# Patient Record
Sex: FEMALE | Race: WHITE | NOT HISPANIC OR LATINO | Employment: OTHER | URBAN - METROPOLITAN AREA
[De-identification: names, ages, dates, MRNs, and addresses within clinical notes are randomized per-mention and may not be internally consistent; named-entity substitution may affect disease eponyms.]

---

## 2018-03-20 ENCOUNTER — OFFICE VISIT (OUTPATIENT)
Dept: ORTHOPEDICS | Facility: CLINIC | Age: 58
End: 2018-03-20
Payer: OTHER GOVERNMENT

## 2018-03-20 VITALS
SYSTOLIC BLOOD PRESSURE: 124 MMHG | BODY MASS INDEX: 39.51 KG/M2 | HEART RATE: 79 BPM | WEIGHT: 276 LBS | HEIGHT: 70 IN | DIASTOLIC BLOOD PRESSURE: 80 MMHG

## 2018-03-20 DIAGNOSIS — M75.22 BICEPS TENDINITIS OF LEFT SHOULDER: Primary | ICD-10-CM

## 2018-03-20 DIAGNOSIS — M19.012 PRIMARY OSTEOARTHRITIS OF LEFT SHOULDER: ICD-10-CM

## 2018-03-20 PROCEDURE — 73030 X-RAY EXAM OF SHOULDER: CPT | Mod: FY,LT,, | Performed by: ORTHOPAEDIC SURGERY

## 2018-03-20 PROCEDURE — 99203 OFFICE O/P NEW LOW 30 MIN: CPT | Mod: ,,, | Performed by: ORTHOPAEDIC SURGERY

## 2018-03-20 RX ORDER — VENLAFAXINE 75 MG/1
1 TABLET ORAL DAILY
COMMUNITY
Start: 2017-12-21 | End: 2019-12-23

## 2018-03-20 RX ORDER — FUROSEMIDE 20 MG/1
1 TABLET ORAL DAILY
COMMUNITY
Start: 2017-12-21 | End: 2019-04-01

## 2018-03-20 RX ORDER — ZALEPLON 5 MG/1
1 CAPSULE ORAL NIGHTLY
COMMUNITY
Start: 2018-02-14 | End: 2018-06-12

## 2018-03-20 RX ORDER — PANTOPRAZOLE SODIUM 40 MG/1
1 TABLET, DELAYED RELEASE ORAL DAILY
COMMUNITY
Start: 2018-01-31 | End: 2020-07-16 | Stop reason: SDUPTHER

## 2018-03-20 RX ORDER — MULTIVIT WITH MINERALS/HERBS
1 TABLET ORAL DAILY
COMMUNITY
End: 2019-03-13

## 2018-03-20 RX ORDER — PREGABALIN 200 MG/1
1 CAPSULE ORAL 3 TIMES DAILY
COMMUNITY
Start: 2018-01-08 | End: 2020-12-10 | Stop reason: SDUPTHER

## 2018-03-20 RX ORDER — METFORMIN HYDROCHLORIDE 500 MG/1
1 TABLET, EXTENDED RELEASE ORAL DAILY
COMMUNITY
Start: 2018-02-07 | End: 2019-03-13

## 2018-03-20 NOTE — LETTER
March 20, 2018      Alfredito Roy MD at [facility]  [address] [place] [address]  [address]  [facility] [address]  Phone: [phone]  Fax: [phone]          Patient: Lolly Ramírez   MR Number: [id number]   YOB: 1960   Date of Visit: 3/20/2018       Dear Dr. Alfredito Roy:    Thank you for referring Lolly Ramírez to me for evaluation. Attached you will find relevant portions of my assessment and plan of care.    If you have questions, please do not hesitate to call me. I look forward to following Lolly Ramírez along with you.    Sincerely,    Deacon Samson Jr., MD    Enclosure  CC:  No Recipients    If you would like to receive this communication electronically, please contact [email or web address] or [phone] to request more information on [email or web address] Link access.    For providers and/or their staff who would like to refer a patient to [facility], please contact us through our one-stop-shop provider referral line, [facility], at [phone].    If you feel you have received this communication in error or would no longer like to receive these types of communications, please e-mail [email or web address]

## 2018-03-20 NOTE — PROGRESS NOTES
Subjective:       Chief Complaint    Chief Complaint   Patient presents with    Shoulder Pain     NP, left shoulder.  DOI: approx. 18, due to a trip & fall over a dog fence causing her to fall into her trailer.  Previous MRI on 3/14/18 @ [facility].  Made worse by outward flexion & overhead activity.  Made better by not using it.  Hx of rupture bicep tendon surgery in 2005 by Dr. Samson.       HPI  Lolly Ramírez is a 57 y.o.  female who presents Chronic left shoulder pain for the past 6 or 7 months. Wakes her up at night. Also has lymphedema of the left upper extremity from breast cancer. MRI of the left shoulder done on  showed a partial undersurface tear of the supraspinatus involving a small portion of the footprint. There was also a small tear of the few fibers of the subscapularis. Degenerative changes were seen at the acromioclavicular joint as well.      Past History  Past Medical History:   Diagnosis Date    Depression     Diabetes mellitus, type 2     GERD (gastroesophageal reflux disease)     Hx of breast cancer     Insomnia     Lymphedema     Neuropathy of both feet      Past Surgical History:   Procedure Laterality Date    BICEPS TENDON REPAIR Left 2005    BREAST LUMPECTOMY Left      SECTION      , ,     CHOLECYSTECTOMY      Elbow fx Left     HYSTERECTOMY      KNEE ARTHROSCOPY W/ MENISCAL REPAIR Left     Lower back ruptured disc  2010    LYMPH NODE DISSECTION       Social History     Social History    Marital status:      Spouse name: N/A    Number of children: N/A    Years of education: N/A     Occupational History    Not on file.     Social History Main Topics    Smoking status: Former Smoker     Quit date: 2010    Smokeless tobacco: Never Used    Alcohol use Yes      Comment: socially    Drug use: No    Sexual activity: Not on file     Other Topics Concern    Not on file     Social History Narrative    No narrative  on file         Medications  Current Outpatient Prescriptions   Medication Sig    ascorbic acid, vitamin C, (VITAMIN C) 100 MG tablet Take 100 mg by mouth once daily.    b complex vitamins tablet Take 1 tablet by mouth once daily.    furosemide (LASIX) 20 MG tablet Take 1 tablet by mouth once daily.    LYRICA 200 mg Cap Take 1 capsule by mouth 2 (two) times daily.    metFORMIN (GLUCOPHAGE-XR) 500 MG 24 hr tablet Take 1 tablet by mouth once daily.     pantoprazole (PROTONIX) 40 MG tablet Take 1 tablet by mouth once daily.    venlafaxine (EFFEXOR) 75 MG tablet Take 1 tablet by mouth once daily.    zaleplon (SONATA) 5 MG Cap Take 1 capsule by mouth every evening.     No current facility-administered medications for this visit.        Allergies  Review of patient's allergies indicates:   Allergen Reactions    Codeine Nausea And Vomiting    Hydromorphone hcl Rash         Review of Systems     Constitutional: Negative    HENT: Negative  Eyes: Negative  Respiratory: Negative  Cardiovascular: Negative  Musculoskeletal: HPI  Skin: Negative  Neurological: Negative  Hematological: Negative  Endocrine: Negative      Physical Exam    Vitals:    03/20/18 0932   BP: 124/80   Pulse: 79     Physical Examination:Her bicipital groove of the left shoulder. External rotation 45°, internal rotation. Upper lumbar. Abduction 100° with pain. Forward flexion 90° with pain. Scaption is 90°. Lymphedema of the left upper extremity is present. Good functioning subscapularis. Positive impingement signs present with pain and weakness.     Skin-clear  General appearance -  well appearing, and in no distress  Mental status - awake  Neck - supple  Chest -  symmetric air entry  Heart - normal rate   Abdomen - soft      Assessment/Plan   Biceps tendinitis of left shoulder  -     X-Ray Shoulder 2 or More Views Left  -     Ambulatory Referral to Physical/Occupational Therapy    Primary osteoarthritis of left shoulder  -     Ambulatory Referral  to Physical/Occupational Therapy    Referred to select physical therapy for dry needling of her left shoulder (patient's choice)        This note was dictated using voice recognition software and may contain grammatical errors.

## 2018-05-01 ENCOUNTER — OFFICE VISIT (OUTPATIENT)
Dept: ORTHOPEDICS | Facility: CLINIC | Age: 58
End: 2018-05-01
Payer: OTHER GOVERNMENT

## 2018-05-01 VITALS
WEIGHT: 276 LBS | HEART RATE: 80 BPM | DIASTOLIC BLOOD PRESSURE: 80 MMHG | BODY MASS INDEX: 39.51 KG/M2 | HEIGHT: 70 IN | SYSTOLIC BLOOD PRESSURE: 130 MMHG

## 2018-05-01 DIAGNOSIS — M75.02 ADHESIVE CAPSULITIS OF LEFT SHOULDER: Primary | ICD-10-CM

## 2018-05-01 PROCEDURE — 99213 OFFICE O/P EST LOW 20 MIN: CPT | Mod: ,,, | Performed by: ORTHOPAEDIC SURGERY

## 2018-05-01 RX ORDER — FUROSEMIDE 20 MG/1
1 TABLET ORAL DAILY
COMMUNITY
Start: 2018-03-21 | End: 2018-06-12

## 2018-05-01 NOTE — PROGRESS NOTES
Subjective:       Chief Complaint    Chief Complaint   Patient presents with    Follow-up     NP, left shoulder.  DOI: approx. 18, due to a trip & fall over a dog fence causing her to fall into her trailer.  Patient in physical therapy once a week.  Doing well with PT.  Able to lift arm w/ good ROM.  Ibufprofen helps with the pain.        HPI  Lolly Ramírez is a 57 y.o.  female who presents Follow-up on painful left shoulder. Not much improvement in physical therapy. We've advised discontinue therapy. She really can't afford it.      Past History  Past Medical History:   Diagnosis Date    Depression     Diabetes mellitus, type 2     GERD (gastroesophageal reflux disease)     Hx of breast cancer     Insomnia     Lymphedema     Neuropathy of both feet      Past Surgical History:   Procedure Laterality Date    BICEPS TENDON REPAIR Left 2005    BREAST LUMPECTOMY Left      SECTION      , ,     CHOLECYSTECTOMY      Elbow fx Left     HYSTERECTOMY      KNEE ARTHROSCOPY W/ MENISCAL REPAIR Left     Lower back ruptured disc  2010    LYMPH NODE DISSECTION       Social History     Social History    Marital status:      Spouse name: N/A    Number of children: N/A    Years of education: N/A     Occupational History    Not on file.     Social History Main Topics    Smoking status: Former Smoker     Quit date: 2010    Smokeless tobacco: Never Used    Alcohol use Yes      Comment: socially    Drug use: No    Sexual activity: Not on file     Other Topics Concern    Not on file     Social History Narrative    No narrative on file         Medications  Current Outpatient Prescriptions   Medication Sig    ascorbic acid, vitamin C, (VITAMIN C) 100 MG tablet Take 100 mg by mouth once daily.    b complex vitamins tablet Take 1 tablet by mouth once daily.    furosemide (LASIX) 20 MG tablet Take 1 tablet by mouth once daily.    furosemide (LASIX) 20 MG tablet 1  tablet once daily.    LYRICA 200 mg Cap Take 1 capsule by mouth 2 (two) times daily.    metFORMIN (GLUCOPHAGE-XR) 500 MG 24 hr tablet Take 1 tablet by mouth once daily.     pantoprazole (PROTONIX) 40 MG tablet Take 1 tablet by mouth once daily.    venlafaxine (EFFEXOR) 75 MG tablet Take 1 tablet by mouth once daily.    zaleplon (SONATA) 5 MG Cap Take 1 capsule by mouth every evening.     No current facility-administered medications for this visit.        Allergies  Review of patient's allergies indicates:   Allergen Reactions    Codeine Nausea And Vomiting    Hydromorphone hcl Rash         Review of Systems     Constitutional: Negative    HENT: Negative  Eyes: Negative  Respiratory: Negative  Cardiovascular: Negative  Musculoskeletal: HPI  Skin: Negative  Neurological: Negative  Hematological: Negative  Endocrine: Negative      Physical Exam    Vitals:    05/01/18 0823   BP: 130/80   Pulse: 80     Physical Examination:Examination left shoulder reveals active abduction to 80° forward flexion is 110°, internal rotation, left iliolumbar and external rotation 45°. There is some mild posterior tenderness.     Skin-clear  General appearance -  well appearing, and in no distress  Mental status - awake  Neck - supple  Chest -  symmetric air entry  Heart - normal rate   Abdomen - soft      Assessment/Plan   Adhesive capsulitis of left shoulder      Pool. Exercises recommended. Continue with weight loss. MRI in March suggested, partial rotator cuff tear of the supraspinatus.      This note was dictated using voice recognition software and may contain grammatical errors.

## 2018-05-01 NOTE — LETTER
May 1, 2018      Alfredito Roy MD at [facility] - [facility]opedic Surgery  [address] [place] [address]  [address] [place] [address]  Phone: [phone]  Fax: [phone]          Patient: Lolly Ramírez   MR Number: [id number]   YOB: 1960   Date of Visit: 5/1/2018       Dear Dr. Alfredito Roy:    Thank you for referring Lolly Ramírez to me for evaluation. Attached you will find relevant portions of my assessment and plan of care.    If you have questions, please do not hesitate to call me. I look forward to following Lolly Ramírez along with you.    Sincerely,    Deacon Samson Jr., MD    Enclosure  CC:  No Recipients    If you would like to receive this communication electronically, please contact [email or web address] or [phone] to request more information on [email or web address] Link access.    For providers and/or their staff who would like to refer a patient to [facility], please contact us through our one-stop-shop provider referral line, [facility], at [phone].    If you feel you have received this communication in error or would no longer like to receive these types of communications, please e-mail [email or web address]

## 2018-06-12 ENCOUNTER — OFFICE VISIT (OUTPATIENT)
Dept: ORTHOPEDICS | Facility: CLINIC | Age: 58
End: 2018-06-12
Payer: OTHER GOVERNMENT

## 2018-06-12 VITALS
BODY MASS INDEX: 39.51 KG/M2 | WEIGHT: 276 LBS | SYSTOLIC BLOOD PRESSURE: 130 MMHG | HEIGHT: 70 IN | HEART RATE: 78 BPM | DIASTOLIC BLOOD PRESSURE: 80 MMHG

## 2018-06-12 DIAGNOSIS — M75.02 ADHESIVE CAPSULITIS OF LEFT SHOULDER: Primary | ICD-10-CM

## 2018-06-12 DIAGNOSIS — M19.012 PRIMARY OSTEOARTHRITIS OF LEFT SHOULDER: ICD-10-CM

## 2018-06-12 PROCEDURE — 99213 OFFICE O/P EST LOW 20 MIN: CPT | Mod: ,,, | Performed by: ORTHOPAEDIC SURGERY

## 2018-06-12 RX ORDER — TRAZODONE HYDROCHLORIDE 50 MG/1
1 TABLET ORAL NIGHTLY
Refills: 3 | COMMUNITY
Start: 2018-06-06 | End: 2020-08-24 | Stop reason: SDUPTHER

## 2018-06-12 NOTE — PROGRESS NOTES
Subjective:       Chief Complaint    Chief Complaint   Patient presents with    Follow-up     left shoulder,DOI: approx. 18, due to a trip & fall over a dog fence causing her to fall into her trailer.  Stopped physical therapy 4 weeks ago.  Doing exercises at home and in the pool.  ROM much better than before.  Taking advil for the pain.         HPI  Lolly Ramírez is a 57 y.o.  female who presents  Improve range of motion and less pain in left shoulder.      Past History  Past Medical History:   Diagnosis Date    Depression     Diabetes mellitus, type 2     GERD (gastroesophageal reflux disease)     Hx of breast cancer     Insomnia     Lymphedema     Neuropathy of both feet      Past Surgical History:   Procedure Laterality Date    BICEPS TENDON REPAIR Left 2005    BREAST LUMPECTOMY Left      SECTION      , ,     CHOLECYSTECTOMY      Elbow fx Left     HYSTERECTOMY      KNEE ARTHROSCOPY W/ MENISCAL REPAIR Left     Lower back ruptured disc  2010    LYMPH NODE DISSECTION       Social History     Social History    Marital status:      Spouse name: N/A    Number of children: N/A    Years of education: N/A     Occupational History    Not on file.     Social History Main Topics    Smoking status: Former Smoker     Quit date: 2010    Smokeless tobacco: Never Used    Alcohol use Yes      Comment: socially    Drug use: No    Sexual activity: Not on file     Other Topics Concern    Not on file     Social History Narrative    No narrative on file         Medications  Current Outpatient Prescriptions   Medication Sig    ascorbic acid, vitamin C, (VITAMIN C) 100 MG tablet Take 100 mg by mouth once daily.    b complex vitamins tablet Take 1 tablet by mouth once daily.    furosemide (LASIX) 20 MG tablet Take 1 tablet by mouth once daily.    LYRICA 200 mg Cap Take 1 capsule by mouth 2 (two) times daily.    metFORMIN (GLUCOPHAGE-XR) 500 MG 24 hr tablet  Take 1 tablet by mouth once daily.     pantoprazole (PROTONIX) 40 MG tablet Take 1 tablet by mouth once daily.    traZODone (DESYREL) 50 MG tablet 1 tablet every evening.    venlafaxine (EFFEXOR) 75 MG tablet Take 1 tablet by mouth once daily.     No current facility-administered medications for this visit.        Allergies  Review of patient's allergies indicates:   Allergen Reactions    Codeine Nausea And Vomiting    Hydromorphone hcl Rash         Review of Systems     Constitutional: Negative    HENT: Negative  Eyes: Negative  Respiratory: Negative  Cardiovascular: Negative  Musculoskeletal: HPI  Skin: Negative  Neurological: Negative  Hematological: Negative  Endocrine: Negative      Physical Exam    Vitals:    06/12/18 0821   BP: 130/80   Pulse: 78     Physical Examination:Left shoulder reveals forward flexion 130° with dates discomfort but better than last time. Abduction 80° with discomfort. Internal rotation to Upper lumbar. External rotation 45°. Scaption 80°. The shoulder is nontender today.     Skin-clear  General appearance -  well appearing, and in no distress  Mental status - awake  Neck - supple  Chest -  symmetric air entry  Heart - normal rate   Abdomen - soft      Assessment/Plan   Adhesive capsulitis of left shoulder    Primary osteoarthritis of left shoulder      Continue with close chain stretching.      This note was dictated using voice recognition software and may contain grammatical errors.

## 2018-08-22 ENCOUNTER — OFFICE VISIT (OUTPATIENT)
Dept: ORTHOPEDICS | Facility: CLINIC | Age: 58
End: 2018-08-22
Payer: OTHER GOVERNMENT

## 2018-08-22 VITALS — WEIGHT: 276 LBS | HEIGHT: 70 IN | BODY MASS INDEX: 39.51 KG/M2

## 2018-08-22 DIAGNOSIS — M75.02 ADHESIVE CAPSULITIS OF LEFT SHOULDER: Primary | ICD-10-CM

## 2018-08-22 PROCEDURE — 99212 OFFICE O/P EST SF 10 MIN: CPT | Mod: ,,, | Performed by: ORTHOPAEDIC SURGERY

## 2018-08-22 RX ORDER — NAPROXEN 500 MG/1
1 TABLET ORAL 2 TIMES DAILY PRN
Refills: 0 | COMMUNITY
Start: 2018-08-16 | End: 2019-04-01

## 2018-08-22 NOTE — LETTER
August 22, 2018      Alfredito Roy MD  [address]  [address]  [facility]  [place] [address]           [facility] - Orthopedic Surgery  [address]  [address]  [place] [address]  Phone: [phone]  Fax: [phone]          Patient: Lolly Ramírez   MR Number: [id number]   YOB: 1960   Date of Visit: 8/22/2018       Dear Dr. Alfredito Roy:    Thank you for referring Lolly Ramírez to me for evaluation. Attached you will find relevant portions of my assessment and plan of care.    If you have questions, please do not hesitate to call me. I look forward to following Lolly Ramírez along with you.    Sincerely,    Deacon Samson Jr., MD    Enclosure  CC:  No Recipients    If you would like to receive this communication electronically, please contact [email or web address] or [phone] to request more information on [employer] Link access.    For providers and/or their staff who would like to refer a patient to [facility], please contact us through our one-stop-shop provider referral line, [facility], at [phone].    If you feel you have received this communication in error or would no longer like to receive these types of communications, please e-mail [email or web address]

## 2018-08-22 NOTE — PROGRESS NOTES
Subjective:       Chief Complaint    Chief Complaint   Patient presents with    Follow-up     left shoulder.  DOI: approx. 18, due to a trip & fall over a dog fence causing her to fall into her trailer.  Patient reports her shoulder is doing better.  She still has some aching but overall doing well.  Doing exercises at home w/ no issues.  She can tell the difference if she forgets to do the exercises.       HPI  Lolly Ramírez is a 57 y.o.  female who presents follow-up on frozen shoulder, left shoulder. Doing much better.      Past History  Past Medical History:   Diagnosis Date    Depression     Diabetes mellitus, type 2     GERD (gastroesophageal reflux disease)     Hx of breast cancer     Insomnia     Lymphedema     Neuropathy of both feet      Past Surgical History:   Procedure Laterality Date    BICEPS TENDON REPAIR Left 2005    BREAST LUMPECTOMY Left      SECTION      , ,     CHOLECYSTECTOMY      Elbow fx Left     HYSTERECTOMY      KNEE ARTHROSCOPY W/ MENISCAL REPAIR Left     Lower back ruptured disc  2010    LYMPH NODE DISSECTION       Social History     Socioeconomic History    Marital status:      Spouse name: Not on file    Number of children: Not on file    Years of education: Not on file    Highest education level: Not on file   Social Needs    Financial resource strain: Not on file    Food insecurity - worry: Not on file    Food insecurity - inability: Not on file    Transportation needs - medical: Not on file    Transportation needs - non-medical: Not on file   Occupational History    Not on file   Tobacco Use    Smoking status: Former Smoker     Last attempt to quit: 2010     Years since quittin.7    Smokeless tobacco: Never Used   Substance and Sexual Activity    Alcohol use: Yes     Comment: socially    Drug use: No    Sexual activity: Not on file   Other Topics Concern    Not on file   Social History Narrative     Not on file         Medications  Current Outpatient Medications   Medication Sig    ascorbic acid, vitamin C, (VITAMIN C) 100 MG tablet Take 100 mg by mouth once daily.    b complex vitamins tablet Take 1 tablet by mouth once daily.    furosemide (LASIX) 20 MG tablet Take 1 tablet by mouth once daily.    LYRICA 200 mg Cap Take 1 capsule by mouth 2 (two) times daily.    metFORMIN (GLUCOPHAGE-XR) 500 MG 24 hr tablet Take 1 tablet by mouth once daily.     naproxen (NAPROSYN) 500 MG tablet Take 1 tablet by mouth 2 (two) times daily as needed.    pantoprazole (PROTONIX) 40 MG tablet Take 1 tablet by mouth once daily.    traZODone (DESYREL) 50 MG tablet 1 tablet every evening.    venlafaxine (EFFEXOR) 75 MG tablet Take 1 tablet by mouth once daily.     No current facility-administered medications for this visit.        Allergies  Review of patient's allergies indicates:   Allergen Reactions    Codeine Nausea And Vomiting    Hydromorphone hcl Rash         Review of Systems     Constitutional: Negative    HENT: Negative  Eyes: Negative  Respiratory: Negative  Cardiovascular: Negative  Musculoskeletal: HPI  Skin: Negative  Neurological: Negative  Hematological: Negative  Endocrine: Negative      Physical Exam    There were no vitals filed for this visit.  Physical Examination: Forward flexion, abduction 180°, external rotation 45°, internal rotation T12. Scaption 90°.     Skin-clear  General appearance -  well appearing, and in no distress  Mental status - awake  Neck - supple  Chest -  symmetric air entry  Heart - normal rate   Abdomen - soft      Assessment/Plan   Adhesive capsulitis of left shoulder        Has basically achieved full range of motion.    This note was dictated using voice recognition software and may contain grammatical errors.

## 2019-03-13 ENCOUNTER — OFFICE VISIT (OUTPATIENT)
Dept: ORTHOPEDICS | Facility: CLINIC | Age: 59
End: 2019-03-13
Payer: MEDICARE

## 2019-03-13 VITALS
WEIGHT: 276 LBS | DIASTOLIC BLOOD PRESSURE: 78 MMHG | HEART RATE: 72 BPM | SYSTOLIC BLOOD PRESSURE: 118 MMHG | HEIGHT: 70 IN | BODY MASS INDEX: 39.51 KG/M2

## 2019-03-13 DIAGNOSIS — S83.241A ACUTE MEDIAL MENISCUS TEAR OF RIGHT KNEE, INITIAL ENCOUNTER: Primary | ICD-10-CM

## 2019-03-13 PROCEDURE — 73560 X-RAY EXAM OF KNEE 1 OR 2: CPT | Mod: FY,RT,, | Performed by: ORTHOPAEDIC SURGERY

## 2019-03-13 PROCEDURE — 73560 PR  X-RAY KNEE 1 OR 2 VIEW: ICD-10-PCS | Mod: FY,RT,, | Performed by: ORTHOPAEDIC SURGERY

## 2019-03-13 PROCEDURE — 20610 DRAIN/INJ JOINT/BURSA W/O US: CPT | Mod: RT,,, | Performed by: ORTHOPAEDIC SURGERY

## 2019-03-13 PROCEDURE — 20610 LARGE JOINT ASPIRATION/INJECTION: R KNEE: ICD-10-PCS | Mod: RT,,, | Performed by: ORTHOPAEDIC SURGERY

## 2019-03-13 PROCEDURE — 99214 OFFICE O/P EST MOD 30 MIN: CPT | Mod: 25,,, | Performed by: ORTHOPAEDIC SURGERY

## 2019-03-13 PROCEDURE — 99214 PR OFFICE/OUTPT VISIT, EST, LEVL IV, 30-39 MIN: ICD-10-PCS | Mod: 25,,, | Performed by: ORTHOPAEDIC SURGERY

## 2019-03-13 RX ORDER — TRIAMCINOLONE ACETONIDE 40 MG/ML
40 INJECTION, SUSPENSION INTRA-ARTICULAR; INTRAMUSCULAR
Status: DISCONTINUED | OUTPATIENT
Start: 2019-03-13 | End: 2019-03-13 | Stop reason: HOSPADM

## 2019-03-13 RX ORDER — TIZANIDINE HYDROCHLORIDE 4 MG/1
4 CAPSULE, GELATIN COATED ORAL
COMMUNITY
Start: 2018-09-12 | End: 2019-04-01

## 2019-03-13 RX ADMIN — TRIAMCINOLONE ACETONIDE 40 MG: 40 INJECTION, SUSPENSION INTRA-ARTICULAR; INTRAMUSCULAR at 01:03

## 2019-03-13 NOTE — PROCEDURES
Large Joint Aspiration/Injection: R knee  Date/Time: 3/13/2019 1:47 PM  Performed by: Taurus Romero MD  Authorized by: Taurus Romero MD     Consent Done?:  Yes (Verbal)  Indications:  Pain and joint swelling  Procedure site marked: Yes    Timeout: Prior to procedure the correct patient, procedure, and site was verified      Location:  Knee  Site:  R knee  Prep: Patient was prepped and draped in usual sterile fashion    Needle size:  22 G  Approach:  Lateral  Medications:  40 mg triamcinolone acetonide 40 mg/mL  Patient tolerance:  Patient tolerated the procedure well with no immediate complications

## 2019-03-13 NOTE — PROGRESS NOTES
Past Medical History:   Diagnosis Date    Depression     Diabetes mellitus, type 2     GERD (gastroesophageal reflux disease)     Hx of breast cancer     Insomnia     Lymphedema     Neuropathy of both feet        Past Surgical History:   Procedure Laterality Date    BICEPS TENDON REPAIR Left 2005    BREAST LUMPECTOMY Left      SECTION      , ,     CHOLECYSTECTOMY      Elbow fx Left 2015    HYSTERECTOMY      KNEE ARTHROSCOPY W/ MENISCAL REPAIR Left     Lower back ruptured disc  2010    LYMPH NODE DISSECTION         Current Outpatient Medications   Medication Sig    furosemide (LASIX) 20 MG tablet Take 1 tablet by mouth once daily.    LYRICA 200 mg Cap Take 1 capsule by mouth 2 (two) times daily.    multivitamin capsule Take 1 capsule by mouth once daily.    naproxen (NAPROSYN) 500 MG tablet Take 1 tablet by mouth 2 (two) times daily as needed.    pantoprazole (PROTONIX) 40 MG tablet Take 1 tablet by mouth once daily.    tiZANidine 4 mg Cap Take 4 mg by mouth.    traZODone (DESYREL) 50 MG tablet 1 tablet every evening.    venlafaxine (EFFEXOR) 75 MG tablet Take 1 tablet by mouth once daily.     No current facility-administered medications for this visit.        Review of patient's allergies indicates:   Allergen Reactions    Codeine Nausea And Vomiting    Hydromorphone hcl Rash       Family History   Problem Relation Age of Onset    No Known Problems Mother     Parkinsonism Father     Diabetes Father        Social History     Socioeconomic History    Marital status:      Spouse name: Not on file    Number of children: Not on file    Years of education: Not on file    Highest education level: Not on file   Social Needs    Financial resource strain: Not on file    Food insecurity - worry: Not on file    Food insecurity - inability: Not on file    Transportation needs - medical: Not on file    Transportation needs - non-medical: Not on file  "  Occupational History    Not on file   Tobacco Use    Smoking status: Former Smoker     Last attempt to quit: 2010     Years since quittin.2    Smokeless tobacco: Never Used   Substance and Sexual Activity    Alcohol use: Yes     Comment: socially    Drug use: No    Sexual activity: Not on file   Other Topics Concern    Not on file   Social History Narrative    Not on file       Chief Complaint:   Chief Complaint   Patient presents with    Right Knee - Pain     Right Knee Pain since 2018. She was dancing and her shoe got caught on a wooden floor. She has had pain in the knee since. Patient had an MRI. No Xrays.        Consulting Physician: Alfredito Roy MD    History of Present Illness:    This is a 58 y.o. year old female who complains of Patient has been a two-month history of right knee pain this occurred aftertwisting any went dancingshe complains of pain in the right knee      ROS    Examination:    Vital Signs:    Vitals:    19 1320   BP: 118/78   Pulse: 72   Weight: 125.2 kg (276 lb)   Height: 5' 10" (1.778 m)   PainSc:   4   PainLoc: Knee       This a well-developed, well nourished patient in no acute distress.    Alert and oriented and cooperative to examination.       Physical Exam: right knee-patient has a mild varus alignment she has medial and anterior medial knee pain on Marley testing on range of motion she has no gross instabilityshe has a moderate effusion    Imaging:  An MRI of the right knee shows evidence of moderate degenerative arthritis of the medial compartmentalso changes in the midportion of the medial meniscus consistent with intrasubstance tear there is also some degenerative changes in the medial aspect of patella AP and lateral x-ray of the right knee shows COPD or narrowing of the medial joint space with early osteophyte formation insistent with degenerative arthritis she has about 1-2 mm ofjoint space present    Assessment: moderate to severe " osteoarthritis of the right knee and medial meniscal tear    Plan:  We will aspirate the knee is a inject with Kenalog and reevaluate her about 3 weeks she shows no improvement will consider arthroscopy of the right knee      DISCLAIMER: This note may have been dictated using voice recognition software and may contain grammatical errors.     NOTE: Consult report sent to referring provider via [employer] EMR.

## 2019-03-13 NOTE — LETTER
March 13, 2019      Alfredito Roy MD  [address]  [address]  [facility]  [place] [address]           [facility] Orthopedic [facility]  [address]  [address]  [place] [address]  Phone: [phone]  Fax: [phone]          Patient: Lolly Ramírez   MR Number: [id number]   YOB: 1960   Date of Visit: 3/13/2019       Dear Dr. Alfredito Roy:    Thank you for referring Lolly Ramírez to me for evaluation. Attached you will find relevant portions of my assessment and plan of care.    If you have questions, please do not hesitate to call me. I look forward to following Lolly Ramírez along with you.    Sincerely,    Taurus Romero MD    Enclosure  CC:  No Recipients    If you would like to receive this communication electronically, please contact [email or web address] or [phone] to request more information on [employer] access.    For providers and/or their staff who would like to refer a patient to [facility], please contact us through our one-stop-shop provider referral line, [facility] [place], at [phone].    If you feel you have received this communication in error or would no longer like to receive these types of communications, please e-mail [email or web address]

## 2019-04-01 ENCOUNTER — OFFICE VISIT (OUTPATIENT)
Dept: ORTHOPEDICS | Facility: CLINIC | Age: 59
End: 2019-04-01
Payer: MEDICARE

## 2019-04-01 VITALS
HEIGHT: 70 IN | BODY MASS INDEX: 39.51 KG/M2 | DIASTOLIC BLOOD PRESSURE: 80 MMHG | WEIGHT: 276 LBS | SYSTOLIC BLOOD PRESSURE: 128 MMHG

## 2019-04-01 DIAGNOSIS — S83.241A ACUTE MEDIAL MENISCUS TEAR OF RIGHT KNEE, INITIAL ENCOUNTER: Primary | ICD-10-CM

## 2019-04-01 DIAGNOSIS — M17.11 PRIMARY OSTEOARTHRITIS OF RIGHT KNEE: ICD-10-CM

## 2019-04-01 PROCEDURE — 99214 OFFICE O/P EST MOD 30 MIN: CPT | Mod: ,,, | Performed by: ORTHOPAEDIC SURGERY

## 2019-04-01 PROCEDURE — 99214 PR OFFICE/OUTPT VISIT, EST, LEVL IV, 30-39 MIN: ICD-10-PCS | Mod: ,,, | Performed by: ORTHOPAEDIC SURGERY

## 2019-04-01 RX ORDER — TRIAZOLAM 0.25 MG/1
TABLET ORAL
Refills: 0 | COMMUNITY
Start: 2019-03-25 | End: 2019-04-22

## 2019-04-01 RX ORDER — TRAMADOL HYDROCHLORIDE 50 MG/1
TABLET ORAL
Refills: 0 | COMMUNITY
Start: 2019-03-25 | End: 2019-04-22

## 2019-04-01 RX ORDER — AMOXICILLIN 500 MG/1
CAPSULE ORAL
Refills: 0 | COMMUNITY
Start: 2019-03-25 | End: 2019-04-22

## 2019-04-01 NOTE — PROGRESS NOTES
Past Medical History:   Diagnosis Date    Depression     Diabetes mellitus, type 2     GERD (gastroesophageal reflux disease)     Hx of breast cancer     Insomnia     Lymphedema     Neuropathy of both feet        Past Surgical History:   Procedure Laterality Date    BICEPS TENDON REPAIR Left 2005    BREAST LUMPECTOMY Left      SECTION      , ,     CHOLECYSTECTOMY      Elbow fx Left 2015    HYSTERECTOMY      KNEE ARTHROSCOPY W/ MENISCAL REPAIR Left     Lower back ruptured disc  2010    LYMPH NODE DISSECTION         Current Outpatient Medications   Medication Sig    amoxicillin (AMOXIL) 500 MG capsule     LYRICA 200 mg Cap Take 1 capsule by mouth 2 (two) times daily.    multivitamin capsule Take 1 capsule by mouth once daily.    pantoprazole (PROTONIX) 40 MG tablet Take 1 tablet by mouth once daily.    traMADol (ULTRAM) 50 mg tablet TK 1 T PO  Q 4 TO 6 H PRN FOR DENTAL PAIN. NOT TO EXCEED 5 TABLETS IN 24 HOUR PERIOD    traZODone (DESYREL) 50 MG tablet 1 tablet every evening.    triazolam (HALCION) 0.25 MG Tab     venlafaxine (EFFEXOR) 75 MG tablet Take 1 tablet by mouth once daily.     No current facility-administered medications for this visit.        Review of patient's allergies indicates:   Allergen Reactions    Codeine Nausea And Vomiting    Hydromorphone hcl Rash       Family History   Problem Relation Age of Onset    No Known Problems Mother     Parkinsonism Father     Diabetes Father        Social History     Socioeconomic History    Marital status:      Spouse name: Not on file    Number of children: Not on file    Years of education: Not on file    Highest education level: Not on file   Occupational History    Not on file   Social Needs    Financial resource strain: Not on file    Food insecurity:     Worry: Not on file     Inability: Not on file    Transportation needs:     Medical: Not on file     Non-medical: Not on file   Tobacco Use  "   Smoking status: Former Smoker     Last attempt to quit: 2010     Years since quittin.3    Smokeless tobacco: Never Used   Substance and Sexual Activity    Alcohol use: Yes     Comment: socially    Drug use: No    Sexual activity: Not on file   Lifestyle    Physical activity:     Days per week: Not on file     Minutes per session: Not on file    Stress: Not on file   Relationships    Social connections:     Talks on phone: Not on file     Gets together: Not on file     Attends Christianity service: Not on file     Active member of club or organization: Not on file     Attends meetings of clubs or organizations: Not on file     Relationship status: Not on file    Intimate partner violence:     Fear of current or ex partner: Not on file     Emotionally abused: Not on file     Physically abused: Not on file     Forced sexual activity: Not on file   Other Topics Concern    Not on file   Social History Narrative    Not on file       Chief Complaint:   Chief Complaint   Patient presents with    Right Knee - Follow-up     Right Knee Pain. Patient had kenalog inj on 2019. She states she is feeling much better. The inj helped ease her pain and decreased swelling       Consulting Physician: No ref. provider found    History of Present Illness:    This is a 58 y.o. year old female who complains of Patient is following of her right knee she had a Kenalog injection last visit she's feelingch betterpatient has an MRI of the right knee which shows moderate arthritic changes in medial compartment along with some tearing of the medial meniscus patient's symptoms are minimal at this time      ROS    Examination:    Vital Signs:    Vitals:    19 0921   BP: 128/80   Weight: 125.2 kg (276 lb)   Height: 5' 10" (1.778 m)   PainSc:   1   PainLoc: Knee       This a well-developed, well nourished patient in no acute distress.    Alert and oriented and cooperative to examination.       Physical Exam: ight " knee-patient is good range of motion there is no effusion she has minimal discomfort medially    Imaging:  Previous MRI shows moderate arthritic changes in the medial compartment but some meniscal changes in the medial meniscus     Assessment: moderate arthritis the right knee her pain is being controlled with theinjection of Kenalog    Plan:  Patient is encouraged to get on weight reduction program we are going to treat her conservativelye'll see her back in 3 months and her symptoms increasingly consider arthroscopy to the right knee      DISCLAIMER: This note may have been dictated using voice recognition software and may contain grammatical errors.     NOTE: Consult report sent to referring provider via [employer] EMR.

## 2019-04-22 ENCOUNTER — OFFICE VISIT (OUTPATIENT)
Dept: ORTHOPEDICS | Facility: CLINIC | Age: 59
End: 2019-04-22
Payer: MEDICARE

## 2019-04-22 VITALS — HEART RATE: 78 BPM | WEIGHT: 276 LBS | BODY MASS INDEX: 39.51 KG/M2 | HEIGHT: 70 IN

## 2019-04-22 DIAGNOSIS — M25.562 CHRONIC PAIN OF LEFT KNEE: ICD-10-CM

## 2019-04-22 DIAGNOSIS — M17.11 PRIMARY OSTEOARTHRITIS OF RIGHT KNEE: Primary | ICD-10-CM

## 2019-04-22 DIAGNOSIS — S83.241A ACUTE MEDIAL MENISCUS TEAR OF RIGHT KNEE, INITIAL ENCOUNTER: ICD-10-CM

## 2019-04-22 DIAGNOSIS — G89.29 CHRONIC PAIN OF LEFT KNEE: ICD-10-CM

## 2019-04-22 PROCEDURE — 99214 PR OFFICE/OUTPT VISIT, EST, LEVL IV, 30-39 MIN: ICD-10-PCS | Mod: ,,, | Performed by: ORTHOPAEDIC SURGERY

## 2019-04-22 PROCEDURE — 73560 PR  X-RAY KNEE 1 OR 2 VIEW: ICD-10-PCS | Mod: FY,LT,, | Performed by: ORTHOPAEDIC SURGERY

## 2019-04-22 PROCEDURE — 73560 X-RAY EXAM OF KNEE 1 OR 2: CPT | Mod: FY,LT,, | Performed by: ORTHOPAEDIC SURGERY

## 2019-04-22 PROCEDURE — 99214 OFFICE O/P EST MOD 30 MIN: CPT | Mod: ,,, | Performed by: ORTHOPAEDIC SURGERY

## 2019-04-22 NOTE — PROGRESS NOTES
Past Medical History:   Diagnosis Date    Depression     Diabetes mellitus, type 2     GERD (gastroesophageal reflux disease)     Hx of breast cancer     Insomnia     Lymphedema     Neuropathy of both feet        Past Surgical History:   Procedure Laterality Date    BICEPS TENDON REPAIR Left 2005    BREAST LUMPECTOMY Left      SECTION      , ,     CHOLECYSTECTOMY      Elbow fx Left 2015    HYSTERECTOMY      KNEE ARTHROSCOPY W/ MENISCAL REPAIR Left     Lower back ruptured disc  2010    LYMPH NODE DISSECTION         Current Outpatient Medications   Medication Sig    LYRICA 200 mg Cap Take 1 capsule by mouth 2 (two) times daily.    multivitamin capsule Take 1 capsule by mouth once daily.    pantoprazole (PROTONIX) 40 MG tablet Take 1 tablet by mouth once daily.    traZODone (DESYREL) 50 MG tablet 1 tablet every evening.    venlafaxine (EFFEXOR) 75 MG tablet Take 1 tablet by mouth once daily.     No current facility-administered medications for this visit.        Review of patient's allergies indicates:   Allergen Reactions    Codeine Nausea And Vomiting    Dilaudid  [hydromorphone (bulk)] Rash    Hydromorphone hcl Rash       Family History   Problem Relation Age of Onset    No Known Problems Mother     Parkinsonism Father     Diabetes Father        Social History     Socioeconomic History    Marital status:      Spouse name: Not on file    Number of children: Not on file    Years of education: Not on file    Highest education level: Not on file   Occupational History    Not on file   Social Needs    Financial resource strain: Not on file    Food insecurity:     Worry: Not on file     Inability: Not on file    Transportation needs:     Medical: Not on file     Non-medical: Not on file   Tobacco Use    Smoking status: Former Smoker     Last attempt to quit: 2010     Years since quittin.3    Smokeless tobacco: Never Used   Substance and Sexual  "Activity    Alcohol use: Yes     Comment: socially    Drug use: No    Sexual activity: Not on file   Lifestyle    Physical activity:     Days per week: Not on file     Minutes per session: Not on file    Stress: Not on file   Relationships    Social connections:     Talks on phone: Not on file     Gets together: Not on file     Attends Adventist service: Not on file     Active member of club or organization: Not on file     Attends meetings of clubs or organizations: Not on file     Relationship status: Not on file   Other Topics Concern    Not on file   Social History Narrative    Not on file       Chief Complaint:   Chief Complaint   Patient presents with    Right Knee - Pain     Right knee pain. Patient had Right Knee Mri on 03/04/2019. She was seen on 04/01/2019 after kenalog inj. She is now having popping and grinding with pain for the past 3 weeks. Her Right is worse than Left.     Left Knee - Pain     Left Knee Pain is not as bad as right however she does have pain.         Consulting Physician: No ref. provider found    History of Present Illness:    This is a 58 y.o. year old female who complains of patient is following up for a history of right knee pain patient had a recent MRI of the right knee she was given a Kenalog injection she's now having some popping and grinding pain in the last 3 weeksher right knee is worse in the left      ROS    Examination:    Vital Signs:    Vitals:    04/22/19 1410   Pulse: 78   Weight: 125.2 kg (276 lb)   Height: 5' 10" (1.778 m)   PainSc:   7   PainLoc: Knee       This a well-developed, well nourished patient in no acute distress.    Alert and oriented and cooperative to examination.       Physical Exam: right knee-patient has a mild effusion is a right knee and does have some pain in the medial compartment range of motion      Left knee-she has no effusion today to the left knee has good range of motion and minimal discomfort    Imaging:  X-ray left knee show " some mild degenerative changes of medial compartment no acute changes     Assessment: degenerative arthritis of the right knee with degenerative tear of the medial meniscus right knee    Plan:  Patient wishes to have arthroscopy to the right knee will go ahead and schedule to clean out the knee if she fails arthroscopy will considrSupartz injections depending on what we find during the arthroscopy      DISCLAIMER: This note may have been dictated using voice recognition software and may contain grammatical errors.     NOTE: Consult report sent to referring provider via [facility] EMR.

## 2019-04-23 DIAGNOSIS — G89.29 CHRONIC PAIN OF LEFT KNEE: ICD-10-CM

## 2019-04-23 DIAGNOSIS — M17.11 PRIMARY OSTEOARTHRITIS OF RIGHT KNEE: ICD-10-CM

## 2019-04-23 DIAGNOSIS — S83.241A ACUTE MEDIAL MENISCUS TEAR OF RIGHT KNEE, INITIAL ENCOUNTER: Primary | ICD-10-CM

## 2019-04-23 DIAGNOSIS — M25.562 CHRONIC PAIN OF LEFT KNEE: ICD-10-CM

## 2019-04-26 DIAGNOSIS — R91.8 LUNG MASS: Primary | ICD-10-CM

## 2019-04-26 LAB
ALBUMIN SERPL-MCNC: 3.9 G/DL (ref 3.1–4.7)
ALP SERPL-CCNC: 74 IU/L (ref 40–104)
ALT (SGPT): 25 IU/L (ref 3–33)
AST SERPL-CCNC: 27 IU/L (ref 10–40)
BILIRUB SERPL-MCNC: 0.7 MG/DL (ref 0.3–1)
BUN SERPL-MCNC: 13 MG/DL (ref 8–20)
CALCIUM SERPL-MCNC: 9.3 MG/DL (ref 7.7–10.4)
CHLORIDE: 103 MMOL/L (ref 98–110)
CO2 SERPL-SCNC: 28 MMOL/L (ref 22.8–31.6)
CREATININE: 0.71 MG/DL (ref 0.6–1.4)
GLUCOSE: 81 MG/DL (ref 70–99)
HCT VFR BLD AUTO: 45.6 % (ref 36–48)
HGB BLD-MCNC: 14.3 G/DL (ref 12–15)
MCH RBC QN AUTO: 29 PG (ref 25–35)
MCHC RBC AUTO-ENTMCNC: 31.4 G/DL (ref 31–36)
MCV RBC AUTO: 92.5 FL (ref 79–98)
NUCLEATED RBCS: 0 %
PLATELET # BLD AUTO: 283 K/UL (ref 140–440)
POTASSIUM SERPL-SCNC: 3.7 MMOL/L (ref 3.5–5)
PROT SERPL-MCNC: 7 G/DL (ref 6–8.2)
RBC # BLD AUTO: 4.93 M/UL (ref 3.5–5.5)
SODIUM: 141 MMOL/L (ref 134–144)
WBC # BLD AUTO: 6.8 K/UL (ref 5–10)

## 2019-05-13 ENCOUNTER — OFFICE VISIT (OUTPATIENT)
Dept: ORTHOPEDICS | Facility: CLINIC | Age: 59
End: 2019-05-13
Payer: MEDICARE

## 2019-05-13 VITALS — BODY MASS INDEX: 39.51 KG/M2 | WEIGHT: 276 LBS | HEIGHT: 70 IN

## 2019-05-13 DIAGNOSIS — M17.11 PRIMARY OSTEOARTHRITIS OF RIGHT KNEE: Primary | ICD-10-CM

## 2019-05-13 PROCEDURE — 99024 POSTOP FOLLOW-UP VISIT: CPT | Mod: POP,,, | Performed by: ORTHOPAEDIC SURGERY

## 2019-05-13 PROCEDURE — 99024 PR POST-OP FOLLOW-UP VISIT: ICD-10-PCS | Mod: POP,,, | Performed by: ORTHOPAEDIC SURGERY

## 2019-05-13 NOTE — PROGRESS NOTES
Past Medical History:   Diagnosis Date    Depression     Diabetes mellitus, type 2     GERD (gastroesophageal reflux disease)     Hx of breast cancer     Insomnia     Lymphedema     Neuropathy of both feet        Past Surgical History:   Procedure Laterality Date    BICEPS TENDON REPAIR Left 2005    BREAST LUMPECTOMY Left      SECTION      , ,     CHOLECYSTECTOMY      Elbow fx Left 2015    HYSTERECTOMY      KNEE ARTHROSCOPY W/ MENISCAL REPAIR Left     Lower back ruptured disc  2010    LYMPH NODE DISSECTION         Current Outpatient Medications   Medication Sig    LYRICA 200 mg Cap Take 1 capsule by mouth 2 (two) times daily.    multivitamin capsule Take 1 capsule by mouth once daily.    pantoprazole (PROTONIX) 40 MG tablet Take 1 tablet by mouth once daily.    traZODone (DESYREL) 50 MG tablet 1 tablet every evening.    venlafaxine (EFFEXOR) 75 MG tablet Take 1 tablet by mouth once daily.     No current facility-administered medications for this visit.        Review of patient's allergies indicates:   Allergen Reactions    Codeine Nausea And Vomiting    Dilaudid  [hydromorphone (bulk)] Rash    Hydromorphone hcl Rash       Family History   Problem Relation Age of Onset    No Known Problems Mother     Parkinsonism Father     Diabetes Father        Social History     Socioeconomic History    Marital status:      Spouse name: Not on file    Number of children: Not on file    Years of education: Not on file    Highest education level: Not on file   Occupational History    Not on file   Social Needs    Financial resource strain: Not on file    Food insecurity:     Worry: Not on file     Inability: Not on file    Transportation needs:     Medical: Not on file     Non-medical: Not on file   Tobacco Use    Smoking status: Former Smoker     Last attempt to quit: 2010     Years since quittin.4    Smokeless tobacco: Never Used   Substance and Sexual  "Activity    Alcohol use: Yes     Comment: socially    Drug use: No    Sexual activity: Not on file   Lifestyle    Physical activity:     Days per week: Not on file     Minutes per session: Not on file    Stress: Not on file   Relationships    Social connections:     Talks on phone: Not on file     Gets together: Not on file     Attends Anglican service: Not on file     Active member of club or organization: Not on file     Attends meetings of clubs or organizations: Not on file     Relationship status: Not on file   Other Topics Concern    Not on file   Social History Narrative    Not on file       Chief Complaint:   Chief Complaint   Patient presents with    Right Knee - Post-op Evaluation     DOS: 05/02/2019 11 days S/P Arthroscopy with knee with chondroplasty medial,lateral and patellofemoral compartments with synovectomy. Doing well. Pain with extended standing. No pain med needed. Sutures in place       Consulting Physician: No ref. provider found    History of Present Illness:    This is a 58 y.o. year old female who complains of patient's 11 day status post arthroscopy to the right knee shows found to have moderate to severe tricompartmental arthritis of the right knee synovitis      ROS    Examination:    Vital Signs:    Vitals:    05/13/19 0901   Weight: 125.2 kg (276 lb)   Height: 5' 10" (1.778 m)   PainSc:   2       This a well-developed, well nourished patient in no acute distress.    Alert and oriented and cooperative to examination.       Physical Exam: right knee-her incisions are healing well she has a slight effusion she has s bruising in t area she has a negative Homans sign    Imaging:  No x-ray     Assessment: oderate to severe tricompartmental arthritis the right knee    Plan:  Patient instructed on straight leg raises quadriceps exercises she continue full weightbearing as tolerated she did take some Aleve and Tylenol as needed a follow-up in 3 weeks if she still having issues with " may consider her for Zunilda perez      DISCLAIMER: This note may have been dictated using voice recognition software and may contain grammatical errors.     NOTE: Consult report sent to referring provider via [employer] EMR.

## 2019-06-19 ENCOUNTER — OFFICE VISIT (OUTPATIENT)
Dept: ORTHOPEDICS | Facility: CLINIC | Age: 59
End: 2019-06-19
Payer: MEDICARE

## 2019-06-19 VITALS — WEIGHT: 276 LBS | BODY MASS INDEX: 39.51 KG/M2 | HEIGHT: 70 IN | HEART RATE: 71 BPM

## 2019-06-19 DIAGNOSIS — M17.11 PRIMARY OSTEOARTHRITIS OF RIGHT KNEE: Primary | ICD-10-CM

## 2019-06-19 PROCEDURE — 99024 POSTOP FOLLOW-UP VISIT: CPT | Mod: POP,,, | Performed by: ORTHOPAEDIC SURGERY

## 2019-06-19 PROCEDURE — 99024 PR POST-OP FOLLOW-UP VISIT: ICD-10-PCS | Mod: POP,,, | Performed by: ORTHOPAEDIC SURGERY

## 2019-06-19 RX ORDER — NAPROXEN 500 MG/1
TABLET ORAL
Refills: 0 | COMMUNITY
Start: 2019-05-16 | End: 2019-12-03 | Stop reason: SDUPTHER

## 2019-06-19 RX ORDER — TRIAMCINOLONE ACETONIDE 5 MG/G
OINTMENT TOPICAL
Refills: 0 | COMMUNITY
Start: 2019-05-16 | End: 2020-03-05

## 2019-06-19 NOTE — PROGRESS NOTES
Past Medical History:   Diagnosis Date    Depression     Diabetes mellitus, type 2     GERD (gastroesophageal reflux disease)     Hx of breast cancer     Insomnia     Lymphedema     Neuropathy of both feet        Past Surgical History:   Procedure Laterality Date    BICEPS TENDON REPAIR Left 2005    BREAST LUMPECTOMY Left      SECTION      , ,     CHOLECYSTECTOMY      Elbow fx Left 2015    HYSTERECTOMY      KNEE ARTHROSCOPY W/ MENISCAL REPAIR Left     Lower back ruptured disc  2010    LYMPH NODE DISSECTION         Current Outpatient Medications   Medication Sig    LYRICA 200 mg Cap Take 1 capsule by mouth 2 (two) times daily.    multivitamin capsule Take 1 capsule by mouth once daily.    naproxen (NAPROSYN) 500 MG tablet TK 1 T PO Q 12 H PRN. TAKE WITH FOOD OR MILK.    pantoprazole (PROTONIX) 40 MG tablet Take 1 tablet by mouth once daily.    traZODone (DESYREL) 50 MG tablet 1 tablet every evening.    triamcinolone (KENALOG) 0.5 % ointment NOHEMY EXT AA BID FOR 10 DAYS    venlafaxine (EFFEXOR) 75 MG tablet Take 1 tablet by mouth once daily.     No current facility-administered medications for this visit.        Review of patient's allergies indicates:   Allergen Reactions    Codeine Nausea And Vomiting    Dilaudid  [hydromorphone (bulk)] Rash    Hydromorphone hcl Rash       Family History   Problem Relation Age of Onset    No Known Problems Mother     Parkinsonism Father     Diabetes Father        Social History     Socioeconomic History    Marital status:      Spouse name: Not on file    Number of children: Not on file    Years of education: Not on file    Highest education level: Not on file   Occupational History    Not on file   Social Needs    Financial resource strain: Not on file    Food insecurity:     Worry: Not on file     Inability: Not on file    Transportation needs:     Medical: Not on file     Non-medical: Not on file   Tobacco Use     "Smoking status: Former Smoker     Last attempt to quit: 2010     Years since quittin.5    Smokeless tobacco: Never Used   Substance and Sexual Activity    Alcohol use: Yes     Comment: socially    Drug use: No    Sexual activity: Not on file   Lifestyle    Physical activity:     Days per week: Not on file     Minutes per session: Not on file    Stress: Not on file   Relationships    Social connections:     Talks on phone: Not on file     Gets together: Not on file     Attends Hoahaoism service: Not on file     Active member of club or organization: Not on file     Attends meetings of clubs or organizations: Not on file     Relationship status: Not on file   Other Topics Concern    Not on file   Social History Narrative    Not on file       Chief Complaint:   Chief Complaint   Patient presents with    Right Knee - Post-op Evaluation, Pain     DOS: 2019 6 weeks and 6 days S/P Arthroscopy with knee with chondroplasty medial,lateral and patellofemoral compartments with synovectomy. Patient is feeling well. She has pain when standing and walking. While sitting PL [address]       Consulting Physician: No ref. provider found    History of Present Illness:    This is a 58 y.o. year old female who complains of patient is 7 weeks status post arthroscopy to the right knee she underwent a chondroplasty of the medial lateral and patellofemoral compartments with synovectomy she was found to have moderate to severe tricompartmental arthritis and synovitis of the right knee she states she does well when she is sitting has no pain she does have some pain when standing and walking      ROS    Examination:    Vital Signs:    Vitals:    19 0843   Pulse: 71   Weight: 125.2 kg (276 lb)   Height: 5' 10" (1.778 m)   PainSc:   8   PainLoc: Knee       This a well-developed, well nourished patient in no acute distress.    Alert and oriented and cooperative to examination.       [facility] " eff[facility] to the right knee she has good range of motion she does have some discomfort on range of motionpatient has numerous skin irritation lesions in both of her lower extremities below the knee appears to be more allergic response    Imaging:  No x-rays today     Assessment: moderate to severe osteoarthritis of the right knee    Plan:  We will start her on some Supartz injections as soon as we get it cleared medically she is presently on naproxen      DISCLAIMER: This note may have been dictated using voice recognition software and may contain grammatical errors.     NOTE: Consult report sent to referring provider via [employer] EMR.

## 2019-07-03 ENCOUNTER — OFFICE VISIT (OUTPATIENT)
Dept: ORTHOPEDICS | Facility: CLINIC | Age: 59
End: 2019-07-03
Payer: MEDICARE

## 2019-07-03 VITALS — BODY MASS INDEX: 39.51 KG/M2 | WEIGHT: 276 LBS | HEIGHT: 70 IN

## 2019-07-03 DIAGNOSIS — M17.11 PRIMARY OSTEOARTHRITIS OF RIGHT KNEE: Primary | ICD-10-CM

## 2019-07-03 PROCEDURE — 99499 NO LOS: ICD-10-PCS | Mod: ,,, | Performed by: ORTHOPAEDIC SURGERY

## 2019-07-03 PROCEDURE — 99499 UNLISTED E&M SERVICE: CPT | Mod: ,,, | Performed by: ORTHOPAEDIC SURGERY

## 2019-07-03 PROCEDURE — 20610 LARGE JOINT ASPIRATION/INJECTION: R KNEE: ICD-10-PCS | Mod: 58,RT,, | Performed by: ORTHOPAEDIC SURGERY

## 2019-07-03 PROCEDURE — 20610 DRAIN/INJ JOINT/BURSA W/O US: CPT | Mod: 58,RT,, | Performed by: ORTHOPAEDIC SURGERY

## 2019-07-03 RX ORDER — TRIAMCINOLONE ACETONIDE 5 MG/G
0.5 OINTMENT TOPICAL
COMMUNITY
Start: 2019-05-16 | End: 2020-03-05

## 2019-07-03 NOTE — PROCEDURES
Large Joint Aspiration/Injection: R knee  Date/Time: 7/3/2019 9:35 AM  Performed by: Taurus Romero MD  Authorized by: Taurus Romero MD     Consent Done?:  Yes (Verbal)  Indications:  Pain and joint swelling  Procedure site marked: Yes    Timeout: Prior to procedure the correct patient, procedure, and site was verified      Location:  Knee  Site:  R knee  Prep: Patient was prepped and draped in usual sterile fashion    Needle size:  22 G  Approach:  Lateral  Medications:  25 mg sodium hyaluronate (supartz) 10 mg/mL  Patient tolerance:  Patient tolerated the procedure well with no immediate complications

## 2019-07-03 NOTE — PROGRESS NOTES
Past Medical History:   Diagnosis Date    Depression     Diabetes mellitus, type 2     GERD (gastroesophageal reflux disease)     Hx of breast cancer     Insomnia     Lymphedema     Neuropathy of both feet        Past Surgical History:   Procedure Laterality Date    BICEPS TENDON REPAIR Left 2005    BREAST LUMPECTOMY Left      SECTION      , ,     CHOLECYSTECTOMY      Elbow fx Left 2015    HYSTERECTOMY      KNEE ARTHROSCOPY W/ MENISCAL REPAIR Left     Lower back ruptured disc  2010    LYMPH NODE DISSECTION         Current Outpatient Medications   Medication Sig    LYRICA 200 mg Cap Take 1 capsule by mouth 2 (two) times daily.    multivitamin capsule Take 1 capsule by mouth once daily.    naproxen (NAPROSYN) 500 MG tablet TK 1 T PO Q 12 H PRN. TAKE WITH FOOD OR MILK.    pantoprazole (PROTONIX) 40 MG tablet Take 1 tablet by mouth once daily.    traZODone (DESYREL) 50 MG tablet 1 tablet every evening.    triamcinolone (KENALOG) 0.5 % ointment NOHEMY EXT AA BID FOR 10 DAYS    triamcinolone (KENALOG) 0.5 % ointment 0.5 % by Other route.    venlafaxine (EFFEXOR) 75 MG tablet Take 1 tablet by mouth once daily.     No current facility-administered medications for this visit.        Review of patient's allergies indicates:   Allergen Reactions    Codeine Nausea And Vomiting    Dilaudid  [hydromorphone (bulk)] Rash    Hydromorphone hcl Rash       Family History   Problem Relation Age of Onset    No Known Problems Mother     Parkinsonism Father     Diabetes Father        Social History     Socioeconomic History    Marital status:      Spouse name: Not on file    Number of children: Not on file    Years of education: Not on file    Highest education level: Not on file   Occupational History    Not on file   Social Needs    Financial resource strain: Not on file    Food insecurity:     Worry: Not on file     Inability: Not on file    Transportation needs:      "Medical: Not on file     Non-medical: Not on file   Tobacco Use    Smoking status: Former Smoker     Last attempt to quit: 2010     Years since quittin.5    Smokeless tobacco: Never Used   Substance and Sexual Activity    Alcohol use: Yes     Comment: socially    Drug use: No    Sexual activity: Not on file   Lifestyle    Physical activity:     Days per week: Not on file     Minutes per session: Not on file    Stress: Not on file   Relationships    Social connections:     Talks on phone: Not on file     Gets together: Not on file     Attends Tenriism service: Not on file     Active member of club or organization: Not on file     Attends meetings of clubs or organizations: Not on file     Relationship status: Not on file   Other Topics Concern    Not on file   Social History Narrative    Not on file       Chief Complaint:   Chief Complaint   Patient presents with    Right Knee - Injections, Pain     Supartz #1 Right Knee         Consulting Physician: No ref. provider found    History of Present Illness:    This is a 58 y.o. year old female who complains of patient has a history of Aranza arthritis right knee she is here for Supartz injection to the right knee      ROS    Examination:    Vital Signs:    Vitals:    19 0841   Weight: 125.2 kg (276 lb)   Height: 5' 10" (1.778 m)       This a well-developed, well nourished patient in no acute distress.    Alert and oriented and cooperative to examination.       Physical Exam: ight knee-no effusion or cellulitis    Imaging:  No x-rays     Assessment: osteoarthritis of the right knee    Plan:  Will inject Supartz      DISCLAIMER: This note may have been dictated using voice recognition software and may contain grammatical errors.     NOTE: Consult report sent to referring provider via [employer] EMR.  "

## 2019-07-10 ENCOUNTER — OFFICE VISIT (OUTPATIENT)
Dept: ORTHOPEDICS | Facility: CLINIC | Age: 59
End: 2019-07-10
Payer: MEDICARE

## 2019-07-10 VITALS — HEIGHT: 70 IN | WEIGHT: 276 LBS | BODY MASS INDEX: 39.51 KG/M2 | HEART RATE: 70 BPM

## 2019-07-10 DIAGNOSIS — M17.11 PRIMARY OSTEOARTHRITIS OF RIGHT KNEE: Primary | ICD-10-CM

## 2019-07-10 PROCEDURE — 20610 LARGE JOINT ASPIRATION/INJECTION: R KNEE: ICD-10-PCS | Mod: 58,RT,, | Performed by: ORTHOPAEDIC SURGERY

## 2019-07-10 PROCEDURE — 20610 DRAIN/INJ JOINT/BURSA W/O US: CPT | Mod: 58,RT,, | Performed by: ORTHOPAEDIC SURGERY

## 2019-07-10 NOTE — PROGRESS NOTES
Past Medical History:   Diagnosis Date    Depression     Diabetes mellitus, type 2     GERD (gastroesophageal reflux disease)     Hx of breast cancer     Insomnia     Lymphedema     Neuropathy of both feet        Past Surgical History:   Procedure Laterality Date    BICEPS TENDON REPAIR Left 2005    BREAST LUMPECTOMY Left      SECTION      , ,     CHOLECYSTECTOMY      Elbow fx Left 2015    HYSTERECTOMY      KNEE ARTHROSCOPY W/ MENISCAL REPAIR Left     Lower back ruptured disc  2010    LYMPH NODE DISSECTION         Current Outpatient Medications   Medication Sig    LYRICA 200 mg Cap Take 1 capsule by mouth 2 (two) times daily.    multivitamin capsule Take 1 capsule by mouth once daily.    naproxen (NAPROSYN) 500 MG tablet TK 1 T PO Q 12 H PRN. TAKE WITH FOOD OR MILK.    pantoprazole (PROTONIX) 40 MG tablet Take 1 tablet by mouth once daily.    traZODone (DESYREL) 50 MG tablet 1 tablet every evening.    triamcinolone (KENALOG) 0.5 % ointment NOHEMY EXT AA BID FOR 10 DAYS    triamcinolone (KENALOG) 0.5 % ointment 0.5 % by Other route.    venlafaxine (EFFEXOR) 75 MG tablet Take 1 tablet by mouth once daily.     No current facility-administered medications for this visit.        Review of patient's allergies indicates:   Allergen Reactions    Codeine Nausea And Vomiting    Dilaudid  [hydromorphone (bulk)] Rash    Hydromorphone hcl Rash       Family History   Problem Relation Age of Onset    No Known Problems Mother     Parkinsonism Father     Diabetes Father        Social History     Socioeconomic History    Marital status:      Spouse name: Not on file    Number of children: Not on file    Years of education: Not on file    Highest education level: Not on file   Occupational History    Not on file   Social Needs    Financial resource strain: Not on file    Food insecurity:     Worry: Not on file     Inability: Not on file    Transportation needs:      "Medical: Not on file     Non-medical: Not on file   Tobacco Use    Smoking status: Former Smoker     Last attempt to quit: 2010     Years since quittin.6    Smokeless tobacco: Never Used   Substance and Sexual Activity    Alcohol use: Yes     Comment: socially    Drug use: No    Sexual activity: Not on file   Lifestyle    Physical activity:     Days per week: Not on file     Minutes per session: Not on file    Stress: Not on file   Relationships    Social connections:     Talks on phone: Not on file     Gets together: Not on file     Attends Confucianist service: Not on file     Active member of club or organization: Not on file     Attends meetings of clubs or organizations: Not on file     Relationship status: Not on file   Other Topics Concern    Not on file   Social History Narrative    Not on file       Chief Complaint:   Chief Complaint   Patient presents with    Right Knee - Pain, Injections     Supartz #2 Right Knee        Consulting Physician: No ref. provider found    History of Present Illness:    This is a 58 y.o. year old female who complains of patient has a history of arthritis of the right knee here for #2 Supartz injection      ROS    Examination:    Vital Signs:    Vitals:    07/10/19 1311   Pulse: 70   Weight: 125.2 kg (276 lb)   Height: 5' 10" (1.778 m)   PainSc:   4   PainLoc: Knee       This a well-developed, well nourished patient in no acute distress.    Alert and oriented and cooperative to examination.       Physical Exam: right knee-o cellulitis or warmth    Imaging:  o x-rays today     Assessment: steoarthritis of right knee    Plan:  Will inject with Supartz      DISCLAIMER: This note may have been dictated using voice recognition software and may contain grammatical errors.     NOTE: Consult report sent to referring provider via [employer] EMR.  "

## 2019-07-10 NOTE — PROCEDURES
Large Joint Aspiration/Injection: R knee  Date/Time: 7/10/2019 1:25 PM  Performed by: Taurus Romero MD  Authorized by: Taurus Romero MD     Consent Done?:  Yes (Verbal)  Indications:  Pain and joint swelling  Procedure site marked: Yes    Timeout: Prior to procedure the correct patient, procedure, and site was verified      Location:  Knee  Site:  R knee  Prep: Patient was prepped and draped in usual sterile fashion    Needle size:  22 G  Approach:  Lateral  Medications:  25 mg sodium hyaluronate (supartz) 10 mg/mL  Patient tolerance:  Patient tolerated the procedure well with no immediate complications

## 2019-07-17 ENCOUNTER — OFFICE VISIT (OUTPATIENT)
Dept: ORTHOPEDICS | Facility: CLINIC | Age: 59
End: 2019-07-17
Payer: MEDICARE

## 2019-07-17 VITALS — WEIGHT: 276 LBS | HEIGHT: 70 IN | BODY MASS INDEX: 39.51 KG/M2 | HEART RATE: 72 BPM

## 2019-07-17 DIAGNOSIS — M17.11 PRIMARY OSTEOARTHRITIS OF RIGHT KNEE: Primary | ICD-10-CM

## 2019-07-17 PROCEDURE — 20610 DRAIN/INJ JOINT/BURSA W/O US: CPT | Mod: 58,RT,, | Performed by: ORTHOPAEDIC SURGERY

## 2019-07-17 PROCEDURE — 20610 LARGE JOINT ASPIRATION/INJECTION: R KNEE: ICD-10-PCS | Mod: 58,RT,, | Performed by: ORTHOPAEDIC SURGERY

## 2019-07-17 NOTE — PROCEDURES
Large Joint Aspiration/Injection: R knee  Date/Time: 7/17/2019 8:59 AM  Performed by: Taurus Romero MD  Authorized by: Taurus Romero MD     Consent Done?:  Yes (Verbal)  Indications:  Pain and joint swelling  Procedure site marked: Yes    Timeout: Prior to procedure the correct patient, procedure, and site was verified      Location:  Knee  Site:  R knee  Prep: Patient was prepped and draped in usual sterile fashion    Needle size:  22 G  Approach:  Lateral  Medications:  25 mg sodium hyaluronate (supartz) 10 mg/mL  Patient tolerance:  Patient tolerated the procedure well with no immediate complications

## 2019-09-03 ENCOUNTER — OFFICE VISIT (OUTPATIENT)
Dept: ORTHOPEDICS | Facility: CLINIC | Age: 59
End: 2019-09-03
Payer: MEDICARE

## 2019-09-03 VITALS — DIASTOLIC BLOOD PRESSURE: 70 MMHG | HEART RATE: 76 BPM | SYSTOLIC BLOOD PRESSURE: 119 MMHG

## 2019-09-03 DIAGNOSIS — M17.11 PRIMARY OSTEOARTHRITIS OF RIGHT KNEE: Primary | ICD-10-CM

## 2019-09-03 PROCEDURE — 99213 OFFICE O/P EST LOW 20 MIN: CPT | Mod: S$PBB,,, | Performed by: ORTHOPAEDIC SURGERY

## 2019-09-03 PROCEDURE — 99999 PR PBB SHADOW E&M-EST. PATIENT-LVL III: ICD-10-PCS | Mod: PBBFAC,,, | Performed by: ORTHOPAEDIC SURGERY

## 2019-09-03 PROCEDURE — 99213 OFFICE O/P EST LOW 20 MIN: CPT | Mod: PBBFAC,PN | Performed by: ORTHOPAEDIC SURGERY

## 2019-09-03 PROCEDURE — 99213 PR OFFICE/OUTPT VISIT, EST, LEVL III, 20-29 MIN: ICD-10-PCS | Mod: S$PBB,,, | Performed by: ORTHOPAEDIC SURGERY

## 2019-09-03 PROCEDURE — 99999 PR PBB SHADOW E&M-EST. PATIENT-LVL III: CPT | Mod: PBBFAC,,, | Performed by: ORTHOPAEDIC SURGERY

## 2019-09-03 NOTE — PROGRESS NOTES
Past Medical History:   Diagnosis Date    Depression     Diabetes mellitus, type 2     GERD (gastroesophageal reflux disease)     Hx of breast cancer     Insomnia     Lymphedema     Neuropathy of both feet        Past Surgical History:   Procedure Laterality Date    BICEPS TENDON REPAIR Left 2005    BREAST LUMPECTOMY Left      SECTION      , ,     CHOLECYSTECTOMY      Elbow fx Left 2015    HYSTERECTOMY      KNEE ARTHROSCOPY W/ MENISCAL REPAIR Left     Lower back ruptured disc  2010    LYMPH NODE DISSECTION         Current Outpatient Medications   Medication Sig    LYRICA 200 mg Cap Take 1 capsule by mouth 2 (two) times daily.    multivitamin capsule Take 1 capsule by mouth once daily.    naproxen (NAPROSYN) 500 MG tablet TK 1 T PO Q 12 H PRN. TAKE WITH FOOD OR MILK.    pantoprazole (PROTONIX) 40 MG tablet Take 1 tablet by mouth once daily.    traZODone (DESYREL) 50 MG tablet 1 tablet every evening.    triamcinolone (KENALOG) 0.5 % ointment NOHEMY EXT AA BID FOR 10 DAYS    triamcinolone (KENALOG) 0.5 % ointment 0.5 % by Other route.    venlafaxine (EFFEXOR) 75 MG tablet Take 1 tablet by mouth once daily.     No current facility-administered medications for this visit.        Review of patient's allergies indicates:   Allergen Reactions    Codeine Nausea And Vomiting    Dilaudid  [hydromorphone (bulk)] Rash    Hydromorphone hcl Rash       Family History   Problem Relation Age of Onset    No Known Problems Mother     Parkinsonism Father     Diabetes Father        Social History     Socioeconomic History    Marital status:      Spouse name: Not on file    Number of children: Not on file    Years of education: Not on file    Highest education level: Not on file   Occupational History    Not on file   Social Needs    Financial resource strain: Not on file    Food insecurity:     Worry: Not on file     Inability: Not on file    Transportation needs:      Medical: Not on file     Non-medical: Not on file   Tobacco Use    Smoking status: Former Smoker     Last attempt to quit: 2010     Years since quittin.7    Smokeless tobacco: Never Used   Substance and Sexual Activity    Alcohol use: Yes     Comment: socially    Drug use: No    Sexual activity: Not on file   Lifestyle    Physical activity:     Days per week: Not on file     Minutes per session: Not on file    Stress: Not on file   Relationships    Social connections:     Talks on phone: Not on file     Gets together: Not on file     Attends Voodoo service: Not on file     Active member of club or organization: Not on file     Attends meetings of clubs or organizations: Not on file     Relationship status: Not on file   Other Topics Concern    Not on file   Social History Narrative    Not on file       Chief Complaint:   Chief Complaint   Patient presents with    Right Knee - Pain       Consulting Physician: No ref. provider found    History of Present Illness:    This is a 58 y.o. year old female who complains of patient is a history of osteoarthritis of the right knee she had a injection with Supartz in July she stated the injections helped her knee pain she still has some anterior knee pain patient states that her pain level is about 1-2 at times out of 10      ROS    Examination:    Vital Signs:    Vitals:    19 0835   BP: 119/70   Pulse: 76   PainSc:   3   PainLoc: Knee       This a well-developed, well nourished patient in no acute distress.    Alert and oriented and cooperative to examination.       Physical Exam:  Right knee-patient has a slight effusion she has good range of motion she has some discomfort anteriorly minimal discomfort medially    Imaging:  Previous x-rays show about a 1-2 mm articular joint space present consistent with moderate to severe medial compartmental arthritis     Assessment:  Moderate to severe osteoarthritis of the right knee being controlled with  Zunilda and she has taken naproxen    Plan:  Observation at this time patient is returning for pain and swelling increases      DISCLAIMER: This note may have been dictated using voice recognition software and may contain grammatical errors.     NOTE: Consult report sent to referring provider via [employer] EMR.

## 2019-12-03 ENCOUNTER — OFFICE VISIT (OUTPATIENT)
Dept: FAMILY MEDICINE | Facility: CLINIC | Age: 59
End: 2019-12-03
Payer: MEDICARE

## 2019-12-03 VITALS
DIASTOLIC BLOOD PRESSURE: 76 MMHG | BODY MASS INDEX: 34.06 KG/M2 | SYSTOLIC BLOOD PRESSURE: 120 MMHG | WEIGHT: 217 LBS | HEIGHT: 67 IN | HEART RATE: 68 BPM

## 2019-12-03 DIAGNOSIS — Z85.3 HX OF BREAST CANCER: ICD-10-CM

## 2019-12-03 DIAGNOSIS — M19.012 PRIMARY OSTEOARTHRITIS OF LEFT SHOULDER: Primary | ICD-10-CM

## 2019-12-03 DIAGNOSIS — G47.00 INSOMNIA, UNSPECIFIED TYPE: ICD-10-CM

## 2019-12-03 DIAGNOSIS — E11.9 TYPE 2 DIABETES MELLITUS WITHOUT COMPLICATION, WITHOUT LONG-TERM CURRENT USE OF INSULIN: ICD-10-CM

## 2019-12-03 DIAGNOSIS — L29.9 PRURITUS: ICD-10-CM

## 2019-12-03 DIAGNOSIS — K21.9 GASTROESOPHAGEAL REFLUX DISEASE, ESOPHAGITIS PRESENCE NOT SPECIFIED: ICD-10-CM

## 2019-12-03 DIAGNOSIS — M17.11 PRIMARY OSTEOARTHRITIS OF RIGHT KNEE: ICD-10-CM

## 2019-12-03 DIAGNOSIS — Z98.84 HISTORY OF WEIGHT LOSS SURGERY: ICD-10-CM

## 2019-12-03 DIAGNOSIS — R25.2 MUSCLE CRAMPS: ICD-10-CM

## 2019-12-03 DIAGNOSIS — K59.00 CONSTIPATION, UNSPECIFIED CONSTIPATION TYPE: ICD-10-CM

## 2019-12-03 DIAGNOSIS — L40.9 PSORIASIS: ICD-10-CM

## 2019-12-03 PROCEDURE — 99214 PR OFFICE/OUTPT VISIT, EST, LEVL IV, 30-39 MIN: ICD-10-PCS | Mod: S$GLB,,, | Performed by: NURSE PRACTITIONER

## 2019-12-03 PROCEDURE — 99214 OFFICE O/P EST MOD 30 MIN: CPT | Mod: S$GLB,,, | Performed by: NURSE PRACTITIONER

## 2019-12-03 RX ORDER — FUROSEMIDE 20 MG/1
1 TABLET ORAL DAILY PRN
COMMUNITY
End: 2020-08-24 | Stop reason: SDUPTHER

## 2019-12-03 RX ORDER — NAPROXEN 500 MG/1
TABLET ORAL
Qty: 90 TABLET | Refills: 1 | Status: SHIPPED | OUTPATIENT
Start: 2019-12-03 | End: 2020-08-24 | Stop reason: SDUPTHER

## 2019-12-03 RX ORDER — HYDROXYZINE PAMOATE 25 MG/1
25 CAPSULE ORAL NIGHTLY
Qty: 30 CAPSULE | Refills: 3 | Status: SHIPPED | OUTPATIENT
Start: 2019-12-03 | End: 2020-03-05

## 2019-12-03 RX ORDER — DOCUSATE SODIUM 100 MG/1
100 CAPSULE, LIQUID FILLED ORAL DAILY
Qty: 90 CAPSULE | Refills: 0 | Status: SHIPPED | OUTPATIENT
Start: 2019-12-03 | End: 2023-09-26

## 2019-12-04 ENCOUNTER — TELEPHONE (OUTPATIENT)
Dept: FAMILY MEDICINE | Facility: CLINIC | Age: 59
End: 2019-12-04

## 2019-12-04 LAB
ALBUMIN SERPL-MCNC: 4 G/DL (ref 3.6–5.1)
ALBUMIN/GLOB SERPL: 1.4 (CALC) (ref 1–2.5)
ALP SERPL-CCNC: 79 U/L (ref 33–130)
ALT SERPL-CCNC: 12 U/L (ref 6–29)
AST SERPL-CCNC: 16 U/L (ref 10–35)
BILIRUB SERPL-MCNC: 0.5 MG/DL (ref 0.2–1.2)
BUN SERPL-MCNC: 18 MG/DL (ref 7–25)
BUN/CREAT SERPL: NORMAL (CALC) (ref 6–22)
CALCIUM SERPL-MCNC: 9.5 MG/DL (ref 8.6–10.4)
CHLORIDE SERPL-SCNC: 101 MMOL/L (ref 98–110)
CO2 SERPL-SCNC: 31 MMOL/L (ref 20–32)
CREAT SERPL-MCNC: 0.69 MG/DL (ref 0.5–1.05)
GFRSERPLBLD MDRD-ARVRAT: 97 ML/MIN/1.73M2
GLOBULIN SER CALC-MCNC: 2.9 G/DL (CALC) (ref 1.9–3.7)
GLUCOSE SERPL-MCNC: 77 MG/DL (ref 65–139)
MAGNESIUM SERPL-MCNC: 2.1 MG/DL (ref 1.5–2.5)
POTASSIUM SERPL-SCNC: 3.9 MMOL/L (ref 3.5–5.3)
PROT SERPL-MCNC: 6.9 G/DL (ref 6.1–8.1)
SODIUM SERPL-SCNC: 142 MMOL/L (ref 135–146)
T3FREE SERPL-MCNC: 2.6 PG/ML (ref 2.3–4.2)
T4 SERPL-MCNC: 8.3 MCG/DL (ref 5.1–11.9)
THYROPEROXIDASE AB SERPL-ACNC: <1 IU/ML

## 2019-12-04 NOTE — TELEPHONE ENCOUNTER
----- Message from Fern Vizcaino sent at 12/4/2019  2:52 PM CST -----  Good afternoon,    We received a referral on this patient for hx of breast cancer but there the records are not scanned in the patient's chart. We will need progress notes, pathology reports, imaging reports and treatment notes to move forward with scheduling this patient. Please scan into media section or fax to our office @ [phone].    Thank you.

## 2019-12-11 NOTE — PROGRESS NOTES
SUBJECTIVE:    Patient ID: Lolly Ramírez is a 59 y.o. female.    Chief Complaint: Follow-up (brought bottles// SW) and A1C = 5.5    Presents for check up.  Had weight loss surgery earlier this year.  Maintaining weight loss.  Due for labs.  Needs referral to new oncologist.  Patient does have a history of breast cancer was being followed by Oncology in [place] wants to see someone local.  Sleeps okay still suffers with daily back pain. Does not want to undergo any additional surgeries.  Has been having trouble with itching lately.  Symptoms seem to be worse at night.      Office Visit on 12/03/2019   Component Date Value Ref Range Status    Glucose 12/03/2019 77  65 - 139 mg/dL Final    BUN, Bld 12/03/2019 18  7 - 25 mg/dL Final    Creatinine 12/03/2019 0.69  0.50 - 1.05 mg/dL Final    eGFR if non African American 12/03/2019 97  > OR = 60 mL/min/1.73m2 Final    eGFR if African American 12/03/2019 112  > OR = 60 mL/min/1.73m2 Final    BUN/Creatinine Ratio 12/03/2019 NOT APPLICABLE  6 - 22 (calc) Final    Sodium 12/03/2019 142  135 - 146 mmol/L Final    Potassium 12/03/2019 3.9  3.5 - 5.3 mmol/L Final    Chloride 12/03/2019 101  98 - 110 mmol/L Final    CO2 12/03/2019 31  20 - 32 mmol/L Final    Calcium 12/03/2019 9.5  8.6 - 10.4 mg/dL Final    Total Protein 12/03/2019 6.9  6.1 - 8.1 g/dL Final    Albumin 12/03/2019 4.0  3.6 - 5.1 g/dL Final    Globulin, Total 12/03/2019 2.9  1.9 - 3.7 g/dL (calc) Final    Albumin/Globulin Ratio 12/03/2019 1.4  1.0 - 2.5 (calc) Final    Total Bilirubin 12/03/2019 0.5  0.2 - 1.2 mg/dL Final    Alkaline Phosphatase 12/03/2019 79  33 - 130 U/L Final    AST 12/03/2019 16  10 - 35 U/L Final    ALT 12/03/2019 12  6 - 29 U/L Final    Magnesium 12/03/2019 2.1  1.5 - 2.5 mg/dL Final    Thyroid Peroxidase Ab 12/03/2019 <1  <9 IU/mL Final    T4, Total 12/03/2019 8.3  5.1 - 11.9 mcg/dL Final    T3, Free 12/03/2019 2.6  2.3 - 4.2 pg/mL Final       Past Medical  History:   Diagnosis Date    Depression     Diabetes mellitus, type 2     GERD (gastroesophageal reflux disease)     Hx of breast cancer     Insomnia     Lymphedema     Neuropathy of both feet      Past Surgical History:   Procedure Laterality Date    BICEPS TENDON REPAIR Left 2005    BREAST LUMPECTOMY Left      SECTION      , ,     CHOLECYSTECTOMY      Elbow fx Left 2015    HYSTERECTOMY      KNEE ARTHROSCOPY W/ MENISCAL REPAIR Left     Lower back ruptured disc  2010    LYMPH NODE DISSECTION       Family History   Problem Relation Age of Onset    No Known Problems Mother     Parkinsonism Father     Diabetes Father        Marital Status:   Alcohol History:  reports that she drinks alcohol.  Tobacco History:  reports that she quit smoking about 9 years ago. She has never used smokeless tobacco.  Drug History:  reports that she does not use drugs.    Review of patient's allergies indicates:   Allergen Reactions    Codeine Nausea And Vomiting    Dilaudid  [hydromorphone (bulk)] Rash    Hydromorphone hcl Rash       Current Outpatient Medications:     furosemide (LASIX) 20 MG tablet, Take 1 tablet by mouth once daily., Disp: , Rfl:     LYRICA 200 mg Cap, Take 1 capsule by mouth 3 (three) times daily. , Disp: , Rfl:     multivitamin capsule, Take 1 capsule by mouth once daily., Disp: , Rfl:     naproxen (NAPROSYN) 500 MG tablet, TK 1 T PO Q 12 H PRN. TAKE WITH FOOD OR MILK., Disp: 90 tablet, Rfl: 1    pantoprazole (PROTONIX) 40 MG tablet, Take 1 tablet by mouth once daily., Disp: , Rfl:     traZODone (DESYREL) 50 MG tablet, 1 tablet every evening., Disp: , Rfl: 3    triamcinolone (KENALOG) 0.5 % ointment, NOHEMY EXT AA BID FOR 10 DAYS, Disp: , Rfl: 0    triamcinolone (KENALOG) 0.5 % ointment, 0.5 % by Other route., Disp: , Rfl:     venlafaxine (EFFEXOR) 75 MG tablet, Take 1 tablet by mouth once daily., Disp: , Rfl:     docusate sodium (COLACE) 100 MG capsule,  "Take 1 capsule (100 mg total) by mouth once daily., Disp: 90 capsule, Rfl: 0    hydrOXYzine pamoate (VISTARIL) 25 MG Cap, Take 1 capsule (25 mg total) by mouth every evening., Disp: 30 capsule, Rfl: 3    Review of Systems   Constitutional: Negative for chills, fever and unexpected weight change.   HENT: Negative for ear pain, rhinorrhea and sore throat.    Eyes: Negative for pain and visual disturbance.   Respiratory: Negative for cough and shortness of breath.    Cardiovascular: Negative for chest pain, palpitations and leg swelling.   Gastrointestinal: Negative for abdominal pain, diarrhea, nausea and vomiting.   Genitourinary: Negative for difficulty urinating, hematuria and vaginal bleeding.   Musculoskeletal: Positive for back pain. Negative for arthralgias.   Skin: Positive for rash.   Neurological: Negative for dizziness, weakness and headaches.   Psychiatric/Behavioral: Negative for agitation and sleep disturbance. The patient is not nervous/anxious.           Objective:      Vitals:    12/03/19 1038   BP: 120/76   Pulse: 68   Weight: 98.4 kg (217 lb)   Height: 5' 7" (1.702 m)     Body mass index is 33.99 kg/m².  Physical Exam   Constitutional: She is oriented to person, place, and time. She appears well-developed and well-nourished.   HENT:   Right Ear: External ear normal.   Left Ear: External ear normal.   Eyes: Pupils are equal, round, and reactive to light. EOM are normal.   Neck: Normal range of motion. Neck supple.   Cardiovascular: Normal rate, regular rhythm and normal heart sounds.   Pulses:       Dorsalis pedis pulses are 2+ on the right side.        Posterior tibial pulses are 2+ on the right side.   Pulmonary/Chest: Effort normal and breath sounds normal.   Abdominal: Soft. Bowel sounds are normal.   Musculoskeletal: She exhibits no edema or deformity.        Lumbar back: She exhibits decreased range of motion and tenderness.        Right foot: There is normal range of motion and no deformity. "   Feet:   Right Foot:   Protective Sensation: 5 sites tested. 5 sites sensed.   Left Foot:   Protective Sensation: 5 sites tested. 5 sites sensed.   Skin Integrity: Negative for skin breakdown.   Lymphadenopathy:     She has no cervical adenopathy.   Neurological: She is alert and oriented to person, place, and time.   Skin: Skin is warm and dry. Rash noted. Rash is macular (Skilled a macular rash noted to lower extremities some excoriation). There is erythema.   Psychiatric: She has a normal mood and affect. Her behavior is normal.         Assessment:       1. Primary osteoarthritis of left shoulder    2. Muscle cramps    3. Primary osteoarthritis of right knee    4. Hx of breast cancer    5. Gastroesophageal reflux disease, esophagitis presence not specified    6. Insomnia, unspecified type    7. Type 2 diabetes mellitus without complication, without long-term current use of insulin    8. History of weight loss surgery    9. Psoriasis    10. Constipation, unspecified constipation type    11. Pruritus         Plan:       Primary osteoarthritis of left shoulder  -     naproxen (NAPROSYN) 500 MG tablet; TK 1 T PO Q 12 H PRN. TAKE WITH FOOD OR MILK.  Dispense: 90 tablet; Refill: 1    Muscle cramps  -     Comprehensive metabolic panel; Future; Expected date: 12/03/2019  -     Magnesium; Future; Expected date: 12/03/2019    Primary osteoarthritis of right knee    Hx of breast cancer  -     Ambulatory Referral to Hematology / Oncology    Gastroesophageal reflux disease, esophagitis presence not specified    Insomnia, unspecified type    Type 2 diabetes mellitus without complication, without long-term current use of insulin  -     Hemoglobin A1C, POCT    History of weight loss surgery  -     Thyroid peroxidase antibody; Future; Expected date: 12/03/2019  -     T4; Future; Expected date: 12/03/2019  -     T3, free; Future; Expected date: 12/03/2019    Psoriasis  Comments:  March tries lower extremities keep site clean    Orders:  -     hydrOXYzine pamoate (VISTARIL) 25 MG Cap; Take 1 capsule (25 mg total) by mouth every evening.  Dispense: 30 capsule; Refill: 3    Constipation, unspecified constipation type  -     docusate sodium (COLACE) 100 MG capsule; Take 1 capsule (100 mg total) by mouth once daily.  Dispense: 90 capsule; Refill: 0    Pruritus  -     hydrOXYzine pamoate (VISTARIL) 25 MG Cap; Take 1 capsule (25 mg total) by mouth every evening.  Dispense: 30 capsule; Refill: 3      Follow up in about 3 months (around 3/3/2020).

## 2019-12-22 ENCOUNTER — PATIENT MESSAGE (OUTPATIENT)
Dept: FAMILY MEDICINE | Facility: CLINIC | Age: 59
End: 2019-12-22

## 2019-12-23 RX ORDER — VENLAFAXINE HYDROCHLORIDE 150 MG/1
150 CAPSULE, EXTENDED RELEASE ORAL DAILY
Qty: 30 CAPSULE | Refills: 11 | Status: SHIPPED | OUTPATIENT
Start: 2019-12-23 | End: 2020-06-11 | Stop reason: SDUPTHER

## 2019-12-31 ENCOUNTER — TELEPHONE (OUTPATIENT)
Dept: HEMATOLOGY/ONCOLOGY | Facility: CLINIC | Age: 59
End: 2019-12-31

## 2019-12-31 NOTE — TELEPHONE ENCOUNTER
Called patient back to let her know that we have not received any records on her hx of breast cancer. Patient explained that she was a previous patient of Dr. Hernández and she did not know how to obtain her records, I suggested that she come in the office to sign a RUTH and we can fax it to their request fax line. Patient states she will come by sometime in the next couple of weeks. AE

## 2019-12-31 NOTE — TELEPHONE ENCOUNTER
----- Message from Carley De Leon MA sent at 12/31/2019  9:10 AM CST -----  [phone] Pt ready to get NP appt.

## 2020-01-08 ENCOUNTER — TELEPHONE (OUTPATIENT)
Dept: PULMONOLOGY | Facility: CLINIC | Age: 60
End: 2020-01-08

## 2020-01-08 DIAGNOSIS — G47.33 OSA (OBSTRUCTIVE SLEEP APNEA): Primary | ICD-10-CM

## 2020-01-08 NOTE — TELEPHONE ENCOUNTER
Patient does show obstructive sleep apnea on her home sleep test.  We will order a CPAP titration study.

## 2020-01-09 ENCOUNTER — DOCUMENTATION ONLY (OUTPATIENT)
Dept: PULMONOLOGY | Facility: CLINIC | Age: 60
End: 2020-01-09

## 2020-01-09 NOTE — PROGRESS NOTES
The prior documentation is of a sleep study in need for CPAP were on the wrong [facility] [place].  Please disregard

## 2020-02-17 NOTE — PROGRESS NOTES
[facility] Hematolgy/Oncology  History & Physical    Subjective:      Patient ID:   NAME: Lolly Ramírez : 1960     59 y.o. female    Referring Doc: Shaunna Gordon NP  Other Physicians: Steven Tobar (Neuro); Macario Rodas        Chief Complaint: hx/of left breast ca      HPI:  59 y.o. female with diagnosis of history of left breast cancer who has been referred by Shaunna Gordon NP for evaluation by medical hematology/oncology. She was diagnosed with left  Her2Nu positive breast cancer in  ans was treated with chemotherapy + herceptin, radiation and tamoxifen. She is here by herself today. She had lumpectomy in . She has been getting annual breast mammograms. Her daughter was recently diagnosed with breast cancer. She had gastric sleeve about a year ago and has lost about 80 lbs. She last saw Dr Trujillo in 2018.               ROS:   GEN: normal without any fever, night sweats or weight loss  HEENT: normal with no HA's, sore throat, stiff neck, changes in vision  CV: normal with no CP, SOB, PND, LAKE or orthopnea  PULM: normal with no SOB, cough, hemoptysis, sputum or pleuritic pain  GI: normal with no abdominal pain, nausea, vomiting, constipation, diarrhea, melanotic stools, BRBPR, or hematemesis  : normal with no hematuria, dysuria  BREAST: normal with no mass, discharge, pain  SKIN: normal with no rash, erythema, bruising, or swelling       Past Medical/Surgical History:  Past Medical History:   Diagnosis Date    Breast cancer, stage 1, estrogen receptor negative, left () 2020    Depression     Diabetes mellitus, type 2     GERD (gastroesophageal reflux disease)     HER2-positive carcinoma of left breast 2020    Hx of breast cancer     Hx of breast cancer - Her2Nu+/ER+ - 2011 12/3/2019    Insomnia     Lymphedema     Neuropathy of both feet     S/P lumpectomy, left breast 2020     Past Surgical History:   Procedure Laterality Date    BICEPS TENDON REPAIR Left 2005     BREAST LUMPECTOMY Left      SECTION      , ,     CHOLECYSTECTOMY      Elbow fx Left 2015    HYSTERECTOMY      KNEE ARTHROSCOPY W/ MENISCAL REPAIR Left     Lower back ruptured disc  2010    LYMPH NODE DISSECTION           Allergies:  Review of patient's allergies indicates:   Allergen Reactions    Codeine Nausea And Vomiting    Dilaudid  [hydromorphone (bulk)] Rash    Hydromorphone hcl Rash       Social/Family History:  Social History     Socioeconomic History    Marital status:      Spouse name: Not on file    Number of children: Not on file    Years of education: Not on file    Highest education level: Not on file   Occupational History    Not on file   Social Needs    Financial resource strain: Not on file    Food insecurity:     Worry: Not on file     Inability: Not on file    Transportation needs:     Medical: Not on file     Non-medical: Not on file   Tobacco Use    Smoking status: Former Smoker     Last attempt to quit: 2010     Years since quittin.2    Smokeless tobacco: Never Used   Substance and Sexual Activity    Alcohol use: Yes     Comment: socially    Drug use: No    Sexual activity: Not on file   Lifestyle    Physical activity:     Days per week: Not on file     Minutes per session: Not on file    Stress: Not on file   Relationships    Social connections:     Talks on phone: Not on file     Gets together: Not on file     Attends [employer] service: Not on file     Active member of club or organization: Not on file     Attends meetings of clubs or organizations: Not on file     Relationship status: Not on file   Other Topics Concern    Not on file   Social History Narrative    Not on file     Family History   Problem Relation Age of Onset    No Known Problems Mother     Parkinsonism Father     Diabetes Father          Medications:    Current Outpatient Medications:     docusate sodium (COLACE) 100 MG capsule, Take 1 capsule (100 mg  "total) by mouth once daily., Disp: 90 capsule, Rfl: 0    furosemide (LASIX) 20 MG tablet, Take 1 tablet by mouth once daily., Disp: , Rfl:     hydrOXYzine pamoate (VISTARIL) 25 MG Cap, Take 1 capsule (25 mg total) by mouth every evening., Disp: 30 capsule, Rfl: 3    LYRICA 200 mg Cap, Take 1 capsule by mouth 3 (three) times daily. , Disp: , Rfl:     multivitamin capsule, Take 1 capsule by mouth once daily., Disp: , Rfl:     naproxen (NAPROSYN) 500 MG tablet, TK 1 T PO Q 12 H PRN. TAKE WITH FOOD OR MILK., Disp: 90 tablet, Rfl: 1    pantoprazole (PROTONIX) 40 MG tablet, Take 1 tablet by mouth once daily., Disp: , Rfl:     traZODone (DESYREL) 50 MG tablet, 1 tablet every evening., Disp: , Rfl: 3    triamcinolone (KENALOG) 0.5 % ointment, NOHEMY EXT AA BID FOR 10 DAYS, Disp: , Rfl: 0    triamcinolone (KENALOG) 0.5 % ointment, 0.5 % by Other route., Disp: , Rfl:     venlafaxine (EFFEXOR-XR) 150 MG Cp24, Take 1 capsule (150 mg total) by mouth once daily., Disp: 30 capsule, Rfl: 11      Pathology:  Cancer Staging  No matching staging information was found for the patient.    Pathology from 2/28/2011 on chart      Objective:   Vitals:  Blood pressure 130/72, pulse 75, temperature 97.9 °F (36.6 °C), temperature source Oral, resp. rate 19, height 5' 7" (1.702 m), weight 97.5 kg (215 lb).    Physical Examination:   GEN: no apparent distress, comfortable; AAOx3  HEAD: atraumatic and normocephalic  EYES: no pallor, no icterus, PERRLA  ENT: OMM, no pharyngeal erythema, external ears WNL; no nasal discharge; no thrush  NECK: no masses, thyroid normal, trachea midline, no LAD/LN's, supple  CV: RRR with no murmur; normal pulse; normal S1 and S2; no pedal edema  CHEST: Normal respiratory effort; CTAB; normal breath sounds; no wheeze or crackles  ABDOM: nontender and nondistended; soft; normal bowel sounds; no rebound/guarding  MUSC/Skeletal: ROM normal; no crepitus; joints normal; no deformities or arthropathy  EXTREM: no " clubbing, cyanosis, inflammation or swelling  SKIN: excoriations on legs primarily; psoriasis plaques on feet  : no red  NEURO: grossly intact; motor/sensory WNL; AAOx3; no tremors  PSYCH: normal mood, affect and behavior  LYMPH: normal cervical, supraclavicular, axillary and groin LN's  Breast: lumpectomy on left; no changes      Labs:   4/26/2019  Lab Results   Component Value Date    WBC 6.8 04/26/2019    HGB 14.3 04/26/2019    HCT 45.6 04/26/2019    MCV 92.5 04/26/2019     04/26/2019    CMP  Sodium   Date Value Ref Range Status   12/03/2019 142 135 - 146 mmol/L Final   04/26/2019 141 134 - 144 mmol/L      Potassium   Date Value Ref Range Status   12/03/2019 3.9 3.5 - 5.3 mmol/L Final     Chloride   Date Value Ref Range Status   12/03/2019 101 98 - 110 mmol/L Final   04/26/2019 103 98 - 110 mmol/L      CO2   Date Value Ref Range Status   12/03/2019 31 20 - 32 mmol/L Final     Glucose   Date Value Ref Range Status   12/03/2019 77 65 - 139 mg/dL Final     Comment:               Non-fasting reference interval        04/26/2019 81 70 - 99 mg/dL      BUN, Bld   Date Value Ref Range Status   12/03/2019 18 7 - 25 mg/dL Final     Creatinine   Date Value Ref Range Status   12/03/2019 0.69 0.50 - 1.05 mg/dL Final     Comment:     For patients >49 years of age, the reference limit  for Creatinine is approximately 13% higher for people  identified as -American.        04/26/2019 0.71 0.60 - 1.40 mg/dL      Calcium   Date Value Ref Range Status   12/03/2019 9.5 8.6 - 10.4 mg/dL Final     Total Protein   Date Value Ref Range Status   12/03/2019 6.9 6.1 - 8.1 g/dL Final     Albumin   Date Value Ref Range Status   12/03/2019 4.0 3.6 - 5.1 g/dL Final   04/26/2019 3.9 3.1 - 4.7 g/dL      Total Bilirubin   Date Value Ref Range Status   12/03/2019 0.5 0.2 - 1.2 mg/dL Final     Alkaline Phosphatase   Date Value Ref Range Status   12/03/2019 79 33 - 130 U/L Final     AST   Date Value Ref Range Status   12/03/2019 16  10 - 35 U/L Final     ALT   Date Value Ref Range Status   12/03/2019 12 6 - 29 U/L Final     eGFR if    Date Value Ref Range Status   12/03/2019 112 > OR = 60 mL/min/1.73m2 Final     eGFR if non    Date Value Ref Range Status   12/03/2019 97 > OR = 60 mL/min/1.73m2 Final         Radiology/Diagnostic Studies:    Ct Chest 5/1/2019:    IMPRESSION:  No acute pulmonary process    The abnormality on the chest radiograph most likely secondary to degenerative  changes of the thoracic spine    Small hiatal hernia      PET  12/17/2015 on chart    All lab results and imaging results have been reviewed and discussed with the patient    Assessment:   (1) 59 y.o. female  with diagnosis of history of left breast cancer who has been referred by Shaunna Gordon NP for evaluation by medical hematology/oncology.   - She was diagnosed with Her2Nu positive breast cancer in 2011 and was treated with chemotherapy + herceptin, radiation and tamoxifen.   - T1c N0  - 1.4 cm  - Her2Nu +3; ER neg' VT + but at only 3%  - she developed a severe rash to the tamoxifen in the past and it had to be discontinued  - she had prior lumpectomy      (2) DM II    (3) GERD    (4) OA and bicep tendonitis    (5) Prior microcytic anemia issues in 2017 with GIB - s/p scope with Dr Heart in past    (6) Neuropathy - followed by Dr Tobar    (7) Former smoker (quit 2010)    (8) Chronic skin issues on legs - on creams - seen by derm in past    (9) Gastric sleeve    (10) Chronic back issues      VISIT DIAGNOSES:              Hx of breast cancer    Breast cancer, stage 1, estrogen receptor negative, left (2011)    S/P lumpectomy, left breast    HER2-positive carcinoma of left breast            Plan:     PLAN:  1. Check CBC, CMP. Breast cancer markers, iron panel  2. Need prior mammogram from Feb 2019 and continue with yearly exams  3. F/u with PCP, etc    RTC in  12 months   Fax note to Shaunna Gordon NP; [facility];  Katie Rodas        I have explained and the patient understands all of  the current recommendation(s). I have answered all of their questions to the best of my ability and to their complete satisfaction.             Thank you for allowing me to participate in this patient's care. Please call with any questions or concerns.    Electronically signed Tray Simpson MD

## 2020-02-18 ENCOUNTER — OFFICE VISIT (OUTPATIENT)
Dept: HEMATOLOGY/ONCOLOGY | Facility: CLINIC | Age: 60
End: 2020-02-18
Payer: MEDICARE

## 2020-02-18 VITALS
SYSTOLIC BLOOD PRESSURE: 130 MMHG | WEIGHT: 215 LBS | BODY MASS INDEX: 33.74 KG/M2 | TEMPERATURE: 98 F | HEIGHT: 67 IN | RESPIRATION RATE: 19 BRPM | DIASTOLIC BLOOD PRESSURE: 72 MMHG | HEART RATE: 75 BPM

## 2020-02-18 DIAGNOSIS — C50.912 HER2-POSITIVE CARCINOMA OF LEFT BREAST: ICD-10-CM

## 2020-02-18 DIAGNOSIS — Z17.1 BREAST CANCER, STAGE 1, ESTROGEN RECEPTOR NEGATIVE, LEFT: ICD-10-CM

## 2020-02-18 DIAGNOSIS — R97.1 ELEVATED CANCER ANTIGEN 125 (CA 125): ICD-10-CM

## 2020-02-18 DIAGNOSIS — Z85.3 HX OF BREAST CANCER: Primary | ICD-10-CM

## 2020-02-18 DIAGNOSIS — Z98.890 S/P LUMPECTOMY, LEFT BREAST: ICD-10-CM

## 2020-02-18 DIAGNOSIS — C50.912 BREAST CANCER, STAGE 1, ESTROGEN RECEPTOR NEGATIVE, LEFT: ICD-10-CM

## 2020-02-18 PROBLEM — Z17.31 HER2-POSITIVE CARCINOMA OF LEFT BREAST: Status: ACTIVE | Noted: 2020-02-18

## 2020-02-18 PROCEDURE — 99204 OFFICE O/P NEW MOD 45 MIN: CPT | Mod: S$GLB,,, | Performed by: INTERNAL MEDICINE

## 2020-02-18 PROCEDURE — 99204 PR OFFICE/OUTPT VISIT, NEW, LEVL IV, 45-59 MIN: ICD-10-PCS | Mod: S$GLB,,, | Performed by: INTERNAL MEDICINE

## 2020-03-05 ENCOUNTER — OFFICE VISIT (OUTPATIENT)
Dept: FAMILY MEDICINE | Facility: CLINIC | Age: 60
End: 2020-03-05
Payer: MEDICARE

## 2020-03-05 VITALS
HEIGHT: 67 IN | BODY MASS INDEX: 34 KG/M2 | WEIGHT: 216.63 LBS | HEART RATE: 80 BPM | SYSTOLIC BLOOD PRESSURE: 132 MMHG | DIASTOLIC BLOOD PRESSURE: 82 MMHG

## 2020-03-05 DIAGNOSIS — Z79.899 HIGH RISK MEDICATION USE: ICD-10-CM

## 2020-03-05 DIAGNOSIS — E11.9 TYPE 2 DIABETES MELLITUS WITHOUT COMPLICATION, WITHOUT LONG-TERM CURRENT USE OF INSULIN: Primary | ICD-10-CM

## 2020-03-05 DIAGNOSIS — Z85.3 PERSONAL HISTORY OF MALIGNANT NEOPLASM OF BREAST: ICD-10-CM

## 2020-03-05 DIAGNOSIS — C50.912 MALIGNANT NEOPLASM OF LEFT FEMALE BREAST, UNSPECIFIED ESTROGEN RECEPTOR STATUS, UNSPECIFIED SITE OF BREAST: ICD-10-CM

## 2020-03-05 DIAGNOSIS — Z98.890 PERSONAL HISTORY OF SURGERY TO HEART AND GREAT VESSELS, PRESENTING HAZARDS TO HEALTH: ICD-10-CM

## 2020-03-05 DIAGNOSIS — M17.11 PRIMARY OSTEOARTHRITIS OF RIGHT KNEE: ICD-10-CM

## 2020-03-05 DIAGNOSIS — K21.9 GASTROESOPHAGEAL REFLUX DISEASE, ESOPHAGITIS PRESENCE NOT SPECIFIED: ICD-10-CM

## 2020-03-05 DIAGNOSIS — R97.1 ELEVATED CANCER ANTIGEN 125 (CA 125): ICD-10-CM

## 2020-03-05 DIAGNOSIS — Z98.84 HISTORY OF WEIGHT LOSS SURGERY: ICD-10-CM

## 2020-03-05 LAB — HBA1C MFR BLD: 5.6 %

## 2020-03-05 PROCEDURE — 83036 HEMOGLOBIN GLYCOSYLATED A1C: CPT | Mod: QW,,, | Performed by: NURSE PRACTITIONER

## 2020-03-05 PROCEDURE — 83036 POCT HEMOGLOBIN A1C: ICD-10-PCS | Mod: QW,,, | Performed by: NURSE PRACTITIONER

## 2020-03-05 PROCEDURE — 99214 OFFICE O/P EST MOD 30 MIN: CPT | Mod: S$GLB,,, | Performed by: NURSE PRACTITIONER

## 2020-03-05 PROCEDURE — 99214 PR OFFICE/OUTPT VISIT, EST, LEVL IV, 30-39 MIN: ICD-10-PCS | Mod: S$GLB,,, | Performed by: NURSE PRACTITIONER

## 2020-03-05 NOTE — PROGRESS NOTES
SUBJECTIVE:    Patient ID: Lolly Ramírez is a 59 y.o. female.    Chief Complaint: Diabetes (3 month follow up.....mlr) and Medication Refill (brought bottles.....NO refills needed.....mlr)    59 year old female presents for check up. Recently saw DR. galindo for history of breast cancer. Has labs ordered. Would like to have done in our office. Feeling ok. Had bariatric surgery last year 80lb weight loss. Eating better. Sleeps ok some nights. Followed by neurology for neuropathy. Back still 'gives  Me trouble'.       Office Visit on 03/05/2020   Component Date Value Ref Range Status    Hemoglobin A1C 03/05/2020 5.6  % Final    WBC 03/05/2020 5.4  3.8 - 10.8 Thousand/uL Final    RBC 03/05/2020 4.76  3.80 - 5.10 Million/uL Final    Hemoglobin 03/05/2020 14.0  11.7 - 15.5 g/dL Final    Hematocrit 03/05/2020 42.2  35.0 - 45.0 % Final    Mean Corpuscular Volume 03/05/2020 88.7  80.0 - 100.0 fL Final    Mean Corpuscular Hemoglobin 03/05/2020 29.4  27.0 - 33.0 pg Final    Mean Corpuscular Hemoglobin Conc 03/05/2020 33.2  32.0 - 36.0 g/dL Final    RDW 03/05/2020 12.6  11.0 - 15.0 % Final    Platelets 03/05/2020 325  140 - 400 Thousand/uL Final    MPV 03/05/2020 10.8  7.5 - 12.5 fL Final    Neutrophils Absolute 03/05/2020 3,218  1,500 - 7,800 cells/uL Final    Lymph # 03/05/2020 1,280  850 - 3,900 cells/uL Final    Mono # 03/05/2020 405  200 - 950 cells/uL Final    Eos # 03/05/2020 416  15 - 500 cells/uL Final    Baso # 03/05/2020 81  0 - 200 cells/uL Final    Neutrophils Relative 03/05/2020 59.6  % Final    Lymph% 03/05/2020 23.7  % Final    Mono% 03/05/2020 7.5  % Final    Eosinophil% 03/05/2020 7.7  % Final    Basophil% 03/05/2020 1.5  % Final    Glucose 03/05/2020 86  65 - 99 mg/dL Final    BUN, Bld 03/05/2020 18  7 - 25 mg/dL Final    Creatinine 03/05/2020 0.77  0.50 - 1.05 mg/dL Final    eGFR if non African American 03/05/2020 85  > OR = 60 mL/min/1.73m2 Final    eGFR if   03/05/2020 99  > OR = 60 mL/min/1.73m2 Final    BUN/Creatinine Ratio 03/05/2020 NOT APPLICABLE  6 - 22 (calc) Final    Sodium 03/05/2020 145  135 - 146 mmol/L Final    Potassium 03/05/2020 4.7  3.5 - 5.3 mmol/L Final    Chloride 03/05/2020 107  98 - 110 mmol/L Final    CO2 03/05/2020 32  20 - 32 mmol/L Final    Calcium 03/05/2020 9.3  8.6 - 10.4 mg/dL Final    Total Protein 03/05/2020 6.3  6.1 - 8.1 g/dL Final    Albumin 03/05/2020 4.0  3.6 - 5.1 g/dL Final    Globulin, Total 03/05/2020 2.3  1.9 - 3.7 g/dL (calc) Final    Albumin/Globulin Ratio 03/05/2020 1.7  1.0 - 2.5 (calc) Final    Total Bilirubin 03/05/2020 0.4  0.2 - 1.2 mg/dL Final    Alkaline Phosphatase 03/05/2020 58  37 - 153 U/L Final    AST 03/05/2020 15  10 - 35 U/L Final    ALT 03/05/2020 11  6 - 29 U/L Final    TSH w/reflex to FT4 03/05/2020 1.35  0.40 - 4.50 mIU/L Final    Iron 03/05/2020 74  45 - 160 mcg/dL Final    TIBC 03/05/2020 318  250 - 450 mcg/dL (calc) Final    Iron Saturation 03/05/2020 23  16 - 45 % (calc) Final    Ferritin 03/05/2020 32  16 - 232 ng/mL Final    Cholesterol 03/05/2020 241* <200 mg/dL Final    HDL 03/05/2020 88  > OR = 50 mg/dL Final    Triglycerides 03/05/2020 76  <150 mg/dL Final    LDL Cholesterol 03/05/2020 136* mg/dL (calc) Final    Hdl/Cholesterol Ratio 03/05/2020 2.7  <5.0 (calc) Final    Non HDL Chol. (LDL+VLDL) 03/05/2020 153* <130 mg/dL (calc) Final    Creatinine, Random Ur 03/05/2020 117  20 - 275 mg/dL Final    Microalb, Ur 03/05/2020 1.2  See Note: mg/dL Final    Microalb Creat Ratio 03/05/2020 10  <30 mcg/mg creat Final    CA 15-3 03/05/2020 12  <32 U/mL Final     03/05/2020 13  <35 U/mL Final    CA 27-29 03/05/2020 17  <38 U/mL Final   Office Visit on 12/03/2019   Component Date Value Ref Range Status    Glucose 12/03/2019 77  65 - 139 mg/dL Final    BUN, Bld 12/03/2019 18  7 - 25 mg/dL Final    Creatinine 12/03/2019 0.69  0.50 - 1.05 mg/dL Final    eGFR if non African  American 2019 97  > OR = 60 mL/min/1.73m2 Final    eGFR if African American 2019 112  > OR = 60 mL/min/1.73m2 Final    BUN/Creatinine Ratio 2019 NOT APPLICABLE  6 - 22 (calc) Final    Sodium 2019 142  135 - 146 mmol/L Final    Potassium 2019 3.9  3.5 - 5.3 mmol/L Final    Chloride 2019 101  98 - 110 mmol/L Final    CO2 2019 31  20 - 32 mmol/L Final    Calcium 2019 9.5  8.6 - 10.4 mg/dL Final    Total Protein 2019 6.9  6.1 - 8.1 g/dL Final    Albumin 2019 4.0  3.6 - 5.1 g/dL Final    Globulin, Total 2019 2.9  1.9 - 3.7 g/dL (calc) Final    Albumin/Globulin Ratio 2019 1.4  1.0 - 2.5 (calc) Final    Total Bilirubin 2019 0.5  0.2 - 1.2 mg/dL Final    Alkaline Phosphatase 2019 79  33 - 130 U/L Final    AST 2019 16  10 - 35 U/L Final    ALT 2019 12  6 - 29 U/L Final    Magnesium 2019 2.1  1.5 - 2.5 mg/dL Final    Thyroid Peroxidase Ab 2019 <1  <9 IU/mL Final    T4, Total 2019 8.3  5.1 - 11.9 mcg/dL Final    T3, Free 2019 2.6  2.3 - 4.2 pg/mL Final       Past Medical History:   Diagnosis Date    Breast cancer, stage 1, estrogen receptor negative, left () 2020    Depression     Diabetes mellitus, type 2     GERD (gastroesophageal reflux disease)     HER2-positive carcinoma of left breast 2020    Hx of breast cancer     Hx of breast cancer - Her2Nu+/ER+ - 2011 12/3/2019    Insomnia     Lymphedema     Neuropathy of both feet     S/P lumpectomy, left breast 2020     Past Surgical History:   Procedure Laterality Date    BICEPS TENDON REPAIR Left 2005    BREAST LUMPECTOMY Left      SECTION      , ,     CHOLECYSTECTOMY      Elbow fx Left 2015    HYSTERECTOMY      KNEE ARTHROSCOPY W/ MENISCAL REPAIR Left     Lower back ruptured disc  2010    LYMPH NODE DISSECTION       Family History   Problem Relation Age of Onset    No Known  Problems Mother     Parkinsonism Father     Diabetes Father        Marital Status:   Alcohol History:  reports that she drinks alcohol.  Tobacco History:  reports that she quit smoking about 9 years ago. She has never used smokeless tobacco.  Drug History:  reports that she does not use drugs.    Review of patient's allergies indicates:   Allergen Reactions    Codeine Nausea And Vomiting    Dilaudid  [hydromorphone (bulk)] Rash    Hydromorphone hcl Rash       Current Outpatient Medications:     docusate sodium (COLACE) 100 MG capsule, Take 1 capsule (100 mg total) by mouth once daily. (Patient taking differently: Take 100 mg by mouth every other day. ), Disp: 90 capsule, Rfl: 0    furosemide (LASIX) 20 MG tablet, Take 1 tablet by mouth daily as needed. , Disp: , Rfl:     LYRICA 200 mg Cap, Take 1 capsule by mouth 3 (three) times daily. , Disp: , Rfl:     multivitamin capsule, Take 1 capsule by mouth once daily., Disp: , Rfl:     naproxen (NAPROSYN) 500 MG tablet, TK 1 T PO Q 12 H PRN. TAKE WITH FOOD OR MILK., Disp: 90 tablet, Rfl: 1    pantoprazole (PROTONIX) 40 MG tablet, Take 1 tablet by mouth once daily., Disp: , Rfl:     traZODone (DESYREL) 50 MG tablet, 1 tablet every evening., Disp: , Rfl: 3    venlafaxine (EFFEXOR-XR) 150 MG Cp24, Take 1 capsule (150 mg total) by mouth once daily., Disp: 30 capsule, Rfl: 11    Review of Systems   Constitutional: Negative for chills, fever and unexpected weight change.   HENT: Negative for ear pain, rhinorrhea and sore throat.    Respiratory: Negative for cough and shortness of breath.    Cardiovascular: Negative for chest pain, palpitations and leg swelling.   Gastrointestinal: Negative for abdominal pain, diarrhea, nausea and vomiting.   Genitourinary: Negative for difficulty urinating and hematuria.   Musculoskeletal: Positive for back pain. Negative for arthralgias.   Skin: Positive for rash.   Neurological: Negative for dizziness, weakness and  "headaches.   Psychiatric/Behavioral: Negative for agitation and sleep disturbance. The patient is not nervous/anxious.           Objective:      Vitals:    03/05/20 0847   BP: 132/82   Pulse: 80   Weight: 98.2 kg (216 lb 9.6 oz)   Height: 5' 7" (1.702 m)     Body mass index is 33.92 kg/m².  Physical Exam   Constitutional: She is oriented to person, place, and time. She appears well-developed and well-nourished.   HENT:   Right Ear: External ear normal.   Left Ear: External ear normal.   Eyes: Pupils are equal, round, and reactive to light. EOM are normal.   Neck: Normal range of motion. Neck supple.   Cardiovascular: Normal rate, regular rhythm and normal heart sounds.   Pulses:       Dorsalis pedis pulses are 2+ on the right side.        Posterior tibial pulses are 2+ on the right side.   Pulmonary/Chest: Effort normal and breath sounds normal.   Abdominal: Soft. Bowel sounds are normal.   Musculoskeletal: Normal range of motion. She exhibits no edema or deformity.        Lumbar back: She exhibits tenderness.        Right foot: There is normal range of motion and no deformity.   Feet:   Right Foot:   Protective Sensation: 5 sites tested. 5 sites sensed.   Left Foot:   Protective Sensation: 5 sites tested. 5 sites sensed.   Skin Integrity: Negative for skin breakdown.   Lymphadenopathy:     She has no cervical adenopathy.   Neurological: She is alert and oriented to person, place, and time.   Skin: Skin is warm and dry. Rash noted. There is erythema.   Psoriasis plaques to lower extrem. Mild excoriation   Psychiatric: She has a normal mood and affect. Her behavior is normal.         Assessment:       1. Type 2 diabetes mellitus without complication, without long-term current use of insulin    2. Elevated cancer antigen 125 ()    3. Personal history of malignant neoplasm of breast    4. Malignant neoplasm of left female breast, unspecified estrogen receptor status, unspecified site of breast    5. Personal " history of surgery to heart and great vessels, presenting hazards to health    6. Gastroesophageal reflux disease, esophagitis presence not specified    7. History of weight loss surgery    8. Primary osteoarthritis of right knee    9. High risk medication use         Plan:       Type 2 diabetes mellitus without complication, without long-term current use of insulin  -     Hemoglobin A1C, POCT  -     CBC auto differential; Future; Expected date: 03/05/2020  -     Lipid panel; Future; Expected date: 03/05/2020  -     Microalbumin/creatinine urine ratio; Future; Expected date: 03/05/2020  -     Urinalysis, Reflex to Urine Culture Urine, Clean Catch; Future; Expected date: 03/05/2020  -     Iron and TIBC; Future; Expected date: 03/05/2020  -     Ferritin; Future; Expected date: 03/05/2020    Elevated cancer antigen 125 ()  -     Ferritin; Future; Expected date: 03/05/2020  -     CANCER ANTIGEN 15-3 PLUS; Future; Expected date: 03/05/2020  -     Cancer antigen 27.29; Future; Expected date: 03/05/2020  -     ; Future; Expected date: 03/05/2020    Personal history of malignant neoplasm of breast  -     CANCER ANTIGEN 15-3 PLUS; Future; Expected date: 03/05/2020  -     Cancer antigen 27.29; Future; Expected date: 03/05/2020  -     ; Future; Expected date: 03/05/2020    Malignant neoplasm of left female breast, unspecified estrogen receptor status, unspecified site of breast  -     CANCER ANTIGEN 15-3 PLUS; Future; Expected date: 03/05/2020  -     Cancer antigen 27.29; Future; Expected date: 03/05/2020  -     ; Future; Expected date: 03/05/2020    Personal history of surgery to heart and great vessels, presenting hazards to health  -     CANCER ANTIGEN 15-3 PLUS; Future; Expected date: 03/05/2020  -     Cancer antigen 27.29; Future; Expected date: 03/05/2020  -     ; Future; Expected date: 03/05/2020    Gastroesophageal reflux disease, esophagitis presence not specified    History of weight  loss surgery  -     Iron and TIBC; Future; Expected date: 03/05/2020  -     Ferritin; Future; Expected date: 03/05/2020  -     CANCER ANTIGEN 15-3 PLUS; Future; Expected date: 03/05/2020    Primary osteoarthritis of right knee  -     Comprehensive metabolic panel; Future; Expected date: 03/05/2020  -     Lipid panel; Future; Expected date: 03/05/2020    High risk medication use  -     TSH w/reflex to FT4; Future; Expected date: 03/05/2020  -     Microalbumin/creatinine urine ratio; Future; Expected date: 03/05/2020  -     Urinalysis, Reflex to Urine Culture Urine, Clean Catch; Future; Expected date: 03/05/2020  -     Iron and TIBC; Future; Expected date: 03/05/2020  -     Ferritin; Future; Expected date: 03/05/2020  -     CANCER ANTIGEN 15-3 PLUS; Future; Expected date: 03/05/2020    Other orders  -     Cholesterol, total  -     HDL cholesterol  -     Triglycerides  -     LDL-Cholesterol  -     HDL/Cholesterol Ratio  -      Non-HDL Cholesterol Profile  -     Creatinine, urine, random  -     Microalbumin, Random Urine (w/creatinine)  -     Cancer antigen 15-3  -       -     Cancer antigen 27.29      Follow up in about 3 months (around 6/5/2020) for medication management, Diabetic Check-Up.

## 2020-03-07 LAB
ALBUMIN SERPL-MCNC: 4 G/DL (ref 3.6–5.1)
ALBUMIN/CREAT UR: 10 MCG/MG CREAT
ALBUMIN/GLOB SERPL: 1.7 (CALC) (ref 1–2.5)
ALP SERPL-CCNC: 58 U/L (ref 37–153)
ALT SERPL-CCNC: 11 U/L (ref 6–29)
AST SERPL-CCNC: 15 U/L (ref 10–35)
BASOPHILS # BLD AUTO: 81 CELLS/UL (ref 0–200)
BASOPHILS NFR BLD AUTO: 1.5 %
BILIRUB SERPL-MCNC: 0.4 MG/DL (ref 0.2–1.2)
BUN SERPL-MCNC: 18 MG/DL (ref 7–25)
BUN/CREAT SERPL: NORMAL (CALC) (ref 6–22)
CALCIUM SERPL-MCNC: 9.3 MG/DL (ref 8.6–10.4)
CANCER AG125 SERPL-ACNC: 13 U/ML
CANCER AG15-3 SERPL-ACNC: 12 U/ML
CANCER AG27-29 SERPL-ACNC: 17 U/ML
CHLORIDE SERPL-SCNC: 107 MMOL/L (ref 98–110)
CHOLEST SERPL-MCNC: 241 MG/DL
CHOLEST/HDLC SERPL: 2.7 (CALC)
CO2 SERPL-SCNC: 32 MMOL/L (ref 20–32)
CREAT SERPL-MCNC: 0.77 MG/DL (ref 0.5–1.05)
CREAT UR-MCNC: 117 MG/DL (ref 20–275)
EOSINOPHIL # BLD AUTO: 416 CELLS/UL (ref 15–500)
EOSINOPHIL NFR BLD AUTO: 7.7 %
ERYTHROCYTE [DISTWIDTH] IN BLOOD BY AUTOMATED COUNT: 12.6 % (ref 11–15)
FERRITIN SERPL-MCNC: 32 NG/ML (ref 16–232)
GFRSERPLBLD MDRD-ARVRAT: 85 ML/MIN/1.73M2
GLOBULIN SER CALC-MCNC: 2.3 G/DL (CALC) (ref 1.9–3.7)
GLUCOSE SERPL-MCNC: 86 MG/DL (ref 65–99)
HCT VFR BLD AUTO: 42.2 % (ref 35–45)
HDLC SERPL-MCNC: 88 MG/DL
HGB BLD-MCNC: 14 G/DL (ref 11.7–15.5)
IRON SATN MFR SERPL: 23 % (CALC) (ref 16–45)
IRON SERPL-MCNC: 74 MCG/DL (ref 45–160)
LDLC SERPL CALC-MCNC: 136 MG/DL (CALC)
LYMPHOCYTES # BLD AUTO: 1280 CELLS/UL (ref 850–3900)
LYMPHOCYTES NFR BLD AUTO: 23.7 %
MCH RBC QN AUTO: 29.4 PG (ref 27–33)
MCHC RBC AUTO-ENTMCNC: 33.2 G/DL (ref 32–36)
MCV RBC AUTO: 88.7 FL (ref 80–100)
MICROALBUMIN UR-MCNC: 1.2 MG/DL
MONOCYTES # BLD AUTO: 405 CELLS/UL (ref 200–950)
MONOCYTES NFR BLD AUTO: 7.5 %
NEUTROPHILS # BLD AUTO: 3218 CELLS/UL (ref 1500–7800)
NEUTROPHILS NFR BLD AUTO: 59.6 %
NONHDLC SERPL-MCNC: 153 MG/DL (CALC)
PLATELET # BLD AUTO: 325 THOUSAND/UL (ref 140–400)
PMV BLD REES-ECKER: 10.8 FL (ref 7.5–12.5)
POTASSIUM SERPL-SCNC: 4.7 MMOL/L (ref 3.5–5.3)
PROT SERPL-MCNC: 6.3 G/DL (ref 6.1–8.1)
RBC # BLD AUTO: 4.76 MILLION/UL (ref 3.8–5.1)
SODIUM SERPL-SCNC: 145 MMOL/L (ref 135–146)
TIBC SERPL-MCNC: 318 MCG/DL (CALC) (ref 250–450)
TRIGL SERPL-MCNC: 76 MG/DL
TSH SERPL-ACNC: 1.35 MIU/L (ref 0.4–4.5)
WBC # BLD AUTO: 5.4 THOUSAND/UL (ref 3.8–10.8)

## 2020-03-09 DIAGNOSIS — Z85.3 PERSONAL HISTORY OF MALIGNANT NEOPLASM OF BREAST: Primary | ICD-10-CM

## 2020-03-09 DIAGNOSIS — C50.912 MALIGNANT NEOPLASM OF LEFT BREAST: ICD-10-CM

## 2020-03-09 DIAGNOSIS — C50.912: ICD-10-CM

## 2020-03-09 DIAGNOSIS — Z17.1: ICD-10-CM

## 2020-03-09 DIAGNOSIS — Z98.890 S/P LUMPECTOMY, LEFT BREAST: ICD-10-CM

## 2020-03-17 ENCOUNTER — TELEPHONE (OUTPATIENT)
Dept: FAMILY MEDICINE | Facility: CLINIC | Age: 60
End: 2020-03-17

## 2020-03-17 DIAGNOSIS — E78.5 HYPERLIPIDEMIA, UNSPECIFIED HYPERLIPIDEMIA TYPE: Primary | ICD-10-CM

## 2020-03-17 DIAGNOSIS — Z79.899 HIGH RISK MEDICATION USE: ICD-10-CM

## 2020-03-17 RX ORDER — ROSUVASTATIN CALCIUM 10 MG/1
10 TABLET, COATED ORAL DAILY
Qty: 90 TABLET | Refills: 3 | Status: SHIPPED | OUTPATIENT
Start: 2020-03-17 | End: 2020-06-11 | Stop reason: SDUPTHER

## 2020-03-17 NOTE — TELEPHONE ENCOUNTER
----- Message from Shaunna Gordon NP sent at 3/17/2020  1:51 PM CDT -----  Cholesterol is elevated. Start crestor 10mg once a day. Repeat lipids and cmp in 8 weeks. The rest of your labs are normal.

## 2020-03-24 ENCOUNTER — TELEPHONE (OUTPATIENT)
Dept: HEMATOLOGY/ONCOLOGY | Facility: CLINIC | Age: 60
End: 2020-03-24

## 2020-03-24 ENCOUNTER — HOSPITAL ENCOUNTER (OUTPATIENT)
Dept: RADIOLOGY | Facility: HOSPITAL | Age: 60
Discharge: HOME | End: 2020-03-24
Attending: INTERNAL MEDICINE
Payer: MEDICARE

## 2020-03-24 DIAGNOSIS — C50.912: ICD-10-CM

## 2020-03-24 DIAGNOSIS — C50.912 HER2-POSITIVE CARCINOMA OF LEFT BREAST: ICD-10-CM

## 2020-03-24 DIAGNOSIS — Z17.1 BREAST CANCER, STAGE 1, ESTROGEN RECEPTOR NEGATIVE, LEFT: ICD-10-CM

## 2020-03-24 DIAGNOSIS — Z98.890 S/P LUMPECTOMY, LEFT BREAST: ICD-10-CM

## 2020-03-24 DIAGNOSIS — Z17.1: ICD-10-CM

## 2020-03-24 DIAGNOSIS — Z85.3 HX OF BREAST CANCER: ICD-10-CM

## 2020-03-24 DIAGNOSIS — Z85.3 PERSONAL HISTORY OF MALIGNANT NEOPLASM OF BREAST: ICD-10-CM

## 2020-03-24 DIAGNOSIS — C50.912 MALIGNANT NEOPLASM OF LEFT BREAST: ICD-10-CM

## 2020-03-24 DIAGNOSIS — C50.912 BREAST CANCER, STAGE 1, ESTROGEN RECEPTOR NEGATIVE, LEFT: ICD-10-CM

## 2020-03-24 PROCEDURE — 77062 BREAST TOMOSYNTHESIS BI: CPT | Mod: TC,PO

## 2020-03-24 NOTE — TELEPHONE ENCOUNTER
----- Message from Tray Simpson MD sent at 3/24/2020 11:36 AM CDT -----  Call her with good report

## 2020-03-25 ENCOUNTER — TELEPHONE (OUTPATIENT)
Dept: HEMATOLOGY/ONCOLOGY | Facility: CLINIC | Age: 60
End: 2020-03-25

## 2020-05-19 ENCOUNTER — PATIENT MESSAGE (OUTPATIENT)
Dept: FAMILY MEDICINE | Facility: CLINIC | Age: 60
End: 2020-05-19

## 2020-06-01 ENCOUNTER — TELEPHONE (OUTPATIENT)
Dept: FAMILY MEDICINE | Facility: CLINIC | Age: 60
End: 2020-06-01

## 2020-06-01 NOTE — TELEPHONE ENCOUNTER
----- Message from Yennifer Agarwal sent at 3/17/2020  2:09 PM CDT -----  Notes recorded by Shaunna Gordon NP on 3/17/2020 at 1:51 PM CDT  Cholesterol is elevated. Start crestor 10mg once a day. Repeat lipids and cmp in 8 weeks. The rest of your labs are normal.

## 2020-06-03 ENCOUNTER — TELEPHONE (OUTPATIENT)
Dept: FAMILY MEDICINE | Facility: CLINIC | Age: 60
End: 2020-06-03

## 2020-06-03 NOTE — TELEPHONE ENCOUNTER
Spoke to pt regarding her appt on 6/11. Offered virtual visit. Pt stated she wants to keep in office appt. Advised to wear mask

## 2020-06-09 ENCOUNTER — TELEPHONE (OUTPATIENT)
Dept: FAMILY MEDICINE | Facility: CLINIC | Age: 60
End: 2020-06-09

## 2020-06-11 ENCOUNTER — OFFICE VISIT (OUTPATIENT)
Dept: FAMILY MEDICINE | Facility: CLINIC | Age: 60
End: 2020-06-11
Payer: MEDICARE

## 2020-06-11 VITALS
HEART RATE: 78 BPM | DIASTOLIC BLOOD PRESSURE: 92 MMHG | WEIGHT: 220 LBS | TEMPERATURE: 97 F | BODY MASS INDEX: 34.53 KG/M2 | SYSTOLIC BLOOD PRESSURE: 146 MMHG | HEIGHT: 67 IN

## 2020-06-11 DIAGNOSIS — C50.912 MALIGNANT NEOPLASM OF LEFT FEMALE BREAST, UNSPECIFIED ESTROGEN RECEPTOR STATUS, UNSPECIFIED SITE OF BREAST: ICD-10-CM

## 2020-06-11 DIAGNOSIS — L40.9 PSORIASIS: ICD-10-CM

## 2020-06-11 DIAGNOSIS — E11.9 TYPE 2 DIABETES MELLITUS WITHOUT COMPLICATION, WITHOUT LONG-TERM CURRENT USE OF INSULIN: ICD-10-CM

## 2020-06-11 DIAGNOSIS — Z79.899 HIGH RISK MEDICATION USE: ICD-10-CM

## 2020-06-11 DIAGNOSIS — Z98.84 HISTORY OF WEIGHT LOSS SURGERY: ICD-10-CM

## 2020-06-11 DIAGNOSIS — R97.1 ELEVATED CANCER ANTIGEN 125 (CA 125): ICD-10-CM

## 2020-06-11 DIAGNOSIS — E78.5 HYPERLIPIDEMIA, UNSPECIFIED HYPERLIPIDEMIA TYPE: Primary | ICD-10-CM

## 2020-06-11 DIAGNOSIS — Z98.890 PERSONAL HISTORY OF SURGERY TO HEART AND GREAT VESSELS, PRESENTING HAZARDS TO HEALTH: ICD-10-CM

## 2020-06-11 DIAGNOSIS — Z85.3 HX OF BREAST CANCER: ICD-10-CM

## 2020-06-11 LAB
ALBUMIN SERPL-MCNC: 3.9 G/DL (ref 3.6–5.1)
ALBUMIN/GLOB SERPL: 1.6 (CALC) (ref 1–2.5)
ALP SERPL-CCNC: 62 U/L (ref 37–153)
ALT SERPL-CCNC: 10 U/L (ref 6–29)
AST SERPL-CCNC: 14 U/L (ref 10–35)
BILIRUB SERPL-MCNC: 0.4 MG/DL (ref 0.2–1.2)
BUN SERPL-MCNC: 17 MG/DL (ref 7–25)
BUN/CREAT SERPL: ABNORMAL (CALC) (ref 6–22)
CALCIUM SERPL-MCNC: 9.4 MG/DL (ref 8.6–10.4)
CHLORIDE SERPL-SCNC: 105 MMOL/L (ref 98–110)
CHOLEST SERPL-MCNC: 186 MG/DL
CHOLEST/HDLC SERPL: 2.3 (CALC)
CO2 SERPL-SCNC: 35 MMOL/L (ref 20–32)
CREAT SERPL-MCNC: 0.64 MG/DL (ref 0.5–1.05)
GFRSERPLBLD MDRD-ARVRAT: 98 ML/MIN/1.73M2
GLOBULIN SER CALC-MCNC: 2.4 G/DL (CALC) (ref 1.9–3.7)
GLUCOSE SERPL-MCNC: 91 MG/DL (ref 65–99)
HDLC SERPL-MCNC: 82 MG/DL
LDLC SERPL CALC-MCNC: 88 MG/DL (CALC)
NONHDLC SERPL-MCNC: 104 MG/DL (CALC)
POTASSIUM SERPL-SCNC: 4.5 MMOL/L (ref 3.5–5.3)
PROT SERPL-MCNC: 6.3 G/DL (ref 6.1–8.1)
SODIUM SERPL-SCNC: 145 MMOL/L (ref 135–146)
TRIGL SERPL-MCNC: 74 MG/DL

## 2020-06-11 PROCEDURE — 99214 OFFICE O/P EST MOD 30 MIN: CPT | Mod: S$GLB,,, | Performed by: NURSE PRACTITIONER

## 2020-06-11 PROCEDURE — 99214 PR OFFICE/OUTPT VISIT, EST, LEVL IV, 30-39 MIN: ICD-10-PCS | Mod: S$GLB,,, | Performed by: NURSE PRACTITIONER

## 2020-06-11 RX ORDER — MAGNESIUM 250 MG
250 TABLET ORAL DAILY
COMMUNITY

## 2020-06-11 RX ORDER — VITAMIN B COMPLEX
1 CAPSULE ORAL DAILY
COMMUNITY

## 2020-06-11 RX ORDER — VENLAFAXINE HYDROCHLORIDE 150 MG/1
150 CAPSULE, EXTENDED RELEASE ORAL DAILY
Qty: 90 CAPSULE | Refills: 1 | Status: SHIPPED | OUTPATIENT
Start: 2020-06-11 | End: 2020-08-24 | Stop reason: SDUPTHER

## 2020-06-11 RX ORDER — ASCORBIC ACID 500 MG
500 TABLET ORAL DAILY
COMMUNITY

## 2020-06-11 RX ORDER — ROSUVASTATIN CALCIUM 10 MG/1
10 TABLET, COATED ORAL DAILY
Qty: 90 TABLET | Refills: 3 | Status: SHIPPED | OUTPATIENT
Start: 2020-06-11 | End: 2020-08-24 | Stop reason: SDUPTHER

## 2020-06-11 NOTE — PROGRESS NOTES
SUBJECTIVE:    Patient ID: Lolly Ramírez is a 59 y.o. female.    Chief Complaint: Follow-up ((brought bottles))    59 year old female presents for check up. Recently saw DR. galindo for history of breast cancer.  Had lab work done couple days ago.  Would like to discuss results.  Has lost 80 lb since bariatric surgery about a year ago.  Followed regularly by neurology.  Has upcoming appointment with Dr. Tobar in July.  Still has some back pain.  Some days worse than others.  Since  increasing Lyrica symptoms have improved.  Happy with Effexor.  Thinks it is controlling moods.  Sleeps well      Orders Only on 03/17/2020   Component Date Value Ref Range Status    Glucose 06/10/2020 91  65 - 99 mg/dL Final    BUN, Bld 06/10/2020 17  7 - 25 mg/dL Final    Creatinine 06/10/2020 0.64  0.50 - 1.05 mg/dL Final    eGFR if non African American 06/10/2020 98  > OR = 60 mL/min/1.73m2 Final    eGFR if African American 06/10/2020 114  > OR = 60 mL/min/1.73m2 Final    BUN/Creatinine Ratio 06/10/2020 NOT APPLICABLE  6 - 22 (calc) Final    Sodium 06/10/2020 145  135 - 146 mmol/L Final    Potassium 06/10/2020 4.5  3.5 - 5.3 mmol/L Final    Chloride 06/10/2020 105  98 - 110 mmol/L Final    CO2 06/10/2020 35* 20 - 32 mmol/L Final    Calcium 06/10/2020 9.4  8.6 - 10.4 mg/dL Final    Total Protein 06/10/2020 6.3  6.1 - 8.1 g/dL Final    Albumin 06/10/2020 3.9  3.6 - 5.1 g/dL Final    Globulin, Total 06/10/2020 2.4  1.9 - 3.7 g/dL (calc) Final    Albumin/Globulin Ratio 06/10/2020 1.6  1.0 - 2.5 (calc) Final    Total Bilirubin 06/10/2020 0.4  0.2 - 1.2 mg/dL Final    Alkaline Phosphatase 06/10/2020 62  37 - 153 U/L Final    AST 06/10/2020 14  10 - 35 U/L Final    ALT 06/10/2020 10  6 - 29 U/L Final    Cholesterol 06/10/2020 186  <200 mg/dL Final    HDL 06/10/2020 82  > OR = 50 mg/dL Final    Triglycerides 06/10/2020 74  <150 mg/dL Final    LDL Cholesterol 06/10/2020 88  mg/dL (calc) Final     Hdl/Cholesterol Ratio 06/10/2020 2.3  <5.0 (calc) Final    Non HDL Chol. (LDL+VLDL) 06/10/2020 104  <130 mg/dL (calc) Final   Office Visit on 03/05/2020   Component Date Value Ref Range Status    Hemoglobin A1C 03/05/2020 5.6  % Final    WBC 03/05/2020 5.4  3.8 - 10.8 Thousand/uL Final    RBC 03/05/2020 4.76  3.80 - 5.10 Million/uL Final    Hemoglobin 03/05/2020 14.0  11.7 - 15.5 g/dL Final    Hematocrit 03/05/2020 42.2  35.0 - 45.0 % Final    Mean Corpuscular Volume 03/05/2020 88.7  80.0 - 100.0 fL Final    Mean Corpuscular Hemoglobin 03/05/2020 29.4  27.0 - 33.0 pg Final    Mean Corpuscular Hemoglobin Conc 03/05/2020 33.2  32.0 - 36.0 g/dL Final    RDW 03/05/2020 12.6  11.0 - 15.0 % Final    Platelets 03/05/2020 325  140 - 400 Thousand/uL Final    MPV 03/05/2020 10.8  7.5 - 12.5 fL Final    Neutrophils Absolute 03/05/2020 3,218  1,500 - 7,800 cells/uL Final    Lymph # 03/05/2020 1,280  850 - 3,900 cells/uL Final    Mono # 03/05/2020 405  200 - 950 cells/uL Final    Eos # 03/05/2020 416  15 - 500 cells/uL Final    Baso # 03/05/2020 81  0 - 200 cells/uL Final    Neutrophils Relative 03/05/2020 59.6  % Final    Lymph% 03/05/2020 23.7  % Final    Mono% 03/05/2020 7.5  % Final    Eosinophil% 03/05/2020 7.7  % Final    Basophil% 03/05/2020 1.5  % Final    Glucose 03/05/2020 86  65 - 99 mg/dL Final    BUN, Bld 03/05/2020 18  7 - 25 mg/dL Final    Creatinine 03/05/2020 0.77  0.50 - 1.05 mg/dL Final    eGFR if non African American 03/05/2020 85  > OR = 60 mL/min/1.73m2 Final    eGFR if African American 03/05/2020 99  > OR = 60 mL/min/1.73m2 Final    BUN/Creatinine Ratio 03/05/2020 NOT APPLICABLE  6 - 22 (calc) Final    Sodium 03/05/2020 145  135 - 146 mmol/L Final    Potassium 03/05/2020 4.7  3.5 - 5.3 mmol/L Final    Chloride 03/05/2020 107  98 - 110 mmol/L Final    CO2 03/05/2020 32  20 - 32 mmol/L Final    Calcium 03/05/2020 9.3  8.6 - 10.4 mg/dL Final    Total Protein 03/05/2020 6.3   6.1 - 8.1 g/dL Final    Albumin 2020 4.0  3.6 - 5.1 g/dL Final    Globulin, Total 2020 2.3  1.9 - 3.7 g/dL (calc) Final    Albumin/Globulin Ratio 2020 1.7  1.0 - 2.5 (calc) Final    Total Bilirubin 2020 0.4  0.2 - 1.2 mg/dL Final    Alkaline Phosphatase 2020 58  37 - 153 U/L Final    AST 2020 15  10 - 35 U/L Final    ALT 2020 11  6 - 29 U/L Final    TSH w/reflex to FT4 2020 1.35  0.40 - 4.50 mIU/L Final    Iron 2020 74  45 - 160 mcg/dL Final    TIBC 2020 318  250 - 450 mcg/dL (calc) Final    Iron Saturation 2020 23  16 - 45 % (calc) Final    Ferritin 2020 32  16 - 232 ng/mL Final    Cholesterol 2020 241* <200 mg/dL Final    HDL 2020 88  > OR = 50 mg/dL Final    Triglycerides 2020 76  <150 mg/dL Final    LDL Cholesterol 2020 136* mg/dL (calc) Final    Hdl/Cholesterol Ratio 2020 2.7  <5.0 (calc) Final    Non HDL Chol. (LDL+VLDL) 2020 153* <130 mg/dL (calc) Final    Creatinine, Random Ur 2020 117  20 - 275 mg/dL Final    Microalb, Ur 2020 1.2  See Note: mg/dL Final    Microalb Creat Ratio 2020 10  <30 mcg/mg creat Final    CA 15-3 2020 12  <32 U/mL Final     2020 13  <35 U/mL Final    CA 27-29 2020 17  <38 U/mL Final       Past Medical History:   Diagnosis Date    Breast cancer, stage 1, estrogen receptor negative, left () 2020    Depression     Diabetes mellitus, type 2     GERD (gastroesophageal reflux disease)     HER2-positive carcinoma of left breast 2020    Hx of breast cancer     Hx of breast cancer - Her2Nu+/ER+ - 2011 12/3/2019    Insomnia     Lymphedema     Neuropathy of both feet     S/P lumpectomy, left breast 2020     Past Surgical History:   Procedure Laterality Date    BICEPS TENDON REPAIR Left 2005    BREAST BIOPSY Left     BREAST LUMPECTOMY Left      SECTION      , ,      CHOLECYSTECTOMY      Elbow fx Left 2015    HYSTERECTOMY      KNEE ARTHROSCOPY W/ MENISCAL REPAIR Left     Lower back ruptured disc  06/2010    LYMPH NODE DISSECTION       Family History   Problem Relation Age of Onset    No Known Problems Mother     Parkinsonism Father     Diabetes Father     Breast cancer Maternal Aunt     Breast cancer Paternal Aunt        Marital Status:   Alcohol History:  reports that she drinks alcohol.  Tobacco History:  reports that she quit smoking about 9 years ago. She has never used smokeless tobacco.  Drug History:  reports that she does not use drugs.    Review of patient's allergies indicates:   Allergen Reactions    Codeine Nausea And Vomiting    Dilaudid  [hydromorphone (bulk)] Rash    Hydromorphone hcl Rash       Current Outpatient Medications:     ascorbic acid, vitamin C, (VITAMIN C) 500 MG tablet, Take 500 mg by mouth once daily., Disp: , Rfl:     b complex vitamins capsule, Take 1 capsule by mouth once daily., Disp: , Rfl:     docusate sodium (COLACE) 100 MG capsule, Take 1 capsule (100 mg total) by mouth once daily., Disp: 90 capsule, Rfl: 0    furosemide (LASIX) 20 MG tablet, Take 1 tablet by mouth daily as needed. , Disp: , Rfl:     LYRICA 200 mg Cap, Take 1 capsule by mouth 3 (three) times daily. , Disp: , Rfl:     magnesium 250 mg Tab, Take 250 mg by mouth once daily., Disp: , Rfl:     multivitamin capsule, Take 1 capsule by mouth once daily., Disp: , Rfl:     naproxen (NAPROSYN) 500 MG tablet, TK 1 T PO Q 12 H PRN. TAKE WITH FOOD OR MILK., Disp: 90 tablet, Rfl: 1    pantoprazole (PROTONIX) 40 MG tablet, Take 1 tablet by mouth once daily., Disp: , Rfl:     rosuvastatin (CRESTOR) 10 MG tablet, Take 1 tablet (10 mg total) by mouth once daily., Disp: 90 tablet, Rfl: 3    traZODone (DESYREL) 50 MG tablet, 1 tablet every evening., Disp: , Rfl: 3    venlafaxine (EFFEXOR-XR) 150 MG Cp24, Take 1 capsule (150 mg total) by mouth once daily., Disp: 90  "capsule, Rfl: 1    Review of Systems   Constitutional: Negative for chills, fever and unexpected weight change.   HENT: Negative for ear pain, rhinorrhea and sore throat.    Eyes: Negative for pain and visual disturbance.   Respiratory: Negative for cough and shortness of breath.    Cardiovascular: Negative for chest pain, palpitations and leg swelling.   Gastrointestinal: Negative for abdominal pain, diarrhea, nausea and vomiting.   Genitourinary: Negative for difficulty urinating, hematuria and vaginal bleeding.   Musculoskeletal: Positive for arthralgias.   Skin: Positive for rash.   Neurological: Negative for dizziness, weakness and headaches.   Psychiatric/Behavioral: Negative for agitation and sleep disturbance. The patient is nervous/anxious.           Objective:      Vitals:    06/11/20 0926   BP: (!) 146/92   Pulse: 78   Temp: 97.4 °F (36.3 °C)   Weight: 99.8 kg (220 lb)   Height: 5' 7" (1.702 m)     Body mass index is 34.46 kg/m².  Physical Exam   Constitutional: She is oriented to person, place, and time. She appears well-developed and well-nourished.   HENT:   Right Ear: External ear normal.   Left Ear: External ear normal.   Mouth/Throat: Oropharynx is clear and moist.   Neck: Normal range of motion. Neck supple. No JVD present.   Cardiovascular: Normal rate and regular rhythm.   No murmur heard.  Pulmonary/Chest: Effort normal and breath sounds normal.   Abdominal: Soft. Bowel sounds are normal.   Musculoskeletal: Normal range of motion. She exhibits no edema or deformity.        Lumbar back: She exhibits tenderness. She exhibits normal range of motion.   Lymphadenopathy:     She has no cervical adenopathy.   Neurological: She is alert and oriented to person, place, and time. Gait normal.   Skin: Skin is warm, dry and intact. No rash noted.   Psychiatric: She has a normal mood and affect. Her speech is normal and behavior is normal.         Assessment:       1. Hyperlipidemia, unspecified " hyperlipidemia type    2. High risk medication use    3. Type 2 diabetes mellitus without complication, without long-term current use of insulin    4. Elevated cancer antigen 125 ()    5. Hx of breast cancer    6. Personal history of surgery to heart and great vessels, presenting hazards to health    7. Malignant neoplasm of left female breast, unspecified estrogen receptor status, unspecified site of breast    8. Psoriasis    9. History of weight loss surgery         Plan:       Hyperlipidemia, unspecified hyperlipidemia type  -     rosuvastatin (CRESTOR) 10 MG tablet; Take 1 tablet (10 mg total) by mouth once daily.  Dispense: 90 tablet; Refill: 3    High risk medication use  -     rosuvastatin (CRESTOR) 10 MG tablet; Take 1 tablet (10 mg total) by mouth once daily.  Dispense: 90 tablet; Refill: 3    Type 2 diabetes mellitus without complication, without long-term current use of insulin    Elevated cancer antigen 125 ()    Hx of breast cancer    Personal history of surgery to heart and great vessels, presenting hazards to health    Malignant neoplasm of left female breast, unspecified estrogen receptor status, unspecified site of breast    Psoriasis    History of weight loss surgery    Other orders  -     venlafaxine (EFFEXOR-XR) 150 MG Cp24; Take 1 capsule (150 mg total) by mouth once daily.  Dispense: 90 capsule; Refill: 1      Follow up in about 3 months (around 9/11/2020) for Diabetic Check-Up.

## 2020-06-18 ENCOUNTER — PATIENT MESSAGE (OUTPATIENT)
Dept: FAMILY MEDICINE | Facility: CLINIC | Age: 60
End: 2020-06-18

## 2020-06-18 NOTE — TELEPHONE ENCOUNTER
Can you look at this - ana is out tomorrow and next week. We don't have any available appointments tomorrow.

## 2020-06-19 NOTE — TELEPHONE ENCOUNTER
It is possible that she could have another ablation/KIANA. Obviously if she has had this previously she could consult with the pain management physician regarding her sxs and they can advise her

## 2020-07-16 ENCOUNTER — PATIENT MESSAGE (OUTPATIENT)
Dept: FAMILY MEDICINE | Facility: CLINIC | Age: 60
End: 2020-07-16

## 2020-07-16 RX ORDER — PANTOPRAZOLE SODIUM 40 MG/1
40 TABLET, DELAYED RELEASE ORAL DAILY
Qty: 90 TABLET | Refills: 3 | Status: SHIPPED | OUTPATIENT
Start: 2020-07-16 | End: 2020-08-24 | Stop reason: SDUPTHER

## 2020-08-21 ENCOUNTER — PATIENT MESSAGE (OUTPATIENT)
Dept: FAMILY MEDICINE | Facility: CLINIC | Age: 60
End: 2020-08-21

## 2020-08-23 ENCOUNTER — PATIENT MESSAGE (OUTPATIENT)
Dept: FAMILY MEDICINE | Facility: CLINIC | Age: 60
End: 2020-08-23

## 2020-08-24 ENCOUNTER — PATIENT MESSAGE (OUTPATIENT)
Dept: FAMILY MEDICINE | Facility: CLINIC | Age: 60
End: 2020-08-24

## 2020-08-24 ENCOUNTER — OFFICE VISIT (OUTPATIENT)
Dept: FAMILY MEDICINE | Facility: CLINIC | Age: 60
End: 2020-08-24
Payer: MEDICARE

## 2020-08-24 VITALS
SYSTOLIC BLOOD PRESSURE: 110 MMHG | HEART RATE: 72 BPM | DIASTOLIC BLOOD PRESSURE: 78 MMHG | BODY MASS INDEX: 34.69 KG/M2 | WEIGHT: 221 LBS | HEIGHT: 67 IN

## 2020-08-24 DIAGNOSIS — E11.9 TYPE 2 DIABETES MELLITUS WITHOUT COMPLICATION, WITHOUT LONG-TERM CURRENT USE OF INSULIN: ICD-10-CM

## 2020-08-24 DIAGNOSIS — M51.36 DDD (DEGENERATIVE DISC DISEASE), LUMBAR: ICD-10-CM

## 2020-08-24 DIAGNOSIS — E78.5 HYPERLIPIDEMIA, UNSPECIFIED HYPERLIPIDEMIA TYPE: ICD-10-CM

## 2020-08-24 DIAGNOSIS — Z85.3 HX OF BREAST CANCER: ICD-10-CM

## 2020-08-24 DIAGNOSIS — G47.00 INSOMNIA, UNSPECIFIED TYPE: ICD-10-CM

## 2020-08-24 DIAGNOSIS — M19.012 PRIMARY OSTEOARTHRITIS OF LEFT SHOULDER: ICD-10-CM

## 2020-08-24 DIAGNOSIS — Z79.899 HIGH RISK MEDICATION USE: ICD-10-CM

## 2020-08-24 DIAGNOSIS — K21.9 GASTROESOPHAGEAL REFLUX DISEASE, ESOPHAGITIS PRESENCE NOT SPECIFIED: Primary | ICD-10-CM

## 2020-08-24 PROCEDURE — 99214 PR OFFICE/OUTPT VISIT, EST, LEVL IV, 30-39 MIN: ICD-10-PCS | Mod: S$GLB,,, | Performed by: NURSE PRACTITIONER

## 2020-08-24 PROCEDURE — 99214 OFFICE O/P EST MOD 30 MIN: CPT | Mod: S$GLB,,, | Performed by: NURSE PRACTITIONER

## 2020-08-24 RX ORDER — NAPROXEN 500 MG/1
TABLET ORAL
Qty: 90 TABLET | Refills: 1 | Status: SHIPPED | OUTPATIENT
Start: 2020-08-24 | End: 2021-06-17 | Stop reason: SDUPTHER

## 2020-08-24 RX ORDER — ROSUVASTATIN CALCIUM 10 MG/1
10 TABLET, COATED ORAL DAILY
Qty: 90 TABLET | Refills: 3 | Status: SHIPPED | OUTPATIENT
Start: 2020-08-24 | End: 2020-12-10 | Stop reason: SDUPTHER

## 2020-08-24 RX ORDER — VENLAFAXINE HYDROCHLORIDE 150 MG/1
150 CAPSULE, EXTENDED RELEASE ORAL DAILY
Qty: 90 CAPSULE | Refills: 1 | Status: SHIPPED | OUTPATIENT
Start: 2020-08-24 | End: 2020-12-10 | Stop reason: SDUPTHER

## 2020-08-24 RX ORDER — FUROSEMIDE 20 MG/1
20 TABLET ORAL DAILY PRN
Qty: 90 TABLET | Refills: 1 | Status: SHIPPED | OUTPATIENT
Start: 2020-08-24 | End: 2020-12-10 | Stop reason: SDUPTHER

## 2020-08-24 RX ORDER — PANTOPRAZOLE SODIUM 40 MG/1
40 TABLET, DELAYED RELEASE ORAL DAILY
Qty: 90 TABLET | Refills: 3 | Status: SHIPPED | OUTPATIENT
Start: 2020-08-24 | End: 2020-12-10 | Stop reason: SDUPTHER

## 2020-08-24 RX ORDER — TRAZODONE HYDROCHLORIDE 50 MG/1
50 TABLET ORAL NIGHTLY
Qty: 90 TABLET | Refills: 1 | Status: SHIPPED | OUTPATIENT
Start: 2020-08-24 | End: 2020-12-10 | Stop reason: SDUPTHER

## 2020-08-24 NOTE — PROGRESS NOTES
SUBJECTIVE:    Patient ID: Lolly Ramírez is a 59 y.o. female.    Chief Complaint: Hyperlipidemia (brought bottles, Eye exam req [facility]// [facility])    59 year old female presents for check up. Treated for gerd, insomnia,neuropathy, oa.  R  Would like to discuss results.  Has lost 80 lb since bariatric surgery about a year ago.  Followed regularly by neurology.  Had appointment with Dr. Tobar in July.  Still has some back pain.  Some days worse than others.  Since  increasing Lyrica symptoms have improved. Would like to see DR. portillo  Happy with Effexor.  Thinks it is controlling moods.  Sleeps well      Orders Only on 03/17/2020   Component Date Value Ref Range Status    Glucose 06/10/2020 91  65 - 99 mg/dL Final    BUN, Bld 06/10/2020 17  7 - 25 mg/dL Final    Creatinine 06/10/2020 0.64  0.50 - 1.05 mg/dL Final    eGFR if non African American 06/10/2020 98  > OR = 60 mL/min/1.73m2 Final    eGFR if African American 06/10/2020 114  > OR = 60 mL/min/1.73m2 Final    BUN/Creatinine Ratio 06/10/2020 NOT APPLICABLE  6 - 22 (calc) Final    Sodium 06/10/2020 145  135 - 146 mmol/L Final    Potassium 06/10/2020 4.5  3.5 - 5.3 mmol/L Final    Chloride 06/10/2020 105  98 - 110 mmol/L Final    CO2 06/10/2020 35* 20 - 32 mmol/L Final    Calcium 06/10/2020 9.4  8.6 - 10.4 mg/dL Final    Total Protein 06/10/2020 6.3  6.1 - 8.1 g/dL Final    Albumin 06/10/2020 3.9  3.6 - 5.1 g/dL Final    Globulin, Total 06/10/2020 2.4  1.9 - 3.7 g/dL (calc) Final    Albumin/Globulin Ratio 06/10/2020 1.6  1.0 - 2.5 (calc) Final    Total Bilirubin 06/10/2020 0.4  0.2 - 1.2 mg/dL Final    Alkaline Phosphatase 06/10/2020 62  37 - 153 U/L Final    AST 06/10/2020 14  10 - 35 U/L Final    ALT 06/10/2020 10  6 - 29 U/L Final    Cholesterol 06/10/2020 186  <200 mg/dL Final    HDL 06/10/2020 82  > OR = 50 mg/dL Final    Triglycerides 06/10/2020 74  <150 mg/dL Final    LDL Cholesterol 06/10/2020 88  mg/dL (calc) Final     Hdl/Cholesterol Ratio 06/10/2020 2.3  <5.0 (calc) Final    Non HDL Chol. (LDL+VLDL) 06/10/2020 104  <130 mg/dL (calc) Final   Office Visit on 03/05/2020   Component Date Value Ref Range Status    Hemoglobin A1C 03/05/2020 5.6  % Final    WBC 03/05/2020 5.4  3.8 - 10.8 Thousand/uL Final    RBC 03/05/2020 4.76  3.80 - 5.10 Million/uL Final    Hemoglobin 03/05/2020 14.0  11.7 - 15.5 g/dL Final    Hematocrit 03/05/2020 42.2  35.0 - 45.0 % Final    Mean Corpuscular Volume 03/05/2020 88.7  80.0 - 100.0 fL Final    Mean Corpuscular Hemoglobin 03/05/2020 29.4  27.0 - 33.0 pg Final    Mean Corpuscular Hemoglobin Conc 03/05/2020 33.2  32.0 - 36.0 g/dL Final    RDW 03/05/2020 12.6  11.0 - 15.0 % Final    Platelets 03/05/2020 325  140 - 400 Thousand/uL Final    MPV 03/05/2020 10.8  7.5 - 12.5 fL Final    Neutrophils Absolute 03/05/2020 3,218  1,500 - 7,800 cells/uL Final    Lymph # 03/05/2020 1,280  850 - 3,900 cells/uL Final    Mono # 03/05/2020 405  200 - 950 cells/uL Final    Eos # 03/05/2020 416  15 - 500 cells/uL Final    Baso # 03/05/2020 81  0 - 200 cells/uL Final    Neutrophils Relative 03/05/2020 59.6  % Final    Lymph% 03/05/2020 23.7  % Final    Mono% 03/05/2020 7.5  % Final    Eosinophil% 03/05/2020 7.7  % Final    Basophil% 03/05/2020 1.5  % Final    Glucose 03/05/2020 86  65 - 99 mg/dL Final    BUN, Bld 03/05/2020 18  7 - 25 mg/dL Final    Creatinine 03/05/2020 0.77  0.50 - 1.05 mg/dL Final    eGFR if non African American 03/05/2020 85  > OR = 60 mL/min/1.73m2 Final    eGFR if African American 03/05/2020 99  > OR = 60 mL/min/1.73m2 Final    BUN/Creatinine Ratio 03/05/2020 NOT APPLICABLE  6 - 22 (calc) Final    Sodium 03/05/2020 145  135 - 146 mmol/L Final    Potassium 03/05/2020 4.7  3.5 - 5.3 mmol/L Final    Chloride 03/05/2020 107  98 - 110 mmol/L Final    CO2 03/05/2020 32  20 - 32 mmol/L Final    Calcium 03/05/2020 9.3  8.6 - 10.4 mg/dL Final    Total Protein 03/05/2020  6.3  6.1 - 8.1 g/dL Final    Albumin 03/05/2020 4.0  3.6 - 5.1 g/dL Final    Globulin, Total 03/05/2020 2.3  1.9 - 3.7 g/dL (calc) Final    Albumin/Globulin Ratio 03/05/2020 1.7  1.0 - 2.5 (calc) Final    Total Bilirubin 03/05/2020 0.4  0.2 - 1.2 mg/dL Final    Alkaline Phosphatase 03/05/2020 58  37 - 153 U/L Final    AST 03/05/2020 15  10 - 35 U/L Final    ALT 03/05/2020 11  6 - 29 U/L Final    TSH w/reflex to FT4 03/05/2020 1.35  0.40 - 4.50 mIU/L Final    Iron 03/05/2020 74  45 - 160 mcg/dL Final    TIBC 03/05/2020 318  250 - 450 mcg/dL (calc) Final    Iron Saturation 03/05/2020 23  16 - 45 % (calc) Final    Ferritin 03/05/2020 32  16 - 232 ng/mL Final    Cholesterol 03/05/2020 241* <200 mg/dL Final    HDL 03/05/2020 88  > OR = 50 mg/dL Final    Triglycerides 03/05/2020 76  <150 mg/dL Final    LDL Cholesterol 03/05/2020 136* mg/dL (calc) Final    Hdl/Cholesterol Ratio 03/05/2020 2.7  <5.0 (calc) Final    Non HDL Chol. (LDL+VLDL) 03/05/2020 153* <130 mg/dL (calc) Final    Creatinine, Random Ur 03/05/2020 117  20 - 275 mg/dL Final    Microalb, Ur 03/05/2020 1.2  See Note: mg/dL Final    Microalb Creat Ratio 03/05/2020 10  <30 mcg/mg creat Final    CA 15-3 03/05/2020 12  <32 U/mL Final     03/05/2020 13  <35 U/mL Final    CA 27-29 03/05/2020 17  <38 U/mL Final       Past Medical History:   Diagnosis Date    Breast cancer, stage 1, estrogen receptor negative, left (2011) 2/18/2020    Depression     Diabetes mellitus, type 2     GERD (gastroesophageal reflux disease)     HER2-positive carcinoma of left breast 2/18/2020    Hx of breast cancer     Hx of breast cancer - Her2Nu+/ER+ - 2011 12/3/2019    Insomnia     Lymphedema     Neuropathy of both feet     S/P lumpectomy, left breast 2/18/2020     Social History     Socioeconomic History    Marital status:      Spouse name: Not on file    Number of children: Not on file    Years of education: Not on file    Highest  education level: Not on file   Occupational History    Not on file   Social Needs    Financial resource strain: Not on file    Food insecurity     Worry: Not on file     Inability: Not on file    Transportation needs     Medical: Not on file     Non-medical: Not on file   Tobacco Use    Smoking status: Former Smoker     Quit date: 2010     Years since quittin.7    Smokeless tobacco: Never Used   Substance and Sexual Activity    Alcohol use: Yes     Alcohol/week: 2.0 standard drinks     Types: 2 Glasses of wine per week     Comment: socially    Drug use: Never    Sexual activity: Yes     Partners: Male     Birth control/protection: Other-see comments, None     Comment: Hysterectomy   Lifestyle    Physical activity     Days per week: Not on file     Minutes per session: Not on file    Stress: Not on file   Relationships    Social connections     Talks on phone: Not on file     Gets together: Not on file     Attends Baptism service: Not on file     Active member of club or organization: Not on file     Attends meetings of clubs or organizations: Not on file     Relationship status: Not on file   Other Topics Concern    Not on file   Social History Narrative    Not on file     Past Surgical History:   Procedure Laterality Date    BICEPS TENDON REPAIR Left 2005    BREAST BIOPSY Left     BREAST LUMPECTOMY Left      SECTION      , ,     CHOLECYSTECTOMY      Elbow fx Left 2015    FRACTURE SURGERY  2016    elbow    HYSTERECTOMY      KNEE ARTHROSCOPY W/ MENISCAL REPAIR Left     Lower back ruptured disc  2010    LYMPH NODE DISSECTION      SPINE SURGERY  2009    ruptured disc     Family History   Problem Relation Age of Onset    Cancer Mother     Parkinsonism Father     Diabetes Father     Breast cancer Maternal Aunt     Breast cancer Paternal Aunt     Cancer Maternal Aunt     Cancer Paternal Aunt     Cancer Daughter     Heart disease Maternal Grandmother      Heart disease Paternal Grandmother        Review of patient's allergies indicates:   Allergen Reactions    Codeine Nausea And Vomiting    Dilaudid  [hydromorphone (bulk)] Rash    Hydromorphone hcl Rash       Current Outpatient Medications:     ascorbic acid, vitamin C, (VITAMIN C) 500 MG tablet, Take 500 mg by mouth once daily., Disp: , Rfl:     b complex vitamins capsule, Take 1 capsule by mouth once daily., Disp: , Rfl:     docusate sodium (COLACE) 100 MG capsule, Take 1 capsule (100 mg total) by mouth once daily., Disp: 90 capsule, Rfl: 0    furosemide (LASIX) 20 MG tablet, Take 1 tablet (20 mg total) by mouth daily as needed., Disp: 90 tablet, Rfl: 1    LYRICA 200 mg Cap, Take 1 capsule by mouth 3 (three) times daily. , Disp: , Rfl:     magnesium 250 mg Tab, Take 250 mg by mouth once daily., Disp: , Rfl:     multivitamin capsule, Take 1 capsule by mouth once daily., Disp: , Rfl:     naproxen (NAPROSYN) 500 MG tablet, TK 1 T PO Q 12 H PRN. TAKE WITH FOOD OR MILK., Disp: 90 tablet, Rfl: 1    pantoprazole (PROTONIX) 40 MG tablet, Take 1 tablet (40 mg total) by mouth once daily., Disp: 90 tablet, Rfl: 3    rosuvastatin (CRESTOR) 10 MG tablet, Take 1 tablet (10 mg total) by mouth once daily., Disp: 90 tablet, Rfl: 3    traZODone (DESYREL) 50 MG tablet, Take 1 tablet (50 mg total) by mouth every evening., Disp: 90 tablet, Rfl: 1    venlafaxine (EFFEXOR-XR) 150 MG Cp24, Take 1 capsule (150 mg total) by mouth once daily., Disp: 90 capsule, Rfl: 1    Review of Systems   Constitutional: Negative for chills, fever and unexpected weight change.   HENT: Negative for ear pain, rhinorrhea and sore throat.    Eyes: Negative for pain and visual disturbance.   Respiratory: Negative for cough and shortness of breath.    Cardiovascular: Negative for chest pain, palpitations and leg swelling.   Gastrointestinal: Negative for abdominal pain, diarrhea, nausea and vomiting.   Genitourinary: Negative for difficulty  "urinating, hematuria and vaginal bleeding.   Musculoskeletal: Positive for back pain. Negative for arthralgias.   Skin: Negative for rash.   Neurological: Negative for dizziness, weakness and headaches.   Psychiatric/Behavioral: Negative for agitation and sleep disturbance. The patient is not nervous/anxious.           Objective:      Vitals:    08/24/20 0943   BP: 110/78   Pulse: 72   Weight: 100.2 kg (221 lb)   Height: 5' 7" (1.702 m)     Physical Exam  Constitutional:       Appearance: She is well-developed.   HENT:      Right Ear: External ear normal.      Left Ear: External ear normal.   Neck:      Musculoskeletal: Normal range of motion and neck supple.      Vascular: No JVD.   Cardiovascular:      Rate and Rhythm: Normal rate and regular rhythm.      Heart sounds: No murmur.   Pulmonary:      Effort: Pulmonary effort is normal.      Breath sounds: Normal breath sounds.   Abdominal:      General: Bowel sounds are normal.      Palpations: Abdomen is soft.   Musculoskeletal:         General: No deformity.      Right shoulder: She exhibits normal range of motion and no tenderness.      Lumbar back: She exhibits tenderness. She exhibits normal range of motion.   Lymphadenopathy:      Cervical: No cervical adenopathy.   Skin:     General: Skin is warm and dry.      Findings: No rash.   Neurological:      Mental Status: She is alert and oriented to person, place, and time.      Gait: Gait normal.   Psychiatric:         Speech: Speech normal.         Behavior: Behavior normal.           Assessment:       1. Gastroesophageal reflux disease, esophagitis presence not specified    2. Primary osteoarthritis of left shoulder    3. Hyperlipidemia, unspecified hyperlipidemia type    4. High risk medication use    5. Insomnia, unspecified type    6. Hx of breast cancer - Her2Nu+/ER+ - 2011    7. DDD (degenerative disc disease), lumbar    8. Type 2 diabetes mellitus without complication, without long-term current use of " insulin         Plan:       Gastroesophageal reflux disease, esophagitis presence not specified  -     pantoprazole (PROTONIX) 40 MG tablet; Take 1 tablet (40 mg total) by mouth once daily.  Dispense: 90 tablet; Refill: 3    Primary osteoarthritis of left shoulder  -     naproxen (NAPROSYN) 500 MG tablet; TK 1 T PO Q 12 H PRN. TAKE WITH FOOD OR MILK.  Dispense: 90 tablet; Refill: 1    Hyperlipidemia, unspecified hyperlipidemia type  -     rosuvastatin (CRESTOR) 10 MG tablet; Take 1 tablet (10 mg total) by mouth once daily.  Dispense: 90 tablet; Refill: 3    High risk medication use  -     rosuvastatin (CRESTOR) 10 MG tablet; Take 1 tablet (10 mg total) by mouth once daily.  Dispense: 90 tablet; Refill: 3    Insomnia, unspecified type  -     traZODone (DESYREL) 50 MG tablet; Take 1 tablet (50 mg total) by mouth every evening.  Dispense: 90 tablet; Refill: 1    Hx of breast cancer - Her2Nu+/ER+ - 2011    DDD (degenerative disc disease), lumbar  -     Ambulatory referral/consult to Pain Clinic; Future; Expected date: 08/31/2020    Type 2 diabetes mellitus without complication, without long-term current use of insulin    Other orders  -     venlafaxine (EFFEXOR-XR) 150 MG Cp24; Take 1 capsule (150 mg total) by mouth once daily.  Dispense: 90 capsule; Refill: 1  -     furosemide (LASIX) 20 MG tablet; Take 1 tablet (20 mg total) by mouth daily as needed.  Dispense: 90 tablet; Refill: 1      Follow up in about 3 months (around 11/24/2020) for medication management.        8/24/2020 Shaunna Gorodn

## 2020-10-05 NOTE — TELEPHONE ENCOUNTER
I don't see what she's talking about in here.   
Ok to do labs now.   
Okay to do labs now?  
We do not have a copy.   
stretcher

## 2020-12-10 ENCOUNTER — OFFICE VISIT (OUTPATIENT)
Dept: FAMILY MEDICINE | Facility: CLINIC | Age: 60
End: 2020-12-10
Payer: MEDICARE

## 2020-12-10 VITALS
HEIGHT: 67 IN | SYSTOLIC BLOOD PRESSURE: 126 MMHG | HEART RATE: 70 BPM | DIASTOLIC BLOOD PRESSURE: 84 MMHG | WEIGHT: 226 LBS | BODY MASS INDEX: 35.47 KG/M2

## 2020-12-10 DIAGNOSIS — K21.9 GASTROESOPHAGEAL REFLUX DISEASE, UNSPECIFIED WHETHER ESOPHAGITIS PRESENT: ICD-10-CM

## 2020-12-10 DIAGNOSIS — Z85.3 HX OF BREAST CANCER: ICD-10-CM

## 2020-12-10 DIAGNOSIS — G47.00 INSOMNIA, UNSPECIFIED TYPE: ICD-10-CM

## 2020-12-10 DIAGNOSIS — M51.36 DDD (DEGENERATIVE DISC DISEASE), LUMBAR: ICD-10-CM

## 2020-12-10 DIAGNOSIS — Z98.84 HISTORY OF WEIGHT LOSS SURGERY: ICD-10-CM

## 2020-12-10 DIAGNOSIS — Z79.899 HIGH RISK MEDICATION USE: ICD-10-CM

## 2020-12-10 DIAGNOSIS — E11.9 TYPE 2 DIABETES MELLITUS WITHOUT COMPLICATION, WITHOUT LONG-TERM CURRENT USE OF INSULIN: Primary | ICD-10-CM

## 2020-12-10 DIAGNOSIS — Z98.890 PERSONAL HISTORY OF SURGERY TO HEART AND GREAT VESSELS, PRESENTING HAZARDS TO HEALTH: ICD-10-CM

## 2020-12-10 DIAGNOSIS — E78.5 HYPERLIPIDEMIA, UNSPECIFIED HYPERLIPIDEMIA TYPE: ICD-10-CM

## 2020-12-10 LAB — HBA1C MFR BLD: 5.7 %

## 2020-12-10 PROCEDURE — 99214 OFFICE O/P EST MOD 30 MIN: CPT | Mod: S$GLB,,, | Performed by: NURSE PRACTITIONER

## 2020-12-10 PROCEDURE — 83036 POCT HEMOGLOBIN A1C: ICD-10-PCS | Mod: QW,,, | Performed by: NURSE PRACTITIONER

## 2020-12-10 PROCEDURE — 83036 HEMOGLOBIN GLYCOSYLATED A1C: CPT | Mod: QW,,, | Performed by: NURSE PRACTITIONER

## 2020-12-10 PROCEDURE — 99214 PR OFFICE/OUTPT VISIT, EST, LEVL IV, 30-39 MIN: ICD-10-PCS | Mod: S$GLB,,, | Performed by: NURSE PRACTITIONER

## 2020-12-10 RX ORDER — FUROSEMIDE 20 MG/1
20 TABLET ORAL DAILY PRN
Qty: 90 TABLET | Refills: 1 | Status: SHIPPED | OUTPATIENT
Start: 2020-12-10 | End: 2021-03-10 | Stop reason: SDUPTHER

## 2020-12-10 RX ORDER — TRAZODONE HYDROCHLORIDE 50 MG/1
50 TABLET ORAL NIGHTLY
Qty: 90 TABLET | Refills: 1 | Status: SHIPPED | OUTPATIENT
Start: 2020-12-10 | End: 2021-01-12 | Stop reason: SDUPTHER

## 2020-12-10 RX ORDER — ROSUVASTATIN CALCIUM 10 MG/1
10 TABLET, COATED ORAL DAILY
Qty: 90 TABLET | Refills: 3 | Status: SHIPPED | OUTPATIENT
Start: 2020-12-10 | End: 2021-03-10 | Stop reason: SDUPTHER

## 2020-12-10 RX ORDER — PANTOPRAZOLE SODIUM 40 MG/1
40 TABLET, DELAYED RELEASE ORAL DAILY
Qty: 90 TABLET | Refills: 3 | Status: SHIPPED | OUTPATIENT
Start: 2020-12-10 | End: 2021-03-10 | Stop reason: SDUPTHER

## 2020-12-10 RX ORDER — PREGABALIN 200 MG/1
200 CAPSULE ORAL 3 TIMES DAILY
Qty: 270 CAPSULE | Refills: 1 | Status: SHIPPED | OUTPATIENT
Start: 2020-12-10 | End: 2021-03-10 | Stop reason: SDUPTHER

## 2020-12-10 RX ORDER — VENLAFAXINE HYDROCHLORIDE 150 MG/1
150 CAPSULE, EXTENDED RELEASE ORAL DAILY
Qty: 90 CAPSULE | Refills: 1 | Status: SHIPPED | OUTPATIENT
Start: 2020-12-10 | End: 2021-03-10 | Stop reason: SDUPTHER

## 2020-12-10 NOTE — PROGRESS NOTES
SUBJECTIVE:    Patient ID: Lolly Ramírez is a 60 y.o. female.    Chief Complaint: Follow-up (3 month//brought bottles//refused flu shot/pna//req eye exam tb)    60 year old female presents for check up. Treated for gerd, insomnia,neuropathy, oa. Psoriasis.  Due for labs. .  Has lost 80 lb since bariatric surgery about a year ago.  Followed regularly by neurology.  Had appointment with Dr. Tobar in July.  Still has some back pain.  Some days worse than others. Plans on repeating mri of lumbar spine to decide how to proceed.  Since  increasing Lyrica symptoms have improved.   Happy with Effexor.  Thinks it is controlling moods.  Sleeps well      Office Visit on 12/10/2020   Component Date Value Ref Range Status    Hemoglobin A1C 12/10/2020 5.7  % Final    Glucose 12/10/2020 92  65 - 99 mg/dL Final    BUN 12/10/2020 13  7 - 25 mg/dL Final    Creatinine 12/10/2020 0.73  0.50 - 1.05 mg/dL Final    eGFR if non African American 12/10/2020 90  > OR = 60 mL/min/1.73m2 Final    eGFR if African American 12/10/2020 104  > OR = 60 mL/min/1.73m2 Final    BUN/Creatinine Ratio 12/10/2020 NOT APPLICABLE  6 - 22 (calc) Final    Sodium 12/10/2020 145  135 - 146 mmol/L Final    Potassium 12/10/2020 4.7  3.5 - 5.3 mmol/L Final    Chloride 12/10/2020 105  98 - 110 mmol/L Final    CO2 12/10/2020 35* 20 - 32 mmol/L Final    Calcium 12/10/2020 9.4  8.6 - 10.4 mg/dL Final    Total Protein 12/10/2020 6.6  6.1 - 8.1 g/dL Final    Albumin 12/10/2020 4.1  3.6 - 5.1 g/dL Final    Globulin, Total 12/10/2020 2.5  1.9 - 3.7 g/dL (calc) Final    Albumin/Globulin Ratio 12/10/2020 1.6  1.0 - 2.5 (calc) Final    Total Bilirubin 12/10/2020 0.4  0.2 - 1.2 mg/dL Final    Alkaline Phosphatase 12/10/2020 58  37 - 153 U/L Final    AST 12/10/2020 16  10 - 35 U/L Final    ALT 12/10/2020 12  6 - 29 U/L Final    Cholesterol 12/10/2020 196  <200 mg/dL Final    HDL 12/10/2020 93  > OR = 50 mg/dL Final    Triglycerides 12/10/2020 83   <150 mg/dL Final    LDL Cholesterol 12/10/2020 86  mg/dL (calc) Final    HDL/Cholesterol Ratio 12/10/2020 2.1  <5.0 (calc) Final    Non HDL Chol. (LDL+VLDL) 12/10/2020 103  <130 mg/dL (calc) Final       Past Medical History:   Diagnosis Date    Breast cancer, stage 1, estrogen receptor negative, left (2011) 2/18/2020    Depression     Diabetes mellitus, type 2     GERD (gastroesophageal reflux disease)     HER2-positive carcinoma of left breast 2/18/2020    Hx of breast cancer     Hx of breast cancer - Her2Nu+/ER+ - 2011 12/3/2019    Insomnia     Lymphedema     Neuropathy of both feet     S/P lumpectomy, left breast 2/18/2020     Social History     Socioeconomic History    Marital status:      Spouse name: Not on file    Number of children: Not on file    Years of education: Not on file    Highest education level: Not on file   Occupational History    Not on file   Social Needs    Financial resource strain: Not hard at all    Food insecurity     Worry: Never true     Inability: Never true    Transportation needs     Medical: No     Non-medical: No   Tobacco Use    Smoking status: Former Smoker     Quit date: 12/2010     Years since quitting: 10.0    Smokeless tobacco: Never Used   Substance and Sexual Activity    Alcohol use: Yes     Alcohol/week: 2.0 standard drinks     Types: 2 Glasses of wine per week     Frequency: Monthly or less     Drinks per session: 1 or 2     Binge frequency: Never     Comment: socially    Drug use: Never    Sexual activity: Yes     Partners: Male     Birth control/protection: Other-see comments, None     Comment: Hysterectomy   Lifestyle    Physical activity     Days per week: 0 days     Minutes per session: 0 min    Stress: To some extent   Relationships    Social connections     Talks on phone: More than three times a week     Gets together: Once a week     Attends Anabaptist service: Not on file     Active member of club or organization: Yes      Attends meetings of clubs or organizations: 1 to 4 times per year     Relationship status:    Other Topics Concern    Not on file   Social History Narrative    Not on file     Past Surgical History:   Procedure Laterality Date    BICEPS TENDON REPAIR Left 2005    BREAST BIOPSY Left     BREAST LUMPECTOMY Left      SECTION      , ,     CHOLECYSTECTOMY      Elbow fx Left 2015    FRACTURE SURGERY  2016    elbow    HYSTERECTOMY      KNEE ARTHROSCOPY W/ MENISCAL REPAIR Left     Lower back ruptured disc  2010    LYMPH NODE DISSECTION      SPINE SURGERY  2009    ruptured disc     Family History   Problem Relation Age of Onset    Cancer Mother     Parkinsonism Father     Diabetes Father     Breast cancer Maternal Aunt     Breast cancer Paternal Aunt     Cancer Maternal Aunt     Cancer Paternal Aunt     Cancer Daughter     Heart disease Maternal Grandmother     Heart disease Paternal Grandmother        Review of patient's allergies indicates:   Allergen Reactions    Codeine Nausea And Vomiting    Dilaudid  [hydromorphone (bulk)] Rash    Hydromorphone hcl Rash       Current Outpatient Medications:     ascorbic acid, vitamin C, (VITAMIN C) 500 MG tablet, Take 500 mg by mouth once daily., Disp: , Rfl:     b complex vitamins capsule, Take 1 capsule by mouth once daily., Disp: , Rfl:     docusate sodium (COLACE) 100 MG capsule, Take 1 capsule (100 mg total) by mouth once daily., Disp: 90 capsule, Rfl: 0    furosemide (LASIX) 20 MG tablet, Take 1 tablet (20 mg total) by mouth daily as needed., Disp: 90 tablet, Rfl: 1    LYRICA 200 mg Cap, Take 1 capsule (200 mg total) by mouth 3 (three) times daily., Disp: 270 capsule, Rfl: 1    magnesium 250 mg Tab, Take 250 mg by mouth once daily., Disp: , Rfl:     naproxen (NAPROSYN) 500 MG tablet, TK 1 T PO Q 12 H PRN. TAKE WITH FOOD OR MILK., Disp: 90 tablet, Rfl: 1    pantoprazole (PROTONIX) 40 MG tablet, Take 1 tablet (40 mg  "total) by mouth once daily., Disp: 90 tablet, Rfl: 3    rosuvastatin (CRESTOR) 10 MG tablet, Take 1 tablet (10 mg total) by mouth once daily., Disp: 90 tablet, Rfl: 3    traZODone (DESYREL) 50 MG tablet, Take 1 tablet (50 mg total) by mouth every evening., Disp: 90 tablet, Rfl: 1    venlafaxine (EFFEXOR-XR) 150 MG Cp24, Take 1 capsule (150 mg total) by mouth once daily., Disp: 90 capsule, Rfl: 1    multivitamin capsule, Take 1 capsule by mouth once daily., Disp: , Rfl:     Review of Systems   Constitutional: Negative for chills, fever and unexpected weight change.   HENT: Negative for ear pain, rhinorrhea and sore throat.    Eyes: Negative for pain and visual disturbance.   Respiratory: Negative for cough and shortness of breath.    Cardiovascular: Negative for chest pain, palpitations and leg swelling.   Gastrointestinal: Negative for abdominal pain, diarrhea, nausea and vomiting.   Genitourinary: Negative for difficulty urinating, hematuria and vaginal bleeding.   Musculoskeletal: Positive for back pain. Negative for arthralgias.   Skin: Positive for rash.   Neurological: Negative for dizziness, weakness and headaches.   Psychiatric/Behavioral: Negative for agitation and sleep disturbance. The patient is not nervous/anxious.           Objective:      Vitals:    12/10/20 0844   BP: 126/84   Pulse: 70   Weight: 102.5 kg (226 lb)   Height: 5' 7" (1.702 m)     Physical Exam  Constitutional:       Appearance: She is well-developed.   HENT:      Right Ear: External ear normal.      Left Ear: External ear normal.   Neck:      Musculoskeletal: Normal range of motion and neck supple.      Vascular: No JVD.   Cardiovascular:      Rate and Rhythm: Normal rate and regular rhythm.      Heart sounds: No murmur.   Pulmonary:      Effort: Pulmonary effort is normal.      Breath sounds: Normal breath sounds.   Abdominal:      General: Bowel sounds are normal.      Palpations: Abdomen is soft.   Musculoskeletal:         " General: No deformity.      Right shoulder: She exhibits normal range of motion and no tenderness.      Lumbar back: She exhibits tenderness and bony tenderness. She exhibits normal range of motion.   Lymphadenopathy:      Cervical: No cervical adenopathy.   Skin:     General: Skin is warm and dry.      Findings: No rash.      Comments: Plague psoriasis to lower extrem   Neurological:      Mental Status: She is alert and oriented to person, place, and time.      Gait: Gait normal.   Psychiatric:         Speech: Speech normal.         Behavior: Behavior normal.           Assessment:       1. Type 2 diabetes mellitus without complication, without long-term current use of insulin    2. Gastroesophageal reflux disease, unspecified whether esophagitis present    3. Hyperlipidemia, unspecified hyperlipidemia type    4. High risk medication use    5. Insomnia, unspecified type    6. DDD (degenerative disc disease), lumbar    7. Hx of breast cancer - Her2Nu+/ER+ - 2011    8. Personal history of surgery to heart and great vessels, presenting hazards to health    9. History of weight loss surgery         Plan:       Type 2 diabetes mellitus without complication, without long-term current use of insulin  -     POCT HEMOGLOBIN A1C    Gastroesophageal reflux disease, unspecified whether esophagitis present  -     pantoprazole (PROTONIX) 40 MG tablet; Take 1 tablet (40 mg total) by mouth once daily.  Dispense: 90 tablet; Refill: 3    Hyperlipidemia, unspecified hyperlipidemia type  -     rosuvastatin (CRESTOR) 10 MG tablet; Take 1 tablet (10 mg total) by mouth once daily.  Dispense: 90 tablet; Refill: 3  -     Comprehensive Metabolic Panel; Future; Expected date: 12/10/2020  -     Lipid Panel; Future; Expected date: 12/10/2020    High risk medication use  -     rosuvastatin (CRESTOR) 10 MG tablet; Take 1 tablet (10 mg total) by mouth once daily.  Dispense: 90 tablet; Refill: 3  -     Comprehensive Metabolic Panel; Future;  Expected date: 12/10/2020  -     Lipid Panel; Future; Expected date: 12/10/2020    Insomnia, unspecified type  -     traZODone (DESYREL) 50 MG tablet; Take 1 tablet (50 mg total) by mouth every evening.  Dispense: 90 tablet; Refill: 1    DDD (degenerative disc disease), lumbar    Hx of breast cancer - Her2Nu+/ER+ - 2011    Personal history of surgery to heart and great vessels, presenting hazards to health    History of weight loss surgery    Other orders  -     furosemide (LASIX) 20 MG tablet; Take 1 tablet (20 mg total) by mouth daily as needed.  Dispense: 90 tablet; Refill: 1  -     LYRICA 200 mg Cap; Take 1 capsule (200 mg total) by mouth 3 (three) times daily.  Dispense: 270 capsule; Refill: 1  -     venlafaxine (EFFEXOR-XR) 150 MG Cp24; Take 1 capsule (150 mg total) by mouth once daily.  Dispense: 90 capsule; Refill: 1      Follow up in about 3 months (around 3/10/2021) for medication management.        12/14/2020 Shaunna Gordon

## 2020-12-11 LAB
ALBUMIN SERPL-MCNC: 4.1 G/DL (ref 3.6–5.1)
ALBUMIN/GLOB SERPL: 1.6 (CALC) (ref 1–2.5)
ALP SERPL-CCNC: 58 U/L (ref 37–153)
ALT SERPL-CCNC: 12 U/L (ref 6–29)
AST SERPL-CCNC: 16 U/L (ref 10–35)
BILIRUB SERPL-MCNC: 0.4 MG/DL (ref 0.2–1.2)
BUN SERPL-MCNC: 13 MG/DL (ref 7–25)
BUN/CREAT SERPL: ABNORMAL (CALC) (ref 6–22)
CALCIUM SERPL-MCNC: 9.4 MG/DL (ref 8.6–10.4)
CHLORIDE SERPL-SCNC: 105 MMOL/L (ref 98–110)
CHOLEST SERPL-MCNC: 196 MG/DL
CHOLEST/HDLC SERPL: 2.1 (CALC)
CO2 SERPL-SCNC: 35 MMOL/L (ref 20–32)
CREAT SERPL-MCNC: 0.73 MG/DL (ref 0.5–1.05)
GFRSERPLBLD MDRD-ARVRAT: 90 ML/MIN/1.73M2
GLOBULIN SER CALC-MCNC: 2.5 G/DL (CALC) (ref 1.9–3.7)
GLUCOSE SERPL-MCNC: 92 MG/DL (ref 65–99)
HDLC SERPL-MCNC: 93 MG/DL
LDLC SERPL CALC-MCNC: 86 MG/DL (CALC)
NONHDLC SERPL-MCNC: 103 MG/DL (CALC)
POTASSIUM SERPL-SCNC: 4.7 MMOL/L (ref 3.5–5.3)
PROT SERPL-MCNC: 6.6 G/DL (ref 6.1–8.1)
SODIUM SERPL-SCNC: 145 MMOL/L (ref 135–146)
TRIGL SERPL-MCNC: 83 MG/DL

## 2020-12-15 ENCOUNTER — TELEPHONE (OUTPATIENT)
Dept: FAMILY MEDICINE | Facility: CLINIC | Age: 60
End: 2020-12-15

## 2021-01-12 ENCOUNTER — PATIENT MESSAGE (OUTPATIENT)
Dept: FAMILY MEDICINE | Facility: CLINIC | Age: 61
End: 2021-01-12

## 2021-01-12 DIAGNOSIS — G47.00 INSOMNIA, UNSPECIFIED TYPE: ICD-10-CM

## 2021-01-12 RX ORDER — TRAZODONE HYDROCHLORIDE 50 MG/1
50 TABLET ORAL NIGHTLY
Qty: 90 TABLET | Refills: 1 | Status: SHIPPED | OUTPATIENT
Start: 2021-01-12 | End: 2021-03-10 | Stop reason: SDUPTHER

## 2021-02-03 ENCOUNTER — PATIENT MESSAGE (OUTPATIENT)
Dept: FAMILY MEDICINE | Facility: CLINIC | Age: 61
End: 2021-02-03

## 2021-02-18 ENCOUNTER — PATIENT MESSAGE (OUTPATIENT)
Dept: FAMILY MEDICINE | Facility: CLINIC | Age: 61
End: 2021-02-18

## 2021-02-27 ENCOUNTER — PATIENT MESSAGE (OUTPATIENT)
Dept: FAMILY MEDICINE | Facility: CLINIC | Age: 61
End: 2021-02-27

## 2021-03-05 ENCOUNTER — PATIENT MESSAGE (OUTPATIENT)
Dept: FAMILY MEDICINE | Facility: CLINIC | Age: 61
End: 2021-03-05

## 2021-03-10 ENCOUNTER — OFFICE VISIT (OUTPATIENT)
Dept: FAMILY MEDICINE | Facility: CLINIC | Age: 61
End: 2021-03-10
Payer: MEDICARE

## 2021-03-10 VITALS
DIASTOLIC BLOOD PRESSURE: 110 MMHG | BODY MASS INDEX: 36.57 KG/M2 | SYSTOLIC BLOOD PRESSURE: 160 MMHG | WEIGHT: 233 LBS | HEART RATE: 64 BPM | HEIGHT: 67 IN

## 2021-03-10 DIAGNOSIS — Z98.84 HISTORY OF WEIGHT LOSS SURGERY: ICD-10-CM

## 2021-03-10 DIAGNOSIS — Z79.899 HIGH RISK MEDICATION USE: ICD-10-CM

## 2021-03-10 DIAGNOSIS — G47.00 INSOMNIA, UNSPECIFIED TYPE: ICD-10-CM

## 2021-03-10 DIAGNOSIS — M19.012 PRIMARY OSTEOARTHRITIS OF LEFT SHOULDER: ICD-10-CM

## 2021-03-10 DIAGNOSIS — Z85.3 HX OF BREAST CANCER: ICD-10-CM

## 2021-03-10 DIAGNOSIS — G62.0 CHEMOTHERAPY-INDUCED NEUROPATHY: ICD-10-CM

## 2021-03-10 DIAGNOSIS — E11.9 TYPE 2 DIABETES MELLITUS WITHOUT COMPLICATION, WITHOUT LONG-TERM CURRENT USE OF INSULIN: Primary | ICD-10-CM

## 2021-03-10 DIAGNOSIS — K21.9 GASTROESOPHAGEAL REFLUX DISEASE, UNSPECIFIED WHETHER ESOPHAGITIS PRESENT: ICD-10-CM

## 2021-03-10 DIAGNOSIS — T45.1X5A CHEMOTHERAPY-INDUCED NEUROPATHY: ICD-10-CM

## 2021-03-10 DIAGNOSIS — E78.5 HYPERLIPIDEMIA, UNSPECIFIED HYPERLIPIDEMIA TYPE: ICD-10-CM

## 2021-03-10 DIAGNOSIS — E11.69 TYPE 2 DIABETES MELLITUS WITH OTHER SPECIFIED COMPLICATION, WITHOUT LONG-TERM CURRENT USE OF INSULIN: ICD-10-CM

## 2021-03-10 LAB — HBA1C MFR BLD: 5.7 %

## 2021-03-10 PROCEDURE — 99214 OFFICE O/P EST MOD 30 MIN: CPT | Mod: S$GLB,,, | Performed by: NURSE PRACTITIONER

## 2021-03-10 PROCEDURE — 99214 PR OFFICE/OUTPT VISIT, EST, LEVL IV, 30-39 MIN: ICD-10-PCS | Mod: S$GLB,,, | Performed by: NURSE PRACTITIONER

## 2021-03-10 PROCEDURE — 83036 POCT HEMOGLOBIN A1C: ICD-10-PCS | Mod: QW,,, | Performed by: NURSE PRACTITIONER

## 2021-03-10 PROCEDURE — 83036 HEMOGLOBIN GLYCOSYLATED A1C: CPT | Mod: QW,,, | Performed by: NURSE PRACTITIONER

## 2021-03-11 ENCOUNTER — OFFICE VISIT (OUTPATIENT)
Dept: HEMATOLOGY/ONCOLOGY | Facility: CLINIC | Age: 61
End: 2021-03-11
Payer: MEDICARE

## 2021-03-11 VITALS
HEART RATE: 76 BPM | BODY MASS INDEX: 36.02 KG/M2 | DIASTOLIC BLOOD PRESSURE: 91 MMHG | SYSTOLIC BLOOD PRESSURE: 131 MMHG | WEIGHT: 230 LBS | RESPIRATION RATE: 18 BRPM

## 2021-03-11 DIAGNOSIS — C50.912 BREAST CANCER, STAGE 1, ESTROGEN RECEPTOR NEGATIVE, LEFT: Primary | ICD-10-CM

## 2021-03-11 DIAGNOSIS — C50.912 HER2-POSITIVE CARCINOMA OF LEFT BREAST: ICD-10-CM

## 2021-03-11 DIAGNOSIS — Z85.3 HX OF BREAST CANCER: ICD-10-CM

## 2021-03-11 DIAGNOSIS — Z98.890 S/P LUMPECTOMY, LEFT BREAST: ICD-10-CM

## 2021-03-11 DIAGNOSIS — Z17.1 BREAST CANCER, STAGE 1, ESTROGEN RECEPTOR NEGATIVE, LEFT: Primary | ICD-10-CM

## 2021-03-11 DIAGNOSIS — R97.8 OTHER ABNORMAL TUMOR MARKERS: ICD-10-CM

## 2021-03-11 PROCEDURE — 99214 OFFICE O/P EST MOD 30 MIN: CPT | Mod: S$GLB,,, | Performed by: INTERNAL MEDICINE

## 2021-03-11 PROCEDURE — 99214 PR OFFICE/OUTPT VISIT, EST, LEVL IV, 30-39 MIN: ICD-10-PCS | Mod: S$GLB,,, | Performed by: INTERNAL MEDICINE

## 2021-03-11 RX ORDER — FUROSEMIDE 20 MG/1
20 TABLET ORAL DAILY PRN
Qty: 90 TABLET | Refills: 1 | Status: SHIPPED | OUTPATIENT
Start: 2021-03-11 | End: 2021-06-17 | Stop reason: SDUPTHER

## 2021-03-11 RX ORDER — ROSUVASTATIN CALCIUM 10 MG/1
10 TABLET, COATED ORAL DAILY
Qty: 90 TABLET | Refills: 3 | Status: SHIPPED | OUTPATIENT
Start: 2021-03-11 | End: 2021-06-17 | Stop reason: SDUPTHER

## 2021-03-11 RX ORDER — VENLAFAXINE HYDROCHLORIDE 150 MG/1
150 CAPSULE, EXTENDED RELEASE ORAL DAILY
Qty: 90 CAPSULE | Refills: 1 | Status: SHIPPED | OUTPATIENT
Start: 2021-03-11 | End: 2021-06-17 | Stop reason: SDUPTHER

## 2021-03-11 RX ORDER — PREGABALIN 200 MG/1
200 CAPSULE ORAL 3 TIMES DAILY
Qty: 270 CAPSULE | Refills: 1 | Status: SHIPPED | OUTPATIENT
Start: 2021-03-11 | End: 2021-12-15 | Stop reason: SDUPTHER

## 2021-03-11 RX ORDER — PANTOPRAZOLE SODIUM 40 MG/1
40 TABLET, DELAYED RELEASE ORAL DAILY
Qty: 90 TABLET | Refills: 3 | Status: SHIPPED | OUTPATIENT
Start: 2021-03-11 | End: 2021-06-17 | Stop reason: SDUPTHER

## 2021-03-11 RX ORDER — TRAZODONE HYDROCHLORIDE 50 MG/1
50 TABLET ORAL NIGHTLY
Qty: 90 TABLET | Refills: 1 | Status: SHIPPED | OUTPATIENT
Start: 2021-03-11 | End: 2021-06-17 | Stop reason: SDUPTHER

## 2021-03-29 DIAGNOSIS — Z85.9 HISTORY OF CANCER: ICD-10-CM

## 2021-03-29 DIAGNOSIS — C50.912 MALIGNANT NEOPLASM OF LEFT BREAST: Primary | ICD-10-CM

## 2021-03-29 DIAGNOSIS — Z17.1 ESTROGEN RECEPTOR NEGATIVE STATUS (ER-): ICD-10-CM

## 2021-03-29 DIAGNOSIS — Z98.890: ICD-10-CM

## 2021-03-30 ENCOUNTER — TELEPHONE (OUTPATIENT)
Dept: HEMATOLOGY/ONCOLOGY | Facility: CLINIC | Age: 61
End: 2021-03-30

## 2021-03-30 DIAGNOSIS — Z12.31 SCREENING MAMMOGRAM FOR HIGH-RISK PATIENT: Primary | ICD-10-CM

## 2021-04-15 ENCOUNTER — HOSPITAL ENCOUNTER (OUTPATIENT)
Dept: RADIOLOGY | Facility: HOSPITAL | Age: 61
Discharge: HOME | End: 2021-04-15
Attending: INTERNAL MEDICINE
Payer: MEDICARE

## 2021-04-15 DIAGNOSIS — Z12.31 SCREENING MAMMOGRAM FOR HIGH-RISK PATIENT: ICD-10-CM

## 2021-04-15 PROCEDURE — 77067 SCR MAMMO BI INCL CAD: CPT | Mod: TC,PO

## 2021-06-01 ENCOUNTER — TELEPHONE (OUTPATIENT)
Dept: FAMILY MEDICINE | Facility: CLINIC | Age: 61
End: 2021-06-01

## 2021-06-01 DIAGNOSIS — E11.9 TYPE 2 DIABETES MELLITUS WITHOUT COMPLICATION, WITHOUT LONG-TERM CURRENT USE OF INSULIN: ICD-10-CM

## 2021-06-01 DIAGNOSIS — Z00.00 ROUTINE GENERAL MEDICAL EXAMINATION AT A HEALTH CARE FACILITY: Primary | ICD-10-CM

## 2021-06-01 DIAGNOSIS — Z79.899 ENCOUNTER FOR LONG-TERM (CURRENT) USE OF OTHER MEDICATIONS: ICD-10-CM

## 2021-06-01 DIAGNOSIS — E78.5 HYPERLIPIDEMIA, UNSPECIFIED HYPERLIPIDEMIA TYPE: ICD-10-CM

## 2021-06-05 LAB
ALBUMIN SERPL-MCNC: 4 G/DL (ref 3.6–5.1)
ALBUMIN/CREAT UR: 11 MCG/MG CREAT
ALBUMIN/GLOB SERPL: 1.8 (CALC) (ref 1–2.5)
ALP SERPL-CCNC: 65 U/L (ref 37–153)
ALT SERPL-CCNC: 9 U/L (ref 6–29)
APPEARANCE UR: CLEAR
AST SERPL-CCNC: 15 U/L (ref 10–35)
BACTERIA #/AREA URNS HPF: ABNORMAL /HPF
BACTERIA UR CULT: ABNORMAL
BASOPHILS # BLD AUTO: 70 CELLS/UL (ref 0–200)
BASOPHILS NFR BLD AUTO: 1.3 %
BILIRUB SERPL-MCNC: 0.4 MG/DL (ref 0.2–1.2)
BILIRUB UR QL STRIP: NEGATIVE
BUN SERPL-MCNC: 15 MG/DL (ref 7–25)
BUN/CREAT SERPL: NORMAL (CALC) (ref 6–22)
CALCIUM SERPL-MCNC: 9.5 MG/DL (ref 8.6–10.4)
CHLORIDE SERPL-SCNC: 106 MMOL/L (ref 98–110)
CHOLEST SERPL-MCNC: 182 MG/DL
CHOLEST/HDLC SERPL: 2 (CALC)
CO2 SERPL-SCNC: 32 MMOL/L (ref 20–32)
COLOR UR: YELLOW
CREAT SERPL-MCNC: 0.7 MG/DL (ref 0.5–1.05)
CREAT UR-MCNC: 102 MG/DL (ref 20–275)
EOSINOPHIL # BLD AUTO: 464 CELLS/UL (ref 15–500)
EOSINOPHIL NFR BLD AUTO: 8.6 %
ERYTHROCYTE [DISTWIDTH] IN BLOOD BY AUTOMATED COUNT: 12.8 % (ref 11–15)
GLOBULIN SER CALC-MCNC: 2.2 G/DL (CALC) (ref 1.9–3.7)
GLUCOSE SERPL-MCNC: 99 MG/DL (ref 65–99)
GLUCOSE UR QL STRIP: NEGATIVE
HBA1C MFR BLD: 5.5 % OF TOTAL HGB
HCT VFR BLD AUTO: 40.4 % (ref 35–45)
HDLC SERPL-MCNC: 92 MG/DL
HGB BLD-MCNC: 13.2 G/DL (ref 11.7–15.5)
HGB UR QL STRIP: NEGATIVE
HYALINE CASTS #/AREA URNS LPF: ABNORMAL /LPF
KETONES UR QL STRIP: NEGATIVE
LDLC SERPL CALC-MCNC: 75 MG/DL (CALC)
LEUKOCYTE ESTERASE UR QL STRIP: ABNORMAL
LYMPHOCYTES # BLD AUTO: 1382 CELLS/UL (ref 850–3900)
LYMPHOCYTES NFR BLD AUTO: 25.6 %
MCH RBC QN AUTO: 29.1 PG (ref 27–33)
MCHC RBC AUTO-ENTMCNC: 32.7 G/DL (ref 32–36)
MCV RBC AUTO: 89 FL (ref 80–100)
MICROALBUMIN UR-MCNC: 1.1 MG/DL
MONOCYTES # BLD AUTO: 502 CELLS/UL (ref 200–950)
MONOCYTES NFR BLD AUTO: 9.3 %
NEUTROPHILS # BLD AUTO: 2981 CELLS/UL (ref 1500–7800)
NEUTROPHILS NFR BLD AUTO: 55.2 %
NITRITE UR QL STRIP: POSITIVE
NONHDLC SERPL-MCNC: 90 MG/DL (CALC)
PH UR STRIP: 7 [PH] (ref 5–8)
PLATELET # BLD AUTO: 284 THOUSAND/UL (ref 140–400)
PMV BLD REES-ECKER: 10.7 FL (ref 7.5–12.5)
POTASSIUM SERPL-SCNC: 4.7 MMOL/L (ref 3.5–5.3)
PROT SERPL-MCNC: 6.2 G/DL (ref 6.1–8.1)
PROT UR QL STRIP: NEGATIVE
RBC # BLD AUTO: 4.54 MILLION/UL (ref 3.8–5.1)
RBC #/AREA URNS HPF: ABNORMAL /HPF
SODIUM SERPL-SCNC: 144 MMOL/L (ref 135–146)
SP GR UR STRIP: 1.02 (ref 1–1.03)
SQUAMOUS #/AREA URNS HPF: ABNORMAL /HPF
TRIGL SERPL-MCNC: 71 MG/DL
TSH SERPL-ACNC: 1.24 MIU/L (ref 0.4–4.5)
WBC # BLD AUTO: 5.4 THOUSAND/UL (ref 3.8–10.8)
WBC #/AREA URNS HPF: ABNORMAL /HPF

## 2021-06-06 ENCOUNTER — PATIENT MESSAGE (OUTPATIENT)
Dept: FAMILY MEDICINE | Facility: CLINIC | Age: 61
End: 2021-06-06

## 2021-06-07 ENCOUNTER — TELEPHONE (OUTPATIENT)
Dept: FAMILY MEDICINE | Facility: CLINIC | Age: 61
End: 2021-06-07

## 2021-06-08 ENCOUNTER — OFFICE VISIT (OUTPATIENT)
Dept: FAMILY MEDICINE | Facility: CLINIC | Age: 61
End: 2021-06-08
Payer: MEDICARE

## 2021-06-08 VITALS
SYSTOLIC BLOOD PRESSURE: 132 MMHG | DIASTOLIC BLOOD PRESSURE: 88 MMHG | WEIGHT: 239 LBS | HEIGHT: 67 IN | HEART RATE: 88 BPM | BODY MASS INDEX: 37.51 KG/M2

## 2021-06-08 DIAGNOSIS — H10.31 ACUTE BACTERIAL CONJUNCTIVITIS OF RIGHT EYE: Primary | ICD-10-CM

## 2021-06-08 PROCEDURE — 99213 OFFICE O/P EST LOW 20 MIN: CPT | Mod: S$GLB,,, | Performed by: NURSE PRACTITIONER

## 2021-06-08 PROCEDURE — 99213 PR OFFICE/OUTPT VISIT, EST, LEVL III, 20-29 MIN: ICD-10-PCS | Mod: S$GLB,,, | Performed by: NURSE PRACTITIONER

## 2021-06-08 RX ORDER — NEOMYCIN SULFATE, POLYMYXIN B SULFATE AND DEXAMETHASONE 3.5; 10000; 1 MG/ML; [USP'U]/ML; MG/ML
2 SUSPENSION/ DROPS OPHTHALMIC EVERY 4 HOURS
Qty: 5 ML | Refills: 1 | Status: SHIPPED | OUTPATIENT
Start: 2021-06-08 | End: 2021-09-21

## 2021-06-17 ENCOUNTER — OFFICE VISIT (OUTPATIENT)
Dept: FAMILY MEDICINE | Facility: CLINIC | Age: 61
End: 2021-06-17
Payer: MEDICARE

## 2021-06-17 ENCOUNTER — PATIENT MESSAGE (OUTPATIENT)
Dept: FAMILY MEDICINE | Facility: CLINIC | Age: 61
End: 2021-06-17

## 2021-06-17 VITALS
WEIGHT: 241 LBS | HEART RATE: 76 BPM | HEIGHT: 67 IN | SYSTOLIC BLOOD PRESSURE: 138 MMHG | DIASTOLIC BLOOD PRESSURE: 82 MMHG | BODY MASS INDEX: 37.83 KG/M2

## 2021-06-17 DIAGNOSIS — Z98.84 HISTORY OF WEIGHT LOSS SURGERY: ICD-10-CM

## 2021-06-17 DIAGNOSIS — G47.00 INSOMNIA, UNSPECIFIED TYPE: ICD-10-CM

## 2021-06-17 DIAGNOSIS — M51.36 DDD (DEGENERATIVE DISC DISEASE), LUMBAR: ICD-10-CM

## 2021-06-17 DIAGNOSIS — E11.69 TYPE 2 DIABETES MELLITUS WITH OTHER SPECIFIED COMPLICATION, WITHOUT LONG-TERM CURRENT USE OF INSULIN: Primary | ICD-10-CM

## 2021-06-17 DIAGNOSIS — Z85.3 HX OF BREAST CANCER: ICD-10-CM

## 2021-06-17 DIAGNOSIS — L40.9 PSORIASIS: ICD-10-CM

## 2021-06-17 DIAGNOSIS — G62.0 CHEMOTHERAPY-INDUCED NEUROPATHY: ICD-10-CM

## 2021-06-17 DIAGNOSIS — Z79.899 HIGH RISK MEDICATION USE: ICD-10-CM

## 2021-06-17 DIAGNOSIS — T45.1X5A CHEMOTHERAPY-INDUCED NEUROPATHY: ICD-10-CM

## 2021-06-17 DIAGNOSIS — M19.012 PRIMARY OSTEOARTHRITIS OF LEFT SHOULDER: ICD-10-CM

## 2021-06-17 DIAGNOSIS — E78.5 HYPERLIPIDEMIA, UNSPECIFIED HYPERLIPIDEMIA TYPE: ICD-10-CM

## 2021-06-17 DIAGNOSIS — K21.9 GASTROESOPHAGEAL REFLUX DISEASE, UNSPECIFIED WHETHER ESOPHAGITIS PRESENT: ICD-10-CM

## 2021-06-17 PROCEDURE — 99214 PR OFFICE/OUTPT VISIT, EST, LEVL IV, 30-39 MIN: ICD-10-PCS | Mod: S$GLB,,, | Performed by: NURSE PRACTITIONER

## 2021-06-17 PROCEDURE — 99214 OFFICE O/P EST MOD 30 MIN: CPT | Mod: S$GLB,,, | Performed by: NURSE PRACTITIONER

## 2021-06-17 RX ORDER — PREGABALIN 200 MG/1
200 CAPSULE ORAL 3 TIMES DAILY
Qty: 270 CAPSULE | Refills: 1 | Status: CANCELLED | OUTPATIENT
Start: 2021-06-17

## 2021-06-18 ENCOUNTER — PATIENT MESSAGE (OUTPATIENT)
Dept: FAMILY MEDICINE | Facility: CLINIC | Age: 61
End: 2021-06-18

## 2021-06-18 RX ORDER — ROSUVASTATIN CALCIUM 10 MG/1
10 TABLET, COATED ORAL DAILY
Qty: 90 TABLET | Refills: 3 | Status: SHIPPED | OUTPATIENT
Start: 2021-06-18 | End: 2021-12-15 | Stop reason: SDUPTHER

## 2021-06-18 RX ORDER — NAPROXEN 500 MG/1
TABLET ORAL
Qty: 90 TABLET | Refills: 1 | Status: SHIPPED | OUTPATIENT
Start: 2021-06-18 | End: 2021-12-15 | Stop reason: SDUPTHER

## 2021-06-18 RX ORDER — METHYLPREDNISOLONE 4 MG/1
TABLET ORAL
Qty: 1 PACKAGE | Refills: 0 | Status: SHIPPED | OUTPATIENT
Start: 2021-06-18 | End: 2021-07-09

## 2021-06-18 RX ORDER — VENLAFAXINE HYDROCHLORIDE 150 MG/1
150 CAPSULE, EXTENDED RELEASE ORAL DAILY
Qty: 90 CAPSULE | Refills: 1 | Status: SHIPPED | OUTPATIENT
Start: 2021-06-18 | End: 2021-06-18

## 2021-06-18 RX ORDER — TRAZODONE HYDROCHLORIDE 50 MG/1
50 TABLET ORAL NIGHTLY
Qty: 90 TABLET | Refills: 1 | Status: SHIPPED | OUTPATIENT
Start: 2021-06-18 | End: 2021-12-13 | Stop reason: SDUPTHER

## 2021-06-18 RX ORDER — VENLAFAXINE HYDROCHLORIDE 150 MG/1
150 CAPSULE, EXTENDED RELEASE ORAL DAILY
Qty: 90 CAPSULE | Refills: 1 | Status: SHIPPED | OUTPATIENT
Start: 2021-06-18 | End: 2021-12-15 | Stop reason: SDUPTHER

## 2021-06-18 RX ORDER — ROSUVASTATIN CALCIUM 10 MG/1
10 TABLET, COATED ORAL DAILY
Qty: 90 TABLET | Refills: 3 | Status: SHIPPED | OUTPATIENT
Start: 2021-06-18 | End: 2021-06-18

## 2021-06-18 RX ORDER — TRAZODONE HYDROCHLORIDE 50 MG/1
50 TABLET ORAL NIGHTLY
Qty: 90 TABLET | Refills: 1 | Status: SHIPPED | OUTPATIENT
Start: 2021-06-18 | End: 2021-06-18

## 2021-06-18 RX ORDER — FUROSEMIDE 20 MG/1
20 TABLET ORAL DAILY PRN
Qty: 90 TABLET | Refills: 1 | Status: SHIPPED | OUTPATIENT
Start: 2021-06-18 | End: 2021-06-18

## 2021-06-18 RX ORDER — PANTOPRAZOLE SODIUM 40 MG/1
40 TABLET, DELAYED RELEASE ORAL DAILY
Qty: 90 TABLET | Refills: 3 | Status: SHIPPED | OUTPATIENT
Start: 2021-06-18 | End: 2021-12-15 | Stop reason: SDUPTHER

## 2021-06-18 RX ORDER — CICLOPIROX 80 MG/ML
SOLUTION TOPICAL NIGHTLY
Qty: 6.6 ML | Refills: 3 | Status: SHIPPED | OUTPATIENT
Start: 2021-06-18 | End: 2021-09-21

## 2021-06-18 RX ORDER — FUROSEMIDE 20 MG/1
20 TABLET ORAL DAILY PRN
Qty: 90 TABLET | Refills: 1 | Status: SHIPPED | OUTPATIENT
Start: 2021-06-18 | End: 2021-12-15 | Stop reason: SDUPTHER

## 2021-06-18 RX ORDER — NAPROXEN 500 MG/1
TABLET ORAL
Qty: 90 TABLET | Refills: 1 | Status: SHIPPED | OUTPATIENT
Start: 2021-06-18 | End: 2021-06-18

## 2021-06-18 RX ORDER — PANTOPRAZOLE SODIUM 40 MG/1
40 TABLET, DELAYED RELEASE ORAL DAILY
Qty: 90 TABLET | Refills: 3 | Status: SHIPPED | OUTPATIENT
Start: 2021-06-18 | End: 2021-06-18

## 2021-08-12 ENCOUNTER — PATIENT MESSAGE (OUTPATIENT)
Dept: FAMILY MEDICINE | Facility: CLINIC | Age: 61
End: 2021-08-12

## 2021-08-19 ENCOUNTER — PATIENT MESSAGE (OUTPATIENT)
Dept: FAMILY MEDICINE | Facility: CLINIC | Age: 61
End: 2021-08-19

## 2021-08-23 ENCOUNTER — PATIENT MESSAGE (OUTPATIENT)
Dept: FAMILY MEDICINE | Facility: CLINIC | Age: 61
End: 2021-08-23

## 2021-08-25 ENCOUNTER — OFFICE VISIT (OUTPATIENT)
Dept: FAMILY MEDICINE | Facility: CLINIC | Age: 61
End: 2021-08-25
Payer: MEDICARE

## 2021-08-25 ENCOUNTER — HOSPITAL ENCOUNTER (OUTPATIENT)
Dept: RADIOLOGY | Facility: HOSPITAL | Age: 61
Discharge: HOME | End: 2021-08-25
Attending: NURSE PRACTITIONER
Payer: MEDICARE

## 2021-08-25 VITALS
BODY MASS INDEX: 36.88 KG/M2 | HEIGHT: 67 IN | HEART RATE: 84 BPM | WEIGHT: 235 LBS | DIASTOLIC BLOOD PRESSURE: 84 MMHG | SYSTOLIC BLOOD PRESSURE: 136 MMHG

## 2021-08-25 DIAGNOSIS — E11.69 TYPE 2 DIABETES MELLITUS WITH OTHER SPECIFIED COMPLICATION, WITHOUT LONG-TERM CURRENT USE OF INSULIN: ICD-10-CM

## 2021-08-25 DIAGNOSIS — Z85.3 PERSONAL HISTORY OF MALIGNANT NEOPLASM OF BREAST: ICD-10-CM

## 2021-08-25 DIAGNOSIS — G47.00 INSOMNIA, UNSPECIFIED TYPE: ICD-10-CM

## 2021-08-25 DIAGNOSIS — Z79.899 HIGH RISK MEDICATION USE: ICD-10-CM

## 2021-08-25 DIAGNOSIS — K21.9 GASTROESOPHAGEAL REFLUX DISEASE, UNSPECIFIED WHETHER ESOPHAGITIS PRESENT: ICD-10-CM
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
## 2021-08-25 DIAGNOSIS — M25.562 ACUTE PAIN OF LEFT KNEE: Primary | ICD-10-CM

## 2021-08-25 DIAGNOSIS — M25.562 ACUTE PAIN OF LEFT KNEE: ICD-10-CM

## 2021-08-25 DIAGNOSIS — E78.5 HYPERLIPIDEMIA, UNSPECIFIED HYPERLIPIDEMIA TYPE: ICD-10-CM

## 2021-08-25 DIAGNOSIS — S02.85XA LEFT ORBIT FRACTURE, CLOSED, INITIAL ENCOUNTER: ICD-10-CM

## 2021-08-25 PROCEDURE — 99214 OFFICE O/P EST MOD 30 MIN: CPT | Mod: S$GLB,,, | Performed by: NURSE PRACTITIONER

## 2021-08-25 PROCEDURE — 73562 X-RAY EXAM OF KNEE 3: CPT | Mod: TC,PO,LT

## 2021-08-25 PROCEDURE — 99214 PR OFFICE/OUTPT VISIT, EST, LEVL IV, 30-39 MIN: ICD-10-PCS | Mod: S$GLB,,, | Performed by: NURSE PRACTITIONER

## 2021-08-26 ENCOUNTER — PATIENT MESSAGE (OUTPATIENT)
Dept: FAMILY MEDICINE | Facility: CLINIC | Age: 61
End: 2021-08-26

## 2021-09-07 ENCOUNTER — OFFICE VISIT (OUTPATIENT)
Dept: ORTHOPEDICS | Facility: CLINIC | Age: 61
End: 2021-09-07
Payer: MEDICARE

## 2021-09-07 ENCOUNTER — HOSPITAL ENCOUNTER (OUTPATIENT)
Dept: RADIOLOGY | Facility: HOSPITAL | Age: 61
Discharge: HOME | End: 2021-09-07
Attending: ORTHOPAEDIC SURGERY
Payer: MEDICARE

## 2021-09-07 VITALS — BODY MASS INDEX: 36.88 KG/M2 | WEIGHT: 235 LBS | RESPIRATION RATE: 16 BRPM | HEIGHT: 67 IN

## 2021-09-07 DIAGNOSIS — M25.562 PAIN IN BOTH KNEES, UNSPECIFIED CHRONICITY: Primary | ICD-10-CM

## 2021-09-07 DIAGNOSIS — M25.561 PAIN IN BOTH KNEES, UNSPECIFIED CHRONICITY: Primary | ICD-10-CM

## 2021-09-07 DIAGNOSIS — M25.562 PAIN IN BOTH KNEES, UNSPECIFIED CHRONICITY: ICD-10-CM

## 2021-09-07 DIAGNOSIS — M17.12 PRIMARY OSTEOARTHRITIS OF LEFT KNEE: ICD-10-CM

## 2021-09-07 DIAGNOSIS — M25.561 PAIN IN BOTH KNEES, UNSPECIFIED CHRONICITY: ICD-10-CM

## 2021-09-07 DIAGNOSIS — M17.11 PRIMARY OSTEOARTHRITIS OF RIGHT KNEE: ICD-10-CM

## 2021-09-07 PROCEDURE — 73562 X-RAY EXAM OF KNEE 3: CPT | Mod: 26,50,, | Performed by: RADIOLOGY

## 2021-09-07 PROCEDURE — 99999 PR PBB SHADOW E&M-EST. PATIENT-LVL IV: CPT | Mod: PBBFAC,,, | Performed by: ORTHOPAEDIC SURGERY

## 2021-09-07 PROCEDURE — 20610 DRAIN/INJ JOINT/BURSA W/O US: CPT | Mod: 50,PBBFAC,PN | Performed by: ORTHOPAEDIC SURGERY

## 2021-09-07 PROCEDURE — 99999 PR PBB SHADOW E&M-EST. PATIENT-LVL IV: ICD-10-PCS | Mod: PBBFAC,,, | Performed by: ORTHOPAEDIC SURGERY

## 2021-09-07 PROCEDURE — 20610 LARGE JOINT ASPIRATION/INJECTION: BILATERAL KNEE: ICD-10-PCS | Mod: 50,S$PBB,, | Performed by: ORTHOPAEDIC SURGERY

## 2021-09-07 PROCEDURE — 99214 OFFICE O/P EST MOD 30 MIN: CPT | Mod: PBBFAC,PN,25 | Performed by: ORTHOPAEDIC SURGERY

## 2021-09-07 PROCEDURE — 99213 PR OFFICE/OUTPT VISIT, EST, LEVL III, 20-29 MIN: ICD-10-PCS | Mod: 25,S$PBB,, | Performed by: ORTHOPAEDIC SURGERY

## 2021-09-07 PROCEDURE — 99213 OFFICE O/P EST LOW 20 MIN: CPT | Mod: 25,S$PBB,, | Performed by: ORTHOPAEDIC SURGERY

## 2021-09-07 PROCEDURE — 73562 X-RAY EXAM OF KNEE 3: CPT | Mod: TC,50,PN

## 2021-09-07 PROCEDURE — 73562 XR KNEE ORTHO BILAT: ICD-10-PCS | Mod: 26,50,, | Performed by: RADIOLOGY

## 2021-09-07 RX ADMIN — Medication 20 MG: at 09:09

## 2021-09-14 ENCOUNTER — OFFICE VISIT (OUTPATIENT)
Dept: ORTHOPEDICS | Facility: CLINIC | Age: 61
End: 2021-09-14
Payer: MEDICARE

## 2021-09-14 VITALS — BODY MASS INDEX: 36.88 KG/M2 | WEIGHT: 235 LBS | RESPIRATION RATE: 18 BRPM | HEIGHT: 67 IN

## 2021-09-14 DIAGNOSIS — M17.11 ARTHRITIS OF RIGHT KNEE: ICD-10-CM

## 2021-09-14 DIAGNOSIS — M17.12 PRIMARY OSTEOARTHRITIS OF LEFT KNEE: Primary | ICD-10-CM

## 2021-09-14 PROCEDURE — 99999 PR PBB SHADOW E&M-EST. PATIENT-LVL III: CPT | Mod: PBBFAC,,, | Performed by: ORTHOPAEDIC SURGERY

## 2021-09-14 PROCEDURE — 20610 LARGE JOINT ASPIRATION/INJECTION: L KNEE: ICD-10-PCS | Mod: S$PBB,50,, | Performed by: ORTHOPAEDIC SURGERY

## 2021-09-14 PROCEDURE — 20610 DRAIN/INJ JOINT/BURSA W/O US: CPT | Mod: PBBFAC,PN | Performed by: ORTHOPAEDIC SURGERY

## 2021-09-14 PROCEDURE — 99999 PR PBB SHADOW E&M-EST. PATIENT-LVL III: ICD-10-PCS | Mod: PBBFAC,,, | Performed by: ORTHOPAEDIC SURGERY

## 2021-09-14 PROCEDURE — 99499 NO LOS: ICD-10-PCS | Mod: S$PBB,,, | Performed by: ORTHOPAEDIC SURGERY

## 2021-09-14 PROCEDURE — 99499 UNLISTED E&M SERVICE: CPT | Mod: S$PBB,,, | Performed by: ORTHOPAEDIC SURGERY

## 2021-09-14 RX ADMIN — Medication 20 MG: at 09:09

## 2021-09-21 ENCOUNTER — OFFICE VISIT (OUTPATIENT)
Dept: ORTHOPEDICS | Facility: CLINIC | Age: 61
End: 2021-09-21
Payer: MEDICARE

## 2021-09-21 ENCOUNTER — OFFICE VISIT (OUTPATIENT)
Dept: FAMILY MEDICINE | Facility: CLINIC | Age: 61
End: 2021-09-21
Payer: MEDICARE

## 2021-09-21 ENCOUNTER — PATIENT MESSAGE (OUTPATIENT)
Dept: FAMILY MEDICINE | Facility: CLINIC | Age: 61
End: 2021-09-21

## 2021-09-21 VITALS
HEIGHT: 67 IN | WEIGHT: 235 LBS | BODY MASS INDEX: 36.88 KG/M2 | HEART RATE: 72 BPM | DIASTOLIC BLOOD PRESSURE: 82 MMHG | SYSTOLIC BLOOD PRESSURE: 126 MMHG

## 2021-09-21 DIAGNOSIS — G62.0 CHEMOTHERAPY-INDUCED NEUROPATHY: ICD-10-CM

## 2021-09-21 DIAGNOSIS — Z98.84 HISTORY OF WEIGHT LOSS SURGERY: ICD-10-CM

## 2021-09-21 DIAGNOSIS — Z79.899 HIGH RISK MEDICATION USE: ICD-10-CM

## 2021-09-21 DIAGNOSIS — E78.5 HYPERLIPIDEMIA, UNSPECIFIED HYPERLIPIDEMIA TYPE: ICD-10-CM

## 2021-09-21 DIAGNOSIS — M17.11 PRIMARY OSTEOARTHRITIS OF RIGHT KNEE: ICD-10-CM

## 2021-09-21 DIAGNOSIS — G47.00 INSOMNIA, UNSPECIFIED TYPE: ICD-10-CM

## 2021-09-21 DIAGNOSIS — K21.9 GASTROESOPHAGEAL REFLUX DISEASE, UNSPECIFIED WHETHER ESOPHAGITIS PRESENT: ICD-10-CM

## 2021-09-21 DIAGNOSIS — E11.69 TYPE 2 DIABETES MELLITUS WITH OTHER SPECIFIED COMPLICATION, WITHOUT LONG-TERM CURRENT USE OF INSULIN: Primary | ICD-10-CM

## 2021-09-21 DIAGNOSIS — Z85.3 HX OF BREAST CANCER: ICD-10-CM

## 2021-09-21 DIAGNOSIS — L40.9 PSORIASIS: ICD-10-CM

## 2021-09-21 DIAGNOSIS — T45.1X5A CHEMOTHERAPY-INDUCED NEUROPATHY: ICD-10-CM

## 2021-09-21 DIAGNOSIS — M17.12 PRIMARY OSTEOARTHRITIS OF LEFT KNEE: Primary | ICD-10-CM

## 2021-09-21 LAB — HBA1C MFR BLD: 5.6 %

## 2021-09-21 PROCEDURE — 99999 PR PBB SHADOW E&M-EST. PATIENT-LVL II: CPT | Mod: PBBFAC,,, | Performed by: ORTHOPAEDIC SURGERY

## 2021-09-21 PROCEDURE — 20610 DRAIN/INJ JOINT/BURSA W/O US: CPT | Mod: 50,PBBFAC,PN | Performed by: ORTHOPAEDIC SURGERY

## 2021-09-21 PROCEDURE — 20610 LARGE JOINT ASPIRATION/INJECTION: BILATERAL KNEE: ICD-10-PCS | Mod: 50,S$PBB,, | Performed by: ORTHOPAEDIC SURGERY

## 2021-09-21 PROCEDURE — 99214 PR OFFICE/OUTPT VISIT, EST, LEVL IV, 30-39 MIN: ICD-10-PCS | Mod: S$GLB,,, | Performed by: NURSE PRACTITIONER

## 2021-09-21 PROCEDURE — 99214 OFFICE O/P EST MOD 30 MIN: CPT | Mod: S$GLB,,, | Performed by: NURSE PRACTITIONER

## 2021-09-21 PROCEDURE — 83036 POCT HEMOGLOBIN A1C: ICD-10-PCS | Mod: QW,,, | Performed by: NURSE PRACTITIONER

## 2021-09-21 PROCEDURE — 99499 UNLISTED E&M SERVICE: CPT | Mod: S$PBB,,, | Performed by: ORTHOPAEDIC SURGERY

## 2021-09-21 PROCEDURE — 83036 HEMOGLOBIN GLYCOSYLATED A1C: CPT | Mod: QW,,, | Performed by: NURSE PRACTITIONER

## 2021-09-21 PROCEDURE — 99999 PR PBB SHADOW E&M-EST. PATIENT-LVL II: ICD-10-PCS | Mod: PBBFAC,,, | Performed by: ORTHOPAEDIC SURGERY

## 2021-09-21 PROCEDURE — 99499 NO LOS: ICD-10-PCS | Mod: S$PBB,,, | Performed by: ORTHOPAEDIC SURGERY

## 2021-09-21 RX ORDER — ATOMOXETINE 10 MG/1
10 CAPSULE ORAL DAILY
Qty: 30 CAPSULE | Refills: 0 | Status: SHIPPED | OUTPATIENT
Start: 2021-09-21 | End: 2021-10-20

## 2021-09-21 RX ADMIN — Medication 20 MG: at 11:09

## 2021-09-22 ENCOUNTER — PATIENT MESSAGE (OUTPATIENT)
Dept: FAMILY MEDICINE | Facility: CLINIC | Age: 61
End: 2021-09-22

## 2021-10-20 ENCOUNTER — PATIENT MESSAGE (OUTPATIENT)
Dept: FAMILY MEDICINE | Facility: CLINIC | Age: 61
End: 2021-10-20
Payer: MEDICARE

## 2021-10-20 ENCOUNTER — PATIENT MESSAGE (OUTPATIENT)
Dept: FAMILY MEDICINE | Facility: CLINIC | Age: 61
End: 2021-10-20

## 2021-10-20 DIAGNOSIS — F98.8 ATTENTION DEFICIT DISORDER, UNSPECIFIED HYPERACTIVITY PRESENCE: Primary | ICD-10-CM

## 2021-10-20 RX ORDER — ATOMOXETINE 18 MG/1
18 CAPSULE ORAL DAILY
Qty: 30 CAPSULE | Refills: 0 | Status: SHIPPED | OUTPATIENT
Start: 2021-10-20 | End: 2021-11-22 | Stop reason: SDUPTHER

## 2021-11-16 ENCOUNTER — OFFICE VISIT (OUTPATIENT)
Dept: ORTHOPEDICS | Facility: CLINIC | Age: 61
End: 2021-11-16
Payer: MEDICARE

## 2021-11-16 VITALS — WEIGHT: 235 LBS | RESPIRATION RATE: 18 BRPM | BODY MASS INDEX: 36.88 KG/M2 | HEIGHT: 67 IN

## 2021-11-16 DIAGNOSIS — M17.11 PRIMARY OSTEOARTHRITIS OF RIGHT KNEE: ICD-10-CM

## 2021-11-16 DIAGNOSIS — M17.12 PRIMARY OSTEOARTHRITIS OF LEFT KNEE: Primary | ICD-10-CM

## 2021-11-16 PROCEDURE — 99213 OFFICE O/P EST LOW 20 MIN: CPT | Mod: S$PBB,,, | Performed by: ORTHOPAEDIC SURGERY

## 2021-11-16 PROCEDURE — 99999 PR PBB SHADOW E&M-EST. PATIENT-LVL III: ICD-10-PCS | Mod: PBBFAC,,, | Performed by: ORTHOPAEDIC SURGERY

## 2021-11-16 PROCEDURE — 99213 PR OFFICE/OUTPT VISIT, EST, LEVL III, 20-29 MIN: ICD-10-PCS | Mod: S$PBB,,, | Performed by: ORTHOPAEDIC SURGERY

## 2021-11-16 PROCEDURE — 99213 OFFICE O/P EST LOW 20 MIN: CPT | Mod: PBBFAC,PN | Performed by: ORTHOPAEDIC SURGERY

## 2021-11-16 PROCEDURE — 99999 PR PBB SHADOW E&M-EST. PATIENT-LVL III: CPT | Mod: PBBFAC,,, | Performed by: ORTHOPAEDIC SURGERY

## 2021-11-21 ENCOUNTER — PATIENT MESSAGE (OUTPATIENT)
Dept: FAMILY MEDICINE | Facility: CLINIC | Age: 61
End: 2021-11-21
Payer: MEDICARE

## 2021-11-21 DIAGNOSIS — F98.8 ATTENTION DEFICIT DISORDER, UNSPECIFIED HYPERACTIVITY PRESENCE: ICD-10-CM

## 2021-11-22 RX ORDER — ATOMOXETINE 18 MG/1
18 CAPSULE ORAL DAILY
Qty: 30 CAPSULE | Refills: 0 | Status: SHIPPED | OUTPATIENT
Start: 2021-11-22 | End: 2021-12-15 | Stop reason: SDUPTHER

## 2021-12-12 ENCOUNTER — PATIENT MESSAGE (OUTPATIENT)
Dept: FAMILY MEDICINE | Facility: CLINIC | Age: 61
End: 2021-12-12
Payer: MEDICARE

## 2021-12-12 DIAGNOSIS — G47.00 INSOMNIA, UNSPECIFIED TYPE: ICD-10-CM

## 2021-12-13 RX ORDER — TRAZODONE HYDROCHLORIDE 50 MG/1
100 TABLET ORAL NIGHTLY
Qty: 180 TABLET | Refills: 0 | Status: SHIPPED | OUTPATIENT
Start: 2021-12-13 | End: 2022-02-01 | Stop reason: SDUPTHER

## 2021-12-15 ENCOUNTER — OFFICE VISIT (OUTPATIENT)
Dept: FAMILY MEDICINE | Facility: CLINIC | Age: 61
End: 2021-12-15
Payer: MEDICARE

## 2021-12-15 VITALS
SYSTOLIC BLOOD PRESSURE: 132 MMHG | DIASTOLIC BLOOD PRESSURE: 84 MMHG | HEIGHT: 67 IN | BODY MASS INDEX: 36.26 KG/M2 | WEIGHT: 231 LBS | HEART RATE: 88 BPM

## 2021-12-15 DIAGNOSIS — M17.11 PRIMARY OSTEOARTHRITIS OF RIGHT KNEE: ICD-10-CM

## 2021-12-15 DIAGNOSIS — F98.8 ATTENTION DEFICIT DISORDER, UNSPECIFIED HYPERACTIVITY PRESENCE: ICD-10-CM

## 2021-12-15 DIAGNOSIS — E11.69 TYPE 2 DIABETES MELLITUS WITH OTHER SPECIFIED COMPLICATION, WITHOUT LONG-TERM CURRENT USE OF INSULIN: Primary | ICD-10-CM

## 2021-12-15 DIAGNOSIS — G47.00 INSOMNIA, UNSPECIFIED TYPE: ICD-10-CM

## 2021-12-15 DIAGNOSIS — E78.5 HYPERLIPIDEMIA, UNSPECIFIED HYPERLIPIDEMIA TYPE: ICD-10-CM

## 2021-12-15 DIAGNOSIS — M19.012 PRIMARY OSTEOARTHRITIS OF LEFT SHOULDER: ICD-10-CM

## 2021-12-15 DIAGNOSIS — K21.9 GASTROESOPHAGEAL REFLUX DISEASE, UNSPECIFIED WHETHER ESOPHAGITIS PRESENT: ICD-10-CM

## 2021-12-15 DIAGNOSIS — Z79.899 HIGH RISK MEDICATION USE: ICD-10-CM

## 2021-12-15 LAB — HBA1C MFR BLD: 5.7 %

## 2021-12-15 PROCEDURE — 83036 HEMOGLOBIN GLYCOSYLATED A1C: CPT | Mod: QW,,, | Performed by: NURSE PRACTITIONER

## 2021-12-15 PROCEDURE — 99214 PR OFFICE/OUTPT VISIT, EST, LEVL IV, 30-39 MIN: ICD-10-PCS | Mod: S$GLB,,, | Performed by: NURSE PRACTITIONER

## 2021-12-15 PROCEDURE — 99214 OFFICE O/P EST MOD 30 MIN: CPT | Mod: S$GLB,,, | Performed by: NURSE PRACTITIONER

## 2021-12-15 PROCEDURE — 83036 POCT HEMOGLOBIN A1C: ICD-10-PCS | Mod: QW,,, | Performed by: NURSE PRACTITIONER

## 2021-12-18 ENCOUNTER — PATIENT MESSAGE (OUTPATIENT)
Dept: FAMILY MEDICINE | Facility: CLINIC | Age: 61
End: 2021-12-18
Payer: MEDICARE

## 2021-12-21 RX ORDER — VENLAFAXINE HYDROCHLORIDE 150 MG/1
150 CAPSULE, EXTENDED RELEASE ORAL DAILY
Qty: 90 CAPSULE | Refills: 1 | Status: SHIPPED | OUTPATIENT
Start: 2021-12-21 | End: 2022-03-23 | Stop reason: SDUPTHER

## 2021-12-21 RX ORDER — ATOMOXETINE 18 MG/1
18 CAPSULE ORAL DAILY
Qty: 30 CAPSULE | Refills: 0 | Status: SHIPPED | OUTPATIENT
Start: 2021-12-21 | End: 2022-03-07 | Stop reason: SDUPTHER

## 2021-12-21 RX ORDER — PREGABALIN 200 MG/1
200 CAPSULE ORAL 3 TIMES DAILY
Qty: 270 CAPSULE | Refills: 1 | Status: SHIPPED | OUTPATIENT
Start: 2021-12-21 | End: 2022-03-23 | Stop reason: SDUPTHER

## 2021-12-21 RX ORDER — NAPROXEN 500 MG/1
TABLET ORAL
Qty: 90 TABLET | Refills: 1 | Status: SHIPPED | OUTPATIENT
Start: 2021-12-21 | End: 2022-06-02

## 2021-12-21 RX ORDER — PANTOPRAZOLE SODIUM 40 MG/1
40 TABLET, DELAYED RELEASE ORAL DAILY
Qty: 90 TABLET | Refills: 3 | Status: SHIPPED | OUTPATIENT
Start: 2021-12-21 | End: 2022-03-23 | Stop reason: SDUPTHER

## 2021-12-21 RX ORDER — ROSUVASTATIN CALCIUM 10 MG/1
10 TABLET, COATED ORAL DAILY
Qty: 90 TABLET | Refills: 3 | Status: SHIPPED | OUTPATIENT
Start: 2021-12-21 | End: 2022-03-23

## 2021-12-21 RX ORDER — FUROSEMIDE 20 MG/1
20 TABLET ORAL DAILY PRN
Qty: 90 TABLET | Refills: 1 | Status: SHIPPED | OUTPATIENT
Start: 2021-12-21 | End: 2022-03-23 | Stop reason: SDUPTHER

## 2022-01-26 DIAGNOSIS — G47.00 INSOMNIA, UNSPECIFIED TYPE: ICD-10-CM

## 2022-01-26 RX ORDER — TRAZODONE HYDROCHLORIDE 50 MG/1
100 TABLET ORAL NIGHTLY
Qty: 180 TABLET | Refills: 0 | Status: CANCELLED | OUTPATIENT
Start: 2022-01-26

## 2022-01-31 DIAGNOSIS — G47.00 INSOMNIA, UNSPECIFIED TYPE: ICD-10-CM

## 2022-02-01 RX ORDER — TRAZODONE HYDROCHLORIDE 50 MG/1
100 TABLET ORAL NIGHTLY
Qty: 28 TABLET | Refills: 0 | Status: SHIPPED | OUTPATIENT
Start: 2022-02-01 | End: 2022-02-25 | Stop reason: SDUPTHER

## 2022-02-25 ENCOUNTER — PATIENT MESSAGE (OUTPATIENT)
Dept: FAMILY MEDICINE | Facility: CLINIC | Age: 62
End: 2022-02-25
Payer: MEDICARE

## 2022-02-25 DIAGNOSIS — G47.00 INSOMNIA, UNSPECIFIED TYPE: ICD-10-CM

## 2022-02-25 RX ORDER — TRAZODONE HYDROCHLORIDE 50 MG/1
100 TABLET ORAL NIGHTLY
Qty: 60 TABLET | Refills: 0 | Status: SHIPPED | OUTPATIENT
Start: 2022-02-25 | End: 2022-03-04 | Stop reason: SDUPTHER

## 2022-03-04 ENCOUNTER — PATIENT MESSAGE (OUTPATIENT)
Dept: FAMILY MEDICINE | Facility: CLINIC | Age: 62
End: 2022-03-04
Payer: MEDICARE

## 2022-03-04 DIAGNOSIS — G47.00 INSOMNIA, UNSPECIFIED TYPE: ICD-10-CM

## 2022-03-04 DIAGNOSIS — F98.8 ATTENTION DEFICIT DISORDER, UNSPECIFIED HYPERACTIVITY PRESENCE: ICD-10-CM

## 2022-03-04 RX ORDER — TRAZODONE HYDROCHLORIDE 50 MG/1
100 TABLET ORAL NIGHTLY
Qty: 180 TABLET | Refills: 0 | Status: SHIPPED | OUTPATIENT
Start: 2022-03-04 | End: 2022-03-23 | Stop reason: SDUPTHER

## 2022-03-07 ENCOUNTER — PATIENT MESSAGE (OUTPATIENT)
Dept: FAMILY MEDICINE | Facility: CLINIC | Age: 62
End: 2022-03-07
Payer: MEDICARE

## 2022-03-07 DIAGNOSIS — F98.8 ATTENTION DEFICIT DISORDER, UNSPECIFIED HYPERACTIVITY PRESENCE: ICD-10-CM

## 2022-03-07 RX ORDER — ATOMOXETINE 18 MG/1
18 CAPSULE ORAL DAILY
Qty: 30 CAPSULE | Refills: 0 | Status: SHIPPED | OUTPATIENT
Start: 2022-03-07 | End: 2022-03-23 | Stop reason: SDUPTHER

## 2022-03-15 NOTE — PROGRESS NOTES
[facility] Hematology/Oncology  PROGRESS NOTE -   Follow-up Visit      Subjective:       Patient ID:   NAME: Lolly Ramírez : 1960     61 y.o. female    Referring Doc: Shaunna Gordon NP  Other Physicians: Steven Tobar (Neuro); Macario Rodas           Chief Complaint: hx/of left breast ca    History of Present Illness:     Patient returns today for a regularly scheduled follow-up visit.  The patient is here today to go over the results of the recently ordered labs, tests and studies. She is here by herself. She is doing ok with no new issues.       She saw Evan in Dec 2021       No CP, HA's or N/V. Breathing ok.      She plans to see Dr Heart in future for esophageal dilation    She had back nerve ablation last week with Dr Villa with pain management    Discussed covid19 precautions    She is due for mammogram and she has labs ordered by her PCP.              ROS:   GEN: normal without any fever, night sweats or weight loss  HEENT: normal with no HA's, sore throat, stiff neck, changes in vision  CV: normal with no CP, SOB, PND, LAKE or orthopnea  PULM: normal with no SOB, cough, hemoptysis, sputum or pleuritic pain  GI: normal with no abdominal pain, nausea, vomiting, constipation, diarrhea, melanotic stools, BRBPR, or hematemesis  : normal with no hematuria, dysuria  BREAST: normal with no mass, discharge, pain  SKIN: normal with no rash, erythema, bruising, or swelling    Pain Scale: constant 2 with spikes to 5    Allergies:  Review of patient's allergies indicates:   Allergen Reactions    Banana Hives    Codeine Nausea And Vomiting    Dilaudid  [hydromorphone (bulk)] Rash    Hydromorphone hcl Rash       Medications:    Current Outpatient Medications:     ascorbic acid, vitamin C, (VITAMIN C) 500 MG tablet, Take 500 mg by mouth once daily., Disp: , Rfl:     atomoxetine (STRATTERA) 18 MG capsule, Take 1 capsule (18 mg total) by mouth once daily., Disp: 30 capsule, Rfl: 0    b complex vitamins  "capsule, Take 1 capsule by mouth once daily., Disp: , Rfl:     docusate sodium (COLACE) 100 MG capsule, Take 1 capsule (100 mg total) by mouth once daily., Disp: 90 capsule, Rfl: 0    furosemide (LASIX) 20 MG tablet, Take 1 tablet (20 mg total) by mouth daily as needed., Disp: 90 tablet, Rfl: 1    LYRICA 200 mg Cap, Take 1 capsule (200 mg total) by mouth 3 (three) times daily., Disp: 270 capsule, Rfl: 1    magnesium 250 mg Tab, Take 250 mg by mouth once daily., Disp: , Rfl:     multivitamin capsule, Take 1 capsule by mouth once daily., Disp: , Rfl:     naproxen (NAPROSYN) 500 MG tablet, TK 1 T PO Q 12 H PRN. TAKE WITH FOOD OR MILK., Disp: 90 tablet, Rfl: 1    pantoprazole (PROTONIX) 40 MG tablet, Take 1 tablet (40 mg total) by mouth once daily., Disp: 90 tablet, Rfl: 3    rosuvastatin (CRESTOR) 10 MG tablet, Take 1 tablet (10 mg total) by mouth once daily., Disp: 90 tablet, Rfl: 3    traZODone (DESYREL) 50 MG tablet, Take 2 tablets (100 mg total) by mouth every evening., Disp: 180 tablet, Rfl: 0    venlafaxine (EFFEXOR-XR) 150 MG Cp24, Take 1 capsule (150 mg total) by mouth once daily., Disp: 90 capsule, Rfl: 1    PMHx/PSHx Updates:  See patient's last visit with me on 3/11/2021  See H&P on 2/18/2020        Pathology:  Cancer Staging  No matching staging information was found for the patient.          Objective:     Vitals:  Blood pressure 138/84, pulse 101, temperature 97.4 °F (36.3 °C), resp. rate 20, height 5' 10" (1.778 m), weight 107.5 kg (237 lb).    Physical Examination:   GEN: no apparent distress, comfortable; AAOx3; overweight  HEAD: atraumatic and normocephalic  EYES: no pallor, no icterus, PERRLA  ENT: OMM, no pharyngeal erythema, external ears WNL; no nasal discharge; no thrush  NECK: no masses, thyroid normal, trachea midline, no LAD/LN's, supple  CV: RRR with no murmur; normal pulse; normal S1 and S2; no pedal edema  CHEST: Normal respiratory effort; CTAB; normal breath sounds; no wheeze or " crackles  ABDOM: nontender and nondistended; soft; normal bowel sounds; no rebound/guarding  MUSC/Skeletal: ROM normal; no crepitus; joints normal; no deformities or arthropathy  EXTREM: no clubbing, cyanosis, inflammation or swelling  SKIN: excoriations on legs primarily; psoriasis plaques on feet  : no red  NEURO: grossly intact; motor/sensory WNL; AAOx3; no tremors  PSYCH: normal mood, affect and behavior  LYMPH: normal cervical, supraclavicular, axillary and groin LN's  Breast: lumpectomy on left; no changes        Labs:     Lab Results   Component Value Date    WBC 4.0 03/15/2022    HGB 13.1 03/15/2022    HCT 41.2 03/15/2022    MCV 85.3 03/15/2022     03/15/2022       CMP  Sodium   Date Value Ref Range Status   03/15/2022 143 135 - 146 mmol/L Final   04/26/2019 141 134 - 144 mmol/L      Potassium   Date Value Ref Range Status   03/15/2022 4.2 3.5 - 5.3 mmol/L Final     Chloride   Date Value Ref Range Status   03/15/2022 107 98 - 110 mmol/L Final   04/26/2019 103 98 - 110 mmol/L      CO2   Date Value Ref Range Status   03/15/2022 30 20 - 32 mmol/L Final     Glucose   Date Value Ref Range Status   03/15/2022 126 (H) 65 - 99 mg/dL Final     Comment:                   Fasting reference interval     For someone without known diabetes, a glucose  value >125 mg/dL indicates that they may have  diabetes and this should be confirmed with a  follow-up test.        04/26/2019 81 70 - 99 mg/dL      BUN   Date Value Ref Range Status   03/15/2022 13 7 - 25 mg/dL Final     Creatinine   Date Value Ref Range Status   03/15/2022 0.80 0.50 - 0.99 mg/dL Final     Comment:     For patients >49 years of age, the reference limit  for Creatinine is approximately 13% higher for people  identified as -American.        04/26/2019 0.71 0.60 - 1.40 mg/dL      Calcium   Date Value Ref Range Status   03/15/2022 9.1 8.6 - 10.4 mg/dL Final     Total Protein   Date Value Ref Range Status   03/15/2022 6.3 6.1 - 8.1 g/dL Final      Albumin   Date Value Ref Range Status   03/15/2022 3.9 3.6 - 5.1 g/dL Final   04/26/2019 3.9 3.1 - 4.7 g/dL      Total Bilirubin   Date Value Ref Range Status   03/15/2022 0.3 0.2 - 1.2 mg/dL Final     Alkaline Phosphatase   Date Value Ref Range Status   06/10/2020 62 37 - 153 U/L Final     AST   Date Value Ref Range Status   03/15/2022 16 10 - 35 U/L Final     ALT   Date Value Ref Range Status   03/15/2022 14 6 - 29 U/L Final     eGFR if    Date Value Ref Range Status   03/15/2022 93 > OR = 60 mL/min/1.73m2 Final     eGFR if non    Date Value Ref Range Status   03/15/2022 80 > OR = 60 mL/min/1.73m2 Final   \        Radiology/Diagnostic Studies:    mammo 3/24/2020:    Impression:     1. No mammographic evidence of malignancy.  2. Yearly mammography is recommended.  BI-RADS CATEGORY 2: BENIGN FINDING.    Ct Chest 5/1/2019:     IMPRESSION:  No acute pulmonary process    The abnormality on the chest radiograph most likely secondary to degenerative  changes of the thoracic spine    Small hiatal hernia        PET  12/17/2015 on chart    I have reviewed all available lab results and radiology reports.    Assessment/Plan:   (1) 61 y.o. female  with diagnosis of history of left breast cancer who has been referred by Shaunna Gordon NP for evaluation by medical hematology/oncology.   - She was diagnosed with Her2Nu positive breast cancer in 2011 and was treated with chemotherapy + herceptin, radiation and tamoxifen.   - T1c N0  - 1.4 cm  - Her2Nu +3; ER neg' ME + but at only 3%  - she developed a severe rash to the tamoxifen in the past and it had to be discontinued  - she had prior lumpectomy    3/11/2021:  - she is due for mammogram this month  - last mammogram 3/24/2020    3/16/2022:  - she has mammo scheduled for next month  - basic labs are adequate  - awaiting tumor markers which are still in process        (2) DM II     (3) GERD     (4) OA and bicep tendonitis     (5) Prior microcytic  anemia issues in 2017 with GIB - s/p scope with Dr Heart in past     (6) Neuropathy - followed by Dr Tobar     (7) Former smoker (quit 2010)     (8) Chronic skin issues on legs - on creams - seen by derm in past     (9) Gastric sleeve     (10) Chronic back issues         VISIT DIAGNOSES:      Breast cancer, stage 1, estrogen receptor negative, left (2011)    HER2-positive carcinoma of left breast    S/P lumpectomy, left breast    Hx of breast cancer - Her2Nu+/ER+ - 2011          PLAN:  1. Check CBC, CMP. Breast cancer markers, iron panel every 12 months  2. mammogram scheduled for next month - needs yearly  3. F/u with PCP, GI, card etc     RTC in  12 months   Fax note to Shaunna Gordon NP; [facility]; Amadou; [place]       Discussion:     COVID-19 Discussion:    I had long discussion with patient and any applicable family about the COVID-19 coronavirus epidemic and the recommended precautions with regard to cancer and/or hematology patients. I have re-iterated the CDC recommendations for adequate hand washing, use of hand -like products, and coughing into elbow, etc. In addition, especially for our patients who are on chemotherapy and/or our otherwise immunocompromised patients, I have recommended avoidance of crowds, including movie theaters, restaurants, churches, etc. I have recommended avoidance of any sick or symptomatic family members and/or friends. I have also recommended avoidance of any raw and unwashed food products, and general avoidance of food items that have not been prepared by themselves. The patient has been asked to call us immediately with any symptom developments, issues, questions or other general concerns.       I spent over 25 mins of time with the patient. Reviewed results of the recently ordered labs, tests and studies; made directives with regards to the results. Over half of this time was spent couseling and coordinating care.    I have explained all of the above  in detail and the patient understands all of the current recommendation(s). I have answered all of their questions to the best of my ability and to their complete satisfaction.   The patient is to continue with the current management plan.            Electronically signed by Tray Simpson MD

## 2022-03-16 ENCOUNTER — OFFICE VISIT (OUTPATIENT)
Dept: HEMATOLOGY/ONCOLOGY | Facility: CLINIC | Age: 62
End: 2022-03-16
Payer: MEDICARE

## 2022-03-16 VITALS
HEART RATE: 101 BPM | WEIGHT: 237 LBS | RESPIRATION RATE: 20 BRPM | TEMPERATURE: 97 F | BODY MASS INDEX: 33.93 KG/M2 | SYSTOLIC BLOOD PRESSURE: 138 MMHG | DIASTOLIC BLOOD PRESSURE: 84 MMHG | HEIGHT: 70 IN

## 2022-03-16 DIAGNOSIS — C50.912 HER2-POSITIVE CARCINOMA OF LEFT BREAST: ICD-10-CM

## 2022-03-16 DIAGNOSIS — Z17.1 BREAST CANCER, STAGE 1, ESTROGEN RECEPTOR NEGATIVE, LEFT: Primary | ICD-10-CM

## 2022-03-16 DIAGNOSIS — Z85.3 HX OF BREAST CANCER: ICD-10-CM

## 2022-03-16 DIAGNOSIS — C50.912 BREAST CANCER, STAGE 1, ESTROGEN RECEPTOR NEGATIVE, LEFT: Primary | ICD-10-CM

## 2022-03-16 DIAGNOSIS — Z98.890 S/P LUMPECTOMY, LEFT BREAST: ICD-10-CM

## 2022-03-16 LAB
ALBUMIN SERPL-MCNC: 3.9 G/DL (ref 3.6–5.1)
ALBUMIN/GLOB SERPL: 1.6 (CALC) (ref 1–2.5)
ALP SERPL-CCNC: 66 U/L (ref 37–153)
ALT SERPL-CCNC: 14 U/L (ref 6–29)
AST SERPL-CCNC: 16 U/L (ref 10–35)
BASOPHILS # BLD AUTO: 48 CELLS/UL (ref 0–200)
BASOPHILS NFR BLD AUTO: 1.2 %
BILIRUB SERPL-MCNC: 0.3 MG/DL (ref 0.2–1.2)
BUN SERPL-MCNC: 13 MG/DL (ref 7–25)
BUN/CREAT SERPL: ABNORMAL (CALC) (ref 6–22)
CALCIUM SERPL-MCNC: 9.1 MG/DL (ref 8.6–10.4)
CANCER AG125 SERPL-ACNC: 12 U/ML
CANCER AG15-3 SERPL-ACNC: 16 U/ML
CANCER AG27-29 SERPL-ACNC: 15 U/ML
CHLORIDE SERPL-SCNC: 107 MMOL/L (ref 98–110)
CO2 SERPL-SCNC: 30 MMOL/L (ref 20–32)
CREAT SERPL-MCNC: 0.8 MG/DL (ref 0.5–0.99)
EOSINOPHIL # BLD AUTO: 368 CELLS/UL (ref 15–500)
EOSINOPHIL NFR BLD AUTO: 9.2 %
ERYTHROCYTE [DISTWIDTH] IN BLOOD BY AUTOMATED COUNT: 12.7 % (ref 11–15)
GLOBULIN SER CALC-MCNC: 2.4 G/DL (CALC) (ref 1.9–3.7)
GLUCOSE SERPL-MCNC: 126 MG/DL (ref 65–99)
HCT VFR BLD AUTO: 41.2 % (ref 35–45)
HGB BLD-MCNC: 13.1 G/DL (ref 11.7–15.5)
LYMPHOCYTES # BLD AUTO: 1692 CELLS/UL (ref 850–3900)
LYMPHOCYTES NFR BLD AUTO: 42.3 %
MCH RBC QN AUTO: 27.1 PG (ref 27–33)
MCHC RBC AUTO-ENTMCNC: 31.8 G/DL (ref 32–36)
MCV RBC AUTO: 85.3 FL (ref 80–100)
MONOCYTES # BLD AUTO: 536 CELLS/UL (ref 200–950)
MONOCYTES NFR BLD AUTO: 13.4 %
NEUTROPHILS # BLD AUTO: 1356 CELLS/UL (ref 1500–7800)
NEUTROPHILS NFR BLD AUTO: 33.9 %
PLATELET # BLD AUTO: 233 THOUSAND/UL (ref 140–400)
PMV BLD REES-ECKER: 10.9 FL (ref 7.5–12.5)
POTASSIUM SERPL-SCNC: 4.2 MMOL/L (ref 3.5–5.3)
PROT SERPL-MCNC: 6.3 G/DL (ref 6.1–8.1)
RBC # BLD AUTO: 4.83 MILLION/UL (ref 3.8–5.1)
SODIUM SERPL-SCNC: 143 MMOL/L (ref 135–146)
WBC # BLD AUTO: 4 THOUSAND/UL (ref 3.8–10.8)

## 2022-03-16 PROCEDURE — 99214 OFFICE O/P EST MOD 30 MIN: CPT | Mod: S$GLB,,, | Performed by: INTERNAL MEDICINE

## 2022-03-16 PROCEDURE — 99214 PR OFFICE/OUTPT VISIT, EST, LEVL IV, 30-39 MIN: ICD-10-PCS | Mod: S$GLB,,, | Performed by: INTERNAL MEDICINE

## 2022-03-23 ENCOUNTER — OFFICE VISIT (OUTPATIENT)
Dept: FAMILY MEDICINE | Facility: CLINIC | Age: 62
End: 2022-03-23
Payer: MEDICARE

## 2022-03-23 VITALS
SYSTOLIC BLOOD PRESSURE: 126 MMHG | DIASTOLIC BLOOD PRESSURE: 80 MMHG | BODY MASS INDEX: 34.22 KG/M2 | HEIGHT: 70 IN | HEART RATE: 72 BPM | WEIGHT: 239 LBS

## 2022-03-23 DIAGNOSIS — E11.69 TYPE 2 DIABETES MELLITUS WITH OTHER SPECIFIED COMPLICATION, WITHOUT LONG-TERM CURRENT USE OF INSULIN: ICD-10-CM

## 2022-03-23 DIAGNOSIS — F98.8 ATTENTION DEFICIT DISORDER, UNSPECIFIED HYPERACTIVITY PRESENCE: ICD-10-CM

## 2022-03-23 DIAGNOSIS — E11.9 TYPE 2 DIABETES MELLITUS WITHOUT COMPLICATION, WITHOUT LONG-TERM CURRENT USE OF INSULIN: ICD-10-CM

## 2022-03-23 DIAGNOSIS — M17.11 PRIMARY OSTEOARTHRITIS OF RIGHT KNEE: ICD-10-CM

## 2022-03-23 DIAGNOSIS — K21.9 GASTROESOPHAGEAL REFLUX DISEASE, UNSPECIFIED WHETHER ESOPHAGITIS PRESENT: ICD-10-CM

## 2022-03-23 DIAGNOSIS — G47.00 INSOMNIA, UNSPECIFIED TYPE: ICD-10-CM

## 2022-03-23 DIAGNOSIS — E78.5 HYPERLIPIDEMIA, UNSPECIFIED HYPERLIPIDEMIA TYPE: ICD-10-CM

## 2022-03-23 DIAGNOSIS — Z12.31 OTHER SCREENING MAMMOGRAM: Primary | ICD-10-CM

## 2022-03-23 DIAGNOSIS — Z79.899 HIGH RISK MEDICATION USE: ICD-10-CM

## 2022-03-23 DIAGNOSIS — L40.9 PSORIASIS: ICD-10-CM

## 2022-03-23 LAB — HBA1C MFR BLD: 5.5 %

## 2022-03-23 PROCEDURE — 99214 OFFICE O/P EST MOD 30 MIN: CPT | Mod: S$GLB,,, | Performed by: NURSE PRACTITIONER

## 2022-03-23 PROCEDURE — 83036 POCT HEMOGLOBIN A1C: ICD-10-PCS | Mod: QW,,, | Performed by: NURSE PRACTITIONER

## 2022-03-23 PROCEDURE — 99214 PR OFFICE/OUTPT VISIT, EST, LEVL IV, 30-39 MIN: ICD-10-PCS | Mod: S$GLB,,, | Performed by: NURSE PRACTITIONER

## 2022-03-23 PROCEDURE — 83036 HEMOGLOBIN GLYCOSYLATED A1C: CPT | Mod: QW,,, | Performed by: NURSE PRACTITIONER

## 2022-03-23 RX ORDER — ATOMOXETINE 18 MG/1
18 CAPSULE ORAL DAILY
Qty: 30 CAPSULE | Refills: 0 | Status: SHIPPED | OUTPATIENT
Start: 2022-03-23 | End: 2022-06-02 | Stop reason: SDUPTHER

## 2022-03-23 RX ORDER — VENLAFAXINE HYDROCHLORIDE 150 MG/1
150 CAPSULE, EXTENDED RELEASE ORAL DAILY
Qty: 90 CAPSULE | Refills: 1 | Status: SHIPPED | OUTPATIENT
Start: 2022-03-23 | End: 2022-12-07 | Stop reason: SDUPTHER

## 2022-03-23 RX ORDER — FUROSEMIDE 20 MG/1
20 TABLET ORAL DAILY PRN
Qty: 90 TABLET | Refills: 1 | Status: SHIPPED | OUTPATIENT
Start: 2022-03-23 | End: 2022-09-07 | Stop reason: SDUPTHER

## 2022-03-23 RX ORDER — HYDROCODONE BITARTRATE AND ACETAMINOPHEN 5; 325 MG/1; MG/1
1 TABLET ORAL
COMMUNITY
Start: 2022-03-22 | End: 2023-05-22

## 2022-03-23 RX ORDER — TRAZODONE HYDROCHLORIDE 50 MG/1
100 TABLET ORAL NIGHTLY
Qty: 180 TABLET | Refills: 0 | Status: SHIPPED | OUTPATIENT
Start: 2022-03-23 | End: 2022-08-29 | Stop reason: SDUPTHER

## 2022-03-23 RX ORDER — PREGABALIN 200 MG/1
200 CAPSULE ORAL 3 TIMES DAILY
Qty: 270 CAPSULE | Refills: 1 | Status: SHIPPED | OUTPATIENT
Start: 2022-03-23 | End: 2023-06-15 | Stop reason: SDUPTHER

## 2022-03-23 RX ORDER — PANTOPRAZOLE SODIUM 40 MG/1
40 TABLET, DELAYED RELEASE ORAL DAILY
Qty: 90 TABLET | Refills: 3 | Status: SHIPPED | OUTPATIENT
Start: 2022-03-23 | End: 2023-06-15 | Stop reason: SDUPTHER

## 2022-03-23 NOTE — PROGRESS NOTES
SUBJECTIVE:    Patient ID: Lolly Ramírez is a 61 y.o. female.    Chief Complaint: Follow-up (Brought bottles // Eye exam- Dr. Lianna Higgins. )    61 year old female presents for check up. She is treated for gerd, insomnia,neuropathy, oa. Psoriasis and dm. Taking meds as prescribed.     Followed by dr. Tobar. .  Still has some back pain.  Some days worse than others. Dr. Villa awaiting approval for nerve ablation. Happy with Effexor.  Thinks it is controlling moods.  Sleeping better with increased dose of trazodone. Feels rested in am. Thinks straterra is helping to stay on task. Sleeping ok. Needs refills on meds.       Office Visit on 03/23/2022   Component Date Value Ref Range Status    Hemoglobin A1C 03/23/2022 5.5  % Final   Orders Only on 03/15/2022   Component Date Value Ref Range Status    Glucose 03/15/2022 126 (A) 65 - 99 mg/dL Final    BUN 03/15/2022 13  7 - 25 mg/dL Final    Creatinine 03/15/2022 0.80  0.50 - 0.99 mg/dL Final    eGFR if non African American 03/15/2022 80  > OR = 60 mL/min/1.73m2 Final    eGFR if  03/15/2022 93  > OR = 60 mL/min/1.73m2 Final    BUN/Creatinine Ratio 03/15/2022 NOT APPLICABLE  6 - 22 (calc) Final    Sodium 03/15/2022 143  135 - 146 mmol/L Final    Potassium 03/15/2022 4.2  3.5 - 5.3 mmol/L Final    Chloride 03/15/2022 107  98 - 110 mmol/L Final    CO2 03/15/2022 30  20 - 32 mmol/L Final    Calcium 03/15/2022 9.1  8.6 - 10.4 mg/dL Final    Total Protein 03/15/2022 6.3  6.1 - 8.1 g/dL Final    Albumin 03/15/2022 3.9  3.6 - 5.1 g/dL Final    Globulin, Total 03/15/2022 2.4  1.9 - 3.7 g/dL (calc) Final    Albumin/Globulin Ratio 03/15/2022 1.6  1.0 - 2.5 (calc) Final    Total Bilirubin 03/15/2022 0.3  0.2 - 1.2 mg/dL Final    Alkaline Phosphatase 03/15/2022 66  37 - 153 U/L Final    AST 03/15/2022 16  10 - 35 U/L Final    ALT 03/15/2022 14  6 - 29 U/L Final    WBC 03/15/2022 4.0  3.8 - 10.8 Thousand/uL Final    RBC  03/15/2022 4.83  3.80 - 5.10 Million/uL Final    Hemoglobin 03/15/2022 13.1  11.7 - 15.5 g/dL Final    Hematocrit 03/15/2022 41.2  35.0 - 45.0 % Final    MCV 03/15/2022 85.3  80.0 - 100.0 fL Final    MCH 03/15/2022 27.1  27.0 - 33.0 pg Final    MCHC 03/15/2022 31.8 (A) 32.0 - 36.0 g/dL Final    RDW 03/15/2022 12.7  11.0 - 15.0 % Final    Platelets 03/15/2022 233  140 - 400 Thousand/uL Final    MPV 03/15/2022 10.9  7.5 - 12.5 fL Final    Neutrophils, Abs 03/15/2022 1,356 (A) 1,500 - 7,800 cells/uL Final    Lymph # 03/15/2022 1,692  850 - 3,900 cells/uL Final    Mono # 03/15/2022 536  200 - 950 cells/uL Final    Eos # 03/15/2022 368  15 - 500 cells/uL Final    Baso # 03/15/2022 48  0 - 200 cells/uL Final    Neutrophils Relative 03/15/2022 33.9  % Final    Lymph % 03/15/2022 42.3  % Final    Mono % 03/15/2022 13.4  % Final    Eosinophil % 03/15/2022 9.2  % Final    Basophil % 03/15/2022 1.2  % Final     03/15/2022 12  <35 U/mL Final    CA 27-29 03/15/2022 15  <38 U/mL Final    CA 15-3 03/15/2022 16  <32 U/mL Final   Office Visit on 12/15/2021   Component Date Value Ref Range Status    Hemoglobin A1C 12/15/2021 5.7  % Final       Past Medical History:   Diagnosis Date    Breast cancer, stage 1, estrogen receptor negative, left () 2020    Depression     Diabetes mellitus, type 2     GERD (gastroesophageal reflux disease)     HER2-positive carcinoma of left breast 2020    Hx of breast cancer     Hx of breast cancer - Her2Nu+/ER+ - 2011 12/3/2019    Insomnia     Lymphedema     Neuropathy of both feet     S/P lumpectomy, left breast 2020     Social History     Socioeconomic History    Marital status:    Tobacco Use    Smoking status: Former Smoker     Packs/day: 0.00     Years: 0.00     Pack years: 0.00     Quit date: 2010     Years since quittin.3    Smokeless tobacco: Never Used   Substance and Sexual Activity    Alcohol use: Yes      Alcohol/week: 2.0 standard drinks     Types: 2 Glasses of wine per week     Comment: socially    Drug use: Never    Sexual activity: Yes     Partners: Male     Birth control/protection: Other-see comments, None     Comment: Hysterectomy     Social Determinants of Health     Financial Resource Strain: Low Risk     Difficulty of Paying Living Expenses: Not hard at all   Food Insecurity: No Food Insecurity    Worried About Running Out of Food in the Last Year: Never true    Ran Out of Food in the Last Year: Never true   Transportation Needs: No Transportation Needs    Lack of Transportation (Medical): No    Lack of Transportation (Non-Medical): No   Physical Activity: Insufficiently Active    Days of Exercise per Week: 1 day    Minutes of Exercise per Session: 50 min   Stress: Stress Concern Present    Feeling of Stress : Rather much   Social Connections: Unknown    Frequency of Communication with Friends and Family: More than three times a week    Frequency of Social Gatherings with Friends and Family: Once a week    Active Member of Clubs or Organizations: No    Attends Club or Organization Meetings: Never    Marital Status:    Housing Stability: Low Risk     Unable to Pay for Housing in the Last Year: No    Number of Places Lived in the Last Year: 1    Unstable Housing in the Last Year: No     Past Surgical History:   Procedure Laterality Date    BICEPS TENDON REPAIR Left 2005    BREAST BIOPSY Left     BREAST LUMPECTOMY Left      SECTION      , ,     CHOLECYSTECTOMY      Elbow fx Left 2015    FRACTURE SURGERY  2016    elbow    HYSTERECTOMY      KNEE ARTHROSCOPY W/ MENISCAL REPAIR Left     Lower back ruptured disc  2010    LYMPH NODE DISSECTION      SPINE SURGERY  2009    ruptured disc     Family History   Problem Relation Age of Onset    Cancer Mother     Breast cancer Mother     Parkinsonism Father     Diabetes Father     Breast cancer Maternal  Aunt     Breast cancer Paternal Aunt     Cancer Maternal Aunt     Cancer Paternal Aunt     Cancer Daughter     Heart disease Maternal Grandmother     Heart disease Paternal Grandmother        Review of patient's allergies indicates:   Allergen Reactions    Banana Hives    Codeine Nausea And Vomiting    Dilaudid  [hydromorphone (bulk)] Rash    Hydromorphone hcl Rash       Current Outpatient Medications:     HYDROcodone-acetaminophen (NORCO) 5-325 mg per tablet, Take 1 tablet by mouth every 4 to 6 hours as needed., Disp: , Rfl:     ascorbic acid, vitamin C, (VITAMIN C) 500 MG tablet, Take 500 mg by mouth once daily., Disp: , Rfl:     atomoxetine (STRATTERA) 18 MG capsule, Take 1 capsule (18 mg total) by mouth once daily., Disp: 30 capsule, Rfl: 0    b complex vitamins capsule, Take 1 capsule by mouth once daily., Disp: , Rfl:     docusate sodium (COLACE) 100 MG capsule, Take 1 capsule (100 mg total) by mouth once daily., Disp: 90 capsule, Rfl: 0    furosemide (LASIX) 20 MG tablet, Take 1 tablet (20 mg total) by mouth daily as needed., Disp: 90 tablet, Rfl: 1    LYRICA 200 mg Cap, Take 1 capsule (200 mg total) by mouth 3 (three) times daily., Disp: 270 capsule, Rfl: 1    magnesium 250 mg Tab, Take 250 mg by mouth once daily., Disp: , Rfl:     multivitamin capsule, Take 1 capsule by mouth once daily., Disp: , Rfl:     naproxen (NAPROSYN) 500 MG tablet, TK 1 T PO Q 12 H PRN. TAKE WITH FOOD OR MILK., Disp: 90 tablet, Rfl: 1    pantoprazole (PROTONIX) 40 MG tablet, Take 1 tablet (40 mg total) by mouth once daily., Disp: 90 tablet, Rfl: 3    traZODone (DESYREL) 50 MG tablet, Take 2 tablets (100 mg total) by mouth every evening., Disp: 180 tablet, Rfl: 0    venlafaxine (EFFEXOR-XR) 150 MG Cp24, Take 1 capsule (150 mg total) by mouth once daily., Disp: 90 capsule, Rfl: 1    Review of Systems   Constitutional: Negative for chills, fever and unexpected weight change.   HENT: Negative for ear pain,  "rhinorrhea and sore throat.    Eyes: Negative for pain and visual disturbance.   Respiratory: Negative for cough and shortness of breath.    Cardiovascular: Negative for chest pain, palpitations and leg swelling.   Gastrointestinal: Negative for abdominal pain, diarrhea, nausea and vomiting.   Genitourinary: Negative for difficulty urinating, hematuria and vaginal bleeding.   Musculoskeletal: Negative for arthralgias.   Skin: Positive for rash.   Neurological: Negative for dizziness, weakness and headaches.   Psychiatric/Behavioral: Negative for agitation and sleep disturbance. The patient is not nervous/anxious.           Objective:      Vitals:    03/23/22 0819   BP: 126/80   Pulse: 72   Weight: 108.4 kg (239 lb)   Height: 5' 10" (1.778 m)     Physical Exam  Vitals reviewed.   Constitutional:       General: She is not in acute distress.     Appearance: She is well-developed. She is not ill-appearing.   HENT:      Head: Normocephalic and atraumatic.      Right Ear: External ear normal.      Left Ear: External ear normal.   Eyes:      Pupils: Pupils are equal, round, and reactive to light.   Cardiovascular:      Rate and Rhythm: Normal rate and regular rhythm.      Pulses:           Dorsalis pedis pulses are 2+ on the right side.        Posterior tibial pulses are 2+ on the right side.      Heart sounds: Normal heart sounds.   Pulmonary:      Effort: Pulmonary effort is normal.      Breath sounds: Normal breath sounds.   Abdominal:      General: Bowel sounds are normal.      Palpations: Abdomen is soft.   Musculoskeletal:         General: No deformity. Normal range of motion.      Cervical back: Normal range of motion and neck supple.      Right foot: Normal range of motion. No deformity.   Feet:      Right foot:      Protective Sensation: 5 sites tested. 5 sites sensed.      Left foot:      Protective Sensation: 5 sites tested. 5 sites sensed.      Skin integrity: No skin breakdown.   Lymphadenopathy:      " Cervical: No cervical adenopathy.   Skin:     General: Skin is warm and dry.      Comments: Plaque psoriasis to lower extrem and arms   Neurological:      Mental Status: She is alert and oriented to person, place, and time.   Psychiatric:         Behavior: Behavior normal.           Assessment:       1. Other screening mammogram    2. Type 2 diabetes mellitus with other specified complication, without long-term current use of insulin    3. Attention deficit disorder, unspecified hyperactivity presence    4. Gastroesophageal reflux disease, unspecified whether esophagitis present    5. Insomnia, unspecified type    6. Type 2 diabetes mellitus without complication, without long-term current use of insulin    7. Hyperlipidemia, unspecified hyperlipidemia type    8. High risk medication use    9. Primary osteoarthritis of right knee    10. Psoriasis         Plan:       Other screening mammogram  -     Mammo Digital Screening Bilat; Future; Expected date: 03/23/2022    Type 2 diabetes mellitus with other specified complication, without long-term current use of insulin  -     Hemoglobin A1C, POCT    Attention deficit disorder, unspecified hyperactivity presence  -     atomoxetine (STRATTERA) 18 MG capsule; Take 1 capsule (18 mg total) by mouth once daily.  Dispense: 30 capsule; Refill: 0    Gastroesophageal reflux disease, unspecified whether esophagitis present  -     pantoprazole (PROTONIX) 40 MG tablet; Take 1 tablet (40 mg total) by mouth once daily.  Dispense: 90 tablet; Refill: 3    Insomnia, unspecified type  -     traZODone (DESYREL) 50 MG tablet; Take 2 tablets (100 mg total) by mouth every evening.  Dispense: 180 tablet; Refill: 0    Type 2 diabetes mellitus without complication, without long-term current use of insulin  -     TSH w/reflex to FT4; Future; Expected date: 03/23/2022  -     Lipid Panel; Future; Expected date: 03/23/2022    Hyperlipidemia, unspecified hyperlipidemia type  -     Lipid Panel; Future;  Expected date: 03/23/2022    High risk medication use  -     TSH w/reflex to FT4; Future; Expected date: 03/23/2022    Primary osteoarthritis of right knee    Psoriasis    Other orders  -     furosemide (LASIX) 20 MG tablet; Take 1 tablet (20 mg total) by mouth daily as needed.  Dispense: 90 tablet; Refill: 1  -     LYRICA 200 mg Cap; Take 1 capsule (200 mg total) by mouth 3 (three) times daily.  Dispense: 270 capsule; Refill: 1  -     venlafaxine (EFFEXOR-XR) 150 MG Cp24; Take 1 capsule (150 mg total) by mouth once daily.  Dispense: 90 capsule; Refill: 1      Follow up in about 3 months (around 6/23/2022) for medication management, Diabetic Check-Up.        3/29/2022 Shaunna Gordon

## 2022-04-18 ENCOUNTER — HOSPITAL ENCOUNTER (OUTPATIENT)
Dept: RADIOLOGY | Facility: HOSPITAL | Age: 62
Discharge: HOME | End: 2022-04-18
Attending: NURSE PRACTITIONER
Payer: MEDICARE

## 2022-04-18 DIAGNOSIS — Z12.31 OTHER SCREENING MAMMOGRAM: ICD-10-CM

## 2022-04-18 PROCEDURE — 77067 SCR MAMMO BI INCL CAD: CPT | Mod: TC,PO

## 2022-04-19 ENCOUNTER — TELEPHONE (OUTPATIENT)
Dept: FAMILY MEDICINE | Facility: CLINIC | Age: 62
End: 2022-04-19

## 2022-05-27 LAB
CHOLEST SERPL-MCNC: 242 MG/DL
CHOLEST/HDLC SERPL: 2.8 (CALC)
HDLC SERPL-MCNC: 87 MG/DL
LDLC SERPL CALC-MCNC: 139 MG/DL (CALC)
NONHDLC SERPL-MCNC: 155 MG/DL (CALC)
TRIGL SERPL-MCNC: 64 MG/DL
TSH SERPL-ACNC: 1.19 MIU/L (ref 0.4–4.5)

## 2022-06-02 ENCOUNTER — PATIENT MESSAGE (OUTPATIENT)
Dept: FAMILY MEDICINE | Facility: CLINIC | Age: 62
End: 2022-06-02

## 2022-06-02 ENCOUNTER — TELEPHONE (OUTPATIENT)
Dept: ORTHOPEDICS | Facility: CLINIC | Age: 62
End: 2022-06-02
Payer: MEDICARE

## 2022-06-02 ENCOUNTER — TELEPHONE (OUTPATIENT)
Dept: FAMILY MEDICINE | Facility: CLINIC | Age: 62
End: 2022-06-02

## 2022-06-02 ENCOUNTER — OFFICE VISIT (OUTPATIENT)
Dept: FAMILY MEDICINE | Facility: CLINIC | Age: 62
End: 2022-06-02
Payer: MEDICARE

## 2022-06-02 ENCOUNTER — HOSPITAL ENCOUNTER (OUTPATIENT)
Dept: RADIOLOGY | Facility: HOSPITAL | Age: 62
Discharge: HOME | End: 2022-06-02
Attending: NURSE PRACTITIONER
Payer: MEDICARE

## 2022-06-02 VITALS
HEIGHT: 70 IN | WEIGHT: 242 LBS | SYSTOLIC BLOOD PRESSURE: 128 MMHG | DIASTOLIC BLOOD PRESSURE: 84 MMHG | BODY MASS INDEX: 34.65 KG/M2 | HEART RATE: 72 BPM

## 2022-06-02 DIAGNOSIS — G47.00 INSOMNIA, UNSPECIFIED TYPE: ICD-10-CM

## 2022-06-02 DIAGNOSIS — Z79.899 HIGH RISK MEDICATION USE: ICD-10-CM

## 2022-06-02 DIAGNOSIS — M25.532 LEFT WRIST PAIN: ICD-10-CM

## 2022-06-02 DIAGNOSIS — K21.9 GASTROESOPHAGEAL REFLUX DISEASE, UNSPECIFIED WHETHER ESOPHAGITIS PRESENT: ICD-10-CM

## 2022-06-02 DIAGNOSIS — F98.8 ATTENTION DEFICIT DISORDER, UNSPECIFIED HYPERACTIVITY PRESENCE: ICD-10-CM

## 2022-06-02 DIAGNOSIS — E11.69 TYPE 2 DIABETES MELLITUS WITH OTHER SPECIFIED COMPLICATION, WITHOUT LONG-TERM CURRENT USE OF INSULIN: Primary | ICD-10-CM

## 2022-06-02 DIAGNOSIS — L40.9 PSORIASIS: ICD-10-CM

## 2022-06-02 DIAGNOSIS — E78.5 HYPERLIPIDEMIA, UNSPECIFIED HYPERLIPIDEMIA TYPE: ICD-10-CM

## 2022-06-02 DIAGNOSIS — Z85.3 HX OF BREAST CANCER: ICD-10-CM

## 2022-06-02 LAB — HBA1C MFR BLD: 5.7 %

## 2022-06-02 PROCEDURE — 99214 PR OFFICE/OUTPT VISIT, EST, LEVL IV, 30-39 MIN: ICD-10-PCS | Mod: S$GLB,,, | Performed by: NURSE PRACTITIONER

## 2022-06-02 PROCEDURE — 83036 HEMOGLOBIN GLYCOSYLATED A1C: CPT | Mod: QW,,, | Performed by: NURSE PRACTITIONER

## 2022-06-02 PROCEDURE — 73110 X-RAY EXAM OF WRIST: CPT | Mod: TC,PO,LT

## 2022-06-02 PROCEDURE — 99214 OFFICE O/P EST MOD 30 MIN: CPT | Mod: S$GLB,,, | Performed by: NURSE PRACTITIONER

## 2022-06-02 PROCEDURE — 83036 POCT HEMOGLOBIN A1C: ICD-10-PCS | Mod: QW,,, | Performed by: NURSE PRACTITIONER

## 2022-06-02 RX ORDER — ATOMOXETINE 18 MG/1
18 CAPSULE ORAL DAILY
Qty: 90 CAPSULE | Refills: 0 | Status: SHIPPED | OUTPATIENT
Start: 2022-06-02 | End: 2022-08-29 | Stop reason: SDUPTHER

## 2022-06-02 RX ORDER — MULTIVIT WITH IRON,MINERALS
2 TABLET,CHEWABLE ORAL DAILY
COMMUNITY

## 2022-06-02 NOTE — TELEPHONE ENCOUNTER
----- Message from Awa Viveros sent at 6/2/2022  3:05 PM CDT -----  Patient called the nurse back please give her a call back at [phone]

## 2022-06-02 NOTE — TELEPHONE ENCOUNTER
----- Message from Shaunna Gordon NP sent at 6/2/2022 12:22 PM CDT -----  Possible fracture noted on xray. Need to see dr. gil

## 2022-06-02 NOTE — PROGRESS NOTES
SUBJECTIVE:    Patient ID: Lolly Ramírez is a 61 y.o. female.    Chief Complaint: Diabetes (Brought bottles//needs A1c//bs)    61 year old female presents for check up. She is treated for gerd, insomnia,neuropathy, oa. Psoriasis and dm. Taking meds as prescribed. A   Followed by dr. Tobar regularly. Treating her neuropathy.  Still has some back pain.  Some days worse than others.   Effexort is controlling moods.  Sleeping better with  trazodone. Feels rested in am. Has been having left wrist pain. Started about 2 weeks ago following fall. Pain is worse with movement      Office Visit on 06/02/2022   Component Date Value Ref Range Status    Hemoglobin A1C 06/02/2022 5.7  % Final   Office Visit on 03/23/2022   Component Date Value Ref Range Status    Hemoglobin A1C 03/23/2022 5.5  % Final    TSH w/reflex to FT4 05/26/2022 1.19  0.40 - 4.50 mIU/L Final    Cholesterol 05/26/2022 242 (A) <200 mg/dL Final    HDL 05/26/2022 87  > OR = 50 mg/dL Final    Triglycerides 05/26/2022 64  <150 mg/dL Final    LDL Cholesterol 05/26/2022 139 (A) mg/dL (calc) Final    HDL/Cholesterol Ratio 05/26/2022 2.8  <5.0 (calc) Final    Non HDL Chol. (LDL+VLDL) 05/26/2022 155 (A) <130 mg/dL (calc) Final   Orders Only on 03/15/2022   Component Date Value Ref Range Status    Glucose 03/15/2022 126 (A) 65 - 99 mg/dL Final    BUN 03/15/2022 13  7 - 25 mg/dL Final    Creatinine 03/15/2022 0.80  0.50 - 0.99 mg/dL Final    eGFR if non African American 03/15/2022 80  > OR = 60 mL/min/1.73m2 Final    eGFR if  03/15/2022 93  > OR = 60 mL/min/1.73m2 Final    BUN/Creatinine Ratio 03/15/2022 NOT APPLICABLE  6 - 22 (calc) Final    Sodium 03/15/2022 143  135 - 146 mmol/L Final    Potassium 03/15/2022 4.2  3.5 - 5.3 mmol/L Final    Chloride 03/15/2022 107  98 - 110 mmol/L Final    CO2 03/15/2022 30  20 - 32 mmol/L Final    Calcium 03/15/2022 9.1  8.6 - 10.4 mg/dL Final    Total Protein 03/15/2022 6.3  6.1 - 8.1 g/dL  Final    Albumin 03/15/2022 3.9  3.6 - 5.1 g/dL Final    Globulin, Total 03/15/2022 2.4  1.9 - 3.7 g/dL (calc) Final    Albumin/Globulin Ratio 03/15/2022 1.6  1.0 - 2.5 (calc) Final    Total Bilirubin 03/15/2022 0.3  0.2 - 1.2 mg/dL Final    Alkaline Phosphatase 03/15/2022 66  37 - 153 U/L Final    AST 03/15/2022 16  10 - 35 U/L Final    ALT 03/15/2022 14  6 - 29 U/L Final    WBC 03/15/2022 4.0  3.8 - 10.8 Thousand/uL Final    RBC 03/15/2022 4.83  3.80 - 5.10 Million/uL Final    Hemoglobin 03/15/2022 13.1  11.7 - 15.5 g/dL Final    Hematocrit 03/15/2022 41.2  35.0 - 45.0 % Final    MCV 03/15/2022 85.3  80.0 - 100.0 fL Final    MCH 03/15/2022 27.1  27.0 - 33.0 pg Final    MCHC 03/15/2022 31.8 (A) 32.0 - 36.0 g/dL Final    RDW 03/15/2022 12.7  11.0 - 15.0 % Final    Platelets 03/15/2022 233  140 - 400 Thousand/uL Final    MPV 03/15/2022 10.9  7.5 - 12.5 fL Final    Neutrophils, Abs 03/15/2022 1,356 (A) 1,500 - 7,800 cells/uL Final    Lymph # 03/15/2022 1,692  850 - 3,900 cells/uL Final    Mono # 03/15/2022 536  200 - 950 cells/uL Final    Eos # 03/15/2022 368  15 - 500 cells/uL Final    Baso # 03/15/2022 48  0 - 200 cells/uL Final    Neutrophils Relative 03/15/2022 33.9  % Final    Lymph % 03/15/2022 42.3  % Final    Mono % 03/15/2022 13.4  % Final    Eosinophil % 03/15/2022 9.2  % Final    Basophil % 03/15/2022 1.2  % Final     03/15/2022 12  <35 U/mL Final    CA 27-29 03/15/2022 15  <38 U/mL Final    CA 15-3 03/15/2022 16  <32 U/mL Final   Office Visit on 12/15/2021   Component Date Value Ref Range Status    Hemoglobin A1C 12/15/2021 5.7  % Final       Past Medical History:   Diagnosis Date    Breast cancer, stage 1, estrogen receptor negative, left (2011) 2/18/2020    Depression     Diabetes mellitus, type 2     GERD (gastroesophageal reflux disease)     HER2-positive carcinoma of left breast 2/18/2020    Hx of breast cancer     Hx of breast cancer - Her2Nu+/ER+ - 2011  12/3/2019    Insomnia     Lymphedema     Neuropathy of both feet     S/P lumpectomy, left breast 2020     Social History     Socioeconomic History    Marital status:    Tobacco Use    Smoking status: Former Smoker     Packs/day: 0.00     Years: 0.00     Pack years: 0.00     Quit date: 2010     Years since quittin.5    Smokeless tobacco: Never Used   Substance and Sexual Activity    Alcohol use: Yes     Alcohol/week: 2.0 standard drinks     Types: 2 Glasses of wine per week     Comment: socially    Drug use: Never    Sexual activity: Yes     Partners: Male     Birth control/protection: Other-see comments, None     Comment: Hysterectomy     Social Determinants of Health     Financial Resource Strain: Low Risk     Difficulty of Paying Living Expenses: Not hard at all   Food Insecurity: No Food Insecurity    Worried About Running Out of Food in the Last Year: Never true    Ran Out of Food in the Last Year: Never true   Transportation Needs: No Transportation Needs    Lack of Transportation (Medical): No    Lack of Transportation (Non-Medical): No   Physical Activity: Insufficiently Active    Days of Exercise per Week: 6 days    Minutes of Exercise per Session: 20 min   Stress: Stress Concern Present    Feeling of Stress : To some extent   Social Connections: Unknown    Frequency of Communication with Friends and Family: More than three times a week    Frequency of Social Gatherings with Friends and Family: Once a week    Active Member of Clubs or Organizations: No    Attends Club or Organization Meetings: Never    Marital Status:    Housing Stability: Low Risk     Unable to Pay for Housing in the Last Year: No    Number of Places Lived in the Last Year: 1    Unstable Housing in the Last Year: No     Past Surgical History:   Procedure Laterality Date    BICEPS TENDON REPAIR Left 2005    BREAST BIOPSY Left     BREAST LUMPECTOMY Left      SECTION       1982, 1985, 1986    CHOLECYSTECTOMY      Elbow fx Left 2015    FRACTURE SURGERY  2016    elbow    HYSTERECTOMY      KNEE ARTHROSCOPY W/ MENISCAL REPAIR Left     Lower back ruptured disc  06/2010    LYMPH NODE DISSECTION      SPINE SURGERY  2009    ruptured disc     Family History   Problem Relation Age of Onset    Cancer Mother     Breast cancer Mother     Parkinsonism Father     Diabetes Father     Breast cancer Maternal Aunt     Breast cancer Paternal Aunt     Cancer Maternal Aunt     Cancer Paternal Aunt     Cancer Daughter     Heart disease Maternal Grandmother     Heart disease Paternal Grandmother        Review of patient's allergies indicates:   Allergen Reactions    Banana Hives    Codeine Nausea And Vomiting    Dilaudid  [hydromorphone (bulk)] Rash    Hydromorphone hcl Rash       Current Outpatient Medications:     ascorbic acid, vitamin C, (VITAMIN C) 500 MG tablet, Take 500 mg by mouth once daily., Disp: , Rfl:     b complex vitamins capsule, Take 1 capsule by mouth once daily., Disp: , Rfl:     furosemide (LASIX) 20 MG tablet, Take 1 tablet (20 mg total) by mouth daily as needed., Disp: 90 tablet, Rfl: 1    glucosamine-chondroitin (OSTEO BI-FLEX) 250-200 mg Tab, Take by mouth., Disp: , Rfl:     magnesium 250 mg Tab, Take 250 mg by mouth once daily., Disp: , Rfl:     multivitamin capsule, Take 1 capsule by mouth once daily., Disp: , Rfl:     pantoprazole (PROTONIX) 40 MG tablet, Take 1 tablet (40 mg total) by mouth once daily., Disp: 90 tablet, Rfl: 3    traZODone (DESYREL) 50 MG tablet, Take 2 tablets (100 mg total) by mouth every evening., Disp: 180 tablet, Rfl: 0    venlafaxine (EFFEXOR-XR) 150 MG Cp24, Take 1 capsule (150 mg total) by mouth once daily., Disp: 90 capsule, Rfl: 1    atomoxetine (STRATTERA) 18 MG capsule, Take 1 capsule (18 mg total) by mouth once daily., Disp: 90 capsule, Rfl: 0    docusate sodium (COLACE) 100 MG capsule, Take 1 capsule (100 mg total)  "by mouth once daily., Disp: 90 capsule, Rfl: 0    HYDROcodone-acetaminophen (NORCO) 5-325 mg per tablet, Take 1 tablet by mouth every 4 to 6 hours as needed., Disp: , Rfl:     LYRICA 200 mg Cap, Take 1 capsule (200 mg total) by mouth 3 (three) times daily., Disp: 270 capsule, Rfl: 1    Review of Systems   Constitutional: Negative for chills, fever and unexpected weight change.   HENT: Negative for ear pain, rhinorrhea and sore throat.    Eyes: Negative for pain and visual disturbance.   Respiratory: Negative for cough and shortness of breath.    Cardiovascular: Negative for chest pain, palpitations and leg swelling.   Gastrointestinal: Negative for abdominal pain, diarrhea, nausea and vomiting.   Genitourinary: Negative for difficulty urinating, hematuria and vaginal bleeding.   Musculoskeletal: Negative for arthralgias.        Wrist   Skin: Negative for rash.   Neurological: Negative for dizziness, weakness and headaches.   Psychiatric/Behavioral: Negative for agitation and sleep disturbance. The patient is not nervous/anxious.           Objective:      Vitals:    06/02/22 0819   BP: 128/84   Pulse: 72   Weight: 109.8 kg (242 lb)   Height: 5' 10" (1.778 m)     Physical Exam  Constitutional:       General: She is not in acute distress.     Appearance: She is well-developed. She is not ill-appearing.   HENT:      Right Ear: External ear normal.      Left Ear: External ear normal.   Eyes:      Conjunctiva/sclera: Conjunctivae normal.      Pupils: Pupils are equal, round, and reactive to light.   Neck:      Vascular: No JVD.   Cardiovascular:      Rate and Rhythm: Normal rate and regular rhythm.      Heart sounds: No murmur heard.  Pulmonary:      Effort: Pulmonary effort is normal.      Breath sounds: Normal breath sounds.   Abdominal:      General: Bowel sounds are normal.      Palpations: Abdomen is soft.   Musculoskeletal:         General: No deformity.      Left wrist: Swelling and tenderness present. Decreased " range of motion.      Cervical back: Normal range of motion and neck supple.   Lymphadenopathy:      Cervical: No cervical adenopathy.   Skin:     General: Skin is warm and dry.      Findings: No rash.   Neurological:      Mental Status: She is alert and oriented to person, place, and time.      Gait: Gait normal.   Psychiatric:         Speech: Speech normal.         Behavior: Behavior normal.           Assessment:       1. Type 2 diabetes mellitus with other specified complication, without long-term current use of insulin    2. Attention deficit disorder, unspecified hyperactivity presence    3. Left wrist pain    4. Hx of breast cancer - Her2Nu+/ER+ - 2011    5. Gastroesophageal reflux disease, unspecified whether esophagitis present    6. Insomnia, unspecified type    7. Hyperlipidemia, unspecified hyperlipidemia type    8. High risk medication use    9. Psoriasis         Plan:       Type 2 diabetes mellitus with other specified complication, without long-term current use of insulin  -     Microalbumin/Creatinine Ratio, Urine; Future; Expected date: 06/02/2022  -     POCT HEMOGLOBIN A1C    Attention deficit disorder, unspecified hyperactivity presence  -     atomoxetine (STRATTERA) 18 MG capsule; Take 1 capsule (18 mg total) by mouth once daily.  Dispense: 90 capsule; Refill: 0    Left wrist pain  -     Cancel: X-Ray Wrist 2 View Left; Future    Hx of breast cancer - Her2Nu+/ER+ - 2011    Gastroesophageal reflux disease, unspecified whether esophagitis present    Insomnia, unspecified type    Hyperlipidemia, unspecified hyperlipidemia type    High risk medication use    Psoriasis      Follow up in about 3 months (around 9/2/2022), or if symptoms worsen or fail to improve, for medication management.        6/11/2022 Shaunna Gordon

## 2022-06-02 NOTE — TELEPHONE ENCOUNTER
----- Message from Eliz Machado MA sent at 6/2/2022  4:08 PM CDT -----  Contact: pt  Recent fall ,   Xrays done today [facility]   Needs appt  possible FX   Call back

## 2022-06-02 NOTE — TELEPHONE ENCOUNTER
Spoke to patient regarding x-ray results per Shaunna and she verbalized understanding. She will call Dr. Romero's office to schedule a OV.

## 2022-06-03 ENCOUNTER — OFFICE VISIT (OUTPATIENT)
Dept: ORTHOPEDICS | Facility: CLINIC | Age: 62
End: 2022-06-03
Payer: MEDICARE

## 2022-06-03 VITALS — BODY MASS INDEX: 34.65 KG/M2 | HEIGHT: 70 IN | WEIGHT: 242 LBS

## 2022-06-03 DIAGNOSIS — M25.532 LEFT WRIST PAIN: Primary | ICD-10-CM

## 2022-06-03 PROCEDURE — 99999 PR PBB SHADOW E&M-EST. PATIENT-LVL III: ICD-10-PCS | Mod: PBBFAC,,, | Performed by: ORTHOPAEDIC SURGERY

## 2022-06-03 PROCEDURE — 99999 PR PBB SHADOW E&M-EST. PATIENT-LVL III: CPT | Mod: PBBFAC,,, | Performed by: ORTHOPAEDIC SURGERY

## 2022-06-03 PROCEDURE — 99213 OFFICE O/P EST LOW 20 MIN: CPT | Mod: PBBFAC,PN | Performed by: ORTHOPAEDIC SURGERY

## 2022-06-03 PROCEDURE — 99213 PR OFFICE/OUTPT VISIT, EST, LEVL III, 20-29 MIN: ICD-10-PCS | Mod: S$PBB,,, | Performed by: ORTHOPAEDIC SURGERY

## 2022-06-03 PROCEDURE — 99213 OFFICE O/P EST LOW 20 MIN: CPT | Mod: S$PBB,,, | Performed by: ORTHOPAEDIC SURGERY

## 2022-06-03 NOTE — PROGRESS NOTES
6/3/2022    Past Medical History:   Diagnosis Date    Breast cancer, stage 1, estrogen receptor negative, left () 2020    Depression     Diabetes mellitus, type 2     GERD (gastroesophageal reflux disease)     HER2-positive carcinoma of left breast 2020    Hx of breast cancer     Hx of breast cancer - Her2Nu+/ER+ - 2011 12/3/2019    Insomnia     Lymphedema     Neuropathy of both feet     S/P lumpectomy, left breast 2020       Past Surgical History:   Procedure Laterality Date    BICEPS TENDON REPAIR Left 2005    BREAST BIOPSY Left     BREAST LUMPECTOMY Left      SECTION      , ,     CHOLECYSTECTOMY      Elbow fx Left 2015    FRACTURE SURGERY  2016    elbow    HYSTERECTOMY      KNEE ARTHROSCOPY W/ MENISCAL REPAIR Left     Lower back ruptured disc  2010    LYMPH NODE DISSECTION      SPINE SURGERY  2009    ruptured disc       Current Outpatient Medications   Medication Sig    ascorbic acid, vitamin C, (VITAMIN C) 500 MG tablet Take 500 mg by mouth once daily.    atomoxetine (STRATTERA) 18 MG capsule Take 1 capsule (18 mg total) by mouth once daily.    b complex vitamins capsule Take 1 capsule by mouth once daily.    docusate sodium (COLACE) 100 MG capsule Take 1 capsule (100 mg total) by mouth once daily.    furosemide (LASIX) 20 MG tablet Take 1 tablet (20 mg total) by mouth daily as needed.    glucosamine-chondroitin (OSTEO BI-FLEX) 250-200 mg Tab Take by mouth.    HYDROcodone-acetaminophen (NORCO) 5-325 mg per tablet Take 1 tablet by mouth every 4 to 6 hours as needed.    LYRICA 200 mg Cap Take 1 capsule (200 mg total) by mouth 3 (three) times daily.    magnesium 250 mg Tab Take 250 mg by mouth once daily.    multivitamin capsule Take 1 capsule by mouth once daily.    pantoprazole (PROTONIX) 40 MG tablet Take 1 tablet (40 mg total) by mouth once daily.    traZODone (DESYREL) 50 MG tablet Take 2 tablets (100 mg total) by mouth every  evening.    venlafaxine (EFFEXOR-XR) 150 MG Cp24 Take 1 capsule (150 mg total) by mouth once daily.     No current facility-administered medications for this visit.       Review of patient's allergies indicates:   Allergen Reactions    Banana Hives    Codeine Nausea And Vomiting    Dilaudid  [hydromorphone (bulk)] Rash    Hydromorphone hcl Rash       Family History   Problem Relation Age of Onset    Cancer Mother     Breast cancer Mother     Parkinsonism Father     Diabetes Father     Breast cancer Maternal Aunt     Breast cancer Paternal Aunt     Cancer Maternal Aunt     Cancer Paternal Aunt     Cancer Daughter     Heart disease Maternal Grandmother     Heart disease Paternal Grandmother        Social History     Socioeconomic History    Marital status:    Tobacco Use    Smoking status: Former Smoker     Packs/day: 0.00     Years: 0.00     Pack years: 0.00     Quit date: 2010     Years since quittin.5    Smokeless tobacco: Never Used   Substance and Sexual Activity    Alcohol use: Yes     Alcohol/week: 2.0 standard drinks     Types: 2 Glasses of wine per week     Comment: socially    Drug use: Never    Sexual activity: Yes     Partners: Male     Birth control/protection: Other-see comments, None     Comment: Hysterectomy     Social Determinants of Health     Financial Resource Strain: Low Risk     Difficulty of Paying Living Expenses: Not hard at all   Food Insecurity: No Food Insecurity    Worried About Running Out of Food in the Last Year: Never true    Ran Out of Food in the Last Year: Never true   Transportation Needs: No Transportation Needs    Lack of Transportation (Medical): No    Lack of Transportation (Non-Medical): No   Physical Activity: Insufficiently Active    Days of Exercise per Week: 6 days    Minutes of Exercise per Session: 20 min   Stress: Stress Concern Present    Feeling of Stress : To some extent   Social Connections: Unknown    Frequency of  "Communication with Friends and Family: More than three times a week    Frequency of Social Gatherings with Friends and Family: Once a week    Active Member of Clubs or Organizations: No    Attends Club or Organization Meetings: Never    Marital Status:    Housing Stability: Low Risk     Unable to Pay for Housing in the Last Year: No    Number of Places Lived in the Last Year: 1    Unstable Housing in the Last Year: No       Chief Complaint:   Chief Complaint   Patient presents with    Left Wrist - Pain, Initial Visit, Initial Assessment, Injury     Left Wrist FX Fell on her property at home about 1 month ago. PL 8/10 with use. C/o weakness, swelling, and pain in left wrist. No improvement since initial injury.          History of present illness:    This is a 61 y.o. year old female who complains of patient is being seen today with left wrist pain she fell about a month ago her pain level is 8/10 she complains of some weakness and swelling    Review of Systems:    Constitution: Denies chills, fever, and sweats.  HENT: Denies headaches or blurry vision.  Cardiovascular: Denies chest pain or irregular heart beat.  Respiratory: Denies cough or shortness of breath.  Gastrointestinal: Denies abdominal pain, nausea, or vomiting.  Musculoskeletal:  Denies muscle cramps.  Neurological: Denies dizziness or focal weakness.  Psychiatric/Behavioral: Normal mental status.  Hematologic/Lymphatic: Denies bleeding problem or easy bruising/bleeding.  Skin: Denies rash or suspicious lesions.    Examination:    Vital Signs:    Vitals:    06/03/22 1230   Weight: 109.8 kg (242 lb)   Height: 5' 10" (1.778 m)   PainSc:   8   PainLoc: Wrist       Body mass index is 34.72 kg/m².    This a well-developed, well nourished patient in no acute distress.    Alert and oriented x 3 and cooperative to examination.       Physical Exam:  Left wrist-patient does not have any evidence of any swelling or contusions present her pain appears " to be on the volar side of the ulna distally she has no pain over the hamate area her pain appears to be on the volar side of the ulnar styloid area    Imaging:  Her x-rays show some degenerative changes I do not see a definite fracture of the wrist itself       Assessment: Left wrist pain        Plan:  I am going to go ahead and put her a wrist splint for comfort and will go ahead and schedule an MRI of the left wrist      DISCLAIMER: This note may have been dictated using voice recognition software and may contain grammatical errors.     NOTE: Consult report sent to referring provider via "[employer] EMR.

## 2022-06-22 ENCOUNTER — HOSPITAL ENCOUNTER (OUTPATIENT)
Dept: RADIOLOGY | Facility: HOSPITAL | Age: 62
Discharge: HOME | End: 2022-06-22
Attending: ORTHOPAEDIC SURGERY
Payer: MEDICARE

## 2022-06-22 DIAGNOSIS — M25.532 LEFT WRIST PAIN: ICD-10-CM

## 2022-06-23 ENCOUNTER — PATIENT MESSAGE (OUTPATIENT)
Dept: ORTHOPEDICS | Facility: CLINIC | Age: 62
End: 2022-06-23
Payer: MEDICARE

## 2022-06-24 ENCOUNTER — OFFICE VISIT (OUTPATIENT)
Dept: ORTHOPEDICS | Facility: CLINIC | Age: 62
End: 2022-06-24
Payer: MEDICARE

## 2022-06-24 VITALS — HEIGHT: 70 IN | BODY MASS INDEX: 34.65 KG/M2 | WEIGHT: 242 LBS

## 2022-06-24 DIAGNOSIS — M25.532 LEFT WRIST PAIN: Primary | ICD-10-CM

## 2022-06-24 PROCEDURE — 99999 PR PBB SHADOW E&M-EST. PATIENT-LVL II: CPT | Mod: PBBFAC,,, | Performed by: ORTHOPAEDIC SURGERY

## 2022-06-24 PROCEDURE — 99212 OFFICE O/P EST SF 10 MIN: CPT | Mod: PBBFAC,PN | Performed by: ORTHOPAEDIC SURGERY

## 2022-06-24 PROCEDURE — 99999 PR PBB SHADOW E&M-EST. PATIENT-LVL II: ICD-10-PCS | Mod: PBBFAC,,, | Performed by: ORTHOPAEDIC SURGERY

## 2022-06-24 PROCEDURE — 99213 PR OFFICE/OUTPT VISIT, EST, LEVL III, 20-29 MIN: ICD-10-PCS | Mod: S$PBB,,, | Performed by: ORTHOPAEDIC SURGERY

## 2022-06-24 PROCEDURE — 99213 OFFICE O/P EST LOW 20 MIN: CPT | Mod: S$PBB,,, | Performed by: ORTHOPAEDIC SURGERY

## 2022-06-24 NOTE — PROGRESS NOTES
2022    Past Medical History:   Diagnosis Date    Breast cancer, stage 1, estrogen receptor negative, left () 2020    Depression     Diabetes mellitus, type 2     GERD (gastroesophageal reflux disease)     HER2-positive carcinoma of left breast 2020    Hx of breast cancer     Hx of breast cancer - Her2Nu+/ER+ - 2011 12/3/2019    Insomnia     Lymphedema     Neuropathy of both feet     S/P lumpectomy, left breast 2020       Past Surgical History:   Procedure Laterality Date    BICEPS TENDON REPAIR Left 2005    BREAST BIOPSY Left     BREAST LUMPECTOMY Left      SECTION      , ,     CHOLECYSTECTOMY      Elbow fx Left 2015    FRACTURE SURGERY  2016    elbow    HYSTERECTOMY      KNEE ARTHROSCOPY W/ MENISCAL REPAIR Left     Lower back ruptured disc  2010    LYMPH NODE DISSECTION      SPINE SURGERY  2009    ruptured disc       Current Outpatient Medications   Medication Sig    ascorbic acid, vitamin C, (VITAMIN C) 500 MG tablet Take 500 mg by mouth once daily.    atomoxetine (STRATTERA) 18 MG capsule Take 1 capsule (18 mg total) by mouth once daily.    b complex vitamins capsule Take 1 capsule by mouth once daily.    docusate sodium (COLACE) 100 MG capsule Take 1 capsule (100 mg total) by mouth once daily.    furosemide (LASIX) 20 MG tablet Take 1 tablet (20 mg total) by mouth daily as needed.    glucosamine-chondroitin (OSTEO BI-FLEX) 250-200 mg Tab Take by mouth.    HYDROcodone-acetaminophen (NORCO) 5-325 mg per tablet Take 1 tablet by mouth every 4 to 6 hours as needed.    LYRICA 200 mg Cap Take 1 capsule (200 mg total) by mouth 3 (three) times daily.    magnesium 250 mg Tab Take 250 mg by mouth once daily.    multivitamin capsule Take 1 capsule by mouth once daily.    pantoprazole (PROTONIX) 40 MG tablet Take 1 tablet (40 mg total) by mouth once daily.    traZODone (DESYREL) 50 MG tablet Take 2 tablets (100 mg total) by mouth every  evening.    venlafaxine (EFFEXOR-XR) 150 MG Cp24 Take 1 capsule (150 mg total) by mouth once daily.     No current facility-administered medications for this visit.       Review of patient's allergies indicates:   Allergen Reactions    Banana Hives    Codeine Nausea And Vomiting    Dilaudid  [hydromorphone (bulk)] Rash    Hydromorphone hcl Rash       Family History   Problem Relation Age of Onset    Cancer Mother     Breast cancer Mother     Parkinsonism Father     Diabetes Father     Breast cancer Maternal Aunt     Breast cancer Paternal Aunt     Cancer Maternal Aunt     Cancer Paternal Aunt     Cancer Daughter     Heart disease Maternal Grandmother     Heart disease Paternal Grandmother        Social History     Socioeconomic History    Marital status:    Tobacco Use    Smoking status: Former Smoker     Packs/day: 0.00     Years: 0.00     Pack years: 0.00     Quit date: 2010     Years since quittin.5    Smokeless tobacco: Never Used   Substance and Sexual Activity    Alcohol use: Yes     Alcohol/week: 2.0 standard drinks     Types: 2 Glasses of wine per week     Comment: socially    Drug use: Never    Sexual activity: Yes     Partners: Male     Birth control/protection: Other-see comments, None     Comment: Hysterectomy     Social Determinants of Health     Financial Resource Strain: Low Risk     Difficulty of Paying Living Expenses: Not hard at all   Food Insecurity: No Food Insecurity    Worried About Running Out of Food in the Last Year: Never true    Ran Out of Food in the Last Year: Never true   Transportation Needs: No Transportation Needs    Lack of Transportation (Medical): No    Lack of Transportation (Non-Medical): No   Physical Activity: Insufficiently Active    Days of Exercise per Week: 6 days    Minutes of Exercise per Session: 20 min   Stress: Stress Concern Present    Feeling of Stress : To some extent   Social Connections: Unknown    Frequency of  "Communication with Friends and Family: More than three times a week    Frequency of Social Gatherings with Friends and Family: Once a week    Active Member of Clubs or Organizations: No    Attends Club or Organization Meetings: Never    Marital Status:    Housing Stability: Low Risk     Unable to Pay for Housing in the Last Year: No    Number of Places Lived in the Last Year: 1    Unstable Housing in the Last Year: No       Chief Complaint:   Chief Complaint   Patient presents with    Left Wrist - Follow-up     Lt wrist pain after a fall on her property about 1 month ago. Patient was schedule for an MRI, and was unable to complete test due to positional pain intensity.   Reports no improvement in her symptoms since last visit. Pain with use. In splint today.          History of present illness:    This is a 61 y.o. year old female who complains of patient returns today for follow-up of her left wrist pain she was unable have assist MRI done to her left wrist due to some prior issues with her left shoulder and left elbow    Review of Systems:    Constitution: Denies chills, fever, and sweats.  HENT: Denies headaches or blurry vision.  Cardiovascular: Denies chest pain or irregular heart beat.  Respiratory: Denies cough or shortness of breath.  Gastrointestinal: Denies abdominal pain, nausea, or vomiting.  Musculoskeletal:  Denies muscle cramps.  Neurological: Denies dizziness or focal weakness.  Psychiatric/Behavioral: Normal mental status.  Hematologic/Lymphatic: Denies bleeding problem or easy bruising/bleeding.  Skin: Denies rash or suspicious lesions.    Examination:    Vital Signs:    Vitals:    06/24/22 0918   Weight: 109.8 kg (242 lb)   Height: 5' 10" (1.778 m)   PainSc: 0-No pain   PainLoc: Wrist       Body mass index is 34.72 kg/m².    This a well-developed, well nourished patient in no acute distress.    Alert and oriented x 3 and cooperative to examination.       Physical Exam:  Left " wrist-patient has some tenderness on supination and pronation of the hand the pain appears to be over the volar aspect of the distal ulna    Imaging:  Her x-rays showed possibility of a faint line through the hamate did not suggest anything in the distal ulnar       Assessment: Left wrist pain        Plan:  I will go ahead get and CT scan of the left wrist      DISCLAIMER: This note may have been dictated using voice recognition software and may contain grammatical errors.     NOTE: Consult report sent to referring provider via [employer].

## 2022-07-01 ENCOUNTER — HOSPITAL ENCOUNTER (OUTPATIENT)
Dept: RADIOLOGY | Facility: HOSPITAL | Age: 62
Discharge: HOME | End: 2022-07-01
Attending: ORTHOPAEDIC SURGERY
Payer: MEDICARE

## 2022-07-01 DIAGNOSIS — M25.532 LEFT WRIST PAIN: ICD-10-CM

## 2022-07-01 PROCEDURE — 73200 CT UPPER EXTREMITY W/O DYE: CPT | Mod: TC,PO,LT

## 2022-07-05 ENCOUNTER — OFFICE VISIT (OUTPATIENT)
Dept: ORTHOPEDICS | Facility: CLINIC | Age: 62
End: 2022-07-05
Payer: MEDICARE

## 2022-07-05 VITALS — HEIGHT: 70 IN | WEIGHT: 242 LBS | BODY MASS INDEX: 34.65 KG/M2

## 2022-07-05 DIAGNOSIS — M25.532 LEFT WRIST PAIN: Primary | ICD-10-CM

## 2022-07-05 PROCEDURE — 99213 PR OFFICE/OUTPT VISIT, EST, LEVL III, 20-29 MIN: ICD-10-PCS | Mod: 25,S$PBB,, | Performed by: ORTHOPAEDIC SURGERY

## 2022-07-05 PROCEDURE — 99213 OFFICE O/P EST LOW 20 MIN: CPT | Mod: PBBFAC,PN,25 | Performed by: ORTHOPAEDIC SURGERY

## 2022-07-05 PROCEDURE — 20605 DRAIN/INJ JOINT/BURSA W/O US: CPT | Mod: PBBFAC,PN | Performed by: ORTHOPAEDIC SURGERY

## 2022-07-05 PROCEDURE — 99999 PR PBB SHADOW E&M-EST. PATIENT-LVL III: ICD-10-PCS | Mod: PBBFAC,,, | Performed by: ORTHOPAEDIC SURGERY

## 2022-07-05 PROCEDURE — 99999 PR PBB SHADOW E&M-EST. PATIENT-LVL III: CPT | Mod: PBBFAC,,, | Performed by: ORTHOPAEDIC SURGERY

## 2022-07-05 PROCEDURE — 20605 INTERMEDIATE JOINT ASPIRATION/INJECTION: L INTERCARPAL: ICD-10-PCS | Mod: S$PBB,LT,, | Performed by: ORTHOPAEDIC SURGERY

## 2022-07-05 PROCEDURE — 99213 OFFICE O/P EST LOW 20 MIN: CPT | Mod: 25,S$PBB,, | Performed by: ORTHOPAEDIC SURGERY

## 2022-07-05 RX ORDER — TRIAMCINOLONE ACETONIDE 40 MG/ML
40 INJECTION, SUSPENSION INTRA-ARTICULAR; INTRAMUSCULAR
Status: DISCONTINUED | OUTPATIENT
Start: 2022-07-05 | End: 2022-07-05 | Stop reason: HOSPADM

## 2022-07-05 RX ADMIN — TRIAMCINOLONE ACETONIDE 40 MG: 40 INJECTION, SUSPENSION INTRA-ARTICULAR; INTRAMUSCULAR at 01:07

## 2022-07-05 NOTE — PROCEDURES
Intermediate Joint Aspiration/Injection: L intercarpal    Date/Time: 7/5/2022 1:15 PM  Performed by: Taurus Romero MD  Authorized by: Taurus Romero MD     Indications:  Pain    Location:  Wrist  Site:  L intercarpal  Needle size:  20 G  Approach:  Anterolateral  Medications:  40 mg triamcinolone acetonide 40 mg/mL

## 2022-07-05 NOTE — PROGRESS NOTES
2022    Past Medical History:   Diagnosis Date    Breast cancer, stage 1, estrogen receptor negative, left () 2020    Depression     Diabetes mellitus, type 2     GERD (gastroesophageal reflux disease)     HER2-positive carcinoma of left breast 2020    Hx of breast cancer     Hx of breast cancer - Her2Nu+/ER+ - 2011 12/3/2019    Insomnia     Lymphedema     Neuropathy of both feet     S/P lumpectomy, left breast 2020       Past Surgical History:   Procedure Laterality Date    BICEPS TENDON REPAIR Left 2005    BREAST BIOPSY Left     BREAST LUMPECTOMY Left      SECTION      , ,     CHOLECYSTECTOMY      Elbow fx Left 2015    FRACTURE SURGERY  2016    elbow    HYSTERECTOMY      KNEE ARTHROSCOPY W/ MENISCAL REPAIR Left     Lower back ruptured disc  2010    LYMPH NODE DISSECTION      SPINE SURGERY  2009    ruptured disc       Current Outpatient Medications   Medication Sig    ascorbic acid, vitamin C, (VITAMIN C) 500 MG tablet Take 500 mg by mouth once daily.    atomoxetine (STRATTERA) 18 MG capsule Take 1 capsule (18 mg total) by mouth once daily.    b complex vitamins capsule Take 1 capsule by mouth once daily.    docusate sodium (COLACE) 100 MG capsule Take 1 capsule (100 mg total) by mouth once daily.    furosemide (LASIX) 20 MG tablet Take 1 tablet (20 mg total) by mouth daily as needed.    glucosamine-chondroitin (OSTEO BI-FLEX) 250-200 mg Tab Take by mouth.    HYDROcodone-acetaminophen (NORCO) 5-325 mg per tablet Take 1 tablet by mouth every 4 to 6 hours as needed.    LYRICA 200 mg Cap Take 1 capsule (200 mg total) by mouth 3 (three) times daily.    magnesium 250 mg Tab Take 250 mg by mouth once daily.    multivitamin capsule Take 1 capsule by mouth once daily.    pantoprazole (PROTONIX) 40 MG tablet Take 1 tablet (40 mg total) by mouth once daily.    traZODone (DESYREL) 50 MG tablet Take 2 tablets (100 mg total) by mouth every  evening.    venlafaxine (EFFEXOR-XR) 150 MG Cp24 Take 1 capsule (150 mg total) by mouth once daily.     No current facility-administered medications for this visit.       Review of patient's allergies indicates:   Allergen Reactions    Banana Hives    Codeine Nausea And Vomiting    Dilaudid  [hydromorphone (bulk)] Rash    Hydromorphone hcl Rash       Family History   Problem Relation Age of Onset    Cancer Mother     Breast cancer Mother     Parkinsonism Father     Diabetes Father     Breast cancer Maternal Aunt     Breast cancer Paternal Aunt     Cancer Maternal Aunt     Cancer Paternal Aunt     Cancer Daughter     Heart disease Maternal Grandmother     Heart disease Paternal Grandmother        Social History     Socioeconomic History    Marital status:    Tobacco Use    Smoking status: Former Smoker     Packs/day: 0.00     Years: 0.00     Pack years: 0.00     Quit date: 2010     Years since quittin.6    Smokeless tobacco: Never Used   Substance and Sexual Activity    Alcohol use: Yes     Alcohol/week: 2.0 standard drinks     Types: 2 Glasses of wine per week     Comment: socially    Drug use: Never    Sexual activity: Yes     Partners: Male     Birth control/protection: Other-see comments, None     Comment: Hysterectomy     Social Determinants of Health     Financial Resource Strain: Low Risk     Difficulty of Paying Living Expenses: Not hard at all   Food Insecurity: No Food Insecurity    Worried About Running Out of Food in the Last Year: Never true    Ran Out of Food in the Last Year: Never true   Transportation Needs: No Transportation Needs    Lack of Transportation (Medical): No    Lack of Transportation (Non-Medical): No   Physical Activity: Insufficiently Active    Days of Exercise per Week: 6 days    Minutes of Exercise per Session: 20 min   Stress: Stress Concern Present    Feeling of Stress : To some extent   Social Connections: Unknown    Frequency of  "Communication with Friends and Family: More than three times a week    Frequency of Social Gatherings with Friends and Family: Once a week    Active Member of Clubs or Organizations: No    Attends Club or Organization Meetings: Never    Marital Status:    Housing Stability: Low Risk     Unable to Pay for Housing in the Last Year: No    Number of Places Lived in the Last Year: 1    Unstable Housing in the Last Year: No       Chief Complaint:   Chief Complaint   Patient presents with    Left Wrist - Follow-up, Results     Here for CT Review of Left Wrist. PL 5/10 in left wrist today. Reports no significant improvement in symptoms since last visit. C/o left wrist weakness especially with lifting items.          History of present illness:    This is a 61 y.o. year old female who complains of patient is now returning with a CT scan of left wrist    Review of Systems:    Constitution: Denies chills, fever, and sweats.  HENT: Denies headaches or blurry vision.  Cardiovascular: Denies chest pain or irregular heart beat.  Respiratory: Denies cough or shortness of breath.  Gastrointestinal: Denies abdominal pain, nausea, or vomiting.  Musculoskeletal:  Denies muscle cramps.  Neurological: Denies dizziness or focal weakness.  Psychiatric/Behavioral: Normal mental status.  Hematologic/Lymphatic: Denies bleeding problem or easy bruising/bleeding.  Skin: Denies rash or suspicious lesions.    Examination:    Vital Signs:    Vitals:    07/05/22 1305   Weight: 109.8 kg (242 lb)   Height: 5' 10" (1.778 m)   PainSc:   5   PainLoc: Wrist       Body mass index is 34.72 kg/m².    This a well-developed, well nourished patient in no acute distress.    Alert and oriented x 3 and cooperative to examination.       Physical Exam:  Left wrist-patient continues to have discomfort near the volar lateral aspect of the ulna just proximal to the ulnar styloid there is no palpable lesions is no warmth the cellulitis    Imaging:  The " CT scan of the left wrist showed some mild with arthritic changes near the P is a former and hamate mild chondrocalcinosis along the volar aspect of the radial carpal joint chondrocalcinosis around the TFCligament no effusions no acute bony changes       Assessment: Left wrist pain        Plan:  I reviewed the CT scan with the patient I do not see any active lesions I am going to go ahead and inject her tender area near the distal ulna with some Kenalog today she can use a wrist splint do some warm soaks and will see her back in 5 weeks if she still having problems I am also going to supply her with a wrist splint that she can wear to immobilize it for a period of time      DISCLAIMER: This note may have been dictated using voice recognition software and may contain grammatical errors.     NOTE: Consult report sent to referring provider via [employer] E[employer].

## 2022-07-29 ENCOUNTER — PATIENT MESSAGE (OUTPATIENT)
Dept: FAMILY MEDICINE | Facility: CLINIC | Age: 62
End: 2022-07-29

## 2022-08-01 RX ORDER — NAPROXEN 500 MG/1
500 TABLET ORAL 2 TIMES DAILY
Qty: 90 TABLET | Refills: 1 | Status: SHIPPED | OUTPATIENT
Start: 2022-08-01 | End: 2022-09-07 | Stop reason: SDUPTHER

## 2022-08-09 ENCOUNTER — PATIENT MESSAGE (OUTPATIENT)
Dept: FAMILY MEDICINE | Facility: CLINIC | Age: 62
End: 2022-08-09
Payer: MEDICARE

## 2022-08-17 ENCOUNTER — OFFICE VISIT (OUTPATIENT)
Dept: ORTHOPEDICS | Facility: CLINIC | Age: 62
End: 2022-08-17
Payer: MEDICARE

## 2022-08-17 VITALS — BODY MASS INDEX: 34.65 KG/M2 | HEIGHT: 70 IN | WEIGHT: 242 LBS

## 2022-08-17 DIAGNOSIS — M77.8 WRIST TENDONITIS: Primary | ICD-10-CM

## 2022-08-17 DIAGNOSIS — M17.12 PRIMARY OSTEOARTHRITIS OF LEFT KNEE: ICD-10-CM

## 2022-08-17 DIAGNOSIS — M17.11 ARTHRITIS OF RIGHT KNEE: ICD-10-CM

## 2022-08-17 PROCEDURE — 99999 PR PBB SHADOW E&M-EST. PATIENT-LVL III: ICD-10-PCS | Mod: PBBFAC,,, | Performed by: ORTHOPAEDIC SURGERY

## 2022-08-17 PROCEDURE — 99213 OFFICE O/P EST LOW 20 MIN: CPT | Mod: PBBFAC,PN | Performed by: ORTHOPAEDIC SURGERY

## 2022-08-17 PROCEDURE — 99213 OFFICE O/P EST LOW 20 MIN: CPT | Mod: S$PBB,,, | Performed by: ORTHOPAEDIC SURGERY

## 2022-08-17 PROCEDURE — 99213 PR OFFICE/OUTPT VISIT, EST, LEVL III, 20-29 MIN: ICD-10-PCS | Mod: S$PBB,,, | Performed by: ORTHOPAEDIC SURGERY

## 2022-08-17 PROCEDURE — 99999 PR PBB SHADOW E&M-EST. PATIENT-LVL III: CPT | Mod: PBBFAC,,, | Performed by: ORTHOPAEDIC SURGERY

## 2022-08-17 NOTE — PROGRESS NOTES
2022    Past Medical History:   Diagnosis Date    Breast cancer, stage 1, estrogen receptor negative, left () 2020    Depression     Diabetes mellitus, type 2     GERD (gastroesophageal reflux disease)     HER2-positive carcinoma of left breast 2020    Hx of breast cancer     Hx of breast cancer - Her2Nu+/ER+ - 2011 12/3/2019    Insomnia     Lymphedema     Neuropathy of both feet     S/P lumpectomy, left breast 2020       Past Surgical History:   Procedure Laterality Date    BICEPS TENDON REPAIR Left 2005    BREAST BIOPSY Left     BREAST LUMPECTOMY Left      SECTION      , ,     CHOLECYSTECTOMY      Elbow fx Left 2015    FRACTURE SURGERY  2016    elbow    HYSTERECTOMY      KNEE ARTHROSCOPY W/ MENISCAL REPAIR Left     Lower back ruptured disc  2010    LYMPH NODE DISSECTION      SPINE SURGERY  2009    ruptured disc       Current Outpatient Medications   Medication Sig    ascorbic acid, vitamin C, (VITAMIN C) 500 MG tablet Take 500 mg by mouth once daily.    atomoxetine (STRATTERA) 18 MG capsule Take 1 capsule (18 mg total) by mouth once daily.    b complex vitamins capsule Take 1 capsule by mouth once daily.    docusate sodium (COLACE) 100 MG capsule Take 1 capsule (100 mg total) by mouth once daily.    furosemide (LASIX) 20 MG tablet Take 1 tablet (20 mg total) by mouth daily as needed.    glucosamine-chondroitin (OSTEO BI-FLEX) 250-200 mg Tab Take by mouth.    HYDROcodone-acetaminophen (NORCO) 5-325 mg per tablet Take 1 tablet by mouth every 4 to 6 hours as needed.    LYRICA 200 mg Cap Take 1 capsule (200 mg total) by mouth 3 (three) times daily.    magnesium 250 mg Tab Take 250 mg by mouth once daily.    multivitamin capsule Take 1 capsule by mouth once daily.    naproxen (NAPROSYN) 500 MG tablet Take 1 tablet (500 mg total) by mouth 2 (two) times daily.    pantoprazole (PROTONIX) 40 MG tablet Take 1 tablet (40 mg total) by mouth  once daily.    traZODone (DESYREL) 50 MG tablet Take 2 tablets (100 mg total) by mouth every evening.    venlafaxine (EFFEXOR-XR) 150 MG Cp24 Take 1 capsule (150 mg total) by mouth once daily.     No current facility-administered medications for this visit.       Review of patient's allergies indicates:   Allergen Reactions    Banana Hives    Codeine Nausea And Vomiting    Dilaudid  [hydromorphone (bulk)] Rash    Hydromorphone hcl Rash       Family History   Problem Relation Age of Onset    Cancer Mother     Breast cancer Mother     Parkinsonism Father     Diabetes Father     Breast cancer Maternal Aunt     Breast cancer Paternal Aunt     Cancer Maternal Aunt     Cancer Paternal Aunt     Cancer Daughter     Heart disease Maternal Grandmother     Heart disease Paternal Grandmother        Social History     Socioeconomic History    Marital status:    Tobacco Use    Smoking status: Former Smoker     Packs/day: 0.00     Years: 0.00     Pack years: 0.00     Quit date: 2010     Years since quittin.7    Smokeless tobacco: Never Used   Substance and Sexual Activity    Alcohol use: Yes     Alcohol/week: 2.0 standard drinks     Types: 2 Glasses of wine per week     Comment: socially    Drug use: Never    Sexual activity: Yes     Partners: Male     Birth control/protection: Other-see comments, None     Comment: Hysterectomy     Social Determinants of Health     Financial Resource Strain: Low Risk     Difficulty of Paying Living Expenses: Not hard at all   Food Insecurity: No Food Insecurity    Worried About Running Out of Food in the Last Year: Never true    Ran Out of Food in the Last Year: Never true   Transportation Needs: No Transportation Needs    Lack of Transportation (Medical): No    Lack of Transportation (Non-Medical): No   Physical Activity: Insufficiently Active    Days of Exercise per Week: 6 days    Minutes of Exercise per Session: 20 min   Stress: Stress Concern  "Present    Feeling of Stress : To some extent   Social Connections: Unknown    Frequency of Communication with Friends and Family: More than three times a week    Frequency of Social Gatherings with Friends and Family: Once a week    Active Member of Clubs or Organizations: No    Attends Club or Organization Meetings: Never    Marital Status:    Housing Stability: Low Risk     Unable to Pay for Housing in the Last Year: No    Number of Places Lived in the Last Year: 1    Unstable Housing in the Last Year: No       Chief Complaint:   Chief Complaint   Patient presents with    Left Wrist - Follow-up     Lt Wrist pain. Kenalog inj to area near distal ulna and placed in wrist/forearm brace on 07/05/22. Pt reports improvement in symptoms since last visit. Denies having any pain in wrist today. States she believes pain was related to raking.          History of present illness:    This is a 61 y.o. year old female who complains of patient has been seen today for left wrist pain she had a Kenalog injection near the distal ulna about 2 weeks ago she reports improvement in her symptoms since last visit the denies any pain in her wrist today    Review of Systems:    Constitution: Denies chills, fever, and sweats.  HENT: Denies headaches or blurry vision.  Cardiovascular: Denies chest pain or irregular heart beat.  Respiratory: Denies cough or shortness of breath.  Gastrointestinal: Denies abdominal pain, nausea, or vomiting.  Musculoskeletal:  Denies muscle cramps.  Neurological: Denies dizziness or focal weakness.  Psychiatric/Behavioral: Normal mental status.  Hematologic/Lymphatic: Denies bleeding problem or easy bruising/bleeding.  Skin: Denies rash or suspicious lesions.    Examination:    Vital Signs:    Vitals:    08/17/22 1331   Weight: 109.8 kg (242 lb)   Height: 5' 10" (1.778 m)   PainSc: 0-No pain   PainLoc: Hand       Body mass index is 34.72 kg/m².    This a well-developed, well nourished patient " in no acute distress.    Alert and oriented x 3 and cooperative to examination.       Physical Exam:  Left wrist-patient moves her wrist well he has no discomfort over the ulnar styloid area today    Imaging:  No x-rays       Assessment: Wrist tendonitis        Plan:  Patient appeared to get a good response with the injection she has been told to protect her wrist as much as possible increase her activity as tolerated return as needed      DISCLAIMER: This note may have been dictated using voice recognition software and may contain grammatical errors.     NOTE: Consult report sent to referring provider via [employer] EMR.

## 2022-08-29 ENCOUNTER — PATIENT MESSAGE (OUTPATIENT)
Dept: FAMILY MEDICINE | Facility: CLINIC | Age: 62
End: 2022-08-29

## 2022-09-07 ENCOUNTER — OFFICE VISIT (OUTPATIENT)
Dept: FAMILY MEDICINE | Facility: CLINIC | Age: 62
End: 2022-09-07
Payer: MEDICARE

## 2022-09-07 VITALS
WEIGHT: 238 LBS | HEART RATE: 72 BPM | BODY MASS INDEX: 34.07 KG/M2 | DIASTOLIC BLOOD PRESSURE: 72 MMHG | SYSTOLIC BLOOD PRESSURE: 112 MMHG | HEIGHT: 70 IN

## 2022-09-07 DIAGNOSIS — G47.00 INSOMNIA, UNSPECIFIED TYPE: ICD-10-CM

## 2022-09-07 DIAGNOSIS — K21.9 GASTROESOPHAGEAL REFLUX DISEASE, UNSPECIFIED WHETHER ESOPHAGITIS PRESENT: ICD-10-CM

## 2022-09-07 DIAGNOSIS — F98.8 ATTENTION DEFICIT DISORDER, UNSPECIFIED HYPERACTIVITY PRESENCE: ICD-10-CM

## 2022-09-07 DIAGNOSIS — Z00.00 PHYSICAL EXAM: ICD-10-CM

## 2022-09-07 DIAGNOSIS — E11.69 TYPE 2 DIABETES MELLITUS WITH OTHER SPECIFIED COMPLICATION, WITHOUT LONG-TERM CURRENT USE OF INSULIN: Primary | ICD-10-CM

## 2022-09-07 DIAGNOSIS — M19.012 PRIMARY OSTEOARTHRITIS OF LEFT SHOULDER: ICD-10-CM

## 2022-09-07 DIAGNOSIS — Z85.3 HX OF BREAST CANCER: ICD-10-CM

## 2022-09-07 DIAGNOSIS — L40.9 PSORIASIS: ICD-10-CM

## 2022-09-07 DIAGNOSIS — Z79.899 HIGH RISK MEDICATION USE: ICD-10-CM

## 2022-09-07 LAB — HBA1C MFR BLD: 5.6 %

## 2022-09-07 PROCEDURE — 99214 OFFICE O/P EST MOD 30 MIN: CPT | Mod: S$GLB,,, | Performed by: NURSE PRACTITIONER

## 2022-09-07 PROCEDURE — 83036 HEMOGLOBIN GLYCOSYLATED A1C: CPT | Mod: QW,,, | Performed by: NURSE PRACTITIONER

## 2022-09-07 PROCEDURE — 83036 POCT HEMOGLOBIN A1C: ICD-10-PCS | Mod: QW,,, | Performed by: NURSE PRACTITIONER

## 2022-09-07 PROCEDURE — 99214 PR OFFICE/OUTPT VISIT, EST, LEVL IV, 30-39 MIN: ICD-10-PCS | Mod: S$GLB,,, | Performed by: NURSE PRACTITIONER

## 2022-09-07 RX ORDER — ATOMOXETINE 18 MG/1
18 CAPSULE ORAL DAILY
Qty: 90 CAPSULE | Refills: 0 | Status: CANCELLED | OUTPATIENT
Start: 2022-09-07 | End: 2022-10-07

## 2022-09-19 RX ORDER — TRAZODONE HYDROCHLORIDE 50 MG/1
100 TABLET ORAL NIGHTLY
Qty: 180 TABLET | Refills: 0 | Status: SHIPPED | OUTPATIENT
Start: 2022-09-19 | End: 2022-12-07 | Stop reason: SDUPTHER

## 2022-09-19 RX ORDER — ATOMOXETINE 25 MG/1
25 CAPSULE ORAL DAILY
Qty: 30 CAPSULE | Refills: 0 | Status: SHIPPED | OUTPATIENT
Start: 2022-09-19 | End: 2022-12-07 | Stop reason: SDUPTHER

## 2022-09-19 RX ORDER — FUROSEMIDE 20 MG/1
20 TABLET ORAL DAILY PRN
Qty: 90 TABLET | Refills: 1 | Status: SHIPPED | OUTPATIENT
Start: 2022-09-19 | End: 2023-06-15 | Stop reason: SDUPTHER

## 2022-09-19 RX ORDER — NAPROXEN 500 MG/1
500 TABLET ORAL 2 TIMES DAILY
Qty: 90 TABLET | Refills: 1 | Status: SHIPPED | OUTPATIENT
Start: 2022-09-19 | End: 2022-12-07 | Stop reason: SDUPTHER

## 2022-09-19 NOTE — PROGRESS NOTES
SUBJECTIVE:    Patient ID: Lolly Ramírez is a 61 y.o. female.    Chief Complaint: Diabetes (Brought bottles, Eye exam req Dr. Thomas// [facility])    61 year old female presents for check up.  is present during the exam. She is treated for gerd, insomnia,neuropathy, oa. Psoriasis and dm. Taking meds as prescribed.  Followed by dr. Tobar regularly. Treating her neuropathy.  Still has some back pain.  Some days worse than others.   Effexor is controlling moods.  Sleeping better with  trazodone. Feels rested in am. Happy with strattera. Thinks it is working well. Hga1c is 5.6mg/dl    Diabetes  Pertinent negatives for hypoglycemia include no dizziness, headaches or nervousness/anxiousness. Pertinent negatives for diabetes include no chest pain and no weakness.     Office Visit on 09/07/2022   Component Date Value Ref Range Status    Hemoglobin A1C 09/07/2022 5.6  % Final   Office Visit on 06/02/2022   Component Date Value Ref Range Status    Hemoglobin A1C 06/02/2022 5.7  % Final   Office Visit on 03/23/2022   Component Date Value Ref Range Status    Hemoglobin A1C 03/23/2022 5.5  % Final    TSH w/reflex to FT4 05/26/2022 1.19  0.40 - 4.50 mIU/L Final    Cholesterol 05/26/2022 242 (H)  <200 mg/dL Final    HDL 05/26/2022 87  > OR = 50 mg/dL Final    Triglycerides 05/26/2022 64  <150 mg/dL Final    LDL Cholesterol 05/26/2022 139 (H)  mg/dL (calc) Final    HDL/Cholesterol Ratio 05/26/2022 2.8  <5.0 (calc) Final    Non HDL Chol. (LDL+VLDL) 05/26/2022 155 (H)  <130 mg/dL (calc) Final   Orders Only on 03/15/2022   Component Date Value Ref Range Status    Glucose 03/15/2022 126 (H)  65 - 99 mg/dL Final    BUN 03/15/2022 13  7 - 25 mg/dL Final    Creatinine 03/15/2022 0.80  0.50 - 0.99 mg/dL Final    eGFR if non African American 03/15/2022 80  > OR = 60 mL/min/1.73m2 Final    eGFR if  03/15/2022 93  > OR = 60 mL/min/1.73m2 Final    BUN/Creatinine Ratio 03/15/2022 NOT APPLICABLE  6 - 22 (calc) Final     Sodium 03/15/2022 143  135 - 146 mmol/L Final    Potassium 03/15/2022 4.2  3.5 - 5.3 mmol/L Final    Chloride 03/15/2022 107  98 - 110 mmol/L Final    CO2 03/15/2022 30  20 - 32 mmol/L Final    Calcium 03/15/2022 9.1  8.6 - 10.4 mg/dL Final    Total Protein 03/15/2022 6.3  6.1 - 8.1 g/dL Final    Albumin 03/15/2022 3.9  3.6 - 5.1 g/dL Final    Globulin, Total 03/15/2022 2.4  1.9 - 3.7 g/dL (calc) Final    Albumin/Globulin Ratio 03/15/2022 1.6  1.0 - 2.5 (calc) Final    Total Bilirubin 03/15/2022 0.3  0.2 - 1.2 mg/dL Final    Alkaline Phosphatase 03/15/2022 66  37 - 153 U/L Final    AST 03/15/2022 16  10 - 35 U/L Final    ALT 03/15/2022 14  6 - 29 U/L Final    WBC 03/15/2022 4.0  3.8 - 10.8 Thousand/uL Final    RBC 03/15/2022 4.83  3.80 - 5.10 Million/uL Final    Hemoglobin 03/15/2022 13.1  11.7 - 15.5 g/dL Final    Hematocrit 03/15/2022 41.2  35.0 - 45.0 % Final    MCV 03/15/2022 85.3  80.0 - 100.0 fL Final    MCH 03/15/2022 27.1  27.0 - 33.0 pg Final    MCHC 03/15/2022 31.8 (L)  32.0 - 36.0 g/dL Final    RDW 03/15/2022 12.7  11.0 - 15.0 % Final    Platelets 03/15/2022 233  140 - 400 Thousand/uL Final    MPV 03/15/2022 10.9  7.5 - 12.5 fL Final    Neutrophils, Abs 03/15/2022 1,356 (L)  1,500 - 7,800 cells/uL Final    Lymph # 03/15/2022 1,692  850 - 3,900 cells/uL Final    Mono # 03/15/2022 536  200 - 950 cells/uL Final    Eos # 03/15/2022 368  15 - 500 cells/uL Final    Baso # 03/15/2022 48  0 - 200 cells/uL Final    Neutrophils Relative 03/15/2022 33.9  % Final    Lymph % 03/15/2022 42.3  % Final    Mono % 03/15/2022 13.4  % Final    Eosinophil % 03/15/2022 9.2  % Final    Basophil % 03/15/2022 1.2  % Final     03/15/2022 12  <35 U/mL Final    CA 27-29 03/15/2022 15  <38 U/mL Final    CA 15-3 03/15/2022 16  <32 U/mL Final       Past Medical History:   Diagnosis Date    Breast cancer, stage 1, estrogen receptor negative, left (2011) 2/18/2020    Depression     Diabetes mellitus, type 2     GERD  (gastroesophageal reflux disease)     HER2-positive carcinoma of left breast 2020    Hx of breast cancer     Hx of breast cancer - Her2Nu+/ER+ - 2011 12/3/2019    Insomnia     Lymphedema     Neuropathy of both feet     S/P lumpectomy, left breast 2020     Social History     Socioeconomic History    Marital status:    Tobacco Use    Smoking status: Former     Packs/day: 0.00     Years: 0.00     Pack years: 0.00     Types: Cigarettes     Quit date: 2010     Years since quittin.8    Smokeless tobacco: Never   Substance and Sexual Activity    Alcohol use: Yes     Alcohol/week: 2.0 standard drinks     Types: 2 Glasses of wine per week     Comment: socially    Drug use: Never    Sexual activity: Yes     Partners: Male     Birth control/protection: Other-see comments, None     Comment: Hysterectomy     Social Determinants of Health     Financial Resource Strain: Low Risk     Difficulty of Paying Living Expenses: Not hard at all   Food Insecurity: No Food Insecurity    Worried About Running Out of Food in the Last Year: Never true    Ran Out of Food in the Last Year: Never true   Transportation Needs: No Transportation Needs    Lack of Transportation (Medical): No    Lack of Transportation (Non-Medical): No   Physical Activity: Insufficiently Active    Days of Exercise per Week: 6 days    Minutes of Exercise per Session: 20 min   Stress: Stress Concern Present    Feeling of Stress : To some extent   Social Connections: Unknown    Frequency of Communication with Friends and Family: More than three times a week    Frequency of Social Gatherings with Friends and Family: Once a week    Active Member of Clubs or Organizations: No    Attends Club or Organization Meetings: Never    Marital Status:    Housing Stability: Low Risk     Unable to Pay for Housing in the Last Year: No    Number of Places Lived in the Last Year: 1    Unstable Housing in the Last Year: No     Past Surgical History:    Procedure Laterality Date    BICEPS TENDON REPAIR Left 2005    BREAST BIOPSY Left     BREAST LUMPECTOMY Left      SECTION      , ,     CHOLECYSTECTOMY      Elbow fx Left 2015    FRACTURE SURGERY  2016    elbow    HYSTERECTOMY      KNEE ARTHROSCOPY W/ MENISCAL REPAIR Left     Lower back ruptured disc  2010    LYMPH NODE DISSECTION      SPINE SURGERY  2009    ruptured disc     Family History   Problem Relation Age of Onset    Cancer Mother     Breast cancer Mother     Parkinsonism Father     Diabetes Father     Breast cancer Maternal Aunt     Breast cancer Paternal Aunt     Cancer Maternal Aunt     Cancer Paternal Aunt     Cancer Daughter     Heart disease Maternal Grandmother     Heart disease Paternal Grandmother        Review of patient's allergies indicates:   Allergen Reactions    Banana Hives    Codeine Nausea And Vomiting    Dilaudid  [hydromorphone (bulk)] Rash    Hydromorphone hcl Rash       Current Outpatient Medications:     ascorbic acid, vitamin C, (VITAMIN C) 500 MG tablet, Take 500 mg by mouth once daily., Disp: , Rfl:     b complex vitamins capsule, Take 1 capsule by mouth once daily., Disp: , Rfl:     docusate sodium (COLACE) 100 MG capsule, Take 1 capsule (100 mg total) by mouth once daily., Disp: 90 capsule, Rfl: 0    glucosamine-chondroitin 250-200 mg Tab, Take by mouth., Disp: , Rfl:     HYDROcodone-acetaminophen (NORCO) 5-325 mg per tablet, Take 1 tablet by mouth every 4 to 6 hours as needed., Disp: , Rfl:     LYRICA 200 mg Cap, Take 1 capsule (200 mg total) by mouth 3 (three) times daily., Disp: 270 capsule, Rfl: 1    magnesium 250 mg Tab, Take 250 mg by mouth once daily., Disp: , Rfl:     multivitamin capsule, Take 1 capsule by mouth once daily., Disp: , Rfl:     pantoprazole (PROTONIX) 40 MG tablet, Take 1 tablet (40 mg total) by mouth once daily., Disp: 90 tablet, Rfl: 3    venlafaxine (EFFEXOR-XR) 150 MG Cp24, Take 1 capsule (150 mg total) by mouth once daily.,  "Disp: 90 capsule, Rfl: 1    atomoxetine (STRATTERA) 25 MG capsule, Take 1 capsule (25 mg total) by mouth once daily., Disp: 30 capsule, Rfl: 0    furosemide (LASIX) 20 MG tablet, Take 1 tablet (20 mg total) by mouth daily as needed., Disp: 90 tablet, Rfl: 1    naproxen (NAPROSYN) 500 MG tablet, Take 1 tablet (500 mg total) by mouth 2 (two) times daily., Disp: 90 tablet, Rfl: 1    traZODone (DESYREL) 50 MG tablet, Take 2 tablets (100 mg total) by mouth every evening., Disp: 180 tablet, Rfl: 0    Review of Systems   Constitutional:  Negative for chills, fever and unexpected weight change.   HENT:  Negative for ear pain, rhinorrhea and sore throat.    Eyes:  Negative for pain and visual disturbance.   Respiratory:  Negative for cough and shortness of breath.    Cardiovascular:  Negative for chest pain, palpitations and leg swelling.   Gastrointestinal:  Negative for abdominal pain, diarrhea, nausea and vomiting.   Genitourinary:  Negative for difficulty urinating, hematuria and vaginal bleeding.   Musculoskeletal:  Negative for arthralgias.   Skin:  Negative for rash.   Neurological:  Negative for dizziness, weakness and headaches.   Psychiatric/Behavioral:  Negative for agitation and sleep disturbance. The patient is not nervous/anxious.         Objective:      Vitals:    09/07/22 0905   BP: 112/72   Pulse: 72   Weight: 108 kg (238 lb)   Height: 5' 10" (1.778 m)     Physical Exam  Vitals and nursing note reviewed.   Constitutional:       General: She is not in acute distress.     Appearance: Normal appearance. She is well-developed. She is obese. She is not ill-appearing.   HENT:      Head: Normocephalic.      Right Ear: External ear normal.      Left Ear: External ear normal.   Eyes:      Conjunctiva/sclera: Conjunctivae normal.      Pupils: Pupils are equal, round, and reactive to light.   Neck:      Vascular: No JVD.   Cardiovascular:      Rate and Rhythm: Normal rate and regular rhythm.      Heart sounds: No " murmur heard.  Pulmonary:      Effort: Pulmonary effort is normal.      Breath sounds: Normal breath sounds.   Abdominal:      General: Bowel sounds are normal.      Palpations: Abdomen is soft.   Musculoskeletal:         General: No deformity. Normal range of motion.      Cervical back: Normal range of motion and neck supple.   Feet:      Right foot:      Protective Sensation: 5 sites tested.  5 sites sensed.      Left foot:      Protective Sensation: 5 sites tested.  5 sites sensed.   Lymphadenopathy:      Cervical: No cervical adenopathy.   Skin:     General: Skin is warm and dry.      Findings: No rash.   Neurological:      Mental Status: She is alert and oriented to person, place, and time.      Gait: Gait normal.   Psychiatric:         Speech: Speech normal.         Behavior: Behavior normal.         Assessment:       1. Type 2 diabetes mellitus with other specified complication, without long-term current use of insulin    2. Insomnia, unspecified type    3. Attention deficit disorder, unspecified hyperactivity presence    4. High risk medication use    5. Hx of breast cancer    6. Physical exam    7. Gastroesophageal reflux disease, unspecified whether esophagitis present    8. Psoriasis    9. Primary osteoarthritis of left shoulder           Plan:       Type 2 diabetes mellitus with other specified complication, without long-term current use of insulin  -     Hemoglobin A1C, POCT    Insomnia, unspecified type  -     traZODone (DESYREL) 50 MG tablet; Take 2 tablets (100 mg total) by mouth every evening.  Dispense: 180 tablet; Refill: 0    Attention deficit disorder, unspecified hyperactivity presence  -     atomoxetine (STRATTERA) 25 MG capsule; Take 1 capsule (25 mg total) by mouth once daily.  Dispense: 30 capsule; Refill: 0    High risk medication use    Hx of breast cancer    Physical exam  -     furosemide (LASIX) 20 MG tablet; Take 1 tablet (20 mg total) by mouth daily as needed.  Dispense: 90 tablet;  Refill: 1  -     naproxen (NAPROSYN) 500 MG tablet; Take 1 tablet (500 mg total) by mouth 2 (two) times daily.  Dispense: 90 tablet; Refill: 1  -     traZODone (DESYREL) 50 MG tablet; Take 2 tablets (100 mg total) by mouth every evening.  Dispense: 180 tablet; Refill: 0  -     atomoxetine (STRATTERA) 25 MG capsule; Take 1 capsule (25 mg total) by mouth once daily.  Dispense: 30 capsule; Refill: 0    Gastroesophageal reflux disease, unspecified whether esophagitis present    Psoriasis    Primary osteoarthritis of left shoulder    Follow up in 3 months (on 12/7/2022), or if symptoms worsen or fail to improve, for Follow up, medication management, Diabetic Check-Up.        9/19/2022 Shaunna Gordon

## 2022-09-20 ENCOUNTER — OFFICE VISIT (OUTPATIENT)
Dept: FAMILY MEDICINE | Facility: CLINIC | Age: 62
End: 2022-09-20
Payer: MEDICARE

## 2022-09-20 VITALS
WEIGHT: 236 LBS | HEIGHT: 70 IN | SYSTOLIC BLOOD PRESSURE: 94 MMHG | HEART RATE: 64 BPM | DIASTOLIC BLOOD PRESSURE: 56 MMHG | BODY MASS INDEX: 33.79 KG/M2

## 2022-09-20 DIAGNOSIS — M19.012 PRIMARY OSTEOARTHRITIS OF LEFT SHOULDER: ICD-10-CM

## 2022-09-20 DIAGNOSIS — E11.9 TYPE 2 DIABETES MELLITUS WITHOUT COMPLICATION, WITHOUT LONG-TERM CURRENT USE OF INSULIN: Primary | ICD-10-CM

## 2022-09-20 DIAGNOSIS — Z79.899 HIGH RISK MEDICATION USE: ICD-10-CM

## 2022-09-20 DIAGNOSIS — L03.90 CELLULITIS, UNSPECIFIED CELLULITIS SITE: ICD-10-CM

## 2022-09-20 PROCEDURE — 99213 PR OFFICE/OUTPT VISIT, EST, LEVL III, 20-29 MIN: ICD-10-PCS | Mod: S$GLB,,, | Performed by: NURSE PRACTITIONER

## 2022-09-20 PROCEDURE — 99213 OFFICE O/P EST LOW 20 MIN: CPT | Mod: S$GLB,,, | Performed by: NURSE PRACTITIONER

## 2022-09-20 RX ORDER — PREDNISONE 5 MG/1
5 TABLET ORAL 2 TIMES DAILY
Qty: 6 TABLET | Refills: 0 | Status: SHIPPED | OUTPATIENT
Start: 2022-09-20 | End: 2022-12-07

## 2022-09-20 RX ORDER — SULFAMETHOXAZOLE AND TRIMETHOPRIM 800; 160 MG/1; MG/1
1 TABLET ORAL 2 TIMES DAILY
Qty: 14 TABLET | Refills: 0 | Status: SHIPPED | OUTPATIENT
Start: 2022-09-20 | End: 2022-12-07

## 2022-09-22 ENCOUNTER — PATIENT MESSAGE (OUTPATIENT)
Dept: FAMILY MEDICINE | Facility: CLINIC | Age: 62
End: 2022-09-22

## 2022-09-23 ENCOUNTER — PATIENT MESSAGE (OUTPATIENT)
Dept: FAMILY MEDICINE | Facility: CLINIC | Age: 62
End: 2022-09-23

## 2022-09-28 ENCOUNTER — PATIENT MESSAGE (OUTPATIENT)
Dept: FAMILY MEDICINE | Facility: CLINIC | Age: 62
End: 2022-09-28

## 2022-09-29 ENCOUNTER — PATIENT MESSAGE (OUTPATIENT)
Dept: FAMILY MEDICINE | Facility: CLINIC | Age: 62
End: 2022-09-29

## 2022-10-03 NOTE — PROGRESS NOTES
SUBJECTIVE:    Patient ID: Lolly Ramírez is a 61 y.o. female.    Chief Complaint: Leg Pain (Right upper inner thigh, swelling, red and warm to touch, x2days, possible cellulitis, has taken benadryl and put ice on it// SW)    61 year old female presents for urgent visit.  She is treated for gerd, insomnia,neuropathy, oa. Psoriasis and dm. Taking meds as prescribed.  She is presenting with knot to right inner thigh. Noticed yesterday. Thinks it is getting worse. No fever. No drainage. Feels warm to touch.     Diabetes  Pertinent negatives for hypoglycemia include no nervousness/anxiousness. Pertinent negatives for diabetes include no chest pain.     Office Visit on 09/07/2022   Component Date Value Ref Range Status    Hemoglobin A1C 09/07/2022 5.6  % Final   Office Visit on 06/02/2022   Component Date Value Ref Range Status    Hemoglobin A1C 06/02/2022 5.7  % Final   Office Visit on 03/23/2022   Component Date Value Ref Range Status    Hemoglobin A1C 03/23/2022 5.5  % Final    TSH w/reflex to FT4 05/26/2022 1.19  0.40 - 4.50 mIU/L Final    Cholesterol 05/26/2022 242 (H)  <200 mg/dL Final    HDL 05/26/2022 87  > OR = 50 mg/dL Final    Triglycerides 05/26/2022 64  <150 mg/dL Final    LDL Cholesterol 05/26/2022 139 (H)  mg/dL (calc) Final    HDL/Cholesterol Ratio 05/26/2022 2.8  <5.0 (calc) Final    Non HDL Chol. (LDL+VLDL) 05/26/2022 155 (H)  <130 mg/dL (calc) Final       Past Medical History:   Diagnosis Date    Breast cancer, stage 1, estrogen receptor negative, left (2011) 2/18/2020    Depression     Diabetes mellitus, type 2     GERD (gastroesophageal reflux disease)     HER2-positive carcinoma of left breast 2/18/2020    Hx of breast cancer     Hx of breast cancer - Her2Nu+/ER+ - 2011 12/3/2019    Insomnia     Lymphedema     Neuropathy of both feet     S/P lumpectomy, left breast 2/18/2020     Social History     Socioeconomic History    Marital status:    Tobacco Use    Smoking status: Former      Packs/day: 0.00     Years: 0.00     Pack years: 0.00     Types: Cigarettes     Quit date: 2010     Years since quittin.8    Smokeless tobacco: Never   Substance and Sexual Activity    Alcohol use: Yes     Alcohol/week: 2.0 standard drinks     Types: 2 Glasses of wine per week     Comment: socially    Drug use: Never    Sexual activity: Yes     Partners: Male     Birth control/protection: Other-see comments, None     Comment: Hysterectomy     Social Determinants of Health     Financial Resource Strain: Low Risk     Difficulty of Paying Living Expenses: Not hard at all   Food Insecurity: No Food Insecurity    Worried About Running Out of Food in the Last Year: Never true    Ran Out of Food in the Last Year: Never true   Transportation Needs: No Transportation Needs    Lack of Transportation (Medical): No    Lack of Transportation (Non-Medical): No   Physical Activity: Insufficiently Active    Days of Exercise per Week: 6 days    Minutes of Exercise per Session: 20 min   Stress: Stress Concern Present    Feeling of Stress : To some extent   Social Connections: Unknown    Frequency of Communication with Friends and Family: More than three times a week    Frequency of Social Gatherings with Friends and Family: Once a week    Active Member of Clubs or Organizations: No    Attends Club or Organization Meetings: Never    Marital Status:    Housing Stability: Low Risk     Unable to Pay for Housing in the Last Year: No    Number of Places Lived in the Last Year: 1    Unstable Housing in the Last Year: No     Past Surgical History:   Procedure Laterality Date    BICEPS TENDON REPAIR Left 2005    BREAST BIOPSY Left     BREAST LUMPECTOMY Left      SECTION      , ,     CHOLECYSTECTOMY      Elbow fx Left 2015    FRACTURE SURGERY  2016    elbow    HYSTERECTOMY      KNEE ARTHROSCOPY W/ MENISCAL REPAIR Left     Lower back ruptured disc  2010    LYMPH NODE DISSECTION      SPINE SURGERY       ruptured disc     Family History   Problem Relation Age of Onset    Cancer Mother     Breast cancer Mother     Parkinsonism Father     Diabetes Father     Breast cancer Maternal Aunt     Breast cancer Paternal Aunt     Cancer Maternal Aunt     Cancer Paternal Aunt     Cancer Daughter     Heart disease Maternal Grandmother     Heart disease Paternal Grandmother        Review of patient's allergies indicates:   Allergen Reactions    Banana Hives    Codeine Nausea And Vomiting    Dilaudid  [hydromorphone (bulk)] Rash    Hydromorphone hcl Rash       Current Outpatient Medications:     ascorbic acid, vitamin C, (VITAMIN C) 500 MG tablet, Take 500 mg by mouth once daily., Disp: , Rfl:     atomoxetine (STRATTERA) 25 MG capsule, Take 1 capsule (25 mg total) by mouth once daily., Disp: 30 capsule, Rfl: 0    b complex vitamins capsule, Take 1 capsule by mouth once daily., Disp: , Rfl:     docusate sodium (COLACE) 100 MG capsule, Take 1 capsule (100 mg total) by mouth once daily., Disp: 90 capsule, Rfl: 0    furosemide (LASIX) 20 MG tablet, Take 1 tablet (20 mg total) by mouth daily as needed., Disp: 90 tablet, Rfl: 1    glucosamine-chondroitin 250-200 mg Tab, Take by mouth., Disp: , Rfl:     HYDROcodone-acetaminophen (NORCO) 5-325 mg per tablet, Take 1 tablet by mouth every 4 to 6 hours as needed., Disp: , Rfl:     LYRICA 200 mg Cap, Take 1 capsule (200 mg total) by mouth 3 (three) times daily., Disp: 270 capsule, Rfl: 1    magnesium 250 mg Tab, Take 250 mg by mouth once daily., Disp: , Rfl:     multivitamin capsule, Take 1 capsule by mouth once daily., Disp: , Rfl:     naproxen (NAPROSYN) 500 MG tablet, Take 1 tablet (500 mg total) by mouth 2 (two) times daily., Disp: 90 tablet, Rfl: 1    pantoprazole (PROTONIX) 40 MG tablet, Take 1 tablet (40 mg total) by mouth once daily., Disp: 90 tablet, Rfl: 3    predniSONE (DELTASONE) 5 MG tablet, Take 1 tablet (5 mg total) by mouth 2 (two) times daily., Disp: 6 tablet, Rfl: 0     "sulfamethoxazole-trimethoprim 800-160mg (BACTRIM DS) 800-160 mg Tab, Take 1 tablet by mouth 2 (two) times daily., Disp: 14 tablet, Rfl: 0    traZODone (DESYREL) 50 MG tablet, Take 2 tablets (100 mg total) by mouth every evening., Disp: 180 tablet, Rfl: 0    venlafaxine (EFFEXOR-XR) 150 MG Cp24, Take 1 capsule (150 mg total) by mouth once daily., Disp: 90 capsule, Rfl: 1    Review of Systems   Constitutional:  Negative for chills, fever and unexpected weight change.   Respiratory:  Negative for cough and shortness of breath.    Cardiovascular:  Negative for chest pain, palpitations and leg swelling.   Musculoskeletal:  Negative for arthralgias.   Skin:  Positive for rash.   Psychiatric/Behavioral:  Negative for agitation and sleep disturbance. The patient is not nervous/anxious.         Objective:      Vitals:    09/20/22 1034   BP: (!) 94/56   Pulse: 64   Weight: 107 kg (236 lb)   Height: 5' 10" (1.778 m)     Physical Exam  Vitals and nursing note reviewed.   Constitutional:       Appearance: She is well-developed.   Neck:      Vascular: No JVD.   Cardiovascular:      Rate and Rhythm: Normal rate and regular rhythm.      Heart sounds: No murmur heard.  Pulmonary:      Effort: Pulmonary effort is normal.      Breath sounds: Normal breath sounds.   Skin:     General: Skin is warm and dry.      Findings: No rash.             Comments: Erythematous. No definitive lesion or insect bite. No fluctuance   Neurological:      Mental Status: She is alert and oriented to person, place, and time.      Gait: Gait normal.   Psychiatric:         Speech: Speech normal.         Behavior: Behavior normal.         Assessment:       1. Type 2 diabetes mellitus without complication, without long-term current use of insulin    2. Primary osteoarthritis of left shoulder    3. High risk medication use    4. Cellulitis, unspecified cellulitis site         Plan:       Type 2 diabetes mellitus without complication, without long-term current " use of insulin    Primary osteoarthritis of left shoulder    High risk medication use    Cellulitis, unspecified cellulitis site  Comments:  keep site clean adn dry. cool compresses    Other orders  -     predniSONE (DELTASONE) 5 MG tablet; Take 1 tablet (5 mg total) by mouth 2 (two) times daily.  Dispense: 6 tablet; Refill: 0  -     sulfamethoxazole-trimethoprim 800-160mg (BACTRIM DS) 800-160 mg Tab; Take 1 tablet by mouth 2 (two) times daily.  Dispense: 14 tablet; Refill: 0    Follow up if symptoms worsen or fail to improve, for medication management.        10/3/2022 Shaunna Gordon

## 2022-10-04 ENCOUNTER — PATIENT MESSAGE (OUTPATIENT)
Dept: FAMILY MEDICINE | Facility: CLINIC | Age: 62
End: 2022-10-04

## 2022-10-11 ENCOUNTER — PATIENT MESSAGE (OUTPATIENT)
Dept: FAMILY MEDICINE | Facility: CLINIC | Age: 62
End: 2022-10-11

## 2022-10-12 ENCOUNTER — PATIENT MESSAGE (OUTPATIENT)
Dept: FAMILY MEDICINE | Facility: CLINIC | Age: 62
End: 2022-10-12

## 2022-10-12 ENCOUNTER — HOSPITAL ENCOUNTER (OUTPATIENT)
Dept: RADIOLOGY | Facility: HOSPITAL | Age: 62
Discharge: HOME | End: 2022-10-12
Attending: NURSE PRACTITIONER
Payer: MEDICARE

## 2022-10-12 ENCOUNTER — OFFICE VISIT (OUTPATIENT)
Dept: FAMILY MEDICINE | Facility: CLINIC | Age: 62
End: 2022-10-12
Payer: MEDICARE

## 2022-10-12 VITALS
HEIGHT: 70 IN | BODY MASS INDEX: 34.07 KG/M2 | WEIGHT: 238 LBS | DIASTOLIC BLOOD PRESSURE: 68 MMHG | SYSTOLIC BLOOD PRESSURE: 108 MMHG | HEART RATE: 72 BPM

## 2022-10-12 DIAGNOSIS — F98.8 ATTENTION DEFICIT DISORDER, UNSPECIFIED HYPERACTIVITY PRESENCE: ICD-10-CM

## 2022-10-12 DIAGNOSIS — M79.604 PAIN OF RIGHT LOWER EXTREMITY: ICD-10-CM

## 2022-10-12 DIAGNOSIS — G47.00 INSOMNIA, UNSPECIFIED TYPE: ICD-10-CM

## 2022-10-12 DIAGNOSIS — M17.11 PRIMARY OSTEOARTHRITIS OF RIGHT KNEE: ICD-10-CM
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
## 2022-10-12 DIAGNOSIS — S70.361D INSECT BITE OF RIGHT THIGH, SUBSEQUENT ENCOUNTER: ICD-10-CM

## 2022-10-12 DIAGNOSIS — K21.9 GASTROESOPHAGEAL REFLUX DISEASE, UNSPECIFIED WHETHER ESOPHAGITIS PRESENT: Primary | ICD-10-CM

## 2022-10-12 DIAGNOSIS — E11.9 TYPE 2 DIABETES MELLITUS WITHOUT COMPLICATION, WITHOUT LONG-TERM CURRENT USE OF INSULIN: ICD-10-CM

## 2022-10-12 DIAGNOSIS — M79.89 NODULE OF SOFT TISSUE: ICD-10-CM

## 2022-10-12 PROCEDURE — 99214 OFFICE O/P EST MOD 30 MIN: CPT | Mod: S$GLB,,, | Performed by: NURSE PRACTITIONER

## 2022-10-12 PROCEDURE — 93971 EXTREMITY STUDY: CPT | Mod: 26,RT,, | Performed by: RADIOLOGY

## 2022-10-12 PROCEDURE — 93971 US LOWER EXTREMITY VEINS RIGHT: ICD-10-PCS | Mod: 26,RT,, | Performed by: RADIOLOGY

## 2022-10-12 PROCEDURE — 93971 EXTREMITY STUDY: CPT | Mod: TC,RT

## 2022-10-12 PROCEDURE — 99214 PR OFFICE/OUTPT VISIT, EST, LEVL IV, 30-39 MIN: ICD-10-PCS | Mod: S$GLB,,, | Performed by: NURSE PRACTITIONER

## 2022-10-26 NOTE — PROGRESS NOTES
SUBJECTIVE:    Patient ID: Lolly Ramírez is a 61 y.o. female.    Chief Complaint: Leg Pain (Right for several weeks, still has nodules in leg, had a fall on Saturday, swollen achelis tendon, no bottles// SW)    61 year old female presents for follow up.  is present during the exam. She is treated for gerd, insomnia,neuropathy, oa. Psoriasis and dm. Taking meds as prescribed.  Was seen in our office for check up. Was told had knot to leg with swelling. l Given antibiotics in which swelling and redness did improve.but has not resolved. No fever. Mild tenderness to site    Diabetes  Pertinent negatives for hypoglycemia include no dizziness, headaches or nervousness/anxiousness. Pertinent negatives for diabetes include no chest pain and no weakness.     Office Visit on 2022   Component Date Value Ref Range Status    Hemoglobin A1C 2022 5.6  % Final   Office Visit on 2022   Component Date Value Ref Range Status    Hemoglobin A1C 2022 5.7  % Final       Past Medical History:   Diagnosis Date    Breast cancer, stage 1, estrogen receptor negative, left () 2020    Depression     Diabetes mellitus, type 2     GERD (gastroesophageal reflux disease)     HER2-positive carcinoma of left breast 2020    Hx of breast cancer     Hx of breast cancer - Her2Nu+/ER+ - 2011 12/3/2019    Insomnia     Lymphedema     Neuropathy of both feet     S/P lumpectomy, left breast 2020     Social History     Socioeconomic History    Marital status:    Tobacco Use    Smoking status: Former     Packs/day: 0.00     Years: 0.00     Pack years: 0.00     Types: Cigarettes     Quit date: 2010     Years since quittin.9    Smokeless tobacco: Never   Substance and Sexual Activity    Alcohol use: Yes     Alcohol/week: 2.0 standard drinks     Types: 2 Glasses of wine per week     Comment: socially    Drug use: Never    Sexual activity: Yes     Partners: Male     Birth control/protection:  Other-see comments, None     Comment: Hysterectomy     Social Determinants of Health     Financial Resource Strain: Low Risk     Difficulty of Paying Living Expenses: Not hard at all   Food Insecurity: No Food Insecurity    Worried About Running Out of Food in the Last Year: Never true    Ran Out of Food in the Last Year: Never true   Transportation Needs: No Transportation Needs    Lack of Transportation (Medical): No    Lack of Transportation (Non-Medical): No   Physical Activity: Sufficiently Active    Days of Exercise per Week: 6 days    Minutes of Exercise per Session: 60 min   Stress: Stress Concern Present    Feeling of Stress : To some extent   Social Connections: Unknown    Frequency of Communication with Friends and Family: Three times a week    Frequency of Social Gatherings with Friends and Family: Once a week    Active Member of Clubs or Organizations: Patient refused    Attends Club or Organization Meetings: Patient refused    Marital Status:    Housing Stability: Low Risk     Unable to Pay for Housing in the Last Year: No    Number of Places Lived in the Last Year: 1    Unstable Housing in the Last Year: No     Past Surgical History:   Procedure Laterality Date    BICEPS TENDON REPAIR Left 2005    BREAST BIOPSY Left     BREAST LUMPECTOMY Left      SECTION      , ,     CHOLECYSTECTOMY      Elbow fx Left 2015    FRACTURE SURGERY  2016    elbow    HYSTERECTOMY      KNEE ARTHROSCOPY W/ MENISCAL REPAIR Left     Lower back ruptured disc  2010    LYMPH NODE DISSECTION      SPINE SURGERY  2009    ruptured disc     Family History   Problem Relation Age of Onset    Cancer Mother     Breast cancer Mother     Parkinsonism Father     Diabetes Father     Breast cancer Maternal Aunt     Breast cancer Paternal Aunt     Cancer Maternal Aunt     Cancer Paternal Aunt     Cancer Daughter     Heart disease Maternal Grandmother     Heart disease Paternal Grandmother        Review of  patient's allergies indicates:   Allergen Reactions    Banana Hives    Codeine Nausea And Vomiting    Dilaudid  [hydromorphone (bulk)] Rash    Hydromorphone hcl Rash       Current Outpatient Medications:     ascorbic acid, vitamin C, (VITAMIN C) 500 MG tablet, Take 500 mg by mouth once daily., Disp: , Rfl:     atomoxetine (STRATTERA) 25 MG capsule, Take 1 capsule (25 mg total) by mouth once daily., Disp: 30 capsule, Rfl: 0    b complex vitamins capsule, Take 1 capsule by mouth once daily., Disp: , Rfl:     docusate sodium (COLACE) 100 MG capsule, Take 1 capsule (100 mg total) by mouth once daily., Disp: 90 capsule, Rfl: 0    furosemide (LASIX) 20 MG tablet, Take 1 tablet (20 mg total) by mouth daily as needed., Disp: 90 tablet, Rfl: 1    glucosamine-chondroitin 250-200 mg Tab, Take by mouth., Disp: , Rfl:     HYDROcodone-acetaminophen (NORCO) 5-325 mg per tablet, Take 1 tablet by mouth every 4 to 6 hours as needed., Disp: , Rfl:     LYRICA 200 mg Cap, Take 1 capsule (200 mg total) by mouth 3 (three) times daily., Disp: 270 capsule, Rfl: 1    magnesium 250 mg Tab, Take 250 mg by mouth once daily., Disp: , Rfl:     multivitamin capsule, Take 1 capsule by mouth once daily., Disp: , Rfl:     naproxen (NAPROSYN) 500 MG tablet, Take 1 tablet (500 mg total) by mouth 2 (two) times daily., Disp: 90 tablet, Rfl: 1    pantoprazole (PROTONIX) 40 MG tablet, Take 1 tablet (40 mg total) by mouth once daily., Disp: 90 tablet, Rfl: 3    predniSONE (DELTASONE) 5 MG tablet, Take 1 tablet (5 mg total) by mouth 2 (two) times daily., Disp: 6 tablet, Rfl: 0    sulfamethoxazole-trimethoprim 800-160mg (BACTRIM DS) 800-160 mg Tab, Take 1 tablet by mouth 2 (two) times daily., Disp: 14 tablet, Rfl: 0    traZODone (DESYREL) 50 MG tablet, Take 2 tablets (100 mg total) by mouth every evening., Disp: 180 tablet, Rfl: 0    venlafaxine (EFFEXOR-XR) 150 MG Cp24, Take 1 capsule (150 mg total) by mouth once daily., Disp: 90 capsule, Rfl: 1    Review of  "Systems   Constitutional:  Negative for chills, fever and unexpected weight change.   Respiratory:  Negative for cough and shortness of breath.    Cardiovascular:  Negative for chest pain, palpitations and leg swelling.   Musculoskeletal:  Negative for arthralgias.   Skin:  Positive for rash.   Neurological:  Negative for dizziness, weakness and headaches.   Psychiatric/Behavioral:  Negative for agitation and sleep disturbance. The patient is not nervous/anxious.         Objective:      Vitals:    10/12/22 1102   BP: 108/68   Pulse: 72   Weight: 108 kg (238 lb)   Height: 5' 10" (1.778 m)     Physical Exam  Vitals and nursing note reviewed.   Constitutional:       Appearance: She is well-developed.   Neck:      Vascular: No JVD.   Cardiovascular:      Rate and Rhythm: Normal rate and regular rhythm.      Heart sounds: No murmur heard.  Pulmonary:      Effort: Pulmonary effort is normal.      Breath sounds: Normal breath sounds.   Skin:     General: Skin is warm and dry.      Findings: No rash.             Comments: Erythema has improved no fluctuance. Small nodule to right upper thigh soft. No tenderness   Neurological:      Mental Status: She is alert and oriented to person, place, and time.      Gait: Gait normal.   Psychiatric:         Speech: Speech normal.         Behavior: Behavior normal.         Assessment:       1. Gastroesophageal reflux disease, unspecified whether esophagitis present    2. Pain of right lower extremity    3. Type 2 diabetes mellitus without complication, without long-term current use of insulin    4. Primary osteoarthritis of right knee    5. Nodule of soft tissue    6. Insect bite of right thigh, subsequent encounter    7. Insomnia, unspecified type    8. Attention deficit disorder, unspecified hyperactivity presence           Plan:       Gastroesophageal reflux disease, unspecified whether esophagitis present    Pain of right lower extremity  -     US Lower Extremity Veins Right; " Future    Type 2 diabetes mellitus without complication, without long-term current use of insulin    Primary osteoarthritis of right knee    Nodule of soft tissue    Insect bite of right thigh, subsequent encounter    Insomnia, unspecified type    Attention deficit disorder, unspecified hyperactivity presence    Follow up in about 3 months (around 1/12/2023), or if symptoms worsen or fail to improve, for medication management.        10/26/2022 Shaunna Gordon

## 2022-10-29 ENCOUNTER — HOSPITAL ENCOUNTER (EMERGENCY)
Facility: HOSPITAL | Age: 62
Discharge: HOME | End: 2022-10-29
Attending: STUDENT IN AN ORGANIZED HEALTH CARE EDUCATION/TRAINING PROGRAM
Payer: MEDICARE

## 2022-10-29 VITALS
TEMPERATURE: 98 F | HEART RATE: 84 BPM | OXYGEN SATURATION: 97 % | BODY MASS INDEX: 33.86 KG/M2 | RESPIRATION RATE: 20 BRPM | SYSTOLIC BLOOD PRESSURE: 151 MMHG | WEIGHT: 236 LBS | DIASTOLIC BLOOD PRESSURE: 81 MMHG

## 2022-10-29 DIAGNOSIS — S43.014A ANTERIOR DISLOCATION OF RIGHT SHOULDER, INITIAL ENCOUNTER: Primary | ICD-10-CM
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
## 2022-10-29 PROCEDURE — 63600175 PHARM REV CODE 636 W HCPCS

## 2022-10-29 PROCEDURE — 99285 EMERGENCY DEPT VISIT HI MDM: CPT | Mod: 25

## 2022-10-29 PROCEDURE — 63600175 PHARM REV CODE 636 W HCPCS: Performed by: STUDENT IN AN ORGANIZED HEALTH CARE EDUCATION/TRAINING PROGRAM

## 2022-10-29 PROCEDURE — 25000003 PHARM REV CODE 250: Performed by: STUDENT IN AN ORGANIZED HEALTH CARE EDUCATION/TRAINING PROGRAM

## 2022-10-29 PROCEDURE — 23650 CLTX SHO DSLC W/MNPJ WO ANES: CPT | Mod: RT

## 2022-10-29 RX ORDER — FENTANYL CITRATE 50 UG/ML
INJECTION, SOLUTION INTRAMUSCULAR; INTRAVENOUS
Status: COMPLETED
Start: 2022-10-29 | End: 2022-10-29

## 2022-10-29 RX ORDER — OXYCODONE AND ACETAMINOPHEN 5; 325 MG/1; MG/1
1 TABLET ORAL EVERY 6 HOURS PRN
Qty: 10 TABLET | Refills: 0 | OUTPATIENT
Start: 2022-10-29 | End: 2023-02-24

## 2022-10-29 RX ORDER — SODIUM CHLORIDE 9 MG/ML
INJECTION, SOLUTION INTRAVENOUS
Status: COMPLETED | OUTPATIENT
Start: 2022-10-29 | End: 2022-10-29

## 2022-10-29 RX ORDER — PROPOFOL 10 MG/ML
INJECTION, EMULSION INTRAVENOUS
Status: DISCONTINUED
Start: 2022-10-29 | End: 2022-10-30 | Stop reason: HOSPADM

## 2022-10-29 RX ORDER — FENTANYL CITRATE 50 UG/ML
50 INJECTION, SOLUTION INTRAMUSCULAR; INTRAVENOUS
Status: COMPLETED | OUTPATIENT
Start: 2022-10-29 | End: 2022-10-29

## 2022-10-29 RX ORDER — PROPOFOL 10 MG/ML
VIAL (ML) INTRAVENOUS CODE/TRAUMA/SEDATION MEDICATION
Status: COMPLETED | OUTPATIENT
Start: 2022-10-29 | End: 2022-10-29

## 2022-10-29 RX ADMIN — FENTANYL CITRATE 100 MCG: 50 INJECTION, SOLUTION INTRAMUSCULAR; INTRAVENOUS at 08:10

## 2022-10-29 RX ADMIN — PROPOFOL 30 MG: 10 INJECTION, EMULSION INTRAVENOUS at 09:10

## 2022-10-29 RX ADMIN — PROPOFOL 50 MG: 10 INJECTION, EMULSION INTRAVENOUS at 09:10

## 2022-10-29 RX ADMIN — SODIUM CHLORIDE 1000 ML: 0.9 INJECTION, SOLUTION INTRAVENOUS at 09:10

## 2022-10-30 NOTE — ED PROVIDER NOTES
Encounter Date: 10/29/2022       History     Chief Complaint   Patient presents with    Shoulder Injury     Fall at home. No lOC. Pt has obvious deformity of right shoulder. PT states she has had previous dislocations in shoulder.     61-year-old female presents for acute onset, aching, nonradiating right shoulder pain and deformity after a fall on outstretched hand about 30 minutes prior to arrival..  Earlier in the day, she felt her shoulder dislocate and she popped back into place.  It popped out a 2nd time after this fall and she could not put back into place. She has no other injuries.    Review of patient's allergies indicates:   Allergen Reactions    Banana Hives    Codeine Nausea And Vomiting    Dilaudid  [hydromorphone (bulk)] Rash    Hydromorphone hcl Rash     Past Medical History:   Diagnosis Date    Breast cancer, stage 1, estrogen receptor negative, left () 2020    Depression     Diabetes mellitus, type 2     GERD (gastroesophageal reflux disease)     HER2-positive carcinoma of left breast 2020    Hx of breast cancer     Hx of breast cancer - Her2Nu+/ER+ - 2011 12/3/2019    Insomnia     Lymphedema     Neuropathy of both feet     S/P lumpectomy, left breast 2020     Past Surgical History:   Procedure Laterality Date    BICEPS TENDON REPAIR Left 2005    BREAST BIOPSY Left     BREAST LUMPECTOMY Left      SECTION      , ,     CHOLECYSTECTOMY      Elbow fx Left 2015    FRACTURE SURGERY  2016    elbow    HYSTERECTOMY      KNEE ARTHROSCOPY W/ MENISCAL REPAIR Left     Lower back ruptured disc  2010    LYMPH NODE DISSECTION      SPINE SURGERY  2009    ruptured disc     Family History   Problem Relation Age of Onset    Cancer Mother     Breast cancer Mother     Parkinsonism Father     Diabetes Father     Breast cancer Maternal Aunt     Breast cancer Paternal Aunt     Cancer Maternal Aunt     Cancer Paternal Aunt     Cancer Daughter     Heart disease Maternal  Grandmother     Heart disease Paternal Grandmother      Social History     Tobacco Use    Smoking status: Former     Packs/day: 0.00     Years: 0.00     Pack years: 0.00     Types: Cigarettes     Quit date: 2010     Years since quittin.9    Smokeless tobacco: Never   Substance Use Topics    Alcohol use: Yes     Alcohol/week: 2.0 standard drinks     Types: 2 Glasses of wine per week     Comment: socially    Drug use: Never     Review of Systems   Constitutional:  Negative for activity change, appetite change, chills, fever and unexpected weight change.   HENT:  Negative for dental problem and drooling.    Eyes:  Negative for discharge and itching.   Respiratory:  Negative for cough, chest tightness, shortness of breath, wheezing and stridor.    Cardiovascular:  Negative for chest pain, palpitations and leg swelling.   Gastrointestinal:  Negative for abdominal distention, abdominal pain, diarrhea and nausea.   Genitourinary:  Negative for difficulty urinating, dysuria, frequency and urgency.   Musculoskeletal:  Positive for arthralgias. Negative for back pain, gait problem and joint swelling.   Neurological:  Negative for dizziness, syncope, numbness and headaches.   Psychiatric/Behavioral:  Negative for agitation, behavioral problems and confusion.      Physical Exam     Initial Vitals [10/29/22 2040]   BP Pulse Resp Temp SpO2   (!) 188/96 100 (!) 24 98.4 °F (36.9 °C) 100 %      MAP       --         Physical Exam    Nursing note and vitals reviewed.  Constitutional: She appears well-developed and well-nourished. She is not diaphoretic.   HENT:   Head: Normocephalic and atraumatic.   Mouth/Throat: Oropharynx is clear and moist.   Eyes: EOM are normal. Pupils are equal, round, and reactive to light. Right eye exhibits no discharge. Left eye exhibits no discharge.   Neck: No tracheal deviation present.   Normal range of motion.  Cardiovascular:  Normal rate, regular rhythm and intact distal pulses.            Pulmonary/Chest: No respiratory distress. She has no wheezes. She exhibits no tenderness.   Abdominal: Abdomen is soft. She exhibits no distension. There is no abdominal tenderness.   Musculoskeletal:         General: Tenderness present. No edema. Normal range of motion.      Cervical back: Normal range of motion.      Comments: Right shoulder deformity     Neurological: She is alert and oriented to person, place, and time. She has normal strength. No cranial nerve deficit or sensory deficit. GCS eye subscore is 4. GCS verbal subscore is 5. GCS motor subscore is 6.   Skin: Skin is warm and dry. No rash noted.   Psychiatric: She has a normal mood and affect. Her behavior is normal. Thought content normal.       ED Course   Orthopedic Injury    Date/Time: 10/29/2022 9:11 PM  Performed by: Reynaldo Rincon MD  Authorized by: Reynaldo Rincon MD     Location procedure was performed:  [facility] EMERGENCY DEPARTMENT  Injury:     Injury location:  Shoulder    Location details:  Right shoulder    Injury type:  Dislocation    Dislocation type: anterior        Pre-procedure assessment:     Neurovascular status: Neurovascularly intact      Distal perfusion: normal      Neurological function: normal      Range of motion: reduced      Local anesthesia used?: No      Patient sedated?: Yes      ASA Class:  Class 2 - Mild Illness without functional impairment.    Mallampati Score:  Class 1 - Visualization of the soft palate, fauces, uvula, and anterior/posterior pillars.  Date/Time of last solid:  10/29/2022 5:29 PM    Patient/Family history of anesthesia or sedation complications: No      Sedation type: moderate (conscious) sedation      Sedation:  Propofol    Sedation start:  10/29/2022 9:11 PM    Sedation end:  10/29/2022 9:24 PM      Selections made in this section will also lock the Injury type section above.:     Manipulation performed?: Yes      Reduction method:  External rotation, scapular manipulation and traction and  counter traction    Reduction method:  External rotation, scapular manipulation and traction and counter traction    Reduction method:  External rotation, scapular manipulation and traction and counter traction    Reduction method:  External rotation, scapular manipulation and traction and counter traction    Reduction method:  External rotation, scapular manipulation and traction and counter traction    Reduction method:  External rotation, scapular manipulation and traction and counter traction    Reduction successful?: Yes      Confirmation: Reduction confirmed by x-ray      Immobilization:  Sling    Complications: No      Specimens: No      Implants: No    Post-procedure assessment:     Neurovascular status: Neurovascularly intact      Distal perfusion: normal      Neurological function: normal      Range of motion: normal    Labs Reviewed - No data to display       Imaging Results              X-Ray Shoulder Complete 2 View Right (In process)                      X-Ray Elbow 2 Views Right (In process)  Result time 10/29/22 21:07:53   Procedure changed from X-Ray Elbow Complete Right                    X-Ray Shoulder Trauma Right (In process)                      Medications   fentaNYL 50 mcg/mL injection 50 mcg (100 mcg Intravenous Given 10/29/22 2046)   0.9%  NaCl infusion (1,000 mLs Intravenous New Bag 10/29/22 2106)   propofol (DIPRIVAN) 10 mg/mL IVP (30 mg Intravenous Given 10/29/22 2122)                 ED Course as of 10/30/22 0623   Sat Oct 29, 2022   2215 Repeat shoulder x-rays show relocation of shoulder in AP and Y views.  Patient is feeling much better.  She recovered from her propofol.  She no longer has pain in the shoulder.  She no longer has pain radiating down the arm.  She has normal sensation motor function in the hand and forearm. [BS]      ED Course User Index  [BS] Reynaldo Rincon MD          61-year-old female presents for acute onset right shoulder pain.  Exam notable for shoulder  deformity without other bony abnormality.  She is neurovascularly intact. XR notable for anterior shoulder dislocation. Patient given fentanyl for pain.  Shoulder was reduced under conscious sedation.  After 3 attempts, reduction was successful.  Patient placed in sling and discharged with outpatient orthopedics follow-up.  She is comfortable with the plan.        Attending Critical Care:   Critical Care Times:   Direct Patient Care (initial evaluation, reassessments, and time considering the case)................................................................15 minutes.   Additional History from reviewing old medical records or taking additional history from the family, EMS, PCP, etc.......................5 minutes.   Ordering, Reviewing, and Interpreting Diagnostic Studies...............................................................................................................5 minutes.   Documentation..................................................................................................................................................................................5 minutes.   ==============================================================  · Total Critical Care Time - exclusive of procedural time: 30 minutes.  ==============================================================  Critical care was necessary to treat or prevent imminent or life-threatening deterioration of the following conditions: shoulder dislocation requiring conscious sedation.   Critical care was time spent personally by me on the following activities: obtaining history from patient or relative, examination of patient, review of x-rays / CT sent with the patient, ordering lab, x-rays, and/or EKG, development of treatment plan with patient or relative, ordering and performing treatments and interventions, interpretation of cardiac measurements and re-evaluation of patient's conition.   Critical Care Condition: potentially  life-threatening     Clinical Impression:   Final diagnoses:  [S43.014A] Anterior dislocation of right shoulder, initial encounter (Primary)      ED Disposition Condition    Discharge Stable          ED Prescriptions       Medication Sig Dispense Start Date End Date Auth. Provider    oxyCODONE-acetaminophen (PERCOCET) 5-325 mg per tablet Take 1 tablet by mouth every 6 (six) hours as needed for Pain (Pain after taking tylenol and ibuprofen). 10 tablet 10/29/2022 -- Jesús Enriquez MD          Follow-up Information       Follow up With Specialties Details Why Contact Info    Taurus Romero MD Orthopedic Surgery, Surgery Schedule an appointment as soon as possible for a visit in 3 days For follow up and re-evalaution [address] [place] [address] [address] Brendan [address]ICEA [id number]  [phone]               Reynaldo Rincon MD  10/30/22 0642

## 2022-11-02 ENCOUNTER — OFFICE VISIT (OUTPATIENT)
Dept: ORTHOPEDICS | Facility: CLINIC | Age: 62
End: 2022-11-02
Payer: MEDICARE

## 2022-11-02 VITALS — WEIGHT: 236 LBS | BODY MASS INDEX: 33.79 KG/M2 | HEIGHT: 70 IN

## 2022-11-02 DIAGNOSIS — S43.004A DISLOCATION OF RIGHT SHOULDER JOINT, INITIAL ENCOUNTER: Primary | ICD-10-CM

## 2022-11-02 PROCEDURE — 99999 PR PBB SHADOW E&M-EST. PATIENT-LVL III: CPT | Mod: PBBFAC,,, | Performed by: ORTHOPAEDIC SURGERY

## 2022-11-02 PROCEDURE — 99999 PR PBB SHADOW E&M-EST. PATIENT-LVL III: ICD-10-PCS | Mod: PBBFAC,,, | Performed by: ORTHOPAEDIC SURGERY

## 2022-11-02 PROCEDURE — 99213 OFFICE O/P EST LOW 20 MIN: CPT | Mod: S$PBB,,, | Performed by: ORTHOPAEDIC SURGERY

## 2022-11-02 PROCEDURE — 99213 PR OFFICE/OUTPT VISIT, EST, LEVL III, 20-29 MIN: ICD-10-PCS | Mod: S$PBB,,, | Performed by: ORTHOPAEDIC SURGERY

## 2022-11-02 PROCEDURE — 99213 OFFICE O/P EST LOW 20 MIN: CPT | Mod: PBBFAC,PN | Performed by: ORTHOPAEDIC SURGERY

## 2022-11-02 NOTE — PROGRESS NOTES
2022    Past Medical History:   Diagnosis Date    Breast cancer, stage 1, estrogen receptor negative, left () 2020    Depression     Diabetes mellitus, type 2     GERD (gastroesophageal reflux disease)     HER2-positive carcinoma of left breast 2020    Hx of breast cancer     Hx of breast cancer - Her2Nu+/ER+ - 2011 12/3/2019    Insomnia     Lymphedema     Neuropathy of both feet     S/P lumpectomy, left breast 2020       Past Surgical History:   Procedure Laterality Date    BICEPS TENDON REPAIR Left 2005    BREAST BIOPSY Left     BREAST LUMPECTOMY Left      SECTION      , ,     CHOLECYSTECTOMY      Elbow fx Left 2015    FRACTURE SURGERY  2016    elbow    HYSTERECTOMY      KNEE ARTHROSCOPY W/ MENISCAL REPAIR Left     Lower back ruptured disc  2010    LYMPH NODE DISSECTION      SPINE SURGERY  2009    ruptured disc       Current Outpatient Medications   Medication Sig    ascorbic acid, vitamin C, (VITAMIN C) 500 MG tablet Take 500 mg by mouth once daily.    atomoxetine (STRATTERA) 25 MG capsule Take 1 capsule (25 mg total) by mouth once daily.    b complex vitamins capsule Take 1 capsule by mouth once daily.    docusate sodium (COLACE) 100 MG capsule Take 1 capsule (100 mg total) by mouth once daily.    furosemide (LASIX) 20 MG tablet Take 1 tablet (20 mg total) by mouth daily as needed.    glucosamine-chondroitin 250-200 mg Tab Take by mouth.    HYDROcodone-acetaminophen (NORCO) 5-325 mg per tablet Take 1 tablet by mouth every 4 to 6 hours as needed.    LYRICA 200 mg Cap Take 1 capsule (200 mg total) by mouth 3 (three) times daily.    magnesium 250 mg Tab Take 250 mg by mouth once daily.    multivitamin capsule Take 1 capsule by mouth once daily.    naproxen (NAPROSYN) 500 MG tablet Take 1 tablet (500 mg total) by mouth 2 (two) times daily.    oxyCODONE-acetaminophen (PERCOCET) 5-325 mg per tablet Take 1 tablet by mouth every 6 (six) hours as needed for Pain (Pain  after taking tylenol and ibuprofen).    pantoprazole (PROTONIX) 40 MG tablet Take 1 tablet (40 mg total) by mouth once daily.    predniSONE (DELTASONE) 5 MG tablet Take 1 tablet (5 mg total) by mouth 2 (two) times daily.    sulfamethoxazole-trimethoprim 800-160mg (BACTRIM DS) 800-160 mg Tab Take 1 tablet by mouth 2 (two) times daily.    traZODone (DESYREL) 50 MG tablet Take 2 tablets (100 mg total) by mouth every evening.    venlafaxine (EFFEXOR-XR) 150 MG Cp24 Take 1 capsule (150 mg total) by mouth once daily.     No current facility-administered medications for this visit.       Review of patient's allergies indicates:   Allergen Reactions    Banana Hives    Codeine Nausea And Vomiting    Dilaudid  [hydromorphone (bulk)] Rash    Hydromorphone hcl Rash       Family History   Problem Relation Age of Onset    Cancer Mother     Breast cancer Mother     Parkinsonism Father     Diabetes Father     Breast cancer Maternal Aunt     Breast cancer Paternal Aunt     Cancer Maternal Aunt     Cancer Paternal Aunt     Cancer Daughter     Heart disease Maternal Grandmother     Heart disease Paternal Grandmother        Social History     Socioeconomic History    Marital status:    Tobacco Use    Smoking status: Former     Packs/day: 0.00     Years: 0.00     Pack years: 0.00     Types: Cigarettes     Quit date: 2010     Years since quittin.9    Smokeless tobacco: Never   Substance and Sexual Activity    Alcohol use: Yes     Alcohol/week: 2.0 standard drinks     Types: 2 Glasses of wine per week     Comment: socially    Drug use: Never    Sexual activity: Yes     Partners: Male     Birth control/protection: Other-see comments, None     Comment: Hysterectomy     Social Determinants of Health     Financial Resource Strain: Low Risk     Difficulty of Paying Living Expenses: Not hard at all   Food Insecurity: No Food Insecurity    Worried About Running Out of Food in the Last Year: Never true    Ran Out of Food in the  Last Year: Never true   Transportation Needs: No Transportation Needs    Lack of Transportation (Medical): No    Lack of Transportation (Non-Medical): No   Physical Activity: Sufficiently Active    Days of Exercise per Week: 6 days    Minutes of Exercise per Session: 60 min   Stress: Stress Concern Present    Feeling of Stress : To some extent   Social Connections: Unknown    Frequency of Communication with Friends and Family: Three times a week    Frequency of Social Gatherings with Friends and Family: Once a week    Active Member of Clubs or Organizations: Patient refused    Attends Club or Organization Meetings: Patient refused    Marital Status:    Housing Stability: Low Risk     Unable to Pay for Housing in the Last Year: No    Number of Places Lived in the Last Year: 1    Unstable Housing in the Last Year: No       Chief Complaint:   Chief Complaint   Patient presents with    Right Shoulder - Pain     DOI: 10/29/2022 --Pt went to ER for dislocated right shoulder after a fall on outstretched hand. She felt shoulder dislocate and she popped back into place. It popped out a 2nd time after this fall and she could not put back into place. Reduced in ER.          History of present illness:    This is a 61 y.o. year old female who complains of patient being seen today she fell on her outstretched hand 3 days ago and dislocated her right shoulder the shoulder was then reduced in the emergency room patient states this is the 1st time she had a dislocated her shoulder    Review of Systems:    Constitution: Denies chills, fever, and sweats.  HENT: Denies headaches or blurry vision.  Cardiovascular: Denies chest pain or irregular heart beat.  Respiratory: Denies cough or shortness of breath.  Gastrointestinal: Denies abdominal pain, nausea, or vomiting.  Musculoskeletal:  Denies muscle cramps.  Neurological: Denies dizziness or focal weakness.  Psychiatric/Behavioral: Normal mental status.  Hematologic/Lymphatic:  "Denies bleeding problem or easy bruising/bleeding.  Skin: Denies rash or suspicious lesions.    Examination:    Vital Signs:    Vitals:    11/02/22 1409   Weight: 107 kg (236 lb)   Height: 5' 10" (1.778 m)       Body mass index is 33.86 kg/m².    This a well-developed, well nourished patient in no acute distress.    Alert and oriented x 3 and cooperative to examination.       Physical Exam:  Right shoulder-patient is presently in a sling    Imaging:  X-rays from the emergency room show an anterior inferior shoulder dislocation and the shoulder was reduced the postreduction shows the shoulder to be well positioned in the glenoid       Assessment: Dislocation of right shoulder joint, initial encounter      Plan:  We will keep her right shoulder down in a sling and will see her back in about 14 days I will re-x-ray her in a sling.  Patient is in a take some naproxen and Tylenol as needed      DISCLAIMER: This note may have been dictated using voice recognition software and may contain grammatical errors.     NOTE: Consult report sent to referring provider via [facility] EMR.      "

## 2022-11-14 DIAGNOSIS — M25.511 RIGHT SHOULDER PAIN, UNSPECIFIED CHRONICITY: Primary | ICD-10-CM

## 2022-11-15 ENCOUNTER — HOSPITAL ENCOUNTER (OUTPATIENT)
Dept: RADIOLOGY | Facility: HOSPITAL | Age: 62
Discharge: HOME | End: 2022-11-15
Attending: ORTHOPAEDIC SURGERY
Payer: MEDICARE

## 2022-11-15 ENCOUNTER — OFFICE VISIT (OUTPATIENT)
Dept: ORTHOPEDICS | Facility: CLINIC | Age: 62
End: 2022-11-15
Payer: MEDICARE

## 2022-11-15 VITALS — BODY MASS INDEX: 33.79 KG/M2 | HEIGHT: 70 IN | WEIGHT: 236 LBS

## 2022-11-15 DIAGNOSIS — S43.004A DISLOCATION OF RIGHT SHOULDER JOINT, INITIAL ENCOUNTER: Primary | ICD-10-CM

## 2022-11-15 DIAGNOSIS — M25.511 RIGHT SHOULDER PAIN, UNSPECIFIED CHRONICITY: ICD-10-CM

## 2022-11-15 PROCEDURE — 99213 OFFICE O/P EST LOW 20 MIN: CPT | Mod: PBBFAC,PN | Performed by: ORTHOPAEDIC SURGERY

## 2022-11-15 PROCEDURE — 99213 PR OFFICE/OUTPT VISIT, EST, LEVL III, 20-29 MIN: ICD-10-PCS | Mod: S$PBB,,, | Performed by: ORTHOPAEDIC SURGERY

## 2022-11-15 PROCEDURE — 99999 PR PBB SHADOW E&M-EST. PATIENT-LVL III: CPT | Mod: PBBFAC,,, | Performed by: ORTHOPAEDIC SURGERY

## 2022-11-15 PROCEDURE — 73030 XR SHOULDER COMPLETE 2 OR MORE VIEWS RIGHT: ICD-10-PCS | Mod: 26,RT,, | Performed by: RADIOLOGY

## 2022-11-15 PROCEDURE — 99213 OFFICE O/P EST LOW 20 MIN: CPT | Mod: S$PBB,,, | Performed by: ORTHOPAEDIC SURGERY

## 2022-11-15 PROCEDURE — 73030 X-RAY EXAM OF SHOULDER: CPT | Mod: TC,PN,RT

## 2022-11-15 PROCEDURE — 99999 PR PBB SHADOW E&M-EST. PATIENT-LVL III: ICD-10-PCS | Mod: PBBFAC,,, | Performed by: ORTHOPAEDIC SURGERY

## 2022-11-15 PROCEDURE — 73030 X-RAY EXAM OF SHOULDER: CPT | Mod: 26,RT,, | Performed by: RADIOLOGY

## 2022-11-15 NOTE — PROGRESS NOTES
11/15/2022    Past Medical History:   Diagnosis Date    Breast cancer, stage 1, estrogen receptor negative, left () 2020    Depression     Diabetes mellitus, type 2     GERD (gastroesophageal reflux disease)     HER2-positive carcinoma of left breast 2020    Hx of breast cancer     Hx of breast cancer - Her2Nu+/ER+ - 2011 12/3/2019    Insomnia     Lymphedema     Neuropathy of both feet     S/P lumpectomy, left breast 2020       Past Surgical History:   Procedure Laterality Date    BICEPS TENDON REPAIR Left 2005    BREAST BIOPSY Left     BREAST LUMPECTOMY Left      SECTION      , ,     CHOLECYSTECTOMY      Elbow fx Left 2015    FRACTURE SURGERY  2016    elbow    HYSTERECTOMY      KNEE ARTHROSCOPY W/ MENISCAL REPAIR Left     Lower back ruptured disc  2010    LYMPH NODE DISSECTION      SPINE SURGERY  2009    ruptured disc       Current Outpatient Medications   Medication Sig    ascorbic acid, vitamin C, (VITAMIN C) 500 MG tablet Take 500 mg by mouth once daily.    atomoxetine (STRATTERA) 25 MG capsule Take 1 capsule (25 mg total) by mouth once daily.    b complex vitamins capsule Take 1 capsule by mouth once daily.    docusate sodium (COLACE) 100 MG capsule Take 1 capsule (100 mg total) by mouth once daily.    furosemide (LASIX) 20 MG tablet Take 1 tablet (20 mg total) by mouth daily as needed.    glucosamine-chondroitin 250-200 mg Tab Take by mouth.    HYDROcodone-acetaminophen (NORCO) 5-325 mg per tablet Take 1 tablet by mouth every 4 to 6 hours as needed.    LYRICA 200 mg Cap Take 1 capsule (200 mg total) by mouth 3 (three) times daily.    magnesium 250 mg Tab Take 250 mg by mouth once daily.    multivitamin capsule Take 1 capsule by mouth once daily.    naproxen (NAPROSYN) 500 MG tablet Take 1 tablet (500 mg total) by mouth 2 (two) times daily.    oxyCODONE-acetaminophen (PERCOCET) 5-325 mg per tablet Take 1 tablet by mouth every 6 (six) hours as needed for Pain (Pain  after taking tylenol and ibuprofen).    pantoprazole (PROTONIX) 40 MG tablet Take 1 tablet (40 mg total) by mouth once daily.    predniSONE (DELTASONE) 5 MG tablet Take 1 tablet (5 mg total) by mouth 2 (two) times daily.    sulfamethoxazole-trimethoprim 800-160mg (BACTRIM DS) 800-160 mg Tab Take 1 tablet by mouth 2 (two) times daily.    traZODone (DESYREL) 50 MG tablet Take 2 tablets (100 mg total) by mouth every evening.    venlafaxine (EFFEXOR-XR) 150 MG Cp24 Take 1 capsule (150 mg total) by mouth once daily.     No current facility-administered medications for this visit.       Review of patient's allergies indicates:   Allergen Reactions    Banana Hives    Codeine Nausea And Vomiting    Dilaudid  [hydromorphone (bulk)] Rash    Hydromorphone hcl Rash       Family History   Problem Relation Age of Onset    Cancer Mother     Breast cancer Mother     Parkinsonism Father     Diabetes Father     Breast cancer Maternal Aunt     Breast cancer Paternal Aunt     Cancer Maternal Aunt     Cancer Paternal Aunt     Cancer Daughter     Heart disease Maternal Grandmother     Heart disease Paternal Grandmother        Social History     Socioeconomic History    Marital status:    Tobacco Use    Smoking status: Former     Packs/day: 0.00     Years: 0.00     Pack years: 0.00     Types: Cigarettes     Quit date: 2010     Years since quittin.9    Smokeless tobacco: Never   Substance and Sexual Activity    Alcohol use: Yes     Alcohol/week: 2.0 standard drinks     Types: 2 Glasses of wine per week     Comment: socially    Drug use: Never    Sexual activity: Yes     Partners: Male     Birth control/protection: Other-see comments, None     Comment: Hysterectomy     Social Determinants of Health     Financial Resource Strain: Low Risk     Difficulty of Paying Living Expenses: Not hard at all   Food Insecurity: No Food Insecurity    Worried About Running Out of Food in the Last Year: Never true    Ran Out of Food in the  Last Year: Never true   Transportation Needs: No Transportation Needs    Lack of Transportation (Medical): No    Lack of Transportation (Non-Medical): No   Physical Activity: Sufficiently Active    Days of Exercise per Week: 6 days    Minutes of Exercise per Session: 60 min   Stress: Stress Concern Present    Feeling of Stress : To some extent   Social Connections: Unknown    Frequency of Communication with Friends and Family: Three times a week    Frequency of Social Gatherings with Friends and Family: Once a week    Active Member of Clubs or Organizations: Patient refused    Attends Club or Organization Meetings: Patient refused    Marital Status:    Housing Stability: Low Risk     Unable to Pay for Housing in the Last Year: No    Number of Places Lived in the Last Year: 1    Unstable Housing in the Last Year: No       Chief Complaint:   Chief Complaint   Patient presents with    Right Shoulder - Follow-up     DOI: 10/29/2022 -- 17d s/p dislocation of right shoulder joint. LOV: 11/02/2022 -- remain in sling & take Naproxen & Tylenol for pain relief. Denies having any pain in rt shoulder today, other than when she reaches back. Pt admits to not wearing sling.         History of present illness:    This is a 61 y.o. year old female who complains of the patient is now about 17 days status post anterior dislocation of the right shoulder she is presently in a sling denies any pain today she says she has not been wearing her sling often.    Review of Systems:    Constitution: Denies chills, fever, and sweats.  HENT: Denies headaches or blurry vision.  Cardiovascular: Denies chest pain or irregular heart beat.  Respiratory: Denies cough or shortness of breath.  Gastrointestinal: Denies abdominal pain, nausea, or vomiting.  Musculoskeletal:  Denies muscle cramps.  Neurological: Denies dizziness or focal weakness.  Psychiatric/Behavioral: Normal mental status.  Hematologic/Lymphatic: Denies bleeding problem or easy  "bruising/bleeding.  Skin: Denies rash or suspicious lesions.    Examination:    Vital Signs:    Vitals:    11/15/22 1434   Weight: 107 kg (236 lb)   Height: 5' 10" (1.778 m)   PainSc: 0-No pain   PainLoc: Shoulder       Body mass index is 33.86 kg/m².    This a well-developed, well nourished patient in no acute distress.    Alert and oriented x 3 and cooperative to examination.       Physical Exam:  Right shoulder-the shoulder is clinically in place and she is not having any pain she is neurovascularly intact    Imaging:  X-rays show the shoulder to be well reduced       Assessment: Dislocation of right shoulder joint, initial encounter      Plan:  The patient is a 1st time dislocated we are going to go ahead and send her to physical therapy for internal rotator muscle strengthening and rehab to the right shoulder      DISCLAIMER: This note may have been dictated using voice recognition software and may contain grammatical errors.     NOTE: Consult report sent to referring provider via [facility] EMR.      " corneal abrasion at 9 o'clock. No foreign body present

## 2022-11-29 DIAGNOSIS — M66.861 SPONTANEOUS RUPTURE OF OTHER TENDONS, RIGHT LOWER LEG: Primary | ICD-10-CM

## 2022-12-01 ENCOUNTER — CLINICAL SUPPORT (OUTPATIENT)
Dept: REHABILITATION | Facility: HOSPITAL | Age: 62
End: 2022-12-01
Payer: MEDICARE

## 2022-12-01 DIAGNOSIS — M25.511 ACUTE PAIN OF RIGHT SHOULDER: ICD-10-CM

## 2022-12-01 DIAGNOSIS — S43.004A DISLOCATION OF RIGHT SHOULDER JOINT, INITIAL ENCOUNTER: ICD-10-CM

## 2022-12-01 DIAGNOSIS — M25.611 DECREASED ROM OF RIGHT SHOULDER: ICD-10-CM

## 2022-12-01 DIAGNOSIS — R68.89 ACTIVITY INTOLERANCE: Primary | ICD-10-CM

## 2022-12-01 PROCEDURE — 97140 MANUAL THERAPY 1/> REGIONS: CPT | Mod: PN

## 2022-12-01 PROCEDURE — 97162 PT EVAL MOD COMPLEX 30 MIN: CPT | Mod: PN

## 2022-12-01 NOTE — PLAN OF CARE
"[facility]   Physical Therapy Initial Evaluation     Date: 12/1/2022   Name: Lolly Ramírez  Clinic Number: [id number]    Therapy Diagnosis:   Encounter Diagnoses   Name Primary?    Activity intolerance Yes    Acute pain of right shoulder     Decreased ROM of right shoulder     Dislocation of right shoulder joint, initial encounter      Physician: Taurus Romero MD    Physician Orders: PT Eval and Treat Patient sustained a right anterior inferior shoulder dislocation she is a first-time dislocator this occurred about 3 weeks ago.  Wished to start the patient on a rehab program to strengthen her internal rotators of the right shoulder and range of motion of the right shoulder 2 times a week x6 weeks.   Medical Diagnosis from Referral: Dislocation of right shoulder joint, initial encounter   Evaluation Date: 12/1/2022  Authorization Period Expiration: 11/15/2023   Plan of Care Expiration: 1/13/2023  Progress Note Due: 13/30/2022  Visit # / Visits authorized: 1/ 1   FOTO: Next survey due week of 12/12/2022    Precautions: Diabetes, Fall, and cancer     Time In: 0846  Time Out: 0945  Total Appointment Time (timed & untimed codes): 55 minutes      SUBJECTIVE     Date of onset: 10/29/2022    History of current condition - Lolly reports: She was in her usual state of health until she fell forward on 10/29/2022 landing on her outstretched R arm resulting in anterior dislocation of R shoulder.  Went to ED where she underwent mechanical relocation of GH joint and was placed in a sling.  Followed up with ortho on 11/2/2022.  Sling was changed.  Followed up with ortho 11/15/2022 and has been referred to PT.      Falls: Yes    Imaging, X-rays: SHOULDER COMPLETE 2 OR MORE VIEWS RIGHT 11/15/2023 .  Findings per report: "Mild degenerative change at the AC joint and very slight spurring inferiorly at the glenoid process of the scapula.  No acute fracture or dislocation      Prior Therapy: Yes " "multiple times for her back, frozen L shoulder  Social History:  lives with their spouse in 1 story home with 1 step to enter.    Occupation: Housewife  Prior Level of Function: Full use of R UE, Patient is right handed, no difficulty with reaching (all directions) using R UE.    Current Level of Function: Unable to reach above head with R UE, difficulty getting things out of the oven, painful with lifting R arm laterally > 40* (patient demonstrated) and ~ forward > 80*.  Difficulty with upper body dressing.  Has difficulty finding a comfortable position to sleep but sleep is not disturbed by shoulder pain.      Pain:  Current 0-1/10, worst 10/10, best 0/10   Location: right shoulder - top of shoulder and "everywhere"  Description: intermittent aching "everywhere", occasional sharp pain top of R shoulder  Aggravating Factors: rolling over in bed (reaching with R arm across body), reaching up, lifting.    Easing Factors: activity modification, "taking naproxen for the knee and it's helping the shoulder"    Patients goals: To strengthen my arm, to strengthen my body.       Medical History:   Past Medical History:   Diagnosis Date    Breast cancer, stage 1, estrogen receptor negative, left () 2020    Depression     Diabetes mellitus, type 2     GERD (gastroesophageal reflux disease)     HER2-positive carcinoma of left breast 2020    Hx of breast cancer     Hx of breast cancer - Her2Nu+/ER+ - 2011 12/3/2019    Insomnia     Lymphedema     Neuropathy of both feet     S/P lumpectomy, left breast 2020       Surgical History:   Lolly Ramírez  has a past surgical history that includes Elbow fx (Left, );  section; Lower back ruptured disc (2010); Knee arthroscopy w/ meniscal repair (Left); Biceps tendon repair (Left, 2005); Cholecystectomy; Hysterectomy; Lymph node dissection; Breast biopsy (Left); Breast lumpectomy (Left); Fracture surgery (2016); and Spine surgery " "(2009).    Medications:   Lolly has a current medication list which includes the following prescription(s): ascorbic acid (vitamin c), atomoxetine, b complex vitamins, docusate sodium, furosemide, glucosamine-chondroitin, hydrocodone-acetaminophen, lyrica, magnesium, multivitamin, naproxen, oxycodone-acetaminophen, pantoprazole, prednisone, sulfamethoxazole-trimethoprim 800-160mg, trazodone, and venlafaxine.    Allergies:   Review of patient's allergies indicates:   Allergen Reactions    Banana Hives    Codeine Nausea And Vomiting    Dilaudid  [hydromorphone (bulk)] Rash    Hydromorphone hcl Rash          OBJECTIVE     Posture: Standing: Wearing fracture boot on R resulting in R pelvis elevation.  R shoulder slightly lower than L.  Sitting: adequate lumbar lordosis, shoulders level, increased thoracic kyphosis, moderate rounded shoulder and forward head posture.    Palpation: Tenderness present R anterior shoulder.    Sensation: Patient reports pre-existing neuropathy in B hands following chemotherapy but this has improved.  She reports intermittent feeling of "weakness" in R arm  DTRs:  Range of Motion/Strength:    Shoulder  Right   Left  Pain/Dysfunction with Movement    AROM PROM MMT AROM PROM MMT    flexion  80*  145*  2+/5  160*  175*  4+/5  R shoulder MMT assessed without additional external resistance   extension  60*  65*  3/5  60*  75*  4+/5    abduction  70*  160*  2+/5  145*  180*  4+/5    adduction  20*  35*  3-/5  38*  45*  4+/5    Internal rotation  60*  70*  3/5  70*  80*  4+/5    ER at 90° abd  70*  90*  3-/5  75*  80*  4+/5    ER at 0° abd  40*  50*  3-/5  60*  70*   4+/5    R elbow/wrist ROM WNL. Strength 4/5  L elbow limited extension; otherwise elbow and wrist WNL.  Strength 4/5-4+/5  Flexibility: Tightness in upper traps and levator scapulae B.  Pectoralis mm not assessed   Gait: Without AD  Analysis: decreased toe clearance L.  Patient is wearing fracture boot on R due to Achilles tendon issue. "  Thus she is also demonstrates decreased weight shift to R and decreased stance time on R.    Bed Mobility:Independent  Transfers: Independent; however, patient reports increased difficulty due to limited ability to use R UE to assist (pushing up).  She typically uses R UE for support due to problems with her knees and R ankle/foot.    Special Tests: Shoulder apprehension (-)  Other: Joint play: anterior to posterior glide, superior to inferior glide and lateral distraction unremarkable on R compared to L.  Anterior glide not assessed this date.  AC joint not assessed this date.  Scapulothoracic rhythm impaired on R compared to L.         Limitation/Restriction for FOTO Shoulder Survey    Therapist reviewed FOTO scores for Lolly Ramírez on 12/1/2022.   FOTO documents entered into [employer] - see Media section.    Limitation Score: 53%         TREATMENT     Total Treatment time (time-based codes) separate from Evaluation: 15 minutes      Lolly received the treatments listed below:      manual therapy techniques: PROM and kinesiology taping were applied to the: R shoulder for 15 minutes, including:  Gentle PROM to R shoulder (avoiding end range rotation)  Kinesiology taping for anterior shoulder instability: Y strip for Deltoid inhibition: Anchored with no tension inferior to deltoid tuberosity of the humerus.  Patient moved into ~45* shoulder abduction and some external rotation. Applied anterior tail with 15-25% tension distal to proximal along the curvature of the anterior deltoid terminating near clavicle with no tension.  Patient then moved into adduction and horizontal flexion.  Applied posterior tail with 15-25% tension distal to proximal along the curvature of the posterior deltoid terminating near spine of scapula with no tension.  Rubbed to activate adhesive.  I strip: tear paper backing from middle 1/3 of tape, applying that with 75% downward tension between AC joint and lateral edge of humeral head. Patient  moved into abduction and ER.  Anterior end laid down with no tension, patient moved into adduction and horizontal flexion and posterior end laid down  with no tension.  Rubbed to initiate adhesive.    I strip: Patient shoulder placed in internal rotation. [place] placed between anterior fibers of anterior deltoid and SC joint with no tension.  Base of kinesiology I strip aimed towards central region of humeral head, applying 75% tension with downward pressure around anterior and middle deltoid region, terminating I strip with no tension over posterior shoulder.  Rubbed adhesive to activate.     PATIENT EDUCATION AND HOME EXERCISES     Education provided:   - Instructed patient to limit activity using R UE and to avoid extremes of ROM even if she felt less discomfort with the kinesiology tape.      Written Home Exercises Provided:  to be issued during subsequent visits . Exercises were reviewed and Lolly was able to demonstrate them prior to the end of the session.  Lolly demonstrated fair  understanding of the education provided. See EMR under Patient Instructions for exercises provided during therapy sessions.    ASSESSMENT     Lolly is a 61 y.o. female referred to outpatient Physical Therapy with a medical diagnosis of Dislocation of right shoulder joint, initial encounter. Patient presents with R shoulder pain, decreased R shoulder ROM, impaired strength of R shoulder, impaired posture, decreased upper quadrant flexibility.  These problems contribute to impaired functional use of R UE for bathing/dressing, reaching overhead, lifting, driving.  She should benefit from skilled PT services to address these problems in order to improve stability of R shoulder thus improving her ability to perform upper quadrant bathing/dressing, reach overhead using R UE, lift, perform housework activities, and take care of puppies.      Patient prognosis is Fair.   Patient will benefit from skilled outpatient Physical Therapy to  address the deficits stated above and in the chart below, provide patient /family education, and to maximize patientt's level of independence.     Plan of care discussed with patient: Yes  Patient's spiritual, cultural and educational needs considered and patient is agreeable to the plan of care and goals as stated below:     Anticipated Barriers for therapy: Patient may not be consistent in following precautions to protect R shoulder (as indicated by reported activities and not using sling)    Medical Necessity is demonstrated by the following  History  Co-morbidities and personal factors that may impact the plan of care Co-morbidities:   depression, diabetes, difficulty sleeping, high BMI, history of cancer, and neuropathy of B feet    Personal Factors:   Limited protection of R shoulder     high   Examination  Body Structures and Functions, activity limitations and participation restrictions that may impact the plan of care Body Regions:   upper extremities  trunk    Body Systems:    gross symmetry  ROM  strength  transfers  Joint stability    Participation Restrictions:   Avoid extremes of shoulder external rotation    Activity limitations:   Learning and applying knowledge  no deficits    General Tasks and Commands  no deficits    Communication  no deficits    Mobility  lifting and carrying objects  walking  driving (bike, car, motorcycle)    Self care  washing oneself (bathing, drying, washing hands)  caring for body parts (brushing teeth, shaving, grooming)  dressing    Domestic Life  cooking  doing house work (cleaning house, washing dishes, laundry)  assisting others    Interactions/Relationships  no deficits    Life Areas  no deficits    Community and Social Life  community life  recreation and leisure         high   Clinical Presentation evolving clinical presentation with changing clinical characteristics moderate   Decision Making/ Complexity Score: moderate     Goals:  Short Term Goals: 3 weeks   1)  Decrease max pain to < 7/10  2) Facilitate improved posture in sitting/standing  3) Facilitate improved upper quadrant flexibility  4) Patient will ricco/doff pull over blouse with no more than minimal difficulty 7 of 10 trials    Long Term Goals: 6 weeks   1) Patient will consistently perform upper quadrant bathing/dressing activities without difficulty or discomfort  2) Patient will consistently utilize R UE to reach into overhead cabinet to retrieve/put away dishes with no more than minimal discomfort  3) Patient will consistently utilize R UE to remove hot dish from oven without fear/discomfort  4) Patient will resume her usual housework activities without significant increase in shoulder discomfort  5) Improve functional deficit to < 30% as indicated by FOTO shoulder survey  6) Patient will be independent in complete HEP for ROM, flexibility, strengthening and stabilization    PLAN   Plan of care Certification: 12/1/2022 to 1/13/2023.    Outpatient Physical Therapy 2 times weekly for 6 weeks to include the following interventions: Manual Therapy, Moist Heat/ Ice, Patient Education, Therapeutic Activities, Therapeutic Exercise, and Therapeutic Taping as needed .     Lolly Villafana, PT      I CERTIFY THE NEED FOR THESE SERVICES FURNISHED UNDER THIS PLAN OF TREATMENT AND WHILE UNDER MY CARE   Physician's comments:     Physician's Signature: ___________________________________________________

## 2022-12-05 ENCOUNTER — CLINICAL SUPPORT (OUTPATIENT)
Dept: REHABILITATION | Facility: HOSPITAL | Age: 62
End: 2022-12-05
Payer: MEDICARE

## 2022-12-05 DIAGNOSIS — S43.004A DISLOCATION OF RIGHT SHOULDER JOINT, INITIAL ENCOUNTER: ICD-10-CM

## 2022-12-05 DIAGNOSIS — M25.611 DECREASED ROM OF RIGHT SHOULDER: ICD-10-CM

## 2022-12-05 DIAGNOSIS — R68.89 ACTIVITY INTOLERANCE: Primary | ICD-10-CM

## 2022-12-05 DIAGNOSIS — M25.511 ACUTE PAIN OF RIGHT SHOULDER: ICD-10-CM

## 2022-12-05 PROCEDURE — 97110 THERAPEUTIC EXERCISES: CPT | Mod: PN

## 2022-12-05 PROCEDURE — 97140 MANUAL THERAPY 1/> REGIONS: CPT | Mod: PN

## 2022-12-05 NOTE — PROGRESS NOTES
"[facility]   Physical Therapy Treatment Note     Name: Lolly MCLAIN [place]  Clinic Number: [id number]    Therapy Diagnosis:   Encounter Diagnoses   Name Primary?    Activity intolerance Yes    Acute pain of right shoulder     Decreased ROM of right shoulder     Dislocation of right shoulder joint, initial encounter      Physician: Taurus Romero MD    Visit Date: 12/5/2022    Physician Orders: PT Eval and Treat Patient sustained a right anterior inferior shoulder dislocation she is a first-time dislocator this occurred about 3 weeks ago.  Wished to start the patient on a rehab program to strengthen her internal rotators of the right shoulder and range of motion of the right shoulder 2 times a week x6 weeks.   Medical Diagnosis from Referral: Dislocation of right shoulder joint, initial encounter   Evaluation Date: 12/1/2022  Authorization Period Expiration: 11/15/2023   Plan of Care Expiration: 1/13/2023  Progress Note Due: 13/30/2022  Visit # / Visits authorized: 1/ 6 (2 total)   FOTO: Next survey due week of 12/12/2022     Precautions: Diabetes, Fall, and cancer     PTA Visit #: 0/5     Time In: 0802  Time Out: 0854  Total Billable Time: 48 minutes    SUBJECTIVE     Pt reports: "It woke me up at 2 o'clock this morning but I didn't take any medication".  She was not compliant with home exercise program as one has not been issued yet.  Response to previous treatment: Felt better with the tape but took the tape off Saturday evening.    Functional change: None yet noted     Pain: 5/10  Location: right shoulder in the front    OBJECTIVE     Objective Measures updated at progress report unless specified.     Treatment     Lolly received the treatments listed below:      therapeutic exercises to develop strength and flexibility for 30 minutes including:  UBE L1 3' forward/3' back  Upper traps stretch R only 3x30s (modified)  Levator scapula stretch R only 3x30s (modified)  Isometric shoulder " exercises R 2x10 ea   Flexion   Extension   Abduction   Adduction   Internal rotation   External rotation    manual therapy techniques: PROM, scapular scouring and kinesiology taping were applied to the: R shoulder for 18 minutes, including:  Gentle PROM to R shoulder (avoiding end range rotation)  Scapular scouring to R shoulder in L sidelying  Kinesiology taping for anterior shoulder instability: Y strip for Deltoid inhibition: Anchored with no tension inferior to deltoid tuberosity of the humerus.  Patient moved into ~45* shoulder abduction and some external rotation. Applied anterior tail with 15-25% tension distal to proximal along the curvature of the anterior deltoid terminating near clavicle with no tension.  Patient then moved into adduction and horizontal flexion.  Applied posterior tail with 15-25% tension distal to proximal along the curvature of the posterior deltoid terminating near spine of scapula with no tension.  Rubbed to activate adhesive.  I strip: tear paper backing from middle 1/3 of tape, applying that with 75% downward tension between AC joint and lateral edge of humeral head. Patient moved into abduction and ER.  Anterior end laid down with no tension, patient moved into adduction and horizontal flexion and posterior end laid down  with no tension.  Rubbed to initiate adhesive.    I strip: Patient shoulder placed in internal rotation. [place] placed between anterior fibers of anterior deltoid and SC joint with no tension.  Base of kinesiology I strip aimed towards central region of humeral head, applying 75% tension with downward pressure around anterior and middle deltoid region, terminating I strip with no tension over posterior shoulder.  Rubbed adhesive to activate.        Patient Education and Home Exercises     Home Exercises Provided and Patient Education Provided     Education provided:   - Instructed patient in upper quadrant flexibility and shoulder isometric exercises    Written  Home Exercises Provided: yes (entered into patient instructions in chart). Exercises were reviewed and Lolly was able to demonstrate them prior to the end of the session.  Lolly demonstrated good  understanding of the education provided. See EMR under Patient Instructions for exercises provided during therapy sessions    ASSESSMENT     Patient able to participate in program today with mild increase in discomfort.  Also reported increased fatigue.  She experienced good relief with taping but developed a skin irritation over a previous incision scar; therefore, she removed tape after 48 hours.      Lolly Is progressing well towards her goals.   Pt prognosis is Fair.     Pt will continue to benefit from skilled outpatient physical therapy to address the deficits listed in the problem list box on initial evaluation, provide pt/family education and to maximize pt's level of independence in the home and community environment.     Pt's spiritual, cultural and educational needs considered and pt agreeable to plan of care and goals.     Anticipated barriers to physical therapy: Patient may not be consistent in following precautions to protect R shoulder (as indicated by reported activities and not using sling)     Goals:   Short Term Goals: 3 weeks   1) Decrease max pain to < 7/10 Minimal progress  2) Facilitate improved posture in sitting/standing Initiated  3) Facilitate improved upper quadrant flexibility  Initiated  4) Patient will ricco/doff pull over blouse with no more than minimal difficulty 7 of 10 trials Ongoing     Long Term Goals: 6 weeks   1) Patient will consistently perform upper quadrant bathing/dressing activities without difficulty or discomfort Ongoing  2) Patient will consistently utilize R UE to reach into overhead cabinet to retrieve/put away dishes with no more than minimal discomfort Ongoing  3) Patient will consistently utilize R UE to remove hot dish from oven without fear/discomfort Ongoing  4)  Patient will resume her usual housework activities without significant increase in shoulder discomfort Ongoing  5) Improve functional deficit to < 30% as indicated by FOTO shoulder survey Ongoing  6) Patient will be independent in complete HEP for ROM, flexibility, strengthening and stabilization Initiated    PLAN     Progress shoulder stabilization exercises as patient tolerates.  Add scapular exercises.    Lolly Villafana, PT

## 2022-12-07 ENCOUNTER — OFFICE VISIT (OUTPATIENT)
Dept: FAMILY MEDICINE | Facility: CLINIC | Age: 62
End: 2022-12-07
Payer: MEDICARE

## 2022-12-07 VITALS
WEIGHT: 241 LBS | SYSTOLIC BLOOD PRESSURE: 112 MMHG | BODY MASS INDEX: 34.5 KG/M2 | HEIGHT: 70 IN | DIASTOLIC BLOOD PRESSURE: 80 MMHG | HEART RATE: 84 BPM

## 2022-12-07 DIAGNOSIS — E11.9 TYPE 2 DIABETES MELLITUS WITHOUT COMPLICATION, WITHOUT LONG-TERM CURRENT USE OF INSULIN: Primary | ICD-10-CM

## 2022-12-07 DIAGNOSIS — F98.8 ATTENTION DEFICIT DISORDER, UNSPECIFIED HYPERACTIVITY PRESENCE: ICD-10-CM

## 2022-12-07 DIAGNOSIS — G47.00 INSOMNIA, UNSPECIFIED TYPE: ICD-10-CM

## 2022-12-07 DIAGNOSIS — M17.11 PRIMARY OSTEOARTHRITIS OF RIGHT KNEE: ICD-10-CM

## 2022-12-07 DIAGNOSIS — Z79.899 HIGH RISK MEDICATION USE: ICD-10-CM

## 2022-12-07 DIAGNOSIS — Z85.3 HX OF BREAST CANCER: ICD-10-CM

## 2022-12-07 DIAGNOSIS — M19.012 PRIMARY OSTEOARTHRITIS OF LEFT SHOULDER: ICD-10-CM

## 2022-12-07 DIAGNOSIS — K21.9 GASTROESOPHAGEAL REFLUX DISEASE, UNSPECIFIED WHETHER ESOPHAGITIS PRESENT: ICD-10-CM

## 2022-12-07 DIAGNOSIS — Z00.00 PHYSICAL EXAM: ICD-10-CM

## 2022-12-07 LAB — HBA1C MFR BLD: 5.5 %

## 2022-12-07 PROCEDURE — 99214 OFFICE O/P EST MOD 30 MIN: CPT | Mod: S$GLB,,, | Performed by: NURSE PRACTITIONER

## 2022-12-07 PROCEDURE — 83036 POCT HEMOGLOBIN A1C: ICD-10-PCS | Mod: QW,,, | Performed by: NURSE PRACTITIONER

## 2022-12-07 PROCEDURE — 83036 HEMOGLOBIN GLYCOSYLATED A1C: CPT | Mod: QW,,, | Performed by: NURSE PRACTITIONER

## 2022-12-07 PROCEDURE — 99214 PR OFFICE/OUTPT VISIT, EST, LEVL IV, 30-39 MIN: ICD-10-PCS | Mod: S$GLB,,, | Performed by: NURSE PRACTITIONER

## 2022-12-07 RX ORDER — ATOMOXETINE 25 MG/1
25 CAPSULE ORAL DAILY
Qty: 30 CAPSULE | Refills: 0 | Status: SHIPPED | OUTPATIENT
Start: 2022-12-07 | End: 2022-12-07

## 2022-12-07 RX ORDER — VENLAFAXINE HYDROCHLORIDE 150 MG/1
150 CAPSULE, EXTENDED RELEASE ORAL DAILY
Qty: 90 CAPSULE | Refills: 1 | Status: SHIPPED | OUTPATIENT
Start: 2022-12-07 | End: 2023-05-08 | Stop reason: SDUPTHER

## 2022-12-07 RX ORDER — MUPIROCIN 20 MG/G
OINTMENT TOPICAL
COMMUNITY
Start: 2022-11-29 | End: 2024-02-12

## 2022-12-07 RX ORDER — NAPROXEN 500 MG/1
500 TABLET ORAL 2 TIMES DAILY
Qty: 90 TABLET | Refills: 1 | Status: SHIPPED | OUTPATIENT
Start: 2022-12-07 | End: 2023-03-09 | Stop reason: SDUPTHER

## 2022-12-07 RX ORDER — ATOMOXETINE 40 MG/1
40 CAPSULE ORAL DAILY
Qty: 30 CAPSULE | Refills: 0 | Status: SHIPPED | OUTPATIENT
Start: 2022-12-07 | End: 2023-06-15 | Stop reason: SDUPTHER

## 2022-12-07 RX ORDER — TRAZODONE HYDROCHLORIDE 50 MG/1
100 TABLET ORAL NIGHTLY
Qty: 180 TABLET | Refills: 0 | Status: SHIPPED | OUTPATIENT
Start: 2022-12-07 | End: 2023-03-09 | Stop reason: SDUPTHER

## 2022-12-08 ENCOUNTER — HOSPITAL ENCOUNTER (OUTPATIENT)
Dept: RADIOLOGY | Facility: HOSPITAL | Age: 62
Discharge: HOME | End: 2022-12-08
Attending: PODIATRIST
Payer: MEDICARE

## 2022-12-08 DIAGNOSIS — M66.861 SPONTANEOUS RUPTURE OF OTHER TENDONS, RIGHT LOWER LEG: ICD-10-CM

## 2022-12-08 PROCEDURE — 73721 MRI JNT OF LWR EXTRE W/O DYE: CPT | Mod: TC,RT

## 2022-12-08 NOTE — PROGRESS NOTES
SUBJECTIVE:    Patient ID: Lolly Ramírez is a 61 y.o. female.    Chief Complaint: Diabetes (Brought bottles, Eye exam req Dr. Lu// SW)    61 year old female presents for follow up.  is present during the exam. She is treated for gerd, insomnia,neuropathy, oa. Psoriasis and dm. Taking meds as prescribed.  Recently started on stratterrra still feels as if meds are semi effective. Fell at the end of October.being followed by podiatry. Has mri scheduled due to abnormality visualized on xray and ultrasound. Still in walking boot. Still having pain    Diabetes  Pertinent negatives for hypoglycemia include no dizziness, headaches or nervousness/anxiousness. Pertinent negatives for diabetes include no chest pain and no weakness.     Office Visit on 2022   Component Date Value Ref Range Status    Hemoglobin A1C 2022 5.5  % Final   Office Visit on 2022   Component Date Value Ref Range Status    Hemoglobin A1C 2022 5.6  % Final       Past Medical History:   Diagnosis Date    Breast cancer, stage 1, estrogen receptor negative, left () 2020    Depression     Diabetes mellitus, type 2     GERD (gastroesophageal reflux disease)     HER2-positive carcinoma of left breast 2020    Hx of breast cancer     Hx of breast cancer - Her2Nu+/ER+ - 2011 12/3/2019    Insomnia     Lymphedema     Neuropathy of both feet     S/P lumpectomy, left breast 2020     Social History     Socioeconomic History    Marital status:    Tobacco Use    Smoking status: Former     Packs/day: 0.00     Years: 0.00     Pack years: 0.00     Types: Cigarettes     Quit date: 2010     Years since quittin.0    Smokeless tobacco: Never   Substance and Sexual Activity    Alcohol use: Yes     Alcohol/week: 2.0 standard drinks     Types: 2 Glasses of wine per week     Comment: socially    Drug use: Never    Sexual activity: Yes     Partners: Male     Birth control/protection: Other-see comments, None      Comment: Hysterectomy     Social Determinants of Health     Financial Resource Strain: Low Risk     Difficulty of Paying Living Expenses: Not hard at all   Food Insecurity: No Food Insecurity    Worried About Running Out of Food in the Last Year: Never true    Ran Out of Food in the Last Year: Never true   Transportation Needs: No Transportation Needs    Lack of Transportation (Medical): No    Lack of Transportation (Non-Medical): No   Physical Activity: Unknown    Days of Exercise per Week: 1 day    Minutes of Exercise per Session: Patient refused   Stress: No Stress Concern Present    Feeling of Stress : Only a little   Social Connections: Unknown    Frequency of Communication with Friends and Family: Once a week    Frequency of Social Gatherings with Friends and Family: Once a week    Active Member of Clubs or Organizations: No    Attends Club or Organization Meetings: Never    Marital Status:    Housing Stability: Low Risk     Unable to Pay for Housing in the Last Year: No    Number of Places Lived in the Last Year: 1    Unstable Housing in the Last Year: No     Past Surgical History:   Procedure Laterality Date    BICEPS TENDON REPAIR Left 2005    BREAST BIOPSY Left     BREAST LUMPECTOMY Left      SECTION      , ,     CHOLECYSTECTOMY      Elbow fx Left 2015    FRACTURE SURGERY  2016    elbow    HYSTERECTOMY      KNEE ARTHROSCOPY W/ MENISCAL REPAIR Left     Lower back ruptured disc  2010    LYMPH NODE DISSECTION      SPINE SURGERY  2009    ruptured disc     Family History   Problem Relation Age of Onset    Cancer Mother     Breast cancer Mother     Parkinsonism Father     Diabetes Father     Breast cancer Maternal Aunt     Breast cancer Paternal Aunt     Cancer Maternal Aunt     Cancer Paternal Aunt     Cancer Daughter     Heart disease Maternal Grandmother     Heart disease Paternal Grandmother        Review of patient's allergies indicates:   Allergen Reactions    Banana  Hives    Codeine Nausea And Vomiting    Dilaudid  [hydromorphone (bulk)] Rash    Hydromorphone hcl Rash       Current Outpatient Medications:     ascorbic acid, vitamin C, (VITAMIN C) 500 MG tablet, Take 500 mg by mouth once daily., Disp: , Rfl:     b complex vitamins capsule, Take 1 capsule by mouth once daily., Disp: , Rfl:     docusate sodium (COLACE) 100 MG capsule, Take 1 capsule (100 mg total) by mouth once daily., Disp: 90 capsule, Rfl: 0    furosemide (LASIX) 20 MG tablet, Take 1 tablet (20 mg total) by mouth daily as needed., Disp: 90 tablet, Rfl: 1    glucosamine-chondroitin 250-200 mg Tab, Take by mouth., Disp: , Rfl:     HYDROcodone-acetaminophen (NORCO) 5-325 mg per tablet, Take 1 tablet by mouth every 4 to 6 hours as needed., Disp: , Rfl:     LYRICA 200 mg Cap, Take 1 capsule (200 mg total) by mouth 3 (three) times daily., Disp: 270 capsule, Rfl: 1    magnesium 250 mg Tab, Take 250 mg by mouth once daily., Disp: , Rfl:     multivitamin capsule, Take 1 capsule by mouth once daily., Disp: , Rfl:     mupirocin (BACTROBAN) 2 % ointment, , Disp: , Rfl:     oxyCODONE-acetaminophen (PERCOCET) 5-325 mg per tablet, Take 1 tablet by mouth every 6 (six) hours as needed for Pain (Pain after taking tylenol and ibuprofen)., Disp: 10 tablet, Rfl: 0    pantoprazole (PROTONIX) 40 MG tablet, Take 1 tablet (40 mg total) by mouth once daily., Disp: 90 tablet, Rfl: 3    atomoxetine (STRATTERA) 40 MG capsule, Take 1 capsule (40 mg total) by mouth once daily., Disp: 30 capsule, Rfl: 0    naproxen (NAPROSYN) 500 MG tablet, Take 1 tablet (500 mg total) by mouth 2 (two) times daily., Disp: 90 tablet, Rfl: 1    traZODone (DESYREL) 50 MG tablet, Take 2 tablets (100 mg total) by mouth every evening., Disp: 180 tablet, Rfl: 0    venlafaxine (EFFEXOR-XR) 150 MG Cp24, Take 1 capsule (150 mg total) by mouth once daily., Disp: 90 capsule, Rfl: 1    Review of Systems   Constitutional:  Negative for chills, fever and unexpected weight  "change.   HENT:  Negative for ear pain, rhinorrhea and sore throat.    Respiratory:  Negative for cough and shortness of breath.    Cardiovascular:  Negative for chest pain, palpitations and leg swelling.   Gastrointestinal:  Negative for abdominal pain, diarrhea, nausea and vomiting.   Genitourinary:  Negative for difficulty urinating, hematuria and vaginal bleeding.   Musculoskeletal:  Positive for arthralgias.   Skin:  Negative for rash.   Neurological:  Negative for dizziness, weakness and headaches.   Psychiatric/Behavioral:  Negative for agitation and sleep disturbance. The patient is not nervous/anxious.         Objective:      Vitals:    12/07/22 0911   BP: 112/80   Pulse: 84   Weight: 109.3 kg (241 lb)   Height: 5' 10" (1.778 m)     Physical Exam  Vitals and nursing note reviewed.   Constitutional:       Appearance: Normal appearance. She is well-developed. She is obese.   HENT:      Head: Normocephalic.      Right Ear: External ear normal.      Left Ear: External ear normal.   Eyes:      Conjunctiva/sclera: Conjunctivae normal.      Pupils: Pupils are equal, round, and reactive to light.   Neck:      Vascular: No JVD.   Cardiovascular:      Rate and Rhythm: Normal rate and regular rhythm.      Heart sounds: No murmur heard.  Pulmonary:      Effort: Pulmonary effort is normal.      Breath sounds: Normal breath sounds.   Abdominal:      General: Bowel sounds are normal.      Palpations: Abdomen is soft.   Musculoskeletal:         General: No deformity. Normal range of motion.      Cervical back: Normal range of motion and neck supple.      Comments: Boot intact   Lymphadenopathy:      Cervical: No cervical adenopathy.   Skin:     General: Skin is warm and dry.      Findings: No rash.   Neurological:      Mental Status: She is alert and oriented to person, place, and time.      Gait: Gait normal.   Psychiatric:         Speech: Speech normal.         Behavior: Behavior normal.         Assessment:       1. Type " 2 diabetes mellitus without complication, without long-term current use of insulin    2. Insomnia, unspecified type    3. Physical exam    4. Attention deficit disorder, unspecified hyperactivity presence    5. Primary osteoarthritis of right knee    6. Hx of breast cancer - Her2Nu+/ER+ - 2011    7. Primary osteoarthritis of left shoulder    8. High risk medication use    9. Gastroesophageal reflux disease, unspecified whether esophagitis present           Plan:       Type 2 diabetes mellitus without complication, without long-term current use of insulin  -     Hemoglobin A1C, POCT  -     Comprehensive Metabolic Panel; Future; Expected date: 12/07/2022  -     Lipid Panel; Future; Expected date: 12/07/2022  -     Microalbumin/Creatinine Ratio, Urine; Future; Expected date: 12/07/2022    Insomnia, unspecified type  -     traZODone (DESYREL) 50 MG tablet; Take 2 tablets (100 mg total) by mouth every evening.  Dispense: 180 tablet; Refill: 0  -     Comprehensive Metabolic Panel; Future; Expected date: 12/07/2022  -     Lipid Panel; Future; Expected date: 12/07/2022  -     Microalbumin/Creatinine Ratio, Urine; Future; Expected date: 12/07/2022    Physical exam  -     traZODone (DESYREL) 50 MG tablet; Take 2 tablets (100 mg total) by mouth every evening.  Dispense: 180 tablet; Refill: 0  -     naproxen (NAPROSYN) 500 MG tablet; Take 1 tablet (500 mg total) by mouth 2 (two) times daily.  Dispense: 90 tablet; Refill: 1  -     Discontinue: atomoxetine (STRATTERA) 25 MG capsule; Take 1 capsule (25 mg total) by mouth once daily.  Dispense: 30 capsule; Refill: 0    Attention deficit disorder, unspecified hyperactivity presence  -     Discontinue: atomoxetine (STRATTERA) 25 MG capsule; Take 1 capsule (25 mg total) by mouth once daily.  Dispense: 30 capsule; Refill: 0    Primary osteoarthritis of right knee  -     Comprehensive Metabolic Panel; Future; Expected date: 12/07/2022  -     Lipid Panel; Future; Expected date:  12/07/2022  -     Microalbumin/Creatinine Ratio, Urine; Future; Expected date: 12/07/2022    Hx of breast cancer - Her2Nu+/ER+ - 2011    Primary osteoarthritis of left shoulder    High risk medication use    Gastroesophageal reflux disease, unspecified whether esophagitis present    Other orders  -     venlafaxine (EFFEXOR-XR) 150 MG Cp24; Take 1 capsule (150 mg total) by mouth once daily.  Dispense: 90 capsule; Refill: 1  -     atomoxetine (STRATTERA) 40 MG capsule; Take 1 capsule (40 mg total) by mouth once daily.  Dispense: 30 capsule; Refill: 0    No follow-ups on file.        12/8/2022 Shaunna Gordon

## 2022-12-09 ENCOUNTER — CLINICAL SUPPORT (OUTPATIENT)
Dept: REHABILITATION | Facility: HOSPITAL | Age: 62
End: 2022-12-09
Payer: MEDICARE

## 2022-12-09 DIAGNOSIS — R68.89 ACTIVITY INTOLERANCE: Primary | ICD-10-CM

## 2022-12-09 DIAGNOSIS — M25.611 DECREASED ROM OF RIGHT SHOULDER: ICD-10-CM

## 2022-12-09 DIAGNOSIS — M25.511 ACUTE PAIN OF RIGHT SHOULDER: ICD-10-CM

## 2022-12-09 DIAGNOSIS — S43.004D DISLOCATION OF RIGHT SHOULDER JOINT, SUBSEQUENT ENCOUNTER: ICD-10-CM

## 2022-12-09 LAB
ALBUMIN SERPL-MCNC: 4.2 G/DL (ref 3.6–5.1)
ALBUMIN/CREAT UR: 13 MCG/MG CREAT
ALBUMIN/GLOB SERPL: 1.6 (CALC) (ref 1–2.5)
ALP SERPL-CCNC: 76 U/L (ref 37–153)
ALT SERPL-CCNC: 16 U/L (ref 6–29)
AST SERPL-CCNC: 19 U/L (ref 10–35)
BILIRUB SERPL-MCNC: 0.4 MG/DL (ref 0.2–1.2)
BUN SERPL-MCNC: 18 MG/DL (ref 7–25)
BUN/CREAT SERPL: NORMAL (CALC) (ref 6–22)
CALCIUM SERPL-MCNC: 9.7 MG/DL (ref 8.6–10.4)
CHLORIDE SERPL-SCNC: 102 MMOL/L (ref 98–110)
CHOLEST SERPL-MCNC: 258 MG/DL
CHOLEST/HDLC SERPL: 2.6 (CALC)
CO2 SERPL-SCNC: 32 MMOL/L (ref 20–32)
CREAT SERPL-MCNC: 0.88 MG/DL (ref 0.5–1.05)
CREAT UR-MCNC: 16 MG/DL (ref 20–275)
EGFR: 75 ML/MIN/1.73M2
GLOBULIN SER CALC-MCNC: 2.6 G/DL (CALC) (ref 1.9–3.7)
GLUCOSE SERPL-MCNC: 86 MG/DL (ref 65–139)
HDLC SERPL-MCNC: 99 MG/DL
LDLC SERPL CALC-MCNC: 140 MG/DL (CALC)
MICROALBUMIN UR-MCNC: 0.2 MG/DL
NONHDLC SERPL-MCNC: 159 MG/DL (CALC)
POTASSIUM SERPL-SCNC: 4.7 MMOL/L (ref 3.5–5.3)
PROT SERPL-MCNC: 6.8 G/DL (ref 6.1–8.1)
SODIUM SERPL-SCNC: 143 MMOL/L (ref 135–146)
TRIGL SERPL-MCNC: 88 MG/DL

## 2022-12-09 PROCEDURE — 97140 MANUAL THERAPY 1/> REGIONS: CPT | Mod: PN,CQ

## 2022-12-09 PROCEDURE — 97110 THERAPEUTIC EXERCISES: CPT | Mod: PN,CQ

## 2022-12-09 NOTE — PROGRESS NOTES
"[facility]   Physical Therapy Treatment Note     Name: Lolly MCLAIN Fountain  Clinic Number: [id number]    Therapy Diagnosis:   Encounter Diagnoses   Name Primary?    Activity intolerance Yes    Acute pain of right shoulder     Decreased ROM of right shoulder     Dislocation of right shoulder joint, subsequent encounter      Physician: Taurus Romero MD    Visit Date: 12/9/2022    Physician Orders: PT Eval and Treat Patient sustained a right anterior inferior shoulder dislocation she is a first-time dislocator this occurred about 3 weeks ago.  Wished to start the patient on a rehab program to strengthen her internal rotators of the right shoulder and range of motion of the right shoulder 2 times a week x6 weeks.   Medical Diagnosis from Referral: Dislocation of right shoulder joint, initial encounter   Evaluation Date: 12/1/2022  Authorization Period Expiration: 11/15/2023   Plan of Care Expiration: 1/13/2023  Progress Note Due: 13/30/2022  Visit # / Visits authorized: 2/ 6 (3 total)   FOTO: Next survey due week of 12/12/2022     Precautions: Diabetes, Fall, and cancer     PTA Visit #: 1/5     Time In: 0759  Time Out: 0844  Total Billable Time: 45 minutes    SUBJECTIVE     Pt reports: "I am doing good today. Not really any pain this morning."  She was compliant with home exercise program.  Response to previous treatment: "Felt good"  Functional change: None yet noted     Pain: 0/10  Location: right shoulder in the front    OBJECTIVE     Objective Measures updated at progress report unless specified.     Treatment     Lolly received the treatments listed below:      therapeutic exercises to develop strength and flexibility for 37 minutes including:  UBE L1 4' forward/4' back  Upper traps stretch R only 3x30s (modified)  Levator scapula stretch R only 3x30s (modified)  Scapular squeezes 10x3s ea  Isometric shoulder exercises R 2x10 ea   Flexion   Extension   Abduction   Adduction   Internal " rotation   External rotation  R Sh AAROM w/1# bar x10 ea   Flexion   Abduction   Extension   IR behind back   ER    manual therapy techniques: PROM, scapular scouring and kinesiology taping were applied to the: R shoulder for 8 minutes, including:  Gentle PROM to R shoulder (avoiding end range rotation)  Scapular scouring to R shoulder in L sidelying  (Discontinued) Kinesiology taping for anterior shoulder instability: Y strip for Deltoid inhibition: Anchored with no tension inferior to deltoid tuberosity of the humerus.  Patient moved into ~45* shoulder abduction and some external rotation. Applied anterior tail with 15-25% tension distal to proximal along the curvature of the anterior deltoid terminating near clavicle with no tension.  Patient then moved into adduction and horizontal flexion.  Applied posterior tail with 15-25% tension distal to proximal along the curvature of the posterior deltoid terminating near spine of scapula with no tension.  Rubbed to activate adhesive.  I strip: tear paper backing from middle 1/3 of tape, applying that with 75% downward tension between AC joint and lateral edge of humeral head. Patient moved into abduction and ER.  Anterior end laid down with no tension, patient moved into adduction and horizontal flexion and posterior end laid down  with no tension.  Rubbed to initiate adhesive.    I strip: Patient shoulder placed in internal rotation. [place] placed between anterior fibers of anterior deltoid and SC joint with no tension.  Base of kinesiology I strip aimed towards central region of humeral head, applying 75% tension with downward pressure around anterior and middle deltoid region, terminating I strip with no tension over posterior shoulder.  Rubbed adhesive to activate.        Patient Education and Home Exercises     Home Exercises Provided and Patient Education Provided     Education provided:   - The patient was instructed in increased therapeutic exercise listed above  in bold print.    Written Home Exercises Provided: yes (entered into patient instructions in chart). Exercises were reviewed and Lolly was able to demonstrate them prior to the end of the session.  Lolly demonstrated good  understanding of the education provided. See EMR under Patient Instructions for exercises provided during therapy sessions    ASSESSMENT     The patient tolerated today's treatment session. Lolly Ramírez was progressed with additional therapeutic exercises as stated above in order to promote increased range of motion, strength, and postural correction. Patient requires frequent verbal cues in order to maintain proper posture. During passive range of motion stretches the patient began to cramp with shoulder abduction and soft tissue mobilizations to biceps and deltoids were then provided to help alleviate. Patient deferred modalities post.    Lolly Is progressing well towards her goals.   Pt prognosis is Fair.     Pt will continue to benefit from skilled outpatient physical therapy to address the deficits listed in the problem list box on initial evaluation, provide pt/family education and to maximize pt's level of independence in the home and community environment.     Pt's spiritual, cultural and educational needs considered and pt agreeable to plan of care and goals.     Anticipated barriers to physical therapy: Patient may not be consistent in following precautions to protect R shoulder (as indicated by reported activities and not using sling)     Goals:   Short Term Goals: 3 weeks   1) Decrease max pain to < 7/10 Gradually progressing  2) Facilitate improved posture in sitting/standing Gradually progressing  3) Facilitate improved upper quadrant flexibility  Gradually progressing  4) Patient will ricco/doff pull over blouse with no more than minimal difficulty 7 of 10 trials Ongoing     Long Term Goals: 6 weeks   1) Patient will consistently perform upper quadrant bathing/dressing activities  without difficulty or discomfort Ongoing  2) Patient will consistently utilize R UE to reach into overhead cabinet to retrieve/put away dishes with no more than minimal discomfort Ongoing  3) Patient will consistently utilize R UE to remove hot dish from oven without fear/discomfort Ongoing  4) Patient will resume her usual housework activities without significant increase in shoulder discomfort Ongoing  5) Improve functional deficit to < 30% as indicated by FOTO shoulder survey Ongoing  6) Patient will be independent in complete HEP for ROM, flexibility, strengthening and stabilization Progressing    PLAN     The patient is to be progressed within the established plan of care as tolerated in order to accomplish goals stated above. Plan to incorporate pulley exercise and increased postural strengthening in subsequent sessions.    Eda Marrufo, PTA

## 2022-12-12 ENCOUNTER — CLINICAL SUPPORT (OUTPATIENT)
Dept: REHABILITATION | Facility: HOSPITAL | Age: 62
End: 2022-12-12
Attending: ORTHOPAEDIC SURGERY
Payer: MEDICARE

## 2022-12-12 DIAGNOSIS — M25.511 ACUTE PAIN OF RIGHT SHOULDER: ICD-10-CM

## 2022-12-12 DIAGNOSIS — M25.611 DECREASED ROM OF RIGHT SHOULDER: ICD-10-CM

## 2022-12-12 DIAGNOSIS — S43.004D DISLOCATION OF RIGHT SHOULDER JOINT, SUBSEQUENT ENCOUNTER: ICD-10-CM

## 2022-12-12 DIAGNOSIS — R68.89 ACTIVITY INTOLERANCE: Primary | ICD-10-CM

## 2022-12-12 PROCEDURE — 97140 MANUAL THERAPY 1/> REGIONS: CPT | Mod: PN,CQ

## 2022-12-12 PROCEDURE — 97110 THERAPEUTIC EXERCISES: CPT | Mod: PN,CQ

## 2022-12-12 NOTE — PROGRESS NOTES
"[facility]   Physical Therapy Treatment Note     Name: Lolly Ramírez  Clinic Number: [id number]    Therapy Diagnosis:   Encounter Diagnoses   Name Primary?    Activity intolerance Yes    Acute pain of right shoulder     Decreased ROM of right shoulder     Dislocation of right shoulder joint, subsequent encounter      Physician: Taurus Romero MD    Visit Date: 12/12/2022    Physician Orders: PT Eval and Treat Patient sustained a right anterior inferior shoulder dislocation she is a first-time dislocator this occurred about 3 weeks ago.  Wished to start the patient on a rehab program to strengthen her internal rotators of the right shoulder and range of motion of the right shoulder 2 times a week x6 weeks.   Medical Diagnosis from Referral: Dislocation of right shoulder joint, initial encounter   Evaluation Date: 12/1/2022  Authorization Period Expiration: 11/15/2023   Plan of Care Expiration: 1/13/2023  Progress Note Due: 13/30/2022  Visit # / Visits authorized: 3/ 6 (4 total)   FOTO: Next survey due week of 12/12/2022     Precautions: Diabetes, Fall, and cancer     PTA Visit #: 2/5     Time In: 0802  Time Out: 0850  Total Billable Time: 48 minutes    SUBJECTIVE     Pt reports: "I threw the covers back early this morning (ER), and it hurt a lot." "I can't stand without it hurting my feet. My doctor might send me here for that and balance."  She was compliant with home exercise program.  Response to previous treatment: "Tired"  Functional change: None yet noted     Pain: 0/10  Location: right shoulder in the front    OBJECTIVE     Objective Measures updated at progress report unless specified.     Treatment     Lolly received the treatments listed below:      therapeutic exercises to develop strength and flexibility for 40 minutes including:  UBE lvl2 4' forward/4' back  Pulley exercise x1'   Flexion   Abduction  Upper traps stretch R only 3x30s (modified)  Levator scapula stretch R " only 3x30s (modified)  Resisted rows w/scap squeezes red TB x10   B Sh w/scap squeezes red TB x10  Isometric shoulder exercises R 2x10 ea (with ball, instead of towel today)   Flexion   Extension   Abduction   Adduction   Internal rotation   External rotation  R Sh AAROM w/1# bar x10 ea   Flexion   Abduction   Extension   IR behind back   ER    manual therapy techniques: PROM, scapular scouring and kinesiology taping were applied to the: R shoulder for 8 minutes, including:  Gentle PROM to R shoulder (avoiding end range rotation)  Scapular scouring to R shoulder in L sidelying  (Discontinued) Kinesiology taping for anterior shoulder instability: Y strip for Deltoid inhibition: Anchored with no tension inferior to deltoid tuberosity of the humerus.  Patient moved into ~45* shoulder abduction and some external rotation. Applied anterior tail with 15-25% tension distal to proximal along the curvature of the anterior deltoid terminating near clavicle with no tension.  Patient then moved into adduction and horizontal flexion.  Applied posterior tail with 15-25% tension distal to proximal along the curvature of the posterior deltoid terminating near spine of scapula with no tension.  Rubbed to activate adhesive.  I strip: tear paper backing from middle 1/3 of tape, applying that with 75% downward tension between AC joint and lateral edge of humeral head. Patient moved into abduction and ER.  Anterior end laid down with no tension, patient moved into adduction and horizontal flexion and posterior end laid down  with no tension.  Rubbed to initiate adhesive.    I strip: Patient shoulder placed in internal rotation. [place] placed between anterior fibers of anterior deltoid and SC joint with no tension.  Base of kinesiology I strip aimed towards central region of humeral head, applying 75% tension with downward pressure around anterior and middle deltoid region, terminating I strip with no tension over posterior shoulder.   "Rubbed adhesive to activate.      Patient Education and Home Exercises     Home Exercises Provided and Patient Education Provided     Education provided:   - The patient was instructed in additional therapeutic exercises stated above in bold print.    Written Home Exercises Provided: yes (entered into patient instructions in chart). Exercises were reviewed and Lolly was able to demonstrate them prior to the end of the session.  Lolly demonstrated good  understanding of the education provided. See EMR under Patient Instructions for exercises provided during therapy sessions    ASSESSMENT     Patient reported to PT with scratches and bruising; patient explains from drawing blood and scratches from dog. The patient tolerated today's treatment session well. Lolly was progressed with increased range of motion and postural strengthening exercises. Patient reports feelings of "tiredness" towards the end of treatment. The patient continues to require verbal cues for postural correction. Slight crepitus noted during scapular scouring.    Lolly Is progressing well towards her goals.   Pt prognosis is Fair.     Pt will continue to benefit from skilled outpatient physical therapy to address the deficits listed in the problem list box on initial evaluation, provide pt/family education and to maximize pt's level of independence in the home and community environment.     Pt's spiritual, cultural and educational needs considered and pt agreeable to plan of care and goals.     Anticipated barriers to physical therapy: Patient may not be consistent in following precautions to protect R shoulder (as indicated by reported activities and not using sling)     Goals:   Short Term Goals: 3 weeks   1) Decrease max pain to < 7/10 Gradually progressing  2) Facilitate improved posture in sitting/standing Gradually progressing  3) Facilitate improved upper quadrant flexibility  Gradually progressing  4) Patient will ricco/doff pull over blouse " with no more than minimal difficulty 7 of 10 trials Ongoing     Long Term Goals: 6 weeks   1) Patient will consistently perform upper quadrant bathing/dressing activities without difficulty or discomfort Ongoing  2) Patient will consistently utilize R UE to reach into overhead cabinet to retrieve/put away dishes with no more than minimal discomfort Ongoing  3) Patient will consistently utilize R UE to remove hot dish from oven without fear/discomfort Ongoing  4) Patient will resume her usual housework activities without significant increase in shoulder discomfort Ongoing  5) Improve functional deficit to < 30% as indicated by FOTO shoulder survey Ongoing  6) Patient will be independent in complete HEP for ROM, flexibility, strengthening and stabilization Progressing    PLAN     The patient is to be progressed within the established plan of care as tolerated in order to accomplish goals stated above. Plan to incorporate increased ROM and postural strengthening in subsequent sessions, as well as incorporating functional exercises.    Eda Marrufo, PTA

## 2022-12-16 ENCOUNTER — CLINICAL SUPPORT (OUTPATIENT)
Dept: REHABILITATION | Facility: HOSPITAL | Age: 62
End: 2022-12-16
Attending: ORTHOPAEDIC SURGERY
Payer: MEDICARE

## 2022-12-16 DIAGNOSIS — S43.004D DISLOCATION OF RIGHT SHOULDER JOINT, SUBSEQUENT ENCOUNTER: ICD-10-CM

## 2022-12-16 DIAGNOSIS — R68.89 ACTIVITY INTOLERANCE: Primary | ICD-10-CM

## 2022-12-16 DIAGNOSIS — M25.611 DECREASED ROM OF RIGHT SHOULDER: ICD-10-CM

## 2022-12-16 DIAGNOSIS — M25.511 ACUTE PAIN OF RIGHT SHOULDER: ICD-10-CM

## 2022-12-16 PROCEDURE — 97110 THERAPEUTIC EXERCISES: CPT | Mod: PN

## 2022-12-16 NOTE — PROGRESS NOTES
"[facility]   Physical Therapy Treatment Note     Name: Lolly MCLAIN Darrell  Clinic Number: [id number]    Therapy Diagnosis:   Encounter Diagnoses   Name Primary?    Activity intolerance Yes    Decreased ROM of right shoulder     Acute pain of right shoulder     Dislocation of right shoulder joint, subsequent encounter      Physician: Taurus Romero MD    Visit Date: 12/16/2022    Physician Orders: PT Eval and Treat Patient sustained a right anterior inferior shoulder dislocation she is a first-time dislocator this occurred about 3 weeks ago.  Wished to start the patient on a rehab program to strengthen her internal rotators of the right shoulder and range of motion of the right shoulder 2 times a week x6 weeks.   Medical Diagnosis from Referral: Dislocation of right shoulder joint, initial encounter   Evaluation Date: 12/1/2022  Authorization Period Expiration: 11/15/2023   Plan of Care Expiration: 1/13/2023  Progress Note Due: 13/30/2022  Visit # / Visits authorized: 4/ 6 (5 total)   FOTO: Survey completed 12/16/2022  45% residual deficit     Precautions: Diabetes, Fall, and cancer     PTA Visit #: 2/5     Time In: 0800  Time Out:0850  Total Billable Time: 43 minutes    SUBJECTIVE     Pt reports: "As long as I don't do anything painful it's good."  She was compliant with home exercise program.  Response to previous treatment: "Just tired"  Functional change: Can reach behind the back of my neck      Pain: 0/10  Location: right shoulder in the front    OBJECTIVE     Objective Measures updated at progress report unless specified.     Treatment     Lolly received the treatments listed below:      therapeutic exercises to develop strength and flexibility for 37 minutes including:  UBE lvl2 4' forward/4' back  Pulley exercise x1'   Flexion   Abduction  Upper traps stretch B 3x30s (modified)  Levator scapula stretch B 3x30s (modified)  Resisted rows w/scap squeezes red TB 2 x10   B Sh w/scap " squeezes red TB 2 x10   B shoulder ER w/scap squeeze red tband 2x10   R shoulder IR using red tband 2x10   R shoulder adduction w/red tband 2x10  Isometric shoulder exercises R 2x10 ea    Flexion   Extension   Abduction   Adduction   Internal rotation   External rotation  R Sh AAROM w/2# bar x10 ea   Flexion   Abduction   Extension   IR behind back   ER    manual therapy techniques: PROM, scapular scouring and kinesiology taping were applied to the: R shoulder for 6 minutes, including:  Gentle PROM to R shoulder (avoiding end range rotation)  Scapular scouring to R shoulder in L sidelying    Patient Education and Home Exercises     Home Exercises Provided and Patient Education Provided     Education provided:   - The patient was instructed in additional therapeutic exercises stated above in bold print.    Written Home Exercises Provided: Instructed patient to continue prior HEP. Exercises were reviewed and Lolly was able to demonstrate them prior to the end of the session.  Lolly demonstrated good  understanding of the education provided. See EMR under Patient Instructions for exercises provided during therapy sessions    ASSESSMENT     Patient able to achieve full active flexion of R shoulder but experienced discomfort when lowering arm back to neutral position.  End range discomfort with shoulder external rotation.  Strength is improving.  Endurance for rhythmic stabilization improving     Lolly Is progressing well towards her goals.   Pt prognosis is Fair.     Pt will continue to benefit from skilled outpatient physical therapy to address the deficits listed in the problem list box on initial evaluation, provide pt/family education and to maximize pt's level of independence in the home and community environment.     Pt's spiritual, cultural and educational needs considered and pt agreeable to plan of care and goals.     Anticipated barriers to physical therapy: Patient may not be consistent in following  precautions to protect R shoulder (as indicated by reported activities and not using sling)     Goals:   Short Term Goals: 3 weeks   1) Decrease max pain to < 7/10 Gradually progressing  2) Facilitate improved posture in sitting/standing Progressing  3) Facilitate improved upper quadrant flexibility  Progressing  4) Patient will ricco/doff pull over blouse with no more than minimal difficulty 7 of 10 trials Minimal progress     Long Term Goals: 6 weeks   1) Patient will consistently perform upper quadrant bathing/dressing activities without difficulty or discomfort Minimal progress  2) Patient will consistently utilize R UE to reach into overhead cabinet to retrieve/put away dishes with no more than minimal discomfort Minimal progress  3) Patient will consistently utilize R UE to remove hot dish from oven without fear/discomfort Ongoing  4) Patient will resume her usual housework activities without significant increase in shoulder discomfort Ongoing  5) Improve functional deficit to < 30% as indicated by FOTO shoulder survey Progressing  6) Patient will be independent in complete HEP for ROM, flexibility, strengthening and stabilization Progressing    PLAN     Add serratus punch, modified plank (using parallel bar if needed).  Hold use of kinesiology taping due to skin irritation.      Lolly Villafana, PT

## 2022-12-19 ENCOUNTER — CLINICAL SUPPORT (OUTPATIENT)
Dept: REHABILITATION | Facility: HOSPITAL | Age: 62
End: 2022-12-19
Attending: ORTHOPAEDIC SURGERY
Payer: MEDICARE

## 2022-12-19 DIAGNOSIS — R68.89 ACTIVITY INTOLERANCE: Primary | ICD-10-CM

## 2022-12-19 DIAGNOSIS — S43.004D DISLOCATION OF RIGHT SHOULDER JOINT, SUBSEQUENT ENCOUNTER: ICD-10-CM

## 2022-12-19 DIAGNOSIS — M25.611 DECREASED ROM OF RIGHT SHOULDER: ICD-10-CM

## 2022-12-19 DIAGNOSIS — M25.511 ACUTE PAIN OF RIGHT SHOULDER: ICD-10-CM

## 2022-12-19 PROCEDURE — 97110 THERAPEUTIC EXERCISES: CPT | Mod: PN,CQ

## 2022-12-19 PROCEDURE — 97140 MANUAL THERAPY 1/> REGIONS: CPT | Mod: PN,CQ

## 2022-12-19 NOTE — PROGRESS NOTES
"[facility]   Physical Therapy Treatment Note     Name: Lolly Lozanoncer  Clinic Number: [id number]    Therapy Diagnosis:   Encounter Diagnoses   Name Primary?    Activity intolerance Yes    Acute pain of right shoulder     Decreased ROM of right shoulder     Dislocation of right shoulder joint, subsequent encounter      Physician: Taurus Romero MD    Visit Date: 12/19/2022    Physician Orders: PT Eval and Treat Patient sustained a right anterior inferior shoulder dislocation she is a first-time dislocator this occurred about 3 weeks ago.  Wished to start the patient on a rehab program to strengthen her internal rotators of the right shoulder and range of motion of the right shoulder 2 times a week x6 weeks.   Medical Diagnosis from Referral: Dislocation of right shoulder joint, initial encounter   Evaluation Date: 12/1/2022  Authorization Period Expiration: 11/15/2023   Plan of Care Expiration: 1/13/2023  Progress Note Due: 13/30/2022  Visit # / Visits authorized: 5/ 6 (6 total)   FOTO: Survey completed 12/16/2022  45% residual deficit     Precautions: Diabetes, Fall, and cancer     PTA Visit #: 1/5     Time In: 0802  Time Out: 0910  Total Billable Time: 55 minutes    SUBJECTIVE     Pt reports: "I helped my  move furniture yesterday, so I am sore and tired."  She was compliant with home exercise program.  Response to previous treatment: "was good"  Functional change: Can reach behind the back of my neck      Pain: 0/10  Location: right shoulder in the front    OBJECTIVE     Objective Measures updated at progress report unless specified.     Treatment     Lolly received the treatments listed below:      therapeutic exercises to develop strength and flexibility for 45 minutes including:  UBE lvl2.5 4' forward/4' back  Pulley exercise x2'   Flexion   Abduction   IR behind back  Upper traps stretch B 3x30s (modified)  Levator scapula stretch B 3x30s (modified)  Rhomboid/middle " trap stretch 3x30s ea  Resisted rows w/scap squeezes red TB 2 x10   B Sh w/scap squeezes red TB 2 x10   B shoulder ER w/scap squeeze red tband 2x10   R shoulder IR using red tband 2x10   R shoulder adduction w/red tband 2x10  Isometric shoulder exercises R 2x10 ea    Flexion   Extension   Abduction   Adduction   Internal rotation   External rotation  R Sh AAROM w/2# bar x10 ea   Flexion   Abduction   Extension   IR behind back   ER  Supine serratus punch w2# x10    manual therapy techniques: PROM, scapular scouring and kinesiology taping were applied to the: R shoulder for 10 minutes, including:  Gentle PROM to R shoulder (avoiding end range rotation)  Scapular scouring to R shoulder in L sidelying  STM to biceps    Patient Education and Home Exercises     Home Exercises Provided and Patient Education Provided     Education provided:   - The patient was instructed in increased therapeutic exercise listed above in bold print.    Written Home Exercises Provided: Instructed patient to continue prior HEP. Exercises were reviewed and Lolly was able to demonstrate them prior to the end of the session.  Lolly demonstrated good  understanding of the education provided. See EMR under Patient Instructions for exercises provided during therapy sessions    ASSESSMENT     The patient tolerated today's treatment session well with minimal to moderate discomfort at times due to soreness from patient moving furniture. Darrell was slightly progressed with increased therapeutic exercise stated above. Patient continues to demonstrate rounded shoulder posture, correcting with visual cues. Manual techniques were provided post in order to promote increase range of motion and scapulothoracic mobility. Patient felt well post session per report.    Lolly Is progressing well towards her goals.   Pt prognosis is Fair.     Pt will continue to benefit from skilled outpatient physical therapy to address the deficits listed in the problem list box  on initial evaluation, provide pt/family education and to maximize pt's level of independence in the home and community environment.     Pt's spiritual, cultural and educational needs considered and pt agreeable to plan of care and goals.     Anticipated barriers to physical therapy: Patient may not be consistent in following precautions to protect R shoulder (as indicated by reported activities and not using sling)     Goals:   Short Term Goals: 3 weeks   1) Decrease max pain to < 7/10 Gradually progressing  2) Facilitate improved posture in sitting/standing Progressing  3) Facilitate improved upper quadrant flexibility  Progressing  4) Patient will ricco/doff pull over blouse with no more than minimal difficulty 7 of 10 trials Minimal progress     Long Term Goals: 6 weeks   1) Patient will consistently perform upper quadrant bathing/dressing activities without difficulty or discomfort Minimal progress  2) Patient will consistently utilize R UE to reach into overhead cabinet to retrieve/put away dishes with no more than minimal discomfort Minimal progress  3) Patient will consistently utilize R UE to remove hot dish from oven without fear/discomfort Ongoing  4) Patient will resume her usual housework activities without significant increase in shoulder discomfort Gradually progressing  5) Improve functional deficit to < 30% as indicated by FOTO shoulder survey Progressing  6) Patient will be independent in complete HEP for ROM, flexibility, strengthening and stabilization Progressing    PLAN     The patient is to be progressed within the established plan of care as tolerated in order to accomplish goals stated above and increase function. Plan to incorporate modified plank (using parallel bar if needed) in subsequent session(s).     Eda Marrufo, PTA

## 2022-12-23 ENCOUNTER — CLINICAL SUPPORT (OUTPATIENT)
Dept: REHABILITATION | Facility: HOSPITAL | Age: 62
End: 2022-12-23
Attending: ORTHOPAEDIC SURGERY
Payer: MEDICARE

## 2022-12-23 DIAGNOSIS — M25.511 ACUTE PAIN OF RIGHT SHOULDER: ICD-10-CM

## 2022-12-23 DIAGNOSIS — M25.611 DECREASED ROM OF RIGHT SHOULDER: ICD-10-CM

## 2022-12-23 DIAGNOSIS — R68.89 ACTIVITY INTOLERANCE: Primary | ICD-10-CM

## 2022-12-23 DIAGNOSIS — S43.004D DISLOCATION OF RIGHT SHOULDER JOINT, SUBSEQUENT ENCOUNTER: ICD-10-CM

## 2022-12-23 PROCEDURE — 97110 THERAPEUTIC EXERCISES: CPT | Mod: PN

## 2022-12-23 PROCEDURE — 97140 MANUAL THERAPY 1/> REGIONS: CPT | Mod: PN

## 2022-12-23 NOTE — PROGRESS NOTES
"[facility]   Physical Therapy Treatment Note     Name: Lolly Lozanoncer  Clinic Number: [id number]    Therapy Diagnosis:   Encounter Diagnoses   Name Primary?    Activity intolerance     Decreased ROM of right shoulder     Acute pain of right shoulder     Dislocation of right shoulder joint, subsequent encounter Yes     Physician: Taurus Romero MD    Visit Date: 12/23/2022    Physician Orders: PT Eval and Treat Patient sustained a right anterior inferior shoulder dislocation she is a first-time dislocator this occurred about 3 weeks ago.  Wished to start the patient on a rehab program to strengthen her internal rotators of the right shoulder and range of motion of the right shoulder 2 times a week x6 weeks.   Medical Diagnosis from Referral: Dislocation of right shoulder joint, initial encounter   Evaluation Date: 12/1/2022  Authorization Period Expiration: 11/15/2023   Plan of Care Expiration: 1/13/2023  Progress Note Due: 12/30/2022  Visit # / Visits authorized: 6/ 6 (7 total)   FOTO: Survey completed 12/16/2022  45% residual deficit     Precautions: Diabetes, Fall, and cancer     PTA Visit #: 0/5     Time In: 0805  Time Out: 0910  Total Billable Time: 55 minutes    SUBJECTIVE     Pt reports: "You don't know how many muscles you use when wrapping presents."  She was compliant with home exercise program.  Response to previous treatment: "muscles get tired"  Functional change: Able to ricco shirt without significant pain.        Pain: 0/10  Location: right shoulder in the front    OBJECTIVE     Objective Measures updated at progress report unless specified.     Treatment     Lolly received the treatments listed below:      therapeutic exercises to develop strength and flexibility for 45 minutes including:  UBE lvl2.5 4' forward/4' back  Pulley exercise x2'   Flexion   Abduction   IR behind back  Upper traps stretch B 3x30s (modified)  Levator scapula stretch B 3x30s " (modified)  Rhomboid/middle trap stretch 3x30s ea  Resisted rows w/scap squeezes red TB 2 x10   B Sh w/scap squeezes red TB 2 x10   B shoulder ER w/scap squeeze red tband 2x10   R shoulder IR using red tband 2x10   R shoulder adduction w/red tband 2x10  R Sh AAROM w/2# bar x10 ea   Flexion (supine)   Extension   IR behind back  Supine serratus punch w2# x10  Rhythmic stabilization Supine x 5 minutes   ER/IR (shoulder near 90* abduction, multi position)   Flexion/extension, abduction/adduction with shoulder flexed to 90*  Weight bearing through R UE in L sidelying with shoulder in abduction and in supine with shoulder in 45* flexion  Sidelying shoulder ER x 20    Did not perform this date:   Isometric shoulder exercises R 2x10 ea    Flexion   Extension   Abduction   Adduction   Internal rotation   External rotation  Cane assisted exercises  Abduction  ER    manual therapy techniques: PROM, scapular scouring were applied to the: R shoulder for 10 minutes, including:  Gentle PROM to R shoulder (avoiding end range rotation)  Scapular scouring to R shoulder in L sidelying    Patient Education and Home Exercises     Home Exercises Provided and Patient Education Provided     Education provided:   - Verbal cues for rhythmic stabilization.    Written Home Exercises Provided: Instructed patient to continue prior HEP. Exercises were reviewed and Lolly was able to demonstrate them prior to the end of the session.  Lolly demonstrated good  understanding of the education provided. See EMR under Patient Instructions for exercises provided during therapy sessions    ASSESSMENT     Patient reported intermittent discomfort that occurred primarily @ ~ 80* flexion, particularly with eccentric control.  She tolerated additional exercises without significant increase in symptoms.  Unable to have patient perform weight bearing exercises in standing due to complaints of ankle/Achilles tendon pain that worsened with LE weight bearing.       Lolly Is progressing fairly well towards her goals.   Pt prognosis is Fair.     Pt will continue to benefit from skilled outpatient physical therapy to address the deficits listed in the problem list box on initial evaluation, provide pt/family education and to maximize pt's level of independence in the home and community environment.     Pt's spiritual, cultural and educational needs considered and pt agreeable to plan of care and goals.     Anticipated barriers to physical therapy: Patient may not be consistent in following precautions to protect R shoulder (as indicated by reported activities and not using sling)     Goals:   Short Term Goals: 3 weeks   1) Decrease max pain to < 7/10 Progressing  2) Facilitate improved posture in sitting/standing Progressing  3) Facilitate improved upper quadrant flexibility  Progressing  4) Patient will ricco/doff pull over blouse with no more than minimal difficulty 7 of 10 trials Progressing     Long Term Goals: 6 weeks   1) Patient will consistently perform upper quadrant bathing/dressing activities without difficulty or discomfort Minimal progress  2) Patient will consistently utilize R UE to reach into overhead cabinet to retrieve/put away dishes with no more than minimal discomfort Minimal progress  3) Patient will consistently utilize R UE to remove hot dish from oven without fear/discomfort Ongoing  4) Patient will resume her usual housework activities without significant increase in shoulder discomfort Gradually progressing  5) Improve functional deficit to < 30% as indicated by FOTO shoulder survey Progressing  6) Patient will be independent in complete HEP for ROM, flexibility, strengthening and stabilization Progressing    PLAN     Add prone exercises to improve scapular stability and shoulder strength.      Lolly Villafana, PT

## 2022-12-27 ENCOUNTER — CLINICAL SUPPORT (OUTPATIENT)
Dept: REHABILITATION | Facility: HOSPITAL | Age: 62
End: 2022-12-27
Attending: ORTHOPAEDIC SURGERY
Payer: MEDICARE

## 2022-12-27 DIAGNOSIS — M25.511 ACUTE PAIN OF RIGHT SHOULDER: ICD-10-CM

## 2022-12-27 DIAGNOSIS — M25.611 DECREASED ROM OF RIGHT SHOULDER: ICD-10-CM

## 2022-12-27 DIAGNOSIS — S43.004D DISLOCATION OF RIGHT SHOULDER JOINT, SUBSEQUENT ENCOUNTER: ICD-10-CM

## 2022-12-27 DIAGNOSIS — R68.89 ACTIVITY INTOLERANCE: Primary | ICD-10-CM

## 2022-12-27 PROCEDURE — 97110 THERAPEUTIC EXERCISES: CPT | Mod: PN,CQ

## 2022-12-27 PROCEDURE — 97140 MANUAL THERAPY 1/> REGIONS: CPT | Mod: PN,CQ

## 2022-12-27 NOTE — PROGRESS NOTES
"[facility]   Physical Therapy Treatment Note     Name: Lolly Lozanoncer  Clinic Number: [id number]    Therapy Diagnosis:   Encounter Diagnoses   Name Primary?    Activity intolerance Yes    Acute pain of right shoulder     Decreased ROM of right shoulder     Dislocation of right shoulder joint, subsequent encounter      Physician: Taurus Romero MD    Visit Date: 12/27/2022    Physician Orders: PT Eval and Treat Patient sustained a right anterior inferior shoulder dislocation she is a first-time dislocator this occurred about 3 weeks ago.  Wished to start the patient on a rehab program to strengthen her internal rotators of the right shoulder and range of motion of the right shoulder 2 times a week x6 weeks.   Medical Diagnosis from Referral: Dislocation of right shoulder joint, initial encounter   Evaluation Date: 12/1/2022  Authorization Period Expiration: 11/15/2023   Plan of Care Expiration: 1/13/2023  Progress Note Due: 12/30/2022  Visit # / Visits authorized: 7/ 6 (8 total)   FOTO: Survey completed 12/16/2022  45% residual deficit     Precautions: Diabetes, Fall, and cancer     PTA Visit #: 1/5     Time In: 0800  Time Out: 0900  Total Billable Time: 45 minutes (due to overlapping with other pt)    SUBJECTIVE     Pt reports: "I am stiff, but no pain."  She was compliant with home exercise program.  Response to previous treatment: "muscles get tired"  Functional change: Able to ricco shirt without significant pain.        Pain: 0/10  Location: right shoulder in the front    OBJECTIVE     Objective Measures updated at progress report unless specified.     Treatment     Lolly received the treatments listed below:      therapeutic exercises to develop strength and flexibility for 37 minutes (due to overlapping with other pt) including:  UBE lvl3 4' forward/4' back  Pulley exercise x2'   Flexion   Abduction   IR behind back  Upper traps stretch B 3x30s (modified)  Levator scapula " stretch B 3x30s (modified)  Rhomboid/middle trap stretch 3x30s ea  Resisted rows w/scap squeezes red TB 2 x10   B Sh ext w/scap squeezes red TB 2 x10   B shoulder ER w/scap squeeze red tband x20   R shoulder IR using red tband x20   R shoulder adduction w/red tband x20  R Sh AAROM w/2# bar x15 ea   Flexion (supine)   Extension   IR behind back  Supine serratus punch w2# x15  Rhythmic stabilization Supine x 5 minutes   ER/IR (shoulder near 90* abduction, multi position)   Flexion/extension, abduction/adduction with shoulder flexed to 90*  Weight bearing through R UE in L sidelying with shoulder in abduction and in supine with shoulder in 45* flexion  Sidelying shoulder ER x 20  Prone shoulder exercises x10 ea   Ws   Flexion   Extension   Abduction    Did not perform this date:   Isometric shoulder exercises R 2x10 ea    Flexion   Extension   Abduction   Adduction   Internal rotation   External rotation  Cane assisted exercises  Abduction  ER    manual therapy techniques: PROM, scapular scouring were applied to the: R shoulder for 8 minutes, including:  Gentle PROM to R shoulder (avoiding end range rotation)  Scapular scouring to R shoulder in L sidelying    Patient Education and Home Exercises     Home Exercises Provided and Patient Education Provided     Education provided:   - The patient was instructed in additional therapeutic exercise stated above.    Written Home Exercises Provided: Instructed patient to continue prior HEP. Exercises were reviewed and Lolly was able to demonstrate them prior to the end of the session.  Lolly demonstrated good  understanding of the education provided. See EMR under Patient Instructions for exercises provided during therapy sessions    ASSESSMENT     Patient entered the clinic with bruises and scratches on her arm with explanation of medication and pets causing. The patient still continues to demonstrate bilateral rounded shoulder posture, requiring verbal cues to correct  "throughout treatment, which decreases discomfort. Lolly Ramírez was progressed with additional therapeutic exercises, targeting shoulder and scapular strengthening and stabilization. Patient experiences slight discomfort near bicep long head on and off throughout treatment, reducing with postural correction. Lolly still experiences limited shoulder range of motion in flexion and abduction, actively and passively, experiencing discomfort as noted above. Manual techniques were provided in order to promote increased passive range of motion and scapulothoracic rhythm. Patient is eager to receive treatment for her "feet," which orders where just received today, and the PT is out of the clinic on vacation, as well as patient actively seeking treatment for shoulder. Will discuss with PT upon returning to the clinic.    Lolly Is progressing fairly well towards her goals.   Pt prognosis is Fair.     Pt will continue to benefit from skilled outpatient physical therapy to address the deficits listed in the problem list box on initial evaluation, provide pt/family education and to maximize pt's level of independence in the home and community environment.     Pt's spiritual, cultural and educational needs considered and pt agreeable to plan of care and goals.     Anticipated barriers to physical therapy: Patient may not be consistent in following precautions to protect R shoulder (as indicated by reported activities and not using sling)     Goals:   Short Term Goals: 3 weeks   1) Decrease max pain to < 7/10 Progressing  2) Facilitate improved posture in sitting/standing Progressing  3) Facilitate improved upper quadrant flexibility  Progressing  4) Patient will ricco/doff pull over blouse with no more than minimal difficulty 7 of 10 trials Progressing     Long Term Goals: 6 weeks   1) Patient will consistently perform upper quadrant bathing/dressing activities without difficulty or discomfort Minimal progress  2) Patient will " consistently utilize R UE to reach into overhead cabinet to retrieve/put away dishes with no more than minimal discomfort Minimal progress  3) Patient will consistently utilize R UE to remove hot dish from oven without fear/discomfort Ongoing  4) Patient will resume her usual housework activities without significant increase in shoulder discomfort Gradually progressing  5) Improve functional deficit to < 30% as indicated by FOTO shoulder survey Progressing  6) Patient will be independent in complete HEP for ROM, flexibility, strengthening and stabilization Progressing    PLAN     The patient is to be progressed within the established plan of care as tolerated in order to accomplish goals as stated above. Plan to increase resistance band in subsequent session(s).    Eda Marrufo, PTA

## 2022-12-30 ENCOUNTER — CLINICAL SUPPORT (OUTPATIENT)
Dept: REHABILITATION | Facility: HOSPITAL | Age: 62
End: 2022-12-30
Attending: ORTHOPAEDIC SURGERY
Payer: MEDICARE

## 2022-12-30 DIAGNOSIS — R68.89 ACTIVITY INTOLERANCE: Primary | ICD-10-CM

## 2022-12-30 DIAGNOSIS — S43.004D DISLOCATION OF RIGHT SHOULDER JOINT, SUBSEQUENT ENCOUNTER: ICD-10-CM

## 2022-12-30 DIAGNOSIS — M25.611 DECREASED ROM OF RIGHT SHOULDER: ICD-10-CM

## 2022-12-30 DIAGNOSIS — M25.511 ACUTE PAIN OF RIGHT SHOULDER: ICD-10-CM

## 2022-12-30 PROCEDURE — 97140 MANUAL THERAPY 1/> REGIONS: CPT | Mod: PN,CQ

## 2022-12-30 PROCEDURE — 97110 THERAPEUTIC EXERCISES: CPT | Mod: PN,CQ

## 2022-12-30 NOTE — PROGRESS NOTES
"[facility]   Physical Therapy Treatment Note     Name: Lolly Lozanoncer  Clinic Number: [id number]    Therapy Diagnosis:   Encounter Diagnoses   Name Primary?    Activity intolerance Yes    Acute pain of right shoulder     Decreased ROM of right shoulder     Dislocation of right shoulder joint, subsequent encounter      Physician: Taurus Romero MD    Visit Date: 12/30/2022    Physician Orders: PT Eval and Treat Patient sustained a right anterior inferior shoulder dislocation she is a first-time dislocator this occurred about 3 weeks ago.  Wished to start the patient on a rehab program to strengthen her internal rotators of the right shoulder and range of motion of the right shoulder 2 times a week x6 weeks.   Medical Diagnosis from Referral: Dislocation of right shoulder joint, initial encounter   Evaluation Date: 12/1/2022  Authorization Period Expiration: 11/15/2023   Plan of Care Expiration: 1/13/2023  Progress Note Due: 12/30/2022  Visit # / Visits authorized: 8/ 6 (9 total)   FOTO: Survey completed 12/16/2022  45% residual deficit     Precautions: Diabetes, Fall, and cancer     PTA Visit #: 2/5     Time In: 0800  Time Out: 0853  Total Billable Time: 53 minutes    SUBJECTIVE     Pt reports: "No pain, but I am tired this morning, partially because I am keeping most of my grand-kids right now."  She was compliant with home exercise program.  Response to previous treatment: "sore and tired"  Functional change: Able to ricco shirt without significant pain.        Pain: 0/10  Location: right shoulder in the front    OBJECTIVE     Objective Measures updated at progress report unless specified.     Treatment     Lolly received the treatments listed below:      therapeutic exercises to develop strength and flexibility for 45 minutes including:  UBE lvl3 4' forward/4' back  Pulley exercise x2'   Flexion   Abduction   IR behind back  Upper traps stretch B 3x30s (modified)  Levator scapula " stretch B 3x30s (modified)  Rhomboid/middle trap stretch 3x30s ea  ABC Sh AROM x1  Resisted rows w/scap squeezes red TB 2 x10   B Sh ext w/scap squeezes red TB 2 x10   B shoulder ER w/scap squeeze red tband x20   R shoulder IR using red tband x20   R shoulder adduction w/red tband x20   Lat pulls w/red TB x10  R Sh AAROM w/2# bar x15 ea   Flexion (supine)   Abduction   Extension   IR behind back  Supine serratus punch w2# x20  Rhythmic stabilization Supine x 5 minutes   ER/IR (shoulder near 90* abduction, multi position)   Flexion/extension, abduction/adduction with shoulder flexed to 90*  Weight bearing through R UE in L sidelying with shoulder in abduction and in supine with shoulder in 45* flexion  Sidelying shoulder ER x 20  Prone shoulder exercises 2x10 ea   Ws   Flexion   Extension   Abduction    Did not perform this date:   Isometric shoulder exercises R 2x10 ea    Flexion   Extension   Abduction   Adduction   Internal rotation   External rotation  Cane assisted exercises  Abduction  ER    manual therapy techniques: PROM, scapular scouring were applied to the: R shoulder for 8 minutes, including:  Gentle PROM to R shoulder (avoiding end range rotation)  Scapular scouring to R shoulder in L sidelying    Patient Education and Home Exercises     Home Exercises Provided and Patient Education Provided     Education provided:   - The patient was instructed in additional therapeutic exercise as stated above in bold print.    Written Home Exercises Provided: Instructed patient to continue prior HEP. Exercises were reviewed and Lolly was able to demonstrate them prior to the end of the session.  Lolly demonstrated good  understanding of the education provided. See EMR under Patient Instructions for exercises provided during therapy sessions    ASSESSMENT     Patient entered the clinic demonstrating bilateral rounded shoulder posture, requiring verbal cues to correct throughout treatment. The patient tolerated today's  treatment session well. Lolly Ramírez was progressed with increased therapeutic exercise in order to promote increased range of motion and strength. Patient reports less discomfort with exercises with proper posture, reporting less today compared to previous. Darrell is gradually increasing with shoulder range of motion, demonstrating improvements mostly with flexion and abduction, but not yet full. Patient was excited that she could reach full active assistive range of motion with flexion during pulley exercise. Manual techniques were provided in order to promote increased range of motion and scapulothoracic rhythm.    Lolly Is progressing fairly well towards her goals.   Pt prognosis is Fair.     Pt will continue to benefit from skilled outpatient physical therapy to address the deficits listed in the problem list box on initial evaluation, provide pt/family education and to maximize pt's level of independence in the home and community environment.     Pt's spiritual, cultural and educational needs considered and pt agreeable to plan of care and goals.     Anticipated barriers to physical therapy: Patient may not be consistent in following precautions to protect R shoulder (as indicated by reported activities and not using sling)     Goals:   Short Term Goals: 3 weeks   1) Decrease max pain to < 7/10 Progressing  2) Facilitate improved posture in sitting/standing Progressing  3) Facilitate improved upper quadrant flexibility  Progressing  4) Patient will ricco/doff pull over blouse with no more than minimal difficulty 7 of 10 trials Progressing     Long Term Goals: 6 weeks   1) Patient will consistently perform upper quadrant bathing/dressing activities without difficulty or discomfort Gradually progressing  2) Patient will consistently utilize R UE to reach into overhead cabinet to retrieve/put away dishes with no more than minimal discomfort Minimal progress  3) Patient will consistently utilize R UE to remove  hot dish from oven without fear/discomfort Gradually progressing  4) Patient will resume her usual housework activities without significant increase in shoulder discomfort Gradually progressing  5) Improve functional deficit to < 30% as indicated by FOTO shoulder survey Progressing  6) Patient will be independent in complete HEP for ROM, flexibility, strengthening and stabilization Progressing    PLAN     The patient is to be progressed within the established plan of care as tolerated in order to accomplish goals as stated above. Plan to increase resistance band in subsequent session(s) and add wall wipes into flexion and abduction.    Eda Marrufo, PTA

## 2023-01-06 ENCOUNTER — CLINICAL SUPPORT (OUTPATIENT)
Dept: REHABILITATION | Facility: HOSPITAL | Age: 63
End: 2023-01-06
Attending: ORTHOPAEDIC SURGERY
Payer: MEDICARE

## 2023-01-06 DIAGNOSIS — M25.511 ACUTE PAIN OF RIGHT SHOULDER: ICD-10-CM

## 2023-01-06 DIAGNOSIS — R68.89 ACTIVITY INTOLERANCE: Primary | ICD-10-CM

## 2023-01-06 DIAGNOSIS — S43.004D DISLOCATION OF RIGHT SHOULDER JOINT, SUBSEQUENT ENCOUNTER: ICD-10-CM

## 2023-01-06 DIAGNOSIS — M25.611 DECREASED ROM OF RIGHT SHOULDER: ICD-10-CM

## 2023-01-06 PROCEDURE — 97140 MANUAL THERAPY 1/> REGIONS: CPT | Mod: PN

## 2023-01-06 PROCEDURE — 97110 THERAPEUTIC EXERCISES: CPT | Mod: PN

## 2023-01-06 NOTE — PROGRESS NOTES
"[facility]   Physical Therapy Treatment Note     Name: Lolly MCLAIN [facility]  Clinic Number: [id number]    Therapy Diagnosis:   Encounter Diagnoses   Name Primary?    Activity intolerance Yes    Acute pain of right shoulder     Decreased ROM of right shoulder     Dislocation of right shoulder joint, subsequent encounter      Physician: Taurus Romero MD    Visit Date: 1/6/2023    Physician Orders: PT Eval and Treat Patient sustained a right anterior inferior shoulder dislocation she is a first-time dislocator this occurred about 3 weeks ago.  Wished to start the patient on a rehab program to strengthen her internal rotators of the right shoulder and range of motion of the right shoulder 2 times a week x6 weeks.   Medical Diagnosis from Referral: Dislocation of right shoulder joint, initial encounter   Evaluation Date: 12/1/2022  Authorization Period Expiration: 11/15/2023   Plan of Care Expiration: 1/13/2023  Progress Note Due: 12/30/2022  Visit # / Visits authorized: 2/ 20 (10 total)   FOTO: Survey completed 12/16/2022  45% residual deficit     Precautions: Diabetes, Fall, and cancer     PTA Visit #: 0/5     Time In: 0802  Time Out: 0855  Total Billable Time: 28 minutes    SUBJECTIVE     Pt reports: "It doesn't hurt very often."  She was compliant with home exercise program.  Response to previous treatment: "sore and tired"  Functional change: Able to ricco shirt without significant pain.        Pain: 0/10  Location: right shoulder in the front    OBJECTIVE     Objective Measures updated at progress report unless specified.     Treatment     Lolly received the treatments listed below:      therapeutic exercises to develop strength and flexibility for 38 minutes including (part of session overlapping with another patient):  UBE lvl3 5' forward/5' back  Pulley exercise x2'   Flexion   Abduction   IR behind back  Upper traps stretch B 3x30s (modified)  Levator scapula stretch B 3x30s " (modified)  Rhomboid/middle trap stretch 3x30s ea  ABC Sh AROM x1  Resisted rows w/scap squeezes red TB 2 x10   B Sh ext w/scap squeezes red TB 2 x10   B shoulder ER w/scap squeeze red tband x20   R shoulder IR using red tband x20   R shoulder adduction w/red tband x20   Lat pulls w/red TB x10  R Sh AAROM w/2# bar x15 ea   Flexion (supine)   Abduction   Extension   IR behind back  Supine serratus punch w2# x20  Sidelying shoulder ER x 20  Prone shoulder exercises 2x10 ea   Ws   Flexion   Extension   Abduction   On elbows with side <> side weight shift    Did not perform this date:   Rhythmic stabilization Supine x 5 minutes   ER/IR (shoulder near 90* abduction, multi position)   Flexion/extension, abduction/adduction with shoulder flexed to 90*R 2x10 ea   Isometric shoulder exercises    Flexion   Extension   Abduction   Adduction   Internal rotation   External rotation    manual therapy techniques: PROM, scapular scouring were applied to the: R shoulder for 10 minutes, including:  Gentle PROM to R shoulder (avoiding end range rotation)  Scapular scouring to R shoulder in L sidelying    Patient Education and Home Exercises     Home Exercises Provided and Patient Education Provided     Education provided:   - The patient was instructed in additional therapeutic exercise as stated above in bold print.    Written Home Exercises Provided: Instructed patient to continue prior HEP. Exercises were reviewed and Lolly was able to demonstrate them prior to the end of the session.  Lolly demonstrated good  understanding of the education provided. See EMR under Patient Instructions for exercises provided during therapy sessions    ASSESSMENT     Symptoms improving with increased A/PROM of shoulder and improved upper quadrant flexibility but posture remains significantly impaired and she continues with intermittent shoulder pain.      Lolly Is progressing fairly well towards her goals.   Pt prognosis is Fair.     Pt will continue  to benefit from skilled outpatient physical therapy to address the deficits listed in the problem list box on initial evaluation, provide pt/family education and to maximize pt's level of independence in the home and community environment.     Pt's spiritual, cultural and educational needs considered and pt agreeable to plan of care and goals.     Anticipated barriers to physical therapy: Patient may not be consistent in following precautions to protect R shoulder (as indicated by reported activities and not using sling)     Goals:   Short Term Goals: 3 weeks   1) Decrease max pain to < 7/10 Progressing/nearly met  2) Facilitate improved posture in sitting/standing Minimal progress  3) Facilitate improved upper quadrant flexibility  Progressing  4) Patient will ricco/doff pull over blouse with no more than minimal difficulty 7 of 10 trials Met 1/6/2023     Long Term Goals: 6 weeks   1) Patient will consistently perform upper quadrant bathing/dressing activities without difficulty or discomfort Progressing  2) Patient will consistently utilize R UE to reach into overhead cabinet to retrieve/put away dishes with no more than minimal discomfort Progressing  3) Patient will consistently utilize R UE to remove hot dish from oven without fear/discomfort Gradually progressing  4) Patient will resume her usual housework activities without significant increase in shoulder discomfort Gradually progressing  5) Improve functional deficit to < 30% as indicated by FOTO shoulder survey Progressing  6) Patient will be independent in complete HEP for ROM, flexibility, strengthening and stabilization Progressing    PLAN     Add rhythmic stabilization activities in multiple positions using body blade/BOING.      Lolly Villafana, PT

## 2023-01-09 ENCOUNTER — CLINICAL SUPPORT (OUTPATIENT)
Dept: REHABILITATION | Facility: HOSPITAL | Age: 63
End: 2023-01-09
Attending: ORTHOPAEDIC SURGERY
Payer: MEDICARE

## 2023-01-09 DIAGNOSIS — S43.004D DISLOCATION OF RIGHT SHOULDER JOINT, SUBSEQUENT ENCOUNTER: ICD-10-CM

## 2023-01-09 DIAGNOSIS — R68.89 ACTIVITY INTOLERANCE: Primary | ICD-10-CM

## 2023-01-09 DIAGNOSIS — M25.511 ACUTE PAIN OF RIGHT SHOULDER: ICD-10-CM

## 2023-01-09 DIAGNOSIS — M25.611 DECREASED ROM OF RIGHT SHOULDER: ICD-10-CM

## 2023-01-09 PROCEDURE — 97110 THERAPEUTIC EXERCISES: CPT | Mod: PN,CQ

## 2023-01-09 PROCEDURE — 97140 MANUAL THERAPY 1/> REGIONS: CPT | Mod: PN,CQ

## 2023-01-09 NOTE — PROGRESS NOTES
"[facility]   Physical Therapy Treatment Note     Name: Lolly Ramírez  Clinic Number: [id number]    Therapy Diagnosis:   Encounter Diagnoses   Name Primary?    Activity intolerance Yes    Acute pain of right shoulder     Decreased ROM of right shoulder     Dislocation of right shoulder joint, subsequent encounter      Physician: Taurus Romero MD    Visit Date: 1/9/2023    Physician Orders: PT Eval and Treat Patient sustained a right anterior inferior shoulder dislocation she is a first-time dislocator this occurred about 3 weeks ago.  Wished to start the patient on a rehab program to strengthen her internal rotators of the right shoulder and range of motion of the right shoulder 2 times a week x6 weeks.   Medical Diagnosis from Referral: Dislocation of right shoulder joint, initial encounter   Evaluation Date: 12/1/2022  Authorization Period Expiration: 11/15/2023   Plan of Care Expiration: 1/13/2023  Progress Note Due: 12/30/2022  Visit # / Visits authorized: 2/ 20 (11 total)   FOTO: Survey completed 12/16/2022  45% residual deficit     Precautions: Diabetes, Fall, and cancer     PTA Visit #: 1/5     Time In: 0800  Time Out: 0900  Total Billable Time: 45 minutes (overlapping with another pt)    SUBJECTIVE     Pt reports: "I know how to move it without it hurting. I don't usually hurt until I exercise. I don't reach out to the side or over my head." "The other one pops."  She was compliant with home exercise program.  Response to previous treatment: "tired; hurt later that afternoon"  Functional change: Able to ricco shirt without significant pain.        Pain: 0/10  Location: right shoulder in the front    OBJECTIVE     Objective Measures updated at progress report unless specified.     Treatment     Lolly received the treatments listed below:      therapeutic exercises to develop strength and flexibility for 37 minutes (overlapped w/ another pt) including:  UBE lvl3.5 4' " forward/4' back  Pulley exercise x2'   Flexion   Abduction   IR behind back  Upper traps stretch B 3x30s (modified)  Levator scapula stretch B 3x30s (modified)  Rhomboid/middle trap stretch 3x30s ea  ABC Sh AROM x1  Resisted rows w/scap squeezes green TB x20   B Sh ext w/scap squeezes green TB x20   B shoulder ER w/scap squeeze green TB 2x10   R shoulder IR using green TB x20   R shoulder adduction w/green TB x20   Lat pulls w/red TB x10  R Sh AAROM w/2# bar x20 ea   Flexion (supine)   Abduction   Extension   IR behind back  Supine rhythmic shoulder stabilization x30s ea   Flexion/extension   Abduction/adduction  Prone shoulder exercises x20 ea   Ws   Flexion   Extension   Abduction   On elbows & knees with side <> side weight shift    Did not perform this date:   Rhythmic stabilization Supine x 5 minutes   ER/IR (shoulder near 90* abduction, multi position)   Flexion/extension, abduction/adduction with shoulder flexed to 90*R 2x10 ea   Isometric shoulder exercises    Flexion   Extension   Abduction   Adduction   Internal rotation   External rotation  Supine serratus punch w2# x20    manual therapy techniques: Soft tissue mobilizations, PROM, and Scapular scouring were applied to the: R shoulder for 8 minutes, including:   Soft tissue mobilizations with strumming and triggerpoint release to R biceps and deltoids  Gentle PROM to R shoulder (avoiding end range rotation)  Scapular scouring to R shoulder in L sidelying    Patient Education and Home Exercises     Home Exercises Provided and Patient Education Provided     Education provided:   - The patient was instructed in increased therapeutic exercise as stated above in bold print.    Written Home Exercises Provided: Instructed patient to continue prior HEP. Exercises were reviewed and Lolly was able to demonstrate them prior to the end of the session.  Lolly demonstrated good  understanding of the education provided. See EMR under Patient Instructions for exercises  "provided during therapy sessions    ASSESSMENT     The patient continues to demonstrate poor posture throughout treatment, requiring verbal cues to correct. With poor posture the patient reports "popping in other arm." Lolly Ramírez was progressed today with increased exercise complexity, tolerating with slight fatigue noted. Patient has difficulty keeping rhythm with supine rhythmic stabilization with increased fatigue. Manual techniques were provided post in order to promote increased range of motion, soft tissue mobility, and reduced tension, as well as triggerpoints. Patient demonstrates progressing passive range of motion with decreased discomfort compared to previous sessions.    Lolly Is progressing fairly well towards her goals.   Pt prognosis is Fair.     Pt will continue to benefit from skilled outpatient physical therapy to address the deficits listed in the problem list box on initial evaluation, provide pt/family education and to maximize pt's level of independence in the home and community environment.     Pt's spiritual, cultural and educational needs considered and pt agreeable to plan of care and goals.     Anticipated barriers to physical therapy: Patient may not be consistent in following precautions to protect R shoulder (as indicated by reported activities and not using sling)     Goals:   Short Term Goals: 3 weeks   1) Decrease max pain to < 7/10 Progressing/nearly met  2) Facilitate improved posture in sitting/standing Minimal progress  3) Facilitate improved upper quadrant flexibility  Progressing  4) Patient will ricco/doff pull over blouse with no more than minimal difficulty 7 of 10 trials Met 1/6/2023     Long Term Goals: 6 weeks   1) Patient will consistently perform upper quadrant bathing/dressing activities without difficulty or discomfort Progressing  2) Patient will consistently utilize R UE to reach into overhead cabinet to retrieve/put away dishes with no more than minimal " discomfort Progressing  3) Patient will consistently utilize R UE to remove hot dish from oven without fear/discomfort Gradually progressing  4) Patient will resume her usual housework activities without significant increase in shoulder discomfort Gradually progressing  5) Improve functional deficit to < 30% as indicated by FOTO shoulder survey Progressing  6) Patient will be independent in complete HEP for ROM, flexibility, strengthening and stabilization Progressing    PLAN     The patient is to be progressed within the established plan of care as tolerated in order to accomplish goals as stated above and increase function. Plan to incorporate increased functional strengthening exercises in subsequent sessions, as well as adding wall slides to promote increased range of motion. Patient plans to seek care for bilateral feet with orders transcribed in the system.    Eda Marrufo, PTA

## 2023-01-11 ENCOUNTER — CLINICAL SUPPORT (OUTPATIENT)
Dept: REHABILITATION | Facility: HOSPITAL | Age: 63
End: 2023-01-11
Attending: ORTHOPAEDIC SURGERY
Payer: MEDICARE

## 2023-01-11 DIAGNOSIS — R68.89 ACTIVITY INTOLERANCE: Primary | ICD-10-CM

## 2023-01-11 DIAGNOSIS — M25.511 ACUTE PAIN OF RIGHT SHOULDER: ICD-10-CM

## 2023-01-11 DIAGNOSIS — M25.611 DECREASED ROM OF RIGHT SHOULDER: ICD-10-CM

## 2023-01-11 DIAGNOSIS — S43.004D DISLOCATION OF RIGHT SHOULDER JOINT, SUBSEQUENT ENCOUNTER: ICD-10-CM

## 2023-01-11 PROCEDURE — 97140 MANUAL THERAPY 1/> REGIONS: CPT | Mod: PN,CQ

## 2023-01-11 PROCEDURE — 97110 THERAPEUTIC EXERCISES: CPT | Mod: PN,CQ

## 2023-01-11 NOTE — PROGRESS NOTES
"[facility]   Physical Therapy Treatment Note     Name: Lolly Ramírez  Clinic Number: [id number]    Therapy Diagnosis:   Encounter Diagnoses   Name Primary?    Activity intolerance Yes    Acute pain of right shoulder     Decreased ROM of right shoulder     Dislocation of right shoulder joint, subsequent encounter      Physician: Taurus Romero MD    Visit Date: 1/11/2023    Physician Orders: PT Eval and Treat Patient sustained a right anterior inferior shoulder dislocation she is a first-time dislocator this occurred about 3 weeks ago.  Wished to start the patient on a rehab program to strengthen her internal rotators of the right shoulder and range of motion of the right shoulder 2 times a week x6 weeks.   Medical Diagnosis from Referral: Dislocation of right shoulder joint, initial encounter   Evaluation Date: 12/1/2022  Authorization Period Expiration: 11/15/2023   Plan of Care Expiration: 1/13/2023  Progress Note Due: 12/30/2022  Visit # / Visits authorized: 3/ 20 (11 total)   FOTO: Survey completed 1/11/2023 45% residual deficit   Due again the week of 1/24/2023     Precautions: Diabetes, Fall, and cancer     PTA Visit #: 2/5     Time In: 0824  Time Out: 0927  Total Billable Time: 38 minutes (pt arrived prior to 0800AM & overlapped)    SUBJECTIVE     Pt reports: "It feels tired and stiff/tight this morning. I don't know if I slept on it wrong or what I did. I did not wake up until 7, so I have not had enough time to be tired or make myself stiff. I am not really able to lift over ten pounds; my dog is five pounds. I have had shoulder problems before and had PT."  She was compliant with home exercise program.  Response to previous treatment: "Sore and tired"  Functional change: Able to ricco shirt without significant pain.        Pain: 1/10 "stiff, tight"  Location: right shoulder in the front    OBJECTIVE     Objective Measures updated at progress report unless specified. "     Treatment     Lolly received the treatments listed below:      therapeutic exercises to develop strength and flexibility for 30 minutes (overlapped w/another pt) including:  UBE lvl3.5 4' forward/4' back  Pulley exercise x2'   Flexion   Abduction   IR behind back  Upper traps stretch B 3x30s (modified)  Levator scapula stretch B 3x30s (modified)  Rhomboid/middle trap stretch 3x30s ea  ABC Sh AROM x1  Wall slides x10 ea    Flexion   Abduction  Resisted rows w/scap squeezes green TB x20   B Sh ext w/scap squeezes green TB x20   B shoulder ER w/scap squeeze green TB x20   R shoulder IR using green TB x20   R shoulder adduction w/green TB x20   Lat pulls w/red TB x20  R Sh AAROM w/3# bar x10 ea   Flexion (supine)   Abduction   Extension   IR behind back    Did not perform this date:   Supine rhythmic shoulder stabilization x30s ea (Continue)   Flexion/extension   Abduction/adduction  Prone shoulder exercises x20 ea (Continue)   Ws   Flexion   Extension   Abduction   On elbows & knees with side <> side weight shift  Rhythmic stabilization Supine x 5 minutes   ER/IR (shoulder near 90* abduction, multi position)   Flexion/extension, abduction/adduction with shoulder flexed to 90*R 2x10 ea   Isometric shoulder exercises    Flexion   Extension   Abduction   Adduction   Internal rotation   External rotation  Supine serratus punch w2# x20    manual therapy techniques: Soft tissue mobilizations, PROM, and Scapular scouring were applied to the: R shoulder for 8 minutes, including:   Soft tissue mobilizations with strumming and triggerpoint release to R biceps and deltoids  Gentle PROM to R shoulder (avoiding end range rotation)  Scapular scouring to R shoulder in L sidelying    Patient Education and Home Exercises     Home Exercises Provided and Patient Education Provided     Education provided:   - The patient was instructed in additional therapeutic exercise as stated above in bold print.    Written Home Exercises  Provided: Instructed patient to continue prior HEP. Exercises were reviewed and Lolly was able to demonstrate them prior to the end of the session.  Lolly demonstrated good  understanding of the education provided. See EMR under Patient Instructions for exercises provided during therapy sessions    ASSESSMENT     Lolly Ramírez received skilled PT services in order to promote increased upper extremity strength, range of motion of shoulder, and function. The patient tolerated today's treatment session with increased fatigue noted; rest breaks were taken. Patient was progressed with increased resistance of Therabar active assistive range of motion exercises. Lolly continues to demonstrate limited active range of motion and or active assistive range of motion with resistance, abduction being most difficult overall, but the patient demonstrates near full flexion with active wall slides and passive range of motion. The patient also continues to demonstrate increased thoracic kyphosis with bilateral rounded shoulders and forward head during sitting.    Lolly Is progressing fairly well towards her goals.   Pt prognosis is Fair.     Pt will continue to benefit from skilled outpatient physical therapy to address the deficits listed in the problem list box on initial evaluation, provide pt/family education and to maximize pt's level of independence in the home and community environment.     Pt's spiritual, cultural and educational needs considered and pt agreeable to plan of care and goals.     Anticipated barriers to physical therapy: Patient may not be consistent in following precautions to protect R shoulder (as indicated by reported activities and not using sling)     Goals:   Short Term Goals: 3 weeks   1) Decrease max pain to < 7/10 Progressing/nearly met  2) Facilitate improved posture in sitting/standing Minimal progress  3) Facilitate improved upper quadrant flexibility  Progressing  4) Patient will ricco/doff pull  over blouse with no more than minimal difficulty 7 of 10 trials Met 1/6/2023     Long Term Goals: 6 weeks   1) Patient will consistently perform upper quadrant bathing/dressing activities without difficulty or discomfort Progressing  2) Patient will consistently utilize R UE to reach into overhead cabinet to retrieve/put away dishes with no more than minimal discomfort Progressing  3) Patient will consistently utilize R UE to remove hot dish from oven without fear/discomfort Gradually progressing  4) Patient will resume her usual housework activities without significant increase in shoulder discomfort Gradually progressing  5) Improve functional deficit to < 30% as indicated by FOTO shoulder survey Progressing  6) Patient will be independent in complete HEP for ROM, flexibility, strengthening and stabilization Progressing    PLAN     The patient is to be progressed within the established plan of care as tolerated in order to accomplish goals as stated above and increase function. Plan to incorporate increased functional strengthening exercises, targeting personal care and housework in subsequent sessions.    Eda Marrufo, PTA

## 2023-01-18 ENCOUNTER — CLINICAL SUPPORT (OUTPATIENT)
Dept: REHABILITATION | Facility: HOSPITAL | Age: 63
End: 2023-01-18
Attending: ORTHOPAEDIC SURGERY
Payer: MEDICARE

## 2023-01-18 DIAGNOSIS — M79.671 FOOT PAIN, BILATERAL: ICD-10-CM

## 2023-01-18 DIAGNOSIS — M79.672 FOOT PAIN, BILATERAL: ICD-10-CM

## 2023-01-18 DIAGNOSIS — M76.60 ACHILLES TENDINITIS, UNSPECIFIED LEG: ICD-10-CM

## 2023-01-18 DIAGNOSIS — R26.9 GAIT DIFFICULTY: Primary | ICD-10-CM

## 2023-01-18 PROCEDURE — 97162 PT EVAL MOD COMPLEX 30 MIN: CPT | Mod: PN

## 2023-01-18 PROCEDURE — 97140 MANUAL THERAPY 1/> REGIONS: CPT | Mod: PN

## 2023-01-18 NOTE — PLAN OF CARE
"[facility]   Physical Therapy Initial Evaluation     Date: 1/18/2023   Name: Lolly MCLAIN [facility] Number: [id number]    Therapy Diagnosis:   Encounter Diagnoses   Name Primary?    Gait difficulty Yes    Foot pain, bilateral     Achilles tendinitis, unspecified leg      Physician: Yevgeniy De La Rosa DPM    Physician Orders: PT Eval and Treat   Medical Diagnosis from Referral: Achilles tendinitis, unspecified leg   Evaluation Date: 1/18/2023  Authorization Period Expiration: 12/31/2023   Plan of Care Expiration: 3/3/2023  Progress Note Due: 2/18/2023  Visit # / Visits authorized: 1/ 1   FOTO: Next survey due week of 1/30/2023    Precautions: Diabetes and cancer     Time In: 0808  Time Out: 0853  Total Appointment Time (timed & untimed codes): 41 minutes      SUBJECTIVE     Date of onset: 9/2022    History of current condition - Lolly reports: she had a series of falls with first one being 9/2022 in which she "fell flat-footed".  She noticed some pain in her feet but "pretty much ignored it".  Then around Halloween she fell in her hallway, dislocating her R shoulder.  She also irritated foot pain but ignored it due to shoulder pain.  Now that shoulder pain has improved she is more aware of her foot pain.  She was placed in fracture boot for R.  As a result the L foot pain got worse.  She is now referred to PT    Falls: Yes    Imaging, MRI studies R ankle 12/8/2022: Impression per report:   "1.  Mild intermediate signal hyperintensity of the ATFL with trace fluid superficial and deep to the ligament, likely reflecting a sprain of the ligament.  2.  Mild tendon sheath fluid of the peroneus brevis near its insertion site likely reflects focal tenosynovitis.  3.  Moderate osteoarthropathy of the ankle joint, subtalar and midfoot joints as described.  4.  Questionable focal erosion versus degenerative irregularity of the lateral talar dome.  5.  Mild subjacent edema about the medial ankle.  6.  " "Large calcaneal spur."    Prior Therapy: Yes multiple times for her back, frozen L shoulder, following dislocation of R shoulder.  Social History:  lives with their spouse in 1 story home with 1 step to enter.    Occupation: Housewife  Prior Level of Function: Independent in self care, walking dogs 2x/day, standing for cooking, housework, driving without difficulty  Current Level of Function: Not walking her dogs, unable to stand long enough for cooking or housework activities, drives using toes only.  Unable to wear shoes with a back.  Patient reports feeling off balance due to heel pain.     Pain:  Current 3/10, worst 10/10, best 2/10   Location: bilateral posterior heels   Description: constant sharp pain  Aggravating Factors: Standing and walking, step negotiation  Easing Factors: hot water, ice massage on bottom of feet    Patients goals: to eliminate some of this discomfort/at least learn how to deal with it.  To gain some balance.  To avoid the needle.       Medical History:   Past Medical History:   Diagnosis Date    Breast cancer, stage 1, estrogen receptor negative, left () 2020    Depression     Diabetes mellitus, type 2     GERD (gastroesophageal reflux disease)     HER2-positive carcinoma of left breast 2020    Hx of breast cancer     Hx of breast cancer - Her2Nu+/ER+ - 2011 12/3/2019    Insomnia     Lymphedema     Neuropathy of both feet     S/P lumpectomy, left breast 2020       Surgical History:   Lolly Ramírez  has a past surgical history that includes Elbow fx (Left, );  section; Lower back ruptured disc (2010); Knee arthroscopy w/ meniscal repair (Left); Biceps tendon repair (Left, 2005); Cholecystectomy; Hysterectomy; Lymph node dissection; Breast biopsy (Left); Breast lumpectomy (Left); Fracture surgery (); and Spine surgery ().    Medications:   Lolly has a current medication list which includes the following prescription(s): ascorbic acid " (vitamin c), atomoxetine, b complex vitamins, docusate sodium, furosemide, glucosamine-chondroitin, hydrocodone-acetaminophen, lyrica, magnesium, multivitamin, mupirocin, naproxen, oxycodone-acetaminophen, pantoprazole, trazodone, and venlafaxine.    Allergies:   Review of patient's allergies indicates:   Allergen Reactions    Banana Hives    Codeine Nausea And Vomiting    Dilaudid  [hydromorphone (bulk)] Rash    Hydromorphone hcl Rash          OBJECTIVE     Observation: Patient is noted to have several areas of broken skin B lower legs/ankles/feet due to eczema.    Posture: Standing: adequate longitudinal arch B. Good calcaneal alignment.  Sitting:  Palpation: Significant tenderness medial and lateral aspect of R posterior calcaneal tubercle, L significant tenderness lateral aspect of L posterior calcaneal tubercle.  Increased muscle tension distal gastroc muscle.    Sensation: history of neuropathy B feet  DTRs: N/A  Range of Motion/Strength:    Ankle  Right   Left  Pain/Dysfunction with Movement    AROM PROM MMT AROM PROM MMT    Plantarflexion  60*  62*  3+/5  65*  70*  3+/5    Dorsiflexion  5*  7*  4/5  0*  7*  4/5    Inversion  10*  15*  4/5  5*  7*  4/5    Eversion  5*  7*  4/5  3*  7*  4/5      Flexibility: Moderate calf tightness B (gastrocs and soleus mm)  Gait: Without AD  Analysis: decreased rogelio, decreased step length B, decreased heel strike and toe off B.    Bed Mobility:Independent  Transfers: Independent with increased UE support  Special Tests: Joint mobility talocrural anterior/posterior drawer decreased R compared to L, Anterior/posterior glide 1st and 5th TMT joints no restriction or discomfort.       Limitation/Restriction for FOTO Foot Survey    Therapist reviewed FOTO scores for Lolly Lozanoncer on 1/18/2023.   FOTO documents entered into [employer] - see Media section.    Limitation Score: 64%         TREATMENT     Total Treatment time (time-based codes) separate from Evaluation: 9 minutes       Lolly received the treatments listed below:      manual therapy techniques: Soft tissue Mobilization were applied to the: B calves for 9 minutes, including:  Soft tissue mobilization including deep pressure, strumming to trigger points, long retrograde stroking to both calves.     PATIENT EDUCATION AND HOME EXERCISES     Education provided:   - Discussed role of PT and proposed POC.  Initiated discussion of dry needling (pros vs cons for symptom management)    Written Home Exercises Provided:  To be issued during subsequent sessions . Exercises were reviewed and Lolly was able to demonstrate them prior to the end of the session.  Lolly demonstrated good  understanding of the education provided. See EMR under Patient Instructions for exercises provided during therapy sessions.    ASSESSMENT     Lolly is a 62 y.o. female referred to outpatient Physical Therapy with a medical diagnosis of Achilles tendinitis, unspecified leg . Patient presents with B heel pain, decreased ankle ROM, decreased calf flexibility, increased tenderness/muscle tension, decreased talocrural joint play R ankle. These problems contribute to impaired transfers, impaired gait, and decreased activity tolerance/balance.  She should benefit from skilled PT services to address these problems in order to minimize functional deficit, improve gait quality and to maximize activity tolerance\/safety.     Patient prognosis is Fair.   Patient will benefit from skilled outpatient Physical Therapy to address the deficits stated above and in the chart below, provide patient /family education, and to maximize patientt's level of independence.     Plan of care discussed with patient: Yes  Patient's spiritual, cultural and educational needs considered and patient is agreeable to the plan of care and goals as stated below:     Anticipated Barriers for therapy: None     Medical Necessity is demonstrated by the following  History  Co-morbidities and personal factors  that may impact the plan of care Co-morbidities:   depression, diabetes, difficulty sleeping, high BMI, history of cancer, and neuropathy of B feet    Personal Factors:   no deficits     high   Examination  Body Structures and Functions, activity limitations and participation restrictions that may impact the plan of care Body Regions:   lower extremities    Body Systems:    gross symmetry  ROM  strength  balance  gait  transfers    Participation Restrictions:   None anticipated    Activity limitations:   Learning and applying knowledge  no deficits     General Tasks and Commands  no deficits     Communication  no deficits    Mobility  lifting and carrying objects  walking  driving (bike, car, motorcycle)    Self care  dressing    Domestic Life  shopping  cooking  doing house work (cleaning house, washing dishes, laundry)  assisting others    Interactions/Relationships  no deficits    Life Areas  no deficits    Community and Social Life  community life  recreation and leisure         high   Clinical Presentation evolving clinical presentation with changing clinical characteristics moderate   Decision Making/ Complexity Score: moderate     Goals:  Short Term Goals: 3 weeks   1) Decrease max  pain to < 7/10  2) Decrease tenderness to moderate  3) Facilitate improved LE flexibility  4) Patient will increase standing tolerance to at least 5 min to assist with meal prep and washing dishes.    Long Term Goals: 6 weeks   1) Increase standing tolerance to at least 30 min to allow for meal prep and light housework  2) Patient will consistently drive using metatarsal heads to apply pressure to pedals without increased pain  3) Patient will walk > 30 min over level surface demonstrating adequate step length, heel strike and toe off with heel pain no > 4/10  4) Patient will initiate weight bearing after sitting with heel pain no > 4/10  5) Improve functional mobility to < 40% deficit as indicated by FOTO foot survey  6) Patient  will be independent in [facility] for foot/ankle    PLAN   Plan of care Certification: 1/18/2023 to 3/3/2023.    Outpatient Physical Therapy 2 times weekly for 6 weeks to include the following interventions: Electrical Stimulation IFC/TENS, Gait Training, Manual Therapy, Moist Heat/ Ice, Patient Education, Therapeutic Activities, Therapeutic Exercise, and Dry Needling . Therapeutic Taping as needed.     Lolly Villafana, PT      I CERTIFY THE NEED FOR THESE SERVICES FURNISHED UNDER THIS PLAN OF TREATMENT AND WHILE UNDER MY CARE   Physician's comments:     Physician's Signature: ___________________________________________________

## 2023-01-20 ENCOUNTER — CLINICAL SUPPORT (OUTPATIENT)
Dept: REHABILITATION | Facility: HOSPITAL | Age: 63
End: 2023-01-20
Payer: MEDICARE

## 2023-01-20 DIAGNOSIS — M76.60 ACHILLES TENDINITIS, UNSPECIFIED LATERALITY: ICD-10-CM

## 2023-01-20 DIAGNOSIS — R26.9 GAIT DIFFICULTY: Primary | ICD-10-CM

## 2023-01-20 DIAGNOSIS — M79.672 FOOT PAIN, BILATERAL: ICD-10-CM

## 2023-01-20 DIAGNOSIS — M79.671 FOOT PAIN, BILATERAL: ICD-10-CM

## 2023-01-20 PROCEDURE — 97014 ELECTRIC STIMULATION THERAPY: CPT | Mod: PN

## 2023-01-20 PROCEDURE — 97140 MANUAL THERAPY 1/> REGIONS: CPT | Mod: PN

## 2023-01-20 NOTE — PROGRESS NOTES
"[facility]   Physical Therapy Treatment Note     Name: Lolly MCLAIN Darrell  Clinic Number: [id number]    Therapy Diagnosis:   Encounter Diagnoses   Name Primary?    Gait difficulty Yes    Foot pain, bilateral     Achilles tendinitis, unspecified laterality      Physician: Yevgeniy De La Rosa DPM    Visit Date: 2023       Physician Orders: PT Eval and Treat   Medical Diagnosis from Referral: Achilles tendinitis, unspecified leg   Evaluation Date: 2023  Authorization Period Expiration: 2023   Plan of Care Expiration: 3/3/2023  Progress Note Due: 2023  Visit # / Visits authorized:    FOTO: Next survey due week of 2023     Precautions: Diabetes and cancer     PTA Visit #: 0/5     Time In: 0850  Time Out: 0935  Total Billable Time: 40 minutes    SUBJECTIVE     Pt reports: No new complaints this date.  She was not compliant with home exercise program as one has not been issued yet  Response to previous treatment: Felt better  Functional change: None yet noted    Pain: 5/10 ("but I haven't been up that long")  Location: bilateral heels/feet      OBJECTIVE     Objective Measures updated at progress report unless specified.     Treatment     Lolly received the treatments listed below:      manual therapy techniques: functional dry needling were applied to the: B calves/ankles for 15 minutes, including:  Informed consent obtained after thorough explanation of risks and benefits. Signed consent form will be scanned into medical records. Pre-procedure time out performed which included verification of name, , and site of treatment. Cleaned target tissues with 70% isopropyl alcohol wipe down and performed with single use sterile prep pads.     Functional dry needling to B lower legs performed as follows:    1) 30 mm needle inserted straight medial to lateral at point  horizontally level with medial malleolus and just anterior to Achilles tendon    2) 30 mm needle inserted " posteromedial to anterolateral with 45* angulation at point 3 finger breadths proximal to medial malleolus, just anterior to Achilles tendon     3) 30 mm needle inserted posterolateral to anteromedial with 45* angulation at point 4 finger breadths proximal to lateral malleolus, just anterior to Achilles tendon    4) 30 mm needle inserted posterolateral to anteromedial with 45* angulation at point horizontally level with lateral malleolus and just anterior to Achilles tendon    5) 30 mm needle inserted 1 finger breadth distal and posterior to pt # 1, with 45* angulation inferolateral toward Achilles tendon attachment on posterior calcaneous    6) 30 mm needle inserted 2 fingers breadth distal and posterior to pt #1 with 45* angulation inferolateral and slightly posterior onto medial and superior aspect of calcaneal tuberosity.      7) 50 mm needle inserted @ bifurcation of medial and lateral heads of the gastroc posterior to anterior with 45* caudad angulation    Treatment performed in prone. ES with FDN x 15 at  MHz wave frequency (2 channels on L and 3 on R). Intensity set to patient tolerance and therapist remaining in room. Patient instructed in increasing hydration habits following for decreased soreness. No adverse effects present following needling. Patient reported improved symptoms following session    Patient Education and Home Exercises     Home Exercises Provided and Patient Education Provided     Education provided:   - Instructed patient to increase water intake following session.  Instructed in risks and benefits of functional dry needling.    Written Home Exercises Provided:  Provided patient with information on What to expect following functional dry needling . Exercises were reviewed and Lolly was able to demonstrate them prior to the end of the session.  Lolly demonstrated good  understanding of the education provided. See EMR under Patient Instructions for exercises provided during therapy  sessions    ASSESSMENT     Patient reported decreased symptoms following session of functional dry needling.  Mild bleeding noted on lateral aspect of R ankle upon removing 2 needles.  This resolved after patting area with clean cotton pad.  Overall patient tolerated functional dry needling well without any significant adverse effect.     Lolly Is progressing slowly towards her goals.   Pt prognosis is Fair.     Pt will continue to benefit from skilled outpatient physical therapy to address the deficits listed in the problem list box on initial evaluation, provide pt/family education and to maximize pt's level of independence in the home and community environment.     Pt's spiritual, cultural and educational needs considered and pt agreeable to plan of care and goals.     Anticipated barriers to physical therapy: None    Goals:   Short Term Goals: 3 weeks   1) Decrease max  pain to < 7/10 Minimal progress  2) Decrease tenderness to moderate  Minimal progress  3) Facilitate improved LE flexibility Ongoing  4) Patient will increase standing tolerance to at least 5 min to assist with meal prep and washing dishes. Ongoing     Long Term Goals: 6 weeks   1) Increase standing tolerance to at least 30 min to allow for meal prep and light housework Ongoing  2) Patient will consistently drive using metatarsal heads to apply pressure to pedals without increased pain Ongoing  3) Patient will walk > 30 min over level surface demonstrating adequate step length, heel strike and toe off with heel pain no > 4/10 Ongoing  4) Patient will initiate weight bearing after sitting with heel pain no > 4/10 Ongoing  5) Improve functional mobility to < 40% deficit as indicated by FOTO foot survey Ongoing  6) Patient will be independent in HEP for foot/ankle Ongoing    PLAN     Add additional needle points to medial and lateral gastocs and possibly to lower 1/3 of Achilles tendon.  Instruct in stretching program for gastrocs and soleus.       Lolly Villafana, PT

## 2023-01-23 ENCOUNTER — CLINICAL SUPPORT (OUTPATIENT)
Dept: REHABILITATION | Facility: HOSPITAL | Age: 63
End: 2023-01-23
Payer: MEDICARE

## 2023-01-23 DIAGNOSIS — M79.671 FOOT PAIN, BILATERAL: ICD-10-CM

## 2023-01-23 DIAGNOSIS — M76.60 ACHILLES TENDINITIS, UNSPECIFIED LATERALITY: ICD-10-CM

## 2023-01-23 DIAGNOSIS — R26.9 GAIT DIFFICULTY: Primary | ICD-10-CM

## 2023-01-23 DIAGNOSIS — M79.672 FOOT PAIN, BILATERAL: ICD-10-CM

## 2023-01-23 PROCEDURE — 97014 ELECTRIC STIMULATION THERAPY: CPT | Mod: PN

## 2023-01-23 PROCEDURE — 97110 THERAPEUTIC EXERCISES: CPT | Mod: PN

## 2023-01-23 PROCEDURE — 97140 MANUAL THERAPY 1/> REGIONS: CPT | Mod: PN

## 2023-01-23 NOTE — PROGRESS NOTES
"[facility]   Physical Therapy Treatment Note     Name: Lolly MCLAIN [place]  Clinic Number: [id number]    Therapy Diagnosis:   Encounter Diagnoses   Name Primary?    Gait difficulty Yes    Foot pain, bilateral     Achilles tendinitis, unspecified laterality      Physician: Yevgeniy De La Rosa DPM    Visit Date: 2023       Physician Orders: PT Eval and Treat   Medical Diagnosis from Referral: Achilles tendinitis, unspecified leg   Evaluation Date: 2023  Authorization Period Expiration: 2023   Plan of Care Expiration: 3/3/2023  Progress Note Due: 2023  Visit # / Visits authorized:  (3 total)   FOTO: Next survey due week of 2023     Precautions: Diabetes and cancer     PTA Visit #: 0/5     Time In: 0805  Time Out: 0900  Total Billable Time: 50 minutes    SUBJECTIVE     Pt reports: "I took a misstep this morning and irritated the right one".  She was not compliant with home exercise program as one has not been issued yet  Response to previous treatment: "They felt great until this morning"  Functional change:Decreased pain with walking over level surfaces    Pain: 510   Location: bilateral heels/feet      OBJECTIVE     Objective Measures updated at progress report unless specified.     Treatment     Lolly received the treatments listed below:      therapeutic exercises to develop ROM and flexibility for 10 minutes including   Standing gastroc stretch using wedge 3x30s ea    Soleus stretch using wedge 3x30s ea   Ankle DF/PF and inv/eversion using rocker board x 20 ea    manual therapy techniques: functional dry needling were applied to the: B calves/ankles for 20 minutes, including:  Informed consent obtained after thorough explanation of risks and benefits. Signed consent form will be scanned into medical records. Pre-procedure time out performed which included verification of name, , and site of treatment. Cleaned target tissues with 70% isopropyl alcohol wipe down " and performed with single use sterile prep pads.     Functional dry needling to B lower legs performed as follows:    1) 30 mm needle inserted straight medial to lateral at point  horizontally level with medial malleolus and just anterior to Achilles tendon    2) 30 mm needle inserted posteromedial to anterolateral with 45* angulation at point 3 finger breadths proximal to medial malleolus, just anterior to Achilles tendon     3) 30 mm needle inserted posterolateral to anteromedial with 45* angulation at point 4 finger breadths proximal to lateral malleolus, just anterior to Achilles tendon    4) 30 mm needle inserted posterolateral to anteromedial with 45* angulation at point horizontally level with lateral malleolus and just anterior to Achilles tendon (deferred this date)    5) 30 mm needle inserted 1 finger breadth distal and posterior to pt # 1, with 45* angulation inferolateral toward Achilles tendon attachment on posterior calcaneous (with periosteal pecking at attachment)    6) 30 mm needle inserted 2 fingers breadth distal and posterior to pt #1 with 45* angulation inferolateral and slightly posterior onto medial and superior aspect of calcaneal tuberosity. (With periosteal pecking at attachment)    7) 40 mm needle inserted @ bifurcation of medial and lateral heads of the gastroc posterior to anterior with 45* caudad angulation    8) 40 mm needle inserted in 1 additional trigger point in medial and lateral gastrocs R and L posterior to anterior (total of 2 needles per calf)    9) 30 mm needle inserted within lower 1/3 of each Achilles tendon with posterior to anterior angulation.      All needles wound unidirectionally prior to estim and again following estim prior to removal.     Treatment performed in prone. ES with FDN x 15 at 80 MHz wave frequency, 150 microamps (2 channels on L and 3 on R). Intensity set to patient tolerance and therapist remaining in visual contact. Patient instructed in increasing  hydration habits following for decreased soreness. No adverse effects present following needling. Patient reported improved symptoms in L ankle/foot but continued pain in R following session    Patient Education and Home Exercises     Home Exercises Provided and Patient Education Provided     Education provided:   - Instructed patient to increase water intake following session.  Initiated instruction in LE flexibility exercises.     Written Home Exercises Provided:  Yes . Exercises were reviewed and Lolly was able to demonstrate them prior to the end of the session.  Lolly demonstrated good  understanding of the education provided. See EMR under Patient Instructions for exercises provided during therapy sessions    ASSESSMENT     Patient reported decreased symptoms in L ankle/foot with continuation of symptoms on R following session of functional dry needling.  Mild bleeding noted on medial aspect of R ankle and @ bifurcation of medal/lateral heads of R gastroc upon removing 2 needles.  This resolved after patting area with clean cotton pad.  Overall patient tolerated functional dry needling well without any significant adverse effect. Decreased antalgic gait noted this date.     Lolly Is progressing slowly towards her goals.   Pt prognosis is Fair.     Pt will continue to benefit from skilled outpatient physical therapy to address the deficits listed in the problem list box on initial evaluation, provide pt/family education and to maximize pt's level of independence in the home and community environment.     Pt's spiritual, cultural and educational needs considered and pt agreeable to plan of care and goals.     Anticipated barriers to physical therapy: None    Goals:   Short Term Goals: 3 weeks   1) Decrease max  pain to < 7/10 Progressing  2) Decrease tenderness to moderate  Minimal progress  3) Facilitate improved LE flexibility Initiated  4) Patient will increase standing tolerance to at least 5 min to assist  with meal prep and washing dishes. Minimal progress     Long Term Goals: 6 weeks   1) Increase standing tolerance to at least 30 min to allow for meal prep and light housework Ongoing  2) Patient will consistently drive using metatarsal heads to apply pressure to pedals without increased pain Ongoing  3) Patient will walk > 30 min over level surface demonstrating adequate step length, heel strike and toe off with heel pain no > 4/10 Ongoing  4) Patient will initiate weight bearing after sitting with heel pain no > 4/10 Ongoing  5) Improve functional mobility to < 40% deficit as indicated by FOTO foot survey Ongoing  6) Patient will be independent in HEP for foot/ankle Initiated    PLAN     Therapeutic exercise to increase LE flexibility, ankle ROM, and strength; STM to B calves and along Achilles tendon.  Utilize FDN in conjunction with exercise and STM.     Lolly Villafana, PT

## 2023-01-25 ENCOUNTER — CLINICAL SUPPORT (OUTPATIENT)
Dept: REHABILITATION | Facility: HOSPITAL | Age: 63
End: 2023-01-25
Attending: ORTHOPAEDIC SURGERY
Payer: MEDICARE

## 2023-01-25 DIAGNOSIS — M25.611 DECREASED ROM OF RIGHT SHOULDER: ICD-10-CM

## 2023-01-25 DIAGNOSIS — M25.511 ACUTE PAIN OF RIGHT SHOULDER: ICD-10-CM

## 2023-01-25 DIAGNOSIS — S43.004D DISLOCATION OF RIGHT SHOULDER JOINT, SUBSEQUENT ENCOUNTER: ICD-10-CM

## 2023-01-25 DIAGNOSIS — R68.89 ACTIVITY INTOLERANCE: Primary | ICD-10-CM

## 2023-01-25 PROCEDURE — 97110 THERAPEUTIC EXERCISES: CPT | Mod: PN

## 2023-01-25 PROCEDURE — 97140 MANUAL THERAPY 1/> REGIONS: CPT | Mod: PN

## 2023-01-25 NOTE — PROGRESS NOTES
"[facility]   Physical Therapy Treatment Note     Name: Lolly Lozanoncer  Clinic Number: [id number]    Therapy Diagnosis:   Encounter Diagnoses   Name Primary?    Activity intolerance Yes    Acute pain of right shoulder     Decreased ROM of right shoulder     Dislocation of right shoulder joint, subsequent encounter      Physician: Taurus Romero MD    Visit Date: 1/25/2023    Physician Orders: PT Eval and Treat Patient sustained a right anterior inferior shoulder dislocation she is a first-time dislocator this occurred about 3 weeks ago.  Wished to start the patient on a rehab program to strengthen her internal rotators of the right shoulder and range of motion of the right shoulder 2 times a week x6 weeks.   Medical Diagnosis from Referral: Dislocation of right shoulder joint, initial encounter   Evaluation Date: 12/1/2022  Authorization Period Expiration: 11/15/2023   Plan of Care Expiration: 1/13/2023  Progress Note Due: 12/30/2022  Visit # / Visits authorized: 4/ 20 (12 total)   FOTO: Survey completed 1/25/2023 43% residual deficit   Next survey due at discharge     Precautions: Diabetes, Fall, and cancer     PTA Visit #: 0/5     Time In: 0853  Time Out: 0946  Total Billable Time: 35 minutes    SUBJECTIVE     Pt reports: "It's been doing pretty good but has been more achy over the last couple weeks."  She was somewhat compliant with home exercise program.  Response to previous treatment: "Sore and tired"  Functional change: Able to reach but can't do anything. .        Pain: 1-2/10 "stiff, tight"  Location: right shoulder in the front    OBJECTIVE     Objective Measures updated at progress report unless specified.     Treatment     Lolly received the treatments listed below:      therapeutic exercises to develop strength and flexibility for 27 minutes (overlapped w/another pt) including:  UBE lvl4 4' forward/4' back  Resisted rows w/scap squeezes green TB x20   B Sh ext w/scap " squeezes   Shoulder rows    B shoulder ER w/scap squeeze   R shoulder IR using    R Sh AAROM w/3# bar x20 ea   Serratus punch (supine)    Flexion (supine)   Abduction   Extension   IR behind back    Measurements taken for POC update.      Did not perform this date:    Resisted rows w/scap squeezes green TB x20  R shoulder adduction w/green TB x20   Lat pulls w/red TB x20  Pulley exercise x2'   Flexion   Abduction   IR behind back  Upper traps stretch B 3x30s (modified)  Levator scapula stretch B 3x30s (modified)  Rhomboid/middle trap stretch 3x30s ea  ABC Sh AROM x1  Wall slides x10 ea    Flexion   Abduction  Supine rhythmic shoulder stabilization x30s ea (Continue)   Flexion/extension   Abduction/adduction  Prone shoulder exercises x20 ea (Continue)   Ws   Flexion   Extension   Abduction   On elbows & knees with side <> side weight shift    manual therapy techniques: Soft tissue mobilizations, PROM, and Scapular scouring were applied to the: R shoulder for 8 minutes, including:   Scapular scouring to R shoulder in L sidelying    Patient Education and Home Exercises     Home Exercises Provided and Patient Education Provided     Education provided:   - The patient was instructed in additional therapeutic exercise as stated above in bold print.    Written Home Exercises Provided: Instructed patient to continue prior HEP. Exercises were reviewed and Lolly was able to demonstrate them prior to the end of the session.  Lolly demonstrated good  understanding of the education provided. See EMR under Patient Instructions for exercises provided during therapy sessions    ASSESSMENT     See POC update    Lolly Is progressing fairly well towards her goals.   Pt prognosis is Fair.     Pt will continue to benefit from skilled outpatient physical therapy to address the deficits listed in the problem list box on initial evaluation, provide pt/family education and to maximize pt's level of independence in the home and community  environment.     Pt's spiritual, cultural and educational needs considered and pt agreeable to plan of care and goals.     Anticipated barriers to physical therapy: Patient may not be consistent in following precautions to protect R shoulder (as indicated by reported activities and not using sling)     Goals:   See POC update.     PLAN     See POC update    Lolly Villafana, PT

## 2023-01-25 NOTE — PLAN OF CARE
"[facility]  Physical Therapy Plan of Care Note    Name: Lolly Ramírez  Clinic Number: [id number]    Therapy Diagnosis:   Encounter Diagnoses   Name Primary?    Activity intolerance Yes    Acute pain of right shoulder     Decreased ROM of right shoulder     Dislocation of right shoulder joint, subsequent encounter      Physician: Taurus Romero MD    Visit Date: 1/25/2023    Physician Orders: PT Eval and Treat Patient sustained a right anterior inferior shoulder dislocation she is a first-time dislocator this occurred about 3 weeks ago.  Wished to start the patient on a rehab program to strengthen her internal rotators of the right shoulder and range of motion of the right shoulder 2 times a week x6 weeks.   Medical Diagnosis from Referral: Dislocation of right shoulder joint, initial encounter   Evaluation Date: 12/1/2022  Authorization Period Expiration: 11/15/2023   Plan of Care Expiration: 1/13/2023  Progress Note Due: 12/30/2022  Visit # / Visits authorized: 4/ 20 (12 total)   FOTO: Survey completed 1/25/2023 43% residual deficit   Next survey due at discharge     Precautions: Diabetes, Fall, and cancer   Functional Level Prior to Evaluation: Unable to reach above head with R UE, difficulty getting things out of the oven, painful with lifting R arm laterally > 40* (patient demonstrated) and ~ forward > 80*. Difficulty with upper body dressing. Has difficulty finding a comfortable position to sleep but sleep is not disturbed by shoulder pain.      SUBJECTIVE     Update:   Pain:  Current 1-2/10, worst 10/10, best 0/10   Location: R upper arm and anterior shoulder  Description: some aching with occasional sharp pain (decreased frequency compared to initial evaluation)  Aggravating Factors: Reaching for something beyond her normal reach;    Easing Factors: activity modification, "taking naproxen for the knee and it's helping the shoulder"    Current level of function:  Unable to " "reach above head with R UE, difficulty getting things out of the oven, painful with lifting R arm laterally > 90* (patient demonstrated). Difficulty with upper body dressing. Has difficulty finding a comfortable position to sleep but sleep is not disturbed by shoulder pain.     OBJECTIVE     Update:   Posture: Standing: weight shifted more toward L. Moderate rounded shoulder and forward head posture.  Sitting: adequate lumbar lordosis, moderate increase in thoracic kyphosis, moderate rounded shoulder and forward head posture.  When patient instructed in correction, she stated that "that takes a lot of effort"  Palpation:  Persistent but decreased tenderness present around R GH joint line, R anterior deltoid.  Significant crepitus noted with R scapular scouring.  Range of Motion/Strength:    Shoulder   Right     Left   Pain/Dysfunction with Movement     AROM PROM MMT AROM PROM MMT     flexion  150*  160*  4-/5       extension  60*  70*  4/5        abduction  135*  160*  3+/5     Painful with resistance   adduction  23*  35*  4-/5        Internal rotation  60*  70*  4/5        ER at 90° abd  75*  90*  4-/5        ER at 0° abd  70*  73*  4-/5        R elbow/wrist ROM WNL. Strength 4/5  L elbow limited extension; otherwise elbow and wrist WNL.  Strength 4/5-4+/5  Flexibility: Tightness in upper traps and levator scapulae B persists but is improving.  Pectoralis mm not assessed   Gait: Without AD  Analysis: Improved weight shift during gait with improved R arm swing.    Bed Mobility:Independent but with difficulty rolling   Transfers: Independent; does use UE for support for transfers but uses R UE less than L,    Other:  Scapulothoracic rhythm somewhat impaired on R compared to L.        ASSESSMENT     Update: Patient is making steady progress in PT with significant improvement in A/PROM of R shoulder, improved scapulothoracic rhythm of R shoulder, decreased crepitus with scapular scouring, and improved upper quadrant.  " Thus she is having less difficulty with upper body bathing/dressing, grooming, and reaching.  However, she continues with restricted shoulder ROM, limited shoulder strength, R shoulder pain, impaired scapulothoracic rhythm and impaired GH stability.  These residual problems contribute to ongoing difficulty with reaching, lifting, and transfers as well as sleeping.  She should benefit from continued skilled PT services to minimize functional deficit and to maximize activity tolerance.      Previous Short Term Goals Status:     1) Decrease max pain to < 7/10 Progressing/nearly met  2) Facilitate improved posture in sitting/standing Minimal progress  3) Facilitate improved upper quadrant flexibility  Progressing  4) Patient will ricco/doff pull over blouse with no more than minimal difficulty 7 of 10 trials Met 1/6/2023    New Short Term Goals Status:     # 1-3 continue  4) discontinue  5) Patient will reach to 1st shelf of cabinet using R UE to retrieve/put away plate/glass without increased pain    Long Term Goal Status: continue per initial plan of care.  1) Patient will consistently perform upper quadrant bathing/dressing activities without difficulty or discomfort Progressing  2) Patient will consistently utilize R UE to reach to 2nd shelf of cabinet to retrieve/put away dishes with no more than minimal discomfort Progressing  3) Patient will consistently utilize R UE to remove hot dish from oven without fear/discomfort Discontinued (patient reports that  does this task)  4) Patient will resume her usual housework activities without significant increase in shoulder discomfort Gradually progressing  5) Improve functional deficit to < 30% as indicated by FOTO shoulder survey Progressing  6) Patient will be independent in complete HEP for ROM, flexibility, strengthening and stabilization Progressing    Reasons for Recertification of Therapy:   Patient is demonstrating significant increase in A/PROM of R  shoulder, increased strength, and improved upper quadrant flexibility but continues with impaired scapulothoracic rhythm, limited shoulder stability, limited shoulder strength that interferes with her functional use of the R UE.        PLAN     Updated Certification Period: 1/25/2023 to 3/10/2023   Recommended Treatment Plan: up to 2 times per week for 6 weeks:  Electrical Stimulation IFC/TENS, Manual Therapy, Moist Heat/ Ice, Patient Education, Therapeutic Activities, Therapeutic Exercise, and Dry Needling      Lolly Villafana, PT    I CERTIFY THE NEED FOR THESE SERVICES FURNISHED UNDER THIS PLAN OF TREATMENT AND WHILE UNDER MY CARE  Physician's comments:      Physician's Signature: ___________________________________________________

## 2023-01-27 ENCOUNTER — CLINICAL SUPPORT (OUTPATIENT)
Dept: REHABILITATION | Facility: HOSPITAL | Age: 63
End: 2023-01-27
Payer: MEDICARE

## 2023-01-27 DIAGNOSIS — M79.672 FOOT PAIN, BILATERAL: ICD-10-CM

## 2023-01-27 DIAGNOSIS — R26.9 GAIT DIFFICULTY: Primary | ICD-10-CM

## 2023-01-27 DIAGNOSIS — M76.60 ACHILLES TENDINITIS, UNSPECIFIED LATERALITY: ICD-10-CM

## 2023-01-27 DIAGNOSIS — M79.671 FOOT PAIN, BILATERAL: ICD-10-CM

## 2023-01-27 PROCEDURE — 97110 THERAPEUTIC EXERCISES: CPT | Mod: PN

## 2023-01-27 PROCEDURE — 97140 MANUAL THERAPY 1/> REGIONS: CPT | Mod: PN

## 2023-01-27 NOTE — PROGRESS NOTES
"[facility]   Physical Therapy Treatment Note     Name: Lolly MCLAIN [place]  Clinic Number: [id number]    Therapy Diagnosis:   Encounter Diagnoses   Name Primary?    Gait difficulty Yes    Foot pain, bilateral     Achilles tendinitis, unspecified laterality      Physician: Yevgeniy De La Rosa DPM    Visit Date: 1/27/2023       Physician Orders: PT Eval and Treat   Medical Diagnosis from Referral: Achilles tendinitis, unspecified leg   Evaluation Date: 1/18/2023  Authorization Period Expiration: 12/31/2023   Plan of Care Expiration: 3/3/2023  Progress Note Due: 2/18/2023  Visit # / Visits authorized: 4/ 20 (5 total)   FOTO: Next survey due week of 1/30/2023     Precautions: Diabetes and cancer     PTA Visit #: 0/5     Time In: 0850  Time Out: 0935  Total Billable Time: 40 minutes    SUBJECTIVE     Pt reports: "I walked out into the yard and played with the dogs and our yard is not even".  She was somewhat compliant with home exercise program.  Response to previous treatment: "Sore"  Functional change:Able to walk on uneven surfaces with less pain    Pain: 3/10   Location: bilateral heels/feet      OBJECTIVE     Objective Measures updated at progress report unless specified.     Treatment     Lolly received the treatments listed below:      therapeutic exercises to develop ROM and flexibility for 28 minutes including   Recumbent bike L1 x 8'  Standing gastroc stretch using wedge 3x30s ea    Soleus stretch using wedge 3x30s ea   Ankle DF/PF x 20 B  Inv/eversion using rocker board x 20 ea  AROM ankle x 10 ea   DF/PF   Inv/eversion  Ankle ABCs x 1 rep ea    manual therapy techniques: Soft tissue mobilization were applied to the: B calves/ankles for 12 minutes, including:   Deep soft tissue mobilization to B gastroc and soleus muscles   STM to B Achilles tendons    Patient Education and Home Exercises     Home Exercises Provided and Patient Education Provided     Education provided:   - Instructed " patient in additional exercises as indicated in bold print    Written Home Exercises Provided:  Yes . Exercises were reviewed and Lolly was able to demonstrate them prior to the end of the session.  Lolly demonstrated good  understanding of the education provided. See EMR under Patient Instructions for exercises provided during therapy sessions    ASSESSMENT     Symptoms improving.  Increased frequency of heel toe gait pattern but continues to report significant tenderness at posterior heel.  Trigger point noted L medial gastroc head and throughout medial and lateral R gastrocs.  Significant tenderness palpated R lateral heel.      Lolly Is progressing slowly towards her goals.   Pt prognosis is Fair.     Pt will continue to benefit from skilled outpatient physical therapy to address the deficits listed in the problem list box on initial evaluation, provide pt/family education and to maximize pt's level of independence in the home and community environment.     Pt's spiritual, cultural and educational needs considered and pt agreeable to plan of care and goals.     Anticipated barriers to physical therapy: None    Goals:   Short Term Goals: 3 weeks   1) Decrease max  pain to < 7/10 Progressing  2) Decrease tenderness to moderate  Minimal progress  3) Facilitate improved LE flexibility Minimal progress  4) Patient will increase standing tolerance to at least 5 min to assist with meal prep and washing dishes. Progressing  Long Term Goals: 6 weeks   1) Increase standing tolerance to at least 30 min to allow for meal prep and light housework Minimal progress  2) Patient will consistently drive using metatarsal heads to apply pressure to pedals without increased pain Minimal progress  3) Patient will walk > 30 min over level surface demonstrating adequate step length, heel strike and toe off with heel pain no > 4/10 Minimal progress  4) Patient will initiate weight bearing after sitting with heel pain no > 4/10 Minimal  progress  5) Improve functional mobility to < 40% deficit as indicated by FOTO foot survey Ongoing  6) Patient will be independent in HEP for foot/ankle Minimal progress    PLAN     Add resisted ankle ROM to exercise program.  Continue dry needling and soft tissue mobilization as needed to reduce symptoms.     Lolly Villafana, PT

## 2023-01-30 ENCOUNTER — CLINICAL SUPPORT (OUTPATIENT)
Dept: REHABILITATION | Facility: HOSPITAL | Age: 63
End: 2023-01-30
Payer: MEDICARE

## 2023-01-30 DIAGNOSIS — M79.672 FOOT PAIN, BILATERAL: ICD-10-CM

## 2023-01-30 DIAGNOSIS — M79.671 FOOT PAIN, BILATERAL: ICD-10-CM

## 2023-01-30 DIAGNOSIS — M76.60 ACHILLES TENDINITIS, UNSPECIFIED LATERALITY: ICD-10-CM

## 2023-01-30 DIAGNOSIS — R26.9 GAIT DIFFICULTY: Primary | ICD-10-CM

## 2023-01-30 PROCEDURE — 97014 ELECTRIC STIMULATION THERAPY: CPT | Mod: PN

## 2023-01-30 PROCEDURE — 97140 MANUAL THERAPY 1/> REGIONS: CPT | Mod: PN

## 2023-01-30 PROCEDURE — 97110 THERAPEUTIC EXERCISES: CPT | Mod: PN

## 2023-01-30 NOTE — PROGRESS NOTES
"[facility]   Physical Therapy Treatment Note     Name: Lolly MCLAIN Tippecanoe  Clinic Number: [id number]    Therapy Diagnosis:   Encounter Diagnoses   Name Primary?    Gait difficulty Yes    Foot pain, bilateral     Achilles tendinitis, unspecified laterality      Physician: Yevgeniy De La Rosa DPM    Visit Date: 2023       Physician Orders: PT Eval and Treat   Medical Diagnosis from Referral: Achilles tendinitis, unspecified leg   Evaluation Date: 2023  Authorization Period Expiration: 2023   Plan of Care Expiration: 3/3/2023  Progress Note Due: 2023  Visit # / Visits authorized:  (5 total)   FOTO: completed 2023 50% residual deficit   Next survey due week of 2023     Precautions: Diabetes and cancer     PTA Visit #: 0/5     Time In: 0800  Time Out: 0858  Total Billable Time: 50 minutes    SUBJECTIVE     Pt reports: "We worked at the [email or web address] this weekend and I was able to wear shoes."  She was compliant with home exercise program  Response to previous treatment: "They felt good"  Functional change: able to ricco shoes this weekend and spend time walking/standing    Pain: R 5-6/10, L 1-2  Location: bilateral heels/feet      OBJECTIVE     Objective Measures updated at progress report unless specified.     Treatment     Lolly received the treatments listed below:      therapeutic exercises to develop ROM and flexibility for 18 minutes including   Recumbent bike L3 x 8'  Standing gastroc stretch using wedge 3x30s ea    Soleus stretch using wedge 3x30s ea   Ankle DF/PF and inv/eversion using rocker board x 20 ea    manual therapy techniques: functional dry needling were applied to the: B calves/ankles for 20 minutes, including:  Informed consent obtained after thorough explanation of risks and benefits. Signed consent form will be scanned into medical records. Pre-procedure time out performed which included verification of name, , and site of treatment. Cleaned " target tissues with 70% isopropyl alcohol wipe down and performed with single use sterile prep pads.     Functional dry needling to B lower legs performed as follows:    1) 30 mm needle inserted straight medial to lateral at point  horizontally level with medial malleolus and just anterior to Achilles tendon    2) 30 mm needle inserted posteromedial to anterolateral with 45* angulation at point 3 finger breadths proximal to medial malleolus, just anterior to Achilles tendon     3) 30 mm needle inserted posterolateral to anteromedial with 45* angulation at point 4 finger breadths proximal to lateral malleolus, just anterior to Achilles tendon    4) 30 mm needle inserted posterolateral to anteromedial with 45* angulation at point horizontally level with lateral malleolus and just anterior to Achilles tendon     5) 30 mm needle inserted 1 finger breadth distal and posterior to pt # 1, with 45* angulation inferolateral toward Achilles tendon attachment on posterior calcaneous (with periosteal pecking at attachment)    6) 30 mm needle inserted 2 fingers breadth distal and posterior to pt #1 with 45* angulation inferolateral and slightly posterior onto medial and superior aspect of calcaneal tuberosity. (With periosteal pecking at attachment)    7) 50 mm needle inserted @ bifurcation of medial and lateral heads of the gastroc posterior to anterior with 45* caudad angulation    8) 50 mm needle inserted in 2 additional triggers point in medial gastrocs R and L posterior to anterior (total of 2 needles in medial gastroc trigger points))    9) 30 mm needle inserted within lower 1/3 of each Achilles tendon with posterior to anterior angulation.      All needles wound unidirectionally prior to estim.     Treatment performed in prone. ES with FDN x 15 at 80 MHz wave frequency, 150 microamps (2 channels on L and 3 on R). Intensity set to patient tolerance and therapist remaining in visual contact. Patient instructed in increasing  hydration habits following for decreased soreness. No adverse effects present following needling. Patient reported improved symptoms in L ankle/foot but continued pain in R following session    Patient Education and Home Exercises     Home Exercises Provided and Patient Education Provided     Education provided:   -  Written Home Exercises Provided:  Instructed patient to continue prior HEP. Exercises were reviewed and Lolly was able to demonstrate them prior to the end of the session.  Lolly demonstrated good  understanding of the education provided. See EMR under Patient Instructions for exercises provided during therapy sessions    ASSESSMENT     Patient continues to experience reduction in posterior heel pain L more so than R.  She was able to tolerate wearing shoes this weekend and spend time standing and walking with less discomfort.      Lolly Is progressing slowly towards her goals.   Pt prognosis is Fair.     Pt will continue to benefit from skilled outpatient physical therapy to address the deficits listed in the problem list box on initial evaluation, provide pt/family education and to maximize pt's level of independence in the home and community environment.     Pt's spiritual, cultural and educational needs considered and pt agreeable to plan of care and goals.     Anticipated barriers to physical therapy: None    Goals:   Short Term Goals: 3 weeks   1) Decrease max  pain to < 7/10 Progressing  2) Decrease tenderness to moderate  Ongoing  3) Facilitate improved LE flexibility Progressing  4) Patient will increase standing tolerance to at least 5 min to assist with meal prep and washing dishes. Progressing     Long Term Goals: 6 weeks   1) Increase standing tolerance to at least 30 min to allow for meal prep and light housework Progressing  2) Patient will consistently drive using metatarsal heads to apply pressure to pedals without increased pain Minimal progress  3) Patient will walk > 30 min over  level surface demonstrating adequate step length, heel strike and toe off with heel pain no > 4/10 Progressing  4) Patient will initiate weight bearing after sitting with heel pain no > 4/10 Progressing  5) Improve functional mobility to < 40% deficit as indicated by FOTO foot survey Progressinging  6) Patient will be independent in [facility] for foot/ankle Progressing    PLAN     Progress stretching and strengthening as patient tolerates.  STM and FDN with electric stim to assist with symptom improvement    Lolly Villafana, PT

## 2023-02-01 ENCOUNTER — CLINICAL SUPPORT (OUTPATIENT)
Dept: REHABILITATION | Facility: HOSPITAL | Age: 63
End: 2023-02-01
Attending: ORTHOPAEDIC SURGERY
Payer: MEDICARE

## 2023-02-01 ENCOUNTER — PATIENT MESSAGE (OUTPATIENT)
Dept: FAMILY MEDICINE | Facility: CLINIC | Age: 63
End: 2023-02-01

## 2023-02-01 DIAGNOSIS — R68.89 ACTIVITY INTOLERANCE: Primary | ICD-10-CM

## 2023-02-01 DIAGNOSIS — M25.511 ACUTE PAIN OF RIGHT SHOULDER: ICD-10-CM

## 2023-02-01 DIAGNOSIS — S43.004D DISLOCATION OF RIGHT SHOULDER JOINT, SUBSEQUENT ENCOUNTER: ICD-10-CM

## 2023-02-01 DIAGNOSIS — M25.611 DECREASED ROM OF RIGHT SHOULDER: ICD-10-CM

## 2023-02-01 PROCEDURE — 97110 THERAPEUTIC EXERCISES: CPT | Mod: PN

## 2023-02-01 NOTE — PROGRESS NOTES
"[facility]   Physical Therapy Treatment Note     Name: Lolly Lozanoncer  Clinic Number: [id number]    Therapy Diagnosis:   Encounter Diagnoses   Name Primary?    Activity intolerance Yes    Acute pain of right shoulder     Decreased ROM of right shoulder     Dislocation of right shoulder joint, subsequent encounter      Physician: Taurus Romero MD    Visit Date: 2/1/2023    Physician Orders: PT Eval and Treat Patient sustained a right anterior inferior shoulder dislocation she is a first-time dislocator this occurred about 3 weeks ago.  Wished to start the patient on a rehab program to strengthen her internal rotators of the right shoulder and range of motion of the right shoulder 2 times a week x6 weeks.   Medical Diagnosis from Referral: Dislocation of right shoulder joint, initial encounter   Evaluation Date: 12/1/2022  Authorization Period Expiration: 11/15/2023   Plan of Care Expiration: 3/10/2023  Progress Note Due: 2/25/2023  Visit # / Visits authorized: 5/ 20 (14 total)   FOTO: Survey completed 1/25/2023 43% residual deficit   Next survey due at discharge     Precautions: Diabetes, Fall, and cancer     PTA Visit #: 0/5     Time In: 0855  Time Out: 0945  Total Billable Time: 40 minutes    SUBJECTIVE     Pt reports: "It's been doing pretty good but has been more achy over the last couple weeks."  She was somewhat compliant with home exercise program.  Response to previous treatment: "Sore and tired"  Functional change: Able to put dishes away on 1st shelf of cabinet .        Pain: 0/10 "tired"  Location: right shoulder in the front    OBJECTIVE     Objective Measures updated at progress report unless specified.     Treatment     Lolly received the treatments listed below:      therapeutic exercises to develop strength and flexibility for 38 minutes (overlapped 4' w/another pt) including:  UBE lvl3 4' forward/4' back  Pulley exercise x2'   Flexion  Resisted R shoulder ex using " green theraband x 20  Rows w/scap squeezes     B Sh ext w/scap squeezes   B shoulder ER w/scap squeeze   R shoulder IR    Adduction  Lat pulls    Wall slides x10 ea   Flexion  Abduction/adduction arcs  R Sh AAROM w/3# bar x20 ea   Serratus punch (supine)    Flexion (sitting)   Abduction (sitting)   Extension (sitting)   IR behind back (sitting)  ABC Sh AROM x1  Rhythmic stabilization using BOING x 1' ea   Flexion/ext   Horizontal abd/add at ~ 80* flexion  Supine rhythmic shoulder stabilization (manual resistance) x20s ea   Flexion/extension   Abduction/adduction  Serratus punch using 3# bar x 20  Prone shoulder exercises x20 ea    Ws   Flexion   Extension   Abduction   On elbows with side <> side weight shift    Did not perform this date:    Pulley exercise x2'   Abduction   IR behind back  Upper traps stretch B 3x30s (modified)  Levator scapula stretch B 3x30s (modified)  Rhomboid/middle trap stretch 3x30s ea    manual therapy techniques: PROM were applied to the: R shoulder for 6 minutes, including:   PROM R shoulder in all planes    Patient Education and Home Exercises     Home Exercises Provided and Patient Education Provided     Education provided:   - The patient was instructed in additional therapeutic exercise as stated above in bold print.    Written Home Exercises Provided: Instructed patient to continue prior HEP. Exercises were reviewed and Lolly was able to demonstrate them prior to the end of the session.  Lolly demonstrated good  understanding of the education provided. See EMR under Patient Instructions for exercises provided during therapy sessions    ASSESSMENT     Patient able to complete exercise program but with increased fatigue reported with rhythmic stabilization exercises. Increased height of upward reach achieved.      Lolly Is progressing fairly well towards her goals.   Pt prognosis is Fair.     Pt will continue to benefit from skilled outpatient physical therapy to address the deficits  listed in the problem list box on initial evaluation, provide pt/family education and to maximize pt's level of independence in the home and community environment.     Pt's spiritual, cultural and educational needs considered and pt agreeable to plan of care and goals.     Anticipated barriers to physical therapy: Patient may not be consistent in following precautions to protect R shoulder (as indicated by reported activities and not using sling)     Goals:   Short Term Goals:     1) Decrease max pain to < 7/10 Progressing/nearly met  2) Facilitate improved posture in sitting/standing Minimal progress  3) Facilitate improved upper quadrant flexibility  Progressing  4) Patient will ricco/doff pull over blouse with no more than minimal difficulty 7 of 10 trials Met 1/6/2023  5) Patient will reach to 1st shelf of cabinet using R UE to retrieve/put away plate/glass without increased pain Progressing     Long Term Goals: continue per initial plan of care.  1) Patient will consistently perform upper quadrant bathing/dressing activities without difficulty or discomfort Progressing/nearly met  2) Patient will consistently utilize R UE to reach to 2nd shelf of cabinet to retrieve/put away dishes with no more than minimal discomfort Progressing  3) Patient will consistently utilize R UE to remove hot dish from oven without fear/discomfort Discontinued (patient reports that  does this task)  4) Patient will resume her usual housework activities without significant increase in shoulder discomfort Progressing  5) Improve functional deficit to < 30% as indicated by FOTO shoulder survey Progressing  6) Patient will be independent in complete HEP for ROM, flexibility, strengthening and stabilization Progressing    PLAN     Increase resistance and repetition for exercise program.  Encourage increased participation in HEP.  Prepare for discharge soon if symptoms remain stable.      Lolly Villafana, PT

## 2023-02-02 DIAGNOSIS — S43.004A DISLOCATION OF RIGHT SHOULDER JOINT, INITIAL ENCOUNTER: Primary | ICD-10-CM

## 2023-02-02 DIAGNOSIS — M25.511 RIGHT SHOULDER PAIN, UNSPECIFIED CHRONICITY: ICD-10-CM

## 2023-02-03 ENCOUNTER — CLINICAL SUPPORT (OUTPATIENT)
Dept: REHABILITATION | Facility: HOSPITAL | Age: 63
End: 2023-02-03
Attending: PODIATRIST
Payer: MEDICARE

## 2023-02-03 DIAGNOSIS — M79.672 FOOT PAIN, BILATERAL: ICD-10-CM

## 2023-02-03 DIAGNOSIS — M79.671 FOOT PAIN, BILATERAL: ICD-10-CM

## 2023-02-03 DIAGNOSIS — M76.60 ACHILLES TENDINITIS, UNSPECIFIED LATERALITY: ICD-10-CM

## 2023-02-03 DIAGNOSIS — R26.9 GAIT DIFFICULTY: Primary | ICD-10-CM

## 2023-02-03 PROCEDURE — 97140 MANUAL THERAPY 1/> REGIONS: CPT | Mod: PN,CQ

## 2023-02-03 PROCEDURE — 97110 THERAPEUTIC EXERCISES: CPT | Mod: PN,CQ

## 2023-02-03 NOTE — PROGRESS NOTES
"[facility]   Physical Therapy Treatment Note     Name: Lolly Lozanoncer  Clinic Number: [id number]    Therapy Diagnosis:   Encounter Diagnoses   Name Primary?    Gait difficulty Yes    Foot pain, bilateral     Achilles tendinitis, unspecified laterality      Physician: Yevgeniy De La Rosa DPM    Visit Date: 2/3/2023       Physician Orders: PT Eval and Treat   Medical Diagnosis from Referral: Achilles tendinitis, unspecified leg   Evaluation Date: 1/18/2023  Authorization Period Expiration: 12/31/2023   Plan of Care Expiration: 3/3/2023  Progress Note Due: 2/18/2023  Visit # / Visits authorized: 5/ 20 (6 total)   FOTO: completed 1/30/2023 50% residual deficit   Next survey due week of 2/14/2023     Precautions: Diabetes and cancer     PTA Visit #: 1/5    Time In: 0849  Time Out: 0935  Total Billable Time: 46 minutes    SUBJECTIVE     Pt reports: "I was able to wear my shoes, and I am able to walk around without shoes now. I go to the doctor for my shoulder soon."  She was compliant with home exercise program  Response to previous treatment: "They felt good"  Functional change: able to ambulate without shoes    Pain: R 4-5/10; L 1-2/10  Location: bilateral heels/feet    OBJECTIVE     Objective Measures updated at progress report unless specified.     Treatment     Lolly received the treatments listed below:      therapeutic exercises to develop ROM and flexibility for 37 minutes including   Recumbent bike L3 x10'  Standing gastroc stretch using wedge 3x30s ea    Soleus stretch using wedge 3x30s ea   Ankle DF/PF, inv/eversion using rocker board x20 ea    CW/CCW x10 ea   B Ankle PROM stretches 3x10s ea    PF, DF, Inv/Eversion   Plantarflexion w/red TB x15 ea   Towel sweeps into inv/eversion x20 ea    manual therapy techniques: Soft tissue mobilizations were applied to the: B Achilles Tendon and Heels for 8 minutes, including:   Strumming    Did not perform on this date:  functional dry needling " were applied to the: B calves/ankles for 20 minutes, including:  Informed consent obtained after thorough explanation of risks and benefits. Signed consent form will be scanned into medical records. Pre-procedure time out performed which included verification of name, , and site of treatment. Cleaned target tissues with 70% isopropyl alcohol wipe down and performed with single use sterile prep pads.     Functional dry needling to B lower legs performed as follows:    1) 30 mm needle inserted straight medial to lateral at point  horizontally level with medial malleolus and just anterior to Achilles tendon    2) 30 mm needle inserted posteromedial to anterolateral with 45* angulation at point 3 finger breadths proximal to medial malleolus, just anterior to Achilles tendon     3) 30 mm needle inserted posterolateral to anteromedial with 45* angulation at point 4 finger breadths proximal to lateral malleolus, just anterior to Achilles tendon    4) 30 mm needle inserted posterolateral to anteromedial with 45* angulation at point horizontally level with lateral malleolus and just anterior to Achilles tendon     5) 30 mm needle inserted 1 finger breadth distal and posterior to pt # 1, with 45* angulation inferolateral toward Achilles tendon attachment on posterior calcaneous (with periosteal pecking at attachment)    6) 30 mm needle inserted 2 fingers breadth distal and posterior to pt #1 with 45* angulation inferolateral and slightly posterior onto medial and superior aspect of calcaneal tuberosity. (With periosteal pecking at attachment)    7) 50 mm needle inserted @ bifurcation of medial and lateral heads of the gastroc posterior to anterior with 45* caudad angulation    8) 50 mm needle inserted in 2 additional triggers point in medial gastrocs R and L posterior to anterior (total of 2 needles in medial gastroc trigger points))    9) 30 mm needle inserted within lower 1/3 of each Achilles tendon with posterior to  anterior angulation.      All needles wound unidirectionally prior to estim.     Treatment performed in prone. ES with FDN x 15 at 80 MHz wave frequency, 150 microamps (2 channels on L and 3 on R). Intensity set to patient tolerance and therapist remaining in visual contact. Patient instructed in increasing hydration habits following for decreased soreness. No adverse effects present following needling. Patient reported improved symptoms in L ankle/foot but continued pain in R following session    Patient Education and Home Exercises     Home Exercises Provided and Patient Education Provided     Education provided:   - The patient was provided education on ice massage for possibility in the future.  Written Home Exercises Provided:  Instructed patient to continue prior HEP. Exercises were reviewed and Lolly was able to demonstrate them prior to the end of the session.  Lolly demonstrated good  understanding of the education provided. See EMR under Patient Instructions for exercises provided during therapy sessions    ASSESSMENT     The patient independently ambulated into the clinic, wearing athletic shoes with a back. Lolly Ramírez received skilled PT services regarding bilateral achilles tendonitis. Patient was progressed with additional therapeutic exercises with ankle strengthening, PROM stretching, and AROM. The patient struggles to perform ankle wobble board circles in regards of isolating to only utilizing ankle and not hip motion. Lolly requires tactile cues to perform correctly. Manual soft tissue mobilizations were provided as stated above to promote decreased sensitivity and soft tissue mobility.    Lolly Is progressing slowly towards her goals.   Pt prognosis is Fair.     Pt will continue to benefit from skilled outpatient physical therapy to address the deficits listed in the problem list box on initial evaluation, provide pt/family education and to maximize pt's level of independence in the home and  community environment.     Pt's spiritual, cultural and educational needs considered and pt agreeable to plan of care and goals.     Anticipated barriers to physical therapy: None    Goals:   Short Term Goals: 3 weeks   1) Decrease max  pain to < 7/10 Progressing  2) Decrease tenderness to moderate  Gradually progressing  3) Facilitate improved LE flexibility Progressing  4) Patient will increase standing tolerance to at least 5 min to assist with meal prep and washing dishes. Progressing     Long Term Goals: 6 weeks   1) Increase standing tolerance to at least 30 min to allow for meal prep and light housework Progressing  2) Patient will consistently drive using metatarsal heads to apply pressure to pedals without increased pain Minimal progress  3) Patient will walk > 30 min over level surface demonstrating adequate step length, heel strike and toe off with heel pain no > 4/10 Progressing  4) Patient will initiate weight bearing after sitting with heel pain no > 4/10 Progressing  5) Improve functional mobility to < 40% deficit as indicated by FOTO foot survey Progressinging  6) Patient will be independent in [facility] for foot/ankle Progressing    PLAN     The patient is to be progressed within the established plan of care as tolerated in order to accomplish goals stated above. Plan to progress to standing exercises when applicable to assist with activities of daily living. PT states STM and FDN with electric stim to assist with symptom improvement.    Eda Marrufo, PTA             Private car

## 2023-02-07 ENCOUNTER — HOSPITAL ENCOUNTER (OUTPATIENT)
Dept: RADIOLOGY | Facility: HOSPITAL | Age: 63
Discharge: HOME | End: 2023-02-07
Attending: ORTHOPAEDIC SURGERY
Payer: MEDICARE

## 2023-02-07 ENCOUNTER — OFFICE VISIT (OUTPATIENT)
Dept: ORTHOPEDICS | Facility: CLINIC | Age: 63
End: 2023-02-07
Payer: MEDICARE

## 2023-02-07 VITALS — BODY MASS INDEX: 34.49 KG/M2 | HEIGHT: 70 IN | WEIGHT: 240.94 LBS

## 2023-02-07 DIAGNOSIS — S43.004A DISLOCATION OF RIGHT SHOULDER JOINT, INITIAL ENCOUNTER: ICD-10-CM

## 2023-02-07 DIAGNOSIS — S43.004A DISLOCATION OF RIGHT SHOULDER JOINT, INITIAL ENCOUNTER: Primary | ICD-10-CM

## 2023-02-07 PROCEDURE — 99213 PR OFFICE/OUTPT VISIT, EST, LEVL III, 20-29 MIN: ICD-10-PCS | Mod: S$PBB,,, | Performed by: ORTHOPAEDIC SURGERY

## 2023-02-07 PROCEDURE — 73030 XR SHOULDER COMPLETE 2 OR MORE VIEWS RIGHT: ICD-10-PCS | Mod: 26,RT,, | Performed by: RADIOLOGY

## 2023-02-07 PROCEDURE — 99213 OFFICE O/P EST LOW 20 MIN: CPT | Mod: S$PBB,,, | Performed by: ORTHOPAEDIC SURGERY

## 2023-02-07 PROCEDURE — 73030 X-RAY EXAM OF SHOULDER: CPT | Mod: TC,PN,RT

## 2023-02-07 PROCEDURE — 99999 PR PBB SHADOW E&M-EST. PATIENT-LVL III: ICD-10-PCS | Mod: PBBFAC,,, | Performed by: ORTHOPAEDIC SURGERY

## 2023-02-07 PROCEDURE — 99213 OFFICE O/P EST LOW 20 MIN: CPT | Mod: PBBFAC,PN | Performed by: ORTHOPAEDIC SURGERY

## 2023-02-07 PROCEDURE — 73030 X-RAY EXAM OF SHOULDER: CPT | Mod: 26,RT,, | Performed by: RADIOLOGY

## 2023-02-07 PROCEDURE — 99999 PR PBB SHADOW E&M-EST. PATIENT-LVL III: CPT | Mod: PBBFAC,,, | Performed by: ORTHOPAEDIC SURGERY

## 2023-02-07 NOTE — PROGRESS NOTES
2023    Past Medical History:   Diagnosis Date    Breast cancer, stage 1, estrogen receptor negative, left () 2020    Depression     Diabetes mellitus, type 2     GERD (gastroesophageal reflux disease)     HER2-positive carcinoma of left breast 2020    Hx of breast cancer     Hx of breast cancer - Her2Nu+/ER+ - 2011 12/3/2019    Insomnia     Lymphedema     Neuropathy of both feet     S/P lumpectomy, left breast 2020       Past Surgical History:   Procedure Laterality Date    BICEPS TENDON REPAIR Left 2005    BREAST BIOPSY Left     BREAST LUMPECTOMY Left      SECTION      , ,     CHOLECYSTECTOMY      Elbow fx Left 2015    FRACTURE SURGERY  2016    elbow    HYSTERECTOMY      KNEE ARTHROSCOPY W/ MENISCAL REPAIR Left     Lower back ruptured disc  2010    LYMPH NODE DISSECTION      SPINE SURGERY  2009    ruptured disc       Current Outpatient Medications   Medication Sig    ascorbic acid, vitamin C, (VITAMIN C) 500 MG tablet Take 500 mg by mouth once daily.    b complex vitamins capsule Take 1 capsule by mouth once daily.    docusate sodium (COLACE) 100 MG capsule Take 1 capsule (100 mg total) by mouth once daily.    furosemide (LASIX) 20 MG tablet Take 1 tablet (20 mg total) by mouth daily as needed.    glucosamine-chondroitin 250-200 mg Tab Take by mouth.    HYDROcodone-acetaminophen (NORCO) 5-325 mg per tablet Take 1 tablet by mouth every 4 to 6 hours as needed.    LYRICA 200 mg Cap Take 1 capsule (200 mg total) by mouth 3 (three) times daily.    magnesium 250 mg Tab Take 250 mg by mouth once daily.    multivitamin capsule Take 1 capsule by mouth once daily.    mupirocin (BACTROBAN) 2 % ointment     naproxen (NAPROSYN) 500 MG tablet Take 1 tablet (500 mg total) by mouth 2 (two) times daily.    pantoprazole (PROTONIX) 40 MG tablet Take 1 tablet (40 mg total) by mouth once daily.    traZODone (DESYREL) 50 MG tablet Take 2 tablets (100 mg total) by mouth every evening.     venlafaxine (EFFEXOR-XR) 150 MG Cp24 Take 1 capsule (150 mg total) by mouth once daily.    atomoxetine (STRATTERA) 40 MG capsule Take 1 capsule (40 mg total) by mouth once daily.    oxyCODONE-acetaminophen (PERCOCET) 5-325 mg per tablet Take 1 tablet by mouth every 6 (six) hours as needed for Pain (Pain after taking tylenol and ibuprofen).     No current facility-administered medications for this visit.       Review of patient's allergies indicates:   Allergen Reactions    Banana Hives    Codeine Nausea And Vomiting    Dilaudid  [hydromorphone (bulk)] Rash    Hydromorphone hcl Rash       Family History   Problem Relation Age of Onset    Cancer Mother     Breast cancer Mother     Parkinsonism Father     Diabetes Father     Breast cancer Maternal Aunt     Breast cancer Paternal Aunt     Cancer Maternal Aunt     Cancer Paternal Aunt     Cancer Daughter     Heart disease Maternal Grandmother     Heart disease Paternal Grandmother        Social History     Socioeconomic History    Marital status:    Tobacco Use    Smoking status: Former     Packs/day: 0.00     Years: 0.00     Pack years: 0.00     Types: Cigarettes     Quit date: 2010     Years since quittin.1    Smokeless tobacco: Never   Substance and Sexual Activity    Alcohol use: Yes     Alcohol/week: 2.0 standard drinks     Types: 2 Glasses of wine per week     Comment: socially    Drug use: Never    Sexual activity: Yes     Partners: Male     Birth control/protection: Other-see comments, None     Comment: Hysterectomy     Social Determinants of Health     Financial Resource Strain: Low Risk     Difficulty of Paying Living Expenses: Not hard at all   Food Insecurity: No Food Insecurity    Worried About Running Out of Food in the Last Year: Never true    Ran Out of Food in the Last Year: Never true   Transportation Needs: No Transportation Needs    Lack of Transportation (Medical): No    Lack of Transportation (Non-Medical): No   Physical Activity:  "Unknown    Days of Exercise per Week: 1 day    Minutes of Exercise per Session: Patient refused   Stress: No Stress Concern Present    Feeling of Stress : Only a little   Social Connections: Unknown    Frequency of Communication with Friends and Family: Once a week    Frequency of Social Gatherings with Friends and Family: Once a week    Active Member of Clubs or Organizations: No    Attends Club or Organization Meetings: Never    Marital Status:    Housing Stability: Low Risk     Unable to Pay for Housing in the Last Year: No    Number of Places Lived in the Last Year: 1    Unstable Housing in the Last Year: No       Chief Complaint:   Chief Complaint   Patient presents with    Right Shoulder - Follow-up     DOI: 10/29/2022 -- 17d s/p dislocation of right shoulder joint. LOV: 11/15/2022 - physical therapy for internal rotator muscle strengthening and rehab to the right shoulder. She is doing well.          History of present illness:    This is a 62 y.o. year old female who complains of patient sustained a first-time right shoulder anterior dislocation on October 29, 2022 patient was reduced in the emergency room has been going to physical therapy for range of motion internal muscle strengthening says she is doing well    Review of Systems:    Constitution: Denies chills, fever, and sweats.  HENT: Denies headaches or blurry vision.  Cardiovascular: Denies chest pain or irregular heart beat.  Respiratory: Denies cough or shortness of breath.  Gastrointestinal: Denies abdominal pain, nausea, or vomiting.  Musculoskeletal:  Denies muscle cramps.  Neurological: Denies dizziness or focal weakness.  Psychiatric/Behavioral: Normal mental status.  Hematologic/Lymphatic: Denies bleeding problem or easy bruising/bleeding.  Skin: Denies rash or suspicious lesions.    Examination:    Vital Signs:    Vitals:    02/07/23 1412   Weight: 109.3 kg (240 lb 15.4 oz)   Height: 5' 10" (1.778 m)   PainSc: 0-No pain   PainLoc: " Shoulder       Body mass index is 34.57 kg/m².    This a well-developed, well nourished patient in no acute distress.    Alert and oriented x 3 and cooperative to examination.       Physical Exam:  Right shoulder-patient has excellent range of motion abduction to about 170° excellent external internal rotation good rotator cuff strength with no weakness    Imaging:  X-ray of the right shoulder shows the shoulder to be well reduced no evidence of any new fractures       Assessment: Dislocation of right shoulder joint, initial encounter        Plan:  Patient was 1st time dislocated from a fall she is done well with conservative therapy her strength has increased range of motion is full patient can finish her physical therapy and return as needed      DISCLAIMER: This note may have been dictated using voice recognition software and may contain grammatical errors.     NOTE: Consult report sent to referring provider via [employer] EMR.

## 2023-02-08 ENCOUNTER — CLINICAL SUPPORT (OUTPATIENT)
Dept: REHABILITATION | Facility: HOSPITAL | Age: 63
End: 2023-02-08
Payer: MEDICARE

## 2023-02-08 DIAGNOSIS — M79.672 FOOT PAIN, BILATERAL: ICD-10-CM

## 2023-02-08 DIAGNOSIS — M79.671 FOOT PAIN, BILATERAL: ICD-10-CM

## 2023-02-08 DIAGNOSIS — R26.9 GAIT DIFFICULTY: Primary | ICD-10-CM

## 2023-02-08 DIAGNOSIS — M76.60 ACHILLES TENDINITIS, UNSPECIFIED LATERALITY: ICD-10-CM

## 2023-02-08 PROCEDURE — 97014 ELECTRIC STIMULATION THERAPY: CPT | Mod: PN

## 2023-02-08 PROCEDURE — 97140 MANUAL THERAPY 1/> REGIONS: CPT | Mod: PN

## 2023-02-08 PROCEDURE — 97110 THERAPEUTIC EXERCISES: CPT | Mod: PN

## 2023-02-08 NOTE — PROGRESS NOTES
"[facility]   Physical Therapy Treatment Note     Name: Lolly MCLAIN Darrell  Clinic Number: [id number]    Therapy Diagnosis:   Encounter Diagnoses   Name Primary?    Gait difficulty Yes    Foot pain, bilateral     Achilles tendinitis, unspecified laterality      Physician: Yevgeniy De La Rosa DPM    Visit Date: 2/8/2023       Physician Orders: PT Eval and Treat   Medical Diagnosis from Referral: Achilles tendinitis, unspecified leg   Evaluation Date: 1/18/2023  Authorization Period Expiration: 12/31/2023   Plan of Care Expiration: 3/3/2023  Progress Note Due: 2/18/2023  Visit # / Visits authorized: 6/ 20 (7 total)   FOTO: completed 1/30/2023 50% residual deficit   Next survey due week of 2/14/2023     Precautions: Diabetes and cancer     PTA Visit #: 0/5     Time In: 0940  Time Out: 1035  Total Billable Time: 47 minutes    SUBJECTIVE     Pt reports: "It's been hurting lately.  When I sit down that tendon must relax and it starts hurting." "The weather could be part of it."  She was somewhat compliant with home exercise program  Response to previous treatment: "Felt good"  Functional change: Consistently wearing shoes.      Pain: R 6/10, L 6/10   Location: bilateral heels/feet      OBJECTIVE     Objective Measures updated at progress report unless specified.     Treatment     Lolly received the treatments listed below:      therapeutic exercises to develop ROM and flexibility for 20 minutes including (8 min unbillable)   Recumbent bike L3 x 8' (overlapping with another patient)  Standing gastroc stretch using wedge 3x30s ea    Soleus stretch using wedge 3x30s ea   Ankle alphabet x 1 ea   Ankle DF/PF and inv/eversion using rocker board x 20 ea    manual therapy techniques: functional dry needling were applied to the: B calves/ankles for 20 minutes, including:  Informed consent obtained after thorough explanation of risks and benefits. Signed consent form will be scanned into medical records. " Pre-procedure time out performed which included verification of name, , and site of treatment. Cleaned target tissues with 70% isopropyl alcohol wipe down and performed with single use sterile prep pads.     Functional dry needling to B lower legs performed as follows:    1) 30 mm needle inserted straight medial to lateral at point  horizontally level with medial malleolus and just anterior to Achilles tendon    2) 30 mm needle inserted posteromedial to anterolateral with 45* angulation at point 3 finger breadths proximal to medial malleolus, just anterior to Achilles tendon     3) 30 mm needle inserted posterolateral to anteromedial with 45* angulation at point 4 finger breadths proximal to lateral malleolus, just anterior to Achilles tendon    4) 30 mm needle inserted posterolateral to anteromedial with 45* angulation at point horizontally level with lateral malleolus and just anterior to Achilles tendon     5) 30 mm needle inserted 1 finger breadth distal and posterior to pt # 1, with 45* angulation inferolateral toward Achilles tendon attachment on posterior calcaneous (with periosteal pecking at attachment)    6) 30 mm needle inserted 2 fingers breadth distal and posterior to pt #1 with 45* angulation inferolateral and slightly posterior onto medial and superior aspect of calcaneal tuberosity. (With periosteal pecking at attachment)    7) 50 mm needle inserted @ bifurcation of medial and lateral heads of the gastroc posterior to anterior with 45* caudad angulation    8) 50 mm needle inserted in 1 additional triggers point in medial gastrocs R and L posterior to anterior (total of 1 needle in medial gastroc trigger points))    9) 30 mm needle inserted within lower 1/3 of each Achilles tendon with posterior to anterior angulation.      All needles wound unidirectionally prior to estim.     Treatment performed in prone. ES with FDN x 15 at 80 MHz wave frequency, 150 microamps (2 channels on L and 3 on R).  Intensity set to patient tolerance and therapist remaining in visual contact. Patient instructed in increasing hydration habits following for decreased soreness. No adverse effects present following needling. Patient reported improved symptoms in B ankles/feet.    Patient Education and Home Exercises     Home Exercises Provided and Patient Education Provided     Education provided:   - Emphasized importance of consistent participation in home exercise program  Written Home Exercises Provided:  Instructed patient to continue prior HEP. Exercises were reviewed and Lolly was able to demonstrate them prior to the end of the session.  Lolly demonstrated good  understanding of the education provided. See EMR under Patient Instructions for exercises provided during therapy sessions    ASSESSMENT     Patient able to tolerate shoes for longer period of time but has been inconsistent in performing home exercise program which has contributed to increased discomfort this morning and when sitting. Decreased symptoms following dry needling. Trigger points in medial calves decreased compared to previous visits    Lolly Is progressing slowly towards her goals.   Pt prognosis is Fair.     Pt will continue to benefit from skilled outpatient physical therapy to address the deficits listed in the problem list box on initial evaluation, provide pt/family education and to maximize pt's level of independence in the home and community environment.     Pt's spiritual, cultural and educational needs considered and pt agreeable to plan of care and goals.     Anticipated barriers to physical therapy: None    Goals:   Short Term Goals: 3 weeks   1) Decrease max  pain to < 7/10 Progressing  2) Decrease tenderness to moderate  Progressing  3) Facilitate improved LE flexibility Progressing slowly  4) Patient will increase standing tolerance to at least 5 min to assist with meal prep and washing dishes. Met 2/8/2023     Long Term Goals: 6 weeks    1) Increase standing tolerance to at least 30 min to allow for meal prep and light housework Progressing  2) Patient will consistently drive using metatarsal heads to apply pressure to pedals without increased pain Minimal progress  3) Patient will walk > 30 min over level surface demonstrating adequate step length, heel strike and toe off with heel pain no > 4/10 Progressing  4) Patient will initiate weight bearing after sitting with heel pain no > 4/10 Progressing  5) Improve functional mobility to < 40% deficit as indicated by FOTO foot survey Progressing  6) Patient will be independent in HEP for foot/ankle Progressing    PLAN     Add toe crunches.  STM, dry needling, electric stimulation.  Progress stretching/strengthening as patient tolerates.     Lolly Villafana, PT

## 2023-02-09 ENCOUNTER — PATIENT MESSAGE (OUTPATIENT)
Dept: FAMILY MEDICINE | Facility: CLINIC | Age: 63
End: 2023-02-09

## 2023-02-09 ENCOUNTER — CLINICAL SUPPORT (OUTPATIENT)
Dept: REHABILITATION | Facility: HOSPITAL | Age: 63
End: 2023-02-09
Payer: MEDICARE

## 2023-02-09 DIAGNOSIS — M25.511 ACUTE PAIN OF RIGHT SHOULDER: ICD-10-CM

## 2023-02-09 DIAGNOSIS — M25.611 DECREASED ROM OF RIGHT SHOULDER: ICD-10-CM

## 2023-02-09 DIAGNOSIS — S43.004D DISLOCATION OF RIGHT SHOULDER JOINT, SUBSEQUENT ENCOUNTER: ICD-10-CM

## 2023-02-09 DIAGNOSIS — I89.0 LYMPHEDEMA: ICD-10-CM

## 2023-02-09 DIAGNOSIS — L03.90 CELLULITIS, UNSPECIFIED CELLULITIS SITE: Primary | ICD-10-CM

## 2023-02-09 DIAGNOSIS — R68.89 ACTIVITY INTOLERANCE: Primary | ICD-10-CM

## 2023-02-09 PROCEDURE — 97110 THERAPEUTIC EXERCISES: CPT | Mod: PN,CQ

## 2023-02-09 PROCEDURE — 97140 MANUAL THERAPY 1/> REGIONS: CPT | Mod: PN,CQ

## 2023-02-09 NOTE — PROGRESS NOTES
"[facility]   Physical Therapy Treatment Note     Name: Lolly Lozanoncer  Clinic Number: [id number]    Therapy Diagnosis:   Encounter Diagnoses   Name Primary?    Dislocation of right shoulder joint, subsequent encounter     Acute pain of right shoulder     Decreased ROM of right shoulder     Activity intolerance Yes     Physician: Taurus Romero MD    Visit Date: 2/9/2023    Physician Orders: PT Eval and Treat Patient sustained a right anterior inferior shoulder dislocation she is a first-time dislocator this occurred about 3 weeks ago.  Wished to start the patient on a rehab program to strengthen her internal rotators of the right shoulder and range of motion of the right shoulder 2 times a week x6 weeks.   Medical Diagnosis from Referral: Dislocation of right shoulder joint, initial encounter   Evaluation Date: 12/1/2022  Authorization Period Expiration: 11/15/2023   Plan of Care Expiration: 3/10/2023  Progress Note Due: 2/25/2023  Visit # / Visits authorized: 6/ 20 (15 total)   FOTO: Survey completed 1/25/2023 43% residual deficit   Next survey due at discharge     Precautions: Diabetes, Fall, and cancer     PTA Visit #: 1/5     Time In: 0852 (14' overlapping with another pt)  Time Out: 0952  Total Billable Time: 48 minutes    SUBJECTIVE     Pt reports: "I just only have difficulty and pain with reaching back behind me while rotating that shoulder. I was able to unload the  without problems, which is a big deal for me. I don't take things out of the oven because of my back essentially though."  She was somewhat compliant with home exercise program.  Response to previous treatment: "I felt fine"  Functional change: Able to put dishes away on 1st shelf of cabinet .        Pain: 0/10  Location: right shoulder in the front    OBJECTIVE     Objective Measures updated at progress report unless specified.     Treatment     Lolly received the treatments listed below:  "     therapeutic exercises to develop strength and flexibility for 40 minutes (overlapped 14' w/another pt) including:  UBE lvl3.5 4' forward/4' back  Pulley exercise x2'   Flexion   ER/IR  Resisted R shoulder ex using blue theraband x20  Rows w/scap squeezes     B Sh ext w/scap squeezes   B shoulder ER w/scap squeeze   R shoulder IR    Adduction  Lat pulls    PNF Sh D2 flex/ext  Wall slides x20 ea   Flexion  Abduction/adduction arcs  R Sh AAROM w/3# bar x20 ea   Serratus punch (supine)    Flexion (supine)  ABC Sh AROM w/1# x1  Rhythmic stabilization using BOING x 1' ea   Flexion/ext   Horizontal abd/add at ~ 80* flexion  Prone shoulder exercises x20 ea    Ws   Flexion   Extension   Abduction   On elbows with side <> side weight shift    Did not perform this date:   Abduction (sitting)   Extension (sitting)   IR behind back (sitting)  Supine rhythmic shoulder stabilization (manual resistance) x20s ea   Flexion/extension   Abduction/adduction    Pulley exercise x2'   Abduction   IR behind back  Upper traps stretch B 3x30s (modified)  Levator scapula stretch B 3x30s (modified)  Rhomboid/middle trap stretch 3x30s ea    manual therapy techniques: PROM were applied to the: R shoulder for 8 minutes, including:   PROM R shoulder in all planes    Patient Education and Home Exercises     Home Exercises Provided and Patient Education Provided     Education provided:   - The patient was instructed in additional therapeutic exercise as stated above in bold print.    Written Home Exercises Provided: Instructed patient to continue prior HEP. Exercises were reviewed and Lolly was able to demonstrate them prior to the end of the session.  Lolly demonstrated good  understanding of the education provided. See EMR under Patient Instructions for exercises provided during therapy sessions    ASSESSMENT     Lolly Ramírez received skilled physical therapy services targeting shoulder range of motion, strength, stability, and endurance. The  patient was instructed in increased therapeutic exercise complexity as stated above. Patient experienced muscle fatigue, but no adverse reactions were noted. Darrell explains that external rotation at 90 degrees abduction is the most difficult motion at this time. Manual techniques were provided to promote increased range of motion.    Lolly Is progressing fairly well towards her goals.   Pt prognosis is Fair.     Pt will continue to benefit from skilled outpatient physical therapy to address the deficits listed in the problem list box on initial evaluation, provide pt/family education and to maximize pt's level of independence in the home and community environment.     Pt's spiritual, cultural and educational needs considered and pt agreeable to plan of care and goals.     Anticipated barriers to physical therapy: Patient may not be consistent in following precautions to protect R shoulder (as indicated by reported activities and not using sling)     Goals:   Short Term Goals:     1) Decrease max pain to < 7/10 Progressing/nearly met  2) Facilitate improved posture in sitting/standing Minimal progress  3) Facilitate improved upper quadrant flexibility  Progressing  4) Patient will ricco/doff pull over blouse with no more than minimal difficulty 7 of 10 trials Met 1/6/2023  5) Patient will reach to 1st shelf of cabinet using R UE to retrieve/put away plate/glass without increased pain Progressing     Long Term Goals: continue per initial plan of care.  1) Patient will consistently perform upper quadrant bathing/dressing activities without difficulty or discomfort Progressing/nearly met  2) Patient will consistently utilize R UE to reach to 2nd shelf of cabinet to retrieve/put away dishes with no more than minimal discomfort Progressing  3) Patient will consistently utilize R UE to remove hot dish from oven without fear/discomfort Discontinued (patient reports that  does this task)  4) Patient will resume her  usual housework activities without significant increase in shoulder discomfort Progressing  5) Improve functional deficit to < 30% as indicated by FOTO shoulder survey Progressing  6) Patient will be independent in complete HEP for ROM, flexibility, strengthening and stabilization Progressing    PLAN     The patient is to be progressed within the established plan of care as tolerated, until discharged, maintaining progression. Prepare for discharge in subsequent sessions.    Eda Marrufo, PTA

## 2023-02-10 ENCOUNTER — CLINICAL SUPPORT (OUTPATIENT)
Dept: REHABILITATION | Facility: HOSPITAL | Age: 63
End: 2023-02-10
Payer: MEDICARE

## 2023-02-10 DIAGNOSIS — M79.671 FOOT PAIN, BILATERAL: ICD-10-CM

## 2023-02-10 DIAGNOSIS — M76.60 ACHILLES TENDINITIS, UNSPECIFIED LATERALITY: ICD-10-CM

## 2023-02-10 DIAGNOSIS — M79.672 FOOT PAIN, BILATERAL: ICD-10-CM

## 2023-02-10 DIAGNOSIS — R26.9 GAIT DIFFICULTY: Primary | ICD-10-CM

## 2023-02-10 PROCEDURE — 97140 MANUAL THERAPY 1/> REGIONS: CPT | Mod: PN,CQ

## 2023-02-10 PROCEDURE — 97110 THERAPEUTIC EXERCISES: CPT | Mod: PN,CQ

## 2023-02-10 NOTE — PROGRESS NOTES
"[facility]   Physical Therapy Treatment Note     Name: Lolly MCLAIN [place]  Clinic Number: [id number]    Therapy Diagnosis:   Encounter Diagnoses   Name Primary?    Gait difficulty Yes    Foot pain, bilateral     Achilles tendinitis, unspecified laterality      Physician: Yevgeniy De La Rosa DPM    Visit Date: 2/10/2023       Physician Orders: PT Eval and Treat   Medical Diagnosis from Referral: Achilles tendinitis, unspecified leg   Evaluation Date: 1/18/2023  Authorization Period Expiration: 12/31/2023   Plan of Care Expiration: 3/3/2023  Progress Note Due: 2/18/2023  Visit # / Visits authorized: 7/ 20 (8 total)   FOTO: completed 1/30/2023 50% residual deficit   Next survey due week of 2/14/2023     Precautions: Diabetes and cancer     PTA Visit #: 1/5     Time In: 0845 (overlapping w/another pt)  Time Out: 0954  Total Billable Time: 55 minutes    SUBJECTIVE     Pt reports: "I am doing a little better. My shoes still put pressure on that tendon and the back of my heel."  She was somewhat compliant with home exercise program  Response to previous treatment: "good"  Functional change: Consistently wearing shoes.      Pain: R 5/10, L 4/10   Location: bilateral heels/feet      OBJECTIVE     Objective Measures updated at progress report unless specified.     Treatment     Lolly received the treatments listed below:      therapeutic exercises to develop ROM and flexibility for 45 minutes including (10 min unbillable)   Recumbent bike L3 x 10' (overlapping with another patient)  Standing gastroc stretch using wedge 3x30s ea    Soleus stretch using wedge 3x30s ea   Towel sweeps x20 ea  Towel scrunches x20 ea  B Green TB resisted ankle ROM x20 ea   DF, PF, Inv, Ev   Ankle alphabet x1 ea   Ankle DF/PF and inv/eversion using rocker board x20 ea     manual therapy techniques: Soft tissue mobilizations were applied to the: B Achilles Tendon and Heels for 10 minutes, including:              Strumming and " Triggerpoint release    Did not perform this date:  functional dry needling were applied to the: B calves/ankles for 20 minutes, including:  Informed consent obtained after thorough explanation of risks and benefits. Signed consent form will be scanned into medical records. Pre-procedure time out performed which included verification of name, , and site of treatment. Cleaned target tissues with 70% isopropyl alcohol wipe down and performed with single use sterile prep pads.     Functional dry needling to B lower legs performed as follows:    1) 30 mm needle inserted straight medial to lateral at point  horizontally level with medial malleolus and just anterior to Achilles tendon    2) 30 mm needle inserted posteromedial to anterolateral with 45* angulation at point 3 finger breadths proximal to medial malleolus, just anterior to Achilles tendon     3) 30 mm needle inserted posterolateral to anteromedial with 45* angulation at point 4 finger breadths proximal to lateral malleolus, just anterior to Achilles tendon    4) 30 mm needle inserted posterolateral to anteromedial with 45* angulation at point horizontally level with lateral malleolus and just anterior to Achilles tendon     5) 30 mm needle inserted 1 finger breadth distal and posterior to pt # 1, with 45* angulation inferolateral toward Achilles tendon attachment on posterior calcaneous (with periosteal pecking at attachment)    6) 30 mm needle inserted 2 fingers breadth distal and posterior to pt #1 with 45* angulation inferolateral and slightly posterior onto medial and superior aspect of calcaneal tuberosity. (With periosteal pecking at attachment)    7) 50 mm needle inserted @ bifurcation of medial and lateral heads of the gastroc posterior to anterior with 45* caudad angulation    8) 50 mm needle inserted in 1 additional triggers point in medial gastrocs R and L posterior to anterior (total of 1 needle in medial gastroc trigger points))    9) 30 mm  needle inserted within lower 1/3 of each Achilles tendon with posterior to anterior angulation.      All needles wound unidirectionally prior to estim.     Treatment performed in prone. ES with FDN x 15 at 80 MHz wave frequency, 150 microamps (2 channels on L and 3 on R). Intensity set to patient tolerance and therapist remaining in visual contact. Patient instructed in increasing hydration habits following for decreased soreness. No adverse effects present following needling. Patient reported improved symptoms in B ankles/feet.    Patient Education and Home Exercises     Home Exercises Provided and Patient Education Provided     Education provided:   - The patient was instructed in additional therapeutic exercise as stated above in bold print.  Written Home Exercises Provided:  Instructed patient to continue prior HEP. Exercises were reviewed and Lolly was able to demonstrate them prior to the end of the session.  Lolly demonstrated good  understanding of the education provided. See EMR under Patient Instructions for exercises provided during therapy sessions    ASSESSMENT     The patient independently ambulated into the clinic with minimal to moderate lateral sway due to pain (slight antalgic). Lolly Ramírez received skilled PT services as stated above. Patient was progressed with increased ankle strengthening, range of motion, and endurance exercise. The patient demonstrates limited standing tolerance with standing stretches due to bilateral pain in feet. Manual techniques were provided to promote increased soft tissue mobility and reduced sensitivity. Patient presented with open areas from the patient scratching eczema on lower extremities.     Lolly Is progressing slowly towards her goals.   Pt prognosis is Fair.     Pt will continue to benefit from skilled outpatient physical therapy to address the deficits listed in the problem list box on initial evaluation, provide pt/family education and to maximize pt's  level of independence in the home and community environment.     Pt's spiritual, cultural and educational needs considered and pt agreeable to plan of care and goals.     Anticipated barriers to physical therapy: None    Goals:   Short Term Goals: 3 weeks   1) Decrease max  pain to < 7/10 Progressing  2) Decrease tenderness to moderate  Progressing  3) Facilitate improved LE flexibility Progressing slowly  4) Patient will increase standing tolerance to at least 5 min to assist with meal prep and washing dishes. Met 2/8/2023     Long Term Goals: 6 weeks   1) Increase standing tolerance to at least 30 min to allow for meal prep and light housework Progressing  2) Patient will consistently drive using metatarsal heads to apply pressure to pedals without increased pain Minimal progress  3) Patient will walk > 30 min over level surface demonstrating adequate step length, heel strike and toe off with heel pain no > 4/10 Progressing  4) Patient will initiate weight bearing after sitting with heel pain no > 4/10 Progressing  5) Improve functional mobility to < 40% deficit as indicated by FOTO foot survey Progressing  6) Patient will be independent in [facility] for foot/ankle Progressing    PLAN     The patient is to be progressed within the established plan of care as tolerated in order to accomplish goals stated above. STM, dry needling, electric stimulation as needed. Plan to progress to standing ankle strengthening and proprioception/balance exercises, also working on standing endurance in subsequent sessions. Trial of active Theraball roll by performing plantarflexion and dorsiflexion.  Eda Marrufo, PTA

## 2023-02-13 ENCOUNTER — CLINICAL SUPPORT (OUTPATIENT)
Dept: REHABILITATION | Facility: HOSPITAL | Age: 63
End: 2023-02-13
Payer: MEDICARE

## 2023-02-13 DIAGNOSIS — R26.9 GAIT DIFFICULTY: Primary | ICD-10-CM

## 2023-02-13 DIAGNOSIS — M76.60 ACHILLES TENDINITIS, UNSPECIFIED LATERALITY: ICD-10-CM

## 2023-02-13 DIAGNOSIS — M79.672 FOOT PAIN, BILATERAL: ICD-10-CM

## 2023-02-13 DIAGNOSIS — M79.671 FOOT PAIN, BILATERAL: ICD-10-CM

## 2023-02-13 PROCEDURE — 97140 MANUAL THERAPY 1/> REGIONS: CPT | Mod: PN

## 2023-02-13 PROCEDURE — 97014 ELECTRIC STIMULATION THERAPY: CPT | Mod: PN

## 2023-02-13 PROCEDURE — 97110 THERAPEUTIC EXERCISES: CPT | Mod: PN

## 2023-02-13 NOTE — PROGRESS NOTES
"[facility]   Physical Therapy Treatment Note     Name: Lolly MCLAIN [place]  Clinic Number: [id number]    Therapy Diagnosis:   Encounter Diagnoses   Name Primary?    Gait difficulty Yes    Foot pain, bilateral     Achilles tendinitis, unspecified laterality        Physician: Yevgeniy De La Rosa DPM    Visit Date: 2/13/2023       Physician Orders: PT Eval and Treat   Medical Diagnosis from Referral: Achilles tendinitis, unspecified leg   Evaluation Date: 1/18/2023  Authorization Period Expiration: 12/31/2023   Plan of Care Expiration: 3/3/2023  Progress Note Due: 2/18/2023  Visit # / Visits authorized: 8/ 20 (9 total)   FOTO: completed 1/30/2023 50% residual deficit   Next survey due week of 2/14/2023     Precautions: Diabetes and cancer     PTA Visit #: 0/5     Time In: 0804  Time Out: 0910  Total Billable Time: 53 minutes    SUBJECTIVE     Pt reports: "We went to 4 stores Saturday.  I think my limit is 3 cause my feet hurt afterward."  She was compliant with home exercise program  Response to previous treatment: "sore, like my muscles were tired sore.  Not hurting."  Functional change: Consistently wearing shoes.      Pain: R 4/10, L 3/10   Location: bilateral heels/feet      OBJECTIVE     Objective Measures updated at progress report unless specified.     Treatment     Lolly received the treatments listed below:      therapeutic exercises to develop ROM and flexibility for 19 minutes including:   Recumbent bike L3 x 8'   Standing gastroc stretch using wedge 3x30s ea    Soleus stretch using wedge 3x30s ea   Towel sweeps x20 ea  Towel scrunches x20 ea  B Green TB resisted ankle ROM x20 ea   DF, PF, Inv, Ev     Did not perform this date:  Ankle alphabet x1 ea   Ankle DF/PF and inv/eversion using rocker board x20 ea        manual therapy techniques  functional dry needling were applied to the: B calves/ankles for 19 minutes, including:  Informed consent previously obtained after thorough explanation " of risks and benefits. Signed consent form has been scanned into medical records. Pre-procedure time out performed which included verification of name, , and site of treatment. Cleaned target tissues with 70% isopropyl alcohol wipe down and performed with single use sterile prep pads.     Functional dry needling to B lower legs performed as follows:    1) 30 mm needle inserted straight medial to lateral at point  horizontally level with medial malleolus and just anterior to Achilles tendon    2) 30 mm needle inserted posteromedial to anterolateral with 45* angulation at point 3 finger breadths proximal to medial malleolus, just anterior to Achilles tendon     3) 30 mm needle inserted posterolateral to anteromedial with 45* angulation at point 4 finger breadths proximal to lateral malleolus, just anterior to Achilles tendon    4) 30 mm needle inserted posterolateral to anteromedial with 45* angulation at point horizontally level with lateral malleolus and just anterior to Achilles tendon     5) 30 mm needle inserted 1 finger breadth distal and posterior to pt # 1, with 45* angulation inferolateral toward Achilles tendon attachment on posterior calcaneous (with periosteal pecking at attachment)    6) 30 mm needle inserted 2 fingers breadth distal and posterior to pt #1 with 45* angulation inferolateral and slightly posterior onto medial and superior aspect of calcaneal tuberosity. (With periosteal pecking at attachment)    7) 50 mm needle inserted @ bifurcation of medial and lateral heads of the gastroc posterior to anterior with 45* caudad angulation    8) 50 mm needle inserted in 1 additional trigger point in medial gastrocs R and L posterior to anterior (total of 1 needle in medial gastroc trigger point on R and L)    9) 30 mm needle inserted within lower 1/3 of each Achilles tendon with posterior to anterior angulation.      All needles left insitu .     Treatment performed in prone. ES with FDN x 15 at 80 MHz  wave frequency, 150 microamps (2 channels on L and 3 on R). Intensity set to patient tolerance and therapist remaining in visual contact/as well as patient being given call bell and instructed to ring if any problems. Patient reminded to  increase hydration habits following for decreased soreness. No adverse effects present following needling. Patient reported improved symptoms in B ankles/feet.    Did not perform this date: manual therapy techniques: Soft tissue mobilizations were applied to the: B Achilles Tendon and Heels for 0 minutes, including:              Strumming and Triggerpoint release    Patient Education and Home Exercises     Home Exercises Provided and Patient Education Provided     Education provided:   - The patient was reminded to increase water intake following FDN.  .  Written Home Exercises Provided:  Instructed patient to continue prior HEP. Exercises were reviewed and Lolly was able to demonstrate them prior to the end of the session.  Lolly demonstrated good  understanding of the education provided. See EMR under Patient Instructions for exercises provided during therapy sessions    ASSESSMENT     Symptoms continue to slowly improve.  Patient is reporting improved compliance with home exercise program.  Gait quality improving.  Increased standing/walking tolerance.    Lolly Is progressing slowly towards her goals.   Pt prognosis is Fair.     Pt will continue to benefit from skilled outpatient physical therapy to address the deficits listed in the problem list box on initial evaluation, provide pt/family education and to maximize pt's level of independence in the home and community environment.     Pt's spiritual, cultural and educational needs considered and pt agreeable to plan of care and goals.     Anticipated barriers to physical therapy: None    Goals:   Short Term Goals: 3 weeks   1) Decrease max  pain to < 7/10 Met 2/13/2023  2) Decrease tenderness to moderate  Progressing/nearly  met  3) Facilitate improved LE flexibility Progressing slowly  4) Patient will increase standing tolerance to at least 5 min to assist with meal prep and washing dishes. Met 2/8/2023     Long Term Goals: 6 weeks   1) Increase standing tolerance to at least 30 min to allow for meal prep and light housework Progressing  2) Patient will consistently drive using metatarsal heads to apply pressure to pedals without increased pain Minimal progress  3) Patient will walk > 30 min over level surface demonstrating adequate step length, heel strike and toe off with heel pain no > 4/10 Progressing/nearly met  4) Patient will initiate weight bearing after sitting with heel pain no > 4/10 Progressing/nearly met  5) Improve functional mobility to < 40% deficit as indicated by FOTO foot survey Progressing  6) Patient will be independent in [facility] for foot/ankle Progressing    PLAN     Lolly will continue to be progressed in flexibility and strengthening exercises.  STM with deep pressure/strumming and long stroking. Dry needling with electric stimulation as needed.   .  Lolly Villafana, PT

## 2023-02-15 ENCOUNTER — CLINICAL SUPPORT (OUTPATIENT)
Dept: REHABILITATION | Facility: HOSPITAL | Age: 63
End: 2023-02-15
Payer: MEDICARE

## 2023-02-15 DIAGNOSIS — M79.672 FOOT PAIN, BILATERAL: ICD-10-CM

## 2023-02-15 DIAGNOSIS — R26.9 GAIT DIFFICULTY: Primary | ICD-10-CM

## 2023-02-15 DIAGNOSIS — M76.60 ACHILLES TENDINITIS, UNSPECIFIED LATERALITY: ICD-10-CM

## 2023-02-15 DIAGNOSIS — M79.671 FOOT PAIN, BILATERAL: ICD-10-CM

## 2023-02-15 PROCEDURE — 97140 MANUAL THERAPY 1/> REGIONS: CPT | Mod: PN,CQ

## 2023-02-15 PROCEDURE — 97110 THERAPEUTIC EXERCISES: CPT | Mod: PN,CQ

## 2023-02-15 NOTE — PROGRESS NOTES
"[facility]   Physical Therapy Treatment Note     Name: Lolly Lozanoncer  Clinic Number: [id number]    Therapy Diagnosis:   Encounter Diagnoses   Name Primary?    Gait difficulty Yes    Foot pain, bilateral     Achilles tendinitis, unspecified laterality        Physician: Yevgeniy De La Rosa DPM    Visit Date: 2/15/2023       Physician Orders: PT Eval and Treat   Medical Diagnosis from Referral: Achilles tendinitis, unspecified leg   Evaluation Date: 1/18/2023  Authorization Period Expiration: 12/31/2023   Plan of Care Expiration: 3/3/2023  Progress Note Due: 2/18/2023  Visit # / Visits authorized: 9/ 20 (10 total)   FOTO: Completed 1/30/2023 50% residual deficit   Next survey due week of 2/14/2023     Precautions: Diabetes and cancer     PTA Visit #: 1/5     Time In: 0942  Time Out: 1032  Total Billable Time: 47 minutes    SUBJECTIVE     Pt reports: "I stepped on my slipper of the other foot, so I have agitated it already this morning. I wore Bandaids on my heels to cushion in my shoes."  She was compliant with home exercise program.  Response to previous treatment: "sore"  Functional change: Consistently wearing shoes.    Pain: R 5-6/10, L 4/10   Location: bilateral heels/feet      OBJECTIVE     Objective Measures updated at progress report unless specified.     Treatment     Lolly received the treatments listed below:      therapeutic exercises to develop ROM and flexibility for 35 minutes including:   Recumbent bike L4 x 10'   Standing gastroc stretch using wedge 3x30s ea    Soleus stretch using wedge 3x30s ea    Heel raises on Airex x10    Toe raises on Airex x10   Ankle alphabet x1 ea  Towel sweeps x2'   Towel scrunches x2'  Theraball rolling under sole x1' ea  B Blue TB resisted ankle ROM x20 ea   DF, PF, Inv, Ev     Did not perform this date:   Ankle DF/PF and inv/eversion using rocker board x20 ea    manual therapy techniques: Soft tissue mobilizations were applied to the: B Achilles " Tendon and Heels for 12 minutes, including:              Strumming and Triggerpoint release      Did not perform on this date:  manual therapy techniques  functional dry needling were applied to the: B calves/ankles for 19 minutes, including:  Informed consent previously obtained after thorough explanation of risks and benefits. Signed consent form has been scanned into medical records. Pre-procedure time out performed which included verification of name, , and site of treatment. Cleaned target tissues with 70% isopropyl alcohol wipe down and performed with single use sterile prep pads.     Functional dry needling to B lower legs performed as follows:    1) 30 mm needle inserted straight medial to lateral at point  horizontally level with medial malleolus and just anterior to Achilles tendon    2) 30 mm needle inserted posteromedial to anterolateral with 45* angulation at point 3 finger breadths proximal to medial malleolus, just anterior to Achilles tendon     3) 30 mm needle inserted posterolateral to anteromedial with 45* angulation at point 4 finger breadths proximal to lateral malleolus, just anterior to Achilles tendon    4) 30 mm needle inserted posterolateral to anteromedial with 45* angulation at point horizontally level with lateral malleolus and just anterior to Achilles tendon     5) 30 mm needle inserted 1 finger breadth distal and posterior to pt # 1, with 45* angulation inferolateral toward Achilles tendon attachment on posterior calcaneous (with periosteal pecking at attachment)    6) 30 mm needle inserted 2 fingers breadth distal and posterior to pt #1 with 45* angulation inferolateral and slightly posterior onto medial and superior aspect of calcaneal tuberosity. (With periosteal pecking at attachment)    7) 50 mm needle inserted @ bifurcation of medial and lateral heads of the gastroc posterior to anterior with 45* caudad angulation    8) 50 mm needle inserted in 1 additional trigger point in  medial gastrocs R and L posterior to anterior (total of 1 needle in medial gastroc trigger point on R and L)    9) 30 mm needle inserted within lower 1/3 of each Achilles tendon with posterior to anterior angulation.      All needles left insitu .     Treatment performed in prone. ES with FDN x 15 at 80 MHz wave frequency, 150 microamps (2 channels on L and 3 on R). Intensity set to patient tolerance and therapist remaining in visual contact/as well as patient being given call bell and instructed to ring if any problems. Patient reminded to  increase hydration habits following for decreased soreness. No adverse effects present following needling. Patient reported improved symptoms in B ankles/feet.    Patient Education and Home Exercises     Home Exercises Provided and Patient Education Provided     Education provided:   - The patient was instructed in additional therapeutic exercise as stated above in bold print.  Written Home Exercises Provided:  Instructed patient to continue prior HEP. Exercises were reviewed and Lolly was able to demonstrate them prior to the end of the session.  Lolly demonstrated good  understanding of the education provided. See EMR under Patient Instructions for exercises provided during therapy sessions    ASSESSMENT     Lolly Ramírez ambulated into the clinic properly wearing athletic shoes today. The patient was progressed with standing exercises to assist in standing endurance and ankle strength, as well as increased Theraband resistance. Lolly accidentally kicked chair leg with R heel while seated; ice massaged was discussed with the patient and offered. Patient deferred ice massage for today's treatment due to time constraints. Manual techniques were provided to promote reduced sensitivity and triggerpoints, as well as increased soft tissue mobility. Patient demonstrated increased tenderness R calcaneus > L.    Lolly Is progressing slowly towards her goals.   Pt prognosis is Fair.      Pt will continue to benefit from skilled outpatient physical therapy to address the deficits listed in the problem list box on initial evaluation, provide pt/family education and to maximize pt's level of independence in the home and community environment.     Pt's spiritual, cultural and educational needs considered and pt agreeable to plan of care and goals.     Anticipated barriers to physical therapy: None    Goals:   Short Term Goals: 3 weeks   1) Decrease max  pain to < 7/10 Met 2/13/2023  2) Decrease tenderness to moderate  Progressing/nearly met  3) Facilitate improved LE flexibility Progressing slowly  4) Patient will increase standing tolerance to at least 5 min to assist with meal prep and washing dishes. Met 2/8/2023     Long Term Goals: 6 weeks   1) Increase standing tolerance to at least 30 min to allow for meal prep and light housework Progressing  2) Patient will consistently drive using metatarsal heads to apply pressure to pedals without increased pain Progressing  3) Patient will walk > 30 min over level surface demonstrating adequate step length, heel strike and toe off with heel pain no > 4/10 Progressing/nearly met  4) Patient will initiate weight bearing after sitting with heel pain no > 4/10 Progressing/nearly met  5) Improve functional mobility to < 40% deficit as indicated by FOTO foot survey Progressing  6) Patient will be independent in [facility] for foot/ankle Progressing    PLAN     The patient is to be progressed within the established plan of care as tolerated in order to accomplish goals as stated above and increase function. Plan to incorporate increased standing exercises as tolerated in subsequent sessions. Trial of ice massage as needed, along with dry needling and other manual techniques.  Eda Marrufo, PTA

## 2023-02-17 ENCOUNTER — CLINICAL SUPPORT (OUTPATIENT)
Dept: REHABILITATION | Facility: HOSPITAL | Age: 63
End: 2023-02-17
Attending: ORTHOPAEDIC SURGERY
Payer: MEDICARE

## 2023-02-17 DIAGNOSIS — R68.89 ACTIVITY INTOLERANCE: Primary | ICD-10-CM

## 2023-02-17 DIAGNOSIS — M25.511 ACUTE PAIN OF RIGHT SHOULDER: ICD-10-CM

## 2023-02-17 DIAGNOSIS — M25.611 DECREASED ROM OF RIGHT SHOULDER: ICD-10-CM

## 2023-02-17 DIAGNOSIS — S43.004D DISLOCATION OF RIGHT SHOULDER JOINT, SUBSEQUENT ENCOUNTER: ICD-10-CM

## 2023-02-17 PROCEDURE — 97140 MANUAL THERAPY 1/> REGIONS: CPT | Mod: PN,CQ

## 2023-02-17 PROCEDURE — 97110 THERAPEUTIC EXERCISES: CPT | Mod: PN,CQ

## 2023-02-17 NOTE — PROGRESS NOTES
"[facility]   Physical Therapy Treatment Note     Name: Lolly Ramírez  Clinic Number: [id number]    Therapy Diagnosis:   Encounter Diagnoses   Name Primary?    Activity intolerance Yes    Dislocation of right shoulder joint, subsequent encounter     Acute pain of right shoulder     Decreased ROM of right shoulder      Physician: Taurus Romero MD    Visit Date: 2/17/2023    Physician Orders: PT Eval and Treat Patient sustained a right anterior inferior shoulder dislocation she is a first-time dislocator this occurred about 3 weeks ago.  Wished to start the patient on a rehab program to strengthen her internal rotators of the right shoulder and range of motion of the right shoulder 2 times a week x6 weeks.   Medical Diagnosis from Referral: Dislocation of right shoulder joint, initial encounter   Evaluation Date: 12/1/2022  Authorization Period Expiration: 11/15/2023   Plan of Care Expiration: 3/10/2023  Progress Note Due: 2/25/2023  Visit # / Visits authorized: 7/ 20 (16 total)   FOTO: Survey completed 1/25/2023 43% residual deficit   Next survey due at discharge     Precautions: Diabetes, Fall, and cancer     PTA Visit #: 2/5     Time In: 0900  Time Out: 1000  Total Billable Time: 55 minutes    SUBJECTIVE     Pt reports: "My feet are really bothering me today. Clumsy me did something else to them again after last session with my feet."  She was somewhat compliant with home exercise program.  Response to previous treatment: "a little sore"  Functional change: Able to put dishes away on 1st shelf of cabinet .        Pain: 0/10  Location: right shoulder in the front    OBJECTIVE     Objective Measures updated at progress report unless specified.     Treatment     Lolly received the treatments listed below:      therapeutic exercises to develop strength and flexibility for 47 minutes, including:  UBE lvl3.5 4' forward/4' back  Pulley exercise x2'   Flexion   ER/IR  Resisted R shoulder " ex using blue theraband x20  Rows w/scap squeezes     B Sh ext w/scap squeezes   B shoulder ER w/scap squeeze   R shoulder IR    Adduction  Lat pulls    PNF Sh D2 flex/ext  ABC Sh AROM w/1# x1  Rhythmic stabilization using BOING x 1' ea   Flexion/ext   Horizontal abd/add at ~ 80* flexion  R Sh AAROM w/3# bar x20 ea   Serratus punch (supine)    Flexion (supine)  Prone shoulder exercises x20 ea    Ws   Flexion   Extension   Abduction   On elbows with side <> side weight shift    Did not perform this date:   Wall slides x20 ea   Flexion  Abduction/adduction arcs   Abduction (sitting)   Extension (sitting)   IR behind back (sitting)  Supine rhythmic shoulder stabilization (manual resistance) x20s ea   Flexion/extension   Abduction/adduction    Pulley exercise x2'   Abduction   IR behind back  Upper traps stretch B 3x30s (modified)  Levator scapula stretch B 3x30s (modified)  Rhomboid/middle trap stretch 3x30s ea    manual therapy techniques: PROM were applied to the: R shoulder for 8 minutes, including:   PROM R shoulder in all planes   Scapular scouring    Patient Education and Home Exercises     Home Exercises Provided and Patient Education Provided     Education provided:   - The patient was instructed in continuation of established therapeutic treatment program.    Written Home Exercises Provided: Instructed patient to continue prior HEP. Exercises were reviewed and Lolly was able to demonstrate them prior to the end of the session.  Lolly demonstrated good  understanding of the education provided. See EMR under Patient Instructions for exercises provided during therapy sessions    ASSESSMENT     Lolly Ramírez is nearing discharge due to static progression. Patient reports less frequent shoulder pain if any and increased functional status compared to initially. The patient was not progressed further due to fatigue and time constraints, but patient did not report any discomfort during exercises. Manual techniques  were provided per patient request as stated above in order to promote increased scapulothoracic rhythm.    Lolly Is progressing fairly well towards her goals.   Pt prognosis is Fair.     Pt will continue to benefit from skilled outpatient physical therapy to address the deficits listed in the problem list box on initial evaluation, provide pt/family education and to maximize pt's level of independence in the home and community environment.     Pt's spiritual, cultural and educational needs considered and pt agreeable to plan of care and goals.     Anticipated barriers to physical therapy: Patient may not be consistent in following precautions to protect R shoulder (as indicated by reported activities and not using sling)     Goals:   Short Term Goals:     1) Decrease max pain to < 7/10 Progressing/nearly met  2) Facilitate improved posture in sitting/standing Minimal progress  3) Facilitate improved upper quadrant flexibility  Progressing  4) Patient will ricco/doff pull over blouse with no more than minimal difficulty 7 of 10 trials Met 1/6/2023  5) Patient will reach to 1st shelf of cabinet using R UE to retrieve/put away plate/glass without increased pain Progressing     Long Term Goals: continue per initial plan of care.  1) Patient will consistently perform upper quadrant bathing/dressing activities without difficulty or discomfort Progressing/nearly met  2) Patient will consistently utilize R UE to reach to 2nd shelf of cabinet to retrieve/put away dishes with no more than minimal discomfort Progressing  3) Patient will consistently utilize R UE to remove hot dish from oven without fear/discomfort Discontinued (patient reports that  does this task)  4) Patient will resume her usual housework activities without significant increase in shoulder discomfort Progressing  5) Improve functional deficit to < 30% as indicated by FOTO shoulder survey Progressing  6) Patient will be independent in complete HEP for  ROM, flexibility, strengthening and stabilization Progressing    PLAN     The patient is to be progressed within the established plan of care as tolerated, until discharged, maintaining progression. Prepare for discharge in subsequent sessions. Will provide updated HEP and resistance bands as needed.    Eda Marrufo, PTA

## 2023-02-23 ENCOUNTER — CLINICAL SUPPORT (OUTPATIENT)
Dept: REHABILITATION | Facility: HOSPITAL | Age: 63
End: 2023-02-23
Payer: MEDICARE

## 2023-02-23 DIAGNOSIS — R26.9 GAIT DIFFICULTY: Primary | ICD-10-CM

## 2023-02-23 DIAGNOSIS — M79.671 FOOT PAIN, BILATERAL: ICD-10-CM

## 2023-02-23 DIAGNOSIS — M79.672 FOOT PAIN, BILATERAL: ICD-10-CM

## 2023-02-23 DIAGNOSIS — M76.60 ACHILLES TENDINITIS, UNSPECIFIED LATERALITY: ICD-10-CM

## 2023-02-23 PROCEDURE — 97140 MANUAL THERAPY 1/> REGIONS: CPT | Mod: PN,CQ

## 2023-02-23 PROCEDURE — 97110 THERAPEUTIC EXERCISES: CPT | Mod: PN,CQ

## 2023-02-23 NOTE — PROGRESS NOTES
"[facility]   Physical Therapy Treatment Note     Name: Lolly MCLAIN Darrell  Clinic Number: [id number]    Therapy Diagnosis:   Encounter Diagnoses   Name Primary?    Gait difficulty Yes    Foot pain, bilateral     Achilles tendinitis, unspecified laterality        Physician: Yevgeniy De La Rosa DPM    Visit Date: 2/23/2023       Physician Orders: PT Eval and Treat   Medical Diagnosis from Referral: Achilles tendinitis, unspecified leg   Evaluation Date: 1/18/2023  Authorization Period Expiration: 12/31/2023   Plan of Care Expiration: 3/3/2023  Progress Note Due: 2/18/2023  Visit # / Visits authorized: 10/ 20 (11 total)   FOTO: Completed 02/23/2023 64% residual deficit   Next survey due at discharge     Precautions: Diabetes and cancer     PTA Visit #: 2/5     Time In: 0910 (FOTO survey performed prior)  Time Out: 1005  Total Billable Time: 53 minutes    SUBJECTIVE     Pt reports: "I fell twice yesterday because my dogs got tangled up in my feet and tripped me. I hurt everywhere today."  She was compliant with home exercise program.  Response to previous treatment: "good"  Functional change: Consistently wearing shoes.    Pain: "8/10 standing, but alright sitting"  Location: bilateral heels/feet      OBJECTIVE     Objective Measures updated at progress report unless specified.     Treatment     Lolly received the treatments listed below:      therapeutic exercises to develop ROM and flexibility for 43 minutes including:   Recumbent bike L4 x 10'   Standing gastroc stretch using wedge 3x30s ea    Soleus stretch using wedge 3x30s ea    Heel raises on Airex x10    Toe raises on Airex x10   Ankle alphabet x1 ea  Towel sweeps x2'   Towel scrunches x2'  Theraball rolling under sole x1' ea  B Blue TB resisted ankle ROM x1' ea   DF, PF, Inv, Ev     Did not perform this date:   Ankle DF/PF and inv/eversion using rocker board x20 ea    manual therapy techniques: Soft tissue mobilizations were applied to the: " B Achilles Tendon and Heels for 10 minutes, including:              Ice massage: Strumming      Did not perform on this date:  manual therapy techniques  functional dry needling were applied to the: B calves/ankles for 19 minutes, including:  Informed consent previously obtained after thorough explanation of risks and benefits. Signed consent form has been scanned into medical records. Pre-procedure time out performed which included verification of name, , and site of treatment. Cleaned target tissues with 70% isopropyl alcohol wipe down and performed with single use sterile prep pads.     Functional dry needling to B lower legs performed as follows:    1) 30 mm needle inserted straight medial to lateral at point  horizontally level with medial malleolus and just anterior to Achilles tendon    2) 30 mm needle inserted posteromedial to anterolateral with 45* angulation at point 3 finger breadths proximal to medial malleolus, just anterior to Achilles tendon     3) 30 mm needle inserted posterolateral to anteromedial with 45* angulation at point 4 finger breadths proximal to lateral malleolus, just anterior to Achilles tendon    4) 30 mm needle inserted posterolateral to anteromedial with 45* angulation at point horizontally level with lateral malleolus and just anterior to Achilles tendon     5) 30 mm needle inserted 1 finger breadth distal and posterior to pt # 1, with 45* angulation inferolateral toward Achilles tendon attachment on posterior calcaneous (with periosteal pecking at attachment)    6) 30 mm needle inserted 2 fingers breadth distal and posterior to pt #1 with 45* angulation inferolateral and slightly posterior onto medial and superior aspect of calcaneal tuberosity. (With periosteal pecking at attachment)    7) 50 mm needle inserted @ bifurcation of medial and lateral heads of the gastroc posterior to anterior with 45* caudad angulation    8) 50 mm needle inserted in 1 additional trigger point in  "medial gastrocs R and L posterior to anterior (total of 1 needle in medial gastroc trigger point on R and L)    9) 30 mm needle inserted within lower 1/3 of each Achilles tendon with posterior to anterior angulation.      All needles left insitu .     Treatment performed in prone. ES with FDN x 15 at 80 MHz wave frequency, 150 microamps (2 channels on L and 3 on R). Intensity set to patient tolerance and therapist remaining in visual contact/as well as patient being given call bell and instructed to ring if any problems. Patient reminded to  increase hydration habits following for decreased soreness. No adverse effects present following needling. Patient reported improved symptoms in B ankles/feet.    Patient Education and Home Exercises     Home Exercises Provided and Patient Education Provided     Education provided:   - The patient was provided education on ice massage technique.  Written Home Exercises Provided:  Instructed patient to continue prior HEP. Exercises were reviewed and Lolly was able to demonstrate them prior to the end of the session.  Lolly demonstrated good  understanding of the education provided. See EMR under Patient Instructions for exercises provided during therapy sessions    ASSESSMENT     The patient ambulated into the clinic wearing "slide"/sandal slip on shoes, demonstrating antalgic gait pattern due to falling from pets tripping her earlier today. Lolly Ramírez exhibits additional bruising on thighs, reporting from fall. Patient reports pain in bilateral lower extremities, not just feet. Due to this the patient was not progressed further, not including increased standing exercises at this time. Ice massage was provided to bilateral achilles tendon (including insertion at heel), distal gastroc, and heels. Patient with reporting good results post, reducing pain some in the area.    Lolly Is progressing slowly towards her goals.   Pt prognosis is Fair.     Pt will continue to benefit from " skilled outpatient physical therapy to address the deficits listed in the problem list box on initial evaluation, provide pt/family education and to maximize pt's level of independence in the home and community environment.     Pt's spiritual, cultural and educational needs considered and pt agreeable to plan of care and goals.     Anticipated barriers to physical therapy: None    Goals:   Short Term Goals: 3 weeks   1) Decrease max  pain to < 7/10 Met 2/13/2023  2) Decrease tenderness to moderate  Progressing/nearly met  3) Facilitate improved LE flexibility Progressing slowly  4) Patient will increase standing tolerance to at least 5 min to assist with meal prep and washing dishes. Met 2/8/2023     Long Term Goals: 6 weeks   1) Increase standing tolerance to at least 30 min to allow for meal prep and light housework Progressing  2) Patient will consistently drive using metatarsal heads to apply pressure to pedals without increased pain Progressing  3) Patient will walk > 30 min over level surface demonstrating adequate step length, heel strike and toe off with heel pain no > 4/10 Progressing/nearly met  4) Patient will initiate weight bearing after sitting with heel pain no > 4/10 Progressing/nearly met  5) Improve functional mobility to < 40% deficit as indicated by FOTO foot survey Progressing (Regressed on this date)  6) Patient will be independent in [facility] for foot/ankle Progressing    PLAN     The patient is to be progressed within the established plan of care as tolerated in order to accomplish goals as stated above and increase function. Plan to incorporate increased standing exercises as tolerated in subsequent sessions (mini squats, closed chain TKE, wobble board standing, etc.).  Eda Marrufo, PTA

## 2023-02-24 ENCOUNTER — PATIENT MESSAGE (OUTPATIENT)
Dept: REHABILITATION | Facility: HOSPITAL | Age: 63
End: 2023-02-24

## 2023-02-24 ENCOUNTER — HOSPITAL ENCOUNTER (EMERGENCY)
Facility: HOSPITAL | Age: 63
Discharge: HOME | End: 2023-02-24
Attending: EMERGENCY MEDICINE
Payer: MEDICARE

## 2023-02-24 ENCOUNTER — TELEPHONE (OUTPATIENT)
Dept: FAMILY MEDICINE | Facility: CLINIC | Age: 63
End: 2023-02-24

## 2023-02-24 VITALS
DIASTOLIC BLOOD PRESSURE: 70 MMHG | WEIGHT: 235 LBS | RESPIRATION RATE: 16 BRPM | HEART RATE: 87 BPM | BODY MASS INDEX: 33.72 KG/M2 | SYSTOLIC BLOOD PRESSURE: 156 MMHG | OXYGEN SATURATION: 99 %

## 2023-02-24 DIAGNOSIS — M24.411 SHOULDER DISLOCATION, RECURRENT, RIGHT: Primary | ICD-10-CM

## 2023-02-24 DIAGNOSIS — S80.01XA CONTUSION OF RIGHT KNEE, INITIAL ENCOUNTER: ICD-10-CM

## 2023-02-24 DIAGNOSIS — G89.18 POST PROCEDURE DISCOMFORT: ICD-10-CM

## 2023-02-24 DIAGNOSIS — E11.9 TYPE 2 DIABETES MELLITUS WITHOUT COMPLICATION, WITHOUT LONG-TERM CURRENT USE OF INSULIN: Primary | ICD-10-CM

## 2023-02-24 DIAGNOSIS — S80.211A KNEE ABRASION, RIGHT, INITIAL ENCOUNTER: ICD-10-CM

## 2023-02-24 DIAGNOSIS — E78.5 HYPERLIPIDEMIA, UNSPECIFIED HYPERLIPIDEMIA TYPE: ICD-10-CM
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
## 2023-02-24 DIAGNOSIS — Z79.899 ENCOUNTER FOR LONG-TERM (CURRENT) USE OF OTHER MEDICATIONS: ICD-10-CM

## 2023-02-24 DIAGNOSIS — M25.569 KNEE PAIN: ICD-10-CM

## 2023-02-24 DIAGNOSIS — Q73.8 REDUCTION DEFECT OF EXTREMITY: ICD-10-CM

## 2023-02-24 PROCEDURE — 96375 TX/PRO/DX INJ NEW DRUG ADDON: CPT | Mod: 59

## 2023-02-24 PROCEDURE — 63600175 PHARM REV CODE 636 W HCPCS: Performed by: EMERGENCY MEDICINE

## 2023-02-24 PROCEDURE — 63600175 PHARM REV CODE 636 W HCPCS

## 2023-02-24 PROCEDURE — 99285 EMERGENCY DEPT VISIT HI MDM: CPT | Mod: 25

## 2023-02-24 PROCEDURE — 25000003 PHARM REV CODE 250: Performed by: EMERGENCY MEDICINE

## 2023-02-24 PROCEDURE — 23650 CLTX SHO DSLC W/MNPJ WO ANES: CPT | Mod: RT

## 2023-02-24 PROCEDURE — 96365 THER/PROPH/DIAG IV INF INIT: CPT

## 2023-02-24 RX ORDER — ONDANSETRON 2 MG/ML
INJECTION INTRAMUSCULAR; INTRAVENOUS
Status: DISCONTINUED
Start: 2023-02-24 | End: 2023-02-24 | Stop reason: HOSPADM

## 2023-02-24 RX ORDER — MORPHINE SULFATE 2 MG/ML
INJECTION, SOLUTION INTRAMUSCULAR; INTRAVENOUS
Status: DISCONTINUED
Start: 2023-02-24 | End: 2023-02-24 | Stop reason: HOSPADM

## 2023-02-24 RX ORDER — FENTANYL CITRATE 50 UG/ML
INJECTION, SOLUTION INTRAMUSCULAR; INTRAVENOUS
Status: COMPLETED
Start: 2023-02-24 | End: 2023-02-24

## 2023-02-24 RX ORDER — FENTANYL CITRATE 50 UG/ML
50 INJECTION, SOLUTION INTRAMUSCULAR; INTRAVENOUS
Status: COMPLETED | OUTPATIENT
Start: 2023-02-24 | End: 2023-02-24

## 2023-02-24 RX ORDER — METHOCARBAMOL 500 MG/1
1000 TABLET, FILM COATED ORAL 3 TIMES DAILY
Qty: 30 TABLET | Refills: 0 | Status: SHIPPED | OUTPATIENT
Start: 2023-02-24 | End: 2023-03-01

## 2023-02-24 RX ORDER — PROPOFOL 10 MG/ML
10 VIAL (ML) INTRAVENOUS
Status: COMPLETED | OUTPATIENT
Start: 2023-02-24 | End: 2023-02-24

## 2023-02-24 RX ORDER — PROPOFOL 10 MG/ML
5 VIAL (ML) INTRAVENOUS
Status: DISCONTINUED | OUTPATIENT
Start: 2023-02-24 | End: 2023-02-24

## 2023-02-24 RX ORDER — ONDANSETRON 2 MG/ML
4 INJECTION INTRAMUSCULAR; INTRAVENOUS
Status: COMPLETED | OUTPATIENT
Start: 2023-02-24 | End: 2023-02-24

## 2023-02-24 RX ORDER — FENTANYL CITRATE 50 UG/ML
100 INJECTION, SOLUTION INTRAMUSCULAR; INTRAVENOUS
Status: DISCONTINUED | OUTPATIENT
Start: 2023-02-24 | End: 2023-02-24 | Stop reason: HOSPADM

## 2023-02-24 RX ORDER — PROPOFOL 10 MG/ML
20 VIAL (ML) INTRAVENOUS
Status: COMPLETED | OUTPATIENT
Start: 2023-02-24 | End: 2023-02-24

## 2023-02-24 RX ORDER — FENTANYL CITRATE 50 UG/ML
100 INJECTION, SOLUTION INTRAMUSCULAR; INTRAVENOUS
Status: COMPLETED | OUTPATIENT
Start: 2023-02-24 | End: 2023-02-24

## 2023-02-24 RX ORDER — OXYCODONE AND ACETAMINOPHEN 5; 325 MG/1; MG/1
1 TABLET ORAL EVERY 6 HOURS PRN
Qty: 15 TABLET | Refills: 0 | Status: SHIPPED | OUTPATIENT
Start: 2023-02-24 | End: 2023-02-27

## 2023-02-24 RX ORDER — MORPHINE SULFATE 4 MG/ML
4 INJECTION, SOLUTION INTRAMUSCULAR; INTRAVENOUS
Status: COMPLETED | OUTPATIENT
Start: 2023-02-24 | End: 2023-02-24

## 2023-02-24 RX ORDER — PROPOFOL 10 MG/ML
50 VIAL (ML) INTRAVENOUS
Status: COMPLETED | OUTPATIENT
Start: 2023-02-24 | End: 2023-02-24

## 2023-02-24 RX ADMIN — MORPHINE SULFATE 4 MG: 4 INJECTION INTRAVENOUS at 05:02

## 2023-02-24 RX ADMIN — PROPOFOL 50 MG: 10 INJECTION, EMULSION INTRAVENOUS at 07:02

## 2023-02-24 RX ADMIN — METHOCARBAMOL 1000 MG: 100 INJECTION INTRAMUSCULAR; INTRAVENOUS at 05:02

## 2023-02-24 RX ADMIN — FENTANYL CITRATE 50 MCG: 50 INJECTION, SOLUTION INTRAMUSCULAR; INTRAVENOUS at 05:02

## 2023-02-24 RX ADMIN — PROPOFOL 10 MG: 10 INJECTION, EMULSION INTRAVENOUS at 07:02

## 2023-02-24 RX ADMIN — ONDANSETRON 4 MG: 2 INJECTION INTRAMUSCULAR; INTRAVENOUS at 05:02

## 2023-02-24 RX ADMIN — PROPOFOL 20 MG: 10 INJECTION, EMULSION INTRAVENOUS at 07:02

## 2023-02-24 RX ADMIN — FENTANYL CITRATE 25 MCG: 50 INJECTION, SOLUTION INTRAMUSCULAR; INTRAVENOUS at 06:02

## 2023-02-24 RX ADMIN — FENTANYL CITRATE 100 MCG: 50 INJECTION, SOLUTION INTRAMUSCULAR; INTRAVENOUS at 05:02

## 2023-02-24 RX ADMIN — MORPHINE SULFATE 4 MG: 4 INJECTION, SOLUTION INTRAMUSCULAR; INTRAVENOUS at 05:02

## 2023-02-24 NOTE — ED PROVIDER NOTES
"Encounter Date: 2023       History     Chief Complaint   Patient presents with    Shoulder Injury     Reports "right shoulder pain" s/p "fell"      Emergent evaluation of a 62-year-old female who presents to the ER by EMS for right shoulder pain and injury status post a slip and fall on a puppy pad while walking.  She reports she slipped fell and struck her right  knee on the of the fireplace she is a small open wound with no active bleeding.  Bruising and swelling to the right knee she is keeping the right knee flexed at 90°.  She reports the most severe pain, causing her to yellow out in pain, is in the right shoulder region.  She has dislocated the right shoulder once in the past.  She is history of breast cancer, GERD, type 2 diabetes, neuropathy of both feet and lymphedema.  Pain is 10/10.  Decreased range of motion of the right shoulder due to severe pain she came in wearing her own sling.  She reports numbness and tingling to the right 4th and 5th digits and right forearm.  She denies any head injury or loss of consciousness no neck pain back pain chest pain or shortness of breath no abdominal pain.    Review of patient's allergies indicates:   Allergen Reactions    Banana Hives    Codeine Nausea And Vomiting    Dilaudid  [hydromorphone (bulk)] Rash    Hydromorphone hcl Rash     Past Medical History:   Diagnosis Date    Breast cancer, stage 1, estrogen receptor negative, left () 2020    Depression     Diabetes mellitus, type 2     GERD (gastroesophageal reflux disease)     HER2-positive carcinoma of left breast 2020    Hx of breast cancer     Hx of breast cancer - Her2Nu+/ER+ - 2011 12/3/2019    Insomnia     Lymphedema     Neuropathy of both feet     S/P lumpectomy, left breast 2020     Past Surgical History:   Procedure Laterality Date    BICEPS TENDON REPAIR Left 2005    BREAST BIOPSY Left     BREAST LUMPECTOMY Left      SECTION      , ,     CHOLECYSTECTOMY   "    Elbow fx Left 2015    FRACTURE SURGERY  2016    elbow    HYSTERECTOMY      KNEE ARTHROSCOPY W/ MENISCAL REPAIR Left     Lower back ruptured disc  2010    LYMPH NODE DISSECTION      SPINE SURGERY  2009    ruptured disc     Family History   Problem Relation Age of Onset    Cancer Mother     Breast cancer Mother     Parkinsonism Father     Diabetes Father     Breast cancer Maternal Aunt     Breast cancer Paternal Aunt     Cancer Maternal Aunt     Cancer Paternal Aunt     Cancer Daughter     Heart disease Maternal Grandmother     Heart disease Paternal Grandmother      Social History     Tobacco Use    Smoking status: Former     Packs/day: 0.00     Years: 0.00     Pack years: 0.00     Types: Cigarettes     Quit date: 2010     Years since quittin.2    Smokeless tobacco: Never   Substance Use Topics    Alcohol use: Yes     Alcohol/week: 2.0 standard drinks     Types: 2 Glasses of wine per week     Comment: socially    Drug use: Never     Review of Systems   Constitutional:  Positive for activity change.   Respiratory:  Negative for shortness of breath.    Cardiovascular:  Negative for chest pain.   Gastrointestinal:  Negative for abdominal pain.   Musculoskeletal:  Positive for arthralgias, joint swelling and myalgias. Negative for back pain, gait problem, neck pain and neck stiffness.   Skin:  Positive for color change and wound. Negative for pallor and rash.   Neurological:  Positive for weakness and numbness. Negative for headaches.   Psychiatric/Behavioral:  Negative for agitation and confusion.    All other systems reviewed and are negative.    Physical Exam     Initial Vitals [23 0458]   BP Pulse Resp Temp SpO2   (!) 143/80 75 (!) 22 -- 100 %      MAP       --         Physical Exam    Nursing note and vitals reviewed.  Constitutional: She appears well-developed and well-nourished. She is not diaphoretic. She appears distressed.   HENT:   Head: Normocephalic and atraumatic.   Right Ear: External  ear normal.   Left Ear: External ear normal.   Nose: Nose normal.   Mouth/Throat: Oropharynx is clear and moist.   Eyes: Conjunctivae and EOM are normal. Pupils are equal, round, and reactive to light.   Neck: Neck supple. No tracheal deviation present.   No pain on palpation of cervical spine   Normal range of motion.  Cardiovascular:  Normal rate, regular rhythm, normal heart sounds and intact distal pulses.     Exam reveals no gallop and no friction rub.       No murmur heard.  Pulmonary/Chest: Breath sounds normal. No stridor. No respiratory distress. She has no wheezes. She has no rhonchi. She has no rales. She exhibits no tenderness.   Abdominal: Abdomen is soft. Bowel sounds are normal. She exhibits no distension and no mass. There is no abdominal tenderness. There is no rebound and no guarding.   Musculoskeletal:         General: Tenderness and edema present.      Right shoulder: Swelling, deformity, tenderness and bony tenderness present. No effusion, laceration or crepitus. Decreased range of motion. Normal strength. Normal pulse.      Left shoulder: Normal.      Right upper arm: Swelling, tenderness and bony tenderness present.      Left upper arm: Normal.      Right elbow: No swelling, deformity, effusion or lacerations. Decreased range of motion. No tenderness.      Left elbow: Normal.      Right forearm: Normal.      Left forearm: Normal.      Right wrist: Normal.      Left wrist: Normal.      Right hand: Normal.      Left hand: Normal.      Cervical back: Normal range of motion and neck supple.      Right upper leg: Normal.      Left upper leg: Normal.      Right knee: Swelling, ecchymosis, laceration and bony tenderness present. No deformity, effusion or erythema. Normal range of motion. Tenderness present over the patellar tendon. No medial joint line tenderness.      Left knee: Normal.      Right lower leg: Normal.      Left lower leg: Normal.     Neurological: She is alert and oriented to person,  "place, and time. She has normal strength. A sensory deficit is present. No cranial nerve deficit.   Sensory deficit to right C8 distribution patient reports decreased sensation to light touch as well as pain from the level of the elbow in the lateral arm including the 4th and 5th digits.   Skin: Skin is warm and dry. No rash noted. No erythema. No pallor.   Psychiatric: She has a normal mood and affect. Her behavior is normal. Judgment and thought content normal.       ED Course   Procedural Sedation        Date/Time: 2/24/2023 8:16 AM  Performed by: Chelsea Martinez MD  Authorized by: Chelsea Martinez MD   Consent Done: Yes  Consent: Verbal consent obtained.  Consent given by: patient  Patient understanding: patient states understanding of the procedure being performed  Patient consent: the patient's understanding of the procedure matches consent given  Procedure consent: procedure consent matches procedure scheduled  Relevant documents: relevant documents present and verified  Test results: test results available and properly labeled  Site marked: the operative site was marked  Imaging studies: imaging studies available  Time out: Immediately prior to procedure a "time out" was called to verify the correct patient, procedure, equipment, support staff and site/side marked as required.  ASA Class: Class 2 - Mild Illness without functional impairment.  Mallampati Score: Class 2 - Visualization of the soft palate, fauces, and uvula.   NPO STATUS:  Date/Time of last solid: 2/24/2023 4:00 AM  Date/Time of last fluid: 2/24/2023 4:00 AM    Equipment: on cardiac monitor., on supplemental oxygen., suction available., on BP monitor., on CO2 monitor., airway equipment available. and reversal drugs available.     Sedation type: moderate (conscious) sedation    Sedatives: propofol  Analgesia: morphine and fentanyl  Vitals: Vital signs were monitored during sedation.  Proc Sedation - Complications: Patient is supplemented " with 15 L of oxygen through bag-valve mask, no ventilatory breaths were given.   Patient/Family history of anesthesia or sedation complications: Yes    Orthopedic Injury    Date/Time: 2/24/2023 8:18 AM  Performed by: Chelsea Martinez MD  Authorized by: Chelsea Martinez MD     Location procedure was performed:  [facility] EMERGENCY DEPARTMENT  Consent Done?:  Yes  Universal Protocol:     Verbal consent obtained?: Yes      Consent given by:  Patient    Patient states understanding of procedure being performed: Yes      Patient's understanding of procedure matches consent: Yes      Procedure consent matches procedure scheduled: Yes      Relevant documents present and verified: Yes      Test results available and properly labeled: Yes      Site marked: Yes      Imaging studies available: Yes      Time Out: Immediately prior to the procedure a time out was called    Injury:     Injury location:  Shoulder    Location details:  Right shoulder    Injury type:  Dislocation    Dislocation type: anterior      Chronicity:  Recurrent    Hill-Sachs deformity?: Yes (chronic)        Pre-procedure assessment:     Distal perfusion: normal      Neurological function: diminished      Neurological function comment:  On able to feel light touch or pain to the C8 distribution    Range of motion: reduced      Local anesthesia used?: No      Patient sedated?: Yes        Selections made in this section will also lock the Injury type section above.:     Manipulation performed?: Yes      Reduction method:  External rotation    Reduction method:  External rotation    Reduction method:  External rotation    Reduction method:  External rotation    Reduction method:  External rotation    Reduction method:  External rotation    Immobilization: Shoulder immobilizer.    Complications: Yes (specify) (Required 2 attempts due to recurrent dislocations)    Post-procedure assessment:     Distal perfusion: normal      Neurological function: diminished       Neurological function comment:  Patient reports that she still can not feel her right pinky but otherwise can feel the C8 distribution    Range of motion: splinted      Patient tolerance:  Patient tolerated the procedure well with no immediate complications  Labs Reviewed - No data to display       Imaging Results              X-Ray Shoulder Complete 2 View Right (In process)                      X-Ray Shoulder Complete 2 View Right (Final result)  Result time 02/24/23 08:05:22      Final result by Keon Giraldo MD (02/24/23 08:05:22)                   Narrative:    HISTORY: Post reduction.    FINDINGS: 2 views of the right shoulder at 0746 hours compared to prior exams show persistent anterior subcoracoid dislocation of the humeral head with respect to the glenoid. There are no acute fractures.    IMPRESSION: Persistent anterior shoulder dislocation.    Electronically signed by:  Keon Giraldo MD  2/24/2023 8:05 AM [facility] Workstation: [id number]                                     CT Shoulder Without Contrast Right (Final result)  Result time 02/24/23 07:04:40   Procedure changed from CT Shoulder Without Contrast Left     Final result by Keon Giraldo MD (02/24/23 07:04:40)                   Narrative:    CMS MANDATED QUALITY DATA-CT RADIATION DOSE-436  All CT scans at this facility use dose modulation, iterative reconstruction, and or weight based dosing when appropriate, to reduce radiation dose to as low as reasonably achievable.    HISTORY: Right shoulder fracture, dislocation.    FINDINGS: Thin axial imaging was performed without contrast, with sagittal and coronal reformatted images reviewed. Comparison to prior exams including radiographs of the same day.    There is anterior dislocation of the humeral head with respect to the glenoid, with chronic cortical irregularity of the posterolateral humeral head reflecting Hill-Sachs deformity. There is no acute fracture of the glenoid to suggest bony [place]  injury, or other acute fracture. There is acromioclavicular joint space narrowing with osteophytes, with the acromioclavicular and coracoclavicular relationships within normal limits.    There is a mixed density right glenohumeral joint effusion, with a few intra-articular or pericapsular calcifications. The unenhanced musculature and soft tissues are unremarkable. There is no soft tissue mass.    The visualized right lung is clear, with the visualized unenhanced mediastinum unremarkable.    IMPRESSION:  1. Negative for acute fracture.  2. Anterior shoulder dislocation with chronic Hill-Sachs deformity, and glenohumeral joint effusion.    Electronically signed by:  Keon Giraldo MD  2/24/2023 7:04 AM [facility] Workstation: [id number]                                     X-Ray Shoulder Complete 2 View Right (Final result)  Result time 02/24/23 07:00:27      Final result by Keon Giraldo MD (02/24/23 07:00:27)                   Narrative:    HISTORY: Right shoulder pain post fall.    FINDINGS: 3 views of the right shoulder show anterior subcoracoid dislocation of the humeral head with respect to the glenoid. There is no acute fracture, with acromioclavicular joint space narrowing with osteophytes. Bone mineralization is normal.    IMPRESSION: Anterior shoulder dislocation.    Electronically signed by:  Keon Giarldo MD  2/24/2023 7:00 AM [facility] Workstation: [id number]                                     X-Ray Knee Complete 4 or more Views Right (Final result)  Result time 02/24/23 06:59:02      Final result by Keon Giraldo MD (02/24/23 06:59:02)                   Narrative:    HISTORY: Right knee pain.    FINDINGS: 4 views of the right knee show no acute fracture, dislocation or destructive osseous lesion. There is moderate to advanced tricompartmental joint space narrowing, with patellofemoral and tibiofemoral osteophytes. Bone mineralization is normal, with no evidence of a knee joint effusion.    IMPRESSION:  Tricompartmental osteoarthritis.    Electronically signed by:  Keon Giraldo MD  2/24/2023 6:59 AM [facility] Workstation: [id number]                                     Medications   morphine 2 mg/mL injection (has no administration in time range)   Tdap vaccine injection 0.5 mL (has no administration in time range)   neomycin-bacitracin-polymyxin ointment (has no administration in time range)   fentaNYL 50 mcg/mL injection 100 mcg (has no administration in time range)   morphine injection 4 mg (4 mg Intravenous Given 2/24/23 0500)   ondansetron injection 4 mg (4 mg Intravenous Given 2/24/23 0501)   morphine injection 4 mg (4 mg Intravenous Given 2/24/23 0508)   fentaNYL 50 mcg/mL injection 100 mcg (100 mcg Intravenous Given 2/24/23 0514)   fentaNYL 50 mcg/mL injection 50 mcg (50 mcg Intravenous Given 2/24/23 0519)   methocarbamoL (ROBAXIN) 1,000 mg in dextrose 5 % (D5W) 100 mL IVPB (0 mg Intravenous Stopped 2/24/23 0635)   fentaNYL 50 mcg/mL injection 50 mcg (50 mcg Intravenous Given 2/24/23 0536)   fentaNYL 50 mcg/mL injection 50 mcg (25 mcg Intravenous Given 2/24/23 0614)   propofol (DIPRIVAN) 10 mg/mL IVP (50 mg Intravenous Given 2/24/23 0731)   propofol (DIPRIVAN) 10 mg/mL IVP (20 mg Intravenous Given 2/24/23 0733)   propofol (DIPRIVAN) 10 mg/mL IVP (50 mg Intravenous Given 2/24/23 0754)   propofol (DIPRIVAN) 10 mg/mL IVP (10 mg Intravenous Given 2/24/23 0756)     Medical Decision Making:   Independently Interpreted Test(s):   I have ordered and independently interpreted X-rays - see prior notes.  Clinical Tests:   Radiological Study: Ordered and Reviewed  ED Management:  Emergent evaluation of a 62-year-old female who presents to the ER by EMS for right shoulder pain and injury status post a slip and fall on a puppy pad while walking.  She reports she slipped fell and struck her right  knee on the of the fireplace she is a small open wound with no active bleeding.  Bruising and swelling to the right knee she is  keeping the right knee flexed at 90°.  She reports the most severe pain, causing her to yellow out in pain, is in the right shoulder region.  She has dislocated the right shoulder once in the past.  She is history of breast cancer, GERD, type 2 diabetes, neuropathy of both feet and lymphedema.  Pain is 10/10.  Decreased range of motion of the right shoulder due to severe pain she came in wearing her own sling.  She reports numbness and tingling to the right 4th and 5th digits and right forearm.  She denies any head injury or loss of consciousness no neck pain back pain chest pain or shortness of breath no abdominal pain.  On physical exam blood pressure elevated, patient was tachycardic she was in a great deal of discomfort yelling out in pain to the right shoulder injury.  To severe pain on palpation of the proximal humerus with no definite deformity decreased sensation to C8 dermatome to light touch as well as painful stimuli.  2+ radial pulse.  Less than 2nd cap refill.  Movement of all 5 digits.  Normal sensation to axillary nerve.  MDM    Patient presents for emergent evaluation of acute right shoulder pain and right knee pain status post fall that poses a possible threat to life and/or bodily function.    Differential diagnosis includes but is not limited to right shoulder fracture, dislocation, fracture dislocation,  In the ED patient found to have acute right knee contusion, abrasion, sprain     I ordered X-rays and personally reviewed them   Xray right shoulder was suboptimal views due to positioning, and body habitus right shoulder appears to have inferior dislocation with possible humeral head fracture.  X-ray right knee revealed no fracture dislocation    I ordered CT scan and personally reviewed it and reviewed the radiologist interpretation.  CT right shoulder without contrast significant for Negative for acute fracture.   2. Anterior shoulder dislocation with chronic Hill-Sachs deformity, and  glenohumeral joint effusion.     Discharge  MDM    Patient was managed in the ED with IV morphine 4 mg, morphine 4 mg, fentanyl 100 mcg, fentanyl 50 mcg, fentanyl 50 mcg just prior to CT of right shoulder, on returned she had Robaxin 1000 mg IV, and 25 mg of fentanyl patient's pain is now finally under control and she is resting.  Waiting CT right shoulder results prior to treatment and disposition    The response to treatment was good after multiple doses of opiates.  I have written for a right knee immobilizer patient does not want to wear it at this time due to wanting to keep her right knee bent in a position of comfort for the right shoulder.  Knee lac will be cleaned and Neosporin will be applied.    Chelsea Martinez M.D.   They have follow-up with Dr. Romero on March 7th                        Clinical Impression:   Final diagnoses:  [W19.XXXA] Fall  [M25.569] Knee pain  [M24.411] Shoulder dislocation, recurrent, right (Primary)  [S80.211A] Knee abrasion, right, initial encounter  [S80.01XA] Contusion of right knee, initial encounter  [Q73.8] Reduction defect of extremity  [G89.18] Post procedure discomfort               Chelsea Martinez MD  02/24/23 [id number]       Chelsea Martinez MD  02/24/23 [id number]

## 2023-02-26 ENCOUNTER — PATIENT MESSAGE (OUTPATIENT)
Dept: ORTHOPEDICS | Facility: CLINIC | Age: 63
End: 2023-02-26
Payer: MEDICARE

## 2023-02-27 ENCOUNTER — PATIENT MESSAGE (OUTPATIENT)
Dept: FAMILY MEDICINE | Facility: CLINIC | Age: 63
End: 2023-02-27

## 2023-02-28 ENCOUNTER — CLINICAL SUPPORT (OUTPATIENT)
Dept: REHABILITATION | Facility: HOSPITAL | Age: 63
End: 2023-02-28
Payer: MEDICARE

## 2023-02-28 DIAGNOSIS — I89.0 LYMPHEDEMA OF LEFT ARM: ICD-10-CM

## 2023-02-28 DIAGNOSIS — I89.0 LYMPHEDEMA: ICD-10-CM

## 2023-02-28 PROCEDURE — 97166 OT EVAL MOD COMPLEX 45 MIN: CPT | Mod: PN

## 2023-02-28 NOTE — PLAN OF CARE
[facility]  Occupational Therapy Initial Evaluation    Date: 2/28/2023  Name: Lolly Ramírez  Clinic Number: [id number]    Therapy Diagnosis:   Encounter Diagnoses   Name Primary?    Lymphedema     Lymphedema of left arm      Physician: Shaunna Gordon NP    Physician Orders: evaluate and treat  Medical Diagnosis: breast cancer  Surgical Procedure and Date: 2011 mastectomy, radiation and chemotherapy,   Evaluation Date: 2/28/2023  Insurance Authorization Period Expiration: 2-9-2024  Plan of Care Certification Period: 5-  Progress Note Due: same   Date of Return to MD: to be scheduled  Visit # / Visits authorized: 1 / 1  FOTO: intake 63%    Precautions:  Fall and no needle stick or blood pressure cuff on left upper extremity    Time In:0900  Time Out: 0945  Total Appointment Time (timed & untimed codes): 45 minutes    SUBJECTIVE     Date of Onset: 2011    History of Current Condition/Mechanism of Injury: Lolly Ramírez is a 62 y.o. female who presents to [facility] for evaluation secondary to lymphedema. Patient was referred to therapy by Shaunna Gordon NP , which is the patient's primary care provider. Patient reports that she has had lymphedema post cancer treatment since 2011. Never received therapy.. Patient was accompanied to the evaluation by self.     Falls: yes, multiple. Currently in right sling to immobilize shoulder. Patient fell at home and has appointment with Dr. Romero tomorrow.    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 Had it manipulated but fell again. Currently, immobilized and waiting to see orthopedic doctor. Lolly has been independent with all ADL and mobility.  Current Functional Status   Home/Living environment:  lives with their family      Leisure: Journalism Online    Pain:  Functional Pain Scale Rating 0-10: Current 2/10, worst 8/10, best 2/10   Location: right shoulder  Description: Aching and Variable  Aggravating Factors: Standing and Walking  Easing Factors: rest    Patient's Goals for Therapy: for left arm to reduce    Medical History:   Past Medical History:   Diagnosis Date    Breast cancer, stage 1, estrogen receptor negative, left () 2020    Depression     Diabetes mellitus, type 2     GERD (gastroesophageal reflux disease)     HER2-positive carcinoma of left breast 2020    Hx of breast cancer     Hx of breast cancer - Her2Nu+/ER+ - 2011 12/3/2019    Insomnia     Lymphedema     Neuropathy of both feet     S/P lumpectomy, left breast 2020       Surgical History:    has a past surgical history that includes Elbow fx (Left, );  section; Lower back ruptured disc (2010); Knee arthroscopy w/ meniscal repair (Left); Biceps tendon repair (Left, 2005); Cholecystectomy; Hysterectomy; Lymph node dissection; Breast biopsy (Left); Breast lumpectomy (Left); Fracture surgery (); and Spine surgery ().    Medications:   has a current medication list which includes the following prescription(s): ascorbic acid (vitamin c), atomoxetine, b complex vitamins, docusate sodium, furosemide, glucosamine-chondroitin, hydrocodone-acetaminophen, lyrica, magnesium, methocarbamol, multivitamin, mupirocin, naproxen, pantoprazole, trazodone, and venlafaxine.    Allergies:   Review of patient's allergies indicates:   Allergen Reactions    Banana Hives    Codeine Nausea And Vomiting    Dilaudid  [hydromorphone (bulk)] Rash    Hydromorphone hcl Rash          OBJECTIVE   Lolly was able to ambulate 150 feet to treatment with stand by of this writer. Reliable historian. Reports that she has had lymphedema since she was treated for breast cancer in . Lolly had one incident of cellulitis during her breast cancer  "treatment. Left arm is visibly swollen, posterior elbow most prominent.Lolly has functional range in left arm. Strength is 4/5. Non pitting. Slightly increased density.  No scars, wounds or discoloration.  Range of motion is within normal limits.  Strength is 4+/5, uses arm as assist with all activity    Girth Measurements (in centimeters)  LANDMARK LEFT LE RIGHT LE DIFFERENCE   Web space 19 cm 20 cm 1 cm   wrist 19 cm 17 cm 2 cm   4" 26.25 cm 22.5 cm 4.25 cm   Below elbow   33.75 cm 29.5 cm 4.25 cm   Elbow 37.5 cm 29 cm 8.5 cm   Above elbow 43.25 cm 31 cm 12.25 cm   Mid humerus 45 cm 37 cm 8. cm   Axilla 41.5 cm 42 cm .5 cm            Treatment   Total Treatment time (time-based codes) separate from Evaluation: 0 minutes    Patient Education and Home Exercises    Education provided:   1. Educated on definition of lymphedema.  2. Explained the Complete Decongestive Therapy protocol in depth  3. Educated on Phase 1 and 2 of protocol.  4. Reviewed treatment frequency and likely duration of weeks  5.Contraindications for treatment.  6. Plan of care and goals.  7. Educated on home management protocols.     Written Home Exercises Provided:  no, evaluation only. .  Patient/Family Education: role of OT, goals for OT, scheduling/cancellations - pt verbalized understanding. Discussed insurance limitations with patient.      ASSESSMENT     Lolly Ramírez is a 62 y.o. female referred to outpatient occupational therapy and presents with a medical diagnosis of lymphedema secondary to breast cancer. Lymphedema, left untreated increases risk of infection, gait deviation causing ortho problems and poor body image. Patient presents with the following therapy deficits: lymphedema of left limbs and demonstrates limitations as described in the chart below. Following medical record review it is determined that pt will benefit from complete decongestive therapy services for the treatment and management of this chronic condition. The " following goals were discussed with the patient and patient is in agreement with them as to be addressed in the treatment plan. The patient's rehab potential is Good.     Anticipated barriers to occupational therapy: none    Pt has no cultural, educational or language barriers to learning provided.    Profile and History Assessment of Occupational Performance Level of Clinical Decision Making Complexity Score   Occupational Profile:   Lolly Ramírez is a 62 y.o. female who lives with their spouse and is on disability Lolly Ramírez has difficulty with  ADLs and IADLs as listed previously, which  Affecting herdaily functional abilities.      Comorbidities:    has a past medical history of Breast cancer, stage 1, estrogen receptor negative, left (2011), Depression, Diabetes mellitus, type 2, GERD (gastroesophageal reflux disease), HER2-positive carcinoma of left breast, breast cancer, Hx of breast cancer - Her2Nu+/ER+ - 2011, Insomnia, Lymphedema, Neuropathy of both feet, and S/P lumpectomy, left breast.    Medical and Therapy History Review:   Expanded               Performance Deficits    Physical:  Muscle Power/Strength  Muscle Endurance  Skin Integrity/Scar Formation  Edema  Pain    Cognitive:  No Deficits    Psychosocial:    No Deficits     Clinical Decision Making:  moderate    Assessment Process:  Detailed Assessments    Modification/Need for Assistance:  Not Necessary    Intervention Selection:  Limited Treatment Options       moderate  Based on PMHX, co morbidities , data from assessments and functional level of assistance required with task and clinical presentation directly impacting function.       The following goals were discussed with the patient and patient is in agreement with them as to be addressed in the treatment plan.     Goals:   Short Term Goals for 6 weeks: (phase 1 of protocol)  Complete decongestion left upper quadrant- initiate at first visit  Patient will be educated on lymphedema  precautions and signs of infection. - Ongoing 2/28/2023   Patient will perform deep abdominal breathing TID-  initiate at first visit  Patient will tolerate daily activities with multilayered bandaging.- initiate at first visit  Appropriate compression garments to be ordered/delivered-  initiate at first visit     Long Term Goals for 10 weeks: (Phase 2 of goals)  Patient will be independent with home management of this chronic condition.initiate at first visit  Patient to ricco/doff compression garment.   Patient will demonstrate compliance with all home management recommendations.  Patient will maintain reduction at monthly follow up appointments for 3 months     PLAN   Plan of Care Certification: 2/28/2023 to 5-.     Outpatient Occupational Therapy 3 times weekly for 10 weeks to include the following interventions: Manual therapy/joint mobilizations, Therapeutic exercises/activities., Edema Control, and Complete Decongestive Therapy  .  SUNIL Eddy , SANDRA/MORALES      I CERTIFY THE NEED FOR THESE SERVICES FURNISHED UNDER THIS PLAN OF TREATMENT AND WHILE UNDER MY CARE  Physician's comments:      Physician's Signature: ___________________________________________________

## 2023-03-01 ENCOUNTER — HOSPITAL ENCOUNTER (OUTPATIENT)
Dept: RADIOLOGY | Facility: HOSPITAL | Age: 63
Discharge: HOME | End: 2023-03-01
Attending: ORTHOPAEDIC SURGERY
Payer: MEDICARE

## 2023-03-01 ENCOUNTER — OFFICE VISIT (OUTPATIENT)
Dept: ORTHOPEDICS | Facility: CLINIC | Age: 63
End: 2023-03-01
Payer: MEDICARE

## 2023-03-01 ENCOUNTER — CLINICAL SUPPORT (OUTPATIENT)
Dept: REHABILITATION | Facility: HOSPITAL | Age: 63
End: 2023-03-01
Payer: MEDICARE

## 2023-03-01 VITALS — WEIGHT: 235 LBS | HEIGHT: 70 IN | BODY MASS INDEX: 33.64 KG/M2

## 2023-03-01 DIAGNOSIS — M79.672 FOOT PAIN, BILATERAL: ICD-10-CM

## 2023-03-01 DIAGNOSIS — M76.60 ACHILLES TENDINITIS, UNSPECIFIED LATERALITY: ICD-10-CM

## 2023-03-01 DIAGNOSIS — S43.004A DISLOCATION OF RIGHT SHOULDER JOINT, INITIAL ENCOUNTER: ICD-10-CM

## 2023-03-01 DIAGNOSIS — M79.671 FOOT PAIN, BILATERAL: ICD-10-CM

## 2023-03-01 DIAGNOSIS — S43.004A DISLOCATION OF RIGHT SHOULDER JOINT, INITIAL ENCOUNTER: Primary | ICD-10-CM

## 2023-03-01 DIAGNOSIS — R26.9 GAIT DIFFICULTY: Primary | ICD-10-CM

## 2023-03-01 PROCEDURE — 73030 XR SHOULDER COMPLETE 2 OR MORE VIEWS RIGHT: ICD-10-PCS | Mod: 26,RT,, | Performed by: RADIOLOGY

## 2023-03-01 PROCEDURE — 73030 X-RAY EXAM OF SHOULDER: CPT | Mod: TC,PN,RT

## 2023-03-01 PROCEDURE — 97110 THERAPEUTIC EXERCISES: CPT | Mod: PN,CQ

## 2023-03-01 PROCEDURE — 99999 PR PBB SHADOW E&M-EST. PATIENT-LVL III: ICD-10-PCS | Mod: PBBFAC,,, | Performed by: ORTHOPAEDIC SURGERY

## 2023-03-01 PROCEDURE — 97140 MANUAL THERAPY 1/> REGIONS: CPT | Mod: PN,CQ

## 2023-03-01 PROCEDURE — 99213 OFFICE O/P EST LOW 20 MIN: CPT | Mod: PBBFAC,PN | Performed by: ORTHOPAEDIC SURGERY

## 2023-03-01 PROCEDURE — 73030 X-RAY EXAM OF SHOULDER: CPT | Mod: 26,RT,, | Performed by: RADIOLOGY

## 2023-03-01 PROCEDURE — 99213 OFFICE O/P EST LOW 20 MIN: CPT | Mod: S$PBB,,, | Performed by: ORTHOPAEDIC SURGERY

## 2023-03-01 PROCEDURE — 99213 PR OFFICE/OUTPT VISIT, EST, LEVL III, 20-29 MIN: ICD-10-PCS | Mod: S$PBB,,, | Performed by: ORTHOPAEDIC SURGERY

## 2023-03-01 PROCEDURE — 99999 PR PBB SHADOW E&M-EST. PATIENT-LVL III: CPT | Mod: PBBFAC,,, | Performed by: ORTHOPAEDIC SURGERY

## 2023-03-01 RX ORDER — HYDROCODONE BITARTRATE AND ACETAMINOPHEN 5; 325 MG/1; MG/1
1 TABLET ORAL EVERY 6 HOURS PRN
Qty: 30 TABLET | Refills: 0 | Status: SHIPPED | OUTPATIENT
Start: 2023-03-01 | End: 2023-05-22

## 2023-03-01 NOTE — PROGRESS NOTES
3/1/2023    Past Medical History:   Diagnosis Date    Breast cancer, stage 1, estrogen receptor negative, left () 2020    Depression     Diabetes mellitus, type 2     GERD (gastroesophageal reflux disease)     HER2-positive carcinoma of left breast 2020    Hx of breast cancer     Hx of breast cancer - Her2Nu+/ER+ - 2011 12/3/2019    Insomnia     Lymphedema     Neuropathy of both feet     S/P lumpectomy, left breast 2020       Past Surgical History:   Procedure Laterality Date    BICEPS TENDON REPAIR Left 2005    BREAST BIOPSY Left     BREAST LUMPECTOMY Left      SECTION      , ,     CHOLECYSTECTOMY      Elbow fx Left 2015    FRACTURE SURGERY  2016    elbow    HYSTERECTOMY      KNEE ARTHROSCOPY W/ MENISCAL REPAIR Left     Lower back ruptured disc  2010    LYMPH NODE DISSECTION      SPINE SURGERY  2009    ruptured disc       Current Outpatient Medications   Medication Sig    ascorbic acid, vitamin C, (VITAMIN C) 500 MG tablet Take 500 mg by mouth once daily.    b complex vitamins capsule Take 1 capsule by mouth once daily.    docusate sodium (COLACE) 100 MG capsule Take 1 capsule (100 mg total) by mouth once daily.    furosemide (LASIX) 20 MG tablet Take 1 tablet (20 mg total) by mouth daily as needed.    glucosamine-chondroitin 250-200 mg Tab Take by mouth.    HYDROcodone-acetaminophen (NORCO) 5-325 mg per tablet Take 1 tablet by mouth every 4 to 6 hours as needed.    LYRICA 200 mg Cap Take 1 capsule (200 mg total) by mouth 3 (three) times daily.    magnesium 250 mg Tab Take 250 mg by mouth once daily.    methocarbamoL (ROBAXIN) 500 MG Tab Take 2 tablets (1,000 mg total) by mouth 3 (three) times daily. for 5 days    multivitamin capsule Take 1 capsule by mouth once daily.    mupirocin (BACTROBAN) 2 % ointment     naproxen (NAPROSYN) 500 MG tablet Take 1 tablet (500 mg total) by mouth 2 (two) times daily.    pantoprazole (PROTONIX) 40 MG tablet Take 1 tablet (40 mg total)  by mouth once daily.    traZODone (DESYREL) 50 MG tablet Take 2 tablets (100 mg total) by mouth every evening.    venlafaxine (EFFEXOR-XR) 150 MG Cp24 Take 1 capsule (150 mg total) by mouth once daily.    atomoxetine (STRATTERA) 40 MG capsule Take 1 capsule (40 mg total) by mouth once daily.     No current facility-administered medications for this visit.       Review of patient's allergies indicates:   Allergen Reactions    Banana Hives    Codeine Nausea And Vomiting    Dilaudid  [hydromorphone (bulk)] Rash    Hydromorphone hcl Rash       Family History   Problem Relation Age of Onset    Cancer Mother     Breast cancer Mother     Parkinsonism Father     Diabetes Father     Breast cancer Maternal Aunt     Breast cancer Paternal Aunt     Cancer Maternal Aunt     Cancer Paternal Aunt     Cancer Daughter     Heart disease Maternal Grandmother     Heart disease Paternal Grandmother        Social History     Socioeconomic History    Marital status:    Tobacco Use    Smoking status: Former     Packs/day: 0.00     Years: 0.00     Pack years: 0.00     Types: Cigarettes     Quit date: 2010     Years since quittin.2    Smokeless tobacco: Never   Substance and Sexual Activity    Alcohol use: Yes     Alcohol/week: 2.0 standard drinks     Types: 2 Glasses of wine per week     Comment: socially    Drug use: Never    Sexual activity: Yes     Partners: Male     Birth control/protection: Other-see comments, None     Comment: Hysterectomy     Social Determinants of Health     Financial Resource Strain: Low Risk     Difficulty of Paying Living Expenses: Not hard at all   Food Insecurity: No Food Insecurity    Worried About Running Out of Food in the Last Year: Never true    Ran Out of Food in the Last Year: Never true   Transportation Needs: No Transportation Needs    Lack of Transportation (Medical): No    Lack of Transportation (Non-Medical): No   Physical Activity: Unknown    Days of Exercise per Week: 1 day     Minutes of Exercise per Session: Patient refused   Stress: No Stress Concern Present    Feeling of Stress : Only a little   Social Connections: Unknown    Frequency of Communication with Friends and Family: Once a week    Frequency of Social Gatherings with Friends and Family: Once a week    Active Member of Clubs or Organizations: No    Attends Club or Organization Meetings: Never    Marital Status:    Housing Stability: Low Risk     Unable to Pay for Housing in the Last Year: No    Number of Places Lived in the Last Year: 1    Unstable Housing in the Last Year: No       Chief Complaint:   Chief Complaint   Patient presents with    Right Upper Arm - Pain, Injury     DOI: 02/24/2023 - slipped on puppy pad and fell. She went to ED on 02/24/2023 Had Xray's and CT of the Right Humerus. No evidence of FX on CT.          History of present illness:    This is a 62 y.o. year old female who complains of patient has a history of a 1st time dislocated to her right shoulder back in October 29, 2022 she was treated conservatively and recovered well and had no complaints of any pain apparently the patient slipped on a puppy pad and fell onto her right shoulder on February 24, 2023 she was seen in the emergency room on 02/24/2023 had x-rays and a CT scan of her right humerus there was no evidence of any fractures found on CT.  Patient states that she dislocated her shoulder was seen in the emergency room the shoulder was reduced but I do not have any films postreduction she said that a CT scan was done prior to the reduction she is presently in a sling and swath appears to be comfortable    Review of Systems:    Constitution: Denies chills, fever, and sweats.  HENT: Denies headaches or blurry vision.  Cardiovascular: Denies chest pain or irregular heart beat.  Respiratory: Denies cough or shortness of breath.  Gastrointestinal: Denies abdominal pain, nausea, or vomiting.  Musculoskeletal:  Denies muscle  "cramps.  Neurological: Denies dizziness or focal weakness.  Psychiatric/Behavioral: Normal mental status.  Hematologic/Lymphatic: Denies bleeding problem or easy bruising/bleeding.  Skin: Denies rash or suspicious lesions.    Examination:    Vital Signs:    Vitals:    03/01/23 1508   Weight: 106.6 kg (235 lb 0.2 oz)   Height: 5' 10" (1.778 m)   PainSc:   6   PainLoc: Arm       Body mass index is 33.72 kg/m².    This a well-developed, well nourished patient in no acute distress.    Alert and oriented x 3 and cooperative to examination.       Physical Exam:  Right shoulder-the patient's shoulder pills be clinically well reduced in a sling and swath.  Patient does exhibit some numbness on the dorsum of her wrist and she has some weakness in her biceps muscle.    Imaging:  X-rays shows the humeral head reduced well in the in the glenoid evidence of any fractures       Assessment: Dislocation of right shoulder joint, initial encounter        Plan:  I am going to keep her shoulder down the sling and swath for about another 2 weeks and then will start her on physical therapy we will have her come back in 2 weeks and will repeat her x-ray patient also have it may have a neuropraxic injury of causing her to have weakness in her biceps will watch this for now if there is no improvement we may have to get a nerve conduction test of her upper extremity we will keep her in this sling and swath for now will give her some Norco for pain      DISCLAIMER: This note may have been dictated using voice recognition software and may contain grammatical errors.     NOTE: Consult report sent to referring provider via [employer] EMR.    "

## 2023-03-01 NOTE — PROGRESS NOTES
"[facility]   Physical Therapy Treatment Note     Name: Lolly Lozanoncer  Clinic Number: [id number]    Therapy Diagnosis:   Encounter Diagnoses   Name Primary?    Gait difficulty Yes    Foot pain, bilateral     Achilles tendinitis, unspecified laterality        Physician: Yevgeniy De La Rosa DPM    Visit Date: 3/1/2023       Physician Orders: PT Eval and Treat   Medical Diagnosis from Referral: Achilles tendinitis, unspecified leg   Evaluation Date: 1/18/2023  Authorization Period Expiration: 12/31/2023   Plan of Care Expiration: 3/3/2023  Progress Note Due: 2/18/2023  Visit # / Visits authorized: 11/ 20 (12 total)   FOTO: Completed 02/23/2023 64% residual deficit   Next survey due at discharge     Precautions: Diabetes and cancer     PTA Visit #: 3/5     Time In: 0903  Time Out: 1003  Total Billable Time: 58 minutes    SUBJECTIVE     Pt reports: "I am bruised from the fall. Yesterday my feet hurt really bad. The R one today is bad." "My  does not trust me driving right now."  She was compliant with home exercise program.  Response to previous treatment: "ice wore off too soon"  Functional change: Consistently wearing shoes.    Pain: 4/10 on L, 6/10 on R  Location: bilateral heels/feet      OBJECTIVE     Objective Measures updated at progress report unless specified.     Treatment     Lolly received the treatments listed below:      therapeutic exercises to develop ROM and flexibility for 48 minutes including:   Recumbent bike L4 x 10' (6' today)  Standing gastroc stretch using wedge 3x30s ea (seated today)    Soleus stretch using wedge 3x30s ea (seated today)    (DNP) Heel raises on Airex x10    (DNP) Toe raises on Airex x10   Ankle alphabet x1 ea LE  Towel sweeps x2'   Towel scrunches x2'  Theraball rolling under sole x2' ea LE  Small object pickup with toes x1' ea LE  B Blue TB resisted ankle ROM x1' ea   DF, PF, Inv, Ev     Did not perform this date:   Ankle DF/PF and inv/eversion " using rocker board x20 ea    manual therapy techniques: Soft tissue mobilizations were applied to the: B Achilles Tendon and Heels for 10 minutes, including:         Strumming, Knuckling, Kneading       Did not perform on this date:  manual therapy techniques  functional dry needling were applied to the: B calves/ankles for 19 minutes, including:  Informed consent previously obtained after thorough explanation of risks and benefits. Signed consent form has been scanned into medical records. Pre-procedure time out performed which included verification of name, , and site of treatment. Cleaned target tissues with 70% isopropyl alcohol wipe down and performed with single use sterile prep pads.     Functional dry needling to B lower legs performed as follows:    1) 30 mm needle inserted straight medial to lateral at point  horizontally level with medial malleolus and just anterior to Achilles tendon    2) 30 mm needle inserted posteromedial to anterolateral with 45* angulation at point 3 finger breadths proximal to medial malleolus, just anterior to Achilles tendon     3) 30 mm needle inserted posterolateral to anteromedial with 45* angulation at point 4 finger breadths proximal to lateral malleolus, just anterior to Achilles tendon    4) 30 mm needle inserted posterolateral to anteromedial with 45* angulation at point horizontally level with lateral malleolus and just anterior to Achilles tendon     5) 30 mm needle inserted 1 finger breadth distal and posterior to pt # 1, with 45* angulation inferolateral toward Achilles tendon attachment on posterior calcaneous (with periosteal pecking at attachment)    6) 30 mm needle inserted 2 fingers breadth distal and posterior to pt #1 with 45* angulation inferolateral and slightly posterior onto medial and superior aspect of calcaneal tuberosity. (With periosteal pecking at attachment)    7) 50 mm needle inserted @ bifurcation of medial and lateral heads of the gastroc  posterior to anterior with 45* caudad angulation    8) 50 mm needle inserted in 1 additional trigger point in medial gastrocs R and L posterior to anterior (total of 1 needle in medial gastroc trigger point on R and L)    9) 30 mm needle inserted within lower 1/3 of each Achilles tendon with posterior to anterior angulation.      All needles left insitu .     Treatment performed in prone. ES with FDN x 15 at 80 MHz wave frequency, 150 microamps (2 channels on L and 3 on R). Intensity set to patient tolerance and therapist remaining in visual contact/as well as patient being given call bell and instructed to ring if any problems. Patient reminded to  increase hydration habits following for decreased soreness. No adverse effects present following needling. Patient reported improved symptoms in B ankles/feet.    Patient Education and Home Exercises     Home Exercises Provided and Patient Education Provided     Education provided:   - The patient was instructed in additional fine motor exercise as stated above in bold print.  Written Home Exercises Provided:  Instructed patient to continue prior HEP. Exercises were reviewed and Lolly was able to demonstrate them prior to the end of the session.  Lolly demonstrated good  understanding of the education provided. See EMR under Patient Instructions for exercises provided during therapy sessions    ASSESSMENT     The patient ambulated into the clinic wearing slippers due to pain in bilateral feet. Patient presents with bruising on all extremities from recent falls. Lolly Ramírez deferred standing exercises at this time, due to safety and not trusting L UE for support (wearing immobilizer on R upper extremity from recent fall resulting in dislocation). Patient was slightly progressed as stated above. Manual techniques were provided post; patient experiencing increased sensitivity on lateral R calcaneus (near achilles tendon insertion). Darrell deferred cold pack post due to  transportation.    Lolly Is progressing slowly towards her goals.   Pt prognosis is Fair.     Pt will continue to benefit from skilled outpatient physical therapy to address the deficits listed in the problem list box on initial evaluation, provide pt/family education and to maximize pt's level of independence in the home and community environment.     Pt's spiritual, cultural and educational needs considered and pt agreeable to plan of care and goals.     Anticipated barriers to physical therapy: None    Goals:   Short Term Goals: 3 weeks   1) Decrease max  pain to < 7/10 Met 2/13/2023  2) Decrease tenderness to moderate  Progressing/nearly met  3) Facilitate improved LE flexibility Progressing slowly  4) Patient will increase standing tolerance to at least 5 min to assist with meal prep and washing dishes. Met 2/8/2023     Long Term Goals: 6 weeks   1) Increase standing tolerance to at least 30 min to allow for meal prep and light housework Progressing  2) Patient will consistently drive using metatarsal heads to apply pressure to pedals without increased pain Progressing  3) Patient will walk > 30 min over level surface demonstrating adequate step length, heel strike and toe off with heel pain no > 4/10 Progressing/nearly met  4) Patient will initiate weight bearing after sitting with heel pain no > 4/10 Progressing/nearly met  5) Improve functional mobility to < 40% deficit as indicated by FOTO foot survey Progressing (Regressed on this date)  6) Patient will be independent in [facility] for foot/ankle Progressing    PLAN     The patient is to be progressed within the established plan of care as tolerated in order to accomplish goals as stated above and increase function. Plan to incorporate increased standing exercises as tolerated in subsequent sessions (mini squats, closed chain TKE, wobble board standing, etc.).  Eda Marrufo, PTA

## 2023-03-02 LAB
ALBUMIN SERPL-MCNC: 3.9 G/DL (ref 3.6–5.1)
ALBUMIN/GLOB SERPL: 1.6 (CALC) (ref 1–2.5)
ALP SERPL-CCNC: 75 U/L (ref 37–153)
ALT SERPL-CCNC: 20 U/L (ref 6–29)
AST SERPL-CCNC: 18 U/L (ref 10–35)
BASOPHILS # BLD AUTO: 78 CELLS/UL (ref 0–200)
BASOPHILS NFR BLD AUTO: 1.6 %
BILIRUB SERPL-MCNC: 0.4 MG/DL (ref 0.2–1.2)
BUN SERPL-MCNC: 21 MG/DL (ref 7–25)
BUN/CREAT SERPL: NORMAL (CALC) (ref 6–22)
CALCIUM SERPL-MCNC: 9.2 MG/DL (ref 8.6–10.4)
CHLORIDE SERPL-SCNC: 105 MMOL/L (ref 98–110)
CHOLEST SERPL-MCNC: 243 MG/DL
CHOLEST/HDLC SERPL: 2.7 (CALC)
CO2 SERPL-SCNC: 30 MMOL/L (ref 20–32)
CREAT SERPL-MCNC: 0.9 MG/DL (ref 0.5–1.05)
EGFR: 73 ML/MIN/1.73M2
EOSINOPHIL # BLD AUTO: 441 CELLS/UL (ref 15–500)
EOSINOPHIL NFR BLD AUTO: 9 %
ERYTHROCYTE [DISTWIDTH] IN BLOOD BY AUTOMATED COUNT: 14.4 % (ref 11–15)
GLOBULIN SER CALC-MCNC: 2.4 G/DL (CALC) (ref 1.9–3.7)
GLUCOSE SERPL-MCNC: 92 MG/DL (ref 65–139)
HBA1C MFR BLD: 5.7 % OF TOTAL HGB
HCT VFR BLD AUTO: 37.2 % (ref 35–45)
HDLC SERPL-MCNC: 91 MG/DL
HGB BLD-MCNC: 11.6 G/DL (ref 11.7–15.5)
LDLC SERPL CALC-MCNC: 134 MG/DL (CALC)
LYMPHOCYTES # BLD AUTO: 1240 CELLS/UL (ref 850–3900)
LYMPHOCYTES NFR BLD AUTO: 25.3 %
MCH RBC QN AUTO: 25.4 PG (ref 27–33)
MCHC RBC AUTO-ENTMCNC: 31.2 G/DL (ref 32–36)
MCV RBC AUTO: 81.4 FL (ref 80–100)
MONOCYTES # BLD AUTO: 451 CELLS/UL (ref 200–950)
MONOCYTES NFR BLD AUTO: 9.2 %
NEUTROPHILS # BLD AUTO: 2690 CELLS/UL (ref 1500–7800)
NEUTROPHILS NFR BLD AUTO: 54.9 %
NONHDLC SERPL-MCNC: 152 MG/DL (CALC)
PLATELET # BLD AUTO: 329 THOUSAND/UL (ref 140–400)
PMV BLD REES-ECKER: 11 FL (ref 7.5–12.5)
POTASSIUM SERPL-SCNC: 4.3 MMOL/L (ref 3.5–5.3)
PROT SERPL-MCNC: 6.3 G/DL (ref 6.1–8.1)
RBC # BLD AUTO: 4.57 MILLION/UL (ref 3.8–5.1)
SODIUM SERPL-SCNC: 143 MMOL/L (ref 135–146)
TRIGL SERPL-MCNC: 83 MG/DL
TSH SERPL-ACNC: 2.1 MIU/L (ref 0.4–4.5)
WBC # BLD AUTO: 4.9 THOUSAND/UL (ref 3.8–10.8)

## 2023-03-03 ENCOUNTER — PATIENT MESSAGE (OUTPATIENT)
Dept: REHABILITATION | Facility: HOSPITAL | Age: 63
End: 2023-03-03

## 2023-03-03 LAB
ALBUMIN/CREAT UR: 4 MCG/MG CREAT
APPEARANCE UR: ABNORMAL
BACTERIA #/AREA URNS HPF: ABNORMAL /HPF
BACTERIA UR CULT: ABNORMAL
BACTERIA UR CULT: ABNORMAL
BILIRUB UR QL STRIP: NEGATIVE
COLOR UR: YELLOW
CREAT UR-MCNC: 85 MG/DL (ref 20–275)
GLUCOSE UR QL STRIP: NEGATIVE
HGB UR QL STRIP: NEGATIVE
HYALINE CASTS #/AREA URNS LPF: ABNORMAL /LPF
KETONES UR QL STRIP: NEGATIVE
LEUKOCYTE ESTERASE UR QL STRIP: ABNORMAL
MICROALBUMIN UR-MCNC: 0.3 MG/DL
NITRITE UR QL STRIP: POSITIVE
PH UR STRIP: 6.5 [PH] (ref 5–8)
PROT UR QL STRIP: NEGATIVE
RBC #/AREA URNS HPF: ABNORMAL /HPF
SERVICE CMNT-IMP: ABNORMAL
SP GR UR STRIP: 1.01 (ref 1–1.03)
SQUAMOUS #/AREA URNS HPF: ABNORMAL /HPF
WBC #/AREA URNS HPF: ABNORMAL /HPF

## 2023-03-05 ENCOUNTER — PATIENT MESSAGE (OUTPATIENT)
Dept: FAMILY MEDICINE | Facility: CLINIC | Age: 63
End: 2023-03-05

## 2023-03-06 ENCOUNTER — TELEPHONE (OUTPATIENT)
Dept: FAMILY MEDICINE | Facility: CLINIC | Age: 63
End: 2023-03-06

## 2023-03-07 ENCOUNTER — PATIENT MESSAGE (OUTPATIENT)
Dept: FAMILY MEDICINE | Facility: CLINIC | Age: 63
End: 2023-03-07

## 2023-03-07 DIAGNOSIS — I89.0 LYMPHEDEMA: Primary | ICD-10-CM

## 2023-03-08 ENCOUNTER — TELEPHONE (OUTPATIENT)
Dept: HEMATOLOGY/ONCOLOGY | Facility: CLINIC | Age: 63
End: 2023-03-08

## 2023-03-08 DIAGNOSIS — S43.004A DISLOCATION OF RIGHT SHOULDER JOINT, INITIAL ENCOUNTER: Primary | ICD-10-CM

## 2023-03-08 DIAGNOSIS — S43.004D DISLOCATION OF RIGHT SHOULDER JOINT, SUBSEQUENT ENCOUNTER: ICD-10-CM

## 2023-03-08 NOTE — TELEPHONE ENCOUNTER
Tried contacting pt regarding blood work needed prior to appt on 3/16/23   No answer  Lvm asking her to give a call back if she would like her labs orders to placed somewhere other than [facility].

## 2023-03-09 ENCOUNTER — CLINICAL SUPPORT (OUTPATIENT)
Dept: REHABILITATION | Facility: HOSPITAL | Age: 63
End: 2023-03-09
Payer: MEDICARE

## 2023-03-09 ENCOUNTER — OFFICE VISIT (OUTPATIENT)
Dept: FAMILY MEDICINE | Facility: CLINIC | Age: 63
End: 2023-03-09
Payer: MEDICARE

## 2023-03-09 VITALS
HEART RATE: 55 BPM | BODY MASS INDEX: 34.79 KG/M2 | HEIGHT: 70 IN | OXYGEN SATURATION: 97 % | DIASTOLIC BLOOD PRESSURE: 72 MMHG | SYSTOLIC BLOOD PRESSURE: 120 MMHG | WEIGHT: 243 LBS

## 2023-03-09 DIAGNOSIS — Z12.31 OTHER SCREENING MAMMOGRAM: ICD-10-CM

## 2023-03-09 DIAGNOSIS — M17.11 PRIMARY OSTEOARTHRITIS OF RIGHT KNEE: ICD-10-CM

## 2023-03-09 DIAGNOSIS — R26.9 GAIT DIFFICULTY: Primary | ICD-10-CM

## 2023-03-09 DIAGNOSIS — Z85.3 HX OF BREAST CANCER: ICD-10-CM

## 2023-03-09 DIAGNOSIS — S43.004D DISLOCATION OF RIGHT SHOULDER JOINT, SUBSEQUENT ENCOUNTER: ICD-10-CM

## 2023-03-09 DIAGNOSIS — C50.912 BREAST CANCER, STAGE 1, ESTROGEN RECEPTOR NEGATIVE, LEFT: ICD-10-CM

## 2023-03-09 DIAGNOSIS — K21.9 GASTROESOPHAGEAL REFLUX DISEASE, UNSPECIFIED WHETHER ESOPHAGITIS PRESENT: ICD-10-CM

## 2023-03-09 DIAGNOSIS — Z17.1 BREAST CANCER, STAGE 1, ESTROGEN RECEPTOR NEGATIVE, LEFT: ICD-10-CM

## 2023-03-09 DIAGNOSIS — E78.5 HYPERLIPIDEMIA, UNSPECIFIED HYPERLIPIDEMIA TYPE: ICD-10-CM

## 2023-03-09 DIAGNOSIS — E11.9 TYPE 2 DIABETES MELLITUS WITHOUT COMPLICATION, WITHOUT LONG-TERM CURRENT USE OF INSULIN: Primary | ICD-10-CM

## 2023-03-09 DIAGNOSIS — E11.69 TYPE 2 DIABETES MELLITUS WITH OTHER SPECIFIED COMPLICATION, WITHOUT LONG-TERM CURRENT USE OF INSULIN: ICD-10-CM

## 2023-03-09 DIAGNOSIS — I89.0 LYMPHEDEMA: ICD-10-CM

## 2023-03-09 DIAGNOSIS — M25.611 DECREASED ROM OF RIGHT SHOULDER: ICD-10-CM

## 2023-03-09 DIAGNOSIS — M79.671 FOOT PAIN, BILATERAL: ICD-10-CM

## 2023-03-09 DIAGNOSIS — G47.00 INSOMNIA, UNSPECIFIED TYPE: ICD-10-CM

## 2023-03-09 DIAGNOSIS — M76.60 ACHILLES TENDINITIS, UNSPECIFIED LATERALITY: ICD-10-CM

## 2023-03-09 DIAGNOSIS — M79.672 FOOT PAIN, BILATERAL: ICD-10-CM

## 2023-03-09 DIAGNOSIS — Z00.00 PHYSICAL EXAM: ICD-10-CM

## 2023-03-09 PROCEDURE — 97110 THERAPEUTIC EXERCISES: CPT | Mod: PN

## 2023-03-09 PROCEDURE — 97140 MANUAL THERAPY 1/> REGIONS: CPT | Mod: PN

## 2023-03-09 PROCEDURE — 97014 ELECTRIC STIMULATION THERAPY: CPT | Mod: PN

## 2023-03-09 PROCEDURE — 99214 PR OFFICE/OUTPT VISIT, EST, LEVL IV, 30-39 MIN: ICD-10-PCS | Mod: S$GLB,,, | Performed by: NURSE PRACTITIONER

## 2023-03-09 PROCEDURE — 99214 OFFICE O/P EST MOD 30 MIN: CPT | Mod: S$GLB,,, | Performed by: NURSE PRACTITIONER

## 2023-03-09 RX ORDER — TERBINAFINE HYDROCHLORIDE 250 MG/1
250 TABLET ORAL DAILY
COMMUNITY
End: 2023-10-05

## 2023-03-09 RX ORDER — METHOCARBAMOL 500 MG/1
500 TABLET, FILM COATED ORAL 4 TIMES DAILY
COMMUNITY
End: 2023-05-23

## 2023-03-09 RX ORDER — CALCIUM CARBONATE 600 MG
600 TABLET ORAL ONCE
COMMUNITY

## 2023-03-09 NOTE — PROGRESS NOTES
"[facility]   Physical Therapy Treatment Note     Name: Lolly MCLAIN Sturgeon  Clinic Number: [id number]    Therapy Diagnosis:   Encounter Diagnoses   Name Primary?    Gait difficulty Yes    Foot pain, bilateral     Achilles tendinitis, unspecified laterality        Physician: Yevgeniy De La Rosa DPM    Visit Date: 3/9/2023       Physician Orders: PT Eval and Treat   Medical Diagnosis from Referral: Achilles tendinitis, unspecified leg   Evaluation Date: 1/18/2023  Authorization Period Expiration: 12/31/2023   Plan of Care Expiration: 3/3/2023  Progress Note Due: 2/18/2023  Visit # / Visits authorized: 12/ 20 (13 total)   FOTO: Completed 02/23/2023 64% residual deficit   Next survey due at discharge     Precautions: Diabetes and cancer     PTA Visit #: 0/5     Time In: 1408  Time Out: 1510  Total Billable Time: 57 minutes    SUBJECTIVE     Pt reports: "I am bruised from the fall. Yesterday my feet hurt really bad. The R one today is bad." "My  does not trust me driving right now."  She was compliant with home exercise program.  Response to previous treatment: "sore"  Functional change: Intermittently wearing shoes but increased walking tolerance.    Pain: 5/10 on L, 8/10 on R  Location: bilateral heels/feet      OBJECTIVE     Objective Measures updated at progress report unless specified.     Treatment     Lolly received the treatments listed below:      therapeutic exercises to develop ROM and flexibility for 30 minutes including:   Recumbent bike L4 x 8'  Standing gastroc stretch using wedge 3x30s ea    Soleus stretch using wedge 3x30s ea    Ankle alphabet x1 ea LE  Towel sweeps x2'   Towel scrunches x2'    Did not perform this date  Heel raises on Airex x10   Toe raises on Airex x10  Theraball rolling under sole x2' ea LE  Small object pickup with toes x1' ea LE  B Blue TB resisted ankle ROM x1' ea   DF, PF, Inv, Ev   Ankle DF/PF and inv/eversion using rocker board x20 ea    manual therapy " techniques: Soft tissue mobilizations were applied to the: B Achilles Tendon and Heels for 0 minutes, including:         Strumming, Knuckling, Kneading       manual therapy techniques  functional dry needling were applied to the: R calves/ankles for 15 minutes, including:  Informed consent previously obtained after thorough explanation of risks and benefits. Signed consent form has been scanned into medical records. Pre-procedure time out performed which included verification of name, , and site of treatment. Cleaned target tissues with 70% isopropyl alcohol wipe down and performed with single use sterile prep pads.     Functional dry needling to R lower leg performed as follows:    1) 30 mm needle inserted straight medial to lateral at point  horizontally level with medial malleolus and just anterior to Achilles tendon    2) 30 mm needle inserted posteromedial to anterolateral with 45* angulation at point 3 finger breadths proximal to medial malleolus, just anterior to Achilles tendon     3) 30 mm needle inserted posterolateral to anteromedial with 45* angulation at point 4 finger breadths proximal to lateral malleolus, just anterior to Achilles tendon    4) 30 mm needle inserted posterolateral to anteromedial with 45* angulation at point horizontally level with lateral malleolus and just anterior to Achilles tendon     5) 30 mm needle inserted 1 finger breadth distal and posterior to pt #1, with 45* angulation inferolateral toward Achilles tendon attachment on posterior calcaneous (with periosteal pecking at attachment)    6) 30 mm needle inserted 2 fingers breadth distal and posterior to pt #1 with 45* angulation inferolateral and slightly posterior onto medial and superior aspect of calcaneal tuberosity. (With periosteal pecking at attachment)    7) 50 mm needle inserted @ bifurcation of medial and lateral heads of the gastroc posterior to anterior with 45* caudad angulation    8) 50 mm needle inserted in 2  additional trigger points in medial gastrocs R posterior to anterior (total of 2 needles in medial gastroc trigger point on R)    9) 30 mm needle inserted within lower 1/3 of each Achilles tendon with posterior to anterior angulation.      All needles left insitu .     Treatment performed in L side lying. ES with FDN x 15 at 80 MHz wave frequency, 150 microamps (4 channels on R). Intensity set to patient tolerance and patient given call bell and instructed to ring if any problems. Patient reminded to  increase hydration habits following for decreased soreness. No adverse effects present following needling. Patient reported improved symptoms in R ankle/foot.    Patient Education and Home Exercises     Home Exercises Provided and Patient Education Provided     Education provided:   -   Written Home Exercises Provided:  Instructed patient to continue prior HEP. Exercises were reviewed and Lolly was able to demonstrate them prior to the end of the session.  Lolly demonstrated good  understanding of the education provided. See EMR under Patient Instructions for exercises provided during therapy sessions    ASSESSMENT     The patient ambulated into the clinic wearing slippers due to pain in bilateral feet. She continues to have pain in B heels/calves R>L.  Minimal improvement in symptoms.      Lolly Is progressing slowly towards her goals.   Pt prognosis is Fair.     Pt will continue to benefit from skilled outpatient physical therapy to address the deficits listed in the problem list box on initial evaluation, provide pt/family education and to maximize pt's level of independence in the home and community environment.     Pt's spiritual, cultural and educational needs considered and pt agreeable to plan of care and goals.     Anticipated barriers to physical therapy: None    Goals:   Short Term Goals: 3 weeks   1) Decrease max  pain to < 7/10 Regressed  2) Decrease tenderness to moderate  Progressing  3) Facilitate  improved LE flexibility Progressing slowly  4) Patient will increase standing tolerance to at least 5 min to assist with meal prep and washing dishes. Met 2/8/2023     Long Term Goals: 6 weeks   1) Increase standing tolerance to at least 30 min to allow for meal prep and light housework Progressing  2) Patient will consistently drive using metatarsal heads to apply pressure to pedals without increased pain Progressing  3) Patient will walk > 30 min over level surface demonstrating adequate step length, heel strike and toe off with heel pain no > 4/10 Minimal progress  4) Patient will initiate weight bearing after sitting with heel pain no > 4/10 Progressing  5) Improve functional mobility to < 40% deficit as indicated by FOTO foot survey Progressing  6) Patient will be independent in [facility] for foot/ankle Progressing    PLAN     Resume dry needling.  Progress flexibility and strengthening exercises as tolerated.  POC update.      Lolly Villafana, PT

## 2023-03-10 ENCOUNTER — TELEPHONE (OUTPATIENT)
Dept: HEMATOLOGY/ONCOLOGY | Facility: CLINIC | Age: 63
End: 2023-03-10

## 2023-03-10 ENCOUNTER — CLINICAL SUPPORT (OUTPATIENT)
Dept: REHABILITATION | Facility: HOSPITAL | Age: 63
End: 2023-03-10
Payer: MEDICARE

## 2023-03-10 DIAGNOSIS — M79.672 FOOT PAIN, BILATERAL: ICD-10-CM

## 2023-03-10 DIAGNOSIS — R97.8 OTHER ABNORMAL TUMOR MARKERS: ICD-10-CM

## 2023-03-10 DIAGNOSIS — C50.912 BREAST CANCER, STAGE 1, ESTROGEN RECEPTOR NEGATIVE, LEFT: Primary | ICD-10-CM

## 2023-03-10 DIAGNOSIS — C50.912 HER2-POSITIVE CARCINOMA OF LEFT BREAST: ICD-10-CM

## 2023-03-10 DIAGNOSIS — Z17.1 BREAST CANCER, STAGE 1, ESTROGEN RECEPTOR NEGATIVE, LEFT: Primary | ICD-10-CM

## 2023-03-10 DIAGNOSIS — R26.9 GAIT DIFFICULTY: Primary | ICD-10-CM

## 2023-03-10 DIAGNOSIS — M79.671 FOOT PAIN, BILATERAL: ICD-10-CM

## 2023-03-10 DIAGNOSIS — M76.60 ACHILLES TENDINITIS, UNSPECIFIED LATERALITY: ICD-10-CM

## 2023-03-10 PROCEDURE — 97140 MANUAL THERAPY 1/> REGIONS: CPT | Mod: PN,CQ

## 2023-03-10 PROCEDURE — 97110 THERAPEUTIC EXERCISES: CPT | Mod: PN,CQ

## 2023-03-10 NOTE — PROGRESS NOTES
"[facility]   Physical Therapy Treatment Note     Name: Lolly Lozanoncer  Clinic Number: [id number]    Therapy Diagnosis:   Encounter Diagnoses   Name Primary?    Gait difficulty Yes    Foot pain, bilateral     Achilles tendinitis, unspecified laterality        Physician: Yevgeniy De La Rosa DPM    Visit Date: 3/10/2023       Physician Orders: PT Eval and Treat   Medical Diagnosis from Referral: Achilles tendinitis, unspecified leg   Evaluation Date: 1/18/2023  Authorization Period Expiration: 12/31/2023   Plan of Care Expiration: 3/3/2023  Progress Note Due: 2/18/2023  Visit # / Visits authorized: 13/ 20 (14 total)   FOTO: Completed 02/23/2023 64% residual deficit   Next survey due at discharge     Precautions: Diabetes and cancer    PTA Visit #: 1/5     Time In: 1433  Time Out: 1533  Total Billable Time: 55 minutes    SUBJECTIVE     Pt reports: "I was sore, and it hurt to walk after the needling. I am hurting about the same. It takes a while for it to feel better once I inflamed it."  She was compliant with home exercise program.  Response to previous treatment: "sore"  Functional change: Intermittently wearing shoes but increased walking tolerance.    Pain: 5/10 on L, 8/10 on R  Location: bilateral heels/feet      OBJECTIVE     Objective Measures updated at progress report unless specified.     Treatment     Lolly received the treatments listed below:      therapeutic exercises to develop ROM and flexibility for 45 minutes including:   Recumbent bike L4 x10'  Standing gastroc stretch using wedge 3x30s ea (seated today)    Soleus stretch using wedge 3x30s ea (seated today)   Ankle alphabet x1 ea LE  Towel sweeps x2'  Towel scrunches x2'  Theraball rolling under sole x2' ea LE  Small object pickup with toes x1' ea LE  B Blue TB resisted ankle ROM x1' ea   DF, PF, Inv, Ev   Ankle DF/PF and inv/eversion using rocker board x20 ea    Did not perform this date  Heel raises on Airex x10   Toe raises " on Airex x10    manual therapy techniques: Soft tissue mobilizations were applied to the: B Achilles Tendon, Heels, & Arch for 10 minutes, including:         Strumming, Knuckling, Kneading     Not performed on this date:    manual therapy techniques  functional dry needling were applied to the: R calves/ankles for 0 minutes, including:  Informed consent previously obtained after thorough explanation of risks and benefits. Signed consent form has been scanned into medical records. Pre-procedure time out performed which included verification of name, , and site of treatment. Cleaned target tissues with 70% isopropyl alcohol wipe down and performed with single use sterile prep pads.     Functional dry needling to B lower legs performed as follows:    1) 30 mm needle inserted straight medial to lateral at point  horizontally level with medial malleolus and just anterior to Achilles tendon    2) 30 mm needle inserted posteromedial to anterolateral with 45* angulation at point 3 finger breadths proximal to medial malleolus, just anterior to Achilles tendon     3) 30 mm needle inserted posterolateral to anteromedial with 45* angulation at point 4 finger breadths proximal to lateral malleolus, just anterior to Achilles tendon    4) 30 mm needle inserted posterolateral to anteromedial with 45* angulation at point horizontally level with lateral malleolus and just anterior to Achilles tendon     5) 30 mm needle inserted 1 finger breadth distal and posterior to pt #1, with 45* angulation inferolateral toward Achilles tendon attachment on posterior calcaneous (with periosteal pecking at attachment)    6) 30 mm needle inserted 2 fingers breadth distal and posterior to pt #1 with 45* angulation inferolateral and slightly posterior onto medial and superior aspect of calcaneal tuberosity. (With periosteal pecking at attachment)    7) 50 mm needle inserted @ bifurcation of medial and lateral heads of the gastroc posterior to  anterior with 45* caudad angulation    8) 50 mm needle inserted in 2 additional trigger points in medial gastrocs R and L posterior to anterior (total of 2 needles in medial gastroc trigger point on R)    9) 30 mm needle inserted within lower 1/3 of each Achilles tendon with posterior to anterior angulation.      All needles left insitu .     Treatment performed in L side lying. ES with FDN x 15 at 80 MHz wave frequency, 150 microamps (4 channels on R). Intensity set to patient tolerance and patient given call bell and instructed to ring if any problems. Patient reminded to  increase hydration habits following for decreased soreness. No adverse effects present following needling. Patient reported improved symptoms in R ankle/foot.    Patient Education and Home Exercises     Home Exercises Provided and Patient Education Provided     Education provided:   - The patient was instructed in continuation of therapeutic exercise program with slight progression of duration.  Written Home Exercises Provided:  Instructed patient to continue prior HEP. Exercises were reviewed and Lolly was able to demonstrate them prior to the end of the session.  Lolly demonstrated good  understanding of the education provided. See EMR under Patient Instructions for exercises provided during therapy sessions    ASSESSMENT     The patient ambulated into the clinic wearing slippers without back due to pain in bilateral feet. Darrell reports continued pain since fall with no relief yet noted from efforts of dry needling. The patient defers standing at this time due to instability and lack of upper extremity support with one arm in restrictive brace/sling. Patient was instructed in continuation of therapeutic treatment program with increased duration of recumbent bike utilizing ankle range of motion. Lolly Ramírez experiences most difficulty today with fine motor intrinsic motions on L today. Manual techniques were provided to promote increased  "soft tissue mobility and reduced sensitivity/triggerpoints. Patient defers cold pack post session, reporting that solely providing manual techniques "last longer than ice."    Lolly Is progressing slowly towards her goals.   Pt prognosis is Fair.     Pt will continue to benefit from skilled outpatient physical therapy to address the deficits listed in the problem list box on initial evaluation, provide pt/family education and to maximize pt's level of independence in the home and community environment.     Pt's spiritual, cultural and educational needs considered and pt agreeable to plan of care and goals.     Anticipated barriers to physical therapy: None    Goals:   Short Term Goals: 3 weeks   1) Decrease max  pain to < 7/10 Regressed  2) Decrease tenderness to moderate  Progressing  3) Facilitate improved LE flexibility Progressing slowly  4) Patient will increase standing tolerance to at least 5 min to assist with meal prep and washing dishes. Met 2/8/2023     Long Term Goals: 6 weeks   1) Increase standing tolerance to at least 30 min to allow for meal prep and light housework Progressing  2) Patient will consistently drive using metatarsal heads to apply pressure to pedals without increased pain Progressing  3) Patient will walk > 30 min over level surface demonstrating adequate step length, heel strike and toe off with heel pain no > 4/10 Minimal progress  4) Patient will initiate weight bearing after sitting with heel pain no > 4/10 Progressing  5) Improve functional mobility to < 40% deficit as indicated by FOTO foot survey Progressing  6) Patient will be independent in [facility] for foot/ankle Progressing    PLAN     The patient is to be progressed within the established plan of care as tolerated in order to accomplish goals as stated above and increase function. Plan to progress to increased standing exercises as applicable and utilize dry-needling as needed in subsequent sessions. PT is to perform formal POC " in subsequent session.    Eda Marrufo, PTA

## 2023-03-13 ENCOUNTER — CLINICAL SUPPORT (OUTPATIENT)
Dept: REHABILITATION | Facility: HOSPITAL | Age: 63
End: 2023-03-13
Payer: MEDICARE

## 2023-03-13 ENCOUNTER — TELEPHONE (OUTPATIENT)
Dept: FAMILY MEDICINE | Facility: CLINIC | Age: 63
End: 2023-03-13

## 2023-03-13 DIAGNOSIS — M79.671 FOOT PAIN, BILATERAL: ICD-10-CM

## 2023-03-13 DIAGNOSIS — M79.672 FOOT PAIN, BILATERAL: ICD-10-CM

## 2023-03-13 DIAGNOSIS — M76.60 ACHILLES TENDINITIS, UNSPECIFIED LATERALITY: ICD-10-CM

## 2023-03-13 DIAGNOSIS — R26.9 GAIT DIFFICULTY: Primary | ICD-10-CM

## 2023-03-13 DIAGNOSIS — I89.0 LYMPHEDEMA OF LEFT ARM: Primary | ICD-10-CM

## 2023-03-13 PROCEDURE — 97140 MANUAL THERAPY 1/> REGIONS: CPT | Mod: PN

## 2023-03-13 PROCEDURE — 97014 ELECTRIC STIMULATION THERAPY: CPT | Mod: KX,PN

## 2023-03-13 PROCEDURE — 97140 MANUAL THERAPY 1/> REGIONS: CPT | Mod: KX,PN

## 2023-03-13 RX ORDER — NAPROXEN 500 MG/1
500 TABLET ORAL 2 TIMES DAILY
Qty: 90 TABLET | Refills: 1 | Status: SHIPPED | OUTPATIENT
Start: 2023-03-13 | End: 2023-06-15 | Stop reason: SDUPTHER

## 2023-03-13 RX ORDER — TRAZODONE HYDROCHLORIDE 50 MG/1
100 TABLET ORAL NIGHTLY
Qty: 180 TABLET | Refills: 0 | Status: SHIPPED | OUTPATIENT
Start: 2023-03-13 | End: 2023-06-15 | Stop reason: SDUPTHER

## 2023-03-13 NOTE — PROGRESS NOTES
"[facility]   Physical Therapy Treatment Note     Name: Lolly Ramírez  Clinic Number: [id number]    Therapy Diagnosis:   Encounter Diagnoses   Name Primary?    Gait difficulty Yes    Foot pain, bilateral     Achilles tendinitis, unspecified laterality        Physician: Yevgeniy De La Rosa DPM    Visit Date: 3/13/2023       Physician Orders: PT Eval and Treat   Medical Diagnosis from Referral: Achilles tendinitis, unspecified leg   Evaluation Date: 1/18/2023  Authorization Period Expiration: 12/31/2023   Plan of Care Expiration: 3/3/2023  Progress Note Due: 2/18/2023  Visit # / Visits authorized: 14/ 20 (15 total)   FOTO: Completed 02/23/2023 64% residual deficit   Next survey due at discharge     Precautions: Diabetes and cancer    PTA Visit #: 0/5     Time In: 1100  Time Out: 1200  Total Billable Time: 33 minutes    SUBJECTIVE     Pt reports: "I can't stand very long at all.  Walking is better."  She was compliant with home exercise program.  Response to previous treatment: "no more soreness than I had already"  Functional change: Intermittently wearing shoes but increased walking tolerance.    Pain: 5/10 on L, 8/10 on R  Location: bilateral heels/feet      OBJECTIVE     See POC update.     Treatment     Lolly received the treatments listed below:      therapeutic exercises to develop ROM and flexibility for 0 minutes including:  Did not perform this date due to measuring for POC update  Recumbent bike L4 x10'  Standing gastroc stretch using wedge 3x30s ea (seated today)    Soleus stretch using wedge 3x30s ea (seated today)   Ankle alphabet x1 ea LE  Towel sweeps x2'  Towel scrunches x2'  Theraball rolling under sole x2' ea LE  Small object pickup with toes x1' ea LE  B Blue TB resisted ankle ROM x1' ea   DF, PF, Inv, Ev   Ankle DF/PF and inv/eversion using rocker board x20 ea  Heel raises on Airex x10   Toe raises on Airex x10    Measurements taken for POC update     manual therapy " techniques  functional dry needling were applied to the: B calves/ankles for 15 minutes, including:  Informed consent previously obtained after thorough explanation of risks and benefits. Signed consent form has been scanned into medical records. Pre-procedure time out performed which included verification of name, , and site of treatment. Cleaned target tissues with 70% isopropyl alcohol wipe down and performed with single use sterile prep pads.     Functional dry needling to B lower legs performed as follows:    1) 30 mm needle inserted straight medial to lateral at point  horizontally level with medial malleolus and just anterior to Achilles tendon    2) 30 mm needle inserted posteromedial to anterolateral with 45* angulation at point 3 finger breadths proximal to medial malleolus, just anterior to Achilles tendon     3) 30 mm needle inserted posterolateral to anteromedial with 45* angulation at point 4 finger breadths proximal to lateral malleolus, just anterior to Achilles tendon    4) 30 mm needle inserted posterolateral to anteromedial with 45* angulation at point horizontally level with lateral malleolus and just anterior to Achilles tendon     5) 30 mm needle inserted 1 finger breadth distal and posterior to pt #1, with 45* angulation inferolateral toward Achilles tendon attachment on posterior calcaneous (with periosteal pecking at attachment)    6) 30 mm needle inserted 2 fingers breadth distal and posterior to pt #1 with 45* angulation inferolateral and slightly posterior onto medial and superior aspect of calcaneal tuberosity. (With periosteal pecking at attachment)    7) 50 mm needle inserted @ bifurcation of medial and lateral heads of the gastroc posterior to anterior with 45* caudad angulation    8) 50 mm needle inserted in 2 additional trigger points in medial gastrocs R and 1 on the L posterior to anterior (total of 3 needles in medial gastroc trigger point R/L)    9) 30 mm needle inserted  within lower 1/3 of each Achilles tendon with posterior to anterior angulation.      All needles left insitu for duration of ES.     Treatment performed in prone. ES with FDN x 15 at 80 MHz wave frequency, 150 microamps (4 channels on R). Intensity set to patient tolerance and patient given call bell and instructed to ring if any problems. Patient reminded to  increase hydration habits following for decreased soreness. No adverse effects present following needling. Patient reported some soreness in B ankles/feet following session.     Not performed on this date:   manual therapy techniques: Soft tissue mobilizations were applied to the: B Achilles Tendon, Heels, & Arch for 0 minutes, including:         Strumming, Knuckling, Kneading     Patient Education and Home Exercises     Home Exercises Provided and Patient Education Provided     Education provided:   - The patient was instructed to hydrate appropriately following session and to limit walking activities post needling.  Written Home Exercises Provided:  Instructed patient to continue prior HEP. Exercises were reviewed and Lolly was able to demonstrate them prior to the end of the session.  Lolly demonstrated good  understanding of the education provided. See EMR under Patient Instructions for exercises provided during therapy sessions    ASSESSMENT     See POC Update    Lolly Is progressing slowly towards her goals.   Pt prognosis is Fair.     Pt will continue to benefit from skilled outpatient physical therapy to address the deficits listed in the problem list box on initial evaluation, provide pt/family education and to maximize pt's level of independence in the home and community environment.     Pt's spiritual, cultural and educational needs considered and pt agreeable to plan of care and goals.     Anticipated barriers to physical therapy: None    Goals:   See POC update    PLAN     See POC update.    Lolly Villafana, PT

## 2023-03-13 NOTE — TELEPHONE ENCOUNTER
----- Message from Awa Viveros sent at 3/13/2023  7:34 AM CDT -----  VM 03/10/23 @ 12:43 PM :patient need a refill of her trazodone [facility] if any questions please give the patient a call at [phone]

## 2023-03-13 NOTE — PLAN OF CARE
[facility]  Physical Therapy Plan of Care Note    Name: Lolly Ramírez  [facility] Number: [id number]    Therapy Diagnosis:   Encounter Diagnoses   Name Primary?    Gait difficulty Yes    Foot pain, bilateral     Achilles tendinitis, unspecified laterality      Physician: Yevgeniy De La Rosa DPM    Visit Date: 3/13/2023    Physician Orders: PT Eval and Treat   Medical Diagnosis from Referral: Achilles tendinitis, unspecified leg   Evaluation Date: 1/18/2023  Authorization Period Expiration: 12/31/2023   Plan of Care Expiration: 3/3/2023  Progress Note Due: 2/18/2023  Visit # / Visits authorized: 14/ 20 (15 total)   FOTO: Completed 02/23/2023 64% residual deficit   Next survey due at discharge     Precautions: Diabetes and cancer    Functional Level Prior to Evaluation:   Not walking her dogs, unable to stand long enough for cooking or housework activities, drives using toes only.  Unable to wear shoes with a back.  Patient reports feeling off balance due to heel pain.      SUBJECTIVE     Update:   Pain:  Current 6/10 R, 4/10 L; worst 10/10, best 5-6/10   Location: bilateral lateral and posterior heels   Description: constant variable pain: burns    Current Level of Function: Not walking her dogs (mainly because of feet but somewhat due to shoulder), unable to stand more than 5 min (feet and back), walking tolerance 30 min (if holding onto something); able to drive using appropriate foot position (not currently driving due to dislocated shoulder).  Able to wear shoes with a back occasionally and for short periods of time; has been wearing slippers and sandals without back.  Patient reports feeling off balance due to heel pain. Reports she tends to drag her R foot.       OBJECTIVE     Update:   Observation: Patient is noted to have several areas of broken skin B lower legs/ankles/feet (anterior more so than posterior) due to eczema.    Posture: Standing: adequate longitudinal arch B. Good  calcaneal alignment.    Palpation: Significant tenderness medial and posterior aspect of R posterior calcaneal tubercle, L significant tenderness lateral and posterior aspect of L posterior calcaneal tubercle.     Sensation: history of neuropathy B feet L > R  Range of Motion/Strength:    Ankle   Right     Left   Pain/Dysfunction with Movement     AROM PROM MMT AROM PROM MMT     Plantarflexion  62*  65*  4/5  65*  70*  4/5     Dorsiflexion  10*  12*  4+/5  8*  12*  4+/5     Inversion  20*  25*  4+/5  15*  20*  4+/5     Eversion  15*  20*  4+/5  18*  24*  4+/5        Flexibility: Minimal to Moderate persistent calf tightness B (gastrocs and soleus mm)  Gait: Without AD  Analysis: decreased rogelio, improved step length B, decreased heel strike and toe off B.    Transfers: Independent with increased L UE support (R UE in immobilizer due to recent dislocation)  Special Tests: Joint mobility talocrural anterior/posterior drawer essentially equal R and L, Anterior/posterior glide 1st and 5th TMT joints no restriction or discomfort.     ASSESSMENT     Update: Patient was making good progress in PT with significant decrease in pain until she slipped and fell at home which exacerbated her heel pain and resulted in re-injury to her R shoulder (anterior dislocation).  Since the exacerbation, patient has made very slow progress in PT with decreased area (but not intensity) of tenderness, improved joint play, and increased ankle ROM.  She continues to experience significant heel pain and tenderness that interferes with her ability to stand/walk for significant amount of time and interferes with her ability to wear closed heel shoes.  She may benefit from continued skilled PT services to address residual problems in order to maximize standing/walking tolerance and to minimize functional deficits.     Previous Short Term Goals Status:     1) Decrease max  pain to < 7/10 Ongoing  2) Decrease tenderness to moderate  Minimal  progress (area affected is decreased)   3) Facilitate improved LE flexibility Progressing slowly  4) Patient will increase standing tolerance to at least 5 min to assist with meal prep and washing dishes. Met 2/8/2023    New Short Term Goals Status:    #1-3 continue  4) Met  5) Increase standing tolerance to at least 10 min.     Long Term Goal Status: continue per initial plan of care.  1) Increase standing tolerance to at least 30 min to allow for meal prep and light housework Progressing  2) Patient will consistently drive using metatarsal heads to apply pressure to pedals without increased pain Progressing  3) Patient will walk > 30 min over level surface demonstrating adequate step length, heel strike and toe off with heel pain no > 4/10 Minimal progress  4) Patient will initiate weight bearing after sitting with heel pain no > 4/10 Progressing  5) Improve functional mobility to < 40% deficit as indicated by FOTO foot survey Progressing  6) Patient will be independent in HEP for foot/ankle Progressing    Reasons for Recertification of Therapy:   Patient was making slow but steady progress in PT until a recent stumble/fall that resulted in her dislocating her R shoulder for the 2nd time    PLAN     Updated Certification Period: 3/9/2023 to 4/21/2023   Recommended Treatment Plan: 2 times per week for 6 weeks:  Electrical Stimulation IFC/TENS, Gait Training, Manual Therapy, Moist Heat/ Ice, Patient Education, Therapeutic Exercise, and Dry Needling  Other Recommendations: Therapeutic Taping as needed    Lolly Villafana, PT    I CERTIFY THE NEED FOR THESE SERVICES FURNISHED UNDER THIS PLAN OF TREATMENT AND WHILE UNDER MY CARE  Physician's comments:      Physician's Signature: ___________________________________________________

## 2023-03-13 NOTE — PROGRESS NOTES
SUBJECTIVE:    Patient ID: Lolly Ramírez is a 62 y.o. female.    Chief Complaint: Follow-up (Bottles brought/ 3 month f/u/Decline flu/ Eye exam set for April/ Lab review/ Mammo and foot exam order/BG)    62 year old female presents for check up.  is present during the exam. She is treated for gerd, insomnia,neuropathy, oa. Psoriasis and dm. Taking meds as prescribed.  Hga1c today is 5.7mg/dl. she is currently being followed closely by dr. Romero for right shoulder dislocation. She originally dislocated her shoulder the 1st time dislocated to her right shoulder back in October 29, 2022 she was treated conservatively and recovered well. On 2/24 patient slipped on a puppy pad and fell onto her right shoulder. had x-rays and a CT scan of her right humerus there was no evidence of any fractures found on CT.  She is currently in sling. Plans to follow up with dr. Romero and will begin pt if pain resolves. Starting lymphedema therapy as well.     Diabetes  Pertinent negatives for hypoglycemia include no dizziness, headaches or nervousness/anxiousness. Pertinent negatives for diabetes include no chest pain and no weakness.     Telephone on 02/24/2023   Component Date Value Ref Range Status    WBC 03/01/2023 4.9  3.8 - 10.8 Thousand/uL Final    RBC 03/01/2023 4.57  3.80 - 5.10 Million/uL Final    Hemoglobin 03/01/2023 11.6 (L)  11.7 - 15.5 g/dL Final    Hematocrit 03/01/2023 37.2  35.0 - 45.0 % Final    MCV 03/01/2023 81.4  80.0 - 100.0 fL Final    MCH 03/01/2023 25.4 (L)  27.0 - 33.0 pg Final    MCHC 03/01/2023 31.2 (L)  32.0 - 36.0 g/dL Final    RDW 03/01/2023 14.4  11.0 - 15.0 % Final    Platelets 03/01/2023 329  140 - 400 Thousand/uL Final    MPV 03/01/2023 11.0  7.5 - 12.5 fL Final    Neutrophils, Abs 03/01/2023 2,690  1,500 - 7,800 cells/uL Final    Lymph # 03/01/2023 1,240  850 - 3,900 cells/uL Final    Mono # 03/01/2023 451  200 - 950 cells/uL Final    Eos # 03/01/2023 441  15 - 500 cells/uL Final    Baso  # 03/01/2023 78  0 - 200 cells/uL Final    Neutrophils Relative 03/01/2023 54.9  % Final    Lymph % 03/01/2023 25.3  % Final    Mono % 03/01/2023 9.2  % Final    Eosinophil % 03/01/2023 9.0  % Final    Basophil % 03/01/2023 1.6  % Final    Glucose 03/01/2023 92  65 - 139 mg/dL Final    BUN 03/01/2023 21  7 - 25 mg/dL Final    Creatinine 03/01/2023 0.90  0.50 - 1.05 mg/dL Final    eGFR 03/01/2023 73  > OR = 60 mL/min/1.73m2 Final    BUN/Creatinine Ratio 03/01/2023 NOT APPLICABLE  6 - 22 (calc) Final    Sodium 03/01/2023 143  135 - 146 mmol/L Final    Potassium 03/01/2023 4.3  3.5 - 5.3 mmol/L Final    Chloride 03/01/2023 105  98 - 110 mmol/L Final    CO2 03/01/2023 30  20 - 32 mmol/L Final    Calcium 03/01/2023 9.2  8.6 - 10.4 mg/dL Final    Total Protein 03/01/2023 6.3  6.1 - 8.1 g/dL Final    Albumin 03/01/2023 3.9  3.6 - 5.1 g/dL Final    Globulin, Total 03/01/2023 2.4  1.9 - 3.7 g/dL (calc) Final    Albumin/Globulin Ratio 03/01/2023 1.6  1.0 - 2.5 (calc) Final    Total Bilirubin 03/01/2023 0.4  0.2 - 1.2 mg/dL Final    Alkaline Phosphatase 03/01/2023 75  37 - 153 U/L Final    AST 03/01/2023 18  10 - 35 U/L Final    ALT 03/01/2023 20  6 - 29 U/L Final    Hemoglobin A1C 03/01/2023 5.7 (H)  <5.7 % of total Hgb Final    Cholesterol 03/01/2023 243 (H)  <200 mg/dL Final    HDL 03/01/2023 91  > OR = 50 mg/dL Final    Triglycerides 03/01/2023 83  <150 mg/dL Final    LDL Cholesterol 03/01/2023 134 (H)  mg/dL (calc) Final    HDL/Cholesterol Ratio 03/01/2023 2.7  <5.0 (calc) Final    Non HDL Chol. (LDL+VLDL) 03/01/2023 152 (H)  <130 mg/dL (calc) Final    Creatinine, Urine 03/01/2023 85  20 - 275 mg/dL Final    Microalb, Ur 03/01/2023 0.3  See Note: mg/dL Final    Microalb/Creat Ratio 03/01/2023 4  <30 mcg/mg creat Final    Color, UA 03/01/2023 YELLOW  YELLOW Final    Appearance, UA 03/01/2023 CLOUDY (A)  CLEAR Final    Specific Gravity, UA 03/01/2023 1.013  1.001 - 1.035 Final    pH, UA 03/01/2023 6.5  5.0 - 8.0 Final     Glucose, UA 03/01/2023 NEGATIVE  NEGATIVE Final    Bilirubin, UA 03/01/2023 NEGATIVE  NEGATIVE Final    Ketones, UA 03/01/2023 NEGATIVE  NEGATIVE Final    Occult Blood UA 03/01/2023 NEGATIVE  NEGATIVE Final    Protein, UA 03/01/2023 NEGATIVE  NEGATIVE Final    Nitrite, UA 03/01/2023 POSITIVE (A)  NEGATIVE Final    Leukocytes, UA 03/01/2023 1+ (A)  NEGATIVE Final    WBC Casts, UA 03/01/2023 NONE SEEN  < OR = 5 /HPF Final    RBC Casts, UA 03/01/2023 NONE SEEN  < OR = 2 /HPF Final    Squam Epithel, UA 03/01/2023 NONE SEEN  < OR = 5 /HPF Final    Bacteria, UA 03/01/2023 MANY (A)  NONE SEEN /HPF Final    Hyaline Casts, UA 03/01/2023 NONE SEEN  NONE SEEN /LPF Final    Service Cmt: 03/01/2023    Final    Reflexive Urine Culture 03/01/2023    Final    Urine Culture, Routine 03/01/2023  (A)   Final    TSH w/reflex to FT4 03/01/2023 2.10  0.40 - 4.50 mIU/L Final   Orders Only on 12/07/2022   Component Date Value Ref Range Status    Cholesterol 12/07/2022 258 (H)  <200 mg/dL Final    HDL 12/07/2022 99  > OR = 50 mg/dL Final    Triglycerides 12/07/2022 88  <150 mg/dL Final    LDL Cholesterol 12/07/2022 140 (H)  mg/dL (calc) Final    HDL/Cholesterol Ratio 12/07/2022 2.6  <5.0 (calc) Final    Non HDL Chol. (LDL+VLDL) 12/07/2022 159 (H)  <130 mg/dL (calc) Final    Glucose 12/07/2022 86  65 - 139 mg/dL Final    BUN 12/07/2022 18  7 - 25 mg/dL Final    Creatinine 12/07/2022 0.88  0.50 - 1.05 mg/dL Final    eGFR 12/07/2022 75  > OR = 60 mL/min/1.73m2 Final    BUN/Creatinine Ratio 12/07/2022 NOT APPLICABLE  6 - 22 (calc) Final    Sodium 12/07/2022 143  135 - 146 mmol/L Final    Potassium 12/07/2022 4.7  3.5 - 5.3 mmol/L Final    Chloride 12/07/2022 102  98 - 110 mmol/L Final    CO2 12/07/2022 32  20 - 32 mmol/L Final    Calcium 12/07/2022 9.7  8.6 - 10.4 mg/dL Final    Total Protein 12/07/2022 6.8  6.1 - 8.1 g/dL Final    Albumin 12/07/2022 4.2  3.6 - 5.1 g/dL Final    Globulin, Total 12/07/2022 2.6  1.9 - 3.7 g/dL (calc) Final     Albumin/Globulin Ratio 2022 1.6  1.0 - 2.5 (calc) Final    Total Bilirubin 2022 0.4  0.2 - 1.2 mg/dL Final    Alkaline Phosphatase 2022 76  37 - 153 U/L Final    AST 2022 19  10 - 35 U/L Final    ALT 2022 16  6 - 29 U/L Final    Creatinine, Urine 2022 16 (L)  20 - 275 mg/dL Final    Microalb, Ur 2022 0.2  See Note: mg/dL Final    Microalb/Creat Ratio 2022 13  <30 mcg/mg creat Final   Office Visit on 2022   Component Date Value Ref Range Status    Hemoglobin A1C, POC 2022 5.5  % Final       Past Medical History:   Diagnosis Date    Breast cancer, stage 1, estrogen receptor negative, left () 2020    Depression     Diabetes mellitus, type 2     GERD (gastroesophageal reflux disease)     HER2-positive carcinoma of left breast 2020    Hx of breast cancer     Hx of breast cancer - Her2Nu+/ER+ - 2011 12/3/2019    Insomnia     Lymphedema     Neuropathy of both feet     S/P lumpectomy, left breast 2020     Social History     Socioeconomic History    Marital status:    Tobacco Use    Smoking status: Former     Packs/day: 0.00     Years: 0.00     Pack years: 0.00     Types: Cigarettes     Quit date: 2010     Years since quittin.2    Smokeless tobacco: Never   Substance and Sexual Activity    Alcohol use: Yes     Alcohol/week: 2.0 standard drinks     Types: 2 Glasses of wine per week     Comment: socially    Drug use: Never    Sexual activity: Yes     Partners: Male     Birth control/protection: Other-see comments, None     Comment: Hysterectomy     Social Determinants of Health     Financial Resource Strain: Low Risk     Difficulty of Paying Living Expenses: Not hard at all   Food Insecurity: Unknown    Worried About Running Out of Food in the Last Year: Never true    Ran Out of Food in the Last Year: Patient refused   Transportation Needs: No Transportation Needs    Lack of Transportation (Medical): No    Lack of Transportation  (Non-Medical): No   Physical Activity: Inactive    Days of Exercise per Week: 0 days    Minutes of Exercise per Session: 30 min   Stress: No Stress Concern Present    Feeling of Stress : Only a little   Social Connections: Unknown    Frequency of Communication with Friends and Family: Three times a week    Frequency of Social Gatherings with Friends and Family: Once a week    Active Member of Clubs or Organizations: No    Attends Club or Organization Meetings: 1 to 4 times per year    Marital Status:    Housing Stability: Low Risk     Unable to Pay for Housing in the Last Year: No    Number of Places Lived in the Last Year: 1    Unstable Housing in the Last Year: No     Past Surgical History:   Procedure Laterality Date    BICEPS TENDON REPAIR Left 2005    BREAST BIOPSY Left     BREAST LUMPECTOMY Left      SECTION      , ,     CHOLECYSTECTOMY      Elbow fx Left 2015    FRACTURE SURGERY  2016    elbow    HYSTERECTOMY      KNEE ARTHROSCOPY W/ MENISCAL REPAIR Left     Lower back ruptured disc  2010    LYMPH NODE DISSECTION      SPINE SURGERY  2009    ruptured disc     Family History   Problem Relation Age of Onset    Cancer Mother     Breast cancer Mother     Parkinsonism Father     Diabetes Father     Breast cancer Maternal Aunt     Breast cancer Paternal Aunt     Cancer Maternal Aunt     Cancer Paternal Aunt     Cancer Daughter     Heart disease Maternal Grandmother     Heart disease Paternal Grandmother        Review of patient's allergies indicates:   Allergen Reactions    Banana Hives    Codeine Nausea And Vomiting    Dilaudid  [hydromorphone (bulk)] Rash    Hydromorphone hcl Rash       Current Outpatient Medications:     ascorbic acid, vitamin C, (VITAMIN C) 500 MG tablet, Take 500 mg by mouth once daily., Disp: , Rfl:     b complex vitamins capsule, Take 1 capsule by mouth once daily., Disp: , Rfl:     calcium carbonate (OS-JULIANNE) 600 mg calcium (1,500 mg) Tab, Take 600 mg by mouth  once., Disp: , Rfl:     docusate sodium (COLACE) 100 MG capsule, Take 1 capsule (100 mg total) by mouth once daily., Disp: 90 capsule, Rfl: 0    furosemide (LASIX) 20 MG tablet, Take 1 tablet (20 mg total) by mouth daily as needed., Disp: 90 tablet, Rfl: 1    glucosamine-chondroitin 250-200 mg Tab, Take by mouth., Disp: , Rfl:     LYRICA 200 mg Cap, Take 1 capsule (200 mg total) by mouth 3 (three) times daily., Disp: 270 capsule, Rfl: 1    magnesium 250 mg Tab, Take 250 mg by mouth once daily., Disp: , Rfl:     methocarbamoL (ROBAXIN) 500 MG Tab, Take 500 mg by mouth 4 (four) times daily. TAKE 2 TABLET BY MOUTH THREE TIMES DAILY FOR 5 DAYS, Disp: , Rfl:     pantoprazole (PROTONIX) 40 MG tablet, Take 1 tablet (40 mg total) by mouth once daily., Disp: 90 tablet, Rfl: 3    terbinafine HCL (LAMISIL) 250 mg tablet, Take 250 mg by mouth once daily. TAKE 1 TABLET BY MOUTH EVERY DAY, Disp: , Rfl:     venlafaxine (EFFEXOR-XR) 150 MG Cp24, Take 1 capsule (150 mg total) by mouth once daily., Disp: 90 capsule, Rfl: 1    atomoxetine (STRATTERA) 40 MG capsule, Take 1 capsule (40 mg total) by mouth once daily., Disp: 30 capsule, Rfl: 0    HYDROcodone-acetaminophen (NORCO) 5-325 mg per tablet, Take 1 tablet by mouth every 4 to 6 hours as needed., Disp: , Rfl:     HYDROcodone-acetaminophen (NORCO) 5-325 mg per tablet, Take 1 tablet by mouth every 6 (six) hours as needed for Pain. (Patient not taking: Reported on 3/9/2023), Disp: 30 tablet, Rfl: 0    multivitamin capsule, Take 1 capsule by mouth once daily., Disp: , Rfl:     mupirocin (BACTROBAN) 2 % ointment, , Disp: , Rfl:     naproxen (NAPROSYN) 500 MG tablet, Take 1 tablet (500 mg total) by mouth 2 (two) times daily., Disp: 90 tablet, Rfl: 1    traZODone (DESYREL) 50 MG tablet, Take 2 tablets (100 mg total) by mouth every evening., Disp: 180 tablet, Rfl: 0    Review of Systems   Constitutional:  Negative for chills, fever and unexpected weight change.   HENT:  Negative for ear  "pain, rhinorrhea and sore throat.    Eyes:  Negative for pain and visual disturbance.   Respiratory:  Negative for cough and shortness of breath.    Cardiovascular:  Negative for chest pain, palpitations and leg swelling.   Gastrointestinal:  Negative for abdominal pain, diarrhea, nausea and vomiting.   Genitourinary:  Negative for difficulty urinating, hematuria and vaginal bleeding.   Musculoskeletal:  Negative for arthralgias.        Right shoulder pain   Skin:  Negative for rash.   Neurological:  Negative for dizziness, weakness and headaches.   Psychiatric/Behavioral:  Negative for agitation and sleep disturbance. The patient is not nervous/anxious.         Objective:      Vitals:    03/09/23 1031   BP: 120/72   Pulse: (!) 55   SpO2: 97%   Weight: 110.2 kg (243 lb)   Height: 5' 10" (1.778 m)     Physical Exam  Vitals and nursing note reviewed.   Constitutional:       General: She is not in acute distress.     Appearance: Normal appearance. She is well-developed. She is not ill-appearing.   HENT:      Right Ear: External ear normal.      Left Ear: External ear normal.   Eyes:      Conjunctiva/sclera: Conjunctivae normal.      Pupils: Pupils are equal, round, and reactive to light.   Neck:      Vascular: No JVD.   Cardiovascular:      Rate and Rhythm: Normal rate and regular rhythm.      Heart sounds: No murmur heard.  Pulmonary:      Effort: Pulmonary effort is normal.      Breath sounds: Normal breath sounds.   Abdominal:      General: Bowel sounds are normal.      Palpations: Abdomen is soft.   Musculoskeletal:         General: No deformity.      Right shoulder: Decreased range of motion.      Cervical back: Normal range of motion and neck supple.   Lymphadenopathy:      Cervical: No cervical adenopathy.   Skin:     General: Skin is warm and dry.      Findings: No rash.   Neurological:      Mental Status: She is alert and oriented to person, place, and time.      Gait: Gait normal.   Psychiatric:         " Speech: Speech normal.         Behavior: Behavior normal.         Assessment:       1. Type 2 diabetes mellitus without complication, without long-term current use of insulin    2. Other screening mammogram    3. Insomnia, unspecified type    4. Physical exam    5. Breast cancer, stage 1, estrogen receptor negative, left    6. Type 2 diabetes mellitus with other specified complication, without long-term current use of insulin    7. Gastroesophageal reflux disease, unspecified whether esophagitis present    8. Dislocation of right shoulder joint, subsequent encounter    9. Hx of breast cancer - Her2Nu+/ER+ - 2011    10. Lymphedema    11. Hyperlipidemia, unspecified hyperlipidemia type    12. Primary osteoarthritis of right knee    13. Decreased ROM of right shoulder           Plan:       Type 2 diabetes mellitus without complication, without long-term current use of insulin  -      DIABETES FOOT EXAM    Other screening mammogram    Insomnia, unspecified type  -     traZODone (DESYREL) 50 MG tablet; Take 2 tablets (100 mg total) by mouth every evening.  Dispense: 180 tablet; Refill: 0    Physical exam  -     traZODone (DESYREL) 50 MG tablet; Take 2 tablets (100 mg total) by mouth every evening.  Dispense: 180 tablet; Refill: 0  -     naproxen (NAPROSYN) 500 MG tablet; Take 1 tablet (500 mg total) by mouth 2 (two) times daily.  Dispense: 90 tablet; Refill: 1    Breast cancer, stage 1, estrogen receptor negative, left    Type 2 diabetes mellitus with other specified complication, without long-term current use of insulin    Gastroesophageal reflux disease, unspecified whether esophagitis present    Dislocation of right shoulder joint, subsequent encounter    Hx of breast cancer - Her2Nu+/ER+ - 2011    Lymphedema    Hyperlipidemia, unspecified hyperlipidemia type    Primary osteoarthritis of right knee    Decreased ROM of right shoulder      Follow up in about 3 months (around 6/9/2023), or if symptoms worsen or fail to  improve, for medication management.        3/13/2023 Shaunna Gordon

## 2023-03-14 ENCOUNTER — CLINICAL SUPPORT (OUTPATIENT)
Dept: REHABILITATION | Facility: HOSPITAL | Age: 63
End: 2023-03-14
Payer: MEDICARE

## 2023-03-14 DIAGNOSIS — I89.0 LYMPHEDEMA OF LEFT ARM: Primary | ICD-10-CM

## 2023-03-14 LAB
ALBUMIN SERPL-MCNC: 4 G/DL (ref 3.6–5.1)
ALBUMIN/GLOB SERPL: 1.6 (CALC) (ref 1–2.5)
ALP SERPL-CCNC: 69 U/L (ref 37–153)
ALT SERPL-CCNC: 11 U/L (ref 6–29)
AST SERPL-CCNC: 15 U/L (ref 10–35)
BASOPHILS # BLD AUTO: 69 CELLS/UL (ref 0–200)
BASOPHILS NFR BLD AUTO: 1.4 %
BILIRUB SERPL-MCNC: 0.4 MG/DL (ref 0.2–1.2)
BUN SERPL-MCNC: 16 MG/DL (ref 7–25)
BUN/CREAT SERPL: NORMAL (CALC) (ref 6–22)
CALCIUM SERPL-MCNC: 9.5 MG/DL (ref 8.6–10.4)
CANCER AG125 SERPL-ACNC: 12 U/ML
CANCER AG15-3 SERPL-ACNC: 13 U/ML
CANCER AG27-29 SERPL-ACNC: 20 U/ML
CHLORIDE SERPL-SCNC: 105 MMOL/L (ref 98–110)
CO2 SERPL-SCNC: 29 MMOL/L (ref 20–32)
CREAT SERPL-MCNC: 0.75 MG/DL (ref 0.5–1.05)
EGFR: 91 ML/MIN/1.73M2
EOSINOPHIL # BLD AUTO: 343 CELLS/UL (ref 15–500)
EOSINOPHIL NFR BLD AUTO: 7 %
ERYTHROCYTE [DISTWIDTH] IN BLOOD BY AUTOMATED COUNT: 14.4 % (ref 11–15)
GLOBULIN SER CALC-MCNC: 2.5 G/DL (CALC) (ref 1.9–3.7)
GLUCOSE SERPL-MCNC: 89 MG/DL (ref 65–139)
HCT VFR BLD AUTO: 36.6 % (ref 35–45)
HGB BLD-MCNC: 11.6 G/DL (ref 11.7–15.5)
LYMPHOCYTES # BLD AUTO: 1519 CELLS/UL (ref 850–3900)
LYMPHOCYTES NFR BLD AUTO: 31 %
MCH RBC QN AUTO: 25.8 PG (ref 27–33)
MCHC RBC AUTO-ENTMCNC: 31.7 G/DL (ref 32–36)
MCV RBC AUTO: 81.3 FL (ref 80–100)
MONOCYTES # BLD AUTO: 461 CELLS/UL (ref 200–950)
MONOCYTES NFR BLD AUTO: 9.4 %
NEUTROPHILS # BLD AUTO: 2509 CELLS/UL (ref 1500–7800)
NEUTROPHILS NFR BLD AUTO: 51.2 %
PLATELET # BLD AUTO: 321 THOUSAND/UL (ref 140–400)
PMV BLD REES-ECKER: 11.1 FL (ref 7.5–12.5)
POTASSIUM SERPL-SCNC: 4.3 MMOL/L (ref 3.5–5.3)
PROT SERPL-MCNC: 6.5 G/DL (ref 6.1–8.1)
RBC # BLD AUTO: 4.5 MILLION/UL (ref 3.8–5.1)
SODIUM SERPL-SCNC: 145 MMOL/L (ref 135–146)
WBC # BLD AUTO: 4.9 THOUSAND/UL (ref 3.8–10.8)

## 2023-03-14 PROCEDURE — 97140 MANUAL THERAPY 1/> REGIONS: CPT | Mod: PN

## 2023-03-14 NOTE — PROGRESS NOTES
"The wraps are still on."    [facility]  Occupational Therapy Treatment Note    Date: 3/13/2023  Name: Lolly Ramírez  Clinic Number: [id number]    Therapy Diagnosis:   Encounter Diagnosis   Name Primary?    Lymphedema of left arm Yes     Physician: Shaunna Gordon NP     Physician Orders: evaluate and treat  Medical Diagnosis: breast cancer  Surgical Procedure and Date: 2011 mastectomy, radiation and chemotherapy,   Evaluation Date: 2/28/2023  Insurance Authorization Period Expiration: 2-9-2024  Plan of Care Certification Period: 5-  Progress Note Due: same   Date of Return to MD: to be scheduled  Visit # / Visits authorized: 2/40  FOTO: intake 63%     Precautions:  Fall and no needle stick or blood pressure cuff on left upper extremity  Time In: 1300  Time Out: 1400  Total Billable Time: 60 minutes    SUBJECTIVE     Pt reports: " I am ready to be wrapped"    Response to previous treatment: this is first visit  Functional change: no    Pain: 0/10  Location: no pain    Objective Measures updated at progress report unless specified.    Treatment     Lolly received the treatments listed below:     Manual therapy techniques: Manual Lymphatic Drainage were applied for 60 minutes, including:Arrived for first treatment. Supine on mat with upper body elevated. Manual lymph drainage for left upper quadrant initiated and completed:     MANUAL LYMPHATIC DRAINAGE:  left upper quadrant  Supine with lower extremities elevated:  Short Neck  Axillary lymph nodes bilateral  Activate and work over the AXILLARY INGUINAL-ANASTOMOSES bilateral  Deep abdominal techniques  Rework both  AXILLARY INGUINAL ANASTOMOSES  Upper arm left   Rework:Activate and work over the AXILLARY INGUINAL-ANASTOMOSES bilateral     Left arm was compression bandaged over cotton padding and secured with tubigrip. Lolly instructed to remove if painful.  Patient Education and Home Exercises      Education provided:   1. Educated on " definition of lymphedema.  2. Explained the Complete Decongestive Therapy protocol in depth  3. Educated on Phase 1 and 2 of protocol.  4. Reviewed treatment frequency and likely duration of weeks  5.Contraindications for treatment.  6. Plan of care and goals.  7. Educated on home management protocols.     Written Home Exercises Provided:  no Lolly is following a physical therapy exercise profgram. .    Assessment     Pt would continue to benefit from skilled OT. Yes.     Lolly Ramírez is a 62 y.o. female referred to outpatient occupational therapy and presents with a medical diagnosis of lymphedema secondary to breast cancer. Lymphedema, left untreated increases risk of infection, gait deviation causing ortho problems and poor body image. Patient presents with the following therapy deficits: lymphedema of left limbs and demonstrates limitations as described in the chart below. Following medical record review it is determined that pt will benefit from complete decongestive therapy services for the treatment and management of this chronic condition. The following goals were discussed with the patient and patient is in agreement with them as to be addressed in the treatment plan.        Lolly is progressing well towards her goals and there are no updates to goals at this time. Pt prognosis is Good.     Pt will continue to benefit from skilled outpatient occupational therapy to address the deficits listed in the problem list on initial evaluation provide pt/family education and to maximize pt's level of independence in the home and community environment.     Pt's spiritual, cultural and educational needs considered and pt agreeable to plan of care and goals.    Anticipated barriers to occupational therapy: none    Goals:   Short Term Goals for 6 weeks: (phase 1 of protocol)  Complete decongestion left upper quadrant- ongoing  Patient will be educated on lymphedema precautions and signs of infection. - Ongoing  2/28/2023   Patient will perform deep abdominal breathing TID-  ongoing  Patient will tolerate daily activities with multilayered bandaging.- initiated and ongoing   Appropriate compression garments to be ordered/delivered-  initiate in future   Spouse will be instructed in application of compression bandages will initiate  Long Term Goals for 10 weeks: (Phase 2 of goals)  Patient will be independent with home management of this chronic condition.initiate at first visit  Patient to ricco/doff compression garment.   Patient will demonstrate compliance with all home management recommendations.  Patient will maintain reduction at monthly follow up appointments for 3 months             PLAN     Continue plan of care 3 times a week to address goals in plan of care.  Updates/Grading for next session: continue phase 1 of Complete Decongestive Therapy protocol      SUNIL Eddy , SANDRA/MORALES

## 2023-03-15 ENCOUNTER — HOSPITAL ENCOUNTER (OUTPATIENT)
Dept: RADIOLOGY | Facility: HOSPITAL | Age: 63
Discharge: HOME | End: 2023-03-15
Attending: ORTHOPAEDIC SURGERY
Payer: MEDICARE

## 2023-03-15 ENCOUNTER — OFFICE VISIT (OUTPATIENT)
Dept: ORTHOPEDICS | Facility: CLINIC | Age: 63
End: 2023-03-15
Payer: MEDICARE

## 2023-03-15 DIAGNOSIS — S43.004D DISLOCATION OF RIGHT SHOULDER JOINT, SUBSEQUENT ENCOUNTER: Primary | ICD-10-CM

## 2023-03-15 DIAGNOSIS — S43.004D DISLOCATION OF RIGHT SHOULDER JOINT, SUBSEQUENT ENCOUNTER: ICD-10-CM

## 2023-03-15 PROCEDURE — 73030 X-RAY EXAM OF SHOULDER: CPT | Mod: 26,RT,, | Performed by: RADIOLOGY

## 2023-03-15 PROCEDURE — 73030 XR SHOULDER COMPLETE 2 OR MORE VIEWS RIGHT: ICD-10-PCS | Mod: 26,RT,, | Performed by: RADIOLOGY

## 2023-03-15 PROCEDURE — 99213 OFFICE O/P EST LOW 20 MIN: CPT | Mod: S$PBB,,, | Performed by: ORTHOPAEDIC SURGERY

## 2023-03-15 PROCEDURE — 73030 X-RAY EXAM OF SHOULDER: CPT | Mod: TC,PN,RT

## 2023-03-15 PROCEDURE — 99213 OFFICE O/P EST LOW 20 MIN: CPT | Mod: PBBFAC,PN | Performed by: ORTHOPAEDIC SURGERY

## 2023-03-15 PROCEDURE — 99999 PR PBB SHADOW E&M-EST. PATIENT-LVL III: ICD-10-PCS | Mod: PBBFAC,,, | Performed by: ORTHOPAEDIC SURGERY

## 2023-03-15 PROCEDURE — 99999 PR PBB SHADOW E&M-EST. PATIENT-LVL III: CPT | Mod: PBBFAC,,, | Performed by: ORTHOPAEDIC SURGERY

## 2023-03-15 PROCEDURE — 99213 PR OFFICE/OUTPT VISIT, EST, LEVL III, 20-29 MIN: ICD-10-PCS | Mod: S$PBB,,, | Performed by: ORTHOPAEDIC SURGERY

## 2023-03-15 NOTE — PROGRESS NOTES
3/15/2023    Past Medical History:   Diagnosis Date    Breast cancer, stage 1, estrogen receptor negative, left () 2020    Depression     Diabetes mellitus, type 2     GERD (gastroesophageal reflux disease)     HER2-positive carcinoma of left breast 2020    Hx of breast cancer     Hx of breast cancer - Her2Nu+/ER+ - 2011 12/3/2019    Insomnia     Lymphedema     Neuropathy of both feet     S/P lumpectomy, left breast 2020       Past Surgical History:   Procedure Laterality Date    BICEPS TENDON REPAIR Left 2005    BREAST BIOPSY Left     BREAST LUMPECTOMY Left      SECTION      , ,     CHOLECYSTECTOMY      Elbow fx Left 2015    FRACTURE SURGERY  2016    elbow    HYSTERECTOMY      KNEE ARTHROSCOPY W/ MENISCAL REPAIR Left     Lower back ruptured disc  2010    LYMPH NODE DISSECTION      SPINE SURGERY  2009    ruptured disc       Current Outpatient Medications   Medication Sig    ascorbic acid, vitamin C, (VITAMIN C) 500 MG tablet Take 500 mg by mouth once daily.    b complex vitamins capsule Take 1 capsule by mouth once daily.    calcium carbonate (OS-JULIANNE) 600 mg calcium (1,500 mg) Tab Take 600 mg by mouth once.    docusate sodium (COLACE) 100 MG capsule Take 1 capsule (100 mg total) by mouth once daily.    furosemide (LASIX) 20 MG tablet Take 1 tablet (20 mg total) by mouth daily as needed.    glucosamine-chondroitin 250-200 mg Tab Take by mouth.    HYDROcodone-acetaminophen (NORCO) 5-325 mg per tablet Take 1 tablet by mouth every 4 to 6 hours as needed.    LYRICA 200 mg Cap Take 1 capsule (200 mg total) by mouth 3 (three) times daily.    magnesium 250 mg Tab Take 250 mg by mouth once daily.    methocarbamoL (ROBAXIN) 500 MG Tab Take 500 mg by mouth 4 (four) times daily. TAKE 2 TABLET BY MOUTH THREE TIMES DAILY FOR 5 DAYS    multivitamin capsule Take 1 capsule by mouth once daily.    mupirocin (BACTROBAN) 2 % ointment     naproxen (NAPROSYN) 500 MG tablet Take 1 tablet (500  mg total) by mouth 2 (two) times daily.    pantoprazole (PROTONIX) 40 MG tablet Take 1 tablet (40 mg total) by mouth once daily.    terbinafine HCL (LAMISIL) 250 mg tablet Take 250 mg by mouth once daily. TAKE 1 TABLET BY MOUTH EVERY DAY    traZODone (DESYREL) 50 MG tablet Take 2 tablets (100 mg total) by mouth every evening.    venlafaxine (EFFEXOR-XR) 150 MG Cp24 Take 1 capsule (150 mg total) by mouth once daily.    atomoxetine (STRATTERA) 40 MG capsule Take 1 capsule (40 mg total) by mouth once daily.    HYDROcodone-acetaminophen (NORCO) 5-325 mg per tablet Take 1 tablet by mouth every 6 (six) hours as needed for Pain. (Patient not taking: Reported on 3/15/2023)     No current facility-administered medications for this visit.       Review of patient's allergies indicates:   Allergen Reactions    Banana Hives    Codeine Nausea And Vomiting    Dilaudid  [hydromorphone (bulk)] Rash    Hydromorphone hcl Rash       Family History   Problem Relation Age of Onset    Cancer Mother     Breast cancer Mother     Parkinsonism Father     Diabetes Father     Breast cancer Maternal Aunt     Breast cancer Paternal Aunt     Cancer Maternal Aunt     Cancer Paternal Aunt     Cancer Daughter     Heart disease Maternal Grandmother     Heart disease Paternal Grandmother        Social History     Socioeconomic History    Marital status:    Tobacco Use    Smoking status: Former     Packs/day: 0.00     Years: 0.00     Pack years: 0.00     Types: Cigarettes     Quit date: 2010     Years since quittin.2    Smokeless tobacco: Never   Substance and Sexual Activity    Alcohol use: Yes     Alcohol/week: 2.0 standard drinks     Types: 2 Glasses of wine per week     Comment: socially    Drug use: Never    Sexual activity: Yes     Partners: Male     Birth control/protection: Other-see comments, None     Comment: Hysterectomy     Social Determinants of Health     Financial Resource Strain: Low Risk     Difficulty of Paying Living  Expenses: Not hard at all   Food Insecurity: Unknown    Worried About Running Out of Food in the Last Year: Never true    Ran Out of Food in the Last Year: Patient refused   Transportation Needs: No Transportation Needs    Lack of Transportation (Medical): No    Lack of Transportation (Non-Medical): No   Physical Activity: Inactive    Days of Exercise per Week: 0 days    Minutes of Exercise per Session: 30 min   Stress: No Stress Concern Present    Feeling of Stress : Only a little   Social Connections: Unknown    Frequency of Communication with Friends and Family: Three times a week    Frequency of Social Gatherings with Friends and Family: Once a week    Active Member of Clubs or Organizations: No    Attends Club or Organization Meetings: 1 to 4 times per year    Marital Status:    Housing Stability: Low Risk     Unable to Pay for Housing in the Last Year: No    Number of Places Lived in the Last Year: 1    Unstable Housing in the Last Year: No       Chief Complaint:   Chief Complaint   Patient presents with    Right Shoulder - Pain, Follow-up     LOV: 03/01/2023 - Seen for shoulder dislocation that was reduced in the ED on 02/24/2023. Will keep her shoulder down in the sling and swath for another 2 weeks and then will start her on physical therapy. If no better will get NCS         History of present illness:    This is a 62 y.o. year old female who complains of patient is returning today for follow-up of her right shoulder she has sustained a shoulder anterior dislocation of her right shoulder it was reduced in the emergency room on 02/24/2023 we will keep her arm in a sling now for about 2 weeks and we are going to start her on some physical therapy patient sustained what I thought may have been a neuropraxic nerve injury with some biceps weakness in the right arm we will continue to watch and monitor there at this time    Review of Systems:    Constitution: Denies chills, fever, and sweats.  HENT:  Denies headaches or blurry vision.  Cardiovascular: Denies chest pain or irregular heart beat.  Respiratory: Denies cough or shortness of breath.  Gastrointestinal: Denies abdominal pain, nausea, or vomiting.  Musculoskeletal:  Denies muscle cramps.  Neurological: Denies dizziness or focal weakness.  Psychiatric/Behavioral: Normal mental status.  Hematologic/Lymphatic: Denies bleeding problem or easy bruising/bleeding.  Skin: Denies rash or suspicious lesions.    Examination:    Vital Signs:  There were no vitals filed for this visit.    There is no height or weight on file to calculate BMI.    This a well-developed, well nourished patient in no acute distress.    Alert and oriented x 3 and cooperative to examination.       Physical Exam:  Right shoulder-patient is presently in a sling and swath she appears comfortable in the shoulder appears well reduced    Imaging:  X-ray of the right shoulder today shows the shoulder be well reduced she does have a Hill-Sachs lesion in the humeral head the held is well contained in the glenoid       Assessment: Dislocation of right shoulder joint, subsequent encounter        Plan:  Patient has had 2 traumatic shoulder dislocations patient does have a Hill-Sachs lesion on the humeral head patient has had no other history of shoulder dislocations besides these 2 traumatic dislocations we will send her to physical therapy for strengthening exercises especially internal rotators and active assisted range of motion of the right shoulder and will see her back in 3 weeks      DISCLAIMER: This note may have been dictated using voice recognition software and may contain grammatical errors.     NOTE: Consult report sent to referring provider via [employer].

## 2023-03-15 NOTE — PROGRESS NOTES
[facility] Hematology/Oncology  PROGRESS NOTE -   Follow-up Visit      Subjective:       Patient ID:   NAME: Lolly Ramírez : 1960     62 y.o. female    Referring Doc: Shaunna Gordon NP  Other Physicians: Steven Tobar (Neuro); Macario Rodas           Chief Complaint: hx/of left breast ca    History of Present Illness:     Patient returns today for a regularly scheduled follow-up visit.  The patient is here today to go over the results of the recently ordered labs, tests and studies. She is here with her  today.     She is doing ok with no new issues. Some dry skin with scratch induced marks on lower legs. She has left arm wrapped for lymphedema.     She had fall about 3 weeks ago and dislocated her shoulder.       She saw Shaunna Gordon on 3/13/2023 and they were ordering bone density and mammogram      No CP, HA's or N/V. Breathing ok.      She previously saw Dr Heart and had esophageal dilation which is now better    She had prior back nerve ablation  with Dr Villa with pain management and has not had any further debilitating pain.     Discussed covid19 precautions                   ROS:   GEN: normal without any fever, night sweats or weight loss  HEENT: normal with no HA's, sore throat, stiff neck, changes in vision  CV: normal with no CP, SOB, PND, LAKE or orthopnea  PULM: normal with no SOB, cough, hemoptysis, sputum or pleuritic pain  GI: normal with no abdominal pain, nausea, vomiting, constipation, diarrhea, melanotic stools, BRBPR, or hematemesis  : normal with no hematuria, dysuria  BREAST: normal with no mass, discharge, pain  SKIN: normal with no rash, erythema, bruising, or swelling; chronic dry skin and scratch marks on lower legs    Pain Scale: 6-8 on feet due to tendonitis    Allergies:  Review of patient's allergies indicates:   Allergen Reactions    Banana Hives    Codeine Nausea And Vomiting    Dilaudid  [hydromorphone (bulk)] Rash    Hydromorphone hcl Rash        Medications:    Current Outpatient Medications:     ascorbic acid, vitamin C, (VITAMIN C) 500 MG tablet, Take 500 mg by mouth once daily., Disp: , Rfl:     b complex vitamins capsule, Take 1 capsule by mouth once daily., Disp: , Rfl:     calcium carbonate (OS-JULIANNE) 600 mg calcium (1,500 mg) Tab, Take 600 mg by mouth once., Disp: , Rfl:     docusate sodium (COLACE) 100 MG capsule, Take 1 capsule (100 mg total) by mouth once daily., Disp: 90 capsule, Rfl: 0    furosemide (LASIX) 20 MG tablet, Take 1 tablet (20 mg total) by mouth daily as needed., Disp: 90 tablet, Rfl: 1    glucosamine-chondroitin 250-200 mg Tab, Take by mouth., Disp: , Rfl:     HYDROcodone-acetaminophen (NORCO) 5-325 mg per tablet, Take 1 tablet by mouth every 4 to 6 hours as needed., Disp: , Rfl:     HYDROcodone-acetaminophen (NORCO) 5-325 mg per tablet, Take 1 tablet by mouth every 6 (six) hours as needed for Pain., Disp: 30 tablet, Rfl: 0    LYRICA 200 mg Cap, Take 1 capsule (200 mg total) by mouth 3 (three) times daily., Disp: 270 capsule, Rfl: 1    magnesium 250 mg Tab, Take 250 mg by mouth once daily., Disp: , Rfl:     methocarbamoL (ROBAXIN) 500 MG Tab, Take 500 mg by mouth 4 (four) times daily. TAKE 2 TABLET BY MOUTH THREE TIMES DAILY FOR 5 DAYS, Disp: , Rfl:     multivitamin capsule, Take 1 capsule by mouth once daily., Disp: , Rfl:     mupirocin (BACTROBAN) 2 % ointment, , Disp: , Rfl:     naproxen (NAPROSYN) 500 MG tablet, Take 1 tablet (500 mg total) by mouth 2 (two) times daily., Disp: 90 tablet, Rfl: 1    pantoprazole (PROTONIX) 40 MG tablet, Take 1 tablet (40 mg total) by mouth once daily., Disp: 90 tablet, Rfl: 3    terbinafine HCL (LAMISIL) 250 mg tablet, Take 250 mg by mouth once daily. TAKE 1 TABLET BY MOUTH EVERY DAY, Disp: , Rfl:     traZODone (DESYREL) 50 MG tablet, Take 2 tablets (100 mg total) by mouth every evening., Disp: 180 tablet, Rfl: 0    venlafaxine (EFFEXOR-XR) 150 MG Cp24, Take 1 capsule (150 mg total) by mouth  "once daily., Disp: 90 capsule, Rfl: 1    atomoxetine (STRATTERA) 40 MG capsule, Take 1 capsule (40 mg total) by mouth once daily., Disp: 30 capsule, Rfl: 0    PMHx/PSHx Updates:  See patient's last visit with me on 3/16/2022  See H&P on 2/18/2020        Pathology:   Cancer Staging   No matching staging information was found for the patient.          Objective:     Vitals:  Blood pressure (!) 144/69, pulse 71, temperature 97.7 °F (36.5 °C), resp. rate 18, height 5' 10" (1.778 m), weight 112.6 kg (248 lb 4.8 oz).    Physical Examination:   GEN: no apparent distress, comfortable; AAOx3; overweight  HEAD: atraumatic and normocephalic  EYES: no pallor, no icterus, PERRLA  ENT: OMM, no pharyngeal erythema, external ears WNL; no nasal discharge; no thrush  NECK: no masses, thyroid normal, trachea midline, no LAD/LN's, supple  CV: RRR with no murmur; normal pulse; normal S1 and S2; no pedal edema  CHEST: Normal respiratory effort; CTAB; normal breath sounds; no wheeze or crackles  ABDOM: nontender and nondistended; soft; normal bowel sounds; no rebound/guarding  MUSC/Skeletal: ROM normal; no crepitus; joints normal; no deformities or arthropathy  EXTREM: no clubbing, cyanosis, inflammation or swelling  SKIN: excoriations on legs primarily; psoriasis plaques on feet  : no red  NEURO: grossly intact; motor/sensory WNL; AAOx3; no tremors  PSYCH: normal mood, affect and behavior  LYMPH: normal cervical, supraclavicular, axillary and groin LN's  Breast: lumpectomy on left; no changes        Labs:     Lab Results   Component Value Date    WBC 4.9 03/13/2023    HGB 11.6 (L) 03/13/2023    HCT 36.6 03/13/2023    MCV 81.3 03/13/2023     03/13/2023       CMP  Sodium   Date Value Ref Range Status   03/13/2023 145 135 - 146 mmol/L Final   04/26/2019 141 134 - 144 mmol/L      Potassium   Date Value Ref Range Status   03/13/2023 4.3 3.5 - 5.3 mmol/L Final     Chloride   Date Value Ref Range Status   03/13/2023 105 98 - 110 " mmol/L Final   04/26/2019 103 98 - 110 mmol/L      CO2   Date Value Ref Range Status   03/13/2023 29 20 - 32 mmol/L Final     Glucose   Date Value Ref Range Status   03/13/2023 89 65 - 139 mg/dL Final     Comment:               Non-fasting reference interval        04/26/2019 81 70 - 99 mg/dL      BUN   Date Value Ref Range Status   03/13/2023 16 7 - 25 mg/dL Final     Creatinine   Date Value Ref Range Status   03/13/2023 0.75 0.50 - 1.05 mg/dL Final   04/26/2019 0.71 0.60 - 1.40 mg/dL      Calcium   Date Value Ref Range Status   03/13/2023 9.5 8.6 - 10.4 mg/dL Final     Total Protein   Date Value Ref Range Status   03/13/2023 6.5 6.1 - 8.1 g/dL Final     Albumin   Date Value Ref Range Status   03/13/2023 4.0 3.6 - 5.1 g/dL Final   04/26/2019 3.9 3.1 - 4.7 g/dL      Total Bilirubin   Date Value Ref Range Status   03/13/2023 0.4 0.2 - 1.2 mg/dL Final     Alkaline Phosphatase   Date Value Ref Range Status   06/10/2020 62 37 - 153 U/L Final     AST   Date Value Ref Range Status   03/13/2023 15 10 - 35 U/L Final     ALT   Date Value Ref Range Status   03/13/2023 11 6 - 29 U/L Final     eGFR if    Date Value Ref Range Status   03/15/2022 93 > OR = 60 mL/min/1.73m2 Final     eGFR if non    Date Value Ref Range Status   03/15/2022 80 > OR = 60 mL/min/1.73m2 Final   \  CA 15-3 <32 U/mL 13      CA 27-29 <38 U/mL 20       <35 U/mL 12      Radiology/Diagnostic Studies:    Mammo 4/18/2022:    Impression:     1. No mammographic evidence of malignancy and no detrimental change.  2. Yearly mammography is recommended.  BI-RADS CATEGORY 2: BENIGN FINDING.    mammo 3/24/2020:    Impression:     1. No mammographic evidence of malignancy.  2. Yearly mammography is recommended.  BI-RADS CATEGORY 2: BENIGN FINDING.    Ct Chest 5/1/2019:     IMPRESSION:  No acute pulmonary process    The abnormality on the chest radiograph most likely secondary to degenerative  changes of the thoracic spine    Small  hiatal hernia        PET  12/17/2015 on chart    I have reviewed all available lab results and radiology reports.    Assessment/Plan:   (1) 62 y.o. female  with diagnosis of history of left breast cancer who has been referred by Shaunna Gordon NP for evaluation by medical hematology/oncology.   - She was diagnosed with Her2Nu positive breast cancer in 2011 and was treated with chemotherapy + herceptin, radiation and tamoxifen.   - T1c N0  - 1.4 cm  - Her2Nu +3; ER neg' [facility] + but at only 3%  - she developed a severe rash to the tamoxifen in the past and it had to be discontinued  - she had prior lumpectomy    3/11/2021:  - she is due for mammogram this month  - last mammogram 3/24/2020    3/16/2022:  - she has mammo scheduled for next month  - basic labs are adequate  - awaiting tumor markers which are still in process    3/16/2023:  - due for mammo in March 2023  - labs adequate  - tumor markers are WNL  - bone density being ordered by PCP        (2) DM II     (3) GERD     (4) OA and bicep tendonitis     (5) Prior microcytic anemia issues in 2017 with GIB - s/p scope with Dr Heart in past     (6) Neuropathy - followed by Dr Tobar     (7) Former smoker (quit 2010)     (8) Chronic skin issues on legs - on creams - seen by derm in past     (9) Gastric sleeve     (10) Chronic back issues         VISIT DIAGNOSES:      Breast cancer, stage 1, estrogen receptor negative, left (2011)    HER2-positive carcinoma of left breast    Hx of breast cancer - Her2Nu+/ER+ - 2011    S/P lumpectomy, left breast    Lymphedema of left arm          PLAN:  1. Check CBC, CMP. Breast cancer markers, iron panel every 12 months  2. mammogram scheduled for next month - needs yearly  3. F/u with PCP, GI, card etc  4. Proceed with bone density per PCP directives     RTC in  12 months   Fax note to Shaunna Gordon NP; Edgar Heart; Katie Rodas       Discussion:     COVID-19 Discussion:    I had long discussion with patient and any  applicable family about the COVID-19 coronavirus epidemic and the recommended precautions with regard to cancer and/or hematology patients. I have re-iterated the CDC recommendations for adequate hand washing, use of hand -like products, and coughing into elbow, etc. In addition, especially for our patients who are on chemotherapy and/or our otherwise immunocompromised patients, I have recommended avoidance of crowds, including movie theaters, restaurants, churches, etc. I have recommended avoidance of any sick or symptomatic family members and/or friends. I have also recommended avoidance of any raw and unwashed food products, and general avoidance of food items that have not been prepared by themselves. The patient has been asked to call us immediately with any symptom developments, issues, questions or other general concerns.       I spent over 25 mins of time with the patient. Reviewed results of the recently ordered labs, tests and studies; made directives with regards to the results. Over half of this time was spent couseling and coordinating care.    I have explained all of the above in detail and the patient understands all of the current recommendation(s). I have answered all of their questions to the best of my ability and to their complete satisfaction.   The patient is to continue with the current management plan.            Electronically signed by Tray Simpson MD                 Answers submitted by the patient for this visit:  Review of Systems Questionnaire (Submitted on 3/16/2023)  appetite change : No  unexpected weight change: Yes  mouth sores: No  visual disturbance: Yes  cough: No  shortness of breath: Yes  chest pain: No  abdominal pain: No  diarrhea: No  frequency: No  back pain: Yes  rash: No  headaches: No  adenopathy: No  nervous/ anxious: Yes

## 2023-03-16 ENCOUNTER — CLINICAL SUPPORT (OUTPATIENT)
Dept: REHABILITATION | Facility: HOSPITAL | Age: 63
End: 2023-03-16
Payer: MEDICARE

## 2023-03-16 ENCOUNTER — OFFICE VISIT (OUTPATIENT)
Dept: HEMATOLOGY/ONCOLOGY | Facility: CLINIC | Age: 63
End: 2023-03-16
Payer: MEDICARE

## 2023-03-16 VITALS
BODY MASS INDEX: 35.55 KG/M2 | DIASTOLIC BLOOD PRESSURE: 69 MMHG | WEIGHT: 248.31 LBS | SYSTOLIC BLOOD PRESSURE: 144 MMHG | HEART RATE: 71 BPM | HEIGHT: 70 IN | RESPIRATION RATE: 18 BRPM | TEMPERATURE: 98 F

## 2023-03-16 DIAGNOSIS — I89.0 LYMPHEDEMA OF LEFT ARM: Primary | ICD-10-CM

## 2023-03-16 DIAGNOSIS — Z17.1 BREAST CANCER, STAGE 1, ESTROGEN RECEPTOR NEGATIVE, LEFT: Primary | ICD-10-CM

## 2023-03-16 DIAGNOSIS — C50.912 HER2-POSITIVE CARCINOMA OF LEFT BREAST: ICD-10-CM

## 2023-03-16 DIAGNOSIS — C50.912 BREAST CANCER, STAGE 1, ESTROGEN RECEPTOR NEGATIVE, LEFT: Primary | ICD-10-CM

## 2023-03-16 DIAGNOSIS — M79.672 FOOT PAIN, BILATERAL: ICD-10-CM

## 2023-03-16 DIAGNOSIS — Z85.3 HX OF BREAST CANCER: ICD-10-CM

## 2023-03-16 DIAGNOSIS — I89.0 LYMPHEDEMA OF LEFT ARM: ICD-10-CM

## 2023-03-16 DIAGNOSIS — M76.60 ACHILLES TENDINITIS, UNSPECIFIED LATERALITY: ICD-10-CM

## 2023-03-16 DIAGNOSIS — M79.671 FOOT PAIN, BILATERAL: ICD-10-CM

## 2023-03-16 DIAGNOSIS — R26.9 GAIT DIFFICULTY: Primary | ICD-10-CM

## 2023-03-16 DIAGNOSIS — Z98.890 S/P LUMPECTOMY, LEFT BREAST: ICD-10-CM

## 2023-03-16 PROCEDURE — 97140 MANUAL THERAPY 1/> REGIONS: CPT | Mod: PN

## 2023-03-16 PROCEDURE — 97110 THERAPEUTIC EXERCISES: CPT | Mod: PN,CQ

## 2023-03-16 PROCEDURE — 97140 MANUAL THERAPY 1/> REGIONS: CPT | Mod: PN,CQ

## 2023-03-16 PROCEDURE — 99214 OFFICE O/P EST MOD 30 MIN: CPT | Mod: S$GLB,,, | Performed by: INTERNAL MEDICINE

## 2023-03-16 PROCEDURE — 99214 PR OFFICE/OUTPT VISIT, EST, LEVL IV, 30-39 MIN: ICD-10-PCS | Mod: S$GLB,,, | Performed by: INTERNAL MEDICINE

## 2023-03-16 NOTE — PROGRESS NOTES
"[facility]   Physical Therapy Treatment Note     Name: Lolly Lozanoncer  Clinic Number: [id number]    Therapy Diagnosis:   Encounter Diagnoses   Name Primary?    Gait difficulty Yes    Foot pain, bilateral     Achilles tendinitis, unspecified laterality        Physician: Yevgeniy De La Rosa DPM    Visit Date: 3/16/2023       Physician Orders: PT Eval and Treat   Medical Diagnosis from Referral: Achilles tendinitis, unspecified leg   Evaluation Date: 1/18/2023  Authorization Period Expiration: 12/31/2023  Plan of Care Expiration: 4/21/2023  Progress Note Due: 4/21/2023  Visit # / Visits authorized: 15/ 20 (16 total)   FOTO: Completed 03/13/2023 66% residual deficit   Next survey due at discharge     Precautions: Diabetes and cancer    PTA Visit #: 1/5     Time In: 1100  Time Out: 1156  Total Billable Time: 53 minutes    SUBJECTIVE     Pt reports: "I walked a lot this morning because I went to the doctor and OT in [place] before I came here this morning. My  drove me to those, but I drove here this morning with it hurting."  She was compliant with home exercise program.  Response to previous treatment: "felt better"  Functional change: Intermittently wearing shoes but increased walking tolerance.    Pain: 5-6/10 "until I start walking"  Location: bilateral heels/feet      OBJECTIVE     See POC update.     Treatment     Lolly received the treatments listed below:      therapeutic exercises to develop ROM and flexibility for 43 minutes including:  Recumbent bike L4 x10'  Standing gastroc stretch using wedge 3x30s ea    Soleus stretch using wedge 3x30s ea  Heel raises on Airex x15    Toe raises on Airex x15   Shuttle leg press plus heel raise 37# x20   Seated Ankle alphabet x1 ea LE  Towel sweeps x2'  Toe curls on multi-pressure half spheres x1' ea LE  Theraball rolling under sole x2' ea LE  Small object pickup with toes x1' ea LE  B Blue TB resisted ankle ROM x1' ea LE    PF    Ankle DF/PF " and inv/eversion using rocker board x20 ea    manual therapy techniques: Soft tissue mobilizations were applied to the: B Achilles Tendon, Heels, & Arch for 10 minutes, including:         Strumming, Knuckling, Kneading     Not performed on this date:   manual therapy techniques  functional dry needling were applied to the: B calves/ankles for 15 minutes, including:  Informed consent previously obtained after thorough explanation of risks and benefits. Signed consent form has been scanned into medical records. Pre-procedure time out performed which included verification of name, , and site of treatment. Cleaned target tissues with 70% isopropyl alcohol wipe down and performed with single use sterile prep pads.     Functional dry needling to B lower legs performed as follows:    1) 30 mm needle inserted straight medial to lateral at point  horizontally level with medial malleolus and just anterior to Achilles tendon    2) 30 mm needle inserted posteromedial to anterolateral with 45* angulation at point 3 finger breadths proximal to medial malleolus, just anterior to Achilles tendon     3) 30 mm needle inserted posterolateral to anteromedial with 45* angulation at point 4 finger breadths proximal to lateral malleolus, just anterior to Achilles tendon    4) 30 mm needle inserted posterolateral to anteromedial with 45* angulation at point horizontally level with lateral malleolus and just anterior to Achilles tendon     5) 30 mm needle inserted 1 finger breadth distal and posterior to pt #1, with 45* angulation inferolateral toward Achilles tendon attachment on posterior calcaneous (with periosteal pecking at attachment)    6) 30 mm needle inserted 2 fingers breadth distal and posterior to pt #1 with 45* angulation inferolateral and slightly posterior onto medial and superior aspect of calcaneal tuberosity. (With periosteal pecking at attachment)    7) 50 mm needle inserted @ bifurcation of medial and lateral heads of  "the gastroc posterior to anterior with 45* caudad angulation    8) 50 mm needle inserted in 2 additional trigger points in medial gastrocs R and 1 on the L posterior to anterior (total of 3 needles in medial gastroc trigger point R/L)    9) 30 mm needle inserted within lower 1/3 of each Achilles tendon with posterior to anterior angulation.      All needles left insitu for duration of ES.     Treatment performed in prone. ES with FDN x 15 at 80 MHz wave frequency, 150 microamps (4 channels on R). Intensity set to patient tolerance and patient given call bell and instructed to ring if any problems. Patient reminded to  increase hydration habits following for decreased soreness. No adverse effects present following needling. Patient reported some soreness in B ankles/feet following session.     Patient Education and Home Exercises     Home Exercises Provided and Patient Education Provided     Education provided:   - The patient was instructed in additional therapeutic exercise as stated above in bold print.  Written Home Exercises Provided:  Instructed patient to continue prior HEP. Exercises were reviewed and Lolly was able to demonstrate them prior to the end of the session.  Lolly demonstrated good  understanding of the education provided. See EMR under Patient Instructions for exercises provided during therapy sessions    ASSESSMENT     Patient reports feeling better post dry needling in previous session, as well as "able to stand while a pot of coffee is brewing" ~3 minutes. Lolly Lozanoncer was progressed with additional therapeutic exercise as stated above in bold print to target areas of deficit as evident by measurements taken in previous session. Increased standing exercise was performed today compared to previous sessions. The patient experienced sensitivity only on R lateral side of calcaneus during manual techniques. Patient did not report pain during any of therapeutic exercises.    Lolly Is progressing " slowly towards her goals.   Pt prognosis is Fair.     Pt will continue to benefit from skilled outpatient physical therapy to address the deficits listed in the problem list box on initial evaluation, provide pt/family education and to maximize pt's level of independence in the home and community environment.     Pt's spiritual, cultural and educational needs considered and pt agreeable to plan of care and goals.     Anticipated barriers to physical therapy: None    Goals:   Previous Short Term Goals Status:     1) Decrease max  pain to < 7/10 Ongoing  2) Decrease tenderness to moderate  Minimal progress (area affected is decreased)   3) Facilitate improved LE flexibility Progressing slowly  4) Patient will increase standing tolerance to at least 5 min to assist with meal prep and washing dishes. Met 2/8/2023     New Short Term Goals Status:    #1-3 continue  4) Met  5) Increase standing tolerance to at least 10 min Ongoing                Long Term Goal Status: continue per initial plan of care.  1) Increase standing tolerance to at least 30 min to allow for meal prep and light housework Progressing  2) Patient will consistently drive using metatarsal heads to apply pressure to pedals without increased pain Progressing  3) Patient will walk > 30 min over level surface demonstrating adequate step length, heel strike and toe off with heel pain no > 4/10 Minimal progress  4) Patient will initiate weight bearing after sitting with heel pain no > 4/10 Progressing  5) Improve functional mobility to < 40% deficit as indicated by FOTO foot survey Progressing  6) Patient will be independent in [facility] for foot/ankle Progressing    PLAN     The patient is to be progressed within the established plan of care as tolerated in order to accomplish goals as stated above and increase function. Plan to focus on increased standing endurance, as well as manual ankle range of motion stretches.    Eda Marrufo, PTA

## 2023-03-16 NOTE — PROGRESS NOTES
"    [facility]  Occupational Therapy Treatment Note    Date: 3/14/2023  Name: Lolly Lozanoncer  Clinic Number: [id number]    Therapy Diagnosis:   Encounter Diagnosis   Name Primary?    Lymphedema of left arm Yes     Physician: Shaunna Gordon NP     Physician Orders: evaluate and treat  Medical Diagnosis: breast cancer  Surgical Procedure and Date: 2011 mastectomy, radiation and chemotherapy,   Evaluation Date: 2/28/2023  Insurance Authorization Period Expiration: 2-9-2024  Plan of Care Certification Period: 5-  Progress Note Due: same   Date of Return to MD: to be scheduled  Visit # / Visits authorized: 2/40  FOTO: intake 63%     Precautions:  Fall and no needle stick or blood pressure cuff on left upper extremity  Time In: 0745  Time Out: 0845  Total Billable Time: 60 minutes    SUBJECTIVE     Pt reports: " I am ready to get these off."    Response to previous treatment: did well:  rewrapped so patient could shower  Functional change: no    Pain: 0/10  Location: no pain       Girth Measurements (in centimeters)  LANDMARK LEFT arm 2/28, 3/16 RIGHT LE    Web space 19/19 cm 20 cm    wrist 19/18.5 cm  -.5 17 cm    4" 26.25/25.25 cm  - 1.0 22.5 cm    Below elbow    33.75/31.75 cm  -2.0 29.5 cm    Elbow 37.5/35.75 cm   -1.75 29 cm    Above elbow 43.25/42.5 cm   -.75 31 cm    Mid humerus 45/42.5 cm   -2.5 37 cm    Axilla 41.5/40 cm   -1.5 42 cm              Treatment     Lolly arrived with her .  Received the treatments listed below:     Manual therapy techniques: Manual Lymphatic Drainage were applied for 60 minutes, including: Bandages were removed and skin care completed. Measurements were taken.  present to be instructed in application of bandages and also to learn manual lymph drainage.  reapplied compression bandages with minimal verbal assist. Good return demonstration on manual therapy.   Supine on mat with upper body elevated. Manual lymph drainage for " left upper quadrant initiated and completed:     MANUAL LYMPHATIC DRAINAGE:  left upper quadrant  Supine with lower extremities elevated:  Short Neck  Axillary lymph nodes bilateral  Activate and work over the AXILLARY INGUINAL-ANASTOMOSES bilateral  Deep abdominal techniques  Rework both  AXILLARY INGUINAL ANASTOMOSES  Upper arm left   Rework:Activate and work over the AXILLARY INGUINAL-ANASTOMOSES bilateral     Left arm was compression bandaged over cotton padding and secured with tubigrip.  applied the bandages with verbal cuing. Lolly instructed to remove if painful. was given written instructions for manual lymph drainage, as well as step by step as therapsit was performing.  Patient Education and Home Exercises      Education provided:   1. Educated on definition of lymphedema.  2. Explained the Complete Decongestive Therapy protocol in depth  3. Educated on Phase 1 and 2 of protocol.  4. Reviewed treatment frequency and likely duration of weeks  5.Contraindications for treatment.  6. Plan of care and goals.  7. Educated on home management protocols.     Written Home Exercises Provided: no Lolly is following a physical therapy exercise profgram..    Assessment     Pt would continue to benefit from skilled OT. Yes.     Lolly Ramírez is a 62 y.o. female referred to outpatient occupational therapy and presents with a medical diagnosis of lymphedema secondary to breast cancer. Lymphedema, left untreated increases risk of infection, gait deviation causing ortho problems and poor body image. Patient presents with the following therapy deficits: lymphedema of left limbs and demonstrates limitations as described in the chart below. Following medical record review it is determined that pt will benefit from complete decongestive therapy services for the treatment and management of this chronic condition. The following goals were discussed with the patient and patient is in agreement with them as to be  addressed in the treatment plan.        Lolly is progressing well towards her goals and there are no updates to goals at this time. Pt prognosis is Good.     Pt will continue to benefit from skilled outpatient occupational therapy to address the deficits listed in the problem list on initial evaluation provide pt/family education and to maximize pt's level of independence in the home and community environment.     Pt's spiritual, cultural and educational needs considered and pt agreeable to plan of care and goals.    Anticipated barriers to occupational therapy: none. Lolly and  will be out of town for the next 2 weeks and will return to this clinic/writer to resume services.    Goals:   Short Term Goals for 6 weeks: (phase 1 of protocol)  Complete decongestion left upper quadrant- ongoing  Patient will be educated on lymphedema precautions and signs of infection. - Ongoing 2/28/2023   Patient will perform deep abdominal breathing TID-  ongoing  Patient will tolerate daily activities with multilayered bandaging.- initiated and ongoing   Appropriate compression garments to be ordered/delivered-  initiate in future   Spouse will be instructed in application of compression bandages initiated this date 3/13  Long Term Goals for 10 weeks: (Phase 2 of goals)  Patient will be independent with home management of this chronic condition.initiate at first visit  Patient to ricco/doff compression garment.   Patient will demonstrate compliance with all home management recommendations.  Patient will maintain reduction at monthly follow up appointments for 3 months             PLAN     Continue plan of care 3 times a week to address goals in plan of care. (When patient returns from vacation)  Updates/Grading for next session: continue phase 1 of Complete Decongestive Therapy protocol will be resumed in 2 weeks      SUNIL Eddy , SANDRA/MORALES

## 2023-03-16 NOTE — PROGRESS NOTES
"The wraps are still on."    [facility]  Occupational Therapy Treatment Note    Date: 3/14/2023  Name: Lolly Ramírez  Clinic Number: [id number]    Therapy Diagnosis:   Encounter Diagnosis   Name Primary?    Lymphedema of left arm Yes     Physician: Shaunna Gordon NP     Physician Orders: evaluate and treat  Medical Diagnosis: breast cancer  Surgical Procedure and Date: 2011 mastectomy, radiation and chemotherapy,   Evaluation Date: 2/28/2023  Insurance Authorization Period Expiration: 2-9-2024  Plan of Care Certification Period: 5-  Progress Note Due: same   Date of Return to MD: to be scheduled  Visit # / Visits authorized: 2/40  FOTO: intake 63%     Precautions:  Fall and no needle stick or blood pressure cuff on left upper extremity  Time In: 0900  Time Out: 1000  Total Billable Time: 60 minutes    SUBJECTIVE     Pt reports: " I am ready to get these off."    Response to previous treatment: tolerated well  Functional change: no    Pain: 0/10  Location: no pain    Objective Measures updated at progress report unless specified.    Treatment     Lolly arrived with her .  Received the treatments listed below:     Manual therapy techniques: Manual Lymphatic Drainage were applied for 60 minutes, including: Bandages were removed and skin care completed.  present to be instructed in application of bandages.   Supine on mat with upper body elevated. Manual lymph drainage for left upper quadrant initiated and completed:     MANUAL LYMPHATIC DRAINAGE:  left upper quadrant  Supine with lower extremities elevated:  Short Neck  Axillary lymph nodes bilateral  Activate and work over the AXILLARY INGUINAL-ANASTOMOSES bilateral  Deep abdominal techniques  Rework both  AXILLARY INGUINAL ANASTOMOSES  Upper arm left   Rework:Activate and work over the AXILLARY INGUINAL-ANASTOMOSES bilateral     Left arm was compression bandaged over cotton padding and secured with tubigrip.  was " instructed verbally and physically and return demonstration was good. Lolly instructed to remove if painful.  Patient Education and Home Exercises      Education provided:   1. Educated on definition of lymphedema.  2. Explained the Complete Decongestive Therapy protocol in depth  3. Educated on Phase 1 and 2 of protocol.  4. Reviewed treatment frequency and likely duration of weeks  5.Contraindications for treatment.  6. Plan of care and goals.  7. Educated on home management protocols.     Written Home Exercises Provided: no Lolly is following a physical therapy exercise profgram..    Assessment     Pt would continue to benefit from skilled OT. Yes.     Lolly Ramírez is a 62 y.o. female referred to outpatient occupational therapy and presents with a medical diagnosis of lymphedema secondary to breast cancer. Lymphedema, left untreated increases risk of infection, gait deviation causing ortho problems and poor body image. Patient presents with the following therapy deficits: lymphedema of left limbs and demonstrates limitations as described in the chart below. Following medical record review it is determined that pt will benefit from complete decongestive therapy services for the treatment and management of this chronic condition. The following goals were discussed with the patient and patient is in agreement with them as to be addressed in the treatment plan.        Lolly is progressing well towards her goals and there are no updates to goals at this time. Pt prognosis is Good.     Pt will continue to benefit from skilled outpatient occupational therapy to address the deficits listed in the problem list on initial evaluation provide pt/family education and to maximize pt's level of independence in the home and community environment.     Pt's spiritual, cultural and educational needs considered and pt agreeable to plan of care and goals.    Anticipated barriers to occupational therapy: none    Goals:   Short Term  Goals for 6 weeks: (phase 1 of protocol)  Complete decongestion left upper quadrant- ongoing  Patient will be educated on lymphedema precautions and signs of infection. - Ongoing 2/28/2023   Patient will perform deep abdominal breathing TID-  ongoing  Patient will tolerate daily activities with multilayered bandaging.- initiated and ongoing   Appropriate compression garments to be ordered/delivered-  initiate in future   Spouse will be instructed in application of compression bandages initiated this date 3/13  Long Term Goals for 10 weeks: (Phase 2 of goals)  Patient will be independent with home management of this chronic condition.initiate at first visit  Patient to ricco/doff compression garment.   Patient will demonstrate compliance with all home management recommendations.  Patient will maintain reduction at monthly follow up appointments for 3 months             PLAN     Continue plan of care 3 times a week to address goals in plan of care.  Updates/Grading for next session: continue phase 1 of Complete Decongestive Therapy protocol      SUNIL Eddy , SANDRA/MORALES

## 2023-03-20 ENCOUNTER — CLINICAL SUPPORT (OUTPATIENT)
Dept: REHABILITATION | Facility: HOSPITAL | Age: 63
End: 2023-03-20
Payer: MEDICARE

## 2023-03-20 DIAGNOSIS — S43.004D DISLOCATION OF RIGHT SHOULDER JOINT, SUBSEQUENT ENCOUNTER: ICD-10-CM

## 2023-03-20 DIAGNOSIS — M79.671 FOOT PAIN, BILATERAL: ICD-10-CM

## 2023-03-20 DIAGNOSIS — M79.672 FOOT PAIN, BILATERAL: ICD-10-CM

## 2023-03-20 DIAGNOSIS — R26.9 GAIT DIFFICULTY: Primary | ICD-10-CM

## 2023-03-20 DIAGNOSIS — M76.60 ACHILLES TENDINITIS, UNSPECIFIED LATERALITY: ICD-10-CM

## 2023-03-20 DIAGNOSIS — S43.004D DISLOCATION OF RIGHT SHOULDER JOINT, SUBSEQUENT ENCOUNTER: Primary | ICD-10-CM

## 2023-03-20 PROCEDURE — 97140 MANUAL THERAPY 1/> REGIONS: CPT | Mod: PN,CQ

## 2023-03-20 PROCEDURE — 97110 THERAPEUTIC EXERCISES: CPT | Mod: PN,CQ

## 2023-03-20 NOTE — PROGRESS NOTES
"[facility]   Physical Therapy Treatment Note     Name: Lolly Lozanoncer  Clinic Number: [id number]    Therapy Diagnosis:   Encounter Diagnoses   Name Primary?    Gait difficulty Yes    Foot pain, bilateral     Achilles tendinitis, unspecified laterality             Physician: Yevgeniy De La Rosa DPM    Visit Date: 3/20/2023       Physician Orders: PT Eval and Treat   Medical Diagnosis from Referral: Achilles tendinitis, unspecified leg   Evaluation Date: 1/18/2023  Authorization Period Expiration: 12/31/2023  Plan of Care Expiration: 4/21/2023  Progress Note Due: 4/21/2023  Visit # / Visits authorized: 16/ 20 (17 total)   FOTO: Completed 03/13/2023 66% residual deficit   Next survey due at discharge     Precautions: Diabetes and cancer    PTA Visit #: 2/5     Time In: 1100  Time Out: 1200  Total Billable Time: 58 minutes    SUBJECTIVE     Pt reports: "It hurts on the sides and under my heel to stand. I can feel the weather coming on; it has been bothering my knees some. I got geoff horses in my whole foot last night."  She was compliant with home exercise program.  Response to previous treatment: "felt good"  Functional change: Intermittently wearing shoes but increased walking tolerance.    Pain: 5-6/10  Location: bilateral heels/feet      OBJECTIVE     Objective Measures updated at progress report unless specified.     Treatment     Lolly received the treatments listed below:      therapeutic exercises to develop ROM and flexibility for 43 minutes including:  Recumbent bike L4 x10'  Standing gastroc stretch using wedge 3x30s ea    Soleus stretch using wedge 3x30s ea  Heel raises on Airex x15    Toe raises on Airex x15   Shuttle leg press plus heel raise 37# x20   Seated Ankle alphabet x1 ea LE  Towel sweeps x2'  Toe curls x1' ea LE  Theraball rolling under sole x2' ea LE  Increased WB on multi-pressure half spheres x2' ea  Small object pickup with toes x1' ea LE  B Blue TB resisted ankle " ROM x1' ea LE    Ankle DF/PF and inv/eversion using rocker board x20 ea    manual therapy techniques: Soft tissue mobilizations were applied to the: B Achilles Tendon, Heels, & Arch for 15 minutes, including:         Strumming, Knuckling, Kneading     Not performed on this date:   manual therapy techniques  functional dry needling were applied to the: B calves/ankles for 15 minutes, including:  Informed consent previously obtained after thorough explanation of risks and benefits. Signed consent form has been scanned into medical records. Pre-procedure time out performed which included verification of name, , and site of treatment. Cleaned target tissues with 70% isopropyl alcohol wipe down and performed with single use sterile prep pads.     Functional dry needling to B lower legs performed as follows:    1) 30 mm needle inserted straight medial to lateral at point  horizontally level with medial malleolus and just anterior to Achilles tendon    2) 30 mm needle inserted posteromedial to anterolateral with 45* angulation at point 3 finger breadths proximal to medial malleolus, just anterior to Achilles tendon     3) 30 mm needle inserted posterolateral to anteromedial with 45* angulation at point 4 finger breadths proximal to lateral malleolus, just anterior to Achilles tendon    4) 30 mm needle inserted posterolateral to anteromedial with 45* angulation at point horizontally level with lateral malleolus and just anterior to Achilles tendon     5) 30 mm needle inserted 1 finger breadth distal and posterior to pt #1, with 45* angulation inferolateral toward Achilles tendon attachment on posterior calcaneous (with periosteal pecking at attachment)    6) 30 mm needle inserted 2 fingers breadth distal and posterior to pt #1 with 45* angulation inferolateral and slightly posterior onto medial and superior aspect of calcaneal tuberosity. (With periosteal pecking at attachment)    7) 50 mm needle inserted @ bifurcation  of medial and lateral heads of the gastroc posterior to anterior with 45* caudad angulation    8) 50 mm needle inserted in 2 additional trigger points in medial gastrocs R and 1 on the L posterior to anterior (total of 3 needles in medial gastroc trigger point R/L)    9) 30 mm needle inserted within lower 1/3 of each Achilles tendon with posterior to anterior angulation.      All needles left insitu for duration of ES.     Treatment performed in prone. ES with FDN x 15 at 80 MHz wave frequency, 150 microamps (4 channels on R). Intensity set to patient tolerance and patient given call bell and instructed to ring if any problems. Patient reminded to  increase hydration habits following for decreased soreness. No adverse effects present following needling. Patient reported some soreness in B ankles/feet following session.     Patient Education and Home Exercises     Home Exercises Provided and Patient Education Provided     Education provided:   - The patient was instructed in additional therapeutic exercise as listed above in bold print.  Written Home Exercises Provided:  Instructed patient to continue prior HEP. Exercises were reviewed and Lolly was able to demonstrate them prior to the end of the session.  Lolly demonstrated good  understanding of the education provided. See EMR under Patient Instructions for exercises provided during therapy sessions    ASSESSMENT     The patient ambulated into the clinic wearing slippers without backs due to pain. Lolly Darrell was slightly progressed with additional therapeutic exercise. Patient reports increased pain towards the end of standing exercises, which is why the patient was not progressed further. Manual techniques were provided with tenderness still present on lateral calcaneus bilaterally, but the size of nodule on achilles tendon has reduced bilaterally compared to previous sessions. Patient would possibly benefit from manual passive range of motion ankle  stretching.    Lolly Is progressing slowly towards her goals.   Pt prognosis is Fair.     Pt will continue to benefit from skilled outpatient physical therapy to address the deficits listed in the problem list box on initial evaluation, provide pt/family education and to maximize pt's level of independence in the home and community environment.     Pt's spiritual, cultural and educational needs considered and pt agreeable to plan of care and goals.     Anticipated barriers to physical therapy: None    Goals:   Previous Short Term Goals Status:     1) Decrease max  pain to < 7/10 Ongoing  2) Decrease tenderness to moderate  Minimal progress (area affected is decreased)   3) Facilitate improved LE flexibility Progressing slowly  4) Patient will increase standing tolerance to at least 5 min to assist with meal prep and washing dishes. Met 2/8/2023     New Short Term Goals Status:    #1-3 continue  4) Met  5) Increase standing tolerance to at least 10 min Ongoing                Long Term Goal Status: continue per initial plan of care.  1) Increase standing tolerance to at least 30 min to allow for meal prep and light housework Progressing  2) Patient will consistently drive using metatarsal heads to apply pressure to pedals without increased pain Progressing  3) Patient will walk > 30 min over level surface demonstrating adequate step length, heel strike and toe off with heel pain no > 4/10 Minimal progress  4) Patient will initiate weight bearing after sitting with heel pain no > 4/10 Progressing  5) Improve functional mobility to < 40% deficit as indicated by FOTO foot survey Progressing  6) Patient will be independent in [facility] for foot/ankle Progressing    PLAN     The patient is to be progressed within the established plan of care as tolerated in order to accomplish goals as stated above and increase function. Plan to focus on increased standing endurance, as well as incorporating manual ankle range of motion  stretches in subsequent sessions.    Eda Marrufo, PTA

## 2023-03-22 ENCOUNTER — CLINICAL SUPPORT (OUTPATIENT)
Dept: REHABILITATION | Facility: HOSPITAL | Age: 63
End: 2023-03-22
Payer: MEDICARE

## 2023-03-22 DIAGNOSIS — S43.004D DISLOCATION OF RIGHT SHOULDER JOINT, SUBSEQUENT ENCOUNTER: ICD-10-CM

## 2023-03-22 DIAGNOSIS — M79.672 FOOT PAIN, BILATERAL: ICD-10-CM

## 2023-03-22 DIAGNOSIS — M25.511 ACUTE PAIN OF RIGHT SHOULDER: ICD-10-CM

## 2023-03-22 DIAGNOSIS — M79.671 FOOT PAIN, BILATERAL: ICD-10-CM

## 2023-03-22 DIAGNOSIS — R26.9 GAIT DIFFICULTY: Primary | ICD-10-CM

## 2023-03-22 DIAGNOSIS — R68.89 ACTIVITY INTOLERANCE: Primary | ICD-10-CM

## 2023-03-22 DIAGNOSIS — M76.60 ACHILLES TENDINITIS, UNSPECIFIED LATERALITY: ICD-10-CM

## 2023-03-22 DIAGNOSIS — M25.611 DECREASED ROM OF RIGHT SHOULDER: ICD-10-CM

## 2023-03-22 PROCEDURE — 97162 PT EVAL MOD COMPLEX 30 MIN: CPT | Mod: PN

## 2023-03-22 PROCEDURE — 97110 THERAPEUTIC EXERCISES: CPT | Mod: PN,CQ

## 2023-03-22 PROCEDURE — 97140 MANUAL THERAPY 1/> REGIONS: CPT | Mod: PN,CQ

## 2023-03-22 NOTE — PROGRESS NOTES
"[facility]   Physical Therapy Treatment Note     Name: Lolly MCLAIN Keyes  Clinic Number: [id number]    Therapy Diagnosis:   Encounter Diagnoses   Name Primary?    Gait difficulty Yes    Foot pain, bilateral     Achilles tendinitis, unspecified laterality        Physician: Yevgeniy De La Rosa DPM    Visit Date: 3/22/2023       Physician Orders: PT Eval and Treat   Medical Diagnosis from Referral: Achilles tendinitis, unspecified leg   Evaluation Date: 1/18/2023  Authorization Period Expiration: 12/31/2023  Plan of Care Expiration: 4/21/2023  Progress Note Due: 4/21/2023  Visit # / Visits authorized: 17/ 20 (18 total)   FOTO: Completed 03/13/2023 66% residual deficit   Next survey due at discharge     Precautions: Diabetes and cancer    PTA Visit #: 3/5     Time In: 0908  Time Out: 1005  Total Billable Time: 53 minutes    SUBJECTIVE     Pt reports: "My feet feel a little better, but I slipped on a stepping stone yesterday and did the windmill. I hurt my neck, and I can't turn to look right now." "I have been rubbing those knots in my feet."  She was compliant with home exercise program.  Response to previous treatment: "about the same; a little soreness"  Functional change: Intermittently wearing shoes but increased walking tolerance.    Pain: 4-5/10  Location: bilateral heels/feet      OBJECTIVE     Objective Measures updated at progress report unless specified.     Treatment     Lolly received the treatments listed below:      therapeutic exercises to develop ROM and flexibility for 45 minutes including:  Recumbent bike L4 x10'  Standing gastroc stretch using wedge 3x30s ea    Soleus stretch using wedge 3x30s ea  Heel raises on Airex x15    Toe raises on Airex x15   Shuttle leg press plus heel raise 50# x20   Seated Ankle alphabet x1 ea LE  Towel sweeps x2'  Toe curls x2' ea LE  Theraball rolling under sole x2' ea LE  Increased WB on multi-pressure half spheres x2' ea  Small object pickup with " toes x1' ea LE  B Blue TB resisted ankle ROM x1' ea LE    Ankle DF/PF and inv/eversion using rocker board x20 ea    manual therapy techniques: Soft tissue mobilizations were applied to the: B Achilles Tendon, Heels, & Arch for 8 minutes, including:         Strumming, Knuckling, Kneading     Not performed on this date:   manual therapy techniques  functional dry needling were applied to the: B calves/ankles for 0 minutes, including:  Informed consent previously obtained after thorough explanation of risks and benefits. Signed consent form has been scanned into medical records. Pre-procedure time out performed which included verification of name, , and site of treatment. Cleaned target tissues with 70% isopropyl alcohol wipe down and performed with single use sterile prep pads.     Functional dry needling to B lower legs performed as follows:    1) 30 mm needle inserted straight medial to lateral at point  horizontally level with medial malleolus and just anterior to Achilles tendon    2) 30 mm needle inserted posteromedial to anterolateral with 45* angulation at point 3 finger breadths proximal to medial malleolus, just anterior to Achilles tendon     3) 30 mm needle inserted posterolateral to anteromedial with 45* angulation at point 4 finger breadths proximal to lateral malleolus, just anterior to Achilles tendon    4) 30 mm needle inserted posterolateral to anteromedial with 45* angulation at point horizontally level with lateral malleolus and just anterior to Achilles tendon     5) 30 mm needle inserted 1 finger breadth distal and posterior to pt #1, with 45* angulation inferolateral toward Achilles tendon attachment on posterior calcaneous (with periosteal pecking at attachment)    6) 30 mm needle inserted 2 fingers breadth distal and posterior to pt #1 with 45* angulation inferolateral and slightly posterior onto medial and superior aspect of calcaneal tuberosity. (With periosteal pecking at  attachment)    7) 50 mm needle inserted @ bifurcation of medial and lateral heads of the gastroc posterior to anterior with 45* caudad angulation    8) 50 mm needle inserted in 2 additional trigger points in medial gastrocs R and 1 on the L posterior to anterior (total of 3 needles in medial gastroc trigger point R/L)    9) 30 mm needle inserted within lower 1/3 of each Achilles tendon with posterior to anterior angulation.      All needles left insitu for duration of ES.     Treatment performed in prone. ES with FDN x 15 at 80 MHz wave frequency, 150 microamps (4 channels on R). Intensity set to patient tolerance and patient given call bell and instructed to ring if any problems. Patient reminded to  increase hydration habits following for decreased soreness. No adverse effects present following needling. Patient reported some soreness in B ankles/feet following session.     Patient Education and Home Exercises     Home Exercises Provided and Patient Education Provided     Education provided:   - The patient was instructed in increased exercise complexity as stated above in bold print.  Written Home Exercises Provided:  Instructed patient to continue prior HEP. Exercises were reviewed and Lolly was able to demonstrate them prior to the end of the session.  Lolly demonstrated good  understanding of the education provided. See EMR under Patient Instructions for exercises provided during therapy sessions    ASSESSMENT     Patient entered the clinic today reporting near fall, causing pain in neck, and patient reports continued pain in bilateral feet R>L. Lolly Ramírez was progressed with increased exercise complexity as stated above, but continues to experience standing intolerance. The patient's progress seems to continue to be stalled due to accidents. Manual techniques were provided with increased sensitivity near bilateral achilles insertion on calcanei.    Lolly Is progressing slowly towards her goals.  Pt  prognosis is Fair.     Pt will continue to benefit from skilled outpatient physical therapy to address the deficits listed in the problem list box on initial evaluation, provide pt/family education and to maximize pt's level of independence in the home and community environment.     Pt's spiritual, cultural and educational needs considered and pt agreeable to plan of care and goals.     Anticipated barriers to physical therapy: None    Goals:   Previous Short Term Goals Status:     1) Decrease max pain to < 7/10 Ongoing  2) Decrease tenderness to moderate Minimal progress (area affected is decreased)   3) Facilitate improved LE flexibility Progressing slowly  4) Patient will increase standing tolerance to at least 5 min to assist with meal prep and washing dishes. Met 2/8/2023     New Short Term Goals Status:    #1-3 continue  4) Met  5) Increase standing tolerance to at least 10 min Ongoing                Long Term Goal Status: continue per initial plan of care.  1) Increase standing tolerance to at least 30 min to allow for meal prep and light housework Progressing  2) Patient will consistently drive using metatarsal heads to apply pressure to pedals without increased pain Progressing  3) Patient will walk > 30 min over level surface demonstrating adequate step length, heel strike and toe off with heel pain no > 4/10 Minimal progress  4) Patient will initiate weight bearing after sitting with heel pain no > 4/10 Progressing  5) Improve functional mobility to < 40% deficit as indicated by FOTO foot survey Progressing  6) Patient will be independent in [facility] for foot/ankle Progressing    PLAN     The patient is to be progressed within the established plan of care as tolerated in order to accomplish goals as stated above and increase function. Plan to focus on increased standing endurance, as well as incorporating manual ankle range of motion stretches as needed in subsequent sessions. Can continue dry-needling with  PT.    Eda Marrufo, PTA

## 2023-03-22 NOTE — PLAN OF CARE
"[facility]   Physical Therapy Evaluation       Name: Lolly Lozanoncer  [facility] Number: [id number]    Therapy Diagnosis:   Encounter Diagnoses   Name Primary?    Activity intolerance Yes    Decreased ROM of right shoulder     Acute pain of right shoulder     Dislocation of right shoulder joint, subsequent encounter         Physician: Taurus Romero MD    Physician Orders: PT Eval and Treat Wish to work on active assisted range of motion of the right shoulder and strengthening of her internal rotator muscles of the shoulder to stabilize the shoulder 3 times a week x4 weeks   Medical Diagnosis from Referral: Dislocation of right shoulder joint, subsequent encounter   Evaluation Date: 3/22/2023  Authorization Period Expiration: 12/31/2023  Plan of Care Expiration: 4/21/2023  Progress Note Due: 4/21/2023  Visit # / Visits authorized: 1/ 1   FOTO: Next survey due week of 4/3/2023    Precautions: Diabetes, Fall, and cancer     Time In: 1015  Time Out: 1100  Total Appointment Time (timed & untimed codes): 43 minutes      SUBJECTIVE     Date of onset: 2/24/2023     History of current condition - Lolly reports: she was recovering well from prior R shoulder anterior dislocation until she fell again at home resulting in a recurrent anterior dislocation of her R shoulder.  Went to ED where 2 attempts at relocating shoulder were required.  Was placed in shoulder immobilizer and referred back to orthopod.  Saw him 3/1/2023 and he had her continue immobilizer until she followed up with him on 3/15/2023 at which time he discontinued the immobilizer and referred her back to PT.  She presents to PT today reporting that she lost her balance yesterday and "windmilled" to prevent a fall and subsequently developed R neck pain.      Falls: Yes.      Imaging: XR SHOULDER COMPLETE 2 OR MORE VIEWS RIGHT 03/15/2023: Findings per report: "Prior Hill-Sachs fracture along the superior humeral head which could be " "subacute or remote.  No malalignment.  Degenerative change moderate at the AC joint and mild at the glenohumeral joint"     Prior Therapy: Yes for same problem, also receiving PT for heel pain.    Social History:  lives with their spouse in 1 story home with 1 step to enter.    Occupation: Housewife  Prior Level of Function: Was nearing PT discharge following initial shoulder dislocation.  She was reaching overhead, performing bathing and dressing activities without significant difficulty, sleeping with minimal disturbance.  Patient is right handed.   Current Level of Function: Difficulty "controlling hand" above chest level, Assistance with bathing/dressing, driving primarily using L UE, sleep is impaired, limited from performing housework activities (reaching into overhead cabinet, etc), able to use dust mop but has to use both hands.      Pain:  Current 2/10, worst 10/10, best 2/10   Location: right anterior shoulder and lateral shoulder, upper traps, R lateral cervical area (due to loss of balance yesterday)   Description: intermittent sharp pain followed by dull ache.    Aggravating Factors: Reaching above chest level, flexion of R elbow with a load, R side lying, pushing herself up from chair, driving  Easing Factors: Unknown.      Patients goals: to be able to do normal things: reach to put dishes away, to sweep/mop     Medical History:   Past Medical History:   Diagnosis Date    Breast cancer, stage 1, estrogen receptor negative, left () 2020    Depression     Diabetes mellitus, type 2     GERD (gastroesophageal reflux disease)     HER2-positive carcinoma of left breast 2020    Hx of breast cancer     Hx of breast cancer - Her2Nu+/ER+ - 2011 12/3/2019    Insomnia     Lymphedema     Neuropathy of both feet     S/P lumpectomy, left breast 2020       Surgical History:   Lolly Ramírez  has a past surgical history that includes Elbow fx (Left, );  section; Lower back ruptured " disc (06/2010); Knee arthroscopy w/ meniscal repair (Left); Biceps tendon repair (Left, 12/2005); Cholecystectomy; Hysterectomy; Lymph node dissection; Breast biopsy (Left); Breast lumpectomy (Left); Fracture surgery (2016); and Spine surgery (2009).    Medications:   Lolly has a current medication list which includes the following prescription(s): ascorbic acid (vitamin c), atomoxetine, b complex vitamins, calcium carbonate, docusate sodium, furosemide, glucosamine-chondroitin, hydrocodone-acetaminophen, hydrocodone-acetaminophen, lyrica, magnesium, methocarbamol, multivitamin, mupirocin, naproxen, pantoprazole, terbinafine hcl, trazodone, and venlafaxine.    Allergies:   Review of patient's allergies indicates:   Allergen Reactions    Banana Hives    Codeine Nausea And Vomiting    Dilaudid  [hydromorphone (bulk)] Rash    Hydromorphone hcl Rash          OBJECTIVE     Posture: Standing: R shoulder lower, pelvis level.  Moderate rounded shoulder and forward head posture.  Sitting: adequate lumbar lordosis, increased thoracic kyphosis, moderate RSP/FHP  Palpation: Tenderness present over R biceps tendon and proximal biceps near bicipital groove.  Tenderness also present posterior R GH joint line.  Increased muscle tension throughout B shoulders (upper traps, levator scap.  Tenderness present over distal portion of R supraspinatus in the triangle formed by the distal clavicle and acromion process, tenderness present over R coracoid process.   Sensation: Impaired to light touch R medial forearm.   DTRs: Not tested this date.   Range of Motion/Strength:   Cervical ROM:  flexion 75%, ext 50%, SB R 50%, L 75%, rotation R 15%, L 50%     Shoulder  Right   Left  Pain/Dysfunction with Movement    AROM PROM MMT AROM PROM MMT    flexion  80*  130*  N/T  160*  175*  4+/5  R passive flexion supine 170*   extension  30*  40*   60*  75*   4+/5    abduction  30*  70*   145*  180*  4+/5  R passive abduction supine 140*   adduction   15*  30*   40*  45*  4+/5    Internal rotation  70*  75*   70*  80*  4+/5    ER at 90° abd  Unable to test    75*  80*  4+/5  R passive ER @ 90* abd 75*   ER at 0° abd  50*  55*   60*  75*  4+/5  R passive ER @ 0* abd 75*   R elbow/forearm ROM WFL,    Strength: R forearm, wrist 4/5-4+/5;  WFL,   Flexibility: Significant tightness B upper traps/levator scap, R > L  Gait: Without AD  Analysis: Excessive weight shift with decreased heel strike/toe off B  Bed Mobility:Independent unable to roll onto R side due to shoulder pain  Transfers: Independent increased L UE support  Special Tests: N/A  Other: N/A     Limitation/Restriction for FOTO Shoulder Survey    Therapist reviewed FOTO scores for Lolly Ramírez on 3/22/2023.   FOTO documents entered into [employer] - see Media section.    Limitation Score: 58%         TREATMENT     Total Treatment time (time-based codes) separate from Evaluation: 12 minutes      Lolly received the treatments listed below:      therapeutic exercises to develop strength for 8 minutes including:  Isometric shoulder exercises x 10 ea   Flexion   Extension   Abduction   Adduction   External rotation   Internal rotation     manual therapy techniques: PROM were applied to the: R shoulder for 4 minutes, including:  PROM to R shoulder in supine      PATIENT EDUCATION AND HOME EXERCISES     Education provided:   - Initiated instruction in isometric shoulder exercises.     Written Home Exercises Provided:  To be issued during subsequent visits . Exercises were reviewed and Lolly was able to demonstrate them prior to the end of the session.  Lolly demonstrated good  understanding of the education provided. See EMR under Patient Instructions for exercises provided during therapy sessions.    ASSESSMENT     Lolly is a 62 y.o. female referred to outpatient Physical Therapy with a medical diagnosis of Dislocation of right shoulder joint, subsequent encounter . Patient presents with R shoulder pain,  decreased R shoulder ROM, impaired posture, decreased UE flexibility, decreased shoulder stability, and increased tenderness.  These problems are exacerbated by cervical dysfunction (patient experienced near fall yesterday and irritated her neck) and contribute to limited functional use of R UE in reaching, lifting, and weight bearing.  She should benefit from skilled PT services to address these problems in order to reduce functional deficit and to improve activity tolerance for reaching, lifting, bathing/dressing, and pushing herself up from chair.      Patient prognosis is Fair.   Patient will benefit from skilled outpatient Physical Therapy to address the deficits stated above and in the chart below, provide patient /family education, and to maximize patientt's level of independence.     Plan of care discussed with patient: Yes  Patient's spiritual, cultural and educational needs considered and patient is agreeable to the plan of care and goals as stated below:     Anticipated Barriers for therapy: Patient is currently undergoing PT treatment for Achilles tendinitis and OT treatment for L UE lymphedema.  She has history of prior R shoulder dislocation.     Medical Necessity is demonstrated by the following  History  Co-morbidities and personal factors that may impact the plan of care Co-morbidities:   depression, diabetes, difficulty sleeping, high BMI, history of cancer, and neuropathy of B feet     Personal Factors:   Limited protection of R shoulder       high   Examination  Body Structures and Functions, activity limitations and participation restrictions that may impact the plan of care Body Regions:   upper extremities  trunk     Body Systems:    gross symmetry  ROM  strength  transfers  Joint stability     Participation Restrictions:   Avoid extremes of shoulder external rotation     Activity limitations:   Learning and applying knowledge  no deficits     General Tasks and Commands  no deficits      Communication  no deficits     Mobility  lifting and carrying objects  walking  driving (bike, car, motorcycle)     Self care  washing oneself (bathing, drying, washing hands)  caring for body parts (brushing teeth, shaving, grooming)  dressing     Domestic Life  cooking  doing house work (cleaning house, washing dishes, laundry)  assisting others     Interactions/Relationships  no deficits     Life Areas  no deficits     Community and Social Life  community life  recreation and leisure             high   Clinical Presentation evolving clinical presentation with changing clinical characteristics moderate   Decision Making/ Complexity Score: moderate        Goals:  Short Term Goals: 2 weeks   1) Decrease max pain to < 8/10  2) Facilitate improved upper quadrant flexibility  3) Decrease tenderness to minimal  4) Patient will reach to shoulder height using R UE with no more than minimal difficulty    Long Term Goals:  4 weeks  1) Patient will reach to between 1st and 2nd shelf of cabinet using R UE to put away/retrieve light dish without significant difficulty  2) Patient will sleep at least 6 hours without interruption by shoulder pain at least 5 of 7 nights per week  3) Patient will utilize R UE to assist with pushing herself up from seated surface without increased shoulder discomfort  4) Patient will reach laterally using R UE (as in reaching into glove box in car) without increased R shoulder pain  5) Improve functional deficit to < 40% as indicated by FOTO shoulder survey.  6) Patient will be independent in [facility] for shoulder ROM, upper quadrant flexibility, shoulder strengthening, shoulder stabilization      PLAN   Plan of care Certification: 3/22/2023 to 4/21/2023.    Outpatient Physical Therapy 3 times weekly for 4 weeks to include the following interventions: Electrical Stimulation IFC/TENS, Manual Therapy, Moist Heat/ Ice, Patient Education, Therapeutic Exercise, and Functional Dry Needling .     Lolly  Ledy, PT      I CERTIFY THE NEED FOR THESE SERVICES FURNISHED UNDER THIS PLAN OF TREATMENT AND WHILE UNDER MY CARE   Physician's comments:     Physician's Signature: ___________________________________________________

## 2023-03-23 DIAGNOSIS — S43.004D DISLOCATION OF RIGHT SHOULDER JOINT, SUBSEQUENT ENCOUNTER: Primary | ICD-10-CM

## 2023-03-27 ENCOUNTER — TELEPHONE (OUTPATIENT)
Dept: FAMILY MEDICINE | Facility: CLINIC | Age: 63
End: 2023-03-27

## 2023-03-27 DIAGNOSIS — Z13.820 ENCOUNTER FOR OSTEOPOROSIS SCREENING IN ASYMPTOMATIC POSTMENOPAUSAL PATIENT: ICD-10-CM

## 2023-03-27 DIAGNOSIS — S02.85XA LEFT ORBIT FRACTURE, CLOSED, INITIAL ENCOUNTER: ICD-10-CM

## 2023-03-27 DIAGNOSIS — Z78.0 ENCOUNTER FOR OSTEOPOROSIS SCREENING IN ASYMPTOMATIC POSTMENOPAUSAL PATIENT: ICD-10-CM

## 2023-03-27 DIAGNOSIS — Z85.3 HX OF BREAST CANCER: Primary | ICD-10-CM

## 2023-03-27 DIAGNOSIS — Z12.31 ENCOUNTER FOR SCREENING MAMMOGRAM FOR MALIGNANT NEOPLASM OF BREAST: ICD-10-CM

## 2023-03-27 NOTE — TELEPHONE ENCOUNTER
----- Message from Sandra Farias RN sent at 3/24/2023  3:53 PM CDT -----  Dr. Simpson recently saw this mutual patient in office and patient mentioned that you would be ordering a bone density test and also a mammogram. Dr. Simpson just wanted me to reach out to see if you would be placing these orders?

## 2023-03-28 ENCOUNTER — PATIENT MESSAGE (OUTPATIENT)
Dept: REHABILITATION | Facility: HOSPITAL | Age: 63
End: 2023-03-28
Payer: MEDICARE

## 2023-03-28 ENCOUNTER — TELEPHONE (OUTPATIENT)
Dept: FAMILY MEDICINE | Facility: CLINIC | Age: 63
End: 2023-03-28

## 2023-03-28 DIAGNOSIS — Z78.0 MENOPAUSE: Primary | ICD-10-CM

## 2023-03-28 NOTE — TELEPHONE ENCOUNTER
----- Message from Edwina Yuen sent at 3/28/2023 10:25 AM CDT -----  Regarding: NON PAYABLE CODE  Please provide a payable code for BONE DENSITY. This diagnosis code is not payable.    Thank you

## 2023-03-29 ENCOUNTER — HOSPITAL ENCOUNTER (OUTPATIENT)
Dept: RADIOLOGY | Facility: HOSPITAL | Age: 63
Discharge: HOME | End: 2023-03-29
Attending: NURSE PRACTITIONER
Payer: MEDICARE

## 2023-03-29 ENCOUNTER — PATIENT MESSAGE (OUTPATIENT)
Dept: ORTHOPEDICS | Facility: CLINIC | Age: 63
End: 2023-03-29
Payer: MEDICARE

## 2023-03-29 ENCOUNTER — TELEPHONE (OUTPATIENT)
Dept: ORTHOPEDICS | Facility: CLINIC | Age: 63
End: 2023-03-29
Payer: MEDICARE

## 2023-03-29 DIAGNOSIS — Z78.0 MENOPAUSE: ICD-10-CM

## 2023-03-29 DIAGNOSIS — S86.901A UNSPECIFIED INJURY OF UNSPECIFIED MUSCLE(S) AND TENDON(S) AT LOWER LEG LEVEL, RIGHT LEG, INITIAL ENCOUNTER: Primary | ICD-10-CM

## 2023-03-29 PROCEDURE — 77080 DXA BONE DENSITY AXIAL: CPT | Mod: TC,PO

## 2023-04-04 ENCOUNTER — TELEPHONE (OUTPATIENT)
Dept: ORTHOPEDICS | Facility: CLINIC | Age: 63
End: 2023-04-04
Payer: MEDICARE

## 2023-04-11 ENCOUNTER — HOSPITAL ENCOUNTER (OUTPATIENT)
Dept: RADIOLOGY | Facility: HOSPITAL | Age: 63
Discharge: HOME | End: 2023-04-11
Attending: PODIATRIST
Payer: MEDICARE

## 2023-04-11 ENCOUNTER — PATIENT MESSAGE (OUTPATIENT)
Dept: ADMINISTRATIVE | Facility: HOSPITAL | Age: 63
End: 2023-04-11
Payer: MEDICARE

## 2023-04-11 DIAGNOSIS — S86.901A UNSPECIFIED INJURY OF UNSPECIFIED MUSCLE(S) AND TENDON(S) AT LOWER LEG LEVEL, RIGHT LEG, INITIAL ENCOUNTER: ICD-10-CM

## 2023-04-11 PROCEDURE — 73718 MRI LOWER EXTREMITY W/O DYE: CPT | Mod: TC,PO,RT

## 2023-04-12 ENCOUNTER — HOSPITAL ENCOUNTER (OUTPATIENT)
Dept: RADIOLOGY | Facility: HOSPITAL | Age: 63
Discharge: HOME | End: 2023-04-12
Attending: ORTHOPAEDIC SURGERY
Payer: MEDICARE

## 2023-04-12 ENCOUNTER — OFFICE VISIT (OUTPATIENT)
Dept: ORTHOPEDICS | Facility: CLINIC | Age: 63
End: 2023-04-12
Payer: MEDICARE

## 2023-04-12 VITALS — WEIGHT: 248.25 LBS | BODY MASS INDEX: 35.54 KG/M2 | HEIGHT: 70 IN

## 2023-04-12 DIAGNOSIS — F40.240 CLAUSTROPHOBIA: ICD-10-CM

## 2023-04-12 DIAGNOSIS — S46.011D TRAUMATIC COMPLETE TEAR OF RIGHT ROTATOR CUFF, SUBSEQUENT ENCOUNTER: Primary | ICD-10-CM

## 2023-04-12 DIAGNOSIS — S43.004D DISLOCATION OF RIGHT SHOULDER JOINT, SUBSEQUENT ENCOUNTER: ICD-10-CM

## 2023-04-12 PROCEDURE — 73030 X-RAY EXAM OF SHOULDER: CPT | Mod: TC,PN,RT

## 2023-04-12 PROCEDURE — 99213 OFFICE O/P EST LOW 20 MIN: CPT | Mod: S$PBB,,, | Performed by: ORTHOPAEDIC SURGERY

## 2023-04-12 PROCEDURE — 99213 PR OFFICE/OUTPT VISIT, EST, LEVL III, 20-29 MIN: ICD-10-PCS | Mod: S$PBB,,, | Performed by: ORTHOPAEDIC SURGERY

## 2023-04-12 PROCEDURE — 99213 OFFICE O/P EST LOW 20 MIN: CPT | Mod: PBBFAC,PN | Performed by: ORTHOPAEDIC SURGERY

## 2023-04-12 PROCEDURE — 99999 PR PBB SHADOW E&M-EST. PATIENT-LVL III: CPT | Mod: PBBFAC,,, | Performed by: ORTHOPAEDIC SURGERY

## 2023-04-12 PROCEDURE — 99999 PR PBB SHADOW E&M-EST. PATIENT-LVL III: ICD-10-PCS | Mod: PBBFAC,,, | Performed by: ORTHOPAEDIC SURGERY

## 2023-04-12 PROCEDURE — 73030 XR SHOULDER COMPLETE 2 OR MORE VIEWS RIGHT: ICD-10-PCS | Mod: 26,RT,, | Performed by: RADIOLOGY

## 2023-04-12 PROCEDURE — 73030 X-RAY EXAM OF SHOULDER: CPT | Mod: 26,RT,, | Performed by: RADIOLOGY

## 2023-04-12 RX ORDER — DIAZEPAM 10 MG/1
10 TABLET ORAL ONCE
Qty: 1 TABLET | Refills: 0 | Status: SHIPPED | OUTPATIENT
Start: 2023-04-12 | End: 2023-05-23

## 2023-04-12 NOTE — PROGRESS NOTES
2023    Past Medical History:   Diagnosis Date    Breast cancer, stage 1, estrogen receptor negative, left () 2020    Depression     Diabetes mellitus, type 2     GERD (gastroesophageal reflux disease)     HER2-positive carcinoma of left breast 2020    Hx of breast cancer     Hx of breast cancer - Her2Nu+/ER+ - 2011 12/3/2019    Insomnia     Lymphedema     Neuropathy of both feet     S/P lumpectomy, left breast 2020       Past Surgical History:   Procedure Laterality Date    BICEPS TENDON REPAIR Left 2005    BREAST BIOPSY Left     BREAST LUMPECTOMY Left      SECTION      , ,     CHOLECYSTECTOMY      Elbow fx Left 2015    FRACTURE SURGERY  2016    elbow    HYSTERECTOMY      KNEE ARTHROSCOPY W/ MENISCAL REPAIR Left     Lower back ruptured disc  2010    LYMPH NODE DISSECTION      SPINE SURGERY  2009    ruptured disc       Current Outpatient Medications   Medication Sig    ascorbic acid, vitamin C, (VITAMIN C) 500 MG tablet Take 500 mg by mouth once daily.    b complex vitamins capsule Take 1 capsule by mouth once daily.    calcium carbonate (OS-JULIANNE) 600 mg calcium (1,500 mg) Tab Take 600 mg by mouth once.    docusate sodium (COLACE) 100 MG capsule Take 1 capsule (100 mg total) by mouth once daily.    furosemide (LASIX) 20 MG tablet Take 1 tablet (20 mg total) by mouth daily as needed.    glucosamine-chondroitin 250-200 mg Tab Take by mouth.    HYDROcodone-acetaminophen (NORCO) 5-325 mg per tablet Take 1 tablet by mouth every 4 to 6 hours as needed.    HYDROcodone-acetaminophen (NORCO) 5-325 mg per tablet Take 1 tablet by mouth every 6 (six) hours as needed for Pain.    LYRICA 200 mg Cap Take 1 capsule (200 mg total) by mouth 3 (three) times daily.    magnesium 250 mg Tab Take 250 mg by mouth once daily.    methocarbamoL (ROBAXIN) 500 MG Tab Take 500 mg by mouth 4 (four) times daily. TAKE 2 TABLET BY MOUTH THREE TIMES DAILY FOR 5 DAYS    multivitamin capsule Take 1  capsule by mouth once daily.    mupirocin (BACTROBAN) 2 % ointment     naproxen (NAPROSYN) 500 MG tablet Take 1 tablet (500 mg total) by mouth 2 (two) times daily.    pantoprazole (PROTONIX) 40 MG tablet Take 1 tablet (40 mg total) by mouth once daily.    terbinafine HCL (LAMISIL) 250 mg tablet Take 250 mg by mouth once daily. TAKE 1 TABLET BY MOUTH EVERY DAY    traZODone (DESYREL) 50 MG tablet Take 2 tablets (100 mg total) by mouth every evening.    venlafaxine (EFFEXOR-XR) 150 MG Cp24 Take 1 capsule (150 mg total) by mouth once daily.    atomoxetine (STRATTERA) 40 MG capsule Take 1 capsule (40 mg total) by mouth once daily.     No current facility-administered medications for this visit.       Review of patient's allergies indicates:   Allergen Reactions    Banana Hives    Codeine Nausea And Vomiting    Dilaudid  [hydromorphone (bulk)] Rash    Hydromorphone hcl Rash       Family History   Problem Relation Age of Onset    Cancer Mother     Breast cancer Mother     Parkinsonism Father     Diabetes Father     Breast cancer Maternal Aunt     Breast cancer Paternal Aunt     Cancer Maternal Aunt     Cancer Paternal Aunt     Cancer Daughter     Heart disease Maternal Grandmother     Heart disease Paternal Grandmother        Social History     Socioeconomic History    Marital status:    Tobacco Use    Smoking status: Former     Packs/day: 0.00     Years: 0.00     Pack years: 0.00     Types: Cigarettes     Quit date: 2010     Years since quittin.3    Smokeless tobacco: Never   Substance and Sexual Activity    Alcohol use: Yes     Alcohol/week: 2.0 standard drinks     Types: 2 Glasses of wine per week     Comment: socially    Drug use: Never    Sexual activity: Yes     Partners: Male     Birth control/protection: Other-see comments, None     Comment: Hysterectomy     Social Determinants of Health     Financial Resource Strain: Low Risk     Difficulty of Paying Living Expenses: Not hard at all   Food  Insecurity: Unknown    Worried About Running Out of Food in the Last Year: Never true    Ran Out of Food in the Last Year: Patient refused   Transportation Needs: No Transportation Needs    Lack of Transportation (Medical): No    Lack of Transportation (Non-Medical): No   Physical Activity: Inactive    Days of Exercise per Week: 0 days    Minutes of Exercise per Session: 30 min   Stress: No Stress Concern Present    Feeling of Stress : Only a little   Social Connections: Unknown    Frequency of Communication with Friends and Family: Three times a week    Frequency of Social Gatherings with Friends and Family: Once a week    Active Member of Clubs or Organizations: No    Attends Club or Organization Meetings: 1 to 4 times per year    Marital Status:    Housing Stability: Low Risk     Unable to Pay for Housing in the Last Year: No    Number of Places Lived in the Last Year: 1    Unstable Housing in the Last Year: No       Chief Complaint:   Chief Complaint   Patient presents with    Right Shoulder - Injury, Pain     DOI: 3/25/2023. Patient fell. States pain is sharp. States she is unable to reach above head. Currently takes Aleve and Norco for pain without relief. Previously injured right shoulder on 3/1/2023. Pain decreases with no use and increases with use.         History of present illness:    This is a 62 y.o. year old female who complains of patient with a history of 2 recent dislocations of the right shoulder dramatically no prior problems with the shoulder patient was placed in a sling last time patient fell about 2 weeks ago she says she is unable to reach above her head patient states that her shoulder feeling better with immobilization    Review of Systems:    Constitution: Denies chills, fever, and sweats.  HENT: Denies headaches or blurry vision.  Cardiovascular: Denies chest pain or irregular heart beat.  Respiratory: Denies cough or shortness of breath.  Gastrointestinal: Denies abdominal pain,  "nausea, or vomiting.  Musculoskeletal:  Denies muscle cramps.  Neurological: Denies dizziness or focal weakness.  Psychiatric/Behavioral: Normal mental status.  Hematologic/Lymphatic: Denies bleeding problem or easy bruising/bleeding.  Skin: Denies rash or suspicious lesions.    Examination:    Vital Signs:    Vitals:    04/12/23 0921   Weight: 112.6 kg (248 lb 3.8 oz)   Height: 5' 10" (1.778 m)   PainSc: 0-No pain   PainLoc: Shoulder       Body mass index is 35.62 kg/m².    This a well-developed, well nourished patient in no acute distress.    Alert and oriented x 3 and cooperative to examination.       Physical Exam:  Right shoulder-patient's right shoulder looks well reduced patient has difficulty with abduction and has weakness in abduction strength    Imaging:  X-ray of the right shoulder shows the humeral head to be well reduced no evidence of any recent fractures       Assessment: Traumatic complete tear of right rotator cuff, subsequent encounter        Plan:  We will get an MRI of the right shoulder      DISCLAIMER: This note may have been dictated using voice recognition software and may contain grammatical errors.     NOTE: Consult report sent to referring provider via [facility] EMR.    "

## 2023-04-24 ENCOUNTER — HOSPITAL ENCOUNTER (OUTPATIENT)
Dept: RADIOLOGY | Facility: HOSPITAL | Age: 63
Discharge: HOME | End: 2023-04-24
Attending: ORTHOPAEDIC SURGERY
Payer: MEDICARE

## 2023-04-24 DIAGNOSIS — S46.011D TRAUMATIC COMPLETE TEAR OF RIGHT ROTATOR CUFF, SUBSEQUENT ENCOUNTER: ICD-10-CM

## 2023-04-24 PROCEDURE — 73221 MRI JOINT UPR EXTREM W/O DYE: CPT | Mod: TC,PO,RT

## 2023-04-26 ENCOUNTER — OFFICE VISIT (OUTPATIENT)
Dept: ORTHOPEDICS | Facility: CLINIC | Age: 63
End: 2023-04-26
Payer: MEDICARE

## 2023-04-26 VITALS — HEIGHT: 70 IN | BODY MASS INDEX: 35.54 KG/M2 | WEIGHT: 248.25 LBS

## 2023-04-26 DIAGNOSIS — S46.011D TRAUMATIC COMPLETE TEAR OF RIGHT ROTATOR CUFF, SUBSEQUENT ENCOUNTER: Primary | ICD-10-CM

## 2023-04-26 PROCEDURE — 99213 OFFICE O/P EST LOW 20 MIN: CPT | Mod: S$PBB,,, | Performed by: ORTHOPAEDIC SURGERY

## 2023-04-26 PROCEDURE — 99213 PR OFFICE/OUTPT VISIT, EST, LEVL III, 20-29 MIN: ICD-10-PCS | Mod: S$PBB,,, | Performed by: ORTHOPAEDIC SURGERY

## 2023-04-26 PROCEDURE — 99999 PR PBB SHADOW E&M-EST. PATIENT-LVL III: CPT | Mod: PBBFAC,,, | Performed by: ORTHOPAEDIC SURGERY

## 2023-04-26 PROCEDURE — 99999 PR PBB SHADOW E&M-EST. PATIENT-LVL III: ICD-10-PCS | Mod: PBBFAC,,, | Performed by: ORTHOPAEDIC SURGERY

## 2023-04-26 PROCEDURE — 99213 OFFICE O/P EST LOW 20 MIN: CPT | Mod: PBBFAC,PN | Performed by: ORTHOPAEDIC SURGERY

## 2023-04-26 NOTE — PROGRESS NOTES
2023    Past Medical History:   Diagnosis Date    Breast cancer, stage 1, estrogen receptor negative, left () 2020    Depression     Diabetes mellitus, type 2     GERD (gastroesophageal reflux disease)     HER2-positive carcinoma of left breast 2020    Hx of breast cancer     Hx of breast cancer - Her2Nu+/ER+ - 2011 12/3/2019    Insomnia     Lymphedema     Neuropathy of both feet     S/P lumpectomy, left breast 2020       Past Surgical History:   Procedure Laterality Date    BICEPS TENDON REPAIR Left 2005    BREAST BIOPSY Left     BREAST LUMPECTOMY Left      SECTION      , ,     CHOLECYSTECTOMY      Elbow fx Left 2015    FRACTURE SURGERY  2016    elbow    HYSTERECTOMY      KNEE ARTHROSCOPY W/ MENISCAL REPAIR Left     Lower back ruptured disc  2010    LYMPH NODE DISSECTION      SPINE SURGERY  2009    ruptured disc       Current Outpatient Medications   Medication Sig    ascorbic acid, vitamin C, (VITAMIN C) 500 MG tablet Take 500 mg by mouth once daily.    atomoxetine (STRATTERA) 40 MG capsule Take 1 capsule (40 mg total) by mouth once daily.    b complex vitamins capsule Take 1 capsule by mouth once daily.    calcium carbonate (OS-JULIANNE) 600 mg calcium (1,500 mg) Tab Take 600 mg by mouth once.    diazePAM (VALIUM) 10 MG Tab Take 1 tablet (10 mg total) by mouth once. Take one tablet one hour prior to MRI for 1 dose    docusate sodium (COLACE) 100 MG capsule Take 1 capsule (100 mg total) by mouth once daily.    furosemide (LASIX) 20 MG tablet Take 1 tablet (20 mg total) by mouth daily as needed.    glucosamine-chondroitin 250-200 mg Tab Take by mouth.    HYDROcodone-acetaminophen (NORCO) 5-325 mg per tablet Take 1 tablet by mouth every 4 to 6 hours as needed.    HYDROcodone-acetaminophen (NORCO) 5-325 mg per tablet Take 1 tablet by mouth every 6 (six) hours as needed for Pain.    LYRICA 200 mg Cap Take 1 capsule (200 mg total) by mouth 3 (three) times daily.    magnesium  250 mg Tab Take 250 mg by mouth once daily.    methocarbamoL (ROBAXIN) 500 MG Tab Take 500 mg by mouth 4 (four) times daily. TAKE 2 TABLET BY MOUTH THREE TIMES DAILY FOR 5 DAYS    multivitamin capsule Take 1 capsule by mouth once daily.    mupirocin (BACTROBAN) 2 % ointment     naproxen (NAPROSYN) 500 MG tablet Take 1 tablet (500 mg total) by mouth 2 (two) times daily.    pantoprazole (PROTONIX) 40 MG tablet Take 1 tablet (40 mg total) by mouth once daily.    terbinafine HCL (LAMISIL) 250 mg tablet Take 250 mg by mouth once daily. TAKE 1 TABLET BY MOUTH EVERY DAY    traZODone (DESYREL) 50 MG tablet Take 2 tablets (100 mg total) by mouth every evening.    venlafaxine (EFFEXOR-XR) 150 MG Cp24 Take 1 capsule (150 mg total) by mouth once daily.     No current facility-administered medications for this visit.       Review of patient's allergies indicates:   Allergen Reactions    Banana Hives    Codeine Nausea And Vomiting    Dilaudid  [hydromorphone (bulk)] Rash    Hydromorphone hcl Rash       Family History   Problem Relation Age of Onset    Cancer Mother     Breast cancer Mother     Parkinsonism Father     Diabetes Father     Breast cancer Maternal Aunt     Breast cancer Paternal Aunt     Cancer Maternal Aunt     Cancer Paternal Aunt     Cancer Daughter     Heart disease Maternal Grandmother     Heart disease Paternal Grandmother        Social History     Socioeconomic History    Marital status:    Tobacco Use    Smoking status: Former     Packs/day: 0.00     Years: 0.00     Pack years: 0.00     Types: Cigarettes     Quit date: 2010     Years since quittin.4    Smokeless tobacco: Never   Substance and Sexual Activity    Alcohol use: Yes     Alcohol/week: 2.0 standard drinks     Types: 2 Glasses of wine per week     Comment: socially    Drug use: Never    Sexual activity: Yes     Partners: Male     Birth control/protection: Other-see comments, None     Comment: Hysterectomy     Social Determinants of  Health     Financial Resource Strain: Low Risk     Difficulty of Paying Living Expenses: Not hard at all   Food Insecurity: Unknown    Worried About Running Out of Food in the Last Year: Never true    Ran Out of Food in the Last Year: Patient refused   Transportation Needs: No Transportation Needs    Lack of Transportation (Medical): No    Lack of Transportation (Non-Medical): No   Physical Activity: Inactive    Days of Exercise per Week: 0 days    Minutes of Exercise per Session: 30 min   Stress: No Stress Concern Present    Feeling of Stress : Only a little   Social Connections: Unknown    Frequency of Communication with Friends and Family: Three times a week    Frequency of Social Gatherings with Friends and Family: Once a week    Active Member of Clubs or Organizations: No    Attends Club or Organization Meetings: 1 to 4 times per year    Marital Status:    Housing Stability: Low Risk     Unable to Pay for Housing in the Last Year: No    Number of Places Lived in the Last Year: 1    Unstable Housing in the Last Year: No       Chief Complaint: No chief complaint on file.        History of present illness:    This is a 62 y.o. year old female who complains of patient returns today for follow-up of her right shoulder patient had a recent MRI she continues to complain of weakness in the right shoulder    Review of Systems:    Constitution: Denies chills, fever, and sweats.  HENT: Denies headaches or blurry vision.  Cardiovascular: Denies chest pain or irregular heart beat.  Respiratory: Denies cough or shortness of breath.  Gastrointestinal: Denies abdominal pain, nausea, or vomiting.  Musculoskeletal:  Denies muscle cramps.  Neurological: Denies dizziness or focal weakness.  Psychiatric/Behavioral: Normal mental status.  Hematologic/Lymphatic: Denies bleeding problem or easy bruising/bleeding.  Skin: Denies rash or suspicious lesions.    Examination:    Vital Signs:    Vitals:    04/26/23 0936   Weight:  "112.6 kg (248 lb 3.8 oz)   Height: 5' 10" (1.778 m)   PainSc:   2   PainLoc: Shoulder       Body mass index is 35.62 kg/m².    This a well-developed, well nourished patient in no acute distress.    Alert and oriented x 3 and cooperative to examination.       Physical Exam:  Right shoulder-patient appears to be comfortable the right shoulder but she has severe weakness in abduction strength with pain on abduction she is neurovascularly intact her deltoid is intact    Imaging:  Patient has an MRI of the right shoulder which shows a severe and massive tear of her infraspinatus and supraspinatus tendons they are retracted over 4 cm on both tendons she had proximal migration of the humeral head she has a large Hill-Sachs lesion secondary to her 2 previous shoulder dislocations       Assessment: Traumatic complete tear of right rotator cuff, subsequent encounter        Plan:  This patient has a severe tear of her rotator cuff she is had a history of dislocations dating back to October of 2022 her most recent 1 was back in February of 2023 she did well after her 1st dislocation and once again fell injuring her shoulder patient was discovered to have a rotator cuff tear on her most recent MRI it is severely retracted the head is proximally migrated and she has a large Hill-Sachs lesion from her prior dislocations.  I am going to refer this patient to Dr. Ch at [facility] for evaluation of her shoulder I think she is a candidate for reverse shoulder I do not think I am able to repair her severely retracted rotator cuff tear.  Patient also has a history of prior dislocations with a large Hill-Sachs lesion.      DISCLAIMER: This note may have been dictated using voice recognition software and may contain grammatical errors.     NOTE: Consult report sent to referring provider via [facility] EMR.  "

## 2023-05-01 ENCOUNTER — PATIENT MESSAGE (OUTPATIENT)
Dept: FAMILY MEDICINE | Facility: CLINIC | Age: 63
End: 2023-05-01

## 2023-05-01 ENCOUNTER — PATIENT MESSAGE (OUTPATIENT)
Dept: REHABILITATION | Facility: HOSPITAL | Age: 63
End: 2023-05-01
Payer: MEDICARE

## 2023-05-08 ENCOUNTER — TELEPHONE (OUTPATIENT)
Dept: ORTHOPEDICS | Facility: CLINIC | Age: 63
End: 2023-05-08
Payer: MEDICARE

## 2023-05-08 DIAGNOSIS — S46.011D TRAUMATIC COMPLETE TEAR OF RIGHT ROTATOR CUFF, SUBSEQUENT ENCOUNTER: Primary | ICD-10-CM

## 2023-05-08 RX ORDER — VENLAFAXINE HYDROCHLORIDE 150 MG/1
150 CAPSULE, EXTENDED RELEASE ORAL DAILY
Qty: 90 CAPSULE | Refills: 1 | Status: SHIPPED | OUTPATIENT
Start: 2023-05-08 | End: 2023-06-15 | Stop reason: SDUPTHER

## 2023-05-08 NOTE — TELEPHONE ENCOUNTER
Pt is requesting to know if there is anyone else that she could see on the [facility]  sooner than Dr. Harris , as he only sees new pts on Mondays and she is dependant on her  for transportation.

## 2023-05-08 NOTE — TELEPHONE ENCOUNTER
----- Message from Cynthia Zimmerman sent at 5/8/2023 11:57 AM CDT -----  [facility] is calling for refill on venlofaxine 150 mg

## 2023-05-08 NOTE — TELEPHONE ENCOUNTER
----- Message from Eliz Machado MA sent at 5/8/2023  1:32 PM CDT -----  Contact: pt  Ruddy Ch in [place].    Appt is July 03  Call back

## 2023-05-18 ENCOUNTER — OFFICE VISIT (OUTPATIENT)
Dept: ORTHOPEDICS | Facility: CLINIC | Age: 63
End: 2023-05-18
Payer: MEDICARE

## 2023-05-18 VITALS
DIASTOLIC BLOOD PRESSURE: 84 MMHG | WEIGHT: 245 LBS | HEIGHT: 70 IN | BODY MASS INDEX: 35.07 KG/M2 | SYSTOLIC BLOOD PRESSURE: 130 MMHG

## 2023-05-18 DIAGNOSIS — S46.011D TRAUMATIC COMPLETE TEAR OF RIGHT ROTATOR CUFF, SUBSEQUENT ENCOUNTER: Primary | ICD-10-CM

## 2023-05-18 DIAGNOSIS — S46.011D TRAUMATIC COMPLETE TEAR OF RIGHT ROTATOR CUFF, SUBSEQUENT ENCOUNTER: ICD-10-CM

## 2023-05-18 DIAGNOSIS — Z87.39 HISTORY OF CLOSED SHOULDER DISLOCATION: Primary | ICD-10-CM

## 2023-05-18 PROCEDURE — 99203 PR OFFICE/OUTPT VISIT, NEW, LEVL III, 30-44 MIN: ICD-10-PCS | Mod: S$GLB,,, | Performed by: ORTHOPAEDIC SURGERY

## 2023-05-18 PROCEDURE — 99203 OFFICE O/P NEW LOW 30 MIN: CPT | Mod: S$GLB,,, | Performed by: ORTHOPAEDIC SURGERY

## 2023-05-18 NOTE — H&P (VIEW-ONLY)
[facility]    Subjective:     Chief Complaint:   Chief Complaint   Patient presents with    Right Shoulder - Pain     Right shoulder x months, fell and dislocated twice, last one 2023, painful and limited ROM       Past Medical History:   Diagnosis Date    Breast cancer, stage 1, estrogen receptor negative, left () 2020    Depression     Diabetes mellitus, type 2     GERD (gastroesophageal reflux disease)     HER2-positive carcinoma of left breast 2020    Hx of breast cancer     Hx of breast cancer - Her2Nu+/ER+ - 2011 12/3/2019    Insomnia     Lymphedema     Neuropathy of both feet     S/P lumpectomy, left breast 2020       Past Surgical History:   Procedure Laterality Date    BICEPS TENDON REPAIR Left 2005    BREAST BIOPSY Left     BREAST LUMPECTOMY Left      SECTION      , ,     CHOLECYSTECTOMY      Elbow fx Left 2015    FRACTURE SURGERY  2016    elbow    HYSTERECTOMY      KNEE ARTHROSCOPY W/ MENISCAL REPAIR Left     Lower back ruptured disc  2010    LYMPH NODE DISSECTION      SPINE SURGERY  2009    ruptured disc       Current Outpatient Medications   Medication Sig    ascorbic acid, vitamin C, (VITAMIN C) 500 MG tablet Take 500 mg by mouth once daily.    atomoxetine (STRATTERA) 40 MG capsule Take 1 capsule (40 mg total) by mouth once daily.    b complex vitamins capsule Take 1 capsule by mouth once daily.    calcium carbonate (OS-JULIANNE) 600 mg calcium (1,500 mg) Tab Take 600 mg by mouth once.    docusate sodium (COLACE) 100 MG capsule Take 1 capsule (100 mg total) by mouth once daily.    furosemide (LASIX) 20 MG tablet Take 1 tablet (20 mg total) by mouth daily as needed.    glucosamine-chondroitin 250-200 mg Tab Take by mouth.    HYDROcodone-acetaminophen (NORCO) 5-325 mg per tablet Take 1 tablet by mouth every 4 to 6 hours as needed.    HYDROcodone-acetaminophen (NORCO) 5-325 mg per tablet Take 1 tablet by mouth every 6 (six) hours as needed for Pain.     LYRICA 200 mg Cap Take 1 capsule (200 mg total) by mouth 3 (three) times daily.    magnesium 250 mg Tab Take 250 mg by mouth once daily.    multivitamin capsule Take 1 capsule by mouth once daily.    mupirocin (BACTROBAN) 2 % ointment     naproxen (NAPROSYN) 500 MG tablet Take 1 tablet (500 mg total) by mouth 2 (two) times daily.    pantoprazole (PROTONIX) 40 MG tablet Take 1 tablet (40 mg total) by mouth once daily.    terbinafine HCL (LAMISIL) 250 mg tablet Take 250 mg by mouth once daily. TAKE 1 TABLET BY MOUTH EVERY DAY    traZODone (DESYREL) 50 MG tablet Take 2 tablets (100 mg total) by mouth every evening.    venlafaxine (EFFEXOR-XR) 150 MG Cp24 Take 1 capsule (150 mg total) by mouth once daily.    diazePAM (VALIUM) 10 MG Tab Take 1 tablet (10 mg total) by mouth once. Take one tablet one hour prior to MRI for 1 dose    methocarbamoL (ROBAXIN) 500 MG Tab Take 500 mg by mouth 4 (four) times daily. TAKE 2 TABLET BY MOUTH THREE TIMES DAILY FOR 5 DAYS     No current facility-administered medications for this visit.       Review of patient's allergies indicates:   Allergen Reactions    Banana Hives    Codeine Nausea And Vomiting    Dilaudid  [hydromorphone (bulk)] Rash    Hydromorphone hcl Rash       Family History   Problem Relation Age of Onset    Cancer Mother     Breast cancer Mother     Parkinsonism Father     Diabetes Father     Breast cancer Maternal Aunt     Breast cancer Paternal Aunt     Cancer Maternal Aunt     Cancer Paternal Aunt     Cancer Daughter     Heart disease Maternal Grandmother     Heart disease Paternal Grandmother        Social History     Socioeconomic History    Marital status:    Tobacco Use    Smoking status: Former     Packs/day: 0.00     Years: 0.00     Pack years: 0.00     Types: Cigarettes     Quit date: 2010     Years since quittin.4    Smokeless tobacco: Never   Substance and Sexual Activity    Alcohol use: Yes     Alcohol/week: 2.0 standard drinks     Types: 2  Glasses of wine per week     Comment: socially    Drug use: Never    Sexual activity: Yes     Partners: Male     Birth control/protection: Other-see comments, None     Comment: Hysterectomy     Social Determinants of Health     Financial Resource Strain: Low Risk     Difficulty of Paying Living Expenses: Not hard at all   Food Insecurity: Unknown    Worried About Running Out of Food in the Last Year: Never true    Ran Out of Food in the Last Year: Patient refused   Transportation Needs: No Transportation Needs    Lack of Transportation (Medical): No    Lack of Transportation (Non-Medical): No   Physical Activity: Inactive    Days of Exercise per Week: 0 days    Minutes of Exercise per Session: 30 min   Stress: No Stress Concern Present    Feeling of Stress : Only a little   Social Connections: Unknown    Frequency of Communication with Friends and Family: Three times a week    Frequency of Social Gatherings with Friends and Family: Once a week    Active Member of Clubs or Organizations: No    Attends Club or Organization Meetings: 1 to 4 times per year    Marital Status:    Housing Stability: Low Risk     Unable to Pay for Housing in the Last Year: No    Number of Places Lived in the Last Year: 1    Unstable Housing in the Last Year: No       History of present illness:  Patient comes in today for the right shoulder.  She has had right shoulder problems since November of last year.  At that time she had a dislocation.  This was followed by a secondary dislocation.  An MRI was done which showed a massive rotator cuff tear.      Review of Systems:    Constitution: Negative for chills, fever, and sweats.  Negative for unexplained weight loss.    HENT:  Negative for headaches and blurry vision.    Cardiovascular:Negative for chest pain or irregular heart beat. Negative for hypertension.    Respiratory:  Negative for cough and shortness of breath.    Gastrointestinal: Negative for abdominal pain, heartburn,  melena, nausea, and vomitting.    Genitourinary:  Negative bladder incontinence and dysuria.    Musculoskeletal:  See HPI for details.     Neurological: Negative for numbness.    Psychiatric/Behavioral: Negative for depression.  The patient is not nervous/anxious.      Endocrine: Negative for polyuria    Hematologic/Lymphatic: Negative for bleeding problem.  Does not bruise/bleed easily.    Skin: Negative for poor would healing and rash    Objective:      Physical Examination:    Vital Signs:    Vitals:    05/18/23 1305   BP: 130/84       Body mass index is 35.15 kg/m².    This a well-developed, well nourished patient in no acute distress.  They are alert and oriented and cooperative to examination.        Patient has full passive range of motion right shoulder.  Her rotator cuff is profoundly weak.  She does have anterior apprehension.  Pertinent New Results:    XRAY Report / Interpretation:       Assessment/Plan:      Right shoulder massive rotator cuff tear following a dislocation.  I have offered her rotator cuff repair.  She clearly understands that there is a chance this will simply Fail as the tear is too large in the tissue is potentially not of good enough quality.  If that happens then she will need a reverse total shoulder.  She is only 62 years old.  I think it is worth trying to save her cuff.  If we get in there during the arthroscopy in the tissue is of poor quality we will simply proceed with a debridement and schedule her for a reverse total shoulder later date      This note was created using Dragon voice recognition software that occasionally misinterpreted phrases or words.

## 2023-05-18 NOTE — PROGRESS NOTES
[facility]    Subjective:     Chief Complaint:   Chief Complaint   Patient presents with    Right Shoulder - Pain     Right shoulder x months, fell and dislocated twice, last one 2023, painful and limited ROM       Past Medical History:   Diagnosis Date    Breast cancer, stage 1, estrogen receptor negative, left () 2020    Depression     Diabetes mellitus, type 2     GERD (gastroesophageal reflux disease)     HER2-positive carcinoma of left breast 2020    Hx of breast cancer     Hx of breast cancer - Her2Nu+/ER+ - 2011 12/3/2019    Insomnia     Lymphedema     Neuropathy of both feet     S/P lumpectomy, left breast 2020       Past Surgical History:   Procedure Laterality Date    BICEPS TENDON REPAIR Left 2005    BREAST BIOPSY Left     BREAST LUMPECTOMY Left      SECTION      , ,     CHOLECYSTECTOMY      Elbow fx Left 2015    FRACTURE SURGERY  2016    elbow    HYSTERECTOMY      KNEE ARTHROSCOPY W/ MENISCAL REPAIR Left     Lower back ruptured disc  2010    LYMPH NODE DISSECTION      SPINE SURGERY  2009    ruptured disc       Current Outpatient Medications   Medication Sig    ascorbic acid, vitamin C, (VITAMIN C) 500 MG tablet Take 500 mg by mouth once daily.    atomoxetine (STRATTERA) 40 MG capsule Take 1 capsule (40 mg total) by mouth once daily.    b complex vitamins capsule Take 1 capsule by mouth once daily.    calcium carbonate (OS-JULIANNE) 600 mg calcium (1,500 mg) Tab Take 600 mg by mouth once.    docusate sodium (COLACE) 100 MG capsule Take 1 capsule (100 mg total) by mouth once daily.    furosemide (LASIX) 20 MG tablet Take 1 tablet (20 mg total) by mouth daily as needed.    glucosamine-chondroitin 250-200 mg Tab Take by mouth.    HYDROcodone-acetaminophen (NORCO) 5-325 mg per tablet Take 1 tablet by mouth every 4 to 6 hours as needed.    HYDROcodone-acetaminophen (NORCO) 5-325 mg per tablet Take 1 tablet by mouth every 6 (six) hours as needed for Pain.     LYRICA 200 mg Cap Take 1 capsule (200 mg total) by mouth 3 (three) times daily.    magnesium 250 mg Tab Take 250 mg by mouth once daily.    multivitamin capsule Take 1 capsule by mouth once daily.    mupirocin (BACTROBAN) 2 % ointment     naproxen (NAPROSYN) 500 MG tablet Take 1 tablet (500 mg total) by mouth 2 (two) times daily.    pantoprazole (PROTONIX) 40 MG tablet Take 1 tablet (40 mg total) by mouth once daily.    terbinafine HCL (LAMISIL) 250 mg tablet Take 250 mg by mouth once daily. TAKE 1 TABLET BY MOUTH EVERY DAY    traZODone (DESYREL) 50 MG tablet Take 2 tablets (100 mg total) by mouth every evening.    venlafaxine (EFFEXOR-XR) 150 MG Cp24 Take 1 capsule (150 mg total) by mouth once daily.    diazePAM (VALIUM) 10 MG Tab Take 1 tablet (10 mg total) by mouth once. Take one tablet one hour prior to MRI for 1 dose    methocarbamoL (ROBAXIN) 500 MG Tab Take 500 mg by mouth 4 (four) times daily. TAKE 2 TABLET BY MOUTH THREE TIMES DAILY FOR 5 DAYS     No current facility-administered medications for this visit.       Review of patient's allergies indicates:   Allergen Reactions    Banana Hives    Codeine Nausea And Vomiting    Dilaudid  [hydromorphone (bulk)] Rash    Hydromorphone hcl Rash       Family History   Problem Relation Age of Onset    Cancer Mother     Breast cancer Mother     Parkinsonism Father     Diabetes Father     Breast cancer Maternal Aunt     Breast cancer Paternal Aunt     Cancer Maternal Aunt     Cancer Paternal Aunt     Cancer Daughter     Heart disease Maternal Grandmother     Heart disease Paternal Grandmother        Social History     Socioeconomic History    Marital status:    Tobacco Use    Smoking status: Former     Packs/day: 0.00     Years: 0.00     Pack years: 0.00     Types: Cigarettes     Quit date: 2010     Years since quittin.4    Smokeless tobacco: Never   Substance and Sexual Activity    Alcohol use: Yes     Alcohol/week: 2.0 standard drinks     Types: 2  Glasses of wine per week     Comment: socially    Drug use: Never    Sexual activity: Yes     Partners: Male     Birth control/protection: Other-see comments, None     Comment: Hysterectomy     Social Determinants of Health     Financial Resource Strain: Low Risk     Difficulty of Paying Living Expenses: Not hard at all   Food Insecurity: Unknown    Worried About Running Out of Food in the Last Year: Never true    Ran Out of Food in the Last Year: Patient refused   Transportation Needs: No Transportation Needs    Lack of Transportation (Medical): No    Lack of Transportation (Non-Medical): No   Physical Activity: Inactive    Days of Exercise per Week: 0 days    Minutes of Exercise per Session: 30 min   Stress: No Stress Concern Present    Feeling of Stress : Only a little   Social Connections: Unknown    Frequency of Communication with Friends and Family: Three times a week    Frequency of Social Gatherings with Friends and Family: Once a week    Active Member of Clubs or Organizations: No    Attends Club or Organization Meetings: 1 to 4 times per year    Marital Status:    Housing Stability: Low Risk     Unable to Pay for Housing in the Last Year: No    Number of Places Lived in the Last Year: 1    Unstable Housing in the Last Year: No       History of present illness:  Patient comes in today for the right shoulder.  She has had right shoulder problems since November of last year.  At that time she had a dislocation.  This was followed by a secondary dislocation.  An MRI was done which showed a massive rotator cuff tear.      Review of Systems:    Constitution: Negative for chills, fever, and sweats.  Negative for unexplained weight loss.    HENT:  Negative for headaches and blurry vision.    Cardiovascular:Negative for chest pain or irregular heart beat. Negative for hypertension.    Respiratory:  Negative for cough and shortness of breath.    Gastrointestinal: Negative for abdominal pain, heartburn,  melena, nausea, and vomitting.    Genitourinary:  Negative bladder incontinence and dysuria.    Musculoskeletal:  See HPI for details.     Neurological: Negative for numbness.    Psychiatric/Behavioral: Negative for depression.  The patient is not nervous/anxious.      Endocrine: Negative for polyuria    Hematologic/Lymphatic: Negative for bleeding problem.  Does not bruise/bleed easily.    Skin: Negative for poor would healing and rash    Objective:      Physical Examination:    Vital Signs:    Vitals:    05/18/23 1305   BP: 130/84       Body mass index is 35.15 kg/m².    This a well-developed, well nourished patient in no acute distress.  They are alert and oriented and cooperative to examination.        Patient has full passive range of motion right shoulder.  Her rotator cuff is profoundly weak.  She does have anterior apprehension.  Pertinent New Results:    XRAY Report / Interpretation:       Assessment/Plan:      Right shoulder massive rotator cuff tear following a dislocation.  I have offered her rotator cuff repair.  She clearly understands that there is a chance this will simply Fail as the tear is too large in the tissue is potentially not of good enough quality.  If that happens then she will need a reverse total shoulder.  She is only 62 years old.  I think it is worth trying to save her cuff.  If we get in there during the arthroscopy in the tissue is of poor quality we will simply proceed with a debridement and schedule her for a reverse total shoulder later date      This note was created using Dragon voice recognition software that occasionally misinterpreted phrases or words.

## 2023-05-22 DIAGNOSIS — S46.011D TRAUMATIC COMPLETE TEAR OF RIGHT ROTATOR CUFF, SUBSEQUENT ENCOUNTER: Primary | ICD-10-CM

## 2023-05-22 RX ORDER — HYDROCODONE BITARTRATE AND ACETAMINOPHEN 7.5; 325 MG/1; MG/1
1 TABLET ORAL EVERY 6 HOURS PRN
Qty: 28 TABLET | Refills: 0 | Status: SHIPPED | OUTPATIENT
Start: 2023-05-22 | End: 2023-05-29

## 2023-05-23 ENCOUNTER — HOSPITAL ENCOUNTER (OUTPATIENT)
Dept: PREADMISSION TESTING | Facility: HOSPITAL | Age: 63
Discharge: HOME | End: 2023-05-23
Attending: ORTHOPAEDIC SURGERY
Payer: MEDICARE

## 2023-05-23 VITALS
TEMPERATURE: 98 F | WEIGHT: 246.94 LBS | HEIGHT: 70 IN | BODY MASS INDEX: 35.35 KG/M2 | OXYGEN SATURATION: 96 % | RESPIRATION RATE: 18 BRPM | HEART RATE: 75 BPM | DIASTOLIC BLOOD PRESSURE: 84 MMHG | SYSTOLIC BLOOD PRESSURE: 135 MMHG

## 2023-05-23 DIAGNOSIS — S46.011D TRAUMATIC COMPLETE TEAR OF RIGHT ROTATOR CUFF, SUBSEQUENT ENCOUNTER: ICD-10-CM

## 2023-05-23 LAB
ALBUMIN SERPL BCP-MCNC: 3.5 G/DL (ref 3.5–5.2)
ALP SERPL-CCNC: 64 U/L (ref 55–135)
ALT SERPL W/O P-5'-P-CCNC: 15 U/L (ref 10–44)
ANION GAP SERPL CALC-SCNC: 7 MMOL/L (ref 8–16)
AST SERPL-CCNC: 16 U/L (ref 10–40)
BASOPHILS # BLD AUTO: 0.08 K/UL (ref 0–0.2)
BASOPHILS NFR BLD: 1.5 % (ref 0–1.9)
BILIRUB SERPL-MCNC: 0.4 MG/DL (ref 0.1–1)
BUN SERPL-MCNC: 19 MG/DL (ref 8–23)
CALCIUM SERPL-MCNC: 8.9 MG/DL (ref 8.7–10.5)
CHLORIDE SERPL-SCNC: 106 MMOL/L (ref 95–110)
CO2 SERPL-SCNC: 30 MMOL/L (ref 23–29)
CREAT SERPL-MCNC: 0.8 MG/DL (ref 0.5–1.4)
DIFFERENTIAL METHOD: ABNORMAL
EOSINOPHIL # BLD AUTO: 0.4 K/UL (ref 0–0.5)
EOSINOPHIL NFR BLD: 8 % (ref 0–8)
ERYTHROCYTE [DISTWIDTH] IN BLOOD BY AUTOMATED COUNT: 15.9 % (ref 11.5–14.5)
EST. GFR  (NO RACE VARIABLE): >60 ML/MIN/1.73 M^2
GLUCOSE SERPL-MCNC: 99 MG/DL (ref 70–110)
HCT VFR BLD AUTO: 34.7 % (ref 37–48.5)
HGB BLD-MCNC: 10.8 G/DL (ref 12–16)
IMM GRANULOCYTES # BLD AUTO: 0.02 K/UL (ref 0–0.04)
IMM GRANULOCYTES NFR BLD AUTO: 0.4 % (ref 0–0.5)
LYMPHOCYTES # BLD AUTO: 1.5 K/UL (ref 1–4.8)
LYMPHOCYTES NFR BLD: 28.1 % (ref 18–48)
MCH RBC QN AUTO: 25.8 PG (ref 27–31)
MCHC RBC AUTO-ENTMCNC: 31.1 G/DL (ref 32–36)
MCV RBC AUTO: 83 FL (ref 82–98)
MONOCYTES # BLD AUTO: 0.6 K/UL (ref 0.3–1)
MONOCYTES NFR BLD: 10.4 % (ref 4–15)
NEUTROPHILS # BLD AUTO: 2.8 K/UL (ref 1.8–7.7)
NEUTROPHILS NFR BLD: 51.6 % (ref 38–73)
NRBC BLD-RTO: 0 /100 WBC
PLATELET # BLD AUTO: 292 K/UL (ref 150–450)
PMV BLD AUTO: 10.6 FL (ref 9.2–12.9)
POTASSIUM SERPL-SCNC: 3.9 MMOL/L (ref 3.5–5.1)
PROT SERPL-MCNC: 6.4 G/DL (ref 6–8.4)
RBC # BLD AUTO: 4.19 M/UL (ref 4–5.4)
SODIUM SERPL-SCNC: 143 MMOL/L (ref 136–145)
WBC # BLD AUTO: 5.38 K/UL (ref 3.9–12.7)

## 2023-05-23 PROCEDURE — 93010 ELECTROCARDIOGRAM REPORT: CPT | Mod: ,,, | Performed by: SPECIALIST

## 2023-05-23 PROCEDURE — 93005 ELECTROCARDIOGRAM TRACING: CPT | Performed by: SPECIALIST

## 2023-05-23 PROCEDURE — 80053 COMPREHEN METABOLIC PANEL: CPT | Performed by: ORTHOPAEDIC SURGERY

## 2023-05-23 PROCEDURE — 93010 EKG 12-LEAD: ICD-10-PCS | Mod: ,,, | Performed by: SPECIALIST

## 2023-05-23 PROCEDURE — 85025 COMPLETE CBC W/AUTO DIFF WBC: CPT | Performed by: ORTHOPAEDIC SURGERY

## 2023-05-23 NOTE — DISCHARGE INSTRUCTIONS
To confirm, Your doctor has instructed you that surgery is scheduled for:   Wednesday, May 24, 2023    Pre-Op will call the afternoon prior to surgery between 4:00 and 6:00 PM with the final arrival time.      Please report to [facility] [place] via [facility] entrance. Check in at registration desk.    Do not eat or drink anything after midnight the night before your surgery - THIS INCLUDES  WATER, GUM, MINTS AND CANDY.  YOU MAY BRUSH YOUR TEETH BUT DO NOT SWALLOW     TAKE ONLY THESE MEDICATIONS WITH A SMALL SIP OF WATER THE MORNING OF YOUR PROCEDURE:  Ok to take pain pill morning of procedure.      PLEASE NOTE:  The surgery schedule has many variables which may affect the time of your surgery case.  Family members should be available if your surgery time changes.  Plan to be here the day of your procedure between 4-6 hours.      DO NOT TAKE THESE MEDICATIONS 5-7 DAYS PRIOR to your procedure or per your surgeon's request: ASPIRIN, ALEVE, ADVIL, IBUPROFEN,  ESTHER SELTZER, BC , FISH OIL , VITAMIN E, HERBALS  (May take Tylenol)                                                         IMPORTANT INSTRUCTIONS    Do not smoke, vape or drink alcoholic beverages 24 hours prior to your procedure.  Shower the night before AND the morning of your procedure with a Chlorhexidine wash such as Hibiclens or Dial antibacterial soap from the neck down. Do not apply any deodorants, lotions or powders after each shower.  Do not get it on your face or in your eyes.  You may use your own shampoo and face wash. This helps your skin to be as bacteria free as possible.  DO NOT remove hair from the surgery site.  Do not shave the incision site unless you are given specific instructions to do so.    Sleep in a bed with clean sheets.  Do not sleep with a pet in the bed.   If you wear contact lenses, dentures, hearing aids or glasses, bring a container to put them in during surgery and give to a family member for safe keeping.    Please leave  all jewelry, piercing's and valuables at home.     Make arrangements in advance for transportation home by a responsible adult.    You must make arrangements for transportation, TAXI'S, UBER'S OR LYFTS ARE NOT ALLOWED.      If you have any questions about these instructions, call Pre-Op Admit  Nursing at [phone] or the Pre-Op Day Surgery Unit at [phone].

## 2023-05-24 ENCOUNTER — ANESTHESIA EVENT (OUTPATIENT)
Dept: SURGERY | Facility: HOSPITAL | Age: 63
End: 2023-05-24
Payer: MEDICARE

## 2023-05-24 ENCOUNTER — HOSPITAL ENCOUNTER (OUTPATIENT)
Facility: HOSPITAL | Age: 63
Discharge: HOME | End: 2023-05-24
Attending: ORTHOPAEDIC SURGERY | Admitting: ORTHOPAEDIC SURGERY
Payer: MEDICARE

## 2023-05-24 ENCOUNTER — ANESTHESIA (OUTPATIENT)
Dept: SURGERY | Facility: HOSPITAL | Age: 63
End: 2023-05-24
Payer: MEDICARE

## 2023-05-24 VITALS
OXYGEN SATURATION: 97 % | HEART RATE: 77 BPM | RESPIRATION RATE: 15 BRPM | WEIGHT: 246 LBS | HEIGHT: 70 IN | BODY MASS INDEX: 35.22 KG/M2 | SYSTOLIC BLOOD PRESSURE: 146 MMHG | DIASTOLIC BLOOD PRESSURE: 80 MMHG | TEMPERATURE: 98 F

## 2023-05-24 DIAGNOSIS — S46.011D TRAUMATIC COMPLETE TEAR OF RIGHT ROTATOR CUFF, SUBSEQUENT ENCOUNTER: Primary | ICD-10-CM

## 2023-05-24 LAB — GLUCOSE SERPL-MCNC: 90 MG/DL (ref 70–110)

## 2023-05-24 PROCEDURE — 36000710: Performed by: ORTHOPAEDIC SURGERY

## 2023-05-24 PROCEDURE — C9290 INJ, BUPIVACAINE LIPOSOME: HCPCS | Performed by: STUDENT IN AN ORGANIZED HEALTH CARE EDUCATION/TRAINING PROGRAM

## 2023-05-24 PROCEDURE — 71000015 HC POSTOP RECOV 1ST HR: Performed by: ORTHOPAEDIC SURGERY

## 2023-05-24 PROCEDURE — 71000033 HC RECOVERY, INTIAL HOUR: Performed by: ORTHOPAEDIC SURGERY

## 2023-05-24 PROCEDURE — 63600175 PHARM REV CODE 636 W HCPCS: Performed by: ORTHOPAEDIC SURGERY

## 2023-05-24 PROCEDURE — 63600175 PHARM REV CODE 636 W HCPCS: Performed by: STUDENT IN AN ORGANIZED HEALTH CARE EDUCATION/TRAINING PROGRAM

## 2023-05-24 PROCEDURE — D9220A PRA ANESTHESIA: Mod: CRNA,,, | Performed by: NURSE ANESTHETIST, CERTIFIED REGISTERED

## 2023-05-24 PROCEDURE — D9220A PRA ANESTHESIA: Mod: ANES,,, | Performed by: ANESTHESIOLOGY

## 2023-05-24 PROCEDURE — 25000003 PHARM REV CODE 250: Performed by: NURSE ANESTHETIST, CERTIFIED REGISTERED

## 2023-05-24 PROCEDURE — 64415 NJX AA&/STRD BRCH PLXS IMG: CPT | Mod: 59 | Performed by: STUDENT IN AN ORGANIZED HEALTH CARE EDUCATION/TRAINING PROGRAM

## 2023-05-24 PROCEDURE — 71000039 HC RECOVERY, EACH ADD'L HOUR: Performed by: ORTHOPAEDIC SURGERY

## 2023-05-24 PROCEDURE — 25000003 PHARM REV CODE 250: Performed by: ORTHOPAEDIC SURGERY

## 2023-05-24 PROCEDURE — 36000711: Performed by: ORTHOPAEDIC SURGERY

## 2023-05-24 PROCEDURE — 01630 ANES OPN/ARTHR PX SHO JT NOS: CPT | Performed by: ORTHOPAEDIC SURGERY

## 2023-05-24 PROCEDURE — D9220A PRA ANESTHESIA: ICD-10-PCS | Mod: ANES,,, | Performed by: ANESTHESIOLOGY

## 2023-05-24 PROCEDURE — D9220A PRA ANESTHESIA: ICD-10-PCS | Mod: CRNA,,, | Performed by: NURSE ANESTHETIST, CERTIFIED REGISTERED

## 2023-05-24 PROCEDURE — 99900035 HC TECH TIME PER 15 MIN (STAT)

## 2023-05-24 PROCEDURE — 82962 GLUCOSE BLOOD TEST: CPT | Performed by: ORTHOPAEDIC SURGERY

## 2023-05-24 PROCEDURE — 27201423 OPTIME MED/SURG SUP & DEVICES STERILE SUPPLY: Performed by: ORTHOPAEDIC SURGERY

## 2023-05-24 PROCEDURE — 37000008 HC ANESTHESIA 1ST 15 MINUTES: Performed by: ORTHOPAEDIC SURGERY

## 2023-05-24 PROCEDURE — 27000080 OPTIME MED/SURG SUP & DEVICES GENERAL CLASSIFICATION: Performed by: ORTHOPAEDIC SURGERY

## 2023-05-24 PROCEDURE — 63600175 PHARM REV CODE 636 W HCPCS: Performed by: NURSE ANESTHETIST, CERTIFIED REGISTERED

## 2023-05-24 PROCEDURE — 37000009 HC ANESTHESIA EA ADD 15 MINS: Performed by: ORTHOPAEDIC SURGERY

## 2023-05-24 PROCEDURE — 25000003 PHARM REV CODE 250: Performed by: STUDENT IN AN ORGANIZED HEALTH CARE EDUCATION/TRAINING PROGRAM

## 2023-05-24 RX ORDER — OXYCODONE HYDROCHLORIDE 5 MG/1
5 TABLET ORAL
Status: DISCONTINUED | OUTPATIENT
Start: 2023-05-24 | End: 2023-05-24 | Stop reason: HOSPADM

## 2023-05-24 RX ORDER — LIDOCAINE HYDROCHLORIDE 20 MG/ML
INJECTION INTRAVENOUS
Status: DISCONTINUED | OUTPATIENT
Start: 2023-05-24 | End: 2023-05-24

## 2023-05-24 RX ORDER — CEFAZOLIN SODIUM 2 G/50ML
2 SOLUTION INTRAVENOUS
Status: COMPLETED | OUTPATIENT
Start: 2023-05-24 | End: 2023-05-24

## 2023-05-24 RX ORDER — FENTANYL CITRATE 50 UG/ML
25 INJECTION, SOLUTION INTRAMUSCULAR; INTRAVENOUS EVERY 5 MIN PRN
Status: DISCONTINUED | OUTPATIENT
Start: 2023-05-24 | End: 2023-05-24 | Stop reason: HOSPADM

## 2023-05-24 RX ORDER — ACETAMINOPHEN 10 MG/ML
INJECTION, SOLUTION INTRAVENOUS
Status: DISCONTINUED | OUTPATIENT
Start: 2023-05-24 | End: 2023-05-24

## 2023-05-24 RX ORDER — PROPOFOL 10 MG/ML
VIAL (ML) INTRAVENOUS
Status: DISCONTINUED | OUTPATIENT
Start: 2023-05-24 | End: 2023-05-24

## 2023-05-24 RX ORDER — DIPHENHYDRAMINE HYDROCHLORIDE 50 MG/ML
12.5 INJECTION INTRAMUSCULAR; INTRAVENOUS
Status: DISCONTINUED | OUTPATIENT
Start: 2023-05-24 | End: 2023-05-24 | Stop reason: HOSPADM

## 2023-05-24 RX ORDER — SUCCINYLCHOLINE CHLORIDE 20 MG/ML
INJECTION INTRAMUSCULAR; INTRAVENOUS
Status: DISCONTINUED | OUTPATIENT
Start: 2023-05-24 | End: 2023-05-24

## 2023-05-24 RX ORDER — BUPIVACAINE HYDROCHLORIDE 5 MG/ML
INJECTION, SOLUTION EPIDURAL; INTRACAUDAL
Status: COMPLETED | OUTPATIENT
Start: 2023-05-24 | End: 2023-05-24

## 2023-05-24 RX ORDER — FAMOTIDINE 10 MG/ML
INJECTION INTRAVENOUS
Status: DISCONTINUED | OUTPATIENT
Start: 2023-05-24 | End: 2023-05-24

## 2023-05-24 RX ORDER — SODIUM CHLORIDE 0.9 G/100ML
IRRIGANT IRRIGATION
Status: DISCONTINUED | OUTPATIENT
Start: 2023-05-24 | End: 2023-05-24 | Stop reason: HOSPADM

## 2023-05-24 RX ORDER — EPINEPHRINE 1 MG/ML
INJECTION, SOLUTION, CONCENTRATE INTRAVENOUS
Status: DISCONTINUED | OUTPATIENT
Start: 2023-05-24 | End: 2023-05-24 | Stop reason: HOSPADM

## 2023-05-24 RX ORDER — ONDANSETRON 2 MG/ML
4 INJECTION INTRAMUSCULAR; INTRAVENOUS DAILY PRN
Status: DISCONTINUED | OUTPATIENT
Start: 2023-05-24 | End: 2023-05-24 | Stop reason: HOSPADM

## 2023-05-24 RX ORDER — BUPIVACAINE HCL/EPINEPHRINE 0.25-.0005
VIAL (ML) INJECTION
Status: DISCONTINUED | OUTPATIENT
Start: 2023-05-24 | End: 2023-05-24 | Stop reason: HOSPADM

## 2023-05-24 RX ORDER — ONDANSETRON HYDROCHLORIDE 2 MG/ML
INJECTION, SOLUTION INTRAMUSCULAR; INTRAVENOUS
Status: DISCONTINUED | OUTPATIENT
Start: 2023-05-24 | End: 2023-05-24

## 2023-05-24 RX ORDER — ROCURONIUM BROMIDE 10 MG/ML
INJECTION, SOLUTION INTRAVENOUS
Status: DISCONTINUED | OUTPATIENT
Start: 2023-05-24 | End: 2023-05-24

## 2023-05-24 RX ORDER — FENTANYL CITRATE 50 UG/ML
INJECTION, SOLUTION INTRAMUSCULAR; INTRAVENOUS
Status: DISCONTINUED | OUTPATIENT
Start: 2023-05-24 | End: 2023-05-24

## 2023-05-24 RX ORDER — MIDAZOLAM HYDROCHLORIDE 1 MG/ML
INJECTION INTRAMUSCULAR; INTRAVENOUS
Status: DISCONTINUED | OUTPATIENT
Start: 2023-05-24 | End: 2023-05-24

## 2023-05-24 RX ORDER — METHYLPREDNISOLONE ACETATE 80 MG/ML
INJECTION, SUSPENSION INTRA-ARTICULAR; INTRALESIONAL; INTRAMUSCULAR; SOFT TISSUE
Status: DISCONTINUED | OUTPATIENT
Start: 2023-05-24 | End: 2023-05-24 | Stop reason: HOSPADM

## 2023-05-24 RX ADMIN — LIDOCAINE HYDROCHLORIDE 50 MG: 20 INJECTION, SOLUTION INTRAVENOUS at 08:05

## 2023-05-24 RX ADMIN — Medication 120 MG: at 08:05

## 2023-05-24 RX ADMIN — BUPIVACAINE 10 ML: 13.3 INJECTION, SUSPENSION, LIPOSOMAL INFILTRATION at 08:05

## 2023-05-24 RX ADMIN — BUPIVACAINE HYDROCHLORIDE 10 ML: 5 INJECTION, SOLUTION EPIDURAL; INTRACAUDAL; PERINEURAL at 08:05

## 2023-05-24 RX ADMIN — SUGAMMADEX 200 MG: 100 INJECTION, SOLUTION INTRAVENOUS at 09:05

## 2023-05-24 RX ADMIN — FAMOTIDINE 20 MG: 10 INJECTION INTRAVENOUS at 08:05

## 2023-05-24 RX ADMIN — ONDANSETRON 4 MG: 2 INJECTION INTRAMUSCULAR; INTRAVENOUS at 08:05

## 2023-05-24 RX ADMIN — ROCURONIUM BROMIDE 10 MG: 10 INJECTION, SOLUTION INTRAVENOUS at 08:05

## 2023-05-24 RX ADMIN — SODIUM CHLORIDE, SODIUM LACTATE, POTASSIUM CHLORIDE, AND CALCIUM CHLORIDE: .6; .31; .03; .02 INJECTION, SOLUTION INTRAVENOUS at 08:05

## 2023-05-24 RX ADMIN — ACETAMINOPHEN 1000 MG: 10 INJECTION INTRAVENOUS at 09:05

## 2023-05-24 RX ADMIN — ROCURONIUM BROMIDE 20 MG: 10 INJECTION, SOLUTION INTRAVENOUS at 08:05

## 2023-05-24 RX ADMIN — FENTANYL CITRATE 50 MCG: 50 INJECTION, SOLUTION INTRAMUSCULAR; INTRAVENOUS at 09:05

## 2023-05-24 RX ADMIN — PROPOFOL 150 MG: 10 INJECTION, EMULSION INTRAVENOUS at 08:05

## 2023-05-24 RX ADMIN — CEFAZOLIN SODIUM 2 G: 2 SOLUTION INTRAVENOUS at 08:05

## 2023-05-24 RX ADMIN — MIDAZOLAM HYDROCHLORIDE 2 MG: 1 INJECTION, SOLUTION INTRAMUSCULAR; INTRAVENOUS at 08:05

## 2023-05-24 NOTE — ANESTHESIA PREPROCEDURE EVALUATION
2023  Lolly Ramírez is a 62 y.o., female.      Patient Active Problem List   Diagnosis    Biceps tendinitis of left shoulder    Primary osteoarthritis of left shoulder    Adhesive capsulitis of left shoulder    Acute medial meniscus tear of right knee    Primary osteoarthritis of right knee    Hx of breast cancer - Her2Nu+/ER+ -     GERD (gastroesophageal reflux disease)    Insomnia    Type 2 diabetes mellitus with other specified complication, without long-term current use of insulin    Breast cancer, stage 1, estrogen receptor negative, left ()    S/P lumpectomy, left breast    HER2-positive carcinoma of left breast    Chemotherapy-induced neuropathy    Acute pain of right shoulder    Decreased ROM of right shoulder    Activity intolerance    Gait difficulty    Foot pain, bilateral    Achilles tendinitis, unspecified leg    Lymphedema of left arm       Past Surgical History:   Procedure Laterality Date    BICEPS TENDON REPAIR Left 2005    BREAST BIOPSY Left     BREAST LUMPECTOMY Left      SECTION      , ,     CHOLECYSTECTOMY      Elbow fx Left 2015    FRACTURE SURGERY Left 2016    elbow    HYSTERECTOMY  2013    KNEE ARTHROSCOPY W/ MENISCAL REPAIR Left 2014    Lower back ruptured disc  2010    LYMPH NODE DISSECTION      SPINE SURGERY      ruptured disc        Tobacco Use:  The patient  reports that she quit smoking about 12 years ago. Her smoking use included cigarettes. She has a 12.50 pack-year smoking history. She has never used smokeless tobacco.     Results for orders placed or performed during the hospital encounter of 23   EKG 12-lead    Collection Time: 23  1:45 PM    Narrative    Test Reason : S46.011D,    Vent. Rate : 073 BPM     Atrial Rate : 073 BPM     P-R Int : 166 ms          QRS Dur : 080  ms      QT Int : 388 ms       P-R-T Axes : 049 018 035 degrees     QTc Int : 427 ms    Normal sinus rhythm  Normal ECG  No previous ECGs available    Referred By:             Confirmed By:              Lab Results   Component Value Date    WBC 5.38 05/23/2023    HGB 10.8 (L) 05/23/2023    HCT 34.7 (L) 05/23/2023    MCV 83 05/23/2023     05/23/2023     BMP  Lab Results   Component Value Date     05/23/2023    K 3.9 05/23/2023     05/23/2023    CO2 30 (H) 05/23/2023    BUN 19 05/23/2023    CREATININE 0.8 05/23/2023    CALCIUM 8.9 05/23/2023    ANIONGAP 7 (L) 05/23/2023    GLU 99 05/23/2023    GLU 89 03/13/2023    GLU 92 03/01/2023       No results found for this or any previous visit.            Pre-op Assessment    I have reviewed the Patient Summary Reports.     I have reviewed the Nursing Notes. I have reviewed the NPO Status.   I have reviewed the Medications.     Review of Systems  Anesthesia Hx:  No problems with previous Anesthesia  Denies Family Hx of Anesthesia complications.   Denies Personal Hx of Anesthesia complications.   Social:  Former Smoker    Hematology/Oncology:  Hematology Normal       -- Cancer in past history:    EENT/Dental:EENT/Dental Normal   Cardiovascular:  Cardiovascular Normal     Pulmonary:   Sleep Apnea Patient has a recalled CPAP that she is not using. I advised her that if she is requiring a lot of oral narcotics postoperatively to use the machine because the risks benefit ratio is better to use the CPAP for her to be able to avoid hypoxia/apnea.   Education provided regarding risk of obstructive sleep apnea     Renal/:  Renal/ Normal     Hepatic/GI:   GERD, well controlled    Musculoskeletal:   Arthritis     Neurological:   Neuromuscular Disease,   Peripheral Neuropathy    Endocrine:   Diabetes    Psych:   depression          Physical Exam  General: Well nourished, Cooperative, Alert and Oriented    Airway:  Mallampati: IV   Mouth Opening: Normal  TM Distance:  Normal  Tongue: Normal  Neck ROM: Normal ROM    Dental:  Intact    Chest/Lungs:  Clear to auscultation    Heart:  Rate: Normal  Rhythm: Regular Rhythm  Sounds: Normal        Anesthesia Plan  Type of Anesthesia, risks & benefits discussed:    Anesthesia Type: Gen ETT  Intra-op Monitoring Plan: Standard ASA Monitors  Post Op Pain Control Plan: multimodal analgesia and peripheral nerve block  Induction:  IV  Airway Plan: Video and Direct  Informed Consent: Informed consent signed with the Patient and all parties understand the risks and agree with anesthesia plan.  All questions answered.   ASA Score: 3  Anesthesia Plan Notes: 1g ofirmev  pepcid 20mg  Single shot interscalene for postoperative pain management.    Ready For Surgery From Anesthesia Perspective.     .

## 2023-05-24 NOTE — ANESTHESIA PROCEDURE NOTES
Intubation    Date/Time: 5/24/2023 8:54 AM  Performed by: Ismael Spears CRNA  Authorized by: Aren Fernandez MD     Intubation:     Induction:  Intravenous    Intubated:  Postinduction    Mask Ventilation:  Easy mask    Attempts:  1    Attempted By:  CRNA    Method of Intubation:  Video laryngoscopy    Blade:  Kraus 3    Laryngeal View Grade: Grade I - full view of cords      Difficult Airway Encountered?: No      Complications:  None    Airway Device:  Oral endotracheal tube    Airway Device Size:  7.5    Style/Cuff Inflation:  Cuffed    Secured at:  The lips    Placement Verified By:  Capnometry and Revisualization with laryngoscopy    Complicating Factors:  None    Findings Post-Intubation:  BS equal bilateral and atraumatic/condition of teeth unchanged

## 2023-05-24 NOTE — ANESTHESIA POSTPROCEDURE EVALUATION
Anesthesia Post Evaluation    Patient: Lolly Ramírez    Procedure(s) Performed: Procedure(s) (LRB):  ARTHROSCOPY, SHOULDER, WITH SUBACROMIAL SPACE DECOMPRESSION (Right)    Final Anesthesia Type: general      Patient location during evaluation: PACU  Patient participation: Yes- Able to Participate  Level of consciousness: awake and alert and oriented  Post-procedure vital signs: reviewed and stable  Pain management: adequate  Airway patency: patent    PONV status at discharge: No PONV  Anesthetic complications: no      Cardiovascular status: blood pressure returned to baseline and hemodynamically stable  Respiratory status: unassisted, spontaneous ventilation and room air  Hydration status: euvolemic  Follow-up not needed.          Vitals Value Taken Time   /66 05/24/23 1030   Temp 36.4 °C (97.6 °F) 05/24/23 1000   Pulse 67 05/24/23 1034   Resp 51 05/24/23 1034   SpO2 94 % 05/24/23 1034   Vitals shown include unvalidated device data.    Pt comfortable VSS    No case tracking events are documented in the log.      Pain/Zac Score: Zac Score: 9 (5/24/2023 10:30 AM)

## 2023-05-24 NOTE — PLAN OF CARE
Pt AAOX3, Vital signs stable, right shoulder dressing c,d,I. Shoulder immobilizer on. Pt tolerating oral liquids and voiding without difficulty. Pt ready for discharge

## 2023-05-24 NOTE — ANESTHESIA PROCEDURE NOTES
Peripheral Block    Patient location during procedure: pre-op   Block not for primary anesthetic.  Reason for block: at surgeon's request and post-op pain management   Post-op Pain Location: right shoulder   Start time: 5/24/2023 8:24 AM  Timeout: 5/24/2023 8:24 AM   End time: 5/24/2023 8:24 AM    Staffing  Authorizing Provider: Kenan Adhikari MD  Performing Provider: Kenan Adhikari MD    Preanesthetic Checklist  Completed: patient identified, IV checked, site marked, risks and benefits discussed, surgical consent, monitors and equipment checked, pre-op evaluation and timeout performed  Peripheral Block  Patient position: sitting  Prep: ChloraPrep  Patient monitoring: heart rate, cardiac monitor, continuous pulse ox, continuous capnometry and frequent blood pressure checks  Block type: interscalene  Laterality: right  Injection technique: single shot  Needle  Needle type: Echogenic   Needle gauge: 22 G  Needle length: 2 in  Needle localization: anatomical landmarks, ultrasound guidance, nerve stimulator and paresthesias   -ultrasound image captured on disc.  Assessment  Injection assessment: negative aspiration, negative parasthesia and local visualized surrounding nerve  Paresthesia pain: none  Heart rate change: no  Slow fractionated injection: yes    Medications:    Medications: BUPivacaine liposome (PF) 1.3 % (13.3 mg/mL) suspension - Injection   10 mL - 5/24/2023 8:25:00 AM  bupivacaine (pf) (MARCAINE) injection 0.5% - Perineural   10 mL - 5/24/2023 8:25:00 AM    Additional Notes  VSS.  [facility] RN monitoring vitals throughout procedure.  Patient tolerated procedure well.

## 2023-05-24 NOTE — TRANSFER OF CARE
"Anesthesia Transfer of Care Note    Patient: Lolly Ramírez    Procedure(s) Performed: Procedure(s) (LRB):  ARTHROSCOPY, SHOULDER, WITH SUBACROMIAL SPACE DECOMPRESSION (Right)    Patient location: PACU    Anesthesia Type: general    Transport from OR: Transported from OR on room air with adequate spontaneous ventilation    Post pain: adequate analgesia    Post assessment: no apparent anesthetic complications and tolerated procedure well    Post vital signs: stable    Level of consciousness: awake    Nausea/Vomiting: no nausea/vomiting    Complications: none    Transfer of care protocol was followed      Last vitals:   Visit Vitals  BP (!) 152/85 (BP Location: Left leg, Patient Position: Lying)   Pulse 77   Temp 36.7 °C (98 °F) (Oral)   Resp 17   Ht 5' 10" (1.778 m)   Wt 111.6 kg (246 lb)   SpO2 97%   Breastfeeding No   BMI 35.30 kg/m²     "

## 2023-05-25 ENCOUNTER — TELEPHONE (OUTPATIENT)
Dept: ANESTHESIOLOGY | Facility: HOSPITAL | Age: 63
End: 2023-05-25

## 2023-05-25 NOTE — TELEPHONE ENCOUNTER
Anesthesia Telephone Follow Up       Patient Name: Lolly Ramírez  Date and time of call: 5/25/2023 and 11:12 AM  Call handled by: Shakir Covington Jr   Current phone number: [phone] (home)     Block:  Right interscalene block    Dictated Follow Up:  Postop day 1 status post right shoulder surgery with a right interscalene block.  Patient reports excellent pain relief overnight.  Patient denies any shortness of breath this morning.  Patient reports motor and sensory starting to return to hand.  Patient is very pleased with block.  We will follow patient to block resolves.

## 2023-05-26 ENCOUNTER — TELEPHONE (OUTPATIENT)
Dept: ANESTHESIOLOGY | Facility: HOSPITAL | Age: 63
End: 2023-05-26

## 2023-05-26 NOTE — TELEPHONE ENCOUNTER
Anesthesia Telephone Follow Up       Patient Name: Lolly Ramírez  Date and time of call: 5/26/2023 and 12:07 PM  Call handled by: Shakir Covington Jr   Current phone number: [phone] (home)     Block:  Right interscalene block    Dictated Follow Up:  Postop day 2 status post right shoulder surgery with a right interscalene block with Exparel.  Patient reports excellent pain relief.  Patient denies any shortness of breath.  Patient reports block continuing to resolve still with some numbness of her forearm.  Patient has full motor of her hand.  We will follow-up until block resolution.

## 2023-05-27 ENCOUNTER — TELEPHONE (OUTPATIENT)
Dept: ANESTHESIOLOGY | Facility: HOSPITAL | Age: 63
End: 2023-05-27

## 2023-05-27 NOTE — TELEPHONE ENCOUNTER
Anesthesia Telephone Follow Up       Patient Name: Lolly Ramírez  Date and time of call: 5/27/2023 and 5:23 PM  Call handled by: Shakir Covington Jr   Current phone number: [phone] (home)     Block:  Right interscalene block with Exparel    Dictated Follow Up:  Patient postop day 3 status post right shoulder surgery with a right interscalene block with Exparel.  Patient reports full resolution of block.  Patient denies any shortness of breath.  Patient was extremely pleased with her block.  We will sign off.

## 2023-06-02 ENCOUNTER — OFFICE VISIT (OUTPATIENT)
Dept: ORTHOPEDICS | Facility: CLINIC | Age: 63
End: 2023-06-02
Payer: MEDICARE

## 2023-06-02 VITALS — HEIGHT: 70 IN | WEIGHT: 246 LBS | BODY MASS INDEX: 35.22 KG/M2

## 2023-06-02 DIAGNOSIS — Z98.890 S/P ARTHROSCOPY OF RIGHT SHOULDER: Primary | ICD-10-CM

## 2023-06-02 PROCEDURE — 99024 PR POST-OP FOLLOW-UP VISIT: ICD-10-PCS | Mod: S$GLB,POP,, | Performed by: PHYSICIAN ASSISTANT

## 2023-06-02 PROCEDURE — 99024 POSTOP FOLLOW-UP VISIT: CPT | Mod: S$GLB,POP,, | Performed by: PHYSICIAN ASSISTANT

## 2023-06-02 RX ORDER — HYDROCODONE BITARTRATE AND ACETAMINOPHEN 10; 325 MG/1; MG/1
1 TABLET ORAL EVERY 4 HOURS
COMMUNITY
Start: 2023-04-14 | End: 2023-10-05

## 2023-06-02 NOTE — PROGRESS NOTES
[facility]  [address]  [place] [address]  Phone: [phone]   [phone]    Patient's PCP:Shaunna Gordon NP  Referring Provider: No ref. provider found    POST-OP Note:    Subjective:        Chief Complaint:   Chief Complaint   Patient presents with    Right Shoulder - Post-op Evaluation     Patient is doing ok, she is here to get her sutures removed, she is here to discuss surgery .       Past Medical History:   Diagnosis Date    Breast cancer, stage 1, estrogen receptor negative, left () 2020    Depression     Diabetes mellitus, type 2     GERD (gastroesophageal reflux disease)     HER2-positive carcinoma of left breast 2020    Hx of breast cancer     Hx of breast cancer - Her2Nu+/ER+ - 2011 12/3/2019    Insomnia     Lymphedema     Neuropathy of both feet     S/P lumpectomy, left breast 2020       Past Surgical History:   Procedure Laterality Date    ARTHROSCOPY OF SHOULDER WITH DECOMPRESSION OF SUBACROMIAL SPACE Right 2023    Procedure: ARTHROSCOPY, SHOULDER, WITH SUBACROMIAL SPACE DECOMPRESSION;  Surgeon: Curtis Ricardo MD;  Location: [facility];  Service: Orthopedics;  Laterality: Right;    BICEPS TENDON REPAIR Left 2005    BREAST BIOPSY Left     BREAST LUMPECTOMY Left      SECTION      1982, ,     CHOLECYSTECTOMY  2000    Elbow fx Left 2015    FRACTURE SURGERY Left 2016    elbow    HYSTERECTOMY  2013    KNEE ARTHROSCOPY W/ MENISCAL REPAIR Left 2014    Lower back ruptured disc  2010    LYMPH NODE DISSECTION  2011    SPINE SURGERY  2009    ruptured disc       Current Outpatient Medications   Medication Sig    ascorbic acid, vitamin C, (VITAMIN C) 500 MG tablet Take 500 mg by mouth once daily.    atomoxetine (STRATTERA) 40 MG capsule Take 1 capsule (40 mg total) by mouth once daily.    b complex vitamins capsule Take 1 capsule by mouth once daily.    calcium carbonate (OS-JULIANNE) 600 mg calcium (1,500 mg) Tab Take 600 mg by mouth once.     docusate sodium (COLACE) 100 MG capsule Take 1 capsule (100 mg total) by mouth once daily. (Patient taking differently: Take 100 mg by mouth daily as needed.)    furosemide (LASIX) 20 MG tablet Take 1 tablet (20 mg total) by mouth daily as needed.    glucosamine-chondroitin 250-200 mg Tab Take 2 tablets by mouth once daily.    HYDROcodone-acetaminophen (NORCO)  mg per tablet Take 1 tablet by mouth every 4 (four) hours.    LYRICA 200 mg Cap Take 1 capsule (200 mg total) by mouth 3 (three) times daily.    magnesium 250 mg Tab Take 250 mg by mouth once daily.    multivitamin capsule Take 1 capsule by mouth once daily.    mupirocin (BACTROBAN) 2 % ointment     naproxen (NAPROSYN) 500 MG tablet Take 1 tablet (500 mg total) by mouth 2 (two) times daily.    pantoprazole (PROTONIX) 40 MG tablet Take 1 tablet (40 mg total) by mouth once daily.    terbinafine HCL (LAMISIL) 250 mg tablet Take 250 mg by mouth once daily. TAKE 1 TABLET BY MOUTH EVERY DAY    traZODone (DESYREL) 50 MG tablet Take 2 tablets (100 mg total) by mouth every evening.    venlafaxine (EFFEXOR-XR) 150 MG Cp24 Take 1 capsule (150 mg total) by mouth once daily. (Patient taking differently: Take 150 mg by mouth every evening.)     No current facility-administered medications for this visit.       Review of patient's allergies indicates:   Allergen Reactions    Banana Hives    Codeine Nausea And Vomiting    Adhesive Rash    Dilaudid  [hydromorphone (bulk)] Rash    Hydromorphone hcl Rash       Family History   Problem Relation Age of Onset    Cancer Mother     Breast cancer Mother     Parkinsonism Father     Diabetes Father     Breast cancer Maternal Aunt     Breast cancer Paternal Aunt     Cancer Maternal Aunt     Cancer Paternal Aunt     Cancer Daughter     Heart disease Maternal Grandmother     Heart disease Paternal Grandmother        Social History     Socioeconomic History    Marital status:    Tobacco Use    Smoking status: Former      Packs/day: 0.50     Years: 25.00     Pack years: 12.50     Types: Cigarettes     Quit date: 2010     Years since quittin.5    Smokeless tobacco: Never   Substance and Sexual Activity    Alcohol use: Not Currently     Alcohol/week: 2.0 standard drinks     Types: 2 Glasses of wine per week    Drug use: Never    Sexual activity: Yes     Partners: Male     Birth control/protection: Other-see comments, None     Comment: Hysterectomy     Social Determinants of Health     Financial Resource Strain: Low Risk     Difficulty of Paying Living Expenses: Not hard at all   Food Insecurity: Unknown    Worried About Running Out of Food in the Last Year: Never true    Ran Out of Food in the Last Year: Patient refused   Transportation Needs: No Transportation Needs    Lack of Transportation (Medical): No    Lack of Transportation (Non-Medical): No   Physical Activity: Inactive    Days of Exercise per Week: 0 days    Minutes of Exercise per Session: 30 min   Stress: No Stress Concern Present    Feeling of Stress : Only a little   Social Connections: Unknown    Frequency of Communication with Friends and Family: Three times a week    Frequency of Social Gatherings with Friends and Family: Once a week    Active Member of Clubs or Organizations: No    Attends Club or Organization Meetings: 1 to 4 times per year    Marital Status:    Housing Stability: Low Risk     Unable to Pay for Housing in the Last Year: No    Number of Places Lived in the Last Year: 1    Unstable Housing in the Last Year: No       History of present illness: Lolly comes in today 10 days status post right shoulder arthroscopy with extensive glenohumeral debridement.  Unfortunately, she had a massive, irreparable rotator cuff tendon tear.  She also had grade 3-4 chondromalacia on the humeral head.  Grade 3 chondromalacia in the glenoid.  She also had a significant Hill-Sachs lesion.  She comes in today for wound check and suture removal.  Unfortunately,  she continues to complain of pain and decreased range of motion.    Review of Systems:    Musculoskeletal:  See HPI       Objective:        Physical Examination:    Vital Signs: There were no vitals filed for this visit.    Body mass index is 35.3 kg/m².    This a well-developed, well nourished patient in no acute distress.  They are alert and oriented and cooperative to examination.        Right shoulder exam: Skin to the right shoulder is clean dry and intact.  There is no erythema.  She does have some small ecchymosis around the arthroscopic portals.  The 2 arthroscopic portals are healing well without wound dehiscence or drainage.  There are no signs or symptoms of infection.  She is neurovascularly intact throughout the right upper extremity.  She can open and close right hand into a fist.    Pertinent New Results:     XRAY Report / Interpretation:  No new radiographs were taken on today's clinic visit.     Assessment:       1. S/P arthroscopy of right shoulder      Plan:     S/P arthroscopy of right shoulder        Follow up in about 4 weeks (around 6/30/2023) for right shoulder scope 05/24/23, discuss surgery , Tues/Thurs.    Sutures were removed from her right shoulder today.  She tolerated this well.  She was advised to clean the operative site once a day with warm soapy water not apply any topical creams or ointments.  She was instructed not to submerge operative site underwater in a pool or bathtub type environment for least 48 more hours.  Like to see her back in 4 weeks to see how she is progressing postoperatively.  She is still continues to continue with pain, weakness/decreased range of motion in the definitive treatment recommendation to address her shoulder that it has known glenohumeral osteoarthritis and a rotator cuff tendon tear would be a reverse total shoulder arthroplasty.  She is aware of this.  We discussed this again today on today's visit.      Arben Coombs, YVETTES, PAEstelleC    This  note was created using [employer] voice recognition software that occasionally misinterprets words or phrases.

## 2023-06-15 ENCOUNTER — OFFICE VISIT (OUTPATIENT)
Dept: FAMILY MEDICINE | Facility: CLINIC | Age: 63
End: 2023-06-15
Payer: MEDICARE

## 2023-06-15 ENCOUNTER — OFFICE VISIT (OUTPATIENT)
Dept: ORTHOPEDICS | Facility: CLINIC | Age: 63
End: 2023-06-15
Payer: MEDICARE

## 2023-06-15 VITALS
DIASTOLIC BLOOD PRESSURE: 70 MMHG | SYSTOLIC BLOOD PRESSURE: 120 MMHG | WEIGHT: 250 LBS | BODY MASS INDEX: 35.79 KG/M2 | HEIGHT: 70 IN

## 2023-06-15 VITALS
DIASTOLIC BLOOD PRESSURE: 70 MMHG | WEIGHT: 250 LBS | BODY MASS INDEX: 35.79 KG/M2 | HEART RATE: 95 BPM | HEIGHT: 70 IN | SYSTOLIC BLOOD PRESSURE: 120 MMHG | OXYGEN SATURATION: 96 %

## 2023-06-15 DIAGNOSIS — F32.A DEPRESSION, UNSPECIFIED DEPRESSION TYPE: ICD-10-CM

## 2023-06-15 DIAGNOSIS — G47.00 INSOMNIA, UNSPECIFIED TYPE: ICD-10-CM

## 2023-06-15 DIAGNOSIS — E11.9 TYPE 2 DIABETES MELLITUS WITHOUT COMPLICATION, WITHOUT LONG-TERM CURRENT USE OF INSULIN: Primary | ICD-10-CM

## 2023-06-15 DIAGNOSIS — Z98.890 S/P ARTHROSCOPY OF RIGHT SHOULDER: Primary | ICD-10-CM

## 2023-06-15 DIAGNOSIS — Z85.3 HX OF BREAST CANCER: ICD-10-CM

## 2023-06-15 DIAGNOSIS — Z00.00 PHYSICAL EXAM: ICD-10-CM

## 2023-06-15 DIAGNOSIS — F98.8 ATTENTION DEFICIT DISORDER, UNSPECIFIED HYPERACTIVITY PRESENCE: ICD-10-CM

## 2023-06-15 DIAGNOSIS — K21.9 GASTROESOPHAGEAL REFLUX DISEASE, UNSPECIFIED WHETHER ESOPHAGITIS PRESENT: ICD-10-CM

## 2023-06-15 DIAGNOSIS — Z98.890 S/P ARTHROSCOPY OF SHOULDER: ICD-10-CM

## 2023-06-15 LAB — HBA1C MFR BLD: 6 %

## 2023-06-15 PROCEDURE — 99024 PR POST-OP FOLLOW-UP VISIT: ICD-10-PCS | Mod: S$GLB,POP,, | Performed by: ORTHOPAEDIC SURGERY

## 2023-06-15 PROCEDURE — 83036 HEMOGLOBIN GLYCOSYLATED A1C: CPT | Mod: QW,,, | Performed by: NURSE PRACTITIONER

## 2023-06-15 PROCEDURE — 99214 PR OFFICE/OUTPT VISIT, EST, LEVL IV, 30-39 MIN: ICD-10-PCS | Mod: S$GLB,,, | Performed by: NURSE PRACTITIONER

## 2023-06-15 PROCEDURE — 99024 POSTOP FOLLOW-UP VISIT: CPT | Mod: S$GLB,POP,, | Performed by: ORTHOPAEDIC SURGERY

## 2023-06-15 PROCEDURE — 83036 POCT HEMOGLOBIN A1C: ICD-10-PCS | Mod: QW,,, | Performed by: NURSE PRACTITIONER

## 2023-06-15 PROCEDURE — 99214 OFFICE O/P EST MOD 30 MIN: CPT | Mod: S$GLB,,, | Performed by: NURSE PRACTITIONER

## 2023-06-15 RX ORDER — VENLAFAXINE HYDROCHLORIDE 75 MG/1
225 CAPSULE, EXTENDED RELEASE ORAL NIGHTLY
Qty: 270 CAPSULE | Refills: 3 | Status: SHIPPED | OUTPATIENT
Start: 2023-06-15 | End: 2024-06-14

## 2023-06-15 RX ORDER — PREGABALIN 200 MG/1
200 CAPSULE ORAL 3 TIMES DAILY
Qty: 270 CAPSULE | Refills: 3 | Status: SHIPPED | OUTPATIENT
Start: 2023-06-15 | End: 2023-11-16

## 2023-06-15 RX ORDER — TRAZODONE HYDROCHLORIDE 100 MG/1
100 TABLET ORAL NIGHTLY
Qty: 90 TABLET | Refills: 1 | Status: SHIPPED | OUTPATIENT
Start: 2023-06-15 | End: 2023-06-21

## 2023-06-15 RX ORDER — PANTOPRAZOLE SODIUM 40 MG/1
40 TABLET, DELAYED RELEASE ORAL DAILY
Qty: 90 TABLET | Refills: 3 | Status: SHIPPED | OUTPATIENT
Start: 2023-06-15

## 2023-06-15 RX ORDER — FUROSEMIDE 20 MG/1
20 TABLET ORAL DAILY PRN
Qty: 90 TABLET | Refills: 3 | Status: SHIPPED | OUTPATIENT
Start: 2023-06-15 | End: 2023-09-14 | Stop reason: SDUPTHER

## 2023-06-15 RX ORDER — ATOMOXETINE 40 MG/1
40 CAPSULE ORAL DAILY
Qty: 90 CAPSULE | Refills: 1 | Status: SHIPPED | OUTPATIENT
Start: 2023-06-15 | End: 2023-09-14 | Stop reason: SDUPTHER

## 2023-06-15 RX ORDER — NAPROXEN 500 MG/1
500 TABLET ORAL 2 TIMES DAILY
Qty: 90 TABLET | Refills: 3 | Status: SHIPPED | OUTPATIENT
Start: 2023-06-15 | End: 2023-09-14 | Stop reason: SDUPTHER

## 2023-06-15 NOTE — PROGRESS NOTES
[facility] POST-OP NOTE    Subjective:           Chief Complaint:   Chief Complaint   Patient presents with    Right Shoulder - Pain, Post-op Evaluation     Patient is here for a PO f/up Right RCR 23 & Wound check, states she is still draining quite a bit.        Past Medical History:   Diagnosis Date    Breast cancer, stage 1, estrogen receptor negative, left () 2020    Depression     Diabetes mellitus, type 2     GERD (gastroesophageal reflux disease)     HER2-positive carcinoma of left breast 2020    Hx of breast cancer     Hx of breast cancer - Her2Nu+/ER+ - 2011 12/3/2019    Insomnia     Lymphedema     Neuropathy of both feet     S/P lumpectomy, left breast 2020       Past Surgical History:   Procedure Laterality Date    ARTHROSCOPY OF SHOULDER WITH DECOMPRESSION OF SUBACROMIAL SPACE Right 2023    Procedure: ARTHROSCOPY, SHOULDER, WITH SUBACROMIAL SPACE DECOMPRESSION;  Surgeon: Curtis Ricardo MD;  Location: [facility];  Service: Orthopedics;  Laterality: Right;    BICEPS TENDON REPAIR Left 2005    BREAST BIOPSY Left     BREAST LUMPECTOMY Left      SECTION      1982, ,     CHOLECYSTECTOMY  2000    Elbow fx Left 2015    FRACTURE SURGERY Left 2016    elbow    HYSTERECTOMY  2013    KNEE ARTHROSCOPY W/ MENISCAL REPAIR Left 2014    Lower back ruptured disc  2010    LYMPH NODE DISSECTION  2011    SPINE SURGERY  2009    ruptured disc       Current Outpatient Medications   Medication Sig    ascorbic acid, vitamin C, (VITAMIN C) 500 MG tablet Take 500 mg by mouth once daily.    atomoxetine (STRATTERA) 40 MG capsule Take 1 capsule (40 mg total) by mouth once daily.    b complex vitamins capsule Take 1 capsule by mouth once daily.    calcium carbonate (OS-JULIANNE) 600 mg calcium (1,500 mg) Tab Take 600 mg by mouth once.    docusate sodium (COLACE) 100 MG capsule Take 1 capsule (100 mg total) by mouth once daily. (Patient taking differently: Take 100 mg by mouth  daily as needed.)    furosemide (LASIX) 20 MG tablet Take 1 tablet (20 mg total) by mouth daily as needed.    glucosamine-chondroitin 250-200 mg Tab Take 2 tablets by mouth once daily.    HYDROcodone-acetaminophen (NORCO)  mg per tablet Take 1 tablet by mouth every 4 (four) hours.    LYRICA 200 mg Cap Take 1 capsule (200 mg total) by mouth 3 (three) times daily.    magnesium 250 mg Tab Take 250 mg by mouth once daily.    multivitamin capsule Take 1 capsule by mouth once daily.    mupirocin (BACTROBAN) 2 % ointment     naproxen (NAPROSYN) 500 MG tablet Take 1 tablet (500 mg total) by mouth 2 (two) times daily.    pantoprazole (PROTONIX) 40 MG tablet Take 1 tablet (40 mg total) by mouth once daily.    terbinafine HCL (LAMISIL) 250 mg tablet Take 250 mg by mouth once daily. TAKE 1 TABLET BY MOUTH EVERY DAY    traZODone (DESYREL) 50 MG tablet Take 2 tablets (100 mg total) by mouth every evening.    venlafaxine (EFFEXOR-XR) 150 MG Cp24 Take 1 capsule (150 mg total) by mouth once daily. (Patient taking differently: Take 150 mg by mouth every evening.)     No current facility-administered medications for this visit.       Review of patient's allergies indicates:   Allergen Reactions    Banana Hives    Codeine Nausea And Vomiting    Adhesive Rash    Dilaudid  [hydromorphone (bulk)] Rash    Hydromorphone hcl Rash       Family History   Problem Relation Age of Onset    Cancer Mother     Breast cancer Mother     Parkinsonism Father     Diabetes Father     Breast cancer Maternal Aunt     Breast cancer Paternal Aunt     Cancer Maternal Aunt     Cancer Paternal Aunt     Cancer Daughter     Heart disease Maternal Grandmother     Heart disease Paternal Grandmother        Social History     Socioeconomic History    Marital status:    Tobacco Use    Smoking status: Former     Packs/day: 0.50     Years: 25.00     Pack years: 12.50     Types: Cigarettes     Quit date: 2010     Years since quittin.5    Smokeless  tobacco: Never   Substance and Sexual Activity    Alcohol use: Not Currently     Alcohol/week: 2.0 standard drinks     Types: 2 Glasses of wine per week    Drug use: Never    Sexual activity: Yes     Partners: Male     Birth control/protection: Other-see comments, None     Comment: Hysterectomy     Social Determinants of Health     Financial Resource Strain: Low Risk     Difficulty of Paying Living Expenses: Not hard at all   Food Insecurity: Unknown    Worried About Running Out of Food in the Last Year: Never true    Ran Out of Food in the Last Year: Patient refused   Transportation Needs: No Transportation Needs    Lack of Transportation (Medical): No    Lack of Transportation (Non-Medical): No   Physical Activity: Inactive    Days of Exercise per Week: 0 days    Minutes of Exercise per Session: 30 min   Stress: No Stress Concern Present    Feeling of Stress : Only a little   Social Connections: Unknown    Frequency of Communication with Friends and Family: Three times a week    Frequency of Social Gatherings with Friends and Family: Once a week    Active Member of Clubs or Organizations: No    Attends Club or Organization Meetings: 1 to 4 times per year    Marital Status:    Housing Stability: Low Risk     Unable to Pay for Housing in the Last Year: No    Number of Places Lived in the Last Year: 1    Unstable Housing in the Last Year: No       History of present illness:  62-year-old female, returns clinic today for the right shoulder, she is status post arthroscopy of the right shoulder for a attempted rotator cuff repair on May 24th.  Unfortunately, the rotator cuff was unable to be repaired.  We discussed surgical treatment such as reverse total shoulder arthroplasty later on in the future.  Patient had her sutures removed 1 week postop as per the office protocol.  Unfortunately, the lateral portal has continued to have some clear drainage.      Review of Systems:    Musculoskeletal:  See  "HPI      Objective:        Physical Examination:    Vital Signs:    Vitals:    06/15/23 1015   BP: 120/70       Body mass index is 35.87 kg/m².    This a well-developed, well nourished patient in no acute distress.  They are alert and oriented and cooperative to examination.        Examination of the right shoulder, the skin, the lateral arthroscopic portal has no obvious dehiscence however there is a scant amount of clear yellow fluid noted and if allowed will coalesce and dripped down the arm.  No signs symptoms of infection.  This does appear to be a synovial sinus.    Pertinent New Results:    XRAY Report / Interpretation:       Assessment/Plan:      Status post right shoulder arthroscopy.  Patient with continued drainage from the lateral portal.  We would like this to close on its own.  Treat this conservatively.  Will observe her for another week, will place her in a sling to limit range of motion of the right shoulder see if this will seal up on its own.  If not we discussed incision and drainage.  Re-closure of the wound.    Francisco Condon, Physician Assistant, served in the capacity as a "scribe" for this patient encounter.  A "face-to-face" encounter occurred with Dr. Curtis Ricardo on this date.  The treatment plan and medical decision-making is outlined above. Patient was seen and examined with a chaperone.     This note was created using Dragon voice recognition software that occasionally misinterpreted phrases or words.        "

## 2023-06-21 ENCOUNTER — PATIENT MESSAGE (OUTPATIENT)
Dept: FAMILY MEDICINE | Facility: CLINIC | Age: 63
End: 2023-06-21

## 2023-06-21 RX ORDER — TRAZODONE HYDROCHLORIDE 100 MG/1
200 TABLET ORAL NIGHTLY
Qty: 60 TABLET | Refills: 5 | Status: SHIPPED | OUTPATIENT
Start: 2023-06-21 | End: 2023-09-14 | Stop reason: SDUPTHER

## 2023-06-22 ENCOUNTER — HOSPITAL ENCOUNTER (OUTPATIENT)
Dept: RADIOLOGY | Facility: HOSPITAL | Age: 63
Discharge: HOME | End: 2023-06-22
Attending: NURSE PRACTITIONER
Payer: MEDICARE

## 2023-06-22 DIAGNOSIS — Z12.31 ENCOUNTER FOR SCREENING MAMMOGRAM FOR MALIGNANT NEOPLASM OF BREAST: ICD-10-CM

## 2023-06-22 DIAGNOSIS — Z85.3 HX OF BREAST CANCER: ICD-10-CM

## 2023-06-22 PROCEDURE — 77067 SCR MAMMO BI INCL CAD: CPT | Mod: TC,PO

## 2023-06-27 ENCOUNTER — DOCUMENTATION ONLY (OUTPATIENT)
Dept: REHABILITATION | Facility: HOSPITAL | Age: 63
End: 2023-06-27
Payer: MEDICARE

## 2023-06-27 DIAGNOSIS — M76.62 ACHILLES TENDINITIS OF BOTH LOWER EXTREMITIES: Primary | ICD-10-CM

## 2023-06-27 DIAGNOSIS — R26.9 GAIT DIFFICULTY: ICD-10-CM

## 2023-06-27 DIAGNOSIS — M76.61 ACHILLES TENDINITIS OF BOTH LOWER EXTREMITIES: Primary | ICD-10-CM

## 2023-06-27 DIAGNOSIS — M79.671 FOOT PAIN, BILATERAL: ICD-10-CM

## 2023-06-27 DIAGNOSIS — M79.672 FOOT PAIN, BILATERAL: ICD-10-CM

## 2023-06-27 PROBLEM — M25.511 ACUTE PAIN OF RIGHT SHOULDER: Status: RESOLVED | Noted: 2022-12-01 | Resolved: 2023-06-27

## 2023-06-27 PROBLEM — M25.611 DECREASED ROM OF RIGHT SHOULDER: Status: RESOLVED | Noted: 2022-12-01 | Resolved: 2023-06-27

## 2023-06-27 PROBLEM — R68.89 ACTIVITY INTOLERANCE: Status: RESOLVED | Noted: 2022-12-23 | Resolved: 2023-06-27

## 2023-06-27 NOTE — PROGRESS NOTES
[facility]  PT Discharge Note    Name: Lolly Ramírez  [facility] Number: [id number]    Therapy Diagnosis:        Encounter Diagnoses   Name Primary?    Activity intolerance Yes    Acute pain of right shoulder      Decreased ROM of right shoulder      Dislocation of right shoulder joint, initial encounter        Physician: Taurus Romero MD     Physician Orders: PT Eval and Treat Patient sustained a right anterior inferior shoulder dislocation she is a first-time dislocator this occurred about 3 weeks ago.  Wished to start the patient on a rehab program to strengthen her internal rotators of the right shoulder and range of motion of the right shoulder 2 times a week x6 weeks.   Medical Diagnosis from Referral: Dislocation of right shoulder joint, initial encounter   Evaluation Date: 12/1/2022      Date of Last visit: 2/17/2023  Total Visits Received: 16    ASSESSMENT      Patient made steady progress in PT but re-injured her shoulder resulting in her discontinuing attendance.  See daily progress notes for progression    Discharge reason: Other:  Patient re-injured same shoulder resulting in increased instability and orders to hold PT pending resume orders.     Discharge FOTO Score:  Survey completed 1/25/2023 43% residual deficit     Goals: as documented in final treatment note  Short Term Goals:     1) Decrease max pain to < 7/10 Progressing/nearly met  2) Facilitate improved posture in sitting/standing Minimal progress  3) Facilitate improved upper quadrant flexibility  Progressing  4) Patient will ricco/doff pull over blouse with no more than minimal difficulty 7 of 10 trials Met 1/6/2023  5) Patient will reach to 1st shelf of cabinet using R UE to retrieve/put away plate/glass without increased pain Progressing     Long Term Goals: continue per initial plan of care.  1) Patient will consistently perform upper quadrant bathing/dressing activities without difficulty or discomfort  Progressing/nearly met  2) Patient will consistently utilize R UE to reach to 2nd shelf of cabinet to retrieve/put away dishes with no more than minimal discomfort Progressing  3) Patient will consistently utilize R UE to remove hot dish from oven without fear/discomfort Discontinued (patient reports that  does this task)  4) Patient will resume her usual housework activities without significant increase in shoulder discomfort Progressing  5) Improve functional deficit to < 30% as indicated by FOTO shoulder survey Progressing  6) Patient will be independent in complete HEP for ROM, flexibility, strengthening and stabilization Progressing    PLAN   This patient is discharged from Physical Therapy      Lolly Villafana, PT

## 2023-06-27 NOTE — PROGRESS NOTES
[facility]  PT Discharge Note    Name: Lolly Ramírez  [facility] Number: [id number]    Therapy Diagnosis:   Encounter Diagnoses   Name Primary?    Achilles tendinitis of both lower extremities Yes    Foot pain, bilateral     Gait difficulty      Physician: Yevgeniy De La Rosa DPM     Physician Orders: PT Eval and Treat   Medical Diagnosis from Referral: Achilles tendinitis, unspecified leg   Evaluation Date: 1/18/2023    Date of Last visit: 3/22/2023  Total Visits Received: 18    ASSESSMENT      Patient was making fair progress in PT until she tripped and fell resulting in further injury to her     Discharge reason: Other:  Patient injured right ankle with suspected tearing of Achilles tendon.  MRI confirmed full thickness Achilles tendon tear @ calcaneal attachment with proximal retraction.  She underwent surgical repair 4/2023    Discharge FOTO Score: as assessed 3/22/2023 41% residual deficit    Goals: as documented in final treatment note 3/22/2023  Previous Short Term Goals Status:     1) Decrease max pain to < 7/10 Ongoing  2) Decrease tenderness to moderate Minimal progress (area affected is decreased)   3) Facilitate improved LE flexibility Progressing slowly  4) Patient will increase standing tolerance to at least 5 min to assist with meal prep and washing dishes. Met 2/8/2023     New Short Term Goals Status:    #1-3 continue  4) Met  5) Increase standing tolerance to at least 10 min Ongoing                Long Term Goal Status: continue per initial plan of care.  1) Increase standing tolerance to at least 30 min to allow for meal prep and light housework Progressing  2) Patient will consistently drive using metatarsal heads to apply pressure to pedals without increased pain Progressing  3) Patient will walk > 30 min over level surface demonstrating adequate step length, heel strike and toe off with heel pain no > 4/10 Minimal progress  4) Patient will initiate weight bearing after  sitting with heel pain no > 4/10 Progressing  5) Improve functional mobility to < 40% deficit as indicated by FOTO foot survey Progressing  6) Patient will be independent in HEP for foot/ankle Progressing    PLAN   This patient is discharged from Physical Therapy      Lolly Villafana, PT

## 2023-06-28 NOTE — PROGRESS NOTES
SUBJECTIVE:    Patient ID: Lolly Ramírez is a 62 y.o. female.    Chief Complaint: Follow-up (Bottles brought/ 3 month f/u/ med refill/ Mammo scheduled and ey exam scheduled for 6/16/23/bg)    62 year old female presents for check up.  is present during the exam. She is treated for gerd, insomnia,neuropathy, oa. Psoriasis and dm. Taking meds as prescribed.  Hga1c today is 6.0mg/dl. She originally dislocated her shoulder the 1st time dislocated to her right shoulder back in October 29, 2022 she was treated conservatively and recovered well. On 2/24 patient slipped on a puppy pad and fell onto her right shoulder.for the right shoulder, she is status post arthroscopy of the right shoulder for a attempted rotator cuff repair on May 24th.  he rotator cuff was unable to be repaired.  possible reverse total shoulder arthroplasty later on in the future.  Patient had her sutures removed 1 week postop.has continued to have some clear drainage.frustrated about pain and drainage. No fever. Needs refills.     Diabetes  Pertinent negatives for hypoglycemia include no dizziness, headaches or nervousness/anxiousness. Pertinent negatives for diabetes include no chest pain and no weakness.     Office Visit on 06/15/2023   Component Date Value Ref Range Status    Hemoglobin A1C, POC 06/15/2023 6.0  % Final   Admission on 05/24/2023, Discharged on 05/24/2023   Component Date Value Ref Range Status    POC Glucose 05/24/2023 90  70 - 110 Final   Hospital Outpatient Visit on 05/23/2023   Component Date Value Ref Range Status    WBC 05/23/2023 5.38  3.90 - 12.70 K/uL Final    RBC 05/23/2023 4.19  4.00 - 5.40 M/uL Final    Hemoglobin 05/23/2023 10.8 (L)  12.0 - 16.0 g/dL Final    Hematocrit 05/23/2023 34.7 (L)  37.0 - 48.5 % Final    MCV 05/23/2023 83  82 - 98 fL Final    MCH 05/23/2023 25.8 (L)  27.0 - 31.0 pg Final    MCHC 05/23/2023 31.1 (L)  32.0 - 36.0 g/dL Final    RDW 05/23/2023 15.9 (H)  11.5 - 14.5 % Final    Platelets  05/23/2023 292  150 - 450 K/uL Final    MPV 05/23/2023 10.6  9.2 - 12.9 fL Final    Immature Granulocytes 05/23/2023 0.4  0.0 - 0.5 % Final    Gran # (ANC) 05/23/2023 2.8  1.8 - 7.7 K/uL Final    Immature Grans (Abs) 05/23/2023 0.02  0.00 - 0.04 K/uL Final    Lymph # 05/23/2023 1.5  1.0 - 4.8 K/uL Final    Mono # 05/23/2023 0.6  0.3 - 1.0 K/uL Final    Eos # 05/23/2023 0.4  0.0 - 0.5 K/uL Final    Baso # 05/23/2023 0.08  0.00 - 0.20 K/uL Final    nRBC 05/23/2023 0  0 /100 WBC Final    Gran % 05/23/2023 51.6  38.0 - 73.0 % Final    Lymph % 05/23/2023 28.1  18.0 - 48.0 % Final    Mono % 05/23/2023 10.4  4.0 - 15.0 % Final    Eosinophil % 05/23/2023 8.0  0.0 - 8.0 % Final    Basophil % 05/23/2023 1.5  0.0 - 1.9 % Final    Differential Method 05/23/2023 Automated   Final    Sodium 05/23/2023 143  136 - 145 mmol/L Final    Potassium 05/23/2023 3.9  3.5 - 5.1 mmol/L Final    Chloride 05/23/2023 106  95 - 110 mmol/L Final    CO2 05/23/2023 30 (H)  23 - 29 mmol/L Final    Glucose 05/23/2023 99  70 - 110 mg/dL Final    BUN 05/23/2023 19  8 - 23 mg/dL Final    Creatinine 05/23/2023 0.8  0.5 - 1.4 mg/dL Final    Calcium 05/23/2023 8.9  8.7 - 10.5 mg/dL Final    Total Protein 05/23/2023 6.4  6.0 - 8.4 g/dL Final    Albumin 05/23/2023 3.5  3.5 - 5.2 g/dL Final    Total Bilirubin 05/23/2023 0.4  0.1 - 1.0 mg/dL Final    Alkaline Phosphatase 05/23/2023 64  55 - 135 U/L Final    AST 05/23/2023 16  10 - 40 U/L Final    ALT 05/23/2023 15  10 - 44 U/L Final    Anion Gap 05/23/2023 7 (L)  8 - 16 mmol/L Final    eGFR 05/23/2023 >60.0  >60 mL/min/1.73 m^2 Final   Telephone on 03/10/2023   Component Date Value Ref Range Status    WBC 03/13/2023 4.9  3.8 - 10.8 Thousand/uL Final    RBC 03/13/2023 4.50  3.80 - 5.10 Million/uL Final    Hemoglobin 03/13/2023 11.6 (L)  11.7 - 15.5 g/dL Final    Hematocrit 03/13/2023 36.6  35.0 - 45.0 % Final    MCV 03/13/2023 81.3  80.0 - 100.0 fL Final    MCH 03/13/2023 25.8 (L)  27.0 - 33.0 pg Final    MCHC  03/13/2023 31.7 (L)  32.0 - 36.0 g/dL Final    RDW 03/13/2023 14.4  11.0 - 15.0 % Final    Platelets 03/13/2023 321  140 - 400 Thousand/uL Final    MPV 03/13/2023 11.1  7.5 - 12.5 fL Final    Neutrophils, Abs 03/13/2023 2,509  1,500 - 7,800 cells/uL Final    Lymph # 03/13/2023 1,519  850 - 3,900 cells/uL Final    Mono # 03/13/2023 461  200 - 950 cells/uL Final    Eos # 03/13/2023 343  15 - 500 cells/uL Final    Baso # 03/13/2023 69  0 - 200 cells/uL Final    Neutrophils Relative 03/13/2023 51.2  % Final    Lymph % 03/13/2023 31.0  % Final    Mono % 03/13/2023 9.4  % Final    Eosinophil % 03/13/2023 7.0  % Final    Basophil % 03/13/2023 1.4  % Final    Glucose 03/13/2023 89  65 - 139 mg/dL Final    BUN 03/13/2023 16  7 - 25 mg/dL Final    Creatinine 03/13/2023 0.75  0.50 - 1.05 mg/dL Final    eGFR 03/13/2023 91  > OR = 60 mL/min/1.73m2 Final    BUN/Creatinine Ratio 03/13/2023 NOT APPLICABLE  6 - 22 (calc) Final    Sodium 03/13/2023 145  135 - 146 mmol/L Final    Potassium 03/13/2023 4.3  3.5 - 5.3 mmol/L Final    Chloride 03/13/2023 105  98 - 110 mmol/L Final    CO2 03/13/2023 29  20 - 32 mmol/L Final    Calcium 03/13/2023 9.5  8.6 - 10.4 mg/dL Final    Total Protein 03/13/2023 6.5  6.1 - 8.1 g/dL Final    Albumin 03/13/2023 4.0  3.6 - 5.1 g/dL Final    Globulin, Total 03/13/2023 2.5  1.9 - 3.7 g/dL (calc) Final    Albumin/Globulin Ratio 03/13/2023 1.6  1.0 - 2.5 (calc) Final    Total Bilirubin 03/13/2023 0.4  0.2 - 1.2 mg/dL Final    Alkaline Phosphatase 03/13/2023 69  37 - 153 U/L Final    AST 03/13/2023 15  10 - 35 U/L Final    ALT 03/13/2023 11  6 - 29 U/L Final     03/13/2023 12  <35 U/mL Final    CA 27-29 03/13/2023 20  <38 U/mL Final    CA 15-3 03/13/2023 13  <32 U/mL Final   Telephone on 02/24/2023   Component Date Value Ref Range Status    WBC 03/01/2023 4.9  3.8 - 10.8 Thousand/uL Final    RBC 03/01/2023 4.57  3.80 - 5.10 Million/uL Final    Hemoglobin 03/01/2023 11.6 (L)  11.7 - 15.5 g/dL Final     Hematocrit 03/01/2023 37.2  35.0 - 45.0 % Final    MCV 03/01/2023 81.4  80.0 - 100.0 fL Final    MCH 03/01/2023 25.4 (L)  27.0 - 33.0 pg Final    MCHC 03/01/2023 31.2 (L)  32.0 - 36.0 g/dL Final    RDW 03/01/2023 14.4  11.0 - 15.0 % Final    Platelets 03/01/2023 329  140 - 400 Thousand/uL Final    MPV 03/01/2023 11.0  7.5 - 12.5 fL Final    Neutrophils, Abs 03/01/2023 2,690  1,500 - 7,800 cells/uL Final    Lymph # 03/01/2023 1,240  850 - 3,900 cells/uL Final    Mono # 03/01/2023 451  200 - 950 cells/uL Final    Eos # 03/01/2023 441  15 - 500 cells/uL Final    Baso # 03/01/2023 78  0 - 200 cells/uL Final    Neutrophils Relative 03/01/2023 54.9  % Final    Lymph % 03/01/2023 25.3  % Final    Mono % 03/01/2023 9.2  % Final    Eosinophil % 03/01/2023 9.0  % Final    Basophil % 03/01/2023 1.6  % Final    Glucose 03/01/2023 92  65 - 139 mg/dL Final    BUN 03/01/2023 21  7 - 25 mg/dL Final    Creatinine 03/01/2023 0.90  0.50 - 1.05 mg/dL Final    eGFR 03/01/2023 73  > OR = 60 mL/min/1.73m2 Final    BUN/Creatinine Ratio 03/01/2023 NOT APPLICABLE  6 - 22 (calc) Final    Sodium 03/01/2023 143  135 - 146 mmol/L Final    Potassium 03/01/2023 4.3  3.5 - 5.3 mmol/L Final    Chloride 03/01/2023 105  98 - 110 mmol/L Final    CO2 03/01/2023 30  20 - 32 mmol/L Final    Calcium 03/01/2023 9.2  8.6 - 10.4 mg/dL Final    Total Protein 03/01/2023 6.3  6.1 - 8.1 g/dL Final    Albumin 03/01/2023 3.9  3.6 - 5.1 g/dL Final    Globulin, Total 03/01/2023 2.4  1.9 - 3.7 g/dL (calc) Final    Albumin/Globulin Ratio 03/01/2023 1.6  1.0 - 2.5 (calc) Final    Total Bilirubin 03/01/2023 0.4  0.2 - 1.2 mg/dL Final    Alkaline Phosphatase 03/01/2023 75  37 - 153 U/L Final    AST 03/01/2023 18  10 - 35 U/L Final    ALT 03/01/2023 20  6 - 29 U/L Final    Hemoglobin A1C 03/01/2023 5.7 (H)  <5.7 % of total Hgb Final    Cholesterol 03/01/2023 243 (H)  <200 mg/dL Final    HDL 03/01/2023 91  > OR = 50 mg/dL Final    Triglycerides 03/01/2023 83  <150 mg/dL  Final    LDL Cholesterol 03/01/2023 134 (H)  mg/dL (calc) Final    HDL/Cholesterol Ratio 03/01/2023 2.7  <5.0 (calc) Final    Non HDL Chol. (LDL+VLDL) 03/01/2023 152 (H)  <130 mg/dL (calc) Final    Creatinine, Urine 03/01/2023 85  20 - 275 mg/dL Final    Microalb, Ur 03/01/2023 0.3  See Note: mg/dL Final    Microalb/Creat Ratio 03/01/2023 4  <30 mcg/mg creat Final    Color, UA 03/01/2023 YELLOW  YELLOW Final    Appearance, UA 03/01/2023 CLOUDY (A)  CLEAR Final    Specific Gravity, UA 03/01/2023 1.013  1.001 - 1.035 Final    pH, UA 03/01/2023 6.5  5.0 - 8.0 Final    Glucose, UA 03/01/2023 NEGATIVE  NEGATIVE Final    Bilirubin, UA 03/01/2023 NEGATIVE  NEGATIVE Final    Ketones, UA 03/01/2023 NEGATIVE  NEGATIVE Final    Occult Blood UA 03/01/2023 NEGATIVE  NEGATIVE Final    Protein, UA 03/01/2023 NEGATIVE  NEGATIVE Final    Nitrite, UA 03/01/2023 POSITIVE (A)  NEGATIVE Final    Leukocytes, UA 03/01/2023 1+ (A)  NEGATIVE Final    WBC Casts, UA 03/01/2023 NONE SEEN  < OR = 5 /HPF Final    RBC Casts, UA 03/01/2023 NONE SEEN  < OR = 2 /HPF Final    Squam Epithel, UA 03/01/2023 NONE SEEN  < OR = 5 /HPF Final    Bacteria, UA 03/01/2023 MANY (A)  NONE SEEN /HPF Final    Hyaline Casts, UA 03/01/2023 NONE SEEN  NONE SEEN /LPF Final    Service Cmt: 03/01/2023    Final    Reflexive Urine Culture 03/01/2023    Final    Urine Culture, Routine 03/01/2023  (A)   Final    TSH w/reflex to FT4 03/01/2023 2.10  0.40 - 4.50 mIU/L Final       Past Medical History:   Diagnosis Date    Breast cancer, stage 1, estrogen receptor negative, left (2011) 2/18/2020    Depression     Diabetes mellitus, type 2     GERD (gastroesophageal reflux disease)     HER2-positive carcinoma of left breast 2/18/2020    Hx of breast cancer     Hx of breast cancer - Her2Nu+/ER+ - 2011 12/3/2019    Insomnia     Lymphedema     Neuropathy of both feet     S/P lumpectomy, left breast 2/18/2020     Social History     Socioeconomic History    Marital status:     Tobacco Use    Smoking status: Former     Packs/day: 0.50     Years: 25.00     Pack years: 12.50     Types: Cigarettes     Quit date: 2010     Years since quittin.5    Smokeless tobacco: Never   Substance and Sexual Activity    Alcohol use: Not Currently     Alcohol/week: 2.0 standard drinks     Types: 2 Glasses of wine per week    Drug use: Never    Sexual activity: Yes     Partners: Male     Birth control/protection: Other-see comments, None     Comment: Hysterectomy     Social Determinants of Health     Financial Resource Strain: Low Risk     Difficulty of Paying Living Expenses: Not hard at all   Food Insecurity: Unknown    Worried About Running Out of Food in the Last Year: Never true    Ran Out of Food in the Last Year: Patient refused   Transportation Needs: No Transportation Needs    Lack of Transportation (Medical): No    Lack of Transportation (Non-Medical): No   Physical Activity: Inactive    Days of Exercise per Week: 0 days    Minutes of Exercise per Session: 30 min   Stress: No Stress Concern Present    Feeling of Stress : Only a little   Social Connections: Unknown    Frequency of Communication with Friends and Family: Three times a week    Frequency of Social Gatherings with Friends and Family: Once a week    Active Member of Clubs or Organizations: No    Attends Club or Organization Meetings: 1 to 4 times per year    Marital Status:    Housing Stability: Low Risk     Unable to Pay for Housing in the Last Year: No    Number of Places Lived in the Last Year: 1    Unstable Housing in the Last Year: No     Past Surgical History:   Procedure Laterality Date    ARTHROSCOPY OF SHOULDER WITH DECOMPRESSION OF SUBACROMIAL SPACE Right 2023    Procedure: ARTHROSCOPY, SHOULDER, WITH SUBACROMIAL SPACE DECOMPRESSION;  Surgeon: Curtis Ricardo MD;  Location: [facility];  Service: Orthopedics;  Laterality: Right;    BICEPS TENDON REPAIR Left 2005    BREAST BIOPSY Left     BREAST LUMPECTOMY Left  2011     SECTION      1982, 1985, 1986    CHOLECYSTECTOMY  2000    Elbow fx Left 2015    FRACTURE SURGERY Left 2016    elbow    HYSTERECTOMY  2013    KNEE ARTHROSCOPY W/ MENISCAL REPAIR Left 2014    Lower back ruptured disc  2010    LYMPH NODE DISSECTION  2011    SPINE SURGERY  2009    ruptured disc     Family History   Problem Relation Age of Onset    Cancer Mother     Breast cancer Mother     Parkinsonism Father     Diabetes Father     Breast cancer Maternal Aunt     Breast cancer Paternal Aunt     Cancer Maternal Aunt     Cancer Paternal Aunt     Cancer Daughter     Heart disease Maternal Grandmother     Heart disease Paternal Grandmother        Review of patient's allergies indicates:   Allergen Reactions    Banana Hives    Codeine Nausea And Vomiting    Adhesive Rash    Dilaudid  [hydromorphone (bulk)] Rash    Hydromorphone hcl Rash       Current Outpatient Medications:     ascorbic acid, vitamin C, (VITAMIN C) 500 MG tablet, Take 500 mg by mouth once daily., Disp: , Rfl:     b complex vitamins capsule, Take 1 capsule by mouth once daily., Disp: , Rfl:     calcium carbonate (OS-JULIANNE) 600 mg calcium (1,500 mg) Tab, Take 600 mg by mouth once., Disp: , Rfl:     docusate sodium (COLACE) 100 MG capsule, Take 1 capsule (100 mg total) by mouth once daily. (Patient taking differently: Take 100 mg by mouth daily as needed.), Disp: 90 capsule, Rfl: 0    glucosamine-chondroitin 250-200 mg Tab, Take 2 tablets by mouth once daily., Disp: , Rfl:     HYDROcodone-acetaminophen (NORCO)  mg per tablet, Take 1 tablet by mouth every 4 (four) hours., Disp: , Rfl:     magnesium 250 mg Tab, Take 250 mg by mouth once daily., Disp: , Rfl:     multivitamin capsule, Take 1 capsule by mouth once daily., Disp: , Rfl:     mupirocin (BACTROBAN) 2 % ointment, , Disp: , Rfl:     terbinafine HCL (LAMISIL) 250 mg tablet, Take 250 mg by mouth once daily. TAKE 1 TABLET BY MOUTH EVERY DAY, Disp: , Rfl:     atomoxetine (STRATTERA) 40  "MG capsule, Take 1 capsule (40 mg total) by mouth once daily., Disp: 90 capsule, Rfl: 1    furosemide (LASIX) 20 MG tablet, Take 1 tablet (20 mg total) by mouth daily as needed., Disp: 90 tablet, Rfl: 3    LYRICA 200 mg Cap, Take 1 capsule (200 mg total) by mouth 3 (three) times daily., Disp: 270 capsule, Rfl: 3    naproxen (NAPROSYN) 500 MG tablet, Take 1 tablet (500 mg total) by mouth 2 (two) times daily., Disp: 90 tablet, Rfl: 3    pantoprazole (PROTONIX) 40 MG tablet, Take 1 tablet (40 mg total) by mouth once daily., Disp: 90 tablet, Rfl: 3    traZODone (DESYREL) 100 MG tablet, Take 2 tablets (200 mg total) by mouth every evening., Disp: 60 tablet, Rfl: 5    venlafaxine (EFFEXOR-XR) 75 MG 24 hr capsule, Take 3 capsules (225 mg total) by mouth every evening., Disp: 270 capsule, Rfl: 3    Review of Systems   Constitutional:  Negative for chills, fever and unexpected weight change.   HENT:  Negative for ear pain, rhinorrhea and sore throat.    Eyes:  Negative for pain and visual disturbance.   Respiratory:  Negative for cough and shortness of breath.    Cardiovascular:  Negative for chest pain, palpitations and leg swelling.   Gastrointestinal:  Negative for abdominal pain, diarrhea, nausea and vomiting.   Genitourinary:  Negative for difficulty urinating, hematuria and vaginal bleeding.   Musculoskeletal:  Negative for arthralgias.        Shoulder pain   Skin:  Negative for rash.   Neurological:  Negative for dizziness, weakness and headaches.   Psychiatric/Behavioral:  Negative for agitation and sleep disturbance. The patient is not nervous/anxious.         Objective:      Vitals:    06/15/23 0914   BP: 120/70   Pulse: 95   SpO2: 96%   Weight: 113.4 kg (250 lb)   Height: 5' 10" (1.778 m)     Physical Exam  Vitals and nursing note reviewed.   Constitutional:       General: She is not in acute distress.     Appearance: Normal appearance. She is well-developed. She is obese.   HENT:      Head: Normocephalic.      " Right Ear: External ear normal.      Left Ear: External ear normal.   Eyes:      Conjunctiva/sclera: Conjunctivae normal.      Pupils: Pupils are equal, round, and reactive to light.   Neck:      Vascular: No JVD.   Cardiovascular:      Rate and Rhythm: Normal rate and regular rhythm.      Heart sounds: No murmur heard.  Pulmonary:      Effort: Pulmonary effort is normal.      Breath sounds: Normal breath sounds.   Abdominal:      General: Bowel sounds are normal.      Palpations: Abdomen is soft.   Musculoskeletal:         General: No deformity.      Right shoulder: Decreased range of motion.      Cervical back: Normal range of motion and neck supple.      Right lower leg: Edema present.      Left lower leg: Edema present.      Comments: Surgical incision with clear drainage. Actively dripping down arm   Lymphadenopathy:      Cervical: No cervical adenopathy.   Skin:     General: Skin is warm and dry.      Findings: No rash.   Neurological:      Mental Status: She is alert and oriented to person, place, and time.      Gait: Gait normal.   Psychiatric:         Mood and Affect: Mood is not depressed.         Speech: Speech normal.         Behavior: Behavior normal.         Assessment:       1. Type 2 diabetes mellitus without complication, without long-term current use of insulin    2. Physical exam    3. Insomnia, unspecified type    4. Gastroesophageal reflux disease, unspecified whether esophagitis present    5. Hx of breast cancer - Her2Nu+/ER+ - 2011    6. Attention deficit disorder, unspecified hyperactivity presence    7. S/P arthroscopy of shoulder    8. Depression, unspecified depression type         Plan:       Type 2 diabetes mellitus without complication, without long-term current use of insulin  Comments:  hga1c is 6.0  Orders:  -     Hemoglobin A1C, POCT    Physical exam  -     furosemide (LASIX) 20 MG tablet; Take 1 tablet (20 mg total) by mouth daily as needed.  Dispense: 90 tablet; Refill: 3  -      Discontinue: traZODone (DESYREL) 100 MG tablet; Take 1 tablet (100 mg total) by mouth every evening.  Dispense: 90 tablet; Refill: 1  -     naproxen (NAPROSYN) 500 MG tablet; Take 1 tablet (500 mg total) by mouth 2 (two) times daily.  Dispense: 90 tablet; Refill: 3    Insomnia, unspecified type  Comments:  continue trazodone  Orders:  -     Discontinue: traZODone (DESYREL) 100 MG tablet; Take 1 tablet (100 mg total) by mouth every evening.  Dispense: 90 tablet; Refill: 1    Gastroesophageal reflux disease, unspecified whether esophagitis present  Comments:  continue protonix  Orders:  -     pantoprazole (PROTONIX) 40 MG tablet; Take 1 tablet (40 mg total) by mouth once daily.  Dispense: 90 tablet; Refill: 3    Hx of breast cancer - Her2Nu+/ER+ - 2011  Comments:  follows with dr. brock    Attention deficit disorder, unspecified hyperactivity presence  Comments:  continue strattera    S/P arthroscopy of shoulder  Comments:  will see dr finger today    Depression, unspecified depression type  Comments:  increase effexor to 225    Other orders  -     venlafaxine (EFFEXOR-XR) 75 MG 24 hr capsule; Take 3 capsules (225 mg total) by mouth every evening.  Dispense: 270 capsule; Refill: 3  -     LYRICA 200 mg Cap; Take 1 capsule (200 mg total) by mouth 3 (three) times daily.  Dispense: 270 capsule; Refill: 3  -     atomoxetine (STRATTERA) 40 MG capsule; Take 1 capsule (40 mg total) by mouth once daily.  Dispense: 90 capsule; Refill: 1      Follow up in about 3 months (around 9/15/2023), or if symptoms worsen or fail to improve, for medication management, Diabetic Check-Up.        6/27/2023 Shaunna Gordon

## 2023-06-30 ENCOUNTER — DOCUMENTATION ONLY (OUTPATIENT)
Dept: REHABILITATION | Facility: HOSPITAL | Age: 63
End: 2023-06-30
Payer: MEDICARE

## 2023-06-30 NOTE — PROGRESS NOTES
[facility]  PT Discharge Note    Name: Lolly Ramírez  [facility] Number: [id number]    Therapy Diagnosis       Encounter Diagnoses   Name Primary?    Activity intolerance Yes    Decreased ROM of right shoulder      Acute pain of right shoulder      Dislocation of right shoulder joint, subsequent encounter          Physician: Taurus Romero MD     Physician Orders: PT Eval and Treat Wish to work on active assisted range of motion of the right shoulder and strengthening of her internal rotator muscles of the shoulder to stabilize the shoulder 3 times a week x4 weeks   Medical Diagnosis from Referral: Dislocation of right shoulder joint, subsequent encounter   Evaluation Date: 3/22/2023    Date of Last visit: 3/22/2023  Total Visits Received: 1    ASSESSMENT      Patient attended PT for reassessment of her shoulder after 2nd dislocation.  Following this reassessment, patient fell again, reinjuring her R shoulder including dislocation requiring 2 attempts to reseat humeral head in glenoid fossa.  PT deferred pending assessment by MD.  Patient subsequently underwent shoulder surgery.      Discharge reason: Other:  Reinjury resulting in surgery.    Discharge FOTO Score: N/A    Goals: No change since assessment on 3/22/2023    PLAN   This patient is discharged from Physical Therapy      Lolly Villafana, PT

## 2023-08-28 ENCOUNTER — OFFICE VISIT (OUTPATIENT)
Dept: URGENT CARE | Facility: CLINIC | Age: 63
End: 2023-08-28
Payer: MEDICARE

## 2023-08-28 VITALS
RESPIRATION RATE: 16 BRPM | OXYGEN SATURATION: 95 % | HEART RATE: 70 BPM | WEIGHT: 250 LBS | DIASTOLIC BLOOD PRESSURE: 77 MMHG | TEMPERATURE: 98 F | SYSTOLIC BLOOD PRESSURE: 121 MMHG | BODY MASS INDEX: 35.87 KG/M2

## 2023-08-28 DIAGNOSIS — U07.1 COVID-19: ICD-10-CM

## 2023-08-28 DIAGNOSIS — U07.1 COVID-19 VIRUS DETECTED: ICD-10-CM

## 2023-08-28 DIAGNOSIS — R05.9 COUGH, UNSPECIFIED TYPE: Primary | ICD-10-CM

## 2023-08-28 LAB
CTP QC/QA: YES
SARS-COV-2 AG RESP QL IA.RAPID: POSITIVE

## 2023-08-28 PROCEDURE — 99204 OFFICE O/P NEW MOD 45 MIN: CPT | Mod: S$GLB,,, | Performed by: STUDENT IN AN ORGANIZED HEALTH CARE EDUCATION/TRAINING PROGRAM

## 2023-08-28 PROCEDURE — 99204 PR OFFICE/OUTPT VISIT, NEW, LEVL IV, 45-59 MIN: ICD-10-PCS | Mod: S$GLB,,, | Performed by: STUDENT IN AN ORGANIZED HEALTH CARE EDUCATION/TRAINING PROGRAM

## 2023-08-28 PROCEDURE — 87811 SARS-COV-2 COVID19 W/OPTIC: CPT | Mod: QW,S$GLB,, | Performed by: STUDENT IN AN ORGANIZED HEALTH CARE EDUCATION/TRAINING PROGRAM

## 2023-08-28 PROCEDURE — 87811 SARS CORONAVIRUS 2 ANTIGEN POCT, MANUAL READ: ICD-10-PCS | Mod: QW,S$GLB,, | Performed by: STUDENT IN AN ORGANIZED HEALTH CARE EDUCATION/TRAINING PROGRAM

## 2023-08-28 RX ORDER — AZITHROMYCIN 250 MG/1
TABLET, FILM COATED ORAL
Qty: 6 TABLET | Refills: 0 | Status: SHIPPED | OUTPATIENT
Start: 2023-08-28 | End: 2023-09-02

## 2023-08-28 RX ORDER — ALBUTEROL SULFATE 90 UG/1
1-2 AEROSOL, METERED RESPIRATORY (INHALATION) EVERY 6 HOURS PRN
Qty: 18 G | Refills: 0 | Status: SHIPPED | OUTPATIENT
Start: 2023-08-28 | End: 2023-10-05

## 2023-08-28 NOTE — PROGRESS NOTES
Subjective:      Patient ID: Lolly Ramírze is a 62 y.o. female.    Vitals:  weight is 113.4 kg (250 lb). Her oral temperature is 97.8 °F (36.6 °C). Her blood pressure is 121/77 and her pulse is 70. Her respiration is 16 and oxygen saturation is 95%.     Chief Complaint: Sinus Problem    Patient is a 62-year-old female with a past medical history of breast cancer, type 2 diabetes, GERD, insomnia, and depression who presents to clinic for evaluation of COVID like symptoms.  Patient reports she is vaccinated.  Patient reports positive sick exposure as her  had COVID last week.  Patient reports symptoms times 5-6 days ago.  Patient reports over-the-counter medications with no significant relief to symptoms.    Sinus Problem  This is a new problem. The current episode started in the past 7 days. The problem is unchanged. The maximum temperature recorded prior to her arrival was 100.4 - 100.9 F. The fever has been present for 1 to 2 days. Associated symptoms include chills, congestion, coughing, headaches, sneezing and a sore throat (Scratchy throat). Pertinent negatives include no ear pain or shortness of breath.       Constitution: Positive for chills and fatigue.   HENT:  Positive for congestion, postnasal drip and sore throat (Scratchy throat). Negative for ear pain and trouble swallowing.    Neck: neck negative.   Cardiovascular: Negative.  Negative for chest pain and palpitations.   Eyes: Negative.    Respiratory:  Positive for cough. Negative for chest tightness, sputum production, shortness of breath and wheezing.    Gastrointestinal:  Positive for nausea and vomiting. Negative for abdominal pain and diarrhea.   Endocrine: negative.   Genitourinary: Negative.    Musculoskeletal:  Positive for muscle ache.   Skin: Negative.  Negative for color change, pale, rash and erythema.   Allergic/Immunologic: Positive for sneezing.   Neurological:  Positive for headaches. Negative for light-headedness, passing out,  disorientation and altered mental status.   Hematologic/Lymphatic: Negative.    Psychiatric/Behavioral: Negative.  Negative for altered mental status, disorientation and confusion.       Objective:     Physical Exam   Constitutional: She is oriented to person, place, and time. She appears well-developed. She is cooperative.  Non-toxic appearance. She does not appear ill. No distress. obesity  HENT:   Head: Normocephalic and atraumatic.   Ears:   Right Ear: Hearing, tympanic membrane, external ear and ear canal normal.   Left Ear: Hearing, tympanic membrane, external ear and ear canal normal.   Nose: Congestion present. No mucosal edema, rhinorrhea or nasal deformity. No epistaxis. Right sinus exhibits no maxillary sinus tenderness and no frontal sinus tenderness. Left sinus exhibits no maxillary sinus tenderness and no frontal sinus tenderness.   Mouth/Throat: Uvula is midline and mucous membranes are normal. Mucous membranes are moist. No trismus in the jaw. Normal dentition. No uvula swelling. Posterior oropharyngeal erythema present. No oropharyngeal exudate. Oropharynx is clear.   Eyes: Conjunctivae and lids are normal. Pupils are equal, round, and reactive to light. Right eye exhibits no discharge. Left eye exhibits no discharge. No scleral icterus.   Neck: Trachea normal and phonation normal. Neck supple. No neck rigidity present.   Cardiovascular: Normal rate, regular rhythm and normal pulses.   Pulmonary/Chest: Effort normal and breath sounds normal. No respiratory distress. She has no wheezes. She has no rhonchi. She has no rales.   Abdominal: Normal appearance and bowel sounds are normal. She exhibits no distension. Soft. There is no abdominal tenderness.   Musculoskeletal: Normal range of motion.         General: Normal range of motion.      Cervical back: She exhibits no tenderness.   Lymphadenopathy:     She has no cervical adenopathy.   Neurological: She is alert and oriented to person, place, and  time. She exhibits normal muscle tone.   Skin: Skin is warm, dry, intact, not diaphoretic, not pale and no rash. Capillary refill takes 2 to 3 seconds. No erythema   Psychiatric: Her speech is normal and behavior is normal. Judgment and thought content normal.   Nursing note and vitals reviewed.      Assessment:     1. Cough, unspecified type    2. COVID-19        Plan:       Cough, unspecified type  -     SARS Coronavirus 2 Antigen, POCT Manual Read    COVID-19    Other orders  -     azithromycin (Z-FLORENTINO) 250 MG tablet; Take 2 tablets by mouth on day 1; Take 1 tablet by mouth on days 2-5  Dispense: 6 tablet; Refill: 0  -     albuterol (VENTOLIN HFA) 90 mcg/actuation inhaler; Inhale 1-2 puffs into the lungs every 6 (six) hours as needed for Wheezing or Shortness of Breath. Rescue  Dispense: 18 g; Refill: 0                Labs:  COVID positive.  Quarantine as directed.  Quarantine information provided to patient.  COVID recommendations provided.  (Vitamin-C 2000 mg daily, vitamin D3 1000 IU daily, Pepcid 40 mg daily, Zyrtec 10 mg daily, zinc 50 mg daily)  Tylenol per package instructions for any pain or fever.  May rotate with Motrin if unable to control pain or fever along with Tylenol.  Monitor home SpO2 and present to emergency department with readings less than 92%.  Take medications as prescribed.  Patient reports having Zofran and Tessalon from her  at home that she can use.  Assure adequate hydration.  Follow-up with PCP in 1-2 days.  Return to clinic as needed.  To ED for any new or acutely worsening symptoms including but not limited to chest pain, palpitations, shortness of breath, or fever greater than 103° F.  Patient in agreement with plan of care.     DISCLAIMER: Please note that my documentation in this Electronic Healthcare Record was produced using speech recognition software and therefore may contain errors related to that software system.These could include grammar, punctuation and spelling  errors or the inclusion/exclusion of phrases that were not intended. Garbled syntax, mangled pronouns, and other bizarre constructions may be attributed to that software system.

## 2023-09-06 ENCOUNTER — PATIENT MESSAGE (OUTPATIENT)
Dept: FAMILY MEDICINE | Facility: CLINIC | Age: 63
End: 2023-09-06

## 2023-09-06 DIAGNOSIS — Z79.899 HIGH RISK MEDICATION USE: ICD-10-CM

## 2023-09-06 DIAGNOSIS — D64.9 ANEMIA, UNSPECIFIED TYPE: Primary | ICD-10-CM

## 2023-09-08 LAB
ALBUMIN SERPL-MCNC: 3.3 G/DL (ref 3.6–5.1)
ALBUMIN/GLOB SERPL: 1.3 (CALC) (ref 1–2.5)
ALP SERPL-CCNC: 92 U/L (ref 37–153)
ALT SERPL-CCNC: 14 U/L (ref 6–29)
AST SERPL-CCNC: 14 U/L (ref 10–35)
BASOPHILS # BLD AUTO: 54 CELLS/UL (ref 0–200)
BASOPHILS NFR BLD AUTO: 0.8 %
BILIRUB SERPL-MCNC: 0.3 MG/DL (ref 0.2–1.2)
BUN SERPL-MCNC: 16 MG/DL (ref 7–25)
BUN/CREAT SERPL: ABNORMAL (CALC) (ref 6–22)
CALCIUM SERPL-MCNC: 8.6 MG/DL (ref 8.6–10.4)
CHLORIDE SERPL-SCNC: 108 MMOL/L (ref 98–110)
CO2 SERPL-SCNC: 27 MMOL/L (ref 20–32)
CREAT SERPL-MCNC: 0.76 MG/DL (ref 0.5–1.05)
EGFR: 89 ML/MIN/1.73M2
EOSINOPHIL # BLD AUTO: 456 CELLS/UL (ref 15–500)
EOSINOPHIL NFR BLD AUTO: 6.8 %
ERYTHROCYTE [DISTWIDTH] IN BLOOD BY AUTOMATED COUNT: 15.7 % (ref 11–15)
FERRITIN SERPL-MCNC: 38 NG/ML (ref 16–288)
GLOBULIN SER CALC-MCNC: 2.6 G/DL (CALC) (ref 1.9–3.7)
GLUCOSE SERPL-MCNC: 79 MG/DL (ref 65–99)
HCT VFR BLD AUTO: 32 % (ref 35–45)
HGB BLD-MCNC: 10.2 G/DL (ref 11.7–15.5)
LYMPHOCYTES # BLD AUTO: 1508 CELLS/UL (ref 850–3900)
LYMPHOCYTES NFR BLD AUTO: 22.5 %
MCH RBC QN AUTO: 25.2 PG (ref 27–33)
MCHC RBC AUTO-ENTMCNC: 31.9 G/DL (ref 32–36)
MCV RBC AUTO: 79 FL (ref 80–100)
MONOCYTES # BLD AUTO: 717 CELLS/UL (ref 200–950)
MONOCYTES NFR BLD AUTO: 10.7 %
NEUTROPHILS # BLD AUTO: 3966 CELLS/UL (ref 1500–7800)
NEUTROPHILS NFR BLD AUTO: 59.2 %
PLATELET # BLD AUTO: 362 THOUSAND/UL (ref 140–400)
PMV BLD REES-ECKER: 11.8 FL (ref 7.5–12.5)
POTASSIUM SERPL-SCNC: 4.7 MMOL/L (ref 3.5–5.3)
PROT SERPL-MCNC: 5.9 G/DL (ref 6.1–8.1)
RBC # BLD AUTO: 4.05 MILLION/UL (ref 3.8–5.1)
SODIUM SERPL-SCNC: 143 MMOL/L (ref 135–146)
WBC # BLD AUTO: 6.7 THOUSAND/UL (ref 3.8–10.8)

## 2023-09-14 ENCOUNTER — TELEPHONE (OUTPATIENT)
Dept: FAMILY MEDICINE | Facility: CLINIC | Age: 63
End: 2023-09-14

## 2023-09-14 ENCOUNTER — OFFICE VISIT (OUTPATIENT)
Dept: FAMILY MEDICINE | Facility: CLINIC | Age: 63
End: 2023-09-14
Payer: MEDICARE

## 2023-09-14 VITALS
BODY MASS INDEX: 39.08 KG/M2 | WEIGHT: 249 LBS | HEIGHT: 67 IN | OXYGEN SATURATION: 95 % | HEART RATE: 79 BPM | SYSTOLIC BLOOD PRESSURE: 134 MMHG | DIASTOLIC BLOOD PRESSURE: 78 MMHG

## 2023-09-14 DIAGNOSIS — Z85.3 HX OF BREAST CANCER: ICD-10-CM

## 2023-09-14 DIAGNOSIS — Z00.00 PHYSICAL EXAM: ICD-10-CM

## 2023-09-14 DIAGNOSIS — G47.00 INSOMNIA, UNSPECIFIED TYPE: ICD-10-CM

## 2023-09-14 DIAGNOSIS — E11.9 TYPE 2 DIABETES MELLITUS WITHOUT COMPLICATION, WITHOUT LONG-TERM CURRENT USE OF INSULIN: Primary | ICD-10-CM

## 2023-09-14 DIAGNOSIS — K21.9 GASTROESOPHAGEAL REFLUX DISEASE, UNSPECIFIED WHETHER ESOPHAGITIS PRESENT: ICD-10-CM

## 2023-09-14 DIAGNOSIS — M54.9 BACK PAIN, UNSPECIFIED BACK LOCATION, UNSPECIFIED BACK PAIN LATERALITY, UNSPECIFIED CHRONICITY: ICD-10-CM

## 2023-09-14 DIAGNOSIS — J43.9 PULMONARY EMPHYSEMA, UNSPECIFIED EMPHYSEMA TYPE: ICD-10-CM

## 2023-09-14 DIAGNOSIS — E78.5 HYPERLIPIDEMIA, UNSPECIFIED HYPERLIPIDEMIA TYPE: ICD-10-CM

## 2023-09-14 DIAGNOSIS — F98.8 ATTENTION DEFICIT DISORDER, UNSPECIFIED HYPERACTIVITY PRESENCE: ICD-10-CM

## 2023-09-14 DIAGNOSIS — M19.011 PRIMARY OSTEOARTHRITIS OF RIGHT SHOULDER: ICD-10-CM

## 2023-09-14 DIAGNOSIS — M17.11 PRIMARY OSTEOARTHRITIS OF RIGHT KNEE: ICD-10-CM

## 2023-09-14 DIAGNOSIS — E66.01 SEVERE OBESITY (BMI 35.0-39.9) WITH COMORBIDITY: ICD-10-CM

## 2023-09-14 PROCEDURE — 99214 PR OFFICE/OUTPT VISIT, EST, LEVL IV, 30-39 MIN: ICD-10-PCS | Mod: S$GLB,,, | Performed by: NURSE PRACTITIONER

## 2023-09-14 PROCEDURE — 99214 OFFICE O/P EST MOD 30 MIN: CPT | Mod: S$GLB,,, | Performed by: NURSE PRACTITIONER

## 2023-09-14 PROCEDURE — 83036 POCT HEMOGLOBIN A1C: ICD-10-PCS | Mod: QW,,, | Performed by: NURSE PRACTITIONER

## 2023-09-14 PROCEDURE — 83036 HEMOGLOBIN GLYCOSYLATED A1C: CPT | Mod: QW,,, | Performed by: NURSE PRACTITIONER

## 2023-09-14 RX ORDER — FUROSEMIDE 20 MG/1
20 TABLET ORAL DAILY PRN
Qty: 90 TABLET | Refills: 3 | Status: SHIPPED | OUTPATIENT
Start: 2023-09-14

## 2023-09-14 RX ORDER — TRAZODONE HYDROCHLORIDE 100 MG/1
200 TABLET ORAL NIGHTLY
Qty: 60 TABLET | Refills: 5 | Status: SHIPPED | OUTPATIENT
Start: 2023-09-14 | End: 2024-03-27 | Stop reason: SDUPTHER

## 2023-09-14 RX ORDER — ATOMOXETINE 40 MG/1
40 CAPSULE ORAL DAILY
Qty: 90 CAPSULE | Refills: 1 | Status: SHIPPED | OUTPATIENT
Start: 2023-09-14 | End: 2023-12-29

## 2023-09-14 RX ORDER — NAPROXEN 500 MG/1
500 TABLET ORAL 2 TIMES DAILY
Qty: 90 TABLET | Refills: 3 | Status: SHIPPED | OUTPATIENT
Start: 2023-09-14 | End: 2023-10-05

## 2023-09-14 NOTE — PROGRESS NOTES
SUBJECTIVE:    Patient ID: Lolly Ramírez is a 62 y.o. female.    Chief Complaint: Follow-up (Brought bottles, A1c ordered, Back pain//BA )    62 year old female presents for check up.  is present during the exam. She is treated for gerd, insomnia,neuropathy, oa. Psoriasis and dm. Taking meds as prescribed.  Hga1c today is 6.0mg/dl. She originally dislocated her shoulder the 1st time dislocated to her right shoulder back in October 29, 2022 she was treated conservatively and recovered well. Has been having worsening back pain. Back pain seems to be getting worse. Having trouble sleeping . Taking trazodone nightly    Follow-up  Pertinent negatives include no abdominal pain, arthralgias, chest pain, chills, coughing, fever, headaches, nausea, rash, sore throat, vomiting or weakness.       Office Visit on 09/14/2023   Component Date Value Ref Range Status    Hemoglobin A1C, POC 09/18/2023 6.0  % Final   Patient Message on 09/06/2023   Component Date Value Ref Range Status    WBC 09/07/2023 6.7  3.8 - 10.8 Thousand/uL Final    RBC 09/07/2023 4.05  3.80 - 5.10 Million/uL Final    Hemoglobin 09/07/2023 10.2 (L)  11.7 - 15.5 g/dL Final    Hematocrit 09/07/2023 32.0 (L)  35.0 - 45.0 % Final    MCV 09/07/2023 79.0 (L)  80.0 - 100.0 fL Final    MCH 09/07/2023 25.2 (L)  27.0 - 33.0 pg Final    MCHC 09/07/2023 31.9 (L)  32.0 - 36.0 g/dL Final    RDW 09/07/2023 15.7 (H)  11.0 - 15.0 % Final    Platelets 09/07/2023 362  140 - 400 Thousand/uL Final    MPV 09/07/2023 11.8  7.5 - 12.5 fL Final    Neutrophils, Abs 09/07/2023 3,966  1,500 - 7,800 cells/uL Final    Lymph # 09/07/2023 1,508  850 - 3,900 cells/uL Final    Mono # 09/07/2023 717  200 - 950 cells/uL Final    Eos # 09/07/2023 456  15 - 500 cells/uL Final    Baso # 09/07/2023 54  0 - 200 cells/uL Final    Neutrophils Relative 09/07/2023 59.2  % Final    Lymph % 09/07/2023 22.5  % Final    Mono % 09/07/2023 10.7  % Final    Eosinophil % 09/07/2023  6.8  % Final    Basophil % 09/07/2023 0.8  % Final    Ferritin 09/07/2023 38  16 - 288 ng/mL Final    Glucose 09/07/2023 79  65 - 99 mg/dL Final    BUN 09/07/2023 16  7 - 25 mg/dL Final    Creatinine 09/07/2023 0.76  0.50 - 1.05 mg/dL Final    eGFR 09/07/2023 89  > OR = 60 mL/min/1.73m2 Final    BUN/Creatinine Ratio 09/07/2023 SEE NOTE:  6 - 22 (calc) Final    Sodium 09/07/2023 143  135 - 146 mmol/L Final    Potassium 09/07/2023 4.7  3.5 - 5.3 mmol/L Final    Chloride 09/07/2023 108  98 - 110 mmol/L Final    CO2 09/07/2023 27  20 - 32 mmol/L Final    Calcium 09/07/2023 8.6  8.6 - 10.4 mg/dL Final    Total Protein 09/07/2023 5.9 (L)  6.1 - 8.1 g/dL Final    Albumin 09/07/2023 3.3 (L)  3.6 - 5.1 g/dL Final    Globulin, Total 09/07/2023 2.6  1.9 - 3.7 g/dL (calc) Final    Albumin/Globulin Ratio 09/07/2023 1.3  1.0 - 2.5 (calc) Final    Total Bilirubin 09/07/2023 0.3  0.2 - 1.2 mg/dL Final    Alkaline Phosphatase 09/07/2023 92  37 - 153 U/L Final    AST 09/07/2023 14  10 - 35 U/L Final    ALT 09/07/2023 14  6 - 29 U/L Final   Office Visit on 08/28/2023   Component Date Value Ref Range Status    SARS Coronavirus 2 Antigen 08/28/2023 Positive (A)  Negative Final     Acceptable 08/28/2023 Yes   Final   Office Visit on 06/15/2023   Component Date Value Ref Range Status    Hemoglobin A1C, POC 06/15/2023 6.0  % Final   Admission on 05/24/2023, Discharged on 05/24/2023   Component Date Value Ref Range Status    POC Glucose 05/24/2023 90  70 - 110 Final   Hospital Outpatient Visit on 05/23/2023   Component Date Value Ref Range Status    WBC 05/23/2023 5.38  3.90 - 12.70 K/uL Final    RBC 05/23/2023 4.19  4.00 - 5.40 M/uL Final    Hemoglobin 05/23/2023 10.8 (L)  12.0 - 16.0 g/dL Final    Hematocrit 05/23/2023 34.7 (L)  37.0 - 48.5 % Final    MCV 05/23/2023 83  82 - 98 fL Final    MCH 05/23/2023 25.8 (L)  27.0 - 31.0 pg Final    MCHC 05/23/2023 31.1 (L)  32.0 - 36.0 g/dL Final    RDW  05/23/2023 15.9 (H)  11.5 - 14.5 % Final    Platelets 05/23/2023 292  150 - 450 K/uL Final    MPV 05/23/2023 10.6  9.2 - 12.9 fL Final    Immature Granulocytes 05/23/2023 0.4  0.0 - 0.5 % Final    Gran # (ANC) 05/23/2023 2.8  1.8 - 7.7 K/uL Final    Immature Grans (Abs) 05/23/2023 0.02  0.00 - 0.04 K/uL Final    Lymph # 05/23/2023 1.5  1.0 - 4.8 K/uL Final    Mono # 05/23/2023 0.6  0.3 - 1.0 K/uL Final    Eos # 05/23/2023 0.4  0.0 - 0.5 K/uL Final    Baso # 05/23/2023 0.08  0.00 - 0.20 K/uL Final    nRBC 05/23/2023 0  0 /100 WBC Final    Gran % 05/23/2023 51.6  38.0 - 73.0 % Final    Lymph % 05/23/2023 28.1  18.0 - 48.0 % Final    Mono % 05/23/2023 10.4  4.0 - 15.0 % Final    Eosinophil % 05/23/2023 8.0  0.0 - 8.0 % Final    Basophil % 05/23/2023 1.5  0.0 - 1.9 % Final    Differential Method 05/23/2023 Automated   Final    Sodium 05/23/2023 143  136 - 145 mmol/L Final    Potassium 05/23/2023 3.9  3.5 - 5.1 mmol/L Final    Chloride 05/23/2023 106  95 - 110 mmol/L Final    CO2 05/23/2023 30 (H)  23 - 29 mmol/L Final    Glucose 05/23/2023 99  70 - 110 mg/dL Final    BUN 05/23/2023 19  8 - 23 mg/dL Final    Creatinine 05/23/2023 0.8  0.5 - 1.4 mg/dL Final    Calcium 05/23/2023 8.9  8.7 - 10.5 mg/dL Final    Total Protein 05/23/2023 6.4  6.0 - 8.4 g/dL Final    Albumin 05/23/2023 3.5  3.5 - 5.2 g/dL Final    Total Bilirubin 05/23/2023 0.4  0.1 - 1.0 mg/dL Final    Alkaline Phosphatase 05/23/2023 64  55 - 135 U/L Final    AST 05/23/2023 16  10 - 40 U/L Final    ALT 05/23/2023 15  10 - 44 U/L Final    Anion Gap 05/23/2023 7 (L)  8 - 16 mmol/L Final    eGFR 05/23/2023 >60.0  >60 mL/min/1.73 m^2 Final       Past Medical History:   Diagnosis Date    Breast cancer, stage 1, estrogen receptor negative, left (2011) 2/18/2020    Depression     Diabetes mellitus, type 2     GERD (gastroesophageal reflux disease)     HER2-positive carcinoma of left breast 2/18/2020    Hx of breast cancer      Hx of breast cancer - Her2Nu+/ER+ - 2011 12/3/2019    Insomnia     Lymphedema     Neuropathy of both feet     S/P lumpectomy, left breast 2020     Social History     Socioeconomic History    Marital status:    Tobacco Use    Smoking status: Former     Current packs/day: 0.00     Average packs/day: 0.5 packs/day for 25.0 years (12.5 ttl pk-yrs)     Types: Cigarettes     Start date: 1985     Quit date: 2010     Years since quittin.8    Smokeless tobacco: Never   Substance and Sexual Activity    Alcohol use: Not Currently     Alcohol/week: 2.0 standard drinks of alcohol     Types: 2 Glasses of wine per week    Drug use: Never    Sexual activity: Yes     Partners: Male     Birth control/protection: Other-see comments, None     Comment: Hysterectomy     Social Determinants of Health     Financial Resource Strain: Low Risk  (3/9/2023)    Overall Financial Resource Strain (CARDIA)     Difficulty of Paying Living Expenses: Not hard at all   Food Insecurity: Unknown (3/9/2023)    Hunger Vital Sign     Worried About Running Out of Food in the Last Year: Never true     Ran Out of Food in the Last Year: Patient refused   Transportation Needs: No Transportation Needs (3/9/2023)    PRAPARE - Transportation     Lack of Transportation (Medical): No     Lack of Transportation (Non-Medical): No   Physical Activity: Inactive (3/9/2023)    Exercise Vital Sign     Days of Exercise per Week: 0 days     Minutes of Exercise per Session: 30 min   Stress: No Stress Concern Present (3/9/2023)    [employer] [place] [employer] - Occupational Stress Questionnaire     Feeling of Stress : Only a little   Social Connections: Unknown (3/9/2023)    Social Connection and Isolation Panel [NHANES]     Frequency of Communication with Friends and Family: Three times a week     Frequency of Social Gatherings with Friends and Family: Once a week     Active Member of Clubs or Organizations: No      Attends Club or Organization Meetings: 1 to 4 times per year     Marital Status:    Housing Stability: Low Risk  (3/9/2023)    Housing Stability Vital Sign     Unable to Pay for Housing in the Last Year: No     Number of Places Lived in the Last Year: 1     Unstable Housing in the Last Year: No     Past Surgical History:   Procedure Laterality Date    ARTHROSCOPY OF SHOULDER WITH DECOMPRESSION OF SUBACROMIAL SPACE Right 2023    Procedure: ARTHROSCOPY, SHOULDER, WITH SUBACROMIAL SPACE DECOMPRESSION;  Surgeon: Curtis Ricardo MD;  Location: [facility];  Service: Orthopedics;  Laterality: Right;    BICEPS TENDON REPAIR Left 2005    BREAST BIOPSY Left     BREAST LUMPECTOMY Left 2011     SECTION      1982, ,     CHOLECYSTECTOMY  2000    Elbow fx Left 2015    FRACTURE SURGERY Left 2016    elbow    HYSTERECTOMY  2013    KNEE ARTHROSCOPY W/ MENISCAL REPAIR Left 2014    Lower back ruptured disc  2010    LYMPH NODE DISSECTION  2011    SPINE SURGERY  2009    ruptured disc     Family History   Problem Relation Age of Onset    Cancer Mother     Breast cancer Mother     Parkinsonism Father     Diabetes Father     Breast cancer Maternal Aunt     Breast cancer Paternal Aunt     Cancer Maternal Aunt     Cancer Paternal Aunt     Cancer Daughter     Heart disease Maternal Grandmother     Heart disease Paternal Grandmother        All of your core healthy metrics are met.      Review of patient's allergies indicates:   Allergen Reactions    Banana Hives    Codeine Nausea And Vomiting    Adhesive Rash    Dilaudid  [hydromorphone (bulk)] Rash    Hydromorphone hcl Rash       Current Outpatient Medications:     albuterol (VENTOLIN HFA) 90 mcg/actuation inhaler, Inhale 1-2 puffs into the lungs every 6 (six) hours as needed for Wheezing or Shortness of Breath. Rescue, Disp: 18 g, Rfl: 0    ascorbic acid, vitamin C, (VITAMIN C) 500 MG tablet, Take 500 mg by mouth once daily.,  Disp: , Rfl:     b complex vitamins capsule, Take 1 capsule by mouth once daily., Disp: , Rfl:     calcium carbonate (OS-JULIANNE) 600 mg calcium (1,500 mg) Tab, Take 600 mg by mouth once., Disp: , Rfl:     glucosamine-chondroitin 250-200 mg Tab, Take 2 tablets by mouth once daily., Disp: , Rfl:     HYDROcodone-acetaminophen (NORCO)  mg per tablet, Take 1 tablet by mouth every 4 (four) hours., Disp: , Rfl:     LYRICA 200 mg Cap, Take 1 capsule (200 mg total) by mouth 3 (three) times daily., Disp: 270 capsule, Rfl: 3    magnesium 250 mg Tab, Take 250 mg by mouth once daily., Disp: , Rfl:     multivitamin capsule, Take 1 capsule by mouth once daily., Disp: , Rfl:     mupirocin (BACTROBAN) 2 % ointment, , Disp: , Rfl:     pantoprazole (PROTONIX) 40 MG tablet, Take 1 tablet (40 mg total) by mouth once daily., Disp: 90 tablet, Rfl: 3    terbinafine HCL (LAMISIL) 250 mg tablet, Take 250 mg by mouth once daily. TAKE 1 TABLET BY MOUTH EVERY DAY, Disp: , Rfl:     venlafaxine (EFFEXOR-XR) 75 MG 24 hr capsule, Take 3 capsules (225 mg total) by mouth every evening., Disp: 270 capsule, Rfl: 3    atomoxetine (STRATTERA) 40 MG capsule, Take 1 capsule (40 mg total) by mouth once daily., Disp: 90 capsule, Rfl: 1    furosemide (LASIX) 20 MG tablet, Take 1 tablet (20 mg total) by mouth daily as needed., Disp: 90 tablet, Rfl: 3    naproxen (NAPROSYN) 500 MG tablet, Take 1 tablet (500 mg total) by mouth 2 (two) times daily., Disp: 90 tablet, Rfl: 3    traZODone (DESYREL) 100 MG tablet, Take 2 tablets (200 mg total) by mouth every evening., Disp: 60 tablet, Rfl: 5    Review of Systems   Constitutional:  Negative for chills, fever and unexpected weight change.   HENT:  Negative for ear pain, rhinorrhea and sore throat.    Eyes:  Negative for pain and visual disturbance.   Respiratory:  Negative for cough and shortness of breath.    Cardiovascular:  Negative for chest pain, palpitations and leg swelling.   Gastrointestinal:  " Negative for abdominal pain, diarrhea, nausea and vomiting.   Genitourinary:  Negative for difficulty urinating, hematuria and vaginal bleeding.   Musculoskeletal:  Positive for back pain. Negative for arthralgias.   Skin:  Negative for rash.   Neurological:  Negative for dizziness, weakness and headaches.   Psychiatric/Behavioral:  Positive for sleep disturbance. Negative for agitation. The patient is not nervous/anxious.           Objective:      Vitals:    09/14/23 1101   BP: 134/78   Pulse: 79   SpO2: 95%   Weight: 112.9 kg (249 lb)   Height: 5' 7" (1.702 m)     Physical Exam  Vitals and nursing note reviewed.   Constitutional:       General: She is not in acute distress.     Appearance: Normal appearance. She is well-developed. She is obese.   HENT:      Head: Normocephalic.      Right Ear: External ear normal.      Left Ear: External ear normal.   Eyes:      Conjunctiva/sclera: Conjunctivae normal.      Pupils: Pupils are equal, round, and reactive to light.   Neck:      Vascular: No JVD.   Cardiovascular:      Rate and Rhythm: Normal rate and regular rhythm.      Heart sounds: No murmur heard.  Pulmonary:      Effort: Pulmonary effort is normal.      Breath sounds: Normal breath sounds.   Abdominal:      General: Bowel sounds are normal.      Palpations: Abdomen is soft.   Musculoskeletal:         General: No deformity.      Right shoulder: Decreased range of motion.      Cervical back: Normal range of motion and neck supple.      Lumbar back: Tenderness present. Decreased range of motion. Negative right straight leg raise test and negative left straight leg raise test.   Lymphadenopathy:      Cervical: No cervical adenopathy.   Skin:     General: Skin is warm and dry.      Findings: No rash.   Neurological:      Mental Status: She is alert and oriented to person, place, and time.      Gait: Gait normal.   Psychiatric:         Speech: Speech normal.         Behavior: Behavior normal.         Assessment:     "   1. Type 2 diabetes mellitus without complication, without long-term current use of insulin    2. Physical exam    3. Severe obesity (BMI 35.0-39.9) with comorbidity    4. Pulmonary emphysema, unspecified emphysema type    5. Back pain, unspecified back location, unspecified back pain laterality, unspecified chronicity    6. Insomnia, unspecified type    7. Hx of breast cancer - Her2Nu+/ER+ - 2011    8. Primary osteoarthritis of right knee    9. Gastroesophageal reflux disease, unspecified whether esophagitis present    10. Hyperlipidemia, unspecified hyperlipidemia type    11. Primary osteoarthritis of right shoulder    12. Attention deficit disorder, unspecified hyperactivity presence         Plan:       Type 2 diabetes mellitus without complication, without long-term current use of insulin  Comments:  hga1c 6.0  Orders:  -     Hemoglobin A1C, POCT    Physical exam  -     furosemide (LASIX) 20 MG tablet; Take 1 tablet (20 mg total) by mouth daily as needed.  Dispense: 90 tablet; Refill: 3  -     naproxen (NAPROSYN) 500 MG tablet; Take 1 tablet (500 mg total) by mouth 2 (two) times daily.  Dispense: 90 tablet; Refill: 3    Severe obesity (BMI 35.0-39.9) with comorbidity    Pulmonary emphysema, unspecified emphysema type    Back pain, unspecified back location, unspecified back pain laterality, unspecified chronicity  Comments:  xray  Orders:  -     X-Ray Lumbar Spine 5 View; Future; Expected date: 09/14/2023    Insomnia, unspecified type  Comments:  trazodone    Hx of breast cancer - Her2Nu+/ER+ - 2011    Primary osteoarthritis of right knee  Comments:  naproxen    Gastroesophageal reflux disease, unspecified whether esophagitis present  Comments:  protonix    Hyperlipidemia, unspecified hyperlipidemia type    Primary osteoarthritis of right shoulder    Attention deficit disorder, unspecified hyperactivity presence  Comments:  strattera      Other orders  -     traZODone (DESYREL) 100 MG tablet; Take 2 tablets  (200 mg total) by mouth every evening.  Dispense: 60 tablet; Refill: 5  -     atomoxetine (STRATTERA) 40 MG capsule; Take 1 capsule (40 mg total) by mouth once daily.  Dispense: 90 capsule; Refill: 1      No follow-ups on file.        9/26/2023 Shaunna Gordon

## 2023-09-14 NOTE — TELEPHONE ENCOUNTER
----- Message from Awa Viveros sent at 9/14/2023 12:25 PM CDT -----  Patient called and stated that she was just seen and she stated that she left her Frank in the room she was in and she is  dropping her  off and will be on her way to pick it up .she would like the nurse to have it up front if any questions please give her a call back at  [phone]

## 2023-09-18 LAB — HBA1C MFR BLD: 6 %

## 2023-09-19 ENCOUNTER — PATIENT MESSAGE (OUTPATIENT)
Dept: FAMILY MEDICINE | Facility: CLINIC | Age: 63
End: 2023-09-19

## 2023-09-21 ENCOUNTER — HOSPITAL ENCOUNTER (OUTPATIENT)
Dept: RADIOLOGY | Facility: HOSPITAL | Age: 63
Discharge: HOME | End: 2023-09-21
Attending: NURSE PRACTITIONER
Payer: MEDICARE

## 2023-09-21 DIAGNOSIS — M54.9 BACK PAIN, UNSPECIFIED BACK LOCATION, UNSPECIFIED BACK PAIN LATERALITY, UNSPECIFIED CHRONICITY: ICD-10-CM

## 2023-09-21 PROCEDURE — 72110 X-RAY EXAM L-2 SPINE 4/>VWS: CPT | Mod: TC,PO

## 2023-09-26 ENCOUNTER — TELEPHONE (OUTPATIENT)
Dept: FAMILY MEDICINE | Facility: CLINIC | Age: 63
End: 2023-09-26

## 2023-09-26 NOTE — TELEPHONE ENCOUNTER
----- Message from Shaunna Gordon NP sent at 9/26/2023  1:25 AM CDT -----  Multilevel degenerative disc disease, retrolisthesis. No acute findings

## 2023-09-29 ENCOUNTER — PATIENT MESSAGE (OUTPATIENT)
Dept: FAMILY MEDICINE | Facility: CLINIC | Age: 63
End: 2023-09-29

## 2023-09-29 DIAGNOSIS — M54.9 DORSALGIA, UNSPECIFIED: Primary | ICD-10-CM

## 2023-09-29 DIAGNOSIS — M54.30 SCIATICA, UNSPECIFIED LATERALITY: ICD-10-CM

## 2023-10-05 ENCOUNTER — OFFICE VISIT (OUTPATIENT)
Dept: ORTHOPEDICS | Facility: CLINIC | Age: 63
End: 2023-10-05
Payer: MEDICARE

## 2023-10-05 VITALS — BODY MASS INDEX: 39.08 KG/M2 | WEIGHT: 249 LBS | HEIGHT: 67 IN

## 2023-10-05 DIAGNOSIS — M17.12 PRIMARY OSTEOARTHRITIS OF LEFT KNEE: Primary | ICD-10-CM

## 2023-10-05 DIAGNOSIS — M17.11 PRIMARY OSTEOARTHRITIS OF RIGHT KNEE: ICD-10-CM

## 2023-10-05 DIAGNOSIS — Z01.818 PRE-OP TESTING: ICD-10-CM

## 2023-10-05 DIAGNOSIS — S46.011D TRAUMATIC COMPLETE TEAR OF RIGHT ROTATOR CUFF, SUBSEQUENT ENCOUNTER: ICD-10-CM

## 2023-10-05 PROCEDURE — 20610 DRAIN/INJ JOINT/BURSA W/O US: CPT | Mod: RT,S$GLB,, | Performed by: ORTHOPAEDIC SURGERY

## 2023-10-05 PROCEDURE — 20610 LARGE JOINT ASPIRATION/INJECTION: R KNEE: ICD-10-PCS | Mod: RT,S$GLB,, | Performed by: ORTHOPAEDIC SURGERY

## 2023-10-05 PROCEDURE — 99213 PR OFFICE/OUTPT VISIT, EST, LEVL III, 20-29 MIN: ICD-10-PCS | Mod: 25,S$GLB,, | Performed by: ORTHOPAEDIC SURGERY

## 2023-10-05 PROCEDURE — 99213 OFFICE O/P EST LOW 20 MIN: CPT | Mod: 25,S$GLB,, | Performed by: ORTHOPAEDIC SURGERY

## 2023-10-05 RX ORDER — TRAMADOL HYDROCHLORIDE 50 MG/1
50 TABLET ORAL EVERY 8 HOURS PRN
Qty: 21 EACH | Refills: 0 | Status: SHIPPED | OUTPATIENT
Start: 2023-10-05 | End: 2023-10-12

## 2023-10-05 RX ORDER — TRIAMCINOLONE ACETONIDE 40 MG/ML
40 INJECTION, SUSPENSION INTRA-ARTICULAR; INTRAMUSCULAR
Status: DISCONTINUED | OUTPATIENT
Start: 2023-10-05 | End: 2023-10-05 | Stop reason: HOSPADM

## 2023-10-05 RX ADMIN — TRIAMCINOLONE ACETONIDE 40 MG: 40 INJECTION, SUSPENSION INTRA-ARTICULAR; INTRAMUSCULAR at 07:10

## 2023-10-05 NOTE — PROCEDURES
Large Joint Aspiration/Injection: R knee    Date/Time: 10/5/2023 7:45 AM    Performed by: Curtis Ricardo MD  Authorized by: Curtis Ricardo MD    Consent Done?:  Yes (Verbal)  Indications:  Arthritis and pain  Site marked: the procedure site was marked    Timeout: prior to procedure the correct patient, procedure, and site was verified    Prep: patient was prepped and draped in usual sterile fashion      Local anesthesia used?: Yes    Local anesthetic:  Lidocaine 1% without epinephrine    Details:  Needle Size:  25 G  Ultrasonic Guidance for needle placement?: No    Location:  Knee  Site:  R knee  Medications:  40 mg triamcinolone acetonide 40 mg/mL  Patient tolerance:  Patient tolerated the procedure well with no immediate complications

## 2023-10-05 NOTE — PROGRESS NOTES
[facility]    Subjective:     Chief Complaint:   Chief Complaint   Patient presents with    Left Knee - Pain     Patient is here for a f/up on Right Shoulder pain &  chronic bilat knee pain. Last Fall was in February.     Right Knee - Pain    Right Shoulder - Pain       Past Medical History:   Diagnosis Date    Breast cancer, stage 1, estrogen receptor negative, left () 2020    Depression     Diabetes mellitus, type 2     GERD (gastroesophageal reflux disease)     HER2-positive carcinoma of left breast 2020    Hx of breast cancer     Hx of breast cancer - Her2Nu+/ER+ - 2011 12/3/2019    Insomnia     Lymphedema     Neuropathy of both feet     S/P lumpectomy, left breast 2020       Past Surgical History:   Procedure Laterality Date    ARTHROSCOPY OF SHOULDER WITH DECOMPRESSION OF SUBACROMIAL SPACE Right 2023    Procedure: ARTHROSCOPY, SHOULDER, WITH SUBACROMIAL SPACE DECOMPRESSION;  Surgeon: Curtis Ricardo MD;  Location: [facility];  Service: Orthopedics;  Laterality: Right;    BICEPS TENDON REPAIR Left 2005    BREAST BIOPSY Left     BREAST LUMPECTOMY Left      SECTION      1982, 1985,     CHOLECYSTECTOMY  2000    Elbow fx Left 2015    FRACTURE SURGERY Left 2016    elbow    HYSTERECTOMY  2013    KNEE ARTHROSCOPY W/ MENISCAL REPAIR Left 2014    Lower back ruptured disc  2010    LYMPH NODE DISSECTION  2011    SPINE SURGERY  2009    ruptured disc       Current Outpatient Medications   Medication Sig    ascorbic acid, vitamin C, (VITAMIN C) 500 MG tablet Take 500 mg by mouth once daily.    ascorbic acid/hesperidin (BIOFLAVONOID PRODUCTS ORAL) Take by mouth.    atomoxetine (STRATTERA) 40 MG capsule Take 1 capsule (40 mg total) by mouth once daily.    b complex vitamins capsule Take 1 capsule by mouth once daily.    calcium carbonate (OS-JULIANNE) 600 mg calcium (1,500 mg) Tab Take 600 mg by mouth once.    furosemide (LASIX) 20 MG tablet Take 1 tablet (20 mg total) by mouth  daily as needed.    glucosamine-chondroitin 250-200 mg Tab Take 2 tablets by mouth once daily.    LYRICA 200 mg Cap Take 1 capsule (200 mg total) by mouth 3 (three) times daily.    magnesium 250 mg Tab Take 250 mg by mouth once daily.    multivitamin capsule Take 1 capsule by mouth once daily.    mupirocin (BACTROBAN) 2 % ointment     pantoprazole (PROTONIX) 40 MG tablet Take 1 tablet (40 mg total) by mouth once daily.    traZODone (DESYREL) 100 MG tablet Take 2 tablets (200 mg total) by mouth every evening.    venlafaxine (EFFEXOR-XR) 75 MG 24 hr capsule Take 3 capsules (225 mg total) by mouth every evening.     No current facility-administered medications for this visit.       Review of patient's allergies indicates:   Allergen Reactions    Banana Hives    Codeine Nausea And Vomiting    Adhesive Rash    Dilaudid  [hydromorphone (bulk)] Rash    Hydromorphone hcl Rash       Family History   Problem Relation Age of Onset    Cancer Mother     Breast cancer Mother     Parkinsonism Father     Diabetes Father     Breast cancer Maternal Aunt     Breast cancer Paternal Aunt     Cancer Maternal Aunt     Cancer Paternal Aunt     Cancer Daughter     Heart disease Maternal Grandmother     Heart disease Paternal Grandmother        Social History     Socioeconomic History    Marital status:    Tobacco Use    Smoking status: Former     Current packs/day: 0.00     Average packs/day: 0.5 packs/day for 25.0 years (12.5 ttl pk-yrs)     Types: Cigarettes     Start date: 1985     Quit date: 2010     Years since quittin.8    Smokeless tobacco: Never   Substance and Sexual Activity    Alcohol use: Not Currently     Alcohol/week: 2.0 standard drinks of alcohol     Types: 2 Glasses of wine per week    Drug use: Never    Sexual activity: Yes     Partners: Male     Birth control/protection: Other-see comments, None     Comment: Hysterectomy     Social Determinants of Health     Financial Resource Strain: Low Risk   (3/9/2023)    Overall Financial Resource Strain (CARDIA)     Difficulty of Paying Living Expenses: Not hard at all   Food Insecurity: Unknown (3/9/2023)    Hunger Vital Sign     Worried About Running Out of Food in the Last Year: Never true     Ran Out of Food in the Last Year: Patient refused   Transportation Needs: No Transportation Needs (3/9/2023)    PRAPARE - Transportation     Lack of Transportation (Medical): No     Lack of Transportation (Non-Medical): No   Physical Activity: Inactive (3/9/2023)    Exercise Vital Sign     Days of Exercise per Week: 0 days     Minutes of Exercise per Session: 30 min   Stress: No Stress Concern Present (3/9/2023)    [employer] - Occupational Stress Questionnaire     Feeling of Stress : Only a little   Social Connections: Unknown (3/9/2023)    Social Connection and Isolation Panel [NHANES]     Frequency of Communication with Friends and Family: Three times a week     Frequency of Social Gatherings with Friends and Family: Once a week     Active Member of Clubs or Organizations: No     Attends Club or Organization Meetings: 1 to 4 times per year     Marital Status:    Housing Stability: Low Risk  (3/9/2023)    Housing Stability Vital Sign     Unable to Pay for Housing in the Last Year: No     Number of Places Lived in the Last Year: 1     Unstable Housing in the Last Year: No       History of present illness:  62-year-old female, returns to clinic today to follow-up on multiple musculoskeletal complaints.  We began seeing her earlier this year.  April.      Right shoulder:  She initially presented with right shoulder pain, she had an MRI which showed a large rotator cuff tear.  We attempted arthroscopy in May but unfortunately we were unable to Parks any sort of rotator cuff repair.  She simply has rotator cuff arthropathy.  She will need reverse total shoulder arthroplasty at some point.    Bilateral knees:  Patient with known  osteoarthritis in bilateral knees.  Advanced medial compartment collapse, she has varus knees.      Review of Systems:    Constitution: Negative for chills, fever, and sweats.  Negative for unexplained weight loss.    HENT:  Negative for headaches and blurry vision.    Cardiovascular:Negative for chest pain or irregular heart beat. Negative for hypertension.    Respiratory:  Negative for cough and shortness of breath.    Gastrointestinal: Negative for abdominal pain, heartburn, melena, nausea, and vomitting.    Genitourinary:  Negative bladder incontinence and dysuria.    Musculoskeletal:  See HPI for details.     Neurological: Negative for numbness.    Psychiatric/Behavioral: Negative for depression.  The patient is not nervous/anxious.      Endocrine: Negative for polyuria    Hematologic/Lymphatic: Negative for bleeding problem.  Does not bruise/bleed easily.    Skin: Negative for poor would healing and rash    Objective:      Physical Examination:    Vital Signs:  There were no vitals filed for this visit.    Body mass index is 39 kg/m².    This a well-developed, well nourished patient in no acute distress.  They are alert and oriented and cooperative to examination.        Examination of the right shoulder, skin is dry and intact, no erythema or ecchymosis no signs symptoms of infection.  She has full range of motion in all planes.  She does get some pain with range of motion.  She also has pain with impingement test, she has pain and weakness with resisted testing of the rotator cuff.    Examination of the bilateral knees, skin is dry and intact, no erythema ecchymosis, no signs symptoms of infection.  No rashes.  Plus one effusions bilaterally.  Range of motion 0-120 degrees.  Calf soft nontender, straight leg raise negative.    Pertinent New Results:    XRAY Report / Interpretation:   AP and lateral views of bilateral knees taken today in the office demonstrate advanced tricompartmental arthritis of the  "bilateral knees.  She has bone-on-bone appearance of the left knee, near bone-on-bone appearance in the right knee.  Medial compartment.  Severe patellofemoral joint arthritis as well.    Assessment/Plan:      Osteoarthritis bilateral knees, rotator cuff arthropathy right shoulder.  In terms of the bilateral knees, we injected the right knee today, the left knee bothers her worse.  We discussed left total knee arthroplasty and she wishes to proceed.    Patient was consented for surgery. Risks and benefits of the procedure were discussed in great detail. Complications may include but are not limited to bleeding, infection, damage to nerves, arteries, blood vessels, bones, tendons, ligaments, stiffness, instability, deep vein thrombus (DVT), pulmonary embolus (PE), altered sensation, continued pain, RSD, loss of motion or a need for additional surgery. As well as general anesthetic complications including seizure, stroke, heart attack and even death.    In terms of the right shoulder, she has known rotator cuff arthropathy, knees bother her worse christen the shoulder.  She will follow up p.r.n. for the shoulder postoperatively for the knee.    Francisco Condon, Physician Assistant, served in the capacity as a "scribe" for this patient encounter.  A "face-to-face" encounter occurred with Dr. Curtis Ricardo on this date.  The treatment plan and medical decision-making is outlined above. Patient was seen and examined with a chaperone.       This note was created using Dragon voice recognition software that occasionally misinterpreted phrases or words.          "

## 2023-10-06 ENCOUNTER — PATIENT MESSAGE (OUTPATIENT)
Dept: ORTHOPEDICS | Facility: CLINIC | Age: 63
End: 2023-10-06
Payer: MEDICARE

## 2023-10-09 ENCOUNTER — PATIENT MESSAGE (OUTPATIENT)
Dept: FAMILY MEDICINE | Facility: CLINIC | Age: 63
End: 2023-10-09

## 2023-10-09 NOTE — LETTER
[facility]  [address]  [place] [address]  Phone: [phone]  Fax: [phone] October 9, 2023    Lollybrant Ramírez  [address] [place] [address]      To Whom It May Concern:    Lolly Lozanoncer is unable to participate in jury duty due to ongoing issues she is currently under my treatment for.    If you have any questions or concerns, please feel free to call my office.    Sincerely,      Electronically Signed By: ZENA Treadwell Jodi, NP

## 2023-10-10 ENCOUNTER — HOSPITAL ENCOUNTER (OUTPATIENT)
Dept: RADIOLOGY | Facility: HOSPITAL | Age: 63
Discharge: HOME | End: 2023-10-10
Attending: NURSE PRACTITIONER
Payer: MEDICARE

## 2023-10-10 DIAGNOSIS — M54.9 DORSALGIA, UNSPECIFIED: ICD-10-CM

## 2023-10-10 DIAGNOSIS — M54.30 SCIATICA, UNSPECIFIED LATERALITY: ICD-10-CM

## 2023-10-10 PROCEDURE — 72148 MRI LUMBAR SPINE W/O DYE: CPT | Mod: TC

## 2023-10-10 PROCEDURE — 72148 MRI LUMBAR SPINE W/O DYE: CPT | Mod: 26,,, | Performed by: RADIOLOGY

## 2023-10-10 PROCEDURE — 72148 MRI LUMBAR SPINE WITHOUT CONTRAST: ICD-10-PCS | Mod: 26,,, | Performed by: RADIOLOGY

## 2023-10-11 ENCOUNTER — PATIENT MESSAGE (OUTPATIENT)
Dept: FAMILY MEDICINE | Facility: CLINIC | Age: 63
End: 2023-10-11

## 2023-10-11 ENCOUNTER — TELEPHONE (OUTPATIENT)
Dept: FAMILY MEDICINE | Facility: CLINIC | Age: 63
End: 2023-10-11

## 2023-10-11 NOTE — TELEPHONE ENCOUNTER
----- Message from Cynthia Zimmerman sent at 10/11/2023 10:06 AM CDT -----  Pt got a jury duty letter and it is not finished. It says she is treating me for and it is blank   [phone]

## 2023-11-06 ENCOUNTER — TELEPHONE (OUTPATIENT)
Dept: ORTHOPEDICS | Facility: CLINIC | Age: 63
End: 2023-11-06

## 2023-11-06 ENCOUNTER — HOSPITAL ENCOUNTER (OUTPATIENT)
Dept: PREADMISSION TESTING | Facility: HOSPITAL | Age: 63
Discharge: HOME | End: 2023-11-06
Attending: ORTHOPAEDIC SURGERY
Payer: MEDICARE

## 2023-11-06 ENCOUNTER — HOSPITAL ENCOUNTER (OUTPATIENT)
Dept: RADIOLOGY | Facility: HOSPITAL | Age: 63
Discharge: HOME | End: 2023-11-06
Attending: ORTHOPAEDIC SURGERY
Payer: MEDICARE

## 2023-11-06 VITALS — WEIGHT: 249 LBS | HEIGHT: 67 IN | BODY MASS INDEX: 39.08 KG/M2

## 2023-11-06 DIAGNOSIS — M17.12 PRIMARY OSTEOARTHRITIS OF LEFT KNEE: Primary | ICD-10-CM

## 2023-11-06 DIAGNOSIS — M17.12 PRIMARY OSTEOARTHRITIS OF LEFT KNEE: ICD-10-CM

## 2023-11-06 DIAGNOSIS — Z01.818 PRE-OP TESTING: ICD-10-CM

## 2023-11-06 DIAGNOSIS — Z01.818 PREOP TESTING: Primary | ICD-10-CM

## 2023-11-06 PROCEDURE — 73700 CT LOWER EXTREMITY W/O DYE: CPT | Mod: 26,LT,, | Performed by: RADIOLOGY

## 2023-11-06 PROCEDURE — 73700 CT LOWER EXTREMITY W/O DYE: CPT | Mod: TC,LT

## 2023-11-06 PROCEDURE — 73700 CT KNEE WITHOUT CONTRAST LEFT W/MAKO PROTOCOL: ICD-10-PCS | Mod: 26,LT,, | Performed by: RADIOLOGY

## 2023-11-06 NOTE — PRE ADMISSION SCREENING
"               CJR Risk Assessment Scale    Patient Name: Lolly Ramírez  YOB: 1960  MRN: [id number]            RIsk Factor Measure Recommendation Patient Data Scale/Score   BMI >40 Reconsider surgery, weight loss   Estimated body mass index is 39 kg/m² as calculated from the following:    Height as of this encounter: 5' 7" (1.702 m).    Weight as of this encounter: 112.9 kg (249 lb).   [] 0 = 1 - 24.9  [] 1 = 25-29.9  [] 2 = 30-34.9  [x] 3 = 35-39.9  [] 4 = 40-44.9  [] 5 = 45-99.9   Hemoglobin AIC (if applicable) >9 Delay surgery until DM under control  Refer for:  Nutrition Therapy  Exercise   Medication    Lab Results   Component Value Date    HGBA1C 5.7 (H) 03/01/2023       Lab Results   Component Value Date    GLU 65 (L) 11/06/2023      [] 0 = 4.0-5.6  [x] 1 = 5.7-6.4  [] 2 = 6.5-6.9  [] 3 = 7.0-7.9  [] 4 = 8.0-8.9  [] 5 = 9.0-12   Hemoglobin (Anemia) <9 Delay surgery   Correct anemia Lab Results   Component Value Date    HGB 11.6 (L) 11/06/2023    [] 20 - <9.0                    Albumin <3 Delay surgery &Workup Lab Results   Component Value Date    ALBUMIN 3.6 11/06/2023    [] 20 - <3.0   Smoking Cessation >4 Weeks Delay Surgery  Refer to OP Cessation Class    Former Smoker  Quit: 2010 [] 20 - current smoker                                _____ PPD                    Hx of MI, PE, Arrhythmia, CVA, DVT <30 Days Delay Surgery    N/A [] 20      Infection Variable Delay surgery and re-evaluate   N/A [] 20 - recent/current infection     Depression (PHQ) >10 out of 27 Delay Surgery and re-evaluate  Medication  Counseling              [x] 0     []1     []2     []3      []4      [] 5                    (1-4)      (5-9)  (10-14)  (15-19)   (20-27)     Memory Impairment & Memory loss (Mini-Cog Screening Tool) Advanced dementia and/or Parkinson's Reconsider surgery     [x] 0     []1     []2     []3     []4     [] 5     Physical Conditioning (Modified AM-PAC Per Physical Therapy at [facility]) Unable to " ambulate on day of surgery Delay surgery and re-evaluate  Pre-Rehabilitation   (PT evaluation)       []  0   []4       [x]8     []12        []16     []20       (<20%)   (<40%)   (<60%)   (<80% )    (>80%)     Home Environment/Caregiver support  (Per /Navigator Interview)    Availability of basic services and/or approprate assistance during post-operative period Delay surgery and re-evaluate  Safe home environment  Health   1 week post-surgery  Transportation  availability  Ability to obtain DME/Medications post-op    [x] 0     []1     []2     []3     []4     [] 5  [x] 0     []1     []2     []3     []4     [] 5  [x] 0     []1     []2     []3     []4     [] 5  [x] 0     []1     []2     []3     []4     [] 5         MD Contact: Dr. Ricardo Comments:  Total Score:  12

## 2023-11-06 NOTE — PRE ADMISSION SCREENING
Patient Name: Lolly Ramírez  YOB: 1960   MRN: [id number]     [facility]   Basic Mobility Inpatient Short Form 6 Clicks         How much difficulty does the patient currently have  Unable  A Lot  A Little  None      1. Turning over in bed (including adjusting bedclothes, sheets and blankets)?     1 []    2 [x]    3 []    4 []        2. Sitting down on and standing up from a chair with arms (e.g., wheelchair, bedside commode, etc.)     1 []  2 [x]  3 []     4 []      3. Moving from lying on back to sitting on the side of the bed?     1 []  2 []  3 [x]    4 []    How much help from another person does the patient currently need  Total  A Lot  A Little  None      4. Moving to and from a bed to a chair (including a wheelchair)?    1 []  2 []  3 []    4 [x]      5. Need to walk in hospital room?    1 []  2 []  3 []    4 [x]      6. Climbing 3-5 steps with a railing?    1 []  2 []  3 []    4 [x]       Raw Score:     19             CMS 0-100% Score:   41.77         %   Standardized Score:    45.44           CMS Modifier:        CK                                  [facility]   Basic Mobility Inpatient Short Form 6 Clicks Score Conversion Table*         *Use this form to convert -PAC Basic Mobility Inpatient Raw Scores.   [facility] Inpatient Basic Mobility Short Form Scoring Example   1. Add the number values associated with the response to each item. For example, items totals yield a Raw Score of 21.   2. Match the raw score to the t-Scale scores (t-Scale score = 50.25, SE = 4.69).   3. Find the associated CMS % (CMS % = 28.97%).   4. Locate the correct CMS Functional Modifier Code, or G Code (G code = CJ)     NOTE: Each -PAC Short Form has a separate conversion table. Make sure that you use the correct conversion table.       Instruction Manual - page 45 contains conversion table

## 2023-11-06 NOTE — DISCHARGE INSTRUCTIONS
To confirm, Your doctor has instructed you that surgery is scheduled for: 11/20/23    Please report to [facility], Registration the morning of surgery. You must check-in and receive a wristband before going to your procedure.  [address] [place] [address] [place] [address]    Pre-Op will call the afternoon prior to surgery between 1:00 and 6:00 PM with the final arrival time.  Phone number: [phone]    PLEASE NOTE:  The surgery schedule has many variables which may affect the time of your surgery case.  Family members should be available if your surgery time changes.  Plan to be here the day of your procedure between 4-6 hours.    MEDICATIONS:  TAKE ONLY THESE MEDICATIONS WITH A SMALL SIP OF WATER THE MORNING OF YOUR PROCEDURE:    See List    DO NOT TAKE THESE MEDICATIONS 5-7 DAYS PRIOR to your procedure or per your surgeon's request:   ASPIRIN, ALEVE, ADVIL, IBUPROFEN, FISH OIL VITAMIN E, HERBALS    (May take Tylenol)    ONLY if you are prescribed any types of blood thinners such as:  Aspirin, Coumadin, Plavix, Pradaxa, Xarelto, Aggrenox, Effient, Eliquis, Savasya, Brilinta, or any other, ask your surgeon whether you should stop taking them and how long before surgery you should stop.  You may also need to verify with the prescribing physician if it is ok to stop your medication.      INSTRUCTIONS IMPORTANT!!  Do not eat or drink anything between midnight and the time of your procedure- this includes gum, mints, and candy.  Do not smoke or drink alcoholic beverages 24 hours prior to your procedure.  Shower the night before AND the morning of your procedure with a Chlorhexidine wash such as Hibiclens or Dial antibacterial soap from the neck down.  Do not get it on your face or in your eyes.  You may use your own shampoo and face wash. This helps your skin to be as bacteria free as possible.    If you wear contact lenses, dentures, hearing aids or glasses, bring a container to put them in during  surgery and give to a family member for safe keeping.  Please leave all jewelry, piercing's and valuables at home. You must remove your false eyelashes prior to surgery.    DO NOT remove hair from the surgery site.  Do not shave the incision site unless you are given specific instructions to do so.    ONLY if you have been diagnosed with sleep apnea please bring your C-PAP machine.  ONLY if you wear home oxygen please bring your portable oxygen tank the day of your procedure.  ONLY if you have a history of OPEN HEART SURGERY you will need a clearance from your Cardiologist per Anesthesia.      ONLY for patients requiring bowel prep, written instructions will be given by your doctor's office.  ONLY if you have a neuro stimulator, please bring the controller with you the morning of surgery  ONLY if a type and screen test is needed before surgery, please return:  If your doctor has scheduled you for an overnight stay, bring a small overnight bag with any personal items you need.  Make arrangements in advance for transportation home by a responsible adult.  It is not safe to drive a vehicle during the 24 hours after anesthesia.          All  facilities and properties are tobacco free.  Smoking is NOT allowed.   If you have any questions about these instructions, call Pre-Op Admit  Nursing at [phone] or the Pre-Op Day Surgery Unit at [phone].

## 2023-11-06 NOTE — PRE ADMISSION SCREENING
JOINT CAMP ASSESSMENT    Name Lolly Ramírez   MRN [id number]    Age/Sex 62 y.o. female    Surgeon Dr. Curtis Ricardo   Joint Camp Date 2023   Surgery Date 2023   Procedure Left Knee aRTHROPLASTY   Insurance Payor: MEDICARE / Plan: MEDICARE PART A & B / Product Type: Government /    Care Team Patient Care Team:  Shaunna Gordon NP as PCP - General (Family Medicine)  Tj Heart III, MD as Consulting Physician (Gastroenterology)  Rodolfo Rodas MD as Consulting Physician (Interventional Cardiology)  Alfredito Tobar MD as Consulting Physician (Neurology)    Pharmacy   [id number] - [place], [place] - [address] [place] [facility] [address]  [address]  [place] [address]  Phone: [phone] Fax: [phone]    [facility] - [place], [place] - [address] [place], [place] [address] [facility] [address]  Phone: [phone] Fax: [phone]     AM-PAC Score   19   Risk Assessment Score 12     Past Medical History:   Diagnosis Date    Breast cancer, stage 1, estrogen receptor negative, left () 2020    Depression     Diabetes mellitus, type 2     GERD (gastroesophageal reflux disease)     HER2-positive carcinoma of left breast 2020    Hx of breast cancer     Hx of breast cancer - Her2Nu+/ER+ - 2011 12/3/2019    Insomnia     Lymphedema     Neuropathy of both feet     S/P lumpectomy, left breast 2020       Past Surgical History:   Procedure Laterality Date    ARTHROSCOPY OF SHOULDER WITH DECOMPRESSION OF SUBACROMIAL SPACE Right 2023    Procedure: ARTHROSCOPY, SHOULDER, WITH SUBACROMIAL SPACE DECOMPRESSION;  Surgeon: Curtis Ricardo MD;  Location: [facility] OR;  Service: Orthopedics;  Laterality: Right;    BICEPS TENDON REPAIR Left 2005    BREAST BIOPSY Left     BREAST LUMPECTOMY Left      SECTION      1982, 1985,     CHOLECYSTECTOMY  2000    Elbow fx Left 2015    FRACTURE SURGERY Left 2016    elbow    HYSTERECTOMY   2013    KNEE ARTHROSCOPY W/ MENISCAL REPAIR Left 2014    Lower back ruptured disc  06/2010    LYMPH NODE DISSECTION  2011    SPINE SURGERY  2009    ruptured disc         Home Enviroment     Living Arrangement: Lives with spouse  Home Environment: 1-story house/ trailer, number of outside stairs: 1, walk-in shower  Home Safety Concerns: Pets in the home: dogs (2).    DISCHARGE CAREGIVER/SUPPORT SYSTEM     Identified post-op caregiver: Patient has spouse / significant other.  Patient's caregiver(s) will be able to provide physical assistance. Patient will have someone to assist overnight.      Caregiver present at pre-op interview:  Yes      PRE-OPERATIVE FUNCTIONAL STATUS     Employment: Disabled    Pre-op Functional Status: Patient is independent with mobility/ambulation, transfers, ADL's, IADL's.    Use of assistive device for ambulation: rollator  ADL: self care  ADL Limitations: difficulty with walking  Medical Restrictions: Unstable ambulation, Frequent Falls, and Decreased range of motions in extremities    POTENTIAL BARRIERS TO DISCHARGE/POTENTIAL POST-OP COMPLICATIONS     Patient with hx of type II diabetes mellitus, neuropathy. POSSIBLE SAME DAY DISCHARGE.    DISCHARGE PLAN     Expected LOS of 1 days or less for joint replacement discussed with patient. We also discussed a discharge path of HH for approximately two weeks with a transition to outpatient PT on the third week given no post-op complications.      Patient in agreement with discharge plan: Yes    Discharge to: Discharge home with home health (PT/OT) x2 weeks with transition to outpatient PT     HH:  [facility] ([place]). Patient disclosure form completed and sent to case management for upload to the medical record.      OP PT: [facility] ([place]).     Home DME: rollator    Needed DME at D/C: rolling walker and bedside commode. Patient to purchase BSC from retail prior to surgery.     Rx: Per Dr. Ricardo at  discharge     Meds to Beds: Yes  Patient expected to discharge on Aspirin 81mg by mouth twice daily for DVT prophylaxis.

## 2023-11-16 DIAGNOSIS — M17.12 PRIMARY OSTEOARTHRITIS OF LEFT KNEE: Primary | ICD-10-CM

## 2023-11-16 RX ORDER — OXYCODONE AND ACETAMINOPHEN 7.5; 325 MG/1; MG/1
1 TABLET ORAL EVERY 6 HOURS PRN
Qty: 28 TABLET | Refills: 0 | Status: SHIPPED | OUTPATIENT
Start: 2023-11-16 | End: 2024-02-12 | Stop reason: ALTCHOICE

## 2023-11-16 RX ORDER — ASPIRIN 81 MG/1
81 TABLET ORAL EVERY 12 HOURS
Qty: 60 TABLET | Refills: 0 | Status: SHIPPED | OUTPATIENT
Start: 2023-11-16 | End: 2024-02-12 | Stop reason: ALTCHOICE

## 2023-11-16 RX ORDER — CELECOXIB 200 MG/1
200 CAPSULE ORAL 2 TIMES DAILY
Qty: 60 CAPSULE | Refills: 0 | Status: SHIPPED | OUTPATIENT
Start: 2023-11-16 | End: 2023-12-20 | Stop reason: SDUPTHER

## 2023-11-16 RX ORDER — DOCUSATE SODIUM 100 MG/1
100 CAPSULE, LIQUID FILLED ORAL 2 TIMES DAILY
Qty: 60 CAPSULE | Refills: 0 | Status: SHIPPED | OUTPATIENT
Start: 2023-11-16 | End: 2023-12-20

## 2023-11-16 RX ORDER — GABAPENTIN 300 MG/1
300 CAPSULE ORAL 2 TIMES DAILY
Qty: 60 CAPSULE | Refills: 0 | Status: SHIPPED | OUTPATIENT
Start: 2023-11-16 | End: 2023-12-04

## 2023-11-18 ENCOUNTER — ANESTHESIA EVENT (OUTPATIENT)
Dept: SURGERY | Facility: HOSPITAL | Age: 63
End: 2023-11-18
Payer: MEDICARE

## 2023-11-20 ENCOUNTER — ANESTHESIA (OUTPATIENT)
Dept: SURGERY | Facility: HOSPITAL | Age: 63
End: 2023-11-20
Payer: MEDICARE

## 2023-11-20 ENCOUNTER — HOSPITAL ENCOUNTER (OUTPATIENT)
Facility: HOSPITAL | Age: 63
Discharge: HOME | End: 2023-11-20
Attending: ORTHOPAEDIC SURGERY | Admitting: ORTHOPAEDIC SURGERY
Payer: MEDICARE

## 2023-11-20 DIAGNOSIS — M17.12 PRIMARY OSTEOARTHRITIS OF LEFT KNEE: Primary | ICD-10-CM

## 2023-11-20 PROCEDURE — 63600175 PHARM REV CODE 636 W HCPCS: Performed by: ANESTHESIOLOGY

## 2023-11-20 PROCEDURE — 27447 TOTAL KNEE ARTHROPLASTY: CPT | Mod: LT,,, | Performed by: ORTHOPAEDIC SURGERY

## 2023-11-20 PROCEDURE — 25000003 PHARM REV CODE 250: Performed by: ANESTHESIOLOGY

## 2023-11-20 PROCEDURE — D9220A PRA ANESTHESIA: ICD-10-PCS | Mod: ANES,,, | Performed by: ANESTHESIOLOGY

## 2023-11-20 PROCEDURE — 25000003 PHARM REV CODE 250: Performed by: ORTHOPAEDIC SURGERY

## 2023-11-20 PROCEDURE — 63600175 PHARM REV CODE 636 W HCPCS: Performed by: ORTHOPAEDIC SURGERY

## 2023-11-20 PROCEDURE — 97116 GAIT TRAINING THERAPY: CPT

## 2023-11-20 PROCEDURE — 27201423 OPTIME MED/SURG SUP & DEVICES STERILE SUPPLY: Performed by: ORTHOPAEDIC SURGERY

## 2023-11-20 PROCEDURE — 37000009 HC ANESTHESIA EA ADD 15 MINS: Performed by: ORTHOPAEDIC SURGERY

## 2023-11-20 PROCEDURE — 64447 ADDUCTOR SS: ICD-10-PCS | Mod: 59,LT,, | Performed by: ANESTHESIOLOGY

## 2023-11-20 PROCEDURE — C9290 INJ, BUPIVACAINE LIPOSOME: HCPCS | Performed by: ANESTHESIOLOGY

## 2023-11-20 PROCEDURE — 64447 NJX AA&/STRD FEMORAL NRV IMG: CPT | Performed by: ANESTHESIOLOGY

## 2023-11-20 PROCEDURE — C1776 JOINT DEVICE (IMPLANTABLE): HCPCS | Performed by: ORTHOPAEDIC SURGERY

## 2023-11-20 PROCEDURE — 97161 PT EVAL LOW COMPLEX 20 MIN: CPT

## 2023-11-20 PROCEDURE — 71000015 HC POSTOP RECOV 1ST HR: Performed by: ORTHOPAEDIC SURGERY

## 2023-11-20 PROCEDURE — 36000713 HC OR TIME LEV V EA ADD 15 MIN: Performed by: ORTHOPAEDIC SURGERY

## 2023-11-20 PROCEDURE — 97110 THERAPEUTIC EXERCISES: CPT

## 2023-11-20 PROCEDURE — 27200750 HC INSULATED NEEDLE/ STIMUPLEX: Performed by: ANESTHESIOLOGY

## 2023-11-20 PROCEDURE — 27447 PR TOTAL KNEE ARTHROPLASTY: ICD-10-PCS | Mod: LT,,, | Performed by: ORTHOPAEDIC SURGERY

## 2023-11-20 PROCEDURE — 25000003 PHARM REV CODE 250: Performed by: NURSE ANESTHETIST, CERTIFIED REGISTERED

## 2023-11-20 PROCEDURE — 27000221 HC OXYGEN, UP TO 24 HOURS

## 2023-11-20 PROCEDURE — C1713 ANCHOR/SCREW BN/BN,TIS/BN: HCPCS | Performed by: ORTHOPAEDIC SURGERY

## 2023-11-20 PROCEDURE — D9220A PRA ANESTHESIA: Mod: CRNA,,, | Performed by: NURSE ANESTHETIST, CERTIFIED REGISTERED

## 2023-11-20 PROCEDURE — 94799 UNLISTED PULMONARY SVC/PX: CPT | Mod: XB

## 2023-11-20 PROCEDURE — D9220A PRA ANESTHESIA: Mod: ANES,,, | Performed by: ANESTHESIOLOGY

## 2023-11-20 PROCEDURE — 71000016 HC POSTOP RECOV ADDL HR: Performed by: ORTHOPAEDIC SURGERY

## 2023-11-20 PROCEDURE — 63600175 PHARM REV CODE 636 W HCPCS: Performed by: NURSE ANESTHETIST, CERTIFIED REGISTERED

## 2023-11-20 PROCEDURE — D9220A PRA ANESTHESIA: ICD-10-PCS | Mod: CRNA,,, | Performed by: NURSE ANESTHETIST, CERTIFIED REGISTERED

## 2023-11-20 PROCEDURE — 37000008 HC ANESTHESIA 1ST 15 MINUTES: Performed by: ORTHOPAEDIC SURGERY

## 2023-11-20 PROCEDURE — 71000039 HC RECOVERY, EACH ADD'L HOUR: Performed by: ORTHOPAEDIC SURGERY

## 2023-11-20 PROCEDURE — 71000033 HC RECOVERY, INTIAL HOUR: Performed by: ORTHOPAEDIC SURGERY

## 2023-11-20 PROCEDURE — 36000712 HC OR TIME LEV V 1ST 15 MIN: Performed by: ORTHOPAEDIC SURGERY

## 2023-11-20 PROCEDURE — 64447 NJX AA&/STRD FEMORAL NRV IMG: CPT | Mod: 59,LT,, | Performed by: ANESTHESIOLOGY

## 2023-11-20 DEVICE — SYMMETRIC PATELLA
Type: IMPLANTABLE DEVICE | Site: KNEE | Status: FUNCTIONAL
Brand: TRIATHLON

## 2023-11-20 DEVICE — PRIMARY TIBIAL BASEPLATE
Type: IMPLANTABLE DEVICE | Site: KNEE | Status: FUNCTIONAL
Brand: TRIATHLON

## 2023-11-20 DEVICE — CEMENT BONE SIMPLEX HV RADPQ: Type: IMPLANTABLE DEVICE | Site: KNEE | Status: FUNCTIONAL

## 2023-11-20 DEVICE — CRUCIATE RETAINING FEMORAL
Type: IMPLANTABLE DEVICE | Site: KNEE | Status: FUNCTIONAL
Brand: TRIATHLON

## 2023-11-20 DEVICE — TIBIAL BEARING INSERT - CS
Type: IMPLANTABLE DEVICE | Site: KNEE | Status: FUNCTIONAL
Brand: TRIATHLON

## 2023-11-20 RX ORDER — DEXAMETHASONE SODIUM PHOSPHATE 4 MG/ML
INJECTION, SOLUTION INTRA-ARTICULAR; INTRALESIONAL; INTRAMUSCULAR; INTRAVENOUS; SOFT TISSUE
Status: DISCONTINUED | OUTPATIENT
Start: 2023-11-20 | End: 2023-11-20

## 2023-11-20 RX ORDER — PROPOFOL 10 MG/ML
VIAL (ML) INTRAVENOUS CONTINUOUS PRN
Status: DISCONTINUED | OUTPATIENT
Start: 2023-11-20 | End: 2023-11-20

## 2023-11-20 RX ORDER — CELECOXIB 100 MG/1
400 CAPSULE ORAL ONCE
Status: COMPLETED | OUTPATIENT
Start: 2023-11-20 | End: 2023-11-20

## 2023-11-20 RX ORDER — KETAMINE HYDROCHLORIDE 10 MG/ML
INJECTION, SOLUTION INTRAMUSCULAR; INTRAVENOUS
Status: DISCONTINUED | OUTPATIENT
Start: 2023-11-20 | End: 2023-11-20

## 2023-11-20 RX ORDER — NAPROXEN SODIUM 220 MG/1
81 TABLET, FILM COATED ORAL 2 TIMES DAILY
Status: DISCONTINUED | OUTPATIENT
Start: 2023-11-20 | End: 2023-11-20 | Stop reason: HOSPADM

## 2023-11-20 RX ORDER — CEFAZOLIN SODIUM 2 G/50ML
2 SOLUTION INTRAVENOUS
Status: CANCELLED | OUTPATIENT
Start: 2023-11-20 | End: 2023-11-21

## 2023-11-20 RX ORDER — ZOLPIDEM TARTRATE 5 MG/1
5 TABLET ORAL NIGHTLY PRN
Status: CANCELLED | OUTPATIENT
Start: 2023-11-20

## 2023-11-20 RX ORDER — OXYCODONE HYDROCHLORIDE 5 MG/1
5 TABLET ORAL ONCE AS NEEDED
Status: DISCONTINUED | OUTPATIENT
Start: 2023-11-20 | End: 2023-11-20 | Stop reason: HOSPADM

## 2023-11-20 RX ORDER — LIDOCAINE HYDROCHLORIDE 20 MG/ML
INJECTION INTRAVENOUS
Status: DISCONTINUED | OUTPATIENT
Start: 2023-11-20 | End: 2023-11-20

## 2023-11-20 RX ORDER — OXYCODONE HCL 10 MG/1
10 TABLET, FILM COATED, EXTENDED RELEASE ORAL ONCE
Status: COMPLETED | OUTPATIENT
Start: 2023-11-20 | End: 2023-11-20

## 2023-11-20 RX ORDER — KETOROLAC TROMETHAMINE 30 MG/ML
INJECTION, SOLUTION INTRAMUSCULAR; INTRAVENOUS
Status: DISCONTINUED | OUTPATIENT
Start: 2023-11-20 | End: 2023-11-20

## 2023-11-20 RX ORDER — CEFAZOLIN SODIUM 2 G/50ML
2 SOLUTION INTRAVENOUS
Status: COMPLETED | OUTPATIENT
Start: 2023-11-20 | End: 2023-11-20

## 2023-11-20 RX ORDER — BUPIVACAINE HYDROCHLORIDE 7.5 MG/ML
INJECTION, SOLUTION EPIDURAL; RETROBULBAR
Status: DISCONTINUED | OUTPATIENT
Start: 2023-11-20 | End: 2023-11-20

## 2023-11-20 RX ORDER — PHENYLEPHRINE HYDROCHLORIDE 10 MG/ML
INJECTION INTRAVENOUS
Status: DISCONTINUED | OUTPATIENT
Start: 2023-11-20 | End: 2023-11-20

## 2023-11-20 RX ORDER — ACETAMINOPHEN 500 MG
1000 TABLET ORAL
Status: COMPLETED | OUTPATIENT
Start: 2023-11-20 | End: 2023-11-20

## 2023-11-20 RX ORDER — CELECOXIB 100 MG/1
200 CAPSULE ORAL 2 TIMES DAILY
Status: CANCELLED | OUTPATIENT
Start: 2023-11-20

## 2023-11-20 RX ORDER — DEXTROSE MONOHYDRATE AND SODIUM CHLORIDE 5; .45 G/100ML; G/100ML
INJECTION, SOLUTION INTRAVENOUS CONTINUOUS
Status: CANCELLED | OUTPATIENT
Start: 2023-11-20

## 2023-11-20 RX ORDER — POLYETHYLENE GLYCOL 3350 17 G/17G
17 POWDER, FOR SOLUTION ORAL DAILY
Status: CANCELLED | OUTPATIENT
Start: 2023-11-20

## 2023-11-20 RX ORDER — ONDANSETRON 2 MG/ML
INJECTION INTRAMUSCULAR; INTRAVENOUS
Status: DISCONTINUED | OUTPATIENT
Start: 2023-11-20 | End: 2023-11-20

## 2023-11-20 RX ORDER — BUPIVACAINE HYDROCHLORIDE 5 MG/ML
INJECTION, SOLUTION EPIDURAL; INTRACAUDAL
Status: COMPLETED | OUTPATIENT
Start: 2023-11-20 | End: 2023-11-20

## 2023-11-20 RX ORDER — OXYCODONE HYDROCHLORIDE 10 MG/1
10 TABLET ORAL EVERY 4 HOURS PRN
Status: CANCELLED | OUTPATIENT
Start: 2023-11-20

## 2023-11-20 RX ORDER — MIDAZOLAM HYDROCHLORIDE 1 MG/ML
2 INJECTION INTRAMUSCULAR; INTRAVENOUS
Status: DISCONTINUED | OUTPATIENT
Start: 2023-11-20 | End: 2023-11-20 | Stop reason: HOSPADM

## 2023-11-20 RX ORDER — LIDOCAINE HYDROCHLORIDE 10 MG/ML
1 INJECTION, SOLUTION EPIDURAL; INFILTRATION; INTRACAUDAL; PERINEURAL ONCE
Status: DISCONTINUED | OUTPATIENT
Start: 2023-11-20 | End: 2023-11-20 | Stop reason: HOSPADM

## 2023-11-20 RX ORDER — FAMOTIDINE 20 MG/1
20 TABLET, FILM COATED ORAL 2 TIMES DAILY
Status: CANCELLED | OUTPATIENT
Start: 2023-11-20

## 2023-11-20 RX ORDER — FENTANYL CITRATE 50 UG/ML
25 INJECTION, SOLUTION INTRAMUSCULAR; INTRAVENOUS EVERY 5 MIN PRN
Status: DISCONTINUED | OUTPATIENT
Start: 2023-11-20 | End: 2023-11-20 | Stop reason: HOSPADM

## 2023-11-20 RX ORDER — FENTANYL CITRATE 50 UG/ML
25-200 INJECTION, SOLUTION INTRAMUSCULAR; INTRAVENOUS
Status: DISCONTINUED | OUTPATIENT
Start: 2023-11-20 | End: 2023-11-20 | Stop reason: HOSPADM

## 2023-11-20 RX ORDER — ONDANSETRON 2 MG/ML
4 INJECTION INTRAMUSCULAR; INTRAVENOUS ONCE AS NEEDED
Status: DISCONTINUED | OUTPATIENT
Start: 2023-11-20 | End: 2023-11-20 | Stop reason: HOSPADM

## 2023-11-20 RX ORDER — ONDANSETRON 4 MG/1
8 TABLET, ORALLY DISINTEGRATING ORAL EVERY 8 HOURS PRN
Status: CANCELLED | OUTPATIENT
Start: 2023-11-20

## 2023-11-20 RX ORDER — MORPHINE SULFATE 2 MG/ML
2 INJECTION, SOLUTION INTRAMUSCULAR; INTRAVENOUS EVERY 4 HOURS PRN
Status: CANCELLED | OUTPATIENT
Start: 2023-11-20

## 2023-11-20 RX ORDER — SODIUM CHLORIDE 0.9 % (FLUSH) 0.9 %
5 SYRINGE (ML) INJECTION
Status: DISCONTINUED | OUTPATIENT
Start: 2023-11-20 | End: 2023-11-20 | Stop reason: HOSPADM

## 2023-11-20 RX ORDER — BISACODYL 10 MG
10 SUPPOSITORY, RECTAL RECTAL DAILY
Status: CANCELLED | OUTPATIENT
Start: 2023-11-20

## 2023-11-20 RX ADMIN — SODIUM CHLORIDE, SODIUM GLUCONATE, SODIUM ACETATE, POTASSIUM CHLORIDE AND MAGNESIUM CHLORIDE: 526; 502; 368; 37; 30 INJECTION, SOLUTION INTRAVENOUS at 06:11

## 2023-11-20 RX ADMIN — CELECOXIB 400 MG: 100 CAPSULE ORAL at 06:11

## 2023-11-20 RX ADMIN — FENTANYL CITRATE 25 MCG: 50 INJECTION, SOLUTION INTRAMUSCULAR; INTRAVENOUS at 07:11

## 2023-11-20 RX ADMIN — PROPOFOL 50 MCG/KG/MIN: 10 INJECTION, EMULSION INTRAVENOUS at 07:11

## 2023-11-20 RX ADMIN — PHENYLEPHRINE HYDROCHLORIDE 100 MCG: 10 INJECTION INTRAVENOUS at 08:11

## 2023-11-20 RX ADMIN — BUPIVACAINE HYDROCHLORIDE 1.4 ML: 7.5 INJECTION, SOLUTION EPIDURAL; RETROBULBAR at 07:11

## 2023-11-20 RX ADMIN — ACETAMINOPHEN 1000 MG: 500 TABLET ORAL at 06:11

## 2023-11-20 RX ADMIN — KETAMINE HYDROCHLORIDE 5 MG: 10 INJECTION, SOLUTION INTRAMUSCULAR; INTRAVENOUS at 07:11

## 2023-11-20 RX ADMIN — BUPIVACAINE 15 ML: 13.3 INJECTION, SUSPENSION, LIPOSOMAL INFILTRATION at 07:11

## 2023-11-20 RX ADMIN — LIDOCAINE HYDROCHLORIDE 50 MG: 20 INJECTION, SOLUTION INTRAVENOUS at 07:11

## 2023-11-20 RX ADMIN — PHENYLEPHRINE HYDROCHLORIDE 100 MCG: 10 INJECTION INTRAVENOUS at 09:11

## 2023-11-20 RX ADMIN — DEXAMETHASONE SODIUM PHOSPHATE 4 MG: 4 INJECTION, SOLUTION INTRA-ARTICULAR; INTRALESIONAL; INTRAMUSCULAR; INTRAVENOUS; SOFT TISSUE at 08:11

## 2023-11-20 RX ADMIN — KETOROLAC TROMETHAMINE 15 MG: 30 INJECTION, SOLUTION INTRAMUSCULAR; INTRAVENOUS at 09:11

## 2023-11-20 RX ADMIN — OXYCODONE HYDROCHLORIDE 10 MG: 10 TABLET, FILM COATED, EXTENDED RELEASE ORAL at 06:11

## 2023-11-20 RX ADMIN — KETAMINE HYDROCHLORIDE 15 MG: 10 INJECTION, SOLUTION INTRAMUSCULAR; INTRAVENOUS at 07:11

## 2023-11-20 RX ADMIN — FENTANYL CITRATE 50 MCG: 50 INJECTION, SOLUTION INTRAMUSCULAR; INTRAVENOUS at 07:11

## 2023-11-20 RX ADMIN — FENTANYL CITRATE 25 MCG: 50 INJECTION, SOLUTION INTRAMUSCULAR; INTRAVENOUS at 09:11

## 2023-11-20 RX ADMIN — MIDAZOLAM HYDROCHLORIDE 1 MG: 1 INJECTION INTRAMUSCULAR; INTRAVENOUS at 07:11

## 2023-11-20 RX ADMIN — BUPIVACAINE HYDROCHLORIDE 10 ML: 5 INJECTION, SOLUTION EPIDURAL; INTRACAUDAL; PERINEURAL at 07:11

## 2023-11-20 RX ADMIN — GLYCOPYRROLATE 0.1 MG: 0.2 INJECTION, SOLUTION INTRAMUSCULAR; INTRAVITREAL at 07:11

## 2023-11-20 RX ADMIN — MIDAZOLAM HYDROCHLORIDE 2 MG: 1 INJECTION INTRAMUSCULAR; INTRAVENOUS at 07:11

## 2023-11-20 RX ADMIN — ONDANSETRON 8 MG: 2 INJECTION INTRAMUSCULAR; INTRAVENOUS at 08:11

## 2023-11-20 RX ADMIN — ROPIVACAINE HYDROCHLORIDE: 5 INJECTION, SOLUTION EPIDURAL; INFILTRATION; PERINEURAL at 08:11

## 2023-11-20 RX ADMIN — CEFAZOLIN SODIUM 2 G: 2 SOLUTION INTRAVENOUS at 07:11

## 2023-11-20 RX ADMIN — TRANEXAMIC ACID 1000 MG: 100 INJECTION, SOLUTION INTRAVENOUS at 07:11

## 2023-11-20 NOTE — TRANSFER OF CARE
Anesthesia Transfer of Care Note    Patient: Lolly Ramírez    Procedure(s) Performed: Procedure(s) (LRB):  ROBOTIC ARTHROPLASTY, KNEE, TOTAL, LEFT (Left)    Patient location: PACU    Anesthesia Type: spinal    Transport from OR: Transported from OR on 2-3 L/min O2 by NC with adequate spontaneous ventilation    Post pain: adequate analgesia    Post vital signs: stable    Level of consciousness: sedated and responds to stimulation    Nausea/Vomiting: no nausea/vomiting    Complications: none    Transfer of care protocol was followed      Last vitals: Visit Vitals  /68 (BP Location: Right forearm, Patient Position: Lying)   Pulse 71   Temp 36.6 °C (97.9 °F) (Skin)   Resp 16   Wt 112.9 kg (249 lb)   SpO2 99%   Breastfeeding No   BMI 39.00 kg/m²

## 2023-11-20 NOTE — PLAN OF CARE
Patient ready for surgery. Surgery and anesthesia consents signed. Educated on incentive spirometer use. Belongings in pre-op locker. Spouse set up with text message notifications.

## 2023-11-20 NOTE — PLAN OF CARE
Patient cleared by PT to discharge to home. Patient and spouse verbalized readiness to discharge to home.  Patient able to void with no  problems noted.  Dressing to left knee remains clean,dry and intact.   Discharge instructions given to pt and family/friend, verbalized understanding and questions answered. Handouts provided. Belongings given back to pt. IV removed- catheter intact. Discharge via wheelchair.

## 2023-11-20 NOTE — ANESTHESIA POSTPROCEDURE EVALUATION
Anesthesia Post Evaluation    Patient: Lolly Ramírez    Procedure(s) Performed: Procedure(s) (LRB):  ROBOTIC ARTHROPLASTY, KNEE, TOTAL, LEFT (Left)    Final Anesthesia Type: spinal      Patient location during evaluation: PACU  Patient participation: Yes- Able to Participate  Level of consciousness: awake and alert and oriented  Post-procedure vital signs: reviewed and stable  Pain management: adequate  Airway patency: patent  CANELO mitigation strategies: Use of major conduction anesthesia (spinal/epidural) or peripheral nerve block and Multimodal analgesia  PONV status at discharge: No PONV  Anesthetic complications: no      Cardiovascular status: blood pressure returned to baseline  Respiratory status: unassisted, spontaneous ventilation and room air  Hydration status: euvolemic  Follow-up not needed.          Vitals Value Taken Time   /66 11/20/23 1044   Temp 36.4 °C (97.6 °F) 11/20/23 1035   Pulse 94 11/20/23 1044   Resp 15 11/20/23 1044   SpO2 95 % 11/20/23 1044   Vitals shown include unvalidated device data.      No case tracking events are documented in the log.      Pain/Zac Score: Pain Rating Prior to Med Admin: 0 (11/20/2023 10:40 AM)  Zac Score: 8 (11/20/2023 10:25 AM)

## 2023-11-20 NOTE — H&P
CC/Indication for Procedure: 62 y.o. female with Primary osteoarthritis of left knee [M17.12].    Patient scheduled for [place] TOTAL KNEE ARTHROPLASTY [[id number]] (ROBOTIC ARTHROPLASTY, KNEE, TOTAL, LEFT)  [place] TOTAL KNEE ARTHROPLASTY [[id number]].    Past Medical History:   Diagnosis Date    Breast cancer, stage 1, estrogen receptor negative, left () 2020    Depression     Diabetes mellitus, type 2     GERD (gastroesophageal reflux disease)     HER2-positive carcinoma of left breast 2020    Hx of breast cancer     Hx of breast cancer - Her2Nu+/ER+ - 2011 12/3/2019    Insomnia     Lymphedema     Neuropathy of both feet     S/P lumpectomy, left breast 2020     Past Surgical History:   Procedure Laterality Date    ARTHROSCOPY OF SHOULDER WITH DECOMPRESSION OF SUBACROMIAL SPACE Right 2023    Procedure: ARTHROSCOPY, SHOULDER, WITH SUBACROMIAL SPACE DECOMPRESSION;  Surgeon: Curtis Ricardo MD;  Location: [facility];  Service: Orthopedics;  Laterality: Right;    BICEPS TENDON REPAIR Left 2005    BREAST BIOPSY Left     BREAST LUMPECTOMY Left      SECTION      1982, ,     CHOLECYSTECTOMY  2000    Elbow fx Left 2015    FRACTURE SURGERY Left 2016    elbow    HYSTERECTOMY  2013    KNEE ARTHROSCOPY W/ MENISCAL REPAIR Left 2014    Lower back ruptured disc  2010    LYMPH NODE DISSECTION  2011    SPINE SURGERY  2009    ruptured disc     Family History   Problem Relation Age of Onset    Cancer Mother     Breast cancer Mother     Parkinsonism Father     Diabetes Father     Breast cancer Maternal Aunt     Breast cancer Paternal Aunt     Cancer Maternal Aunt     Cancer Paternal Aunt     Cancer Daughter     Heart disease Maternal Grandmother     Heart disease Paternal Grandmother      Social History     Socioeconomic History    Marital status:    Tobacco Use    Smoking status: Former     Current packs/day: 0.00     Average packs/day: 0.5 packs/day for 25.0 years (12.5 ttl pk-yrs)     Types:  Cigarettes     Start date: 1985     Quit date: 2010     Years since quittin.9    Smokeless tobacco: Never   Substance and Sexual Activity    Alcohol use: Not Currently     Alcohol/week: 2.0 standard drinks of alcohol     Types: 2 Glasses of wine per week    Drug use: Never    Sexual activity: Yes     Partners: Male     Birth control/protection: Other-see comments, None     Comment: Hysterectomy     Social Determinants of Health     Financial Resource Strain: Low Risk  (3/9/2023)    Overall Financial Resource Strain (CARDIA)     Difficulty of Paying Living Expenses: Not hard at all   Food Insecurity: Unknown (3/9/2023)    Hunger Vital Sign     Worried About Running Out of Food in the Last Year: Never true     Ran Out of Food in the Last Year: Patient refused   Transportation Needs: No Transportation Needs (3/9/2023)    PRAPARE - Transportation     Lack of Transportation (Medical): No     Lack of Transportation (Non-Medical): No   Physical Activity: Inactive (3/9/2023)    Exercise Vital Sign     Days of Exercise per Week: 0 days     Minutes of Exercise per Session: 30 min   Stress: No Stress Concern Present (3/9/2023)    [employer] [place] [employer] - Occupational Stress Questionnaire     Feeling of Stress : Only a little   Social Connections: Unknown (3/9/2023)    Social Connection and Isolation Panel [NHANES]     Frequency of Communication with Friends and Family: Three times a week     Frequency of Social Gatherings with Friends and Family: Once a week     Active Member of Clubs or Organizations: No     Attends Club or Organization Meetings: 1 to 4 times per year     Marital Status:    Housing Stability: Low Risk  (3/9/2023)    Housing Stability Vital Sign     Unable to Pay for Housing in the Last Year: No     Number of Places Lived in the Last Year: 1     Unstable Housing in the Last Year: No       Review of patient's allergies indicates:   Allergen Reactions    Banana Hives     Codeine Nausea And Vomiting    Adhesive Rash    Dilaudid  [hydromorphone (bulk)] Rash    Hydromorphone hcl Rash         Current Facility-Administered Medications:     cefazolin (ANCEF) 2 gram in dextrose 5% 50 mL IVPB (premix), 2 g, Intravenous, On Call Procedure, Curtis Ricardo MD    electrolyte-S (ISOLYTE), , Intravenous, Continuous, Yevgeniy Bennett MD, Last Rate: 10 mL/hr at 11/20/23 0636, New Bag at 11/20/23 0636    fentaNYL 50 mcg/mL injection  mcg,  mcg, Intravenous, PRN, Yevgeniy Bennett MD, 50 mcg at 11/20/23 0700    LIDOcaine (PF) 10 mg/ml (1%) injection 10 mg, 1 mL, Intradermal, Once, Yevgeniy Bennett MD    midazolam (VERSED) 1 mg/mL injection 2 mg, 2 mg, Intravenous, PRN, Yevgeniy Bennett MD, 2 mg at 11/20/23 0700    sodium chloride 0.9% 49.3 mL with ROPIvacaine 0.5% (PF) (NAROPIN) 49.3 mL, ketorolac (TORADOL) 30 mg, EPINEPHrine (ADRENALINE) 0.5 mg injection, , Other, Once, Curtis Ricardo MD    tranexamic acid (CYKLOKAPRON) 1,000 mg in sodium chloride 0.9 % 100 mL IVPB (MB+), 1,000 mg, Intravenous, On Call Procedure, Curtis Ricardo MD    ROS:    Denies chest pain or palpitations  Denies shortness of breath  Denies fevers or chills  Denies chest pain  Denies abdominal pain    PE:    General Appearance: Well nourished  Orientation: Oriented to time, place, person  Mental Status: Alert  Heart: RRR  Lungs: CTA  Abdomen: Soft and non-tender    Anesthesia/Surgery risks, benefits and alternative options discussed and understood by patient/family.    This note was created using Dragon voice recognition software that occasionally misinterpreted phrases or words.

## 2023-11-20 NOTE — ANESTHESIA PROCEDURE NOTES
Adductor SS    Patient location during procedure: pre-op   Block not for primary anesthetic.  Reason for block: at surgeon's request and post-op pain management   Post-op Pain Location: left knee   Start time: 11/20/2023 7:02 AM  Timeout: 11/20/2023 7:02 AM   End time: 11/20/2023 7:05 AM    Staffing  Authorizing Provider: Florencio Ingram MD  Performing Provider: Florencio Ingram MD    Staffing  Performed by: Florencio Ingram MD  Authorized by: Florencio Ingram MD    Preanesthetic Checklist  Completed: patient identified, IV checked, site marked, risks and benefits discussed, surgical consent, monitors and equipment checked, pre-op evaluation and timeout performed  Peripheral Block  Patient position: supine  Prep: ChloraPrep  Patient monitoring: heart rate, cardiac monitor, continuous pulse ox, continuous capnometry and frequent blood pressure checks  Block type: adductor canal  Laterality: left  Injection technique: single shot  Needle  Needle type: Stimuplex   Needle gauge: 21 G  Needle length: 4 in  Needle localization: anatomical landmarks and ultrasound guidance   -ultrasound image captured on disc.  Assessment  Injection assessment: negative aspiration, negative parasthesia and local visualized surrounding nerve  Paresthesia pain: none  Heart rate change: no  Slow fractionated injection: yes  Pain Tolerance: comfortable throughout block and no complaints  Medications:    Medications: bupivacaine (pf) (MARCAINE) injection 0.5% - Perineural   10 mL - 11/20/2023 7:05:00 AM    Additional Notes  VSS.  [facility] RN monitoring vitals throughout procedure.  Patient tolerated procedure well.

## 2023-11-20 NOTE — ANESTHESIA PROCEDURE NOTES
Spinal    Diagnosis: arthritis  Patient location during procedure: OR  Start time: 11/20/2023 7:43 AM  Timeout: 11/20/2023 7:42 AM  End time: 11/20/2023 7:46 AM    Staffing  Authorizing Provider: Florencio Ingram MD  Performing Provider: Florencio Ingram MD    Staffing  Performed by: Florencio Ingram MD  Authorized by: Florencio Ingram MD    Preanesthetic Checklist  Completed: patient identified, IV checked, site marked, risks and benefits discussed, surgical consent, monitors and equipment checked, pre-op evaluation and timeout performed  Spinal Block  Patient position: sitting  Prep: ChloraPrep  Patient monitoring: heart rate, cardiac monitor and continuous pulse ox  Approach: midline  Location: L4-5  Injection technique: single shot  CSF Fluid: clear free-flowing CSF  Needle  Needle type: pencil-tip   Needle gauge: 25 G  Needle length: 3.5 in  Additional Documentation: incremental injection, negative aspiration for heme and no paresthesia on injection  Needle localization: anatomical landmarks  Assessment  Patient's tolerance of the procedure: no complaints and comfortable throughout block  Medications:    Medications: bupivacaine (pf) (MARCAINE) injection 0.75% - Intraspinal   1.4 mL - 11/20/2023 7:46:00 AM

## 2023-11-20 NOTE — OP NOTE
[facility]  Surgery Department  Operative Note    SUMMARY     Date of Procedure: 11/20/2023     Procedure:  Left total knee arthroplasty robotic    Surgeon(s) and Role:     * Curtis Ricardo MD - Primary    Assisting Surgeon:  Jose    Pre-Operative Diagnosis: Primary osteoarthritis of left knee [M17.12]    Post-Operative Diagnosis: Post-Op Diagnosis Codes:     * Primary osteoarthritis of left knee [M17.12]    Anesthesia: Spinal    Intraoperative Findings:  Severe bone-on-bone arthritis with varus deformity and flexion contracture    Description of the Findings of the Procedure: Patient was placed on the operative table in the supine position.  The  knee was prepped and draped in the usual sterile manner for surgery.  An incision was made over the anterior aspect of the knee.  It was carried down sharply through the skin.  The medial parapatellar tissues were divided and the patella was taken laterally.  The effusion was aspirated.  The medial tibial plateau was taken down.  Two pins were placed just medial to the tibial tubercle for the proximal tibial array.  The tibial array was assembled.  We now placed the tibial checkpoint just medial to the tibial tubercle.  The knee was flexed to 90°.  Two pins were placed into the distal femur for the femoral array and the femoral array was assembled.  A femoral checkpoint was placed.  We now connected to the robotic system and determined the center of rotation of the hip.  The medial and lateral malleoli were identified.  The tibial and femoral check points were verified.  We now verified the femur and the tibia.  When this was completed the knee was brought into extension and the extension gap was determined and captured.  Similarly the flexion gap was determined and captured.  A robotic plan was created to balance the flexion-extension gap.  The plan was accepted.  When this was completed the robotic arm was brought into position and the  femur and the blade were both verified.  The cutting device was utilized and the femoral cuts were made.  Similarly the tibia was verified as was the tibial cutting blade and the tibial cut was made.  We now placed a femoral trial and the tibial trial.  We had full extension full flexion and completely balanced flexion-extension gaps.  This was determined both clinically and using the robotic measurements.  We now broached the tibia.  The patella was calibrated and cut and a button was placed.  The construct trialed perfectly with no lift-off.  We pulsed and irrigated.  We mixed bone cement and cemented 1st the tibia, next the femur and finally the patella.  All excess cement was removed at the time that we cemented and the construct was held in full extension until all the cement completely hardened.  We copiously irrigated again.  We closed the extensor mechanism with a combination of 2. FiberWire and a running Quill stitch.  We irrigated again and closed the subcu with 2-0 Stratafix.  We closed the skin with 4-0 Monocryl.  Sterile dressings were applied and the patient was noted to be in stable condition pending an x-ray and a neurovascular check.  Complications: No    Estimated Blood Loss (EBL): * No values recorded between 11/20/2023  8:42 AM and 11/20/2023  9:29 AM *           Implants:   Implant Name Type Inv. Item Serial No.  Lot No. LRB No. Used Action   CEMENT BONE SIMPLEX HV RADPQ - [id number]  CEMENT BONE SIMPLEX HV RADPQ  [employer] ROSY. [id number] Left 2 Implanted   PIN BONE 3.0U759OC - [id number]  PIN BONE [id number]  [employer]. [id number] Left 1 Implanted and Explanted   PIN FIXATION BONE 140X3.2MM - [id number]  PIN FIXATION BONE 140X3.2MM  [employer] ROSY. [id number] Left 1 Implanted and Explanted   COMPONENT FEM CR CONNIE LEFT 5 - [id number]  COMPONENT FEM CR CONNIE LEFT 5  [employer] ROSY. [id number] Left 1 Implanted   PATELLA TRI 33X9 X3 POLYETHYLE - [id number]  PATELLA TRI 33X9 X3  POLYETHYLE  [employer] ROSY. [id number] Left 1 Implanted   PRIMARY TIBIAL BASE 5. - [id number]  PRIMARY TIBIAL BASE 5.  [employer] ROSY. [id number] Left 1 Implanted   INSERT TIBIAL SZ 5 9MMX3 - [id number]  INSERT TIBIAL SZ 5 9MMX3  [employer]. [id number] Left 1 Implanted       Specimens:   Specimen (24h ago, onward)      None                    Condition: Good    Disposition: PACU - hemodynamically stable.              Discharge Note    SUMMARY     Admit Date: 11/20/2023    Discharge Date and Time:  11/20/2023 9:29 AM    Hospital Course (synopsis of major diagnoses, care, treatment, and services provided during the course of the hospital stay): Uneventful      Final Diagnosis: Post-Op Diagnosis Codes:     * Primary osteoarthritis of left knee [M17.12]    Disposition: Home or Self Care    Follow Up/Patient Instructions:     Medications:  Reconciled Home Medications:   Current Discharge Medication List        CONTINUE these medications which have NOT CHANGED    Details   atomoxetine (STRATTERA) 40 MG capsule Take 1 capsule (40 mg total) by mouth once daily.  Qty: 90 capsule, Refills: 1      furosemide (LASIX) 20 MG tablet Take 1 tablet (20 mg total) by mouth daily as needed.  Qty: 90 tablet, Refills: 3    Associated Diagnoses: Physical exam      pantoprazole (PROTONIX) 40 MG tablet Take 1 tablet (40 mg total) by mouth once daily.  Qty: 90 tablet, Refills: 3    Associated Diagnoses: Gastroesophageal reflux disease, unspecified whether esophagitis present      traZODone (DESYREL) 100 MG tablet Take 2 tablets (200 mg total) by mouth every evening.  Qty: 60 tablet, Refills: 5      venlafaxine (EFFEXOR-XR) 75 MG 24 hr capsule Take 3 capsules (225 mg total) by mouth every evening.  Qty: 270 capsule, Refills: 3      ascorbic acid, vitamin C, (VITAMIN C) 500 MG tablet Take 500 mg by mouth once daily.      ascorbic acid/hesperidin (BIOFLAVONOID PRODUCTS ORAL) Take by mouth.      aspirin (ECOTRIN) 81 MG EC tablet Take 1 tablet  (81 mg total) by mouth every 12 (twelve) hours.  Qty: 60 tablet, Refills: 0    Associated Diagnoses: Primary osteoarthritis of left knee      b complex vitamins capsule Take 1 capsule by mouth once daily.      calcium carbonate (OS-JULIANNE) 600 mg calcium (1,500 mg) Tab Take 600 mg by mouth once.      celecoxib (CELEBREX) 200 MG capsule Take 1 capsule (200 mg total) by mouth 2 (two) times daily.  Qty: 60 capsule, Refills: 0    Associated Diagnoses: Primary osteoarthritis of left knee      docusate sodium (COLACE) 100 MG capsule Take 1 capsule (100 mg total) by mouth 2 (two) times daily.  Qty: 60 capsule, Refills: 0    Associated Diagnoses: Primary osteoarthritis of left knee      gabapentin (NEURONTIN) 300 MG capsule Take 1 capsule (300 mg total) by mouth 2 (two) times daily.  Qty: 60 capsule, Refills: 0    Associated Diagnoses: Primary osteoarthritis of left knee      glucosamine-chondroitin 250-200 mg Tab Take 2 tablets by mouth once daily.      magnesium 250 mg Tab Take 250 mg by mouth once daily.      multivitamin capsule Take 1 capsule by mouth once daily.      mupirocin (BACTROBAN) 2 % ointment       oxyCODONE-acetaminophen (PERCOCET) 7.5-325 mg per tablet Take 1 tablet by mouth every 6 (six) hours as needed for Pain.  Qty: 28 tablet, Refills: 0    Comments: This prescription and the attached 4 other prescriptions are for postoperative use for the patient's scheduled total joint replacement on Monday November 20, 2023.  This is for the meds to bed program.  Associated Diagnoses: Primary osteoarthritis of left knee           Discharge Procedure Orders   Diet general     Call MD for:  temperature >100.4     Call MD for:  persistent nausea and vomiting     Call MD for:  severe uncontrolled pain     Call MD for:  difficulty breathing, headache or visual disturbances     Call MD for:  redness, tenderness, or signs of infection (pain, swelling, redness, odor or green/yellow discharge around incision site)     Call MD  for:  hives     Call MD for:  persistent dizziness or light-headedness     Call MD for:  extreme fatigue      Follow-up Information       [facility]. Call in 1 day(s).    Specialties: Physical Therapy, Home Health Services  Contact information:  [address] [place] [address]  [address]  [place] [address] [phone]

## 2023-11-20 NOTE — PLAN OF CARE
Patient received from recovery at this time.  AAOX3.  NAD noted.  Dressing to left knee remains clean, dry and intact. Tolerating po intake well with no complaints of nausea/vomiting.  Patient able to move BLE with no problems noted.  Due to void.  Medications delivered to spouse in the waiting area. Rolling walker returned to bedside.

## 2023-11-20 NOTE — PLAN OF CARE
Report to Malinda. Patient denies pain, no nausea, dressing to left knee dry intact no drainage, ice to dressing, vs stable, resting quietly,  in attendance

## 2023-11-20 NOTE — ANESTHESIA PREPROCEDURE EVALUATION
11/20/2023  Lolly Ramírez is a 62 y.o., female.      Pre-op Assessment    I have reviewed the NPO Status.   I have reviewed the Medications.     Review of Systems  Anesthesia Hx:  No problems with previous Anesthesia                Social:  Non-Smoker       Cardiovascular:  Exercise tolerance: good                 ECG has been reviewed.                          Pulmonary:   COPD                     Hepatic/GI:     GERD, well controlled             Musculoskeletal:  Arthritis               Neurological:    Neuromuscular Disease,                                   Endocrine:  Diabetes, type 2         Obesity / BMI > 30  Psych:  Psychiatric History                  Physical Exam  General: Well nourished    Airway:  Mallampati: II   Mouth Opening: Normal  TM Distance: Normal  Tongue: Normal  Neck ROM: Normal ROM    Dental:  Intact    Chest/Lungs:  Clear to auscultation, Normal Respiratory Rate        Anesthesia Plan  Type of Anesthesia, risks & benefits discussed:    Anesthesia Type: Spinal  Intra-op Monitoring Plan: Standard ASA Monitors  Post Op Pain Control Plan: multimodal analgesia, IV/PO Opioids PRN and peripheral nerve block  Induction:  IV  Informed Consent: Patient consented to blood products? Yes  ASA Score: 3  Day of Surgery Review of History & Physical: H&P Update referred to the surgeon/provider.    Ready For Surgery From Anesthesia Perspective.     .

## 2023-11-20 NOTE — DISCHARGE INSTRUCTIONS
"Discharge Instructions: After Your Surgery/Procedure  Youve just had surgery. During surgery you were given medicine called anesthesia to keep you relaxed and free of pain. After surgery you may have some pain or nausea. This is common. Here are some tips for feeling better and getting well after surgery.     Stay on schedule with your medication.   Going home  Your doctor or nurse will show you how to take care of yourself when you go home. He or she will also answer your questions. Have an adult family member or friend drive you home.      For your safety we recommend these precaution for the first 24 hours after your procedure:  Do not drive or use heavy equipment.  Do not make important decisions or sign legal papers.  Do not drink alcohol.  Have someone stay with you, if needed. He or she can watch for problems and help keep you safe.  Your concentration, balance, coordination, and judgement may be impaired for many hours after anesthesia.  Use caution when ambulating or standing up.     You may feel weak and "washed out" after anesthesia and surgery.      Subtle residual effects of general anesthesia or sedation with regional / local anesthesia can last more than 24 hours.  Rest for the remainder of the day or longer if your Doctor/Surgeon has advised you to do so.  Although you may feel normal within the first 24 hours, your reflexes and mental ability may be impaired without you realizing it.  You may feel dizzy, lightheaded or sleepy for 24 hours or longer.      Be sure to go to all follow-up visits with your doctor. And rest after your surgery for as long as your doctor tells you to.  Coping with pain  If you have pain after surgery, pain medicine will help you feel better. Take it as told, before pain becomes severe. Also, ask your doctor or pharmacist about other ways to control pain. This might be with heat, ice, or relaxation. And follow any other instructions your surgeon or nurse gives you.  Tips " for taking pain medicine  To get the best relief possible, remember these points:  Pain medicines can upset your stomach. Taking them with a little food may help.  Most pain relievers taken by mouth need at least 20 to 30 minutes to start to work.  Taking medicine on a schedule can help you remember to take it. Try to time your medicine so that you can take it before starting an activity. This might be before you get dressed, go for a walk, or sit down for dinner.  Constipation is a common side effect of pain medicines. Call your doctor before taking any medicines such as laxatives or stool softeners to help ease constipation. Also ask if you should skip any foods. Drinking lots of fluids and eating foods such as fruits and vegetables that are high in fiber can also help. Remember, do not take laxatives unless your surgeon has prescribed them.  Drinking alcohol and taking pain medicine can cause dizziness and slow your breathing. It can even be deadly. Do not drink alcohol while taking pain medicine.  Pain medicine can make you react more slowly to things. Do not drive or run machinery while taking pain medicine.  Your health care provider may tell you to take acetaminophen to help ease your pain. Ask him or her how much you are supposed to take each day. Acetaminophen or other pain relievers may interact with your prescription medicines or other over-the-counter (OTC) drugs. Some prescription medicines have acetaminophen and other ingredients. Using both prescription and OTC acetaminophen for pain can cause you to overdose. Read the labels on your OTC medicines with care. This will help you to clearly know the list of ingredients, how much to take, and any warnings. It may also help you not take too much acetaminophen. If you have questions or do not understand the information, ask your pharmacist or health care provider to explain it to you before you take the OTC medicine.  Managing nausea  Some people have an  upset stomach after surgery. This is often because of anesthesia, pain, or pain medicine, or the stress of surgery. These tips will help you handle nausea and eat healthy foods as you get better. If you were on a special food plan before surgery, ask your doctor if you should follow it while you get better. These tips may help:  Do not push yourself to eat. Your body will tell you when to eat and how much.  Start off with clear liquids and soup. They are easier to digest.  Next try semi-solid foods, such as mashed potatoes, applesauce, and gelatin, as you feel ready.  Slowly move to solid foods. Dont eat fatty, rich, or spicy foods at first.  Do not force yourself to have 3 large meals a day. Instead eat smaller amounts more often.  Take pain medicines with a small amount of solid food, such as crackers or toast, to avoid nausea.     Call your surgeon if  You still have pain an hour after taking medicine. The medicine may not be strong enough.  You feel too sleepy, dizzy, or groggy. The medicine may be too strong.  You have side effects like nausea, vomiting, or skin changes, such as rash, itching, or hives.       If you have obstructive sleep apnea  You were given anesthesia medicine during surgery to keep you comfortable and free of pain. After surgery, you may have more apnea spells because of this medicine and other medicines you were given. The spells may last longer than usual.   At home:  Keep using the continuous positive airway pressure (CPAP) device when you sleep. Unless your health care provider tells you not to, use it when you sleep, day or night. CPAP is a common device used to treat obstructive sleep apnea.  Talk with your provider before taking any pain medicine, muscle relaxants, or sedatives. Your provider will tell you about the possible dangers of taking these medicines.  © 4069-7268 [employer]. [address] [place] [address], [place] [address]. All rights reserved. This information is  not intended as a substitute for professional medical care. Always follow your healthcare professional's instructions.          Using an Incentive Spirometer    An incentive spirometer is a device that helps you do deep breathing exercises. These exercises expand your lungs, aid in circulation, and help prevent pneumonia. Deep breathing exercises also help you breathe better and improve the function of your lungs by:  Keeping your lungs clear  Strengthening your breathing muscles  Helping prevent respiratory complications or problems  The incentive spirometer gives you a way to take an active part in recover. A nurse or therapist will teach you breathing exercises. To do these exercises, you will breathe in through your mouth and not your nose. The incentive spirometer only works correctly if you breathe in through your mouth.  Steps to clear lungs  Step 1. Exhale normally. Then, inhale normally.  Relax and breathe out.  Step 2. Place your lips tightly around the mouthpiece.  Make sure the device is upright and not tilted.  Step 3. Inhale as much air as you can through the mouthpiece (don't breath through your nose).  Inhale slowly and deeply.  Hold your breath long enough to keep the balls or disk raised for at least 3 to 5 seconds, or as instructed by your healthcare provider.  Some spirometers have an indicator to let you know that you are breathing in too fast. If the indicator goes off, breathe in more slowly.  Step 4. Repeat the exercise regularly.  Do this exercise every hour while you're awake, or as instructed by your healthcare provider.  If you were taught deep breathing and coughing exercises, do them regularly as instructed by your healthcare provider.           Post op instructions for prevention of DVT  What is deep vein thrombosis?  Deep vein thrombosis (DVT) is the medical term for blood clots in the deep veins of the leg.  These blood clots can be dangerous.  A DVT can block a blood vessel and keep  blood from getting where it needs to go.  Another problem is that the clot can travel to other parts of the body such as the lungs.  A clot that travels to the lungs is called a pulmonary embolus (PE) and can cause serious problems with breathing which can lead to death.  Am I at risk for DVT/PE?  If you are not very active, you are at risk of DVT.  Anyone confined to bed, sitting for long periods of time, recovering from surgery, etc. increases the risk of DVT.  Other risk factors are cancer diagnosis, certain medications, estrogen replacement in any form,older age, obesity, pregnancy, smoking, history of clotting disorders, and dehydration.  How will I know if I have a DVT?  Swelling in the lower leg  Pain  Warmth, redness, hardness or bulging of the vein  If you have any of these symptoms, call your doctors office right away.  Some people will not have any symptoms until the clot moves to the lungs.  What are the symptoms of a PE?  Panting, shortness of breath, or trouble breathing  Sharp, knife-like chest pain when you breathe  Coughing or coughing up blood  Rapid heartbeat  If you have any of these symptoms or get worse quickly, call 911 for emergency treatment.  How can I prevent a DVT?  Avoid long periods of inactivity and dont cross your legs--get up and walk around every hour or so.  Stay active--walking after surgery is highly encouraged.  This means you should get out of the house and walk in the neighborhood.  Going up and down stairs will not impair healing (unless advised against such activity by your doctor).    Drink plenty of noncaffeinated, nonalcoholic fluids each day to prevent dehydration.  Wear special support stockings, if they have been advised by your doctor.  If you travel, stop at least once an hour and walk around.  Avoid smoking (assistance with stopping is available through your healthcare provider)  Always notify your doctor if you are not able to follow the post operative  instructions that are given to you at the time of discharge.  It may be necessary to prescribe one of the medications available to prevent DVT.          Exparel(bupivacaine) has been injected to provide approximately 72 hours of reduced pain after your surgery.  Do not remove the bracelet for five days.  Report to your doctor as soon as possible if you experience any of the following:   Restlessness   Anxiety   Speech problems    Lightheadedness   Numbness and tingling of the mouth and lips   Seizures    Metallic taste   Blurred vision   Tremors    Twitching   Depression   Extreme drowsiness  Avoid additional use of local anesthetics (such as dental procedures) for five days (96 hours).          We hope your stay was comfortable as you heal now, mend and rest.    For we have enjoyed taking care of you by giving your our best.    And as you get better, by regaining your health and strength;   We count it as a privilege to have served you and hope your time at [facility] was well spent.      Thank  You!!!

## 2023-11-21 ENCOUNTER — OFFICE VISIT (OUTPATIENT)
Dept: ORTHOPEDICS | Facility: CLINIC | Age: 63
End: 2023-11-21
Payer: MEDICARE

## 2023-11-21 VITALS
OXYGEN SATURATION: 96 % | RESPIRATION RATE: 18 BRPM | SYSTOLIC BLOOD PRESSURE: 150 MMHG | HEART RATE: 96 BPM | TEMPERATURE: 97 F | BODY MASS INDEX: 39 KG/M2 | WEIGHT: 249 LBS | DIASTOLIC BLOOD PRESSURE: 83 MMHG

## 2023-11-21 VITALS — BODY MASS INDEX: 39.08 KG/M2 | HEIGHT: 67 IN | WEIGHT: 249 LBS

## 2023-11-21 DIAGNOSIS — Z96.651 S/P TOTAL KNEE ARTHROPLASTY, RIGHT: Primary | ICD-10-CM

## 2023-11-21 PROCEDURE — 99024 PR POST-OP FOLLOW-UP VISIT: ICD-10-PCS | Mod: S$GLB,POP,, | Performed by: ORTHOPAEDIC SURGERY

## 2023-11-21 PROCEDURE — 99024 POSTOP FOLLOW-UP VISIT: CPT | Mod: S$GLB,POP,, | Performed by: ORTHOPAEDIC SURGERY

## 2023-11-21 PROCEDURE — G0180 PR HOME HEALTH MD CERTIFICATION: ICD-10-PCS | Mod: ,,, | Performed by: ORTHOPAEDIC SURGERY

## 2023-11-21 PROCEDURE — G0180 MD CERTIFICATION HHA PATIENT: HCPCS | Mod: ,,, | Performed by: ORTHOPAEDIC SURGERY

## 2023-11-21 NOTE — PROGRESS NOTES
11/21/23  Very pleased with care given. States all staff were kind and supportive.  States she has read and understands all discharge instructions.  HH to start today.

## 2023-11-21 NOTE — PROGRESS NOTES
[facility] POST-OP NOTE    Subjective:           Chief Complaint:   Chief Complaint   Patient presents with    Left Knee - Post-op Evaluation     POD 1 Left TKA. States she is doing well       Past Medical History:   Diagnosis Date    Breast cancer, stage 1, estrogen receptor negative, left () 2020    Depression     Diabetes mellitus, type 2     GERD (gastroesophageal reflux disease)     HER2-positive carcinoma of left breast 2020    Hx of breast cancer     Hx of breast cancer - Her2Nu+/ER+ - 2011 12/3/2019    Insomnia     Lymphedema     Neuropathy of both feet     S/P lumpectomy, left breast 2020       Past Surgical History:   Procedure Laterality Date    ARTHROSCOPY OF SHOULDER WITH DECOMPRESSION OF SUBACROMIAL SPACE Right 2023    Procedure: ARTHROSCOPY, SHOULDER, WITH SUBACROMIAL SPACE DECOMPRESSION;  Surgeon: Curtis Ricardo MD;  Location: [facility];  Service: Orthopedics;  Laterality: Right;    BICEPS TENDON REPAIR Left 2005    BREAST BIOPSY Left     BREAST LUMPECTOMY Left      SECTION      1982, 1985, 1986    CHOLECYSTECTOMY  2000    Elbow fx Left 2015    FRACTURE SURGERY Left 2016    elbow    HYSTERECTOMY  2013    KNEE ARTHROSCOPY W/ MENISCAL REPAIR Left 2014    Lower back ruptured disc  2010    LYMPH NODE DISSECTION  2011    SPINE SURGERY  2009    ruptured disc       Current Outpatient Medications   Medication Sig    ascorbic acid, vitamin C, (VITAMIN C) 500 MG tablet Take 500 mg by mouth once daily.    aspirin (ECOTRIN) 81 MG EC tablet Take 1 tablet (81 mg total) by mouth every 12 (twelve) hours.    atomoxetine (STRATTERA) 40 MG capsule Take 1 capsule (40 mg total) by mouth once daily.    b complex vitamins capsule Take 1 capsule by mouth once daily.    calcium carbonate (OS-JULIANNE) 600 mg calcium (1,500 mg) Tab Take 600 mg by mouth once.    celecoxib (CELEBREX) 200 MG capsule Take 1 capsule (200 mg total) by mouth 2 (two) times daily.    docusate sodium  (COLACE) 100 MG capsule Take 1 capsule (100 mg total) by mouth 2 (two) times daily.    furosemide (LASIX) 20 MG tablet Take 1 tablet (20 mg total) by mouth daily as needed.    gabapentin (NEURONTIN) 300 MG capsule Take 1 capsule (300 mg total) by mouth 2 (two) times daily.    glucosamine-chondroitin 250-200 mg Tab Take 2 tablets by mouth once daily.    magnesium 250 mg Tab Take 250 mg by mouth once daily.    multivitamin capsule Take 1 capsule by mouth once daily.    mupirocin (BACTROBAN) 2 % ointment     oxyCODONE-acetaminophen (PERCOCET) 7.5-325 mg per tablet Take 1 tablet by mouth every 6 (six) hours as needed for Pain.    pantoprazole (PROTONIX) 40 MG tablet Take 1 tablet (40 mg total) by mouth once daily.    traZODone (DESYREL) 100 MG tablet Take 2 tablets (200 mg total) by mouth every evening.    venlafaxine (EFFEXOR-XR) 75 MG 24 hr capsule Take 3 capsules (225 mg total) by mouth every evening.    ascorbic acid/hesperidin (BIOFLAVONOID PRODUCTS ORAL) Take by mouth.     No current facility-administered medications for this visit.       Review of patient's allergies indicates:   Allergen Reactions    Banana Hives    Codeine Nausea And Vomiting    Adhesive Rash    Dilaudid  [hydromorphone (bulk)] Rash    Hydromorphone hcl Rash       Family History   Problem Relation Age of Onset    Cancer Mother     Breast cancer Mother     Parkinsonism Father     Diabetes Father     Breast cancer Maternal Aunt     Breast cancer Paternal Aunt     Cancer Maternal Aunt     Cancer Paternal Aunt     Cancer Daughter     Heart disease Maternal Grandmother     Heart disease Paternal Grandmother        Social History     Socioeconomic History    Marital status:    Tobacco Use    Smoking status: Former     Current packs/day: 0.00     Average packs/day: 0.5 packs/day for 25.0 years (12.5 ttl pk-yrs)     Types: Cigarettes     Start date: 1985     Quit date: 2010     Years since quittin.9    Smokeless tobacco: Never    Substance and Sexual Activity    Alcohol use: Not Currently     Alcohol/week: 2.0 standard drinks of alcohol     Types: 2 Glasses of wine per week    Drug use: Never    Sexual activity: Yes     Partners: Male     Birth control/protection: Other-see comments, None     Comment: Hysterectomy     Social Determinants of Health     Financial Resource Strain: Low Risk  (3/9/2023)    Overall Financial Resource Strain (CARDIA)     Difficulty of Paying Living Expenses: Not hard at all   Food Insecurity: Unknown (3/9/2023)    Hunger Vital Sign     Worried About Running Out of Food in the Last Year: Never true     Ran Out of Food in the Last Year: Patient refused   Transportation Needs: No Transportation Needs (3/9/2023)    PRAPARE - Transportation     Lack of Transportation (Medical): No     Lack of Transportation (Non-Medical): No   Physical Activity: Inactive (3/9/2023)    Exercise Vital Sign     Days of Exercise per Week: 0 days     Minutes of Exercise per Session: 30 min   Stress: No Stress Concern Present (3/9/2023)    [employer] [place] [employer] - Occupational Stress Questionnaire     Feeling of Stress : Only a little   Social Connections: Unknown (3/9/2023)    Social Connection and Isolation Panel [NHANES]     Frequency of Communication with Friends and Family: Three times a week     Frequency of Social Gatherings with Friends and Family: Once a week     Active Member of Clubs or Organizations: No     Attends Club or Organization Meetings: 1 to 4 times per year     Marital Status:    Housing Stability: Low Risk  (3/9/2023)    Housing Stability Vital Sign     Unable to Pay for Housing in the Last Year: No     Number of Places Lived in the Last Year: 1     Unstable Housing in the Last Year: No       History of present illness:  Lolly comes in today postop day 1 left total knee arthroplasty.  Comes in today for wound check, dressing change, and pain control assessment.  She is doing quite well.  Her  "pain is controlled.  She is ambulating with her rolling walker.  She has yet to hear from her home health provider.    Review of Systems:    Musculoskeletal:  See HPI      Objective:        Physical Examination:    Vital Signs:  There were no vitals filed for this visit.    Body mass index is 39 kg/m².    This a well-developed, well nourished patient in no acute distress.  They are alert and oriented and cooperative to examination.        Left knee exam:  Skin to left knee is clean dry and intact.  No erythema or ecchymosis.  No signs or symptoms of infection.  Left knee anterior midline incision is healing well with no wound dehiscence or drainage.  Left calf is soft and nontender.  She is neurovascularly intact throughout the left lower extremity.  She can weightbear as tolerated on the left lower extremity.  She ambulates with a rolling walker.      Pertinent New Results:    XRAY Report / Interpretation:   No new radiographs taken on today's clinic visit.    Assessment/Plan:      1. Status post left total knee arthroplasty.      Dressing change the left knee today.  She tolerated this well.  She will be use an aspirin 81 mg by mouth twice a day for DVT prophylaxis.  Encouraged her to reach out via telephone to her home health provider to ensure orders have been received and there set to see her within the next day or 2.  We will see her back in about 10-12 days for wound check and suture removal.  She was advised she could begin showering in 3-4 days if there is no wound drainage.    rAben Coombs, Physician Assistant, served in the capacity as a "scribe" for this patient encounter.  A "face-to-face" encounter occurred with Dr. Curtis Ricardo on this date.  The treatment plan and medical decision-making is outlined above. Patient was seen and examined with a chaperone.       This note was created using Dragon voice recognition software that occasionally misinterpreted phrases or words.      "

## 2023-11-21 NOTE — PT/OT/SLP EVAL
Physical Therapy Evaluation and Discharge Note    Patient Name:  Lolly Ramírez   MRN:  [id number]    Recommendations:     Discharge Recommendations: Low Intensity Therapy  Discharge Equipment Recommendations: none (has RW at bedside)   Barriers to discharge: None    Assessment:     Lolly Ramírez is a 62 y.o. female admitted with a medical diagnosis of <principal problem not specified>. .  Pt seen at post 05, alert and agreeable to PT. Spouse who is an  [facility] ICU nurse at bedside. Pt with hx of R shoulder rotator cuff tear. Hx of falls due to LLE giveaway. Pt completed thera ex in supine. Min assist to sit EOB and to stand. Pt ambulated 220 ft with chair following and seated rest. Pt taken to bathroom with unsuccessful voiding. OOB chair post PT. Pt and spouse provided with gait belt at discharge and educated on use.  Pt to benefit from HHPT    Recent Surgery: Procedure(s) (LRB):  ROBOTIC ARTHROPLASTY, KNEE, TOTAL, LEFT (Left) Day of Surgery    Plan:     During this hospitalization, patient does not require further acute PT services.  Please re-consult if situation changes.      Subjective   Pt agreeable to PT- stated is walking better than pre op  Spouse stated he took few days off but daughter will be available to assist with recovery  Chief Complaint: RUE is weak, feet numb, stated of falling episodes  Patient/Family Comments/goals: get well  Pain/Comfort:  Pain Rating 1: 0/10    Patients cultural, spiritual, Moravian conflicts given the current situation:      Living Environment:  Home with spouse  Prior to admission, patients level of function was ambulatory with rollator with falls.  Equipment used at home: rollator.  DME owned (not currently used): none.  Upon discharge, patient will have assistance from family.    Objective:     Communicated with nurse Petty  prior to session.  Patient found HOB elevated with   upon PT entry to room.    General Precautions: Standard, fall    Orthopedic Precautions:LLE  weight bearing as tolerated   Braces: N/A  Respiratory Status: Room air    Exams:  Postural Exam:  Patient presented with the following abnormalities:    -       Rounded shoulders  -       Forward head  -       BMI 39  RLE ROM: WFL  RLE Strength: WFL  LLE ROM: Deficits: LK flexion ~80*  LLE Strength: Deficits: 3/5    Functional Mobility:  Bed Mobility:     Scooting: minimum assistance  Supine to Sit: minimum assistance  Transfers:     Sit to Stand:  minimum assistance with rolling walker  Bed to Chair: minimum assistance with  rolling walker  using  Stand Pivot  Toilet Transfer: minimum assistance with  rolling walker and grab bars  using  Stand Pivot  Gait: 220ft with RW min assist and another person following with chair. Pt took 2 seated rests    AM-PAC 6 CLICK MOBILITY  Total Score:16       Treatment and Education:  Patient was educated on the importance of OOB activity and functional mobility to negate negative effects of prolonged bed rest during hospitalization, safe transfers and ambulation, and D/C planning   Thera ex with AP,QS/GS,leg lifts, HS  Gait at hallways with RW min assist with chair following and seated rests  Bathroom use with unsuccessful voiding  PT demonstrated to pt and spouse re use of gait belt    AM-PAC 6 CLICK MOBILITY  Total Score:16     Patient left up in chair with all lines intact, call button in reach, nurse Malinda notified, and spouse present.    GOALS:   Multidisciplinary Problems       Physical Therapy Goals       Not on file                    History:     Past Medical History:   Diagnosis Date    Breast cancer, stage 1, estrogen receptor negative, left (2011) 2/18/2020    Depression     Diabetes mellitus, type 2     GERD (gastroesophageal reflux disease)     HER2-positive carcinoma of left breast 2/18/2020    Hx of breast cancer     Hx of breast cancer - Her2Nu+/ER+ - 2011 12/3/2019    Insomnia     Lymphedema     Neuropathy of both feet     S/P lumpectomy, left breast 2/18/2020        Past Surgical History:   Procedure Laterality Date    ARTHROSCOPY OF SHOULDER WITH DECOMPRESSION OF SUBACROMIAL SPACE Right 2023    Procedure: ARTHROSCOPY, SHOULDER, WITH SUBACROMIAL SPACE DECOMPRESSION;  Surgeon: Curtis Ricardo MD;  Location: [facility];  Service: Orthopedics;  Laterality: Right;    BICEPS TENDON REPAIR Left 2005    BREAST BIOPSY Left     BREAST LUMPECTOMY Left 2011     SECTION      1982, 1985, 1986    CHOLECYSTECTOMY  2000    Elbow fx Left 2015    FRACTURE SURGERY Left 2016    elbow    HYSTERECTOMY  2013    KNEE ARTHROSCOPY W/ MENISCAL REPAIR Left 2014    Lower back ruptured disc  2010    LYMPH NODE DISSECTION  2011    SPINE SURGERY  2009    ruptured disc       Time Tracking:     PT Received On: 23  PT Start Time: 1122     PT Stop Time: 1232  PT Total Time (min): 70 min     Billable Minutes: Evaluation 20, Gait Training 30, and Therapeutic Exercise 20      2023

## 2023-12-04 ENCOUNTER — OFFICE VISIT (OUTPATIENT)
Dept: ORTHOPEDICS | Facility: CLINIC | Age: 63
End: 2023-12-04
Payer: MEDICARE

## 2023-12-04 VITALS — HEIGHT: 67 IN | BODY MASS INDEX: 39.24 KG/M2 | WEIGHT: 250 LBS

## 2023-12-04 DIAGNOSIS — Z96.652 STATUS POST TOTAL KNEE REPLACEMENT, LEFT: Primary | ICD-10-CM

## 2023-12-04 PROCEDURE — 99024 PR POST-OP FOLLOW-UP VISIT: ICD-10-PCS | Mod: S$GLB,POP,, | Performed by: PHYSICIAN ASSISTANT

## 2023-12-04 PROCEDURE — 99024 POSTOP FOLLOW-UP VISIT: CPT | Mod: S$GLB,POP,, | Performed by: PHYSICIAN ASSISTANT

## 2023-12-04 RX ORDER — PREGABALIN 200 MG/1
200 CAPSULE ORAL 3 TIMES DAILY
COMMUNITY
Start: 2023-11-27

## 2023-12-04 NOTE — PROGRESS NOTES
[facility] ORTHOPEDICS  [address]  [place] [address]  Phone: [phone]   [phone]    Patient's PCP:Shaunna Gordon NP  Referring Provider: No ref. provider found    POST-OP Note:    Subjective:        Chief Complaint:   Chief Complaint   Patient presents with    Left Knee - Post-op Evaluation     S/p Left TKA 23, doing great, still sore, tight,        Past Medical History:   Diagnosis Date    Breast cancer, stage 1, estrogen receptor negative, left () 2020    Depression     Diabetes mellitus, type 2     GERD (gastroesophageal reflux disease)     HER2-positive carcinoma of left breast 2020    Hx of breast cancer     Hx of breast cancer - Her2Nu+/ER+ - 2011 12/3/2019    Insomnia     Lymphedema     Neuropathy of both feet     S/P lumpectomy, left breast 2020       Past Surgical History:   Procedure Laterality Date    ARTHROSCOPY OF SHOULDER WITH DECOMPRESSION OF SUBACROMIAL SPACE Right 2023    Procedure: ARTHROSCOPY, SHOULDER, WITH SUBACROMIAL SPACE DECOMPRESSION;  Surgeon: Curtis Ricardo MD;  Location: [facility] OR;  Service: Orthopedics;  Laterality: Right;    BICEPS TENDON REPAIR Left 2005    BREAST BIOPSY Left     BREAST LUMPECTOMY Left      SECTION      1982, ,     CHOLECYSTECTOMY  2000    Elbow fx Left 2015    FRACTURE SURGERY Left 2016    elbow    HYSTERECTOMY  2013    KNEE ARTHROSCOPY W/ MENISCAL REPAIR Left 2014    Lower back ruptured disc  2010    LYMPH NODE DISSECTION  2011    ROBOTIC ARTHROPLASTY, KNEE Left 2023    Procedure: ROBOTIC ARTHROPLASTY, KNEE, TOTAL, LEFT;  Surgeon: Curtis Ricardo MD;  Location: [facility] OR;  Service: Orthopedics;  Laterality: Left;  Barrett-Edi    SPINE SURGERY  2009    ruptured disc       Current Outpatient Medications   Medication Sig    ascorbic acid, vitamin C, (VITAMIN C) 500 MG tablet Take 500 mg by mouth once daily.    aspirin (ECOTRIN) 81 MG EC tablet Take 1 tablet (81 mg total) by mouth every  12 (twelve) hours.    atomoxetine (STRATTERA) 40 MG capsule Take 1 capsule (40 mg total) by mouth once daily.    b complex vitamins capsule Take 1 capsule by mouth once daily.    calcium carbonate (OS-JULIANNE) 600 mg calcium (1,500 mg) Tab Take 600 mg by mouth once.    celecoxib (CELEBREX) 200 MG capsule Take 1 capsule (200 mg total) by mouth 2 (two) times daily.    docusate sodium (COLACE) 100 MG capsule Take 1 capsule (100 mg total) by mouth 2 (two) times daily.    furosemide (LASIX) 20 MG tablet Take 1 tablet (20 mg total) by mouth daily as needed.    glucosamine-chondroitin 250-200 mg Tab Take 2 tablets by mouth once daily.    magnesium 250 mg Tab Take 250 mg by mouth once daily.    multivitamin capsule Take 1 capsule by mouth once daily.    mupirocin (BACTROBAN) 2 % ointment     oxyCODONE-acetaminophen (PERCOCET) 7.5-325 mg per tablet Take 1 tablet by mouth every 6 (six) hours as needed for Pain.    pantoprazole (PROTONIX) 40 MG tablet Take 1 tablet (40 mg total) by mouth once daily.    pregabalin (LYRICA) 200 MG Cap Take 200 mg by mouth 3 (three) times daily.    traZODone (DESYREL) 100 MG tablet Take 2 tablets (200 mg total) by mouth every evening.    venlafaxine (EFFEXOR-XR) 75 MG 24 hr capsule Take 3 capsules (225 mg total) by mouth every evening.     No current facility-administered medications for this visit.       Review of patient's allergies indicates:   Allergen Reactions    Banana Hives    Codeine Nausea And Vomiting    Adhesive Rash    Dilaudid  [hydromorphone (bulk)] Rash    Hydromorphone hcl Rash       Family History   Problem Relation Age of Onset    Cancer Mother     Breast cancer Mother     Parkinsonism Father     Diabetes Father     Breast cancer Maternal Aunt     Breast cancer Paternal Aunt     Cancer Maternal Aunt     Cancer Paternal Aunt     Cancer Daughter     Heart disease Maternal Grandmother     Heart disease Paternal Grandmother        Social History     Socioeconomic History    Marital  status:    Tobacco Use    Smoking status: Former     Current packs/day: 0.00     Average packs/day: 0.5 packs/day for 25.0 years (12.5 ttl pk-yrs)     Types: Cigarettes     Start date: 1985     Quit date: 2010     Years since quittin.0    Smokeless tobacco: Never   Substance and Sexual Activity    Alcohol use: Not Currently     Alcohol/week: 2.0 standard drinks of alcohol     Types: 2 Glasses of wine per week    Drug use: Never    Sexual activity: Yes     Partners: Male     Birth control/protection: Other-see comments, None     Comment: Hysterectomy     Social Determinants of Health     Financial Resource Strain: Low Risk  (3/9/2023)    Overall Financial Resource Strain (CARDIA)     Difficulty of Paying Living Expenses: Not hard at all   Food Insecurity: Unknown (3/9/2023)    Hunger Vital Sign     Worried About Running Out of Food in the Last Year: Never true     Ran Out of Food in the Last Year: Patient refused   Transportation Needs: No Transportation Needs (3/9/2023)    PRAPARE - Transportation     Lack of Transportation (Medical): No     Lack of Transportation (Non-Medical): No   Physical Activity: Inactive (3/9/2023)    Exercise Vital Sign     Days of Exercise per Week: 0 days     Minutes of Exercise per Session: 30 min   Stress: No Stress Concern Present (3/9/2023)    [employer] - Occupational Stress Questionnaire     Feeling of Stress : Only a little   Social Connections: Unknown (3/9/2023)    Social Connection and Isolation Panel [NHANES]     Frequency of Communication with Friends and Family: Three times a week     Frequency of Social Gatherings with Friends and Family: Once a week     Active Member of Clubs or Organizations: No     Attends Club or Organization Meetings: 1 to 4 times per year     Marital Status:    Housing Stability: Low Risk  (3/9/2023)    Housing Stability Vital Sign     Unable to Pay for Housing in the Last Year: No     Number of  Places Lived in the Last Year: 1     Unstable Housing in the Last Year: No       History of present illness:  Lolly comes in today 2 weeks status post left total knee arthroplasty.  Comes today for wound check and routine follow-up.  Her postoperative Dermabond strip and sutures are I have already been removed.  She is ambulating with a Rollator.  She is ready to transition from home health physical therapy to outpatient physical therapy.  She is doing quite well.    Review of Systems:    Musculoskeletal:  See HPI       Objective:        Physical Examination:    Vital Signs: There were no vitals filed for this visit.    Body mass index is 39.16 kg/m².    This a well-developed, well nourished patient in no acute distress.  They are alert and oriented and cooperative to examination.        Left knee exam: Skin to the left knee is clean dry and intact.  There is no erythema or ecchymosis.  There are no signs or symptoms of infection.  She is neurovascularly intact throughout the left lower extremity.  Left knee anterior midline incision well healed without wound dehiscence or drainage.  Left calf is soft nontender.  Left knee range of motion well-preserved at 0-110 degrees.  It is stable to varus and valgus stresses.  She can weightbear as tolerated on the left lower extremity.  She ambulates with a Rollator.    Pertinent New Results:     XRAY Report / Interpretation:  No new radiographs taken on today's clinic visit.     Assessment:       1. Status post total knee replacement, left      Plan:     Status post total knee replacement, left  -     Ambulatory referral/consult to Physical/Occupational Therapy; Future; Expected date: 12/11/2023        Follow up in about 4 weeks (around 1/1/2024) for //XR//  S/p Left TKA 11/20/23.    Sutures were already removed prior to today's visit.  They fell out naturally on their own she states.  She was advised to clean the operative site once a day with warm soapy water not apply any  topical creams or ointments.  Do not submerge under water in a pool or bathtub type environment for least 1 more week.  Continue aspirin 81 mg by mouth twice a day for 2 more weeks to provide a full month postoperative DVT prophylaxis coverage.  Given a prescription to transition to outpatient physical therapy once home health has commenced.  She declined need for refill of pain medication today.  She will call when 1 is needed.  We will see her back in 4 weeks with radiographs of the left knee and to assess her progress with therapy.      Arben Coombs, YVETTES, PA-C    This note was created using [employer] voice recognition software that occasionally misinterprets words or phrases.

## 2023-12-12 ENCOUNTER — CLINICAL SUPPORT (OUTPATIENT)
Dept: REHABILITATION | Facility: HOSPITAL | Age: 63
End: 2023-12-12
Payer: MEDICARE

## 2023-12-12 DIAGNOSIS — Z74.09 IMPAIRED FUNCTIONAL MOBILITY, BALANCE, GAIT, AND ENDURANCE: Primary | ICD-10-CM

## 2023-12-12 DIAGNOSIS — Z96.652 STATUS POST TOTAL KNEE REPLACEMENT, LEFT: ICD-10-CM

## 2023-12-12 DIAGNOSIS — M25.662 DECREASED RANGE OF MOTION (ROM) OF LEFT KNEE: ICD-10-CM

## 2023-12-12 PROCEDURE — 97162 PT EVAL MOD COMPLEX 30 MIN: CPT | Mod: KX,PN

## 2023-12-12 PROCEDURE — 97140 MANUAL THERAPY 1/> REGIONS: CPT | Mod: KX,PN

## 2023-12-12 NOTE — PLAN OF CARE
"[facility]   Physical Therapy Initial Evaluation      [id number]    Therapy Diagnosis:   Encounter Diagnoses   Name Primary?    Impaired functional mobility, balance, gait, and endurance Yes    Decreased range of motion (ROM) of left knee     Status post total knee replacement, left         Physician: Arben Coombs, *    Physician Orders: PT Eval and Treat Knees  Medical Diagnosis from Referral: Status post total knee replacement, left  Evaluation Date: 12/12/2023  Authorization Period Expiration: 12/03/2024  Plan of Care Expiration: 1/25/2024  Progress Note Due: 1/12/2024  Date of Surgery: 11/20/2023  Visit # / Visits authorized: 1/ 1   FOTO: 1/ 3   Next survey due week of 12/25/2023    Precautions: Diabetes and Fall Patient has significant sensitivity to adhesive and does not tolerate kinesiology taping.     Time In: 1255  Time Out: 1340  Total Billable Time: 40 minutes    Subjective     Date of onset: 11/20/2023    History of current condition - Lolly reports: she's had problems with her knees for awhile.  States that both knees were "bone on bone".  She underwent L TKA without complication on 11/20/2023.  Was sent home same day and started home health services 2 days later, with PT starting 2 days after that.  She completed home health 12/1/2023 and is now referred to OP PT    Falls: None recently (last fall 3/2023).     Imaging: X-Ray Knee 1 or 2 View Left 11/20/2023: FINDINGS per report:  Interval left TKA with the orthopedic hardware appearing in place and intact.  Soft tissue air which can be attributed to a postoperative basis    Prior Therapy: Yes for multiple other problems including achilles tendon dysfunction, shoulder dislocation (multiple)   Social History: lives with their spouse in a 1 story home with 1 step to enter.   Occupation: Retired  Prior Level of Function: Ambulating with rollator, impaired balance,   Current Level of " "Function: Does use rollator but has difficulty with loading it into the car thus she walked into clinic without device.  Reports clicking with moving the L knee.  Sleep is intermittent disturbed.  Has occasionally had to take medication to assist with sleeping.      Pain:  Current 3/10, worst 10/10, best 0/10   Location: left knee medial and lateral aspects, tender in distal quad  Description: intermittent aching.   Aggravating Factors: pivoting, stumbling, walking "a long time" (> 5-10 minutes), standing still.    Easing Factors: ice and heat    Patients goals: To navigate steps in RV so she can go camping, walking a longer period.       Medical History:   Past Medical History:   Diagnosis Date    Breast cancer, stage 1, estrogen receptor negative, left () 2020    Depression     Diabetes mellitus, type 2     GERD (gastroesophageal reflux disease)     HER2-positive carcinoma of left breast 2020    Hx of breast cancer     Hx of breast cancer - Her2Nu+/ER+ - 2011 12/3/2019    Insomnia     Lymphedema     Neuropathy of both feet     S/P lumpectomy, left breast 2020       Surgical History:   Lolly Ramírez  has a past surgical history that includes Elbow fx (Left, );  section; Lower back ruptured disc (2010); Knee arthroscopy w/ meniscal repair (Left, ); Biceps tendon repair (Left, 2005); Cholecystectomy (); Hysterectomy (); Lymph node dissection (); Breast biopsy (Left); Breast lumpectomy (Left, ); Fracture surgery (Left, ); Spine surgery (); Arthroscopy of shoulder with decompression of subacromial space (Right, 2023); and robotic arthroplasty, knee (Left, 2023).    Medications:   Lolly has a current medication list which includes the following prescription(s): ascorbic acid (vitamin c), aspirin, atomoxetine, b complex vitamins, calcium carbonate, celecoxib, docusate sodium, furosemide, glucosamine-chondroitin, magnesium, multivitamin, " mupirocin, oxycodone-acetaminophen, pantoprazole, pregabalin, trazodone, and venlafaxine.    Allergies:   Review of patient's allergies indicates:   Allergen Reactions    Banana Hives    Codeine Nausea And Vomiting    Adhesive Rash    Dilaudid  [hydromorphone (bulk)] Rash    Hydromorphone hcl Rash        Objective      Posture: Standing: weight shifted somewhat to R.  Sitting: Unremarkable  Palpation: Tenderness present distal 1/3 incision, medial infrapatellar region.  Minimal restriction noted along incision, distal 1/2 more restricted than proximal   Sensation: Lacks light touch sensation lateral aspect of L knee starting in suprapatellar region extending to mid shin and posteriorly to lateral hamstrings.   DTRs: N/A  Range of Motion/Strength:    Knee  Right   Left  Pain/Dysfunction with Movement    AROM PROM MMT AROM PROM MMT    Flexion  125*  130*  4+/5  109*  111*  4/5    Extension  0*   0*  4+/5  -5*  0*  4/5    Hip ROM grossly WNL   Strength R 4+/5, L 4/5  Ankle ROM WFL    Strength 4+/5  Flexibility: Hamstring length R 160*, L 170*. Piriformis minimal tightness, Quad tightness minimal; calf tightness minimal R more so than L.    Gait: Without AD  Analysis: Mildly antalgic favoring L LE with decreased stance time on L, decreased L knee flexion for swing through.  Lacks end range L knee extension during stance.  Patient reports she does utilize a rollator at home but could not fit it into her car when she left [facility] to come to therapy.    Bed Mobility:Independent  Transfers: Independent Unequal weight bearing through LE with L knee somewhat extended and less weight bearing through L foot noted.    Special Tests: Patellar mobility: good mobility noted  Other: Girth:  Knee     Right  Left  @ joint line  39.2 cm 42.0 cm  @ 5 cm proximal 46.1 cm 49.5 cm  @ 10 cm proximal 49.5 cm 51.2 cm  @ 5 cm distal   36.6 cm 40.5 cm  @ 10 cm distal 37.0 cm 40.5 cm      Intake Outcome Measure for FOTO Knee Survey    Therapist  reviewed FOTO scores for Lolly Ramírez on 12/12/2023.   FOTO report - see Media section or FOTO account episode details.    Intake Score: 56%    Primary Measure    Score Range    Intake Score    Score Interpretation  KOOS, JR.               0 - 100               63.8                   Lower Score = Greater Disability         Treatment     Total Treatment time (time-based codes) separate from Evaluation: 8 minutes     Lolly received the treatments listed below:      manual therapy techniques: Soft tissue Mobilization and passive stretching were applied to the: L knee for 8 minutes, including:  Soft tissue mobilization along incision  End range stretching into flexion and extension      Patient Education and Home Exercises     Education provided:   - Discussed proposed POC.  Instructed patient to continue HEP proved by home health PT pending update from clinic staff.      Written Home Exercises Provided:  To be issued during subsequent visits . Exercises were reviewed and Lolly was able to demonstrate them prior to the end of the session.  Lolly demonstrated good  understanding of the education provided. See EMR under Patient Instructions for exercises provided during therapy sessions.    Assessment     Lolly is a 63 y.o. female referred to outpatient Physical Therapy with a medical diagnosis of Status post total knee replacement, left. Patient presents with therapeutic diagnoses of impaired functional mobility, balance, gait, and endurance, decreased ROM of L knee.  Additional problems include: increased swelling of L knee with increased temperature of anterior knee present, decreased L LE strength. These problems contribute to the following limitations:  impaired quality of gait, difficulty with step negotiation, intermittent impaired sleep, difficulty with transfers, limited standing/walking tolerance.  Lolly will benefit from skilled PT services to address these problems in order to normalize gait pattern, to  maximize activity tolerance, and to minimize functional deficit. .    Patient prognosis is Good.   Patient will benefit from skilled outpatient Physical Therapy to address the deficits stated above and in the chart below, provide patient /family education, and to maximize patientt's level of independence.     Plan of care discussed with patient: Yes  Patient's spiritual, cultural and educational needs considered and patient is agreeable to the plan of care and goals as stated below:     Anticipated Barriers for therapy: Patient has severe arthritis of R knee which could interfere with therapy process.      Medical Necessity is demonstrated by the following  History  Co-morbidities and personal factors that may impact the plan of care [] LOW: no personal factors / co-morbidities  [] MODERATE: 1-2 personal factors / co-morbidities  [x] HIGH: 3+ personal factors / co-morbidities    Moderate / High Support Documentation:   Co-morbidities affecting plan of care: depression, diabetes, difficulty sleeping, high BMI, history of cancer, and neuropathy of B feet     Personal Factors:   attitudes     Examination  Body Structures and Functions, activity limitations and participation restrictions that may impact the plan of care [] LOW: addressing 1-2 elements  [] MODERATE: 3+ elements  [] HIGH: 4+ elements (please support below)    Moderate / High Support Documentation: Body Systems:    gross symmetry, ROM, strength, transfers  Mobility  lifting and carrying objects; walking; driving (car)  Community and Social Life  community life; recreation and leisure     Clinical Presentation [] LOW: stable  [x] MODERATE: Evolving  [] HIGH: Unstable     Decision Making/ Complexity Score: moderate       Goals:  Short Term Goals: 3 weeks   1) Facilitate normalized scar mobility  2) Facilitate resolution of tenderness around scar  3) Improve AROM L knee to 0*-115* to assist with sit <> stand transfers  4) Patient will safely ambulate over  "level surfaces with no more than straight cane    Long Term Goals: 6 weeks   1) Resolve gait deviations  2) Patient will safely navigate at least 6" steps utilizing L LE as power leg without increased L knee discomfort  3) Patient will consistently perform sit <> stand transfers demonstrating equal weight bearing and with no more than minimal UE support  4) Increase walking tolerance to > 30 min to allow for completion of grocery shopping  5) Improve functional score to > 65% as indicated by FOTO knee survey  6) Patient will be independent in HEP.     Plan     Plan of care Certification: 12/12/2023 to 1/25/2024.    Outpatient Physical Therapy 2 times weekly for 6 weeks to include the following interventions: Electrical Stimulation NMES, Gait Training, Manual Therapy, Moist Heat/ Ice, Patient Education, Therapeutic Activities, and Therapeutic Exercise. Lolly may be treated by PTA as part of their rehab team.     Lolly Villafana PT        Physician's Signature: _________________________________________ Date: ________________   "

## 2023-12-14 ENCOUNTER — CLINICAL SUPPORT (OUTPATIENT)
Dept: REHABILITATION | Facility: HOSPITAL | Age: 63
End: 2023-12-14
Payer: MEDICARE

## 2023-12-14 DIAGNOSIS — M25.662 DECREASED RANGE OF MOTION (ROM) OF LEFT KNEE: ICD-10-CM

## 2023-12-14 DIAGNOSIS — Z74.09 IMPAIRED FUNCTIONAL MOBILITY, BALANCE, GAIT, AND ENDURANCE: Primary | ICD-10-CM

## 2023-12-14 DIAGNOSIS — Z96.652 STATUS POST TOTAL KNEE REPLACEMENT, LEFT: ICD-10-CM

## 2023-12-14 PROCEDURE — 97110 THERAPEUTIC EXERCISES: CPT | Mod: PN,CQ

## 2023-12-14 PROCEDURE — 97140 MANUAL THERAPY 1/> REGIONS: CPT | Mod: PN,CQ

## 2023-12-14 NOTE — PROGRESS NOTES
"[facility]   Physical Therapy Treatment Note     Name: Lolly Nicolaser  Clinic Number: [id number]    Therapy Diagnosis:   Encounter Diagnoses   Name Primary?    Impaired functional mobility, balance, gait, and endurance Yes    Decreased range of motion (ROM) of left knee     Status post total knee replacement, left      Physician: Arben Coombs, *    Visit Date: 12/14/2023    Physician Orders: PT Eval and Treat Knees  Medical Diagnosis from Referral: Status post total knee replacement, left  Evaluation Date: 12/12/2023  Authorization Period Expiration: 12/03/2024  Plan of Care Expiration: 1/25/2024  Progress Note Due: 1/12/2024  Date of Surgery: 11/20/2023  Visit # / Visits authorized: 1/ 20 (2 total)   FOTO: 1/ 3              Next survey due week of 12/25/2023     Precautions: Diabetes and Fall Patient has significant sensitivity to adhesive and does not tolerate kinesiology taping.     PTA Visit #: 1/5     Time In: 1020 (overlapping w/another pt)  Time Out: 1106  Total Billable Time: 38 minutes    SUBJECTIVE     Pt reports: "I live at about a three." Reports "muscle pain" brissa posterior knee.  She was compliant with home exercise program provided by home health.  Response to previous treatment: "Ooh, I hurt."  Functional change: N/A at this time    Pain: 3/10  Location: left knee      OBJECTIVE     Objective Measures updated at progress report unless specified.     Treatment     Lolly received the treatments listed below:      therapeutic exercises to develop strength, endurance, ROM, and flexibility for 30 billable minutes including:  Recumbent bike w/seat at 5 x5'  Sitting Piriformis stretch on stool 3x30s  LAQ x10 (-3* extension)  A/AAROM heel slides 15x3s (117* flexion)  Standing LE AROM x20 ea  March  Abduction  Extension  Hamstring curl  Mini squat x15  Supine/Hooklying L quad stretch 3x30s  SAQ x15  Heel slides x15 & x15 AAROM using strap (116* flexion)  L hip flexion SLR " x15  Hip adduction isometric x20  Hip abduction using green theraband x20    To be added:   Bridging; Prone hip extension, hamstring curl, TKE over half bolster; Shuttle leg press    manual therapy techniques: Joint mobilizations and Friction Massage were applied to the: Left knee for 8 minutes, including:  Patellar mobilizations  Retrograde & Friction scar massage to incision    Patient Education and Home Exercises     Home Exercises Provided and Patient Education Provided     Education provided:   - The patient was instructed in initial treatment program as stated above. Instructed patient in self soft tissue mobilizations.    Written Home Exercises Provided:  The patient is to be provided formal written home exercise program in subsequent session(s) . Exercises were reviewed and Lolly was able to demonstrate them prior to the end of the session. Lolly demonstrated good  understanding of the education provided. See EMR under Patient Instructions for exercises provided during therapy sessions    ASSESSMENT     The patient reported to physical therapy stating continued pain 3/10. Patient reports to physical therapy without assistive device demonstrating slight waddling gait. Lolly Darrell was instructed in initial treatment program as stated above targeting knee range of motion, lower extremity flexibility for improved mobility, lower extremity control and stability. Patient reported intermittent pain during standing hamstring curls and supine heel slide. Patient not reporting any discomfort during manual techniques on this date.    Lolly Is progressing well towards her goals.   Pt prognosis is Good.     Pt will continue to benefit from skilled outpatient physical therapy to address the deficits listed in the problem list box on initial evaluation, provide pt/family education and to maximize pt's level of independence in the home and community environment.     Pt's spiritual, cultural and educational needs  "considered and pt agreeable to plan of care and goals.     Anticipated barriers to physical therapy: Patient has severe arthritis of R knee which could interfere with therapy process.       Goals:  Short Term Goals: 3 weeks   1) Facilitate normalized scar mobility Initiated  2) Facilitate resolution of tenderness around scar Initiated/progressing  3) Improve AROM L knee to 0*-115* to assist with sit <> stand transfers Progressing  4) Patient will safely ambulate over level surfaces with no more than straight cane Slow progress     Long Term Goals: 6 weeks   1) Resolve gait deviations Slow progress  2) Patient will safely navigate at least 6" steps utilizing L LE as power leg without increased L knee discomfort Ongoing  3) Patient will consistently perform sit <> stand transfers demonstrating equal weight bearing and with no more than minimal UE support Slow progress  4) Increase walking tolerance to > 30 min to allow for completion of grocery shopping Ongoing  5) Improve functional score to > 65% as indicated by FOTO knee survey Ongoing  6) Patient will be independent in HEP. Partially initiated    PLAN   POC: 2 times weekly for 6 weeks to include the following interventions: Electrical Stimulation NMES, Gait Training, Manual Therapy, Moist Heat/ Ice, Patient Education, Therapeutic Activities, and Therapeutic Exercise. Lolly may be treated by PTA as part of their rehab team.     The patient is to be progressed within the established plan of care as tolerated in order to accomplish goals as stated above. Plan to incorporate bridging and shuttle leg press, as well as prone hip extension, hamstring curl, and terminal knee extension over half roll. Provide formal written home exercise in subsequent session.    Eda Marrufo PTA  "

## 2023-12-16 ENCOUNTER — EXTERNAL HOME HEALTH (OUTPATIENT)
Dept: HOME HEALTH SERVICES | Facility: HOSPITAL | Age: 63
End: 2023-12-16
Payer: MEDICARE

## 2023-12-19 ENCOUNTER — CLINICAL SUPPORT (OUTPATIENT)
Dept: REHABILITATION | Facility: HOSPITAL | Age: 63
End: 2023-12-19
Payer: MEDICARE

## 2023-12-19 DIAGNOSIS — M25.662 DECREASED RANGE OF MOTION (ROM) OF LEFT KNEE: ICD-10-CM

## 2023-12-19 DIAGNOSIS — Z74.09 IMPAIRED FUNCTIONAL MOBILITY, BALANCE, GAIT, AND ENDURANCE: Primary | ICD-10-CM

## 2023-12-19 DIAGNOSIS — Z96.652 STATUS POST TOTAL KNEE REPLACEMENT, LEFT: ICD-10-CM

## 2023-12-19 PROCEDURE — 97110 THERAPEUTIC EXERCISES: CPT | Mod: KX,PN,CQ

## 2023-12-19 NOTE — PROGRESS NOTES
"[facility]   Physical Therapy Treatment Note     Name: Lolly Ramírez  Clinic Number: [id number]    Therapy Diagnosis:   Encounter Diagnoses   Name Primary?    Impaired functional mobility, balance, gait, and endurance Yes    Decreased range of motion (ROM) of left knee     Status post total knee replacement, left      Physician: Arben Coombs, *    Visit Date: 12/19/2023    Physician Orders: PT Eval and Treat Knees  Medical Diagnosis from Referral: Status post total knee replacement, left  Evaluation Date: 12/12/2023  Authorization Period Expiration: 12/03/2024  Plan of Care Expiration: 1/25/2024  Progress Note Due: 1/12/2024  Date of Surgery: 11/20/2023  Visit # / Visits authorized: 2/ 20 (3 total)   FOTO: 1/ 3              Next survey due week of 12/25/2023     Precautions: Diabetes and Fall Patient has significant sensitivity to adhesive and does not tolerate kinesiology taping.     PTA Visit #: 2/5     Time In: 1250  Time Out: 1333  Total Billable Time: 43 minutes    SUBJECTIVE     Pt reports: "I feel like it bothers it sometimes bothers me with me walking funny due to the other leg bothering me. I will be glad when I get the other one fixed and can walk normal. I feel like I haven't walked normal in so long that y'all will have to teach me."  She was compliant with home exercise program provided by home health.  Response to previous treatment: "I was sore."  Functional change: N/A at this time    Pain: 0-3/10 "the same as usual"  Location: left knee      OBJECTIVE     Objective Measures updated at progress report unless specified.     Treatment     Lolly received the treatments listed below:      therapeutic exercises to develop strength, endurance, ROM, and flexibility for 41 minutes including:  Recumbent bike w/seat at 5 x6'  Standing LE AROM x20 ea  March  Abduction  Extension  Hamstring curl  Hip flexion SLR  Mini squat x18  L single shuttle leg press using 25# " x20  Sitting Piriformis stretch on stool 3x30s  LAQ using 1# x20 (-2* extension)  A/AAROM heel slides 15x3s (115* flexion)  Supine/Hooklying L quad stretch 3x30s  SAQ using 1# x20  Heel slides using 1# x15 & x15 AAROM using strap (120* flexion)  L hip flexion SLR x20  Hip adduction isometric x20  Hip abduction using green theraband x20  Bridging x10    To be added:   Prone hip extension, hamstring curl, TKE over half bolster    manual therapy techniques: Joint mobilizations and Friction Massage were applied to the: Left knee for 2 minutes, including:  Patellar mobilizations  Retrograde & Friction scar massage to incision    Patient Education and Home Exercises     Home Exercises Provided and Patient Education Provided     Education provided:   - The patient was instructed in increased exercise complexity as stated above in bold print.    Written Home Exercises Provided:  The patient is to be provided formal written home exercise program in subsequent session(s) . Exercises were reviewed and Lolly was able to demonstrate them prior to the end of the session. Lolly demonstrated good  understanding of the education provided. See EMR under Patient Instructions for exercises provided during therapy sessions    ASSESSMENT     The patient reported to physical therapy stating no present pain in post-operative knee, however some on contralateral side causing antalgic gait deviation at times. Lolly Darrell was progressed with increased lower extremity strength and stability training, as well as promoting increased knee range of motion. Patient is gradually progressing knee range of motion, see objective section. Post-operative incision is healing well with slight puckering superior to patella and restriction at inferior end. Patellar mobility non-remarkable with slight crepitus.    Lolly Is progressing well towards her goals.   Pt prognosis is Good.     Pt will continue to benefit from skilled outpatient physical therapy to  "address the deficits listed in the problem list box on initial evaluation, provide pt/family education and to maximize pt's level of independence in the home and community environment.     Pt's spiritual, cultural and educational needs considered and pt agreeable to plan of care and goals.     Anticipated barriers to physical therapy: Patient has severe arthritis of R knee which could interfere with therapy process.       Goals:  Short Term Goals: 3 weeks   1) Facilitate normalized scar mobility Progressing  2) Facilitate resolution of tenderness around scar Progressing  3) Improve AROM L knee to 0*-115* to assist with sit <> stand transfers Progressing/Nearly met  4) Patient will safely ambulate over level surfaces with no more than straight cane Slow progress     Long Term Goals: 6 weeks   1) Resolve gait deviations Slow progress  2) Patient will safely navigate at least 6" steps utilizing L LE as power leg without increased L knee discomfort Ongoing  3) Patient will consistently perform sit <> stand transfers demonstrating equal weight bearing and with no more than minimal UE support Slowly progressing  4) Increase walking tolerance to > 30 min to allow for completion of grocery shopping Ongoing  5) Improve functional score to > 65% as indicated by FOTO knee survey Ongoing  6) Patient will be independent in HEP. Partially initiated    PLAN   POC: 2 times weekly for 6 weeks to include the following interventions: Electrical Stimulation NMES, Gait Training, Manual Therapy, Moist Heat/ Ice, Patient Education, Therapeutic Activities, and Therapeutic Exercise. Lolly may be treated by PTA as part of their rehab team.     The patient is to be progressed within the established plan of care as tolerated in order to accomplish goals as stated above. Plan to incorporate prone hip extension, hamstring curl, and terminal knee extension over half roll; gradually progressing to increased closed chain exercises such as stair " navigation as contralateral lower extremity allows. Provide formal written home exercise in subsequent session.    Eda Marrufo, PTA

## 2023-12-20 ENCOUNTER — OFFICE VISIT (OUTPATIENT)
Dept: FAMILY MEDICINE | Facility: CLINIC | Age: 63
End: 2023-12-20
Payer: MEDICARE

## 2023-12-20 VITALS
BODY MASS INDEX: 40.34 KG/M2 | OXYGEN SATURATION: 98 % | DIASTOLIC BLOOD PRESSURE: 74 MMHG | SYSTOLIC BLOOD PRESSURE: 136 MMHG | HEART RATE: 82 BPM | WEIGHT: 257 LBS | HEIGHT: 67 IN

## 2023-12-20 DIAGNOSIS — E11.69 TYPE 2 DIABETES MELLITUS WITH OTHER SPECIFIED COMPLICATION, WITHOUT LONG-TERM CURRENT USE OF INSULIN: ICD-10-CM

## 2023-12-20 DIAGNOSIS — G47.00 INSOMNIA, UNSPECIFIED TYPE: ICD-10-CM

## 2023-12-20 DIAGNOSIS — Z85.3 HX OF BREAST CANCER: ICD-10-CM

## 2023-12-20 DIAGNOSIS — M17.12 PRIMARY OSTEOARTHRITIS OF LEFT KNEE: ICD-10-CM

## 2023-12-20 DIAGNOSIS — E11.9 TYPE 2 DIABETES MELLITUS WITHOUT COMPLICATION, WITHOUT LONG-TERM CURRENT USE OF INSULIN: Primary | ICD-10-CM

## 2023-12-20 DIAGNOSIS — Z96.652 HISTORY OF TOTAL KNEE REPLACEMENT, LEFT: ICD-10-CM

## 2023-12-20 DIAGNOSIS — F98.8 ATTENTION DEFICIT DISORDER, UNSPECIFIED HYPERACTIVITY PRESENCE: ICD-10-CM

## 2023-12-20 DIAGNOSIS — Z96.652 STATUS POST TOTAL KNEE REPLACEMENT, LEFT: Primary | ICD-10-CM

## 2023-12-20 DIAGNOSIS — M19.012 PRIMARY OSTEOARTHRITIS OF LEFT SHOULDER: ICD-10-CM

## 2023-12-20 LAB — HBA1C MFR BLD: 5.6 %

## 2023-12-20 PROCEDURE — 99214 PR OFFICE/OUTPT VISIT, EST, LEVL IV, 30-39 MIN: ICD-10-PCS | Mod: S$GLB,,, | Performed by: NURSE PRACTITIONER

## 2023-12-20 PROCEDURE — 83036 POCT HEMOGLOBIN A1C: ICD-10-PCS | Mod: QW,,, | Performed by: NURSE PRACTITIONER

## 2023-12-20 PROCEDURE — 99214 OFFICE O/P EST MOD 30 MIN: CPT | Mod: S$GLB,,, | Performed by: NURSE PRACTITIONER

## 2023-12-20 PROCEDURE — 83036 HEMOGLOBIN GLYCOSYLATED A1C: CPT | Mod: QW,,, | Performed by: NURSE PRACTITIONER

## 2023-12-20 RX ORDER — ATOMOXETINE 60 MG/1
60 CAPSULE ORAL DAILY
Qty: 90 CAPSULE | Refills: 1 | Status: SHIPPED | OUTPATIENT
Start: 2023-12-20 | End: 2024-03-27 | Stop reason: SDUPTHER

## 2023-12-20 NOTE — PROGRESS NOTES
SUBJECTIVE:    Patient ID: Lolly Ramírez is a 63 y.o. female.    Chief Complaint: Diabetes (Bottles brought//Pt is here for check up and medication refills//Decline flu vaccine//MARIAM )    63 year old female presents for check up.  is present during the exam. She is treated for gerd, insomnia,neuropathy, oa. Psoriasis and dm. Taking meds as prescribed.  Hga1c today is 5.6mg/dl. She had left total knee 11/23. Recovering well. In rehab. Doing well. Moods are stable. Not sure if strattera is effective. Would like to discuss    Diabetes  Pertinent negatives for hypoglycemia include no confusion. Pertinent negatives for diabetes include no polydipsia and no polyuria.       Office Visit on 12/20/2023   Component Date Value Ref Range Status    Hemoglobin A1C, POC 12/20/2023 5.6  % Final   Lab Visit on 11/06/2023   Component Date Value Ref Range Status    MRSA SCREEN BY PCR 11/06/2023 Negative  Negative Final    Group & Rh 11/06/2023 A POS   Final    Indirect Gregorio 11/06/2023 NEG   Final    Specimen Outdate 11/06/2023 11/20/2023 23:59   Final    WBC 11/06/2023 5.85  3.90 - 12.70 K/uL Final    RBC 11/06/2023 4.78  4.00 - 5.40 M/uL Final    Hemoglobin 11/06/2023 11.6 (L)  12.0 - 16.0 g/dL Final    Hematocrit 11/06/2023 38.8  37.0 - 48.5 % Final    MCV 11/06/2023 81 (L)  82 - 98 fL Final    MCH 11/06/2023 24.3 (L)  27.0 - 31.0 pg Final    MCHC 11/06/2023 29.9 (L)  32.0 - 36.0 g/dL Final    RDW 11/06/2023 17.0 (H)  11.5 - 14.5 % Final    Platelets 11/06/2023 276  150 - 450 K/uL Final    MPV 11/06/2023 10.6  9.2 - 12.9 fL Final    Immature Granulocytes 11/06/2023 0.3  0.0 - 0.5 % Final    Gran # (ANC) 11/06/2023 3.6  1.8 - 7.7 K/uL Final    Immature Grans (Abs) 11/06/2023 0.02  0.00 - 0.04 K/uL Final    Lymph # 11/06/2023 1.4  1.0 - 4.8 K/uL Final    Mono # 11/06/2023 0.6  0.3 - 1.0 K/uL Final    Eos # 11/06/2023 0.3  0.0 - 0.5 K/uL Final    Baso # 11/06/2023 0.06  0.00 - 0.20 K/uL Final    nRBC 11/06/2023 0  0 /100  WBC Final    Gran % 11/06/2023 61.2  38.0 - 73.0 % Final    Lymph % 11/06/2023 23.8  18.0 - 48.0 % Final    Mono % 11/06/2023 9.4  4.0 - 15.0 % Final    Eosinophil % 11/06/2023 4.3  0.0 - 8.0 % Final    Basophil % 11/06/2023 1.0  0.0 - 1.9 % Final    Differential Method 11/06/2023 Automated   Final    Sodium 11/06/2023 145  136 - 145 mmol/L Final    Potassium 11/06/2023 3.8  3.5 - 5.1 mmol/L Final    Chloride 11/06/2023 109  95 - 110 mmol/L Final    CO2 11/06/2023 25  23 - 29 mmol/L Final    Glucose 11/06/2023 65 (L)  70 - 110 mg/dL Final    BUN 11/06/2023 18  8 - 23 mg/dL Final    Creatinine 11/06/2023 0.8  0.5 - 1.4 mg/dL Final    Calcium 11/06/2023 9.3  8.7 - 10.5 mg/dL Final    Total Protein 11/06/2023 6.8  6.0 - 8.4 g/dL Final    Albumin 11/06/2023 3.6  3.5 - 5.2 g/dL Final    Total Bilirubin 11/06/2023 0.2  0.1 - 1.0 mg/dL Final    Alkaline Phosphatase 11/06/2023 78  55 - 135 U/L Final    AST 11/06/2023 19  10 - 40 U/L Final    ALT 11/06/2023 15  10 - 44 U/L Final    eGFR 11/06/2023 >60  >60 mL/min/1.73 m^2 Final    Anion Gap 11/06/2023 11  8 - 16 mmol/L Final   Office Visit on 09/14/2023   Component Date Value Ref Range Status    Hemoglobin A1C, POC 09/18/2023 6.0  % Final   Patient Message on 09/06/2023   Component Date Value Ref Range Status    WBC 09/07/2023 6.7  3.8 - 10.8 Thousand/uL Final    RBC 09/07/2023 4.05  3.80 - 5.10 Million/uL Final    Hemoglobin 09/07/2023 10.2 (L)  11.7 - 15.5 g/dL Final    Hematocrit 09/07/2023 32.0 (L)  35.0 - 45.0 % Final    MCV 09/07/2023 79.0 (L)  80.0 - 100.0 fL Final    MCH 09/07/2023 25.2 (L)  27.0 - 33.0 pg Final    MCHC 09/07/2023 31.9 (L)  32.0 - 36.0 g/dL Final    RDW 09/07/2023 15.7 (H)  11.0 - 15.0 % Final    Platelets 09/07/2023 362  140 - 400 Thousand/uL Final    MPV 09/07/2023 11.8  7.5 - 12.5 fL Final    Neutrophils, Abs 09/07/2023 3,966  1,500 - 7,800 cells/uL Final    Lymph # 09/07/2023 1,508  850 - 3,900 cells/uL Final    Mono # 09/07/2023 717  200 - 950  cells/uL Final    Eos # 09/07/2023 456  15 - 500 cells/uL Final    Baso # 09/07/2023 54  0 - 200 cells/uL Final    Neutrophils Relative 09/07/2023 59.2  % Final    Lymph % 09/07/2023 22.5  % Final    Mono % 09/07/2023 10.7  % Final    Eosinophil % 09/07/2023 6.8  % Final    Basophil % 09/07/2023 0.8  % Final    Ferritin 09/07/2023 38  16 - 288 ng/mL Final    Glucose 09/07/2023 79  65 - 99 mg/dL Final    BUN 09/07/2023 16  7 - 25 mg/dL Final    Creatinine 09/07/2023 0.76  0.50 - 1.05 mg/dL Final    eGFR 09/07/2023 89  > OR = 60 mL/min/1.73m2 Final    BUN/Creatinine Ratio 09/07/2023 SEE NOTE:  6 - 22 (calc) Final    Sodium 09/07/2023 143  135 - 146 mmol/L Final    Potassium 09/07/2023 4.7  3.5 - 5.3 mmol/L Final    Chloride 09/07/2023 108  98 - 110 mmol/L Final    CO2 09/07/2023 27  20 - 32 mmol/L Final    Calcium 09/07/2023 8.6  8.6 - 10.4 mg/dL Final    Total Protein 09/07/2023 5.9 (L)  6.1 - 8.1 g/dL Final    Albumin 09/07/2023 3.3 (L)  3.6 - 5.1 g/dL Final    Globulin, Total 09/07/2023 2.6  1.9 - 3.7 g/dL (calc) Final    Albumin/Globulin Ratio 09/07/2023 1.3  1.0 - 2.5 (calc) Final    Total Bilirubin 09/07/2023 0.3  0.2 - 1.2 mg/dL Final    Alkaline Phosphatase 09/07/2023 92  37 - 153 U/L Final    AST 09/07/2023 14  10 - 35 U/L Final    ALT 09/07/2023 14  6 - 29 U/L Final   Office Visit on 08/28/2023   Component Date Value Ref Range Status    SARS Coronavirus 2 Antigen 08/28/2023 Positive (A)  Negative Final     Acceptable 08/28/2023 Yes   Final       Past Medical History:   Diagnosis Date    Breast cancer, stage 1, estrogen receptor negative, left (2011) 2/18/2020    Depression     Diabetes mellitus, type 2     GERD (gastroesophageal reflux disease)     HER2-positive carcinoma of left breast 2/18/2020    Hx of breast cancer     Hx of breast cancer - Her2Nu+/ER+ - 2011 12/3/2019    Insomnia     Lymphedema     Neuropathy of both feet     S/P lumpectomy, left breast 2/18/2020     Social History      Socioeconomic History    Marital status:    Tobacco Use    Smoking status: Former     Current packs/day: 0.00     Average packs/day: 0.5 packs/day for 25.0 years (12.5 ttl pk-yrs)     Types: Cigarettes     Start date: 1985     Quit date: 2010     Years since quittin.0    Smokeless tobacco: Never   Substance and Sexual Activity    Alcohol use: Not Currently     Alcohol/week: 2.0 standard drinks of alcohol     Types: 2 Glasses of wine per week    Drug use: Never    Sexual activity: Yes     Partners: Male     Birth control/protection: Other-see comments, None     Comment: Hysterectomy     Social Determinants of Health     Financial Resource Strain: Low Risk  (2023)    Overall Financial Resource Strain (CARDIA)     Difficulty of Paying Living Expenses: Not hard at all   Food Insecurity: No Food Insecurity (2023)    Hunger Vital Sign     Worried About Running Out of Food in the Last Year: Never true     Ran Out of Food in the Last Year: Never true   Transportation Needs: No Transportation Needs (2023)    PRAPARE - Transportation     Lack of Transportation (Medical): No     Lack of Transportation (Non-Medical): No   Physical Activity: Inactive (2023)    Exercise Vital Sign     Days of Exercise per Week: 0 days     Minutes of Exercise per Session: 10 min   Stress: Stress Concern Present (2023)    [employer] [place] [employer] - Occupational Stress Questionnaire     Feeling of Stress : To some extent   Social Connections: Unknown (2023)    Social Connection and Isolation Panel [NHANES]     Frequency of Communication with Friends and Family: Three times a week     Frequency of Social Gatherings with Friends and Family: Once a week     Active Member of Clubs or Organizations: No     Attends Club or Organization Meetings: Never     Marital Status:    Housing Stability: Low Risk  (2023)    Housing Stability Vital Sign     Unable to Pay for Housing  in the Last Year: No     Number of Places Lived in the Last Year: 1     Unstable Housing in the Last Year: No     Past Surgical History:   Procedure Laterality Date    ARTHROSCOPY OF SHOULDER WITH DECOMPRESSION OF SUBACROMIAL SPACE Right 2023    Procedure: ARTHROSCOPY, SHOULDER, WITH SUBACROMIAL SPACE DECOMPRESSION;  Surgeon: Curtis Ricardo MD;  [facility];  Service: Orthopedics;  Laterality: Right;    BICEPS TENDON REPAIR Left 2005    BREAST BIOPSY Left     BREAST LUMPECTOMY Left 2011     SECTION      1982, ,     CHOLECYSTECTOMY  2000    Elbow fx Left 2015    FRACTURE SURGERY Left 2016    elbow    HYSTERECTOMY  2013    KNEE ARTHROSCOPY W/ MENISCAL REPAIR Left 2014    Lower back ruptured disc  2010    LYMPH NODE DISSECTION  2011    ROBOTIC ARTHROPLASTY, KNEE Left 2023    Procedure: ROBOTIC ARTHROPLASTY, KNEE, TOTAL, LEFT;  Surgeon: Curtis Ricardo MD;  Location: [facility] OR;  Service: Orthopedics;  Laterality: Left;  Jaime-Edi    SPINE SURGERY  2009    ruptured disc     Family History   Problem Relation Age of Onset    Cancer Mother     Breast cancer Mother     Parkinsonism Father     Diabetes Father     Breast cancer Maternal Aunt     Breast cancer Paternal Aunt     Cancer Maternal Aunt     Cancer Paternal Aunt     Cancer Daughter     Heart disease Maternal Grandmother     Heart disease Paternal Grandmother        All of your core healthy metrics are met.      Review of patient's allergies indicates:   Allergen Reactions    Banana Hives    Codeine Nausea And Vomiting    Adhesive Rash    Dilaudid  [hydromorphone (bulk)] Rash    Hydromorphone hcl Rash       Current Outpatient Medications:     aspirin (ECOTRIN) 81 MG EC tablet, Take 1 tablet (81 mg total) by mouth every 12 (twelve) hours., Disp: 60 tablet, Rfl: 0    furosemide (LASIX) 20 MG tablet, Take 1 tablet (20 mg total) by mouth daily as needed., Disp: 90 tablet, Rfl: 3    glucosamine-chondroitin 250-200 mg Tab, Take 2  tablets by mouth once daily., Disp: , Rfl:     oxyCODONE-acetaminophen (PERCOCET) 7.5-325 mg per tablet, Take 1 tablet by mouth every 6 (six) hours as needed for Pain., Disp: 28 tablet, Rfl: 0    pantoprazole (PROTONIX) 40 MG tablet, Take 1 tablet (40 mg total) by mouth once daily., Disp: 90 tablet, Rfl: 3    pregabalin (LYRICA) 200 MG Cap, Take 200 mg by mouth 3 (three) times daily., Disp: , Rfl:     traZODone (DESYREL) 100 MG tablet, Take 2 tablets (200 mg total) by mouth every evening., Disp: 60 tablet, Rfl: 5    venlafaxine (EFFEXOR-XR) 75 MG 24 hr capsule, Take 3 capsules (225 mg total) by mouth every evening., Disp: 270 capsule, Rfl: 3    ascorbic acid, vitamin C, (VITAMIN C) 500 MG tablet, Take 500 mg by mouth once daily., Disp: , Rfl:     atomoxetine (STRATTERA) 60 MG capsule, Take 1 capsule (60 mg total) by mouth once daily., Disp: 90 capsule, Rfl: 1    b complex vitamins capsule, Take 1 capsule by mouth once daily., Disp: , Rfl:     calcium carbonate (OS-JULIANNE) 600 mg calcium (1,500 mg) Tab, Take 600 mg by mouth once., Disp: , Rfl:     magnesium 250 mg Tab, Take 250 mg by mouth once daily., Disp: , Rfl:     multivitamin capsule, Take 1 capsule by mouth once daily., Disp: , Rfl:     mupirocin (BACTROBAN) 2 % ointment, , Disp: , Rfl:     Review of Systems   Constitutional:  Positive for activity change.   HENT:  Positive for trouble swallowing. Negative for hearing loss.    Eyes:  Negative for discharge and visual disturbance.   Respiratory:  Negative for chest tightness and wheezing.    Cardiovascular:  Negative for palpitations.   Gastrointestinal:  Negative for blood in stool, constipation and diarrhea.   Endocrine: Negative for polydipsia and polyuria.   Genitourinary:  Negative for difficulty urinating, dysuria, hematuria and menstrual problem.   Psychiatric/Behavioral:  Positive for dysphoric mood. Negative for confusion.           Objective:      Vitals:    12/20/23 0816   BP: 136/74   Pulse: 82   SpO2:  "98%   Weight: 116.6 kg (257 lb)   Height: 5' 7" (1.702 m)     Physical Exam  Vitals and nursing note reviewed.   Constitutional:       General: She is not in acute distress.     Appearance: Normal appearance. She is well-developed. She is obese.   HENT:      Head: Normocephalic.      Right Ear: External ear normal.      Left Ear: External ear normal.   Eyes:      Conjunctiva/sclera: Conjunctivae normal.      Pupils: Pupils are equal, round, and reactive to light.   Neck:      Vascular: No JVD.   Cardiovascular:      Rate and Rhythm: Normal rate and regular rhythm.      Heart sounds: No murmur heard.  Pulmonary:      Effort: Pulmonary effort is normal.      Breath sounds: Normal breath sounds.   Abdominal:      General: Bowel sounds are normal.      Palpations: Abdomen is soft.   Musculoskeletal:         General: No deformity. Normal range of motion.      Cervical back: Normal range of motion and neck supple.   Lymphadenopathy:      Cervical: No cervical adenopathy.   Skin:     General: Skin is warm and dry.      Findings: Rash present.      Comments: Psoriasis to lower extrem   Neurological:      Mental Status: She is alert and oriented to person, place, and time.      Gait: Gait normal.   Psychiatric:         Speech: Speech normal.         Behavior: Behavior normal.           Assessment:       1. Type 2 diabetes mellitus without complication, without long-term current use of insulin    2. Attention deficit disorder, unspecified hyperactivity presence    3. Type 2 diabetes mellitus with other specified complication, without long-term current use of insulin    4. Hx of breast cancer - Her2Nu+/ER+ - 2011    5. Primary osteoarthritis of left shoulder    6. Insomnia, unspecified type    7. Primary osteoarthritis of left knee    8. History of total knee replacement, left         Plan:       Type 2 diabetes mellitus without complication, without long-term current use of insulin  Comments:  hga1c is well " controlled  Orders:  -     Hemoglobin A1C, POCT    Attention deficit disorder, unspecified hyperactivity presence  Comments:  increase strattera to 60  Orders:  -     atomoxetine (STRATTERA) 60 MG capsule; Take 1 capsule (60 mg total) by mouth once daily.  Dispense: 90 capsule; Refill: 1    Type 2 diabetes mellitus with other specified complication, without long-term current use of insulin    Hx of breast cancer - Her2Nu+/ER+ - 2011  Comments:  follow up with dr. sanders    Primary osteoarthritis of left shoulder    Insomnia, unspecified type  Comments:  trazodone    Primary osteoarthritis of left knee    History of total knee replacement, left  Comments:  continue pt      Follow up in about 6 months (around 6/20/2024), or if symptoms worsen or fail to improve, for medication management.        12/29/2023 Shaunna Gordon

## 2023-12-21 ENCOUNTER — CLINICAL SUPPORT (OUTPATIENT)
Dept: REHABILITATION | Facility: HOSPITAL | Age: 63
End: 2023-12-21
Payer: MEDICARE

## 2023-12-21 DIAGNOSIS — M25.662 DECREASED RANGE OF MOTION (ROM) OF LEFT KNEE: ICD-10-CM

## 2023-12-21 DIAGNOSIS — Z74.09 IMPAIRED FUNCTIONAL MOBILITY, BALANCE, GAIT, AND ENDURANCE: Primary | ICD-10-CM

## 2023-12-21 PROCEDURE — 97110 THERAPEUTIC EXERCISES: CPT | Mod: PN

## 2023-12-21 NOTE — PROGRESS NOTES
"[facility]   Physical Therapy Treatment Note     Name: Lolly Lozanoncer  Clinic Number: [id number]    Therapy Diagnosis:   Encounter Diagnoses   Name Primary?    Impaired functional mobility, balance, gait, and endurance Yes    Decreased range of motion (ROM) of left knee      Physician: Arben Coombs, *    Visit Date: 12/21/2023    Physician Orders: PT Eval and Treat Knees  Medical Diagnosis from Referral: Status post total knee replacement, left  Evaluation Date: 12/12/2023  Authorization Period Expiration: 12/03/2024  Plan of Care Expiration: 1/25/2024  Progress Note Due: 1/12/2024  Date of Surgery: 11/20/2023  Visit # / Visits authorized: 2/ 20 (3 total)   FOTO: 1/ 3              Next survey due week of 12/25/2023     Precautions: Diabetes and Fall Patient has significant sensitivity to adhesive and does not tolerate kinesiology taping.     PTA Visit #: 0/5     Time In: 1104  Time Out: 1150  Total Billable Time: 42 minutes    SUBJECTIVE     Pt reports: "I use my rollator at home because I get wobbly when I get tired."  She was compliant with home exercise program provided by home health.  Response to previous treatment: "I hurt, sore."  Functional change: Walking without significant antalgic gait over level surfaces.      Pain: 0/10 "Mostly muscle soreness  Location: left knee      OBJECTIVE     Objective Measures updated at progress report unless specified.     Treatment     Lolly received the treatments listed below:      therapeutic exercises to develop strength, endurance, ROM, and flexibility for 38 minutes including:  Recumbent bike w/seat at 5 x6'  Standing LE AROM x20 ea  March  Abduction  Extension  Hamstring curl  Hip flexion SLR  Mini squat x20  L single shuttle leg press using 25# x20  Sitting Piriformis stretch on stool 3x30s   Hamstring stretch with toes up (to include gastroc) 3x30s L  LAQ using 1# x20 (-2* extension)  A/AAROM heel slides 15x3s (124* " flexion)  Supine/Hooklying L quad stretch 3x30s  SAQ using 1# x20  Heel slides using 1# x15 & x15 AAROM using strap (115* flexion but to 120* with PT overpressure)  L hip flexion using 1# wt SLR x20  Hip adduction isometric x20  Hip abduction using green theraband x20  Bridging x10  Side lying hip abduction x 10   Hip adduction x 10    To be added:   Prone hip extension, hamstring curl, TKE over half bolster    manual therapy techniques: Joint mobilizations and Friction Massage were applied to the: Left knee for 4 minutes, including:  Patellar mobilizations  Passive flexion/extension    Patient Education and Home Exercises     Home Exercises Provided and Patient Education Provided     Education provided:   - The patient was instructed in increased exercise complexity as stated above in bold print.    Written Home Exercises Provided:  The patient is to be provided formal written home exercise program in subsequent session(s) . Exercises were reviewed and Lolly was able to demonstrate them prior to the end of the session. Lolly demonstrated good  understanding of the education provided. See EMR under Patient Instructions for exercises provided during therapy sessions    ASSESSMENT     The patient presents to PT with complaints of soreness over L lateral suprapatellar region. She is progressing well with her ROM and strength.  Gait is improving but patient is fearful of falling thus she continues to utilize rollator at home.    Lolly Is progressing well towards her goals.   Pt prognosis is Good.     Pt will continue to benefit from skilled outpatient physical therapy to address the deficits listed in the problem list box on initial evaluation, provide pt/family education and to maximize pt's level of independence in the home and community environment.     Pt's spiritual, cultural and educational needs considered and pt agreeable to plan of care and goals.     Anticipated barriers to physical therapy: Patient has severe  "arthritis of R knee which could interfere with therapy process.       Goals:  Short Term Goals: 3 weeks   1) Facilitate normalized scar mobility Progressing (not addressed this date)  2) Facilitate resolution of tenderness around scar Progressing (not addressed this date)  3) Improve AROM L knee to 0*-115* to assist with sit <> stand transfers Met 12/21/2023  4) Patient will safely ambulate over level surfaces with no more than straight cane Slow progress (using rollator at home)     Long Term Goals: 6 weeks   1) Resolve gait deviations Progressing  2) Patient will safely navigate at least 6" steps utilizing L LE as power leg without increased L knee discomfort Ongoing  3) Patient will consistently perform sit <> stand transfers demonstrating equal weight bearing and with no more than minimal UE support Slowly progressing  4) Increase walking tolerance to > 30 min to allow for completion of grocery shopping Ongoing  5) Improve functional score to > 65% as indicated by FOTO knee survey Ongoing  6) Patient will be independent in HEP. Partially initiated    PLAN   POC: 2 times weekly for 6 weeks to include the following interventions: Electrical Stimulation NMES, Gait Training, Manual Therapy, Moist Heat/ Ice, Patient Education, Therapeutic Activities, and Therapeutic Exercise. Lolly may be treated by PTA as part of their rehab team.     The patient is to be progressed within the established plan of care as tolerated in order to accomplish goals as stated above. Will add closed chain TKE using step to initiate step negotiation.  Will incorporate prone hip extension, hamstring curl, and terminal knee extension over half roll if patient able to lay prone. Provide formal written home exercise in subsequent session.    Lolly Villafana, PT    "

## 2023-12-27 ENCOUNTER — CLINICAL SUPPORT (OUTPATIENT)
Dept: REHABILITATION | Facility: HOSPITAL | Age: 63
End: 2023-12-27
Payer: MEDICARE

## 2023-12-27 DIAGNOSIS — Z96.652 STATUS POST TOTAL KNEE REPLACEMENT, LEFT: ICD-10-CM

## 2023-12-27 DIAGNOSIS — M25.662 DECREASED RANGE OF MOTION (ROM) OF LEFT KNEE: ICD-10-CM

## 2023-12-27 DIAGNOSIS — Z74.09 IMPAIRED FUNCTIONAL MOBILITY, BALANCE, GAIT, AND ENDURANCE: Primary | ICD-10-CM

## 2023-12-27 PROCEDURE — 97110 THERAPEUTIC EXERCISES: CPT | Mod: KX,PN,CQ

## 2023-12-27 NOTE — PROGRESS NOTES
"[facility]   Physical Therapy Treatment Note     Name: Lolly Lozanoncer  Clinic Number: [id number]    Therapy Diagnosis:   Encounter Diagnoses   Name Primary?    Impaired functional mobility, balance, gait, and endurance Yes    Decreased range of motion (ROM) of left knee     Status post total knee replacement, left      Physician: Arben Coombs, *    Visit Date: 12/27/2023    Physician Orders: PT Eval and Treat Knees  Medical Diagnosis from Referral: Status post total knee replacement, left  Evaluation Date: 12/12/2023  Authorization Period Expiration: 12/03/2024  Plan of Care Expiration: 1/25/2024  Progress Note Due: 1/12/2024  Date of Surgery: 11/20/2023  Visit # / Visits authorized: 4/ 20 (5 total)   FOTO: 2/ 3 Completed 12/27/2023 64% Functional Score  KOOS, JR. 70.7/100 Lower Score = Greater Disability              Next survey due week of 1/12/2024     Precautions: Diabetes and Fall Patient has significant sensitivity to adhesive and does not tolerate kinesiology taping.     PTA Visit #: 1/5     Time In: 1231  Time Out: 1318  Total Billable Time: 47 minutes    SUBJECTIVE     Pt reports: "This weather has been bothering me. My right knee is killing me, so that is why I am using the Rollator today." "It is tender right in the middle of the scar; I have some bruising there and right above it. I don't know what I did."  She was compliant with home exercise program provided by home health.  Response to previous treatment: "I was sore for a couple of days."  Functional change: Walking without significant antalgic gait over level surfaces.      Pain: 0/10   Location: left knee, but 10/10 on right knee    OBJECTIVE     Objective Measures updated at progress report unless specified.     Treatment     Lolly received the treatments listed below:      therapeutic exercises to develop strength, endurance, ROM, and flexibility for 47 minutes including:  Recumbent bike w/seat at 4 " "x6'  Standing LE AROM using 1# x20 ea (increase #)  March  Abduction  Extension  Hamstring curl  Hip flexion SLR  Mini squat x20  L Closed chain TKE on 4" step x10  L step up & down 4" step x10  L single shuttle leg press using 25# x20 (attempt increase # next)  Sitting Piriformis stretch on stool 3x30s   Hamstring stretch with toes up (to include gastroc) 3x30s  LAQ using 1# x20 (0* extension) (increase #)  A/AAROM heel slides 20x3s (116* flexion)  Supine/Hooklying L quad stretch 3x30s  SAQ using 1# x20 (increase #)  Heel slides using 1# x20 & x15 AAROM using strap (123* flexion)  L hip flexion using 1# wt SLR x20  Bridging x20  Side lying hip abduction using 1# x10   Hip adduction using 1# x20    To be added:   (if able) Prone hip extension, hamstring curl, TKE over half bolster    manual therapy techniques: Joint mobilizations and Friction Massage were applied to the: Left knee for 0 minutes, including:  Patellar mobilizations  Passive flexion/extension    Patient Education and Home Exercises     Home Exercises Provided and Patient Education Provided     Education provided:   - The patient was instructed in increased exercise complexity as stated above in bold print.    Written Home Exercises Provided:  The patient is to be provided formal written home exercise program in subsequent session(s) . Exercises were reviewed and Lolly was able to demonstrate them prior to the end of the session. Lolly demonstrated good  understanding of the education provided. See EMR under Patient Instructions for exercises provided during therapy sessions    ASSESSMENT     The patient reported to physical therapy denying present pain in post-operative lower extremity; however stating increased pain in contralateral knee. Lolly Ramírez was progressed with increased exercise complexity targeting lower extremity strength, stability, control, endurance, and knee range of motion, as well as incorporation of additional stability and " "functional strengthening. Patient's incision does not demonstrate any puckering on this date, however mobility was not addressed on this date, especially with slight present bruising.    Lolly Is progressing well towards her goals.   Pt prognosis is Good.     Pt will continue to benefit from skilled outpatient physical therapy to address the deficits listed in the problem list box on initial evaluation, provide pt/family education and to maximize pt's level of independence in the home and community environment.     Pt's spiritual, cultural and educational needs considered and pt agreeable to plan of care and goals.     Anticipated barriers to physical therapy: Patient has severe arthritis of R knee which could interfere with therapy process.       Goals:  Short Term Goals: 3 weeks   1) Facilitate normalized scar mobility Progressing  2) Facilitate resolution of tenderness around scar Progressing  3) Improve AROM L knee to 0*-115* to assist with sit <> stand transfers Met 12/21/2023  4) Patient will safely ambulate over level surfaces with no more than straight cane Slow progress (using rollator at home)     Long Term Goals: 6 weeks   1) Resolve gait deviations Progressing  2) Patient will safely navigate at least 6" steps utilizing L LE as power leg without increased L knee discomfort Partially initiated/Ongoing (limited by contralateral LE)  3) Patient will consistently perform sit <> stand transfers demonstrating equal weight bearing and with no more than minimal UE support Slowly progressing  4) Increase walking tolerance to > 30 min to allow for completion of grocery shopping Ongoing  5) Improve functional score to > 65% as indicated by FOTO knee survey Progressing/Nearly met  6) Patient will be independent in HEP. Partially initiated (utilizing prior from home health PT)    PLAN   POC: 2 times weekly for 6 weeks to include the following interventions: Electrical Stimulation NMES, Gait Training, Manual Therapy, " Moist Heat/ Ice, Patient Education, Therapeutic Activities, and Therapeutic Exercise. Lolly may be treated by PTA as part of their rehab team.     The patient is to be progressed within the established plan of care as tolerated in order to accomplish goals as stated above. Will incorporate prone hip extension, hamstring curl, and terminal knee extension over half roll if patient able to lay prone. Provide updated formal written home exercise in subsequent session.    Eda Marrufo, PTA

## 2023-12-29 ENCOUNTER — CLINICAL SUPPORT (OUTPATIENT)
Dept: REHABILITATION | Facility: HOSPITAL | Age: 63
End: 2023-12-29
Payer: MEDICARE

## 2023-12-29 DIAGNOSIS — Z96.652 STATUS POST TOTAL KNEE REPLACEMENT, LEFT: ICD-10-CM

## 2023-12-29 DIAGNOSIS — M25.662 DECREASED RANGE OF MOTION (ROM) OF LEFT KNEE: ICD-10-CM

## 2023-12-29 DIAGNOSIS — Z74.09 IMPAIRED FUNCTIONAL MOBILITY, BALANCE, GAIT, AND ENDURANCE: Primary | ICD-10-CM

## 2023-12-29 PROCEDURE — 97110 THERAPEUTIC EXERCISES: CPT | Mod: KX,PN,CQ

## 2023-12-29 NOTE — PROGRESS NOTES
"[facility]   Physical Therapy Treatment Note     Name: Lolly Ramírez  Clinic Number: [id number]    Therapy Diagnosis:   Encounter Diagnoses   Name Primary?    Impaired functional mobility, balance, gait, and endurance Yes    Decreased range of motion (ROM) of left knee     Status post total knee replacement, left      Physician: Arben Coombs, *    Visit Date: 12/29/2023    Physician Orders: PT Eval and Treat Knees  Medical Diagnosis from Referral: Status post total knee replacement, left  Evaluation Date: 12/12/2023  Authorization Period Expiration: 12/03/2024  Plan of Care Expiration: 1/25/2024  Progress Note Due: 1/12/2024  Date of Surgery: 11/20/2023  Visit # / Visits authorized: 5/ 20 (6 total)   FOTO: 2/ 3 Completed 12/27/2023 64% Functional Score  KOOS, JR. 70.7/100 Lower Score = Greater Disability              Next survey due week of 1/12/2024     Precautions: Diabetes and Fall Patient has significant sensitivity to adhesive and does not tolerate kinesiology taping.     PTA Visit #: 2/5     Time In: 0945 (overlapping w/another pt)  Time Out: 1035  Total Billable Time: 48 minutes    SUBJECTIVE     Pt reports: "I am sore in my hamstrings because I had a little spill later Wednesday. It wasn't a fall. I went to turn, and the other knee gave out. I leaned back against the wall and slid down onto my butt. I had a printer in my hand, and I dumped it upside down. That gave me a little bit of bruising, but I am on aspirin, so. It didn't hurt the knee, but my hamstrings are sore from how I sat down."  She was compliant with home exercise program provided by home health.  Response to previous treatment: "I was tired."  Functional change: Walking without significant antalgic gait over level surfaces.      Pain: 0/10   Location: left knee, but 10/10 on right knee    OBJECTIVE     Objective Measures updated at progress report unless specified.     Treatment     Lolly received the " "treatments listed below:      therapeutic exercises to develop strength, endurance, ROM, and flexibility for 48 minutes including:  Recumbent bike w/seat at 5 making full revolutions x6'  Standing LE AROM using 3# x20 ea  March  Abduction  Extension  Hamstring curl  Hip flexion SLR  Mini squat x20  L Closed chain TKE on 4" step x10 (continue & progress as able)  L step up & down 4" step x10 (continue & progress as able)  L single shuttle leg press using 25# x20 (attempt increase # next)  Sitting Piriformis stretch on stool 3x30s   Hamstring stretch with toes up (to include gastroc) 3x30s  LAQ using 3# x20 (0* extension)  A/AAROM heel slides using 3# 20x3s (120* flexion)  Supine/Hooklying L quad stretch 3x30s  SAQ using 3# x20  Heel slides using 3# x20 & x15 AAROM using strap (120* flexion)  L hip flexion using 3# wt SLR 2x10  Bridging x20  Side lying hip abduction using 1# x10 (continue & progress as able)   Hip adduction using 1# x20 (continue & progress as able)    To be added:   (if able) Prone hip extension, hamstring curl, TKE over half bolster    manual therapy techniques: Joint mobilizations and Friction Massage were applied to the: Left knee for 0 minutes, including:  Patellar mobilizations  Passive flexion/extension    Patient Education and Home Exercises     Home Exercises Provided and Patient Education Provided     Education provided:   - The patient was instructed in increased exercise complexity as stated above in bold print. Home exercise program was reviewed with the patient, including positional safety precautions due to co-morbidities.    Written Home Exercises Provided: yes. Exercises were reviewed and Lolly was able to demonstrate them prior to the end of the session. Lolly demonstrated good  understanding of the education provided. See EMR under Patient Instructions for exercises provided during therapy sessions    ASSESSMENT     Patient ambulated into the clinic utilizing Rollator, and the " "patient did not utilize upper extremity assist while descending to chair in waiting area. The patient reported to physical therapy stating hamstring soreness post recent fall, along with contralateral knee pain. Lolly Ramírez was slightly progressed with increased exercise complexity targeting increased lower extremity strength, control, and stability. Patient is maintaining full knee range of motion at this time. Quad strength is improving as evident by progressions. Patient presents as a fall risk due to multiple co-morbidities, and patient is not consistent with utilizing assistive device.    Lolly Is progressing well towards her goals.   Pt prognosis is Good.     Pt will continue to benefit from skilled outpatient physical therapy to address the deficits listed in the problem list box on initial evaluation, provide pt/family education and to maximize pt's level of independence in the home and community environment.     Pt's spiritual, cultural and educational needs considered and pt agreeable to plan of care and goals.     Anticipated barriers to physical therapy: Patient has severe arthritis of R knee which could interfere with therapy process.       Goals:  Short Term Goals: 3 weeks   1) Facilitate normalized scar mobility Progressing  2) Facilitate resolution of tenderness around scar Progressing  3) Improve AROM L knee to 0*-115* to assist with sit <> stand transfers Met 12/21/2023  4) Patient will safely ambulate over level surfaces with no more than straight cane Slow progress (using rollator due to contralateral knee)     Long Term Goals: 6 weeks   1) Resolve gait deviations Progressing  2) Patient will safely navigate at least 6" steps utilizing L LE as power leg without increased L knee discomfort Partially initiated/Ongoing (limited by contralateral LE)  3) Patient will consistently perform sit <> stand transfers demonstrating equal weight bearing and with no more than minimal UE support Slowly " progressing  4) Increase walking tolerance to > 30 min to allow for completion of grocery shopping Ongoing  5) Improve functional score to > 65% as indicated by FOTO knee survey Progressing/Nearly met  6) Patient will be independent in HEP. Initiated    PLAN   POC: 2 times weekly for 6 weeks to include the following interventions: Electrical Stimulation NMES, Gait Training, Manual Therapy, Moist Heat/ Ice, Patient Education, Therapeutic Activities, and Therapeutic Exercise. Lolly may be treated by PTA as part of their rehab team.     The patient is to be progressed within the established plan of care as tolerated in order to accomplish goals as stated above. Continue standing stair navigation exercises and side-lying straight leg raises, as well as incorporating prone hip extension, hamstring curl, and terminal knee extension over half roll as able.    Eda Marrufo, PTA

## 2024-01-02 RX ORDER — HYDROCODONE BITARTRATE AND ACETAMINOPHEN 7.5; 325 MG/1; MG/1
1 TABLET ORAL EVERY 8 HOURS PRN
Qty: 21 TABLET | Refills: 0 | Status: SHIPPED | OUTPATIENT
Start: 2024-01-02 | End: 2024-01-09

## 2024-01-02 RX ORDER — CELECOXIB 200 MG/1
200 CAPSULE ORAL DAILY
Qty: 30 CAPSULE | Refills: 0 | Status: SHIPPED | OUTPATIENT
Start: 2024-01-02 | End: 2024-02-01

## 2024-01-02 RX ORDER — OXYCODONE AND ACETAMINOPHEN 7.5; 325 MG/1; MG/1
1 TABLET ORAL EVERY 6 HOURS PRN
Qty: 28 TABLET | Refills: 0 | Status: CANCELLED | OUTPATIENT
Start: 2024-01-02

## 2024-01-03 ENCOUNTER — CLINICAL SUPPORT (OUTPATIENT)
Dept: REHABILITATION | Facility: HOSPITAL | Age: 64
End: 2024-01-03
Payer: MEDICARE

## 2024-01-03 DIAGNOSIS — Z74.09 IMPAIRED FUNCTIONAL MOBILITY, BALANCE, GAIT, AND ENDURANCE: Primary | ICD-10-CM

## 2024-01-03 DIAGNOSIS — M25.662 DECREASED RANGE OF MOTION (ROM) OF LEFT KNEE: ICD-10-CM

## 2024-01-03 PROCEDURE — 97110 THERAPEUTIC EXERCISES: CPT | Mod: PN

## 2024-01-03 PROCEDURE — 97140 MANUAL THERAPY 1/> REGIONS: CPT | Mod: PN

## 2024-01-03 NOTE — PROGRESS NOTES
"[facility]   Physical Therapy Treatment Note     Name: Lolly Lozanoncer  Clinic Number: [id number]    Therapy Diagnosis:   Encounter Diagnoses   Name Primary?    Impaired functional mobility, balance, gait, and endurance Yes    Decreased range of motion (ROM) of left knee      Physician: Arben Coombs, *    Visit Date: 1/3/2024    Physician Orders: PT Eval and Treat Knees  Medical Diagnosis from Referral: Status post total knee replacement, left  Evaluation Date: 12/12/2023  Authorization Period Expiration: 12/31/2024  Plan of Care Expiration: 1/25/2024  Progress Note Due: 1/12/2024  Date of Surgery: 11/20/2023  Visit # / Visits authorized: 1/ 20 (7 total)   FOTO: 2/ 3 Completed 12/27/2023 64% Functional Score  KOJR. JOLLY 70.7/100 Lower Score = Greater Disability              Next survey due week of 1/12/2024     Precautions: Diabetes and Fall Patient has significant sensitivity to adhesive and does not tolerate kinesiology taping.     PTA Visit #: 0/5     Time In: 0803  Time Out: 0900  Total Billable Time: 52 minutes    SUBJECTIVE     Pt reports: "I don't remember doing anything but it's been hurting when I'm walking and when I turn over in bed. "  She was somewhat compliant with home exercise program.  Response to previous treatment: "I was tired."  Functional change: Resumed utilizing rollator for walking due to knee pain.      Pain: 0/10 when not bending knee; 10/10 with certain movements  Location: left knee, but 10/10 on right knee    OBJECTIVE     Patient presents today with complaints of increased lateral L knee pain. Palpation reveals significant tenderness over L lateral joint line/lateral collateral ligament.  She demonstrates full extension and 110* flexion actively, 120* with passive overpressure. Varus testing indicated laxity of lateral knee joint.      Treatment     Lolly received the treatments listed below:      therapeutic exercises to develop strength, endurance, " "ROM, and flexibility for 27 minutes including:  Recumbent bike w/seat at 5 making full revolutions x6'  Standing LE AROM using 3# x20 ea  March  Abduction  Extension  Hamstring curl  Hip flexion SLR  Mini squat x20  L Closed chain TKE on 4" step x10 (continue & progress as able)  L step up & down 4" step x10 (continue & progress as able)  L single shuttle leg press using 25# x20 (attempt increase # next)  Sitting Piriformis stretch on stool 3x30s   L Hamstring stretch with toes up (to include gastroc) 3x30s  LAQ using 3# x20 (0* extension)  A/AAROM heel slides using 3# 20x3s (110* flexion)  Supine/Hooklying L quad stretch 3x30s   Quad sets 20 x 3s  SAQ using 3# x20  Heel slides using 3# x20 & x15 AAROM using strap (120* flexion)  L hip flexion using 3# wt SLR 2x10  Bridging (with knees on bolster) x20  Side lying hip abduction using 1# x10 (continue & progress as able)   Hip adduction using 1# x20 (continue & progress as able)    To be added:   (if able) Prone hip extension, hamstring curl, TKE over half bolster    manual therapy techniques: Soft Tissue Mobilization and Kinesiology Taping were applied to the: Left knee for 10 minutes, including:  STM to lateral knee joint region  Kinesiology taping ( patient stating irritation usually starts the following day after application) using 3 I strips for mechanical correction to L lateral knee joint: with patient in supine position, knee flexed to ~ 80* identified point of pain (over lateral collateral ligament) applied 1st strip by breaking paper at mid point, applying 80% tension to middle of tape and laying tape centered over point of pain with superior tail in line with femur terminating with no tension and inferior tail extending posterior to inferior and slightly inferior terminating near tibial crest with no tension.  2nd I strip applied with 80% tension over center of tape, centered over most painful point with inferior tail extending along fibula terminating " below knee with no tension and superior tail extending posterior to anterior and slightly superiorly terminating with no tension at lateral border of ITB. 3rd strip applied with anchor between the inferior tails of other 2 strips with no tension. Applied inferior to superior with 75% tension, turning at knee to continue proximally along femur, terminating with no tension near mid point of ITB.  Rubbed tape to activate adhesive.      supervised modalities after being cleared for contradictions: IFC Electrical Stimulation:  Lolly received IFC Electrical Stimulation for pain control applied to the L knee. Pt received stimulation at 100 % scan at a frequency of 4000 Hz (beat  Hz) for 10 minutes. Lolly tolderated treatment well without any adverse effects.      cold pack for 10 minutes to L knee.     Patient Education and Home Exercises     Home Exercises Provided and Patient Education Provided     Education provided:   - The patient was instructed to remove tape immediately if skin irritation begins.  Also instructed to utilize ice intermittently throughout the day.  She is to follow up with MD tomorrow.     Written Home Exercises Provided: Instructed patient to continue prior HEP as tolerated, pending MD appointment. Exercises were reviewed and Lolly was able to demonstrate them prior to the end of the session. Lolly demonstrated good  understanding of the education provided. See EMR under Patient Instructions for exercises provided during therapy sessions    ASSESSMENT     Patient presents to PT today with reports of intermittent increased L lateral knee pain.  While ROM is not diminished by the pain, she demonstrates lateral knee hypermobility, increased tenderness at lateral knee, and decreased activity tolerance.  She tolerated IFC with CP well but was still experiencing lateral knee joint pain upon exiting clinic.  She is to see MD tomorrow.  She may benefit from use of kinesiology taping to provide support  "to lateral knee joint pending follow up with MD.  She has been instructed to utilize ice intermittently throughout the day today to assist with symptom management.  She is to remove tape immediately if skin irritation begins.      Lolly Is progressing well towards her goals.   Pt prognosis is Good.     Pt will continue to benefit from skilled outpatient physical therapy to address the deficits listed in the problem list box on initial evaluation, provide pt/family education and to maximize pt's level of independence in the home and community environment.     Pt's spiritual, cultural and educational needs considered and pt agreeable to plan of care and goals.     Anticipated barriers to physical therapy: Patient has severe arthritis of R knee which could interfere with therapy process.       Goals:  Short Term Goals: 3 weeks   1) Facilitate normalized scar mobility Progressing  2) Facilitate resolution of tenderness around scar Progressing  3) Improve AROM L knee to 0*-115* to assist with sit <> stand transfers Met 12/21/2023  4) Patient will safely ambulate over level surfaces with no more than straight cane Regressed due to increased L lateral knee pain (using rollator due to contralateral knee)     Long Term Goals: 6 weeks   1) Resolve gait deviations Regressed 1/3/2024  2) Patient will safely navigate at least 6" steps utilizing L LE as power leg without increased L knee discomfort Ongoing (Deferred this date)  3) Patient will consistently perform sit <> stand transfers demonstrating equal weight bearing and with no more than minimal UE support Slowly progressing  4) Increase walking tolerance to > 30 min to allow for completion of grocery shopping Ongoing  5) Improve functional score to > 65% as indicated by FOTO knee survey Progressing/Nearly met  6) Patient will be independent in HEP. Slowly progressing    PLAN   POC: 2 times weekly for 6 weeks to include the following interventions: Electrical Stimulation " NMES, Gait Training, Manual Therapy, Moist Heat/ Ice, Patient Education, Therapeutic Activities, and Therapeutic Exercise. Lolly may be treated by PTA as part of their rehab team.     The patient is to be progressed within the established plan of care as tolerated in order to accomplish goals as stated above. Patient to see MD tomorrow.  PT requested updated recommendations due to increased lateral knee pain and lateral laxity.  Will resume standing activities as patient tolerates.      Lolly Villafana, PT

## 2024-01-04 ENCOUNTER — OFFICE VISIT (OUTPATIENT)
Dept: ORTHOPEDICS | Facility: CLINIC | Age: 64
End: 2024-01-04
Payer: MEDICARE

## 2024-01-04 ENCOUNTER — PATIENT MESSAGE (OUTPATIENT)
Dept: ORTHOPEDICS | Facility: CLINIC | Age: 64
End: 2024-01-04
Payer: MEDICARE

## 2024-01-04 VITALS — WEIGHT: 257 LBS | HEIGHT: 67 IN | BODY MASS INDEX: 40.34 KG/M2

## 2024-01-04 DIAGNOSIS — Z96.652 STATUS POST TOTAL KNEE REPLACEMENT, LEFT: Primary | ICD-10-CM

## 2024-01-04 DIAGNOSIS — M17.11 PRIMARY OSTEOARTHRITIS OF RIGHT KNEE: ICD-10-CM

## 2024-01-04 DIAGNOSIS — Z01.818 PRE-OP TESTING: Primary | ICD-10-CM

## 2024-01-04 PROCEDURE — 99024 POSTOP FOLLOW-UP VISIT: CPT | Mod: S$GLB,POP,, | Performed by: ORTHOPAEDIC SURGERY

## 2024-01-04 RX ORDER — CEFAZOLIN SODIUM 2 G/50ML
2 SOLUTION INTRAVENOUS
Status: CANCELLED | OUTPATIENT
Start: 2024-01-04

## 2024-01-04 RX ORDER — TRANEXAMIC ACID 10 MG/ML
1000 INJECTION, SOLUTION INTRAVENOUS
Status: CANCELLED | OUTPATIENT
Start: 2024-01-04

## 2024-01-04 NOTE — PROGRESS NOTES
[facility] POST-OP NOTE    Subjective:           Chief Complaint:   Chief Complaint   Patient presents with    Left Knee - Post-op Evaluation     PO Left TKA 23. States she is doing well       Past Medical History:   Diagnosis Date    Breast cancer, stage 1, estrogen receptor negative, left () 2020    Depression     Diabetes mellitus, type 2     GERD (gastroesophageal reflux disease)     HER2-positive carcinoma of left breast 2020    Hx of breast cancer     Hx of breast cancer - Her2Nu+/ER+ - 2011 12/3/2019    Insomnia     Lymphedema     Neuropathy of both feet     S/P lumpectomy, left breast 2020       Past Surgical History:   Procedure Laterality Date    ARTHROSCOPY OF SHOULDER WITH DECOMPRESSION OF SUBACROMIAL SPACE Right 2023    Procedure: ARTHROSCOPY, SHOULDER, WITH SUBACROMIAL SPACE DECOMPRESSION;  Surgeon: Curtis Ricardo MD;  Location: [facility] OR;  Service: Orthopedics;  Laterality: Right;    BICEPS TENDON REPAIR Left 2005    BREAST BIOPSY Left     BREAST LUMPECTOMY Left      SECTION      1982, 1985,     CHOLECYSTECTOMY  2000    Elbow fx Left 2015    FRACTURE SURGERY Left 2016    elbow    HYSTERECTOMY  2013    KNEE ARTHROSCOPY W/ MENISCAL REPAIR Left 2014    Lower back ruptured disc  2010    LYMPH NODE DISSECTION  2011    ROBOTIC ARTHROPLASTY, KNEE Left 2023    Procedure: ROBOTIC ARTHROPLASTY, KNEE, TOTAL, LEFT;  Surgeon: Curtis Ricardo MD;  Location: [facility] OR;  Service: Orthopedics;  Laterality: Left;  [place]    SPINE SURGERY  2009    ruptured disc       Current Outpatient Medications   Medication Sig    ascorbic acid, vitamin C, (VITAMIN C) 500 MG tablet Take 500 mg by mouth once daily.    atomoxetine (STRATTERA) 60 MG capsule Take 1 capsule (60 mg total) by mouth once daily.    b complex vitamins capsule Take 1 capsule by mouth once daily.    calcium carbonate (OS-JULIANNE) 600 mg calcium (1,500 mg) Tab Take 600 mg by mouth once.     celecoxib (CELEBREX) 200 MG capsule Take 1 capsule (200 mg total) by mouth once daily.    furosemide (LASIX) 20 MG tablet Take 1 tablet (20 mg total) by mouth daily as needed.    glucosamine-chondroitin 250-200 mg Tab Take 2 tablets by mouth once daily.    HYDROcodone-acetaminophen (NORCO) 7.5-325 mg per tablet Take 1 tablet by mouth every 8 (eight) hours as needed for Pain.    magnesium 250 mg Tab Take 250 mg by mouth once daily.    multivitamin capsule Take 1 capsule by mouth once daily.    mupirocin (BACTROBAN) 2 % ointment     oxyCODONE-acetaminophen (PERCOCET) 7.5-325 mg per tablet Take 1 tablet by mouth every 6 (six) hours as needed for Pain.    pantoprazole (PROTONIX) 40 MG tablet Take 1 tablet (40 mg total) by mouth once daily.    pregabalin (LYRICA) 200 MG Cap Take 200 mg by mouth 3 (three) times daily.    traZODone (DESYREL) 100 MG tablet Take 2 tablets (200 mg total) by mouth every evening.    venlafaxine (EFFEXOR-XR) 75 MG 24 hr capsule Take 3 capsules (225 mg total) by mouth every evening.    aspirin (ECOTRIN) 81 MG EC tablet Take 1 tablet (81 mg total) by mouth every 12 (twelve) hours.     No current facility-administered medications for this visit.       Review of patient's allergies indicates:   Allergen Reactions    Banana Hives    Codeine Nausea And Vomiting    Adhesive Rash    Dilaudid  [hydromorphone (bulk)] Rash    Hydromorphone hcl Rash       Family History   Problem Relation Age of Onset    Cancer Mother     Breast cancer Mother     Parkinsonism Father     Diabetes Father     Breast cancer Maternal Aunt     Breast cancer Paternal Aunt     Cancer Maternal Aunt     Cancer Paternal Aunt     Cancer Daughter     Heart disease Maternal Grandmother     Heart disease Paternal Grandmother        Social History     Socioeconomic History    Marital status:    Tobacco Use    Smoking status: Former     Current packs/day: 0.00     Average packs/day: 0.5 packs/day for 25.0 years (12.5 ttl pk-yrs)      Types: Cigarettes     Start date: 1985     Quit date: 2010     Years since quittin.1    Smokeless tobacco: Never   Substance and Sexual Activity    Alcohol use: Not Currently     Alcohol/week: 2.0 standard drinks of alcohol     Types: 2 Glasses of wine per week    Drug use: Never    Sexual activity: Yes     Partners: Male     Birth control/protection: Other-see comments, None     Comment: Hysterectomy     Social Determinants of Health     Financial Resource Strain: Low Risk  (2023)    Overall Financial Resource Strain (CARDIA)     Difficulty of Paying Living Expenses: Not hard at all   Food Insecurity: No Food Insecurity (2023)    Hunger Vital Sign     Worried About Running Out of Food in the Last Year: Never true     Ran Out of Food in the Last Year: Never true   Transportation Needs: No Transportation Needs (2023)    PRAPARE - Transportation     Lack of Transportation (Medical): No     Lack of Transportation (Non-Medical): No   Physical Activity: Inactive (2023)    Exercise Vital Sign     Days of Exercise per Week: 0 days     Minutes of Exercise per Session: 10 min   Stress: Stress Concern Present (2023)    [employer] - Occupational Stress Questionnaire     Feeling of Stress : To some extent   Social Connections: Unknown (2023)    Social Connection and Isolation Panel [NHANES]     Frequency of Communication with Friends and Family: Three times a week     Frequency of Social Gatherings with Friends and Family: Once a week     Active Member of Clubs or Organizations: No     Attends Club or Organization Meetings: Never     Marital Status:    Housing Stability: Low Risk  (2023)    Housing Stability Vital Sign     Unable to Pay for Housing in the Last Year: No     Number of Places Lived in the Last Year: 1     Unstable Housing in the Last Year: No       History of present illness:  63-year-old female, follows up today for left  total knee arthroplasty done November 20th.  He is here today for routine follow-up.  States that she is doing exceptionally well.  Her right knee aches more than the left knee now.  SHe is interested in proceeding with right total knee arthroplasty now.    She also has a history of right shoulder pain, rotator cuff tear, she underwent shoulder arthroscopy in May of 2023 and was found to have an irrepairable rotator cuff.  He will ultimately need a reverse total shoulder arthroplasty on the right.      Review of Systems:    Musculoskeletal:  See HPI      Objective:        Physical Examination:    Vital Signs:  There were no vitals filed for this visit.    Body mass index is 40.25 kg/m².    This a well-developed, well nourished patient in no acute distress.  They are alert and oriented and cooperative to examination.        Examination of the left knee, skin is dry and intact, no erythema or ecchymosis, no signs symptoms of infection.  Range of motion 0-110 degrees.  Stable anterior-posterior varus and valgus stress.  Calf soft nontender, straight leg raise negative.    Examination of the right knee, skin is dry and intact, no erythema or ecchymosis, no signs symptoms of infection, range of motion is similar to the left is 0-110 degrees.  It is stable as well to anterior-posterior varus and valgus stress.  Patient has pain over the medial lateral and diffuse pain across the anterior knee.    Pertinent New Results:    XRAY Report / Interpretation:   AP lateral sunrise views of the left knee taken today in the office demonstrate a left total knee arthroplasty to be in appropriate position without evidence of hardware failure or loosening.  The right knee demonstrate advanced tricompartmental arthritis.      Assessment/Plan:      Stable status post left total knee arthroplasty now 6 weeks out.  Continue physical therapy for strengthening and range of motion.  She will follow up with us in 2 months.  In terms of the  "right knee, she would like to proceed with right total knee arthroplasty.  We will schedule this at the next available appointment time.  She has advanced osteoarthritis with bone-on-bone appearance and probably a chronically torn ACL.  After addressing the right knee, then we will address the right shoulder where she has a repairable rotator cuff tear with tendon retraction.  We will proceed with reverse total shoulder arthroplasty at a later date.    Francisco Condon, Physician Assistant, served in the capacity as a "scribe" for this patient encounter.  A "face-to-face" encounter occurred with Dr. Curtis Ricardo on this date.  The treatment plan and medical decision-making is outlined above. Patient was seen and examined with a chaperone.     This note was created using Dragon voice recognition software that occasionally misinterpreted phrases or words.      "

## 2024-01-05 ENCOUNTER — CLINICAL SUPPORT (OUTPATIENT)
Dept: REHABILITATION | Facility: HOSPITAL | Age: 64
End: 2024-01-05
Payer: MEDICARE

## 2024-01-05 DIAGNOSIS — M25.662 DECREASED RANGE OF MOTION (ROM) OF LEFT KNEE: ICD-10-CM

## 2024-01-05 DIAGNOSIS — Z96.652 STATUS POST TOTAL KNEE REPLACEMENT, LEFT: ICD-10-CM

## 2024-01-05 DIAGNOSIS — Z74.09 IMPAIRED FUNCTIONAL MOBILITY, BALANCE, GAIT, AND ENDURANCE: Primary | ICD-10-CM

## 2024-01-05 PROCEDURE — 97110 THERAPEUTIC EXERCISES: CPT | Mod: PN,CQ

## 2024-01-05 NOTE — PROGRESS NOTES
"[facility]   Physical Therapy Treatment Note     Name: Lolly Ramírez  Clinic Number: [id number]    Therapy Diagnosis:   Encounter Diagnoses   Name Primary?    Impaired functional mobility, balance, gait, and endurance Yes    Decreased range of motion (ROM) of left knee     Status post total knee replacement, left      Physician: Arben Coombs, *    Visit Date: 1/5/2024    Physician Orders: PT Eval and Treat Knees  Medical Diagnosis from Referral: Status post total knee replacement, left  Evaluation Date: 12/12/2023  Authorization Period Expiration: 12/31/2024  Plan of Care Expiration: 1/25/2024  Progress Note Due: 1/12/2024  Date of Surgery: 11/20/2023  Visit # / Visits authorized: 2/ 20 (8 total)   FOTO: 2/ 3 Completed 12/27/2023 64% Functional Score  KOOS, JR. 70.7/100 Lower Score = Greater Disability              Next survey due week of 1/12/2024     Precautions: Diabetes and Fall Patient has significant sensitivity to adhesive and does not tolerate kinesiology taping.     PTA Visit #: 1/5     Time In: 0808  Time Out: 0848  Total Billable Time: 40 minutes    SUBJECTIVE     Pt reports: "It is feeling much better. The tape helped and has not irritated it so far, but maybe we should let it air out before putting on more. I did just get out of the shower. The other knee is the problem."  She was somewhat compliant with home exercise program.  Response to previous treatment: "It was still bothering me, but it felt better the next day."  Functional change: Resumed utilizing rollator for walking due to knee pain.      Pain: 0/10 but painful on R knee  Location: left knee    OBJECTIVE     Objective Measures updated at progress report unless specified.    Treatment     Lolly received the treatments listed below:      therapeutic exercises to develop strength, endurance, ROM, and flexibility for 40 minutes including:  Recumbent bike w/seat at 5 making full revolutions x6'  Standing LE " "AROM using 3# x20 ea  March  Abduction  Extension  Hamstring curl  Hip flexion SLR  Mini squat x20  L Closed chain TKE on 4" step x10 (continue & progress as able)  L step up & down 4" step x10 (continue & progress as able)  L single shuttle leg press using 25# x20  Sitting Piriformis stretch on stool 3x30s (can continue)   L Hamstring stretch with toes up (to include gastroc) 3x30s  LAQ using 3# x20 (0* extension)  A/AAROM heel slides using 3# 20x3s (* flexion)  Supine/Hooklying L quad stretch 3x30s   Quad sets 20x3s  L SAQ using 3# x20  L Heel slides using 3# x20 & x15 AAROM using strap (120* flexion)  L Hip abduction slide using 3# 2x10  L hip flexion using 3# wt SLR x20  Bridging (with knees on bolster) x20  Side lying hip abduction using 1# x10 (continue & progress as able)   Hip adduction using 1# x20 (continue & progress as able)    To be added:   (if able) Prone hip extension, hamstring curl, and TKE over half bolster    manual therapy techniques: Soft Tissue Mobilization and Kinesiology Taping were applied to the: Left knee for 0 minutes, including:  STM to lateral knee joint region  Kinesiology taping ( patient stating irritation usually starts the following day after application) using 3 I strips for mechanical correction to L lateral knee joint: with patient in supine position, knee flexed to ~ 80* identified point of pain (over lateral collateral ligament) applied 1st strip by breaking paper at mid point, applying 80% tension to middle of tape and laying tape centered over point of pain with superior tail in line with femur terminating with no tension and inferior tail extending posterior to inferior and slightly inferior terminating near tibial crest with no tension.  2nd I strip applied with 80% tension over center of tape, centered over most painful point with inferior tail extending along fibula terminating below knee with no tension and superior tail extending posterior to anterior and slightly " superiorly terminating with no tension at lateral border of ITB. 3rd strip applied with anchor between the inferior tails of other 2 strips with no tension. Applied inferior to superior with 75% tension, turning at knee to continue proximally along femur, terminating with no tension near mid point of ITB.  Rubbed tape to activate adhesive.      supervised modalities after being cleared for contradictions: IFC Electrical Stimulation:  Lolly received IFC Electrical Stimulation for pain control applied to the L knee. Pt received stimulation at 100 % scan at a frequency of 4000 Hz (beat  Hz) for 0 minutes. Lolly tolderated treatment well without any adverse effects.      cold pack for 0 minutes to L knee.     Patient Education and Home Exercises     Home Exercises Provided and Patient Education Provided     Education provided:   - The patient was instructed in continued treatment program with slight modifications as stated above.    Written Home Exercises Provided: Instructed patient to continue prior HEP as tolerated, pending MD appointment. Exercises were reviewed and Lolly was able to demonstrate them prior to the end of the session. Lolly demonstrated good  understanding of the education provided. See EMR under Patient Instructions for exercises provided during therapy sessions.    ASSESSMENT     The patient reported to physical therapy stating improved symptoms without present post-operative knee pain. Lolly Lozanoncer was instructed in continued treatment program targeting lower extremity strength, endurance, and knee stability for improved function and functional safety. Patient struggles with control of left lower extremity during shuttle leg press and is fearful of hypermobility. Per patient request kinesiology tape was not re-applied at this time, but can utilize as needed for additional knee stability until patient progresses lower extremity strength and stability.      Lolly Is progressing well towards  "her goals.   Pt prognosis is Good.     Pt will continue to benefit from skilled outpatient physical therapy to address the deficits listed in the problem list box on initial evaluation, provide pt/family education and to maximize pt's level of independence in the home and community environment.     Pt's spiritual, cultural and educational needs considered and pt agreeable to plan of care and goals.     Anticipated barriers to physical therapy: Patient has severe arthritis of R knee which could interfere with therapy process.       Goals:  Short Term Goals: 3 weeks   1) Facilitate normalized scar mobility Progressing  2) Facilitate resolution of tenderness around scar Progressing  3) Improve AROM L knee to 0*-115* to assist with sit <> stand transfers Met 12/21/2023  4) Patient will safely ambulate over level surfaces with no more than straight cane Progressing     Long Term Goals: 6 weeks   1) Resolve gait deviations Slowly progressing  2) Patient will safely navigate at least 6" steps utilizing L LE as power leg without increased L knee discomfort Ongoing (Deferred this date)  3) Patient will consistently perform sit <> stand transfers demonstrating equal weight bearing and with no more than minimal UE support Slowly progressing  4) Increase walking tolerance to > 30 min to allow for completion of grocery shopping Ongoing  5) Improve functional score to > 65% as indicated by FOTO knee survey Progressing/Nearly met  6) Patient will be independent in HEP. Progressing    PLAN   POC: 2 times weekly for 6 weeks to include the following interventions: Electrical Stimulation NMES, Gait Training, Manual Therapy, Moist Heat/ Ice, Patient Education, Therapeutic Activities, and Therapeutic Exercise. Lolly may be treated by PTA as part of their rehab team.     The patient is to be progressed within the established plan of care as tolerated in order to accomplish goals as stated above. Plan to resume standing activities as " patient tolerates, focusing on increased knee stability. Re-apply kinesiology tape as needed.    Eda Marrufo, PTA

## 2024-01-08 ENCOUNTER — CLINICAL SUPPORT (OUTPATIENT)
Dept: REHABILITATION | Facility: HOSPITAL | Age: 64
End: 2024-01-08
Payer: MEDICARE

## 2024-01-08 DIAGNOSIS — M25.662 DECREASED RANGE OF MOTION (ROM) OF LEFT KNEE: ICD-10-CM

## 2024-01-08 DIAGNOSIS — Z74.09 IMPAIRED FUNCTIONAL MOBILITY, BALANCE, GAIT, AND ENDURANCE: Primary | ICD-10-CM

## 2024-01-08 PROCEDURE — 97110 THERAPEUTIC EXERCISES: CPT | Mod: PN

## 2024-01-08 NOTE — PROGRESS NOTES
"[facility]   Physical Therapy Treatment Note     Name: Lolly Lozanoncer  Clinic Number: [id number]    Therapy Diagnosis:   Encounter Diagnoses   Name Primary?    Impaired functional mobility, balance, gait, and endurance Yes    Decreased range of motion (ROM) of left knee      Physician: Arben Coombs, *    Visit Date: 1/8/2024    Physician Orders: PT Eval and Treat Knees  Medical Diagnosis from Referral: Status post total knee replacement, left  Evaluation Date: 12/12/2023  Authorization Period Expiration: 12/31/2024  Plan of Care Expiration: 1/25/2024  Progress Note Due: 1/12/2024  Date of Surgery: 11/20/2023  Visit # / Visits authorized: 3/ 20 (9 total)   FOTO: 2/ 3 Completed 12/27/2023 64% Functional Score  KOJR. JOLLY 70.7/100 Lower Score = Greater Disability              Next survey due week of 1/12/2024     Precautions: Diabetes and Fall Patient has significant sensitivity to adhesive and does not tolerate kinesiology taping.     PTA Visit #: 0/5     Time In: 0801  Time Out: 0900  Total Billable Time: 43 minutes    SUBJECTIVE     Pt reports: "The knee doesn't hurt at all.  It's that nerve."  She was somewhat compliant with home exercise program.  Response to previous treatment: "I was sore after the last time."  Functional change: Walked most of the weekend without the rollator.      Pain: 0/10   Location: left knee    OBJECTIVE     Objective Measures updated at progress report unless specified.    Treatment     Lolly received the treatments listed below:      therapeutic exercises to develop strength, endurance, ROM, and flexibility for 52 minutes including (11' @ not billable due to overlapping with 1 other patient):  Recumbent bike w/seat at 6 making full revolutions x 8'  Standing LE AROM using 3# x20 ea  March  Abduction  Extension  Hamstring curl  Hip flexion SLR  Mini squat x20  L Closed chain TKE on 4" step x20  L step up & down 4" step x10 each direction  L single " shuttle leg press using 25# x20  Sitting Piriformis stretch on stool 3x30s    L Hamstring stretch with toes up (to include gastroc) 3x30s  LAQ using 3# x20 (0* extension)  A/AAROM heel slides using 3# 20x3s (115* flexion)  Supine/Hooklying L quad stretch 3x30s   Quad sets 20x3s  L SAQ using 3# x20  L Heel slides using 3# x20 & x15 AAROM using strap (120* flexion)  L Hip abduction slide using 3# 2x10  L hip flexion using 3# wt SLR x20  Bridging (with knees on bolster) x20  Side lying hip abduction using 1# x10 (continue & progress as able)   Hip adduction using 1# x20 (continue & progress as able)    To be added:   (if able) Prone hip extension, hamstring curl, and TKE over half bolster    manual therapy techniques: Kinesiology Taping were applied to the: Left knee for 2 minutes, including:  Kinesiology taping to facilitate desensitization of incision utilizing I strip anchored with no tension just distal to distal end of incision.  Applied along incision distal to proximal using 50% tension and oscillating pattern, terminating with no tension just proximal to proximal end of incision. Rubbed tape to activate adhesive   ( patient stating irritation usually starts the following day after application) using 3 I strips for mechanical correction to L lateral knee joint: with patient in supine position, knee flexed to ~ 80* identified point of pain (over lateral collateral ligament) applied 1st strip by breaking paper at mid point, applying 80% tension to middle of tape and laying tape centered over point of pain with superior tail in line with femur terminating with no tension and inferior tail extending posterior to inferior and slightly inferior terminating near tibial crest with no tension.  2nd I strip applied with 80% tension over center of tape, centered over most painful point with inferior tail extending along fibula terminating below knee with no tension and superior tail extending posterior to anterior and  "slightly superiorly terminating with no tension at lateral border of ITB. 3rd strip applied with anchor between the inferior tails of other 2 strips with no tension. Applied inferior to superior with 75% tension, turning at knee to continue proximally along femur, terminating with no tension near mid point of ITB.  Rubbed tape to activate adhesive.      supervised modalities after being cleared for contradictions: IFC Electrical Stimulation:  Lolly received IFC Electrical Stimulation for pain control applied to the L knee. Pt received stimulation at 100 % scan at a frequency of 4000 Hz (beat  Hz) for 0 minutes. Lolly tolderated treatment well without any adverse effects.      cold pack for 0 minutes to L knee.     Patient Education and Home Exercises     Home Exercises Provided and Patient Education Provided     Education provided:   - Reviewed exercises as noted above with verbal cues for technique.    Written Home Exercises Provided: Instructed patient to continue prior HEP as tolerated, pending MD appointment. Exercises were reviewed and Lolly was able to demonstrate them prior to the end of the session. Lolly demonstrated good  understanding of the education provided. See EMR under Patient Instructions for exercises provided during therapy sessions.    ASSESSMENT     The patient presents to PT today ambulating without assistive device, demonstrating mildly antalgic gait.  She reports "nerve" type pain along incision just distal to inferior pole of patella with hypersensitivity noted.  She experienced no adverse effects from prior taping thus will utilize kinesiology tape to assist with scar mobilization/desensitization.  Patient activity tolerance is more profoundly affected by her other knee at this point as she is making good progress with her L.  She is scheduled next month for a R TKR.     Lolly Is progressing well towards her goals.   Pt prognosis is Good.     Pt will continue to benefit from skilled " "outpatient physical therapy to address the deficits listed in the problem list box on initial evaluation, provide pt/family education and to maximize pt's level of independence in the home and community environment.     Pt's spiritual, cultural and educational needs considered and pt agreeable to plan of care and goals.     Anticipated barriers to physical therapy: Patient has severe arthritis of R knee which could interfere with therapy process.       Goals:  Short Term Goals: 3 weeks   1) Facilitate normalized scar mobility Progressing  2) Facilitate resolution of tenderness around scar Progressing  3) Improve AROM L knee to 0*-115* to assist with sit <> stand transfers Met 12/21/2023  4) Patient will safely ambulate over level surfaces with no more than straight cane Progressing/nearly met     Long Term Goals: 6 weeks   1) Resolve gait deviations Progressing  2) Patient will safely navigate at least 6" steps utilizing L LE as power leg without increased L knee discomfort Progressing  3) Patient will consistently perform sit <> stand transfers demonstrating equal weight bearing and with no more than minimal UE support Slowly progressing  4) Increase walking tolerance to > 30 min to allow for completion of grocery shopping Slowly progressing  5) Improve functional score to > 65% as indicated by FOTO knee survey Progressing/Nearly met  6) Patient will be independent in HEP. Progressing    PLAN   POC: 2 times weekly for 6 weeks to include the following interventions: Electrical Stimulation NMES, Gait Training, Manual Therapy, Moist Heat/ Ice, Patient Education, Therapeutic Activities, and Therapeutic Exercise. Lolly may be treated by PTA as part of their rehab team.     The patient is to be progressed within the established plan of care as tolerated in order to accomplish goals as stated above. Incorporate prone and sidelying exercises to enhance strengthening. Kinesiology taping to decrease scar sensitivity and " facilitate lateral knee stability.     Lolly Villafana, PT

## 2024-01-10 ENCOUNTER — CLINICAL SUPPORT (OUTPATIENT)
Dept: REHABILITATION | Facility: HOSPITAL | Age: 64
End: 2024-01-10
Payer: MEDICARE

## 2024-01-10 DIAGNOSIS — Z74.09 IMPAIRED FUNCTIONAL MOBILITY, BALANCE, GAIT, AND ENDURANCE: Primary | ICD-10-CM

## 2024-01-10 DIAGNOSIS — M25.662 DECREASED RANGE OF MOTION (ROM) OF LEFT KNEE: ICD-10-CM

## 2024-01-10 DIAGNOSIS — Z96.652 STATUS POST TOTAL KNEE REPLACEMENT, LEFT: ICD-10-CM

## 2024-01-10 PROCEDURE — 97110 THERAPEUTIC EXERCISES: CPT | Mod: PN,CQ

## 2024-01-10 NOTE — PROGRESS NOTES
"[facility]   Physical Therapy Treatment Note     Name: Lolly Ramírez  Clinic Number: [id number]    Therapy Diagnosis:   Encounter Diagnoses   Name Primary?    Impaired functional mobility, balance, gait, and endurance Yes    Decreased range of motion (ROM) of left knee     Status post total knee replacement, left      Physician: Arben Coombs, *    Visit Date: 1/10/2024    Physician Orders: PT Eval and Treat Knees  Medical Diagnosis from Referral: Status post total knee replacement, left  Evaluation Date: 12/12/2023  Authorization Period Expiration: 12/31/2024  Plan of Care Expiration: 1/25/2024  Progress Note Due: 1/12/2024  Date of Surgery: 11/20/2023  Visit # / Visits authorized: 4/ 20 (10 total)   FOTO: 2/ 3 Completed 12/27/2023 64% Functional Score  KOOS JR. 70.7/100 Lower Score = Greater Disability              Next survey due week of 1/12/2024     Precautions: Diabetes and Fall Patient has significant sensitivity to adhesive and has not tolerated kinesiology taping well previously on shoulder.     PTA Visit #: 1/5     Time In: 0801  Time Out: 0850  Total Billable Time: 42 minutes    SUBJECTIVE     Pt reports: "I am not using my Rollator anymore."  She was somewhat compliant with home exercise program.  Response to previous treatment: "I felt okay, but it feels less stable without the tape."  Functional change: Discontinued use of rollator    Pain: 0/10   Location: left knee    OBJECTIVE     Objective Measures updated at progress report unless specified.    Treatment     Lolly received the treatments listed below:      therapeutic exercises to develop strength, endurance, ROM, and flexibility for 39 minutes including:  Recumbent bike w/seat at 6 making full revolutions x8'  Standing LE AROM using 3# x20 ea  March  Abduction  Extension  Hamstring curl  Hip flexion SLR  Mini squat x20  L Closed chain TKE on 6" step 2x10   L step up & down 4" step x10  Shuttle leg " press   Double leg using 37# x20 *jt space correction KT tape on R*  L single using 25# x20  Sitting Piriformis stretch on stool 3x30s    L Hamstring stretch with toes up (to include gastroc) 3x30s  LAQ using 3# x20 (0* extension)  A/AAROM heel slides using 3# 20x3s (125* flexion)  Supine/Hooklying L quad stretch 3x30s  L SAQ using 3# x20  L Heel slides using 3# x20  L hip flexion using 3# wt SLR x20  Bridging (with knees on bolster) x20  Side lying hip abduction using 1# x10 (continue & progress as able)   Hip adduction using 1# x20 (continue & progress as able)    To be added:   (if able) Prone hip extension, hamstring curl, and TKE over half bolster    manual therapy techniques: Kinesiology Taping were applied to the: Left knee for 3 minutes, including:  Kinesiology taping to facilitate desensitization of incision utilizing I strip anchored with no tension just distal to distal end of incision.  Applied along incision distal to proximal using 50% tension and oscillating pattern, terminating with no tension just proximal to proximal end of incision. Rubbed tape to activate adhesive  Kinesiology tape applied on contralateral lower extremity (right) for joint space correction utilizing 3 I strips in star pattern for improved gait quality and overall stability   ( patient stating irritation usually starts the following day after application) using 3 I strips for mechanical correction to L lateral knee joint: with patient in supine position, knee flexed to ~ 80* identified point of pain (over lateral collateral ligament) applied 1st strip by breaking paper at mid point, applying 80% tension to middle of tape and laying tape centered over point of pain with superior tail in line with femur terminating with no tension and inferior tail extending posterior to inferior and slightly inferior terminating near tibial crest with no tension.  2nd I strip applied with 80% tension over center of tape, centered over most painful  point with inferior tail extending along fibula terminating below knee with no tension and superior tail extending posterior to anterior and slightly superiorly terminating with no tension at lateral border of ITB. 3rd strip applied with anchor between the inferior tails of other 2 strips with no tension. Applied inferior to superior with 75% tension, turning at knee to continue proximally along femur, terminating with no tension near mid point of ITB.  Rubbed tape to activate adhesive.      supervised modalities after being cleared for contradictions: IFC Electrical Stimulation:  Lolly received IFC Electrical Stimulation for pain control applied to the L knee. Pt received stimulation at 100 % scan at a frequency of 4000 Hz (beat  Hz) for 0 minutes. Lolly tolderated treatment well without any adverse effects.      cold pack for 0 minutes to L knee.     Patient Education and Home Exercises     Home Exercises Provided and Patient Education Provided     Education provided:   - The patient was instructed in increased exercise complexity as stated above in bold print. Education was provided on additional kinesiology tape method for joint space correction on right.    Written Home Exercises Provided: Instructed patient to continue prior HEP as tolerated, pending MD appointment. Exercises were reviewed and Lolly was able to demonstrate them prior to the end of the session. Lolly demonstrated good  understanding of the education provided. See EMR under Patient Instructions for exercises provided during therapy sessions.    ASSESSMENT     The patient reported to physical therapy denying present pain, but requesting utilizing kinesiology tape for additional support and for improving scar mobility. Lollybrant Ramírez was progressed with increased complexity targeting lower extremity strength, stability, and endurance for improved function and functional safety. Functional knee range of motion persists. Patient demonstrates  "waddling, antalgic gait at times due to contralateral lower extremity.    Lolly Is progressing well towards her goals.   Pt prognosis is Good.     Pt will continue to benefit from skilled outpatient physical therapy to address the deficits listed in the problem list box on initial evaluation, provide pt/family education and to maximize pt's level of independence in the home and community environment.     Pt's spiritual, cultural and educational needs considered and pt agreeable to plan of care and goals.     Anticipated barriers to physical therapy: Patient has severe arthritis of R knee which could interfere with therapy process.       Goals:  Short Term Goals: 3 weeks   1) Facilitate normalized scar mobility Progressing  2) Facilitate resolution of tenderness around scar Progressing  3) Improve AROM L knee to 0*-115* to assist with sit <> stand transfers Met 12/21/2023  4) Patient will safely ambulate over level surfaces with no more than straight cane Progressing/nearly met (AD for community ambulation)     Long Term Goals: 6 weeks   1) Resolve gait deviations Progressing  2) Patient will safely navigate at least 6" steps utilizing L LE as power leg without increased L knee discomfort Progressing  3) Patient will consistently perform sit <> stand transfers demonstrating equal weight bearing and with no more than minimal UE support Slowly progressing  4) Increase walking tolerance to > 30 min to allow for completion of grocery shopping Slowly progressing  5) Improve functional score to > 65% as indicated by FOTO knee survey Progressing/Nearly met  6) Patient will be independent in HEP. Progressing    PLAN   POC: 2 times weekly for 6 weeks to include the following interventions: Electrical Stimulation NMES, Gait Training, Manual Therapy, Moist Heat/ Ice, Patient Education, Therapeutic Activities, and Therapeutic Exercise. Lolly may be treated by PTA as part of their rehab team.     The patient is to be progressed " within the established plan of care as tolerated in order to accomplish goals as stated above. Plan to incorporate prone and sidelying exercises to enhance strengthening, as able due to shoulder co-morbidities.     Eda Marrufo, PTA

## 2024-01-11 DIAGNOSIS — Z00.00 ENCOUNTER FOR MEDICARE ANNUAL WELLNESS EXAM: ICD-10-CM

## 2024-01-17 ENCOUNTER — CLINICAL SUPPORT (OUTPATIENT)
Dept: REHABILITATION | Facility: HOSPITAL | Age: 64
End: 2024-01-17
Payer: MEDICARE

## 2024-01-17 DIAGNOSIS — Z74.09 IMPAIRED FUNCTIONAL MOBILITY, BALANCE, GAIT, AND ENDURANCE: Primary | ICD-10-CM

## 2024-01-17 DIAGNOSIS — M25.662 DECREASED RANGE OF MOTION (ROM) OF LEFT KNEE: ICD-10-CM

## 2024-01-17 PROCEDURE — 97110 THERAPEUTIC EXERCISES: CPT | Mod: PN

## 2024-01-17 NOTE — PROGRESS NOTES
1/17/2024    PT-PTA face to face patient care conference with Eda Marrufo PTA re: pt status, POC, and plan for progression completed. Patient progressing weill but is slowed by R knee. Prepare for discharge next week.     Lolly Villafana, PT

## 2024-01-19 ENCOUNTER — CLINICAL SUPPORT (OUTPATIENT)
Dept: REHABILITATION | Facility: HOSPITAL | Age: 64
End: 2024-01-19
Payer: MEDICARE

## 2024-01-19 DIAGNOSIS — Z96.652 STATUS POST TOTAL KNEE REPLACEMENT, LEFT: ICD-10-CM

## 2024-01-19 DIAGNOSIS — M25.662 DECREASED RANGE OF MOTION (ROM) OF LEFT KNEE: ICD-10-CM

## 2024-01-19 DIAGNOSIS — Z74.09 IMPAIRED FUNCTIONAL MOBILITY, BALANCE, GAIT, AND ENDURANCE: Primary | ICD-10-CM

## 2024-01-19 PROCEDURE — 97110 THERAPEUTIC EXERCISES: CPT | Mod: PN,CQ

## 2024-01-19 NOTE — PROGRESS NOTES
"[facility]   Physical Therapy Treatment Note    Name: Lolly Ramírez  Clinic Number: [id number]    Therapy Diagnosis:   Encounter Diagnoses   Name Primary?    Impaired functional mobility, balance, gait, and endurance Yes    Decreased range of motion (ROM) of left knee     Status post total knee replacement, left      Physician: Arben Coombs, *    Visit Date: 1/19/2024    Physician Orders: PT Eval and Treat Knees  Medical Diagnosis from Referral: Status post total knee replacement, left  Evaluation Date: 12/12/2023  Authorization Period Expiration: 12/31/2024  Plan of Care Expiration: 1/25/2024  Progress Note Due: 1/12/2024  Date of Surgery: 11/20/2023  Visit # / Visits authorized: 6/ 20 (12 total)   FOTO: 3/ 3 Completed 1/19/2023 70% Functional Score  KOOS JR. 76.3/100 Lower Score = Greater Disability              Next survey due at discharge     Precautions: Diabetes and Fall Patient has significant sensitivity to adhesive and has not tolerated kinesiology taping well previously on shoulder.     PTA Visit #: 1/5     Time In: 0806  Time Out: 0853  Total Billable Time: 44 minutes    SUBJECTIVE     Pt reports: "Is it normal for the knee to pop?"  She was mostly compliant with home exercise program.  Response to previous treatment: "Tired"  Functional change: Ambulating without devices    Pain: 0/10 "It is just twingy some today, but it is not painful. It is the muscles above the knee."  Location: left knee    OBJECTIVE     Objective Measures updated at progress report unless specified.    Treatment     Lolly received the treatments listed below:      therapeutic exercises to develop strength, endurance, ROM, and flexibility for 44 minutes including:  Recumbent bike w/seat at 2, lvl3 x6'  Standing LE AROM using 3# x20 ea  March  Abduction  Extension  Hamstring curl  Hip flexion SLR  Mini squat x20  L Closed chain TKE on 6" step 2x10 (increase step height)  L step up & over 6" step " x10  Shuttle leg press   Double leg using 37# x20   L single using 37# x20  Sitting Piriformis stretch on stool 3x30s    L Hamstring stretch with toes up (to include gastroc) 3x30s  LAQ using 3# x20  Marching 3# x20  Supine/Hooklying L quad stretch 3x30s  L SAQ using 3# x20  L Heel slides using 3# x20  L hip flexion using 3# wt SLR x20  Bridging x20  Side lying hip abduction using 1# x20 (increase resistance)   Hip adduction using 1# x20 (increase resistance)    manual therapy techniques: Kinesiology Taping were applied to the: Left knee for 3 minutes, including:  Kinesiology taping to facilitate desensitization of incision utilizing I strip anchored with no tension just distal to distal end of incision.  Applied along incision distal to proximal using 50% tension and oscillating pattern, terminating with no tension just proximal to proximal end of incision. Rubbed tape to activate adhesive  Kinesiology tape applied on contralateral lower extremity (right) for joint space correction utilizing 3 I strips in star pattern for improved gait quality and overall stability   patient stating irritation usually starts the following day after application) using 3 I strips for mechanical correction to L lateral knee joint: with patient in supine position, knee flexed to ~ 80* identified point of pain (over lateral collateral ligament) applied 1st strip by breaking paper at mid point, applying 80% tension to middle of tape and laying tape centered over point of pain with superior tail in line with femur terminating with no tension and inferior tail extending posterior to inferior and slightly inferior terminating near tibial crest with no tension.  2nd I strip applied with 80% tension over center of tape, centered over most painful point with inferior tail extending along fibula terminating below knee with no tension and superior tail extending posterior to anterior and slightly superiorly terminating with no tension at lateral  border of ITB. 3rd strip applied with anchor between the inferior tails of other 2 strips with no tension. Applied inferior to superior with 75% tension, turning at knee to continue proximally along femur, terminating with no tension near mid point of ITB.  Rubbed tape to activate adhesive.      supervised modalities after being cleared for contradictions: IFC Electrical Stimulation:  Lolly received IFC Electrical Stimulation for pain control applied to the L knee. Pt received stimulation at 100 % scan at a frequency of 4000 Hz (beat  Hz) for 0 minutes. Lolly tolderated treatment well without any adverse effects.      cold pack for 0 minutes to L knee.     Patient Education and Home Exercises     Home Exercises Provided and Patient Education Provided     Education provided:   - The patient was instructed in increased exercise complexity as stated above in bold print.    Written Home Exercises Provided: Instructed patient to continue prior HEP as tolerated. Exercises were reviewed and Lolly was able to demonstrate them prior to the end of the session. Lolly demonstrated good  understanding of the education provided. See EMR under Patient Instructions for exercises provided during therapy sessions.    ASSESSMENT     The patient reported to physical therapy with right leg being more of complaint as well as instability of right shoulder. Patient states that left knee is doing well only slight discomfort with tension in distal quads on recumbent bike during end range flexion. Lolly Ramírez was progressed with increased exercise complexity in order to promote maintenance of functional knee range of motion, as well as increasing increased lower extremity strength and stability for improved function.    Lolly Is progressing well towards her goals.   Pt prognosis is Good.     Pt will continue to benefit from skilled outpatient physical therapy to address the deficits listed in the problem list box on initial  "evaluation, provide pt/family education and to maximize pt's level of independence in the home and community environment.     Pt's spiritual, cultural and educational needs considered and pt agreeable to plan of care and goals.     Anticipated barriers to physical therapy: Patient has severe arthritis of R knee which could interfere with therapy process.       Goals:  Short Term Goals: 3 weeks   1) Facilitate normalized scar mobility Met 1/17/2024  2) Facilitate resolution of tenderness around scar Progressing/nearly met  3) Improve AROM L knee to 0*-115* to assist with sit <> stand transfers Met 12/21/2023  4) Patient will safely ambulate over level surfaces with no more than straight cane Progressing/nearly met (AD for community ambulation)     Long Term Goals: 6 weeks   1) Resolve gait deviations Progressing  2) Patient will safely navigate at least 6" steps utilizing L LE as power leg without increased L knee discomfort Progressing  3) Patient will consistently perform sit <> stand transfers demonstrating equal weight bearing and with no more than minimal UE support Slowly progressing  4) Increase walking tolerance to > 30 min to allow for completion of grocery shopping Met 1/17/2024  5) Improve functional score to > 65% as indicated by FOTO knee survey Progressing/Nearly met  6) Patient will be independent in HEP. Progressing    PLAN   POC: 2 times weekly for 6 weeks to include the following interventions: Electrical Stimulation NMES, Gait Training, Manual Therapy, Moist Heat/ Ice, Patient Education, Therapeutic Activities, and Therapeutic Exercise. Lolly may be treated by PTA as part of their rehab team.     The patient is to be progressed within the established plan of care as tolerated in order to accomplish goals as stated above. Plan to progress exercise complexity for improved strength, stability, and functional performance; prepare for discharge. Continue to utilize manual techniques to address soft " tissue restrictions as needed.    Eda Marrufo, PTA

## 2024-01-22 ENCOUNTER — CLINICAL SUPPORT (OUTPATIENT)
Dept: REHABILITATION | Facility: HOSPITAL | Age: 64
End: 2024-01-22
Payer: MEDICARE

## 2024-01-22 DIAGNOSIS — M25.662 DECREASED RANGE OF MOTION (ROM) OF LEFT KNEE: ICD-10-CM

## 2024-01-22 DIAGNOSIS — Z74.09 IMPAIRED FUNCTIONAL MOBILITY, BALANCE, GAIT, AND ENDURANCE: Primary | ICD-10-CM

## 2024-01-22 PROCEDURE — 97110 THERAPEUTIC EXERCISES: CPT | Mod: PN

## 2024-01-22 NOTE — PROGRESS NOTES
"[facility]   Physical Therapy Treatment Note    Name: Lolly Lozanoncer  Clinic Number: [id number]    Therapy Diagnosis:   Encounter Diagnoses   Name Primary?    Impaired functional mobility, balance, gait, and endurance Yes    Decreased range of motion (ROM) of left knee      Physician: Arben Coombs, *    Visit Date: 1/22/2024    Physician Orders: PT Eval and Treat Knees  Medical Diagnosis from Referral: Status post total knee replacement, left  Evaluation Date: 12/12/2023  Authorization Period Expiration: 12/31/2024  Plan of Care Expiration: 1/25/2024  Progress Note Due: 1/12/2024  Date of Surgery: 11/20/2023  Visit # / Visits authorized: 7/ 20 (13 total)   FOTO: 3/ 3 Completed 1/19/2023 70% Functional Score  KOOSJR. 76.3/100 Lower Score = Greater Disability              Next survey due at discharge     Precautions: Diabetes and Fall Patient has significant sensitivity to adhesive and has not tolerated kinesiology taping well previously on shoulder.     PTA Visit #: 0/5     Time In: 1320  Time Out: 1405  Total Billable Time: 40 minutes    SUBJECTIVE     Pt reports: "R knee is hurting today"  She was compliant with home exercise program.  Response to previous treatment: "Sore"  Functional change: Ambulating without devices    Pain: 0/10   Location: left knee    OBJECTIVE     Objective Measures updated at progress report unless specified.    Treatment     Lolly received the treatments listed below:      therapeutic exercises to develop strength, endurance, ROM, and flexibility for 40 minutes including:  Recumbent bike w/seat at 2, lvl3 x6'  Standing LE AROM using 3# x20 ea  March  Abduction  Extension  Hamstring curl  Hip flexion SLR  Mini squat x20  L Closed chain TKE on 8" step x15  L step up & over 6" step x10  Shuttle leg press   Double leg using 50# x20   L single using 37# x20  Sitting Piriformis stretch on stool 3x30s    L Hamstring stretch with toes up (to include gastroc) " 3x30s  LAQ using 3# x20  Marching 3# x20  Supine/Hooklying L quad stretch 3x30s  L SAQ using 3# x20  L Heel slides using 3# x20   L hip flexion using 3# wt SLR x20  Bridging x20  Side lying hip abduction using 2# x20   Hip adduction using 2# x20   Prone lying with 2# wt   L Hip extension x 13 (patient fatigued)   L Knee flexion x 10    manual therapy techniques: Kinesiology Taping were applied to the: Left knee for 3 minutes, including:  Kinesiology taping to facilitate desensitization of incision utilizing I strip anchored with no tension just distal to distal end of incision.  Applied along incision distal to proximal using 50% tension and oscillating pattern, terminating with no tension just proximal to proximal end of incision. Rubbed tape to activate adhesive  Kinesiology tape applied on contralateral lower extremity (right) for joint space correction utilizing 3 I strips in star pattern for improved gait quality and overall stability   patient stating irritation usually starts the following day after application) using 3 I strips for mechanical correction to L lateral knee joint: with patient in supine position, knee flexed to ~ 80* identified point of pain (over lateral collateral ligament) applied 1st strip by breaking paper at mid point, applying 80% tension to middle of tape and laying tape centered over point of pain with superior tail in line with femur terminating with no tension and inferior tail extending posterior to inferior and slightly inferior terminating near tibial crest with no tension.  2nd I strip applied with 80% tension over center of tape, centered over most painful point with inferior tail extending along fibula terminating below knee with no tension and superior tail extending posterior to anterior and slightly superiorly terminating with no tension at lateral border of ITB. 3rd strip applied with anchor between the inferior tails of other 2 strips with no tension. Applied inferior to  superior with 75% tension, turning at knee to continue proximally along femur, terminating with no tension near mid point of ITB.  Rubbed tape to activate adhesive.      supervised modalities after being cleared for contradictions: IFC Electrical Stimulation:  Lolly received IFC Electrical Stimulation for pain control applied to the L knee. Pt received stimulation at 100 % scan at a frequency of 4000 Hz (beat  Hz) for 0 minutes. Lolly tolderated treatment well without any adverse effects.      cold pack for 0 minutes to L knee.     Patient Education and Home Exercises     Home Exercises Provided and Patient Education Provided     Education provided:   - The patient was instructed in additional exercises as stated above in bold print.    Written Home Exercises Provided: Instructed patient to continue prior HEP as tolerated. Exercises were reviewed and Lolly was able to demonstrate them prior to the end of the session. Lolly demonstrated good  understanding of the education provided. See EMR under Patient Instructions for exercises provided during therapy sessions.    ASSESSMENT     The patient presents to PT today with primary complaints of R knee pain (non-operative knee); reporting pain in operative knee as 0/10.  She fatigued quickly with closed chain TKE on L and L prone hip extension; however, she tolerated increased resistance on B leg press (shuttle) and sidelying hip abduction/adduction.  Overall the quality of her gait over level surfaces is improving as noted by no use of assistive device in clinic while demonstrating minimal gait deviations.  Primary limiting factor at this time seems to be her non-operative knee.  She is nearing discharge from skilled PT for her L knee.     Lolly Is progressing well towards her goals.   Pt prognosis is Good.     Pt will continue to benefit from skilled outpatient physical therapy to address the deficits listed in the problem list box on initial evaluation, provide  "pt/family education and to maximize pt's level of independence in the home and community environment.     Pt's spiritual, cultural and educational needs considered and pt agreeable to plan of care and goals.     Anticipated barriers to physical therapy: Patient has severe arthritis of R knee which could interfere with therapy process.       Goals:  Short Term Goals: 3 weeks   1) Facilitate normalized scar mobility Met 1/17/2024  2) Facilitate resolution of tenderness around scar Progressing/nearly met  3) Improve AROM L knee to 0*-115* to assist with sit <> stand transfers Met 12/21/2023  4) Patient will safely ambulate over level surfaces with no more than straight cane Met at household level 1/22/2024     Long Term Goals: 6 weeks   1) Resolve gait deviations Progressing  2) Patient will safely navigate at least 6" steps utilizing L LE as power leg without increased L knee discomfort Progressing (somewhat limited by her R knee)   3) Patient will consistently perform sit <> stand transfers demonstrating equal weight bearing and with no more than minimal UE support Slowly progressing  4) Increase walking tolerance to > 30 min to allow for completion of grocery shopping Met 1/17/2024  5) Improve functional score to > 65% as indicated by FOTO knee survey Progressing/Nearly met  6) Patient will be independent in HEP. Progressing    PLAN   POC: 2 times weekly for 6 weeks to include the following interventions: Electrical Stimulation NMES, Gait Training, Manual Therapy, Moist Heat/ Ice, Patient Education, Therapeutic Activities, and Therapeutic Exercise. Lolly may be treated by PTA as part of their rehab team.     The patient is to be progressed within the established plan of care as tolerated in order to accomplish goals as stated above. Prepare for discharge.  Patient is scheduled for R TKA in February.    Lolly Villafana, PT    "

## 2024-01-24 ENCOUNTER — CLINICAL SUPPORT (OUTPATIENT)
Dept: REHABILITATION | Facility: HOSPITAL | Age: 64
End: 2024-01-24
Payer: MEDICARE

## 2024-01-24 DIAGNOSIS — Z74.09 IMPAIRED FUNCTIONAL MOBILITY, BALANCE, GAIT, AND ENDURANCE: Primary | ICD-10-CM

## 2024-01-24 DIAGNOSIS — M25.662 DECREASED RANGE OF MOTION (ROM) OF LEFT KNEE: ICD-10-CM

## 2024-01-24 PROCEDURE — 97110 THERAPEUTIC EXERCISES: CPT | Mod: PN

## 2024-01-24 NOTE — PROGRESS NOTES
"[facility]   Physical Therapy Treatment Note/Discharge Summary    Name: Lolly Ramírez  Clinic Number: [id number]    Therapy Diagnosis:   Encounter Diagnoses   Name Primary?    Impaired functional mobility, balance, gait, and endurance Yes    Decreased range of motion (ROM) of left knee      Physician: Arben Coombs, *    Visit Date: 1/24/2024    Physician Orders: PT Eval and Treat Knees  Medical Diagnosis from Referral: Status post total knee replacement, left  Evaluation Date: 12/12/2023  Authorization Period Expiration: 12/31/2024  Plan of Care Expiration: 1/25/2024  Progress Note Due: 1/12/2024  Date of Surgery: 11/20/2023  Visit # / Visits authorized: 8/ 20 (14 total)   Visits canceled: 0  Visits no showed:  0  FOTO: Final Completed 1/24/2023 99% Functional Score  KOOS JR. 100/100 Lower Score = Greater Disability              Next survey due at discharge     Precautions: Diabetes and Fall Patient has significant sensitivity to adhesive and has not tolerated kinesiology taping well previously on shoulder.     PTA Visit #: 0/5     Time In: 0805  Time Out: 0853  Total Billable Time: 40 minutes    SUBJECTIVE     Pt reports: "L knee doesn't hurt at all"  She was compliant with home exercise program.  Response to previous treatment: "Sore like I've been working out; tired"  Functional change: Ambulating without devices    Current Level of Function: Consistently ambulates over level surfaces without devices demonstrating mild to moderate waddling gait (partially due to R knee).  Sleep is not disturbed by L knee pain.  Has occasionally had to take medication to assist with sleeping due to other issues. Has resumed driving, light house work.       Pain: 0/10   Location: left knee    OBJECTIVE     AROM L Knee 0* - 112*  PROM L Knee 0* - 125*  Strength L knee flexion/extension 4/5-4+/5; hip flexion 4/5, ext 4/5-4+/5, abd 4/5, add 4+/5  Flexibility: hamstrings and piriformis slight " "limitation B; quads minimal tightness L, R not assessed  Gait: Independent ambulating over level surfaces without devices.  Able to navigate steps using L LE as power leg with moderate UE support.  Patient reports pain in R knee and fear of R knee instability    Treatment     Lolly received the treatments listed below:      therapeutic exercises to develop strength, endurance, ROM, and flexibility for 40 minutes including:  Recumbent bike w/seat at 4, lvl3 x7'  Standing LE AROM using 3# x20 ea  March  Abduction  Extension  Hamstring curl  Hip flexion SLR  Mini squat x20  L Closed chain TKE on 8" step x15  L step up & over 6" step x10 deferred due to R knee  Shuttle leg press   Double leg using 50# x20   L single using 37# x20  Sitting Piriformis stretch on stool 3x30s    L Hamstring stretch with toes up (to include gastroc) 3x30s  LAQ using 3# x20  Marching 3# x20  Supine/Hooklying L quad stretch 3x30s  L SAQ using 3# x20  L Heel slides using 3# x20   L hip flexion using 3# wt SLR x20  Bridging x20  Side lying hip abduction using 2# x20   Hip adduction using 2# x20   Prone lying with 2# wt   L Hip extension x 13 (patient fatigued)   L Knee flexion x 10    manual therapy techniques: Kinesiology Taping were applied to the: Left knee for 3 minutes, including:  Kinesiology taping to facilitate desensitization of incision utilizing I strip anchored with no tension just distal to distal end of incision.  Applied along incision distal to proximal using 50% tension and oscillating pattern, terminating with no tension just proximal to proximal end of incision. Rubbed tape to activate adhesive  Kinesiology tape applied on contralateral lower extremity (right) for joint space correction utilizing 3 I strips in star pattern for improved gait quality and overall stability   patient stating irritation usually starts the following day after application) using 3 I strips for mechanical correction to L lateral knee joint: with " patient in supine position, knee flexed to ~ 80* identified point of pain (over lateral collateral ligament) applied 1st strip by breaking paper at mid point, applying 80% tension to middle of tape and laying tape centered over point of pain with superior tail in line with femur terminating with no tension and inferior tail extending posterior to inferior and slightly inferior terminating near tibial crest with no tension.  2nd I strip applied with 80% tension over center of tape, centered over most painful point with inferior tail extending along fibula terminating below knee with no tension and superior tail extending posterior to anterior and slightly superiorly terminating with no tension at lateral border of ITB. 3rd strip applied with anchor between the inferior tails of other 2 strips with no tension. Applied inferior to superior with 75% tension, turning at knee to continue proximally along femur, terminating with no tension near mid point of ITB.  Rubbed tape to activate adhesive.      supervised modalities after being cleared for contradictions: IFC Electrical Stimulation:  Lolly received IFC Electrical Stimulation for pain control applied to the L knee. Pt received stimulation at 100 % scan at a frequency of 4000 Hz (beat  Hz) for 0 minutes. Lolly tolderated treatment well without any adverse effects.      cold pack for 0 minutes to L knee.     Patient Education and Home Exercises     Home Exercises Provided and Patient Education Provided     Education provided:   - Reviewed exercises as noted above, finalizing HEP    Written Home Exercises Provided: Instructed patient to continue prior HEP as tolerated. Exercises were reviewed and Lolly was able to demonstrate them prior to the end of the session. Lolly demonstrated good  understanding of the education provided. See EMR under Patient Instructions for exercises provided during therapy sessions.    ASSESSMENT     Lolly has made good progress in PT  "with improved knee ROM, improved LE flexibility, increased LE strength and decreased swelling of L Knee.  These improvements have contributed to decreased gait deviations with increased safety in gait, improved quality of transfers, and increased activity tolerance.  Currently her main limiting factor is her R knee for which she is scheduled for surgery on 2/26/2024.      Lolly has progressed well towards her goals.   Pt prognosis is Good.     Pt will continue to benefit from skilled outpatient physical therapy to address the deficits listed in the problem list box on initial evaluation, provide pt/family education and to maximize pt's level of independence in the home and community environment.     Pt's spiritual, cultural and educational needs considered and pt agreeable to plan of care and goals.     Anticipated barriers to physical therapy: Patient has severe arthritis of R knee which could interfere with therapy process.       Goals:  Short Term Goals:   1) Facilitate normalized scar mobility Met 1/17/2024  2) Facilitate resolution of tenderness around scar Met 1/24/2024  3) Improve AROM L knee to 0*-115* to assist with sit <> stand transfers Met 12/21/2023  4) Patient will safely ambulate over level surfaces with no more than straight cane Met at household level 1/22/2024     Long Term Goals:    1) Resolve gait deviations Partially met  2) Patient will safely navigate at least 6" steps utilizing L LE as power leg without increased L knee discomfort Partially met  3) Patient will consistently perform sit <> stand transfers demonstrating equal weight bearing and with no more than minimal UE support Met 1/24/2024  4) Increase walking tolerance to > 30 min to allow for completion of grocery shopping Met 1/17/2024  5) Improve functional score to > 65% as indicated by FOTO knee survey Met 1/24/2024  6) Patient will be independent in HEP. Met 1/24/2024    PLAN     Discharge from skilled PT at this time as patient can " continue HEP to maintain gains achieved as she prepares for upcoming R TKA.      Lolly Villafana, PT

## 2024-02-12 ENCOUNTER — HOSPITAL ENCOUNTER (OUTPATIENT)
Dept: RADIOLOGY | Facility: HOSPITAL | Age: 64
Discharge: HOME | End: 2024-02-12
Attending: ORTHOPAEDIC SURGERY
Payer: MEDICARE

## 2024-02-12 ENCOUNTER — HOSPITAL ENCOUNTER (OUTPATIENT)
Dept: PREADMISSION TESTING | Facility: HOSPITAL | Age: 64
Discharge: HOME | End: 2024-02-12
Attending: ORTHOPAEDIC SURGERY
Payer: MEDICARE

## 2024-02-12 VITALS — HEIGHT: 67 IN | WEIGHT: 257 LBS | BODY MASS INDEX: 40.34 KG/M2

## 2024-02-12 DIAGNOSIS — Z01.818 PRE-OP TESTING: ICD-10-CM

## 2024-02-12 DIAGNOSIS — M17.11 PRIMARY OSTEOARTHRITIS OF RIGHT KNEE: ICD-10-CM

## 2024-02-12 LAB
ALBUMIN SERPL BCP-MCNC: 3.6 G/DL (ref 3.5–5.2)
ALP SERPL-CCNC: 102 U/L (ref 55–135)
ALT SERPL W/O P-5'-P-CCNC: 13 U/L (ref 10–44)
ANION GAP SERPL CALC-SCNC: 10 MMOL/L (ref 8–16)
AST SERPL-CCNC: 15 U/L (ref 10–40)
BASOPHILS # BLD AUTO: 0.08 K/UL (ref 0–0.2)
BASOPHILS NFR BLD: 1.2 % (ref 0–1.9)
BILIRUB SERPL-MCNC: 0.2 MG/DL (ref 0.1–1)
BUN SERPL-MCNC: 13 MG/DL (ref 8–23)
CALCIUM SERPL-MCNC: 9.2 MG/DL (ref 8.7–10.5)
CHLORIDE SERPL-SCNC: 106 MMOL/L (ref 95–110)
CO2 SERPL-SCNC: 26 MMOL/L (ref 23–29)
CREAT SERPL-MCNC: 0.8 MG/DL (ref 0.5–1.4)
DIFFERENTIAL METHOD BLD: ABNORMAL
EOSINOPHIL # BLD AUTO: 0.4 K/UL (ref 0–0.5)
EOSINOPHIL NFR BLD: 6.1 % (ref 0–8)
ERYTHROCYTE [DISTWIDTH] IN BLOOD BY AUTOMATED COUNT: 17.4 % (ref 11.5–14.5)
EST. GFR  (NO RACE VARIABLE): >60 ML/MIN/1.73 M^2
GLUCOSE SERPL-MCNC: 76 MG/DL (ref 70–110)
HCT VFR BLD AUTO: 37 % (ref 37–48.5)
HGB BLD-MCNC: 11.2 G/DL (ref 12–16)
IMM GRANULOCYTES # BLD AUTO: 0.02 K/UL (ref 0–0.04)
IMM GRANULOCYTES NFR BLD AUTO: 0.3 % (ref 0–0.5)
LYMPHOCYTES # BLD AUTO: 1.7 K/UL (ref 1–4.8)
LYMPHOCYTES NFR BLD: 25 % (ref 18–48)
MCH RBC QN AUTO: 23.8 PG (ref 27–31)
MCHC RBC AUTO-ENTMCNC: 30.3 G/DL (ref 32–36)
MCV RBC AUTO: 79 FL (ref 82–98)
MONOCYTES # BLD AUTO: 0.7 K/UL (ref 0.3–1)
MONOCYTES NFR BLD: 9.4 % (ref 4–15)
NEUTROPHILS # BLD AUTO: 4 K/UL (ref 1.8–7.7)
NEUTROPHILS NFR BLD: 58 % (ref 38–73)
NRBC BLD-RTO: 0 /100 WBC
PLATELET # BLD AUTO: 339 K/UL (ref 150–450)
PMV BLD AUTO: 10.2 FL (ref 9.2–12.9)
POTASSIUM SERPL-SCNC: 3.9 MMOL/L (ref 3.5–5.1)
PROT SERPL-MCNC: 7.1 G/DL (ref 6–8.4)
RBC # BLD AUTO: 4.71 M/UL (ref 4–5.4)
SODIUM SERPL-SCNC: 142 MMOL/L (ref 136–145)
WBC # BLD AUTO: 6.91 K/UL (ref 3.9–12.7)

## 2024-02-12 PROCEDURE — 93010 ELECTROCARDIOGRAM REPORT: CPT | Mod: ,,, | Performed by: GENERAL PRACTICE

## 2024-02-12 PROCEDURE — 36415 COLL VENOUS BLD VENIPUNCTURE: CPT | Performed by: ORTHOPAEDIC SURGERY

## 2024-02-12 PROCEDURE — 93005 ELECTROCARDIOGRAM TRACING: CPT

## 2024-02-12 PROCEDURE — 73700 CT LOWER EXTREMITY W/O DYE: CPT | Mod: 26,RT,, | Performed by: RADIOLOGY

## 2024-02-12 PROCEDURE — 80053 COMPREHEN METABOLIC PANEL: CPT | Performed by: ORTHOPAEDIC SURGERY

## 2024-02-12 PROCEDURE — 85025 COMPLETE CBC W/AUTO DIFF WBC: CPT | Performed by: ORTHOPAEDIC SURGERY

## 2024-02-12 PROCEDURE — 73700 CT LOWER EXTREMITY W/O DYE: CPT | Mod: TC,RT

## 2024-02-12 NOTE — PRE ADMISSION SCREENING
Patient Name: Lolly Ramírez  YOB: 1960   MRN: [id number]     [facility]   Basic Mobility Inpatient Short Form 6 Clicks         How much difficulty does the patient currently have  Unable  A Lot  A Little  None      1. Turning over in bed (including adjusting bedclothes, sheets and blankets)?     1 []    2 [x]    3 []    4 []        2. Sitting down on and standing up from a chair with arms (e.g., wheelchair, bedside commode, etc.)     1 []  2 []  3 [x]     4 []      3. Moving from lying on back to sitting on the side of the bed?     1 []  2 []  3 []    4 [x]    How much help from another person does the patient currently need  Total  A Lot  A Little  None      4. Moving to and from a bed to a chair (including a wheelchair)?    1 []  2 []  3 [x]    4 []      5. Need to walk in hospital room?    1 []  2 [x]  3 []    4 []      6. Climbing 3-5 steps with a railing?    1 [x]  2 []  3 []    4 []       Raw Score:     15             CMS 0-100% Score:     57.70       %   Standardized Score:      39.45         CMS Modifier:        [facility]                                  [facility]   [facility] Short Form 6 Clicks Score Conversion Table*         *Use this form to convert -PAC Basic Mobility Inpatient Raw Scores.   [facility] Inpatient Basic Mobility Short Form Scoring Example   1. Add the number values associated with the response to each item. For example, items totals yield a Raw Score of 21.   2. Match the raw score to the t-Scale scores (t-Scale score = 50.25, SE = 4.69).   3. Find the associated CMS % (CMS % = 28.97%).   4. Locate the correct CMS Functional Modifier Code, or G Code (G code = CJ)     NOTE: Each -PAC Short Form has a separate conversion table. Make sure that you use the correct conversion table.       Instruction Manual - page 45 contains conversion table

## 2024-02-12 NOTE — DISCHARGE INSTRUCTIONS
To confirm, Your doctor has instructed you that surgery is scheduled for: 2/26/24 with Dr. Ricardo    Please report to [facility], Registration the morning of surgery. You must check-in and receive a wristband before going to your procedure.  [address] [place] [address] [place] [address]    Pre-Op will call the afternoon prior to surgery between 1:00 and 6:00 PM with the final arrival time.  Phone number: [phone]    PLEASE NOTE:  The surgery schedule has many variables which may affect the time of your surgery case.  Family members should be available if your surgery time changes.  Plan to be here the day of your procedure between 4-6 hours.    MEDICATIONS:  TAKE ONLY THESE MEDICATIONS WITH A SMALL SIP OF WATER THE MORNING OF YOUR PROCEDURE:      DO NOT TAKE THESE MEDICATIONS 5-7 DAYS PRIOR to your procedure or per your surgeon's request:   ASPIRIN, ALEVE, ADVIL, IBUPROFEN, FISH OIL VITAMIN E, HERBALS  (May take Tylenol)    ONLY if you are prescribed any types of blood thinners such as:  Aspirin, Coumadin, Plavix, Pradaxa, Xarelto, Aggrenox, Effient, Eliquis, Savasya, Brilinta, or any other, ask your surgeon whether you should stop taking them and how long before surgery you should stop.  You may also need to verify with the prescribing physician if it is ok to stop your medication.      INSTRUCTIONS IMPORTANT!!  Do not eat or drink anything between midnight and the time of your procedure- this includes gum, mints, and candy.  Do not smoke or drink alcoholic beverages 24 hours prior to your procedure.  Shower the night before AND the morning of your procedure with a Chlorhexidine wash such as Hibiclens or Dial antibacterial soap from the neck down.  Do not get it on your face or in your eyes.  You may use your own shampoo and face wash. This helps your skin to be as bacteria free as possible.    If you wear contact lenses, dentures, hearing aids or glasses, bring a container to put them in during  surgery and give to a family member for safe keeping.  Please leave all jewelry, piercing's and valuables at home. You must remove your false eyelashes prior to surgery.    DO NOT remove hair from the surgery site.  Do not shave the incision site unless you are given specific instructions to do so.    ONLY if you have been diagnosed with sleep apnea please bring your C-PAP machine.  ONLY if you wear home oxygen please bring your portable oxygen tank the day of your procedure.  ONLY if you have a history of OPEN HEART SURGERY you will need a clearance from your Cardiologist per Anesthesia.      ONLY for patients requiring bowel prep, written instructions will be given by your doctor's office.  ONLY if you have a neuro stimulator, please bring the controller with you the morning of surgery  ONLY if a type and screen test is needed before surgery, please return:  If your doctor has scheduled you for an overnight stay, bring a small overnight bag with any personal items you need.  Make arrangements in advance for transportation home by a responsible adult. You can not go home in an uber or a cab per hospital policy.  It is not safe to drive a vehicle during the 24 hours after anesthesia.          All  facilities and properties are tobacco free.  Smoking is NOT allowed.   If you have any questions about these instructions, call Pre-Op Admit  Nursing at [phone] or the Pre-Op Day Surgery Unit at [phone].

## 2024-02-12 NOTE — PRE ADMISSION SCREENING
"               [facility] Risk Assessment Scale    Patient Name: Lolly Ramírez  YOB: 1960  MRN: [id number]            RIsk Factor Measure Recommendation Patient Data Scale/Score   BMI >40 Reconsider surgery, weight loss   Estimated body mass index is 40.25 kg/m² as calculated from the following:    Height as of 1/4/24: 5' 7" (1.702 m).    Weight as of 1/4/24: 116.6 kg (257 lb).   [] 0 = 1 - 24.9  [] 1 = 25-29.9  [] 2 = 30-34.9  [] 3 = 35-39.9  [x] 4 = 40-44.9  [] 5 = 45-99.9   Hemoglobin AIC (if applicable) >9 Delay surgery until DM under control  Refer for:  Nutrition Therapy  Exercise   Medication    Lab Results   Component Value Date    HGBA1C 5.7 (H) 03/01/2023       Lab Results   Component Value Date    GLU 76 02/12/2024      [x] 0 = 4.0-5.6  [] 1 = 5.7-6.4  [] 2 = 6.5-6.9  [] 3 = 7.0-7.9  [] 4 = 8.0-8.9  [] 5 = 9.0-12   Hemoglobin (Anemia) <9 Delay surgery   Correct anemia Lab Results   Component Value Date    HGB 11.2 (L) 02/12/2024    [] 20 - <9.0                    Albumin <3 Delay surgery &Workup Lab Results   Component Value Date    ALBUMIN 3.6 02/12/2024    [] 20 - <3.0   Smoking Cessation >4 Weeks Delay Surgery  Refer to OP Cessation Class    Former smoker [] 20 - current smoker                                _____ PPD                    Hx of MI, PE, Arrhythmia, CVA, DVT <30 Days Delay Surgery    NA [] 20      Infection Variable Delay surgery and re-evaluate   NA [] 20 - recent/current infection     Depression (PHQ) >10 out of 27 Delay Surgery and re-evaluate  Medication  Counseling              [] 0     [x]1     []2     []3      []4      [] 5                    (1-4)      (5-9)  (10-14)  (15-19)   (20-27)  Medication effective dosage just increased   Memory Impairment & Memory loss (Mini-Cog Screening Tool) Advanced dementia and/or Parkinson's Reconsider surgery     [] 0     [x]1     []2     []3     []4     [] 5  "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"   Physical Conditioning (Modified AM-PAC Per Physical " Therapy at [facility]) Unable to ambulate on day of surgery Delay surgery and re-evaluate  Pre-Rehabilitation   (PT evaluation)       []  0   []4       [x]8     []12        []16     []20       (<20%)   (<40%)   (<60%)   (<80% )    (>80%)     Home Environment/Caregiver support  (Per /Navigator Interview)    Availability of basic services and/or approprate assistance during post-operative period Delay surgery and re-evaluate  Safe home environment  Health   1 week post-surgery  Transportation  availability  Ability to obtain DME/Medications post-op    [x] 0     []1     []2     []3     []4     [] 5  [x] 0     []1     []2     []3     []4     [] 5  [x] 0     []1     []2     []3     []4     [] 5  [x] 0     []1     []2     []3     []4     [] 5         MD Contact:  Comments:  Total Score:  14

## 2024-02-12 NOTE — PRE ADMISSION SCREENING
JOINT CAMP ASSESSMENT    Name Lolly Ramírez   MRN [id number]    Age/Sex 63 y.o. female    Surgeon Davion   Joint Camp Date 2024   Surgery Date [id number]   Procedure Right TKA   Insurance Payor: MEDICARE / Plan: MEDICARE PART A & B / Product Type: Government /    Care Team Patient Care Team:  Shaunna Gordon NP as PCP - General (Family Medicine)  Tj Heart III, MD as Consulting Physician (Gastroenterology)  Rodolfo Rodas MD as Consulting Physician (Interventional Cardiology)  Alfredito Tobar MD as Consulting Physician (Neurology)    Pharmacy   [id number] - [place], [place] - [address] AT [facility] [address]  [address]  [place] [address]  Phone: [phone] Fax: [phone]    [facility] - [place], [place] - [facility] [facility]  [address] [facility] [address] [address]  Phone: [phone] Fax: [phone]     AM-PAC Score   15   Risk Assessment Score 14     Past Medical History:   Diagnosis Date    Breast cancer, stage 1, estrogen receptor negative, left () 2020    Depression     Diabetes mellitus, type 2     GERD (gastroesophageal reflux disease)     HER2-positive carcinoma of left breast 2020    Hx of breast cancer     Hx of breast cancer - Her2Nu+/ER+ - 2011 12/3/2019    Insomnia     Lymphedema     Neuropathy of both feet     S/P lumpectomy, left breast 2020       Past Surgical History:   Procedure Laterality Date    ARTHROSCOPY OF SHOULDER WITH DECOMPRESSION OF SUBACROMIAL SPACE Right 2023    Procedure: ARTHROSCOPY, SHOULDER, WITH SUBACROMIAL SPACE DECOMPRESSION;  Surgeon: Curtis Ricardo MD;  Location: [facility];  Service: Orthopedics;  Laterality: Right;    BICEPS TENDON REPAIR Left 2005    BREAST BIOPSY Left     BREAST LUMPECTOMY Left      SECTION      1982, ,     CHOLECYSTECTOMY  2000    Elbow fx Left 2015    FRACTURE SURGERY Left 2016    elbow    HYSTERECTOMY  2013    KNEE ARTHROSCOPY  W/ MENISCAL REPAIR Left 2014    Lower back ruptured disc  06/2010    LYMPH NODE DISSECTION  2011    ROBOTIC ARTHROPLASTY, KNEE Left 11/20/2023    Procedure: ROBOTIC ARTHROPLASTY, KNEE, TOTAL, LEFT;  Surgeon: Curtis Ricardo MD;  Location: [facility];  Service: Orthopedics;  Laterality: Left;  [place]    SPINE SURGERY  2009    ruptured disc         Home Enviroment     Living Arrangement: Lives with spouse  Home Environment: 1-story house/ trailer, number of outside stairs: one, walk-in shower - elevated toilet seats  Home Safety Concerns: Pets in the home: dogs (2). Dogs sleep with pt. Cautioned about pets around fresh incisions    DISCHARGE CAREGIVER/SUPPORT SYSTEM     Identified post-op caregiver: Patient has spouse / significant other.  Patient's caregiver(s) will be able to provide physical assistance. Patient will have someone to assist overnight.      Caregiver present at pre-op interview:  Yes      PRE-OPERATIVE FUNCTIONAL STATUS     Employment: Unemployed    Pre-op Functional Status: Patient is independent with mobility/ambulation, transfers, ADL's, IADL's.    Use of assistive device for ambulation: walker  ADL: self care  ADL Limitations: difficulty with walking  Medical Restrictions: Unstable ambulation, Decreased range of motions in extremities, and Wound care needs    POTENTIAL BARRIERS TO DISCHARGE/POTENTIAL POST-OP COMPLICATIONS   Diabetes, GERD, attention deficit disorder, left breast CA with lumpectomy - lymphydema - LIMB ALERT  Video: watched prior to TKA surgery in Nov 2023 reviewed educational booklet      DISCHARGE PLAN     Expected LOS of 1 days or less for joint replacement discussed with patient. We also discussed a discharge path of HH for approximately two weeks with a transition to outpatient PT on the third week given no post-op complications.      Patient in agreement with discharge plan: Yes    Discharge to: Discharge home with home health (PT/OT) x2 weeks with transition to outpatient  PT     HH:  [facility] [place] Patient disclosure form completed and sent to case management for upload to the medical record.      OP PT: [facility]  [facility] [place]     Home DME: rolling walker and bedside commode    Needed DME at D/C: none     Rx: Per Dr. Ricardo at discharge     Meds to Beds: Yes  Patient expected to discharge on Aspirin 81mg by mouth twice daily for DVT prophylaxis. Coumadin Clinic Needed: No

## 2024-02-14 ENCOUNTER — ANESTHESIA EVENT (OUTPATIENT)
Dept: SURGERY | Facility: HOSPITAL | Age: 64
End: 2024-02-14
Payer: MEDICARE

## 2024-02-14 ENCOUNTER — TELEPHONE (OUTPATIENT)
Dept: ORTHOPEDICS | Facility: CLINIC | Age: 64
End: 2024-02-14

## 2024-02-14 DIAGNOSIS — M17.11 PRIMARY OSTEOARTHRITIS OF RIGHT KNEE: Primary | ICD-10-CM

## 2024-02-16 LAB
OHS QRS DURATION: 90 MS
OHS QTC CALCULATION: 420 MS

## 2024-02-22 DIAGNOSIS — M17.11 PRIMARY OSTEOARTHRITIS OF RIGHT KNEE: Primary | ICD-10-CM

## 2024-02-22 RX ORDER — ASPIRIN 81 MG/1
81 TABLET ORAL EVERY 12 HOURS
Qty: 60 TABLET | Refills: 0 | Status: SHIPPED | OUTPATIENT
Start: 2024-02-22 | End: 2024-06-10

## 2024-02-22 RX ORDER — DOCUSATE SODIUM 100 MG/1
100 CAPSULE, LIQUID FILLED ORAL 2 TIMES DAILY
Qty: 60 CAPSULE | Refills: 0 | Status: SHIPPED | OUTPATIENT
Start: 2024-02-22 | End: 2024-03-27

## 2024-02-22 RX ORDER — CELECOXIB 200 MG/1
200 CAPSULE ORAL 2 TIMES DAILY
Qty: 60 CAPSULE | Refills: 0 | Status: SHIPPED | OUTPATIENT
Start: 2024-02-22 | End: 2024-03-27

## 2024-02-22 RX ORDER — OXYCODONE AND ACETAMINOPHEN 7.5; 325 MG/1; MG/1
1 TABLET ORAL EVERY 6 HOURS PRN
Qty: 28 TABLET | Refills: 0 | Status: SHIPPED | OUTPATIENT
Start: 2024-02-22 | End: 2024-03-11 | Stop reason: SDUPTHER

## 2024-02-26 ENCOUNTER — HOSPITAL ENCOUNTER (OUTPATIENT)
Facility: HOSPITAL | Age: 64
Discharge: HOME | End: 2024-02-26
Attending: ORTHOPAEDIC SURGERY | Admitting: ORTHOPAEDIC SURGERY
Payer: MEDICARE

## 2024-02-26 ENCOUNTER — ANESTHESIA (OUTPATIENT)
Dept: SURGERY | Facility: HOSPITAL | Age: 64
End: 2024-02-26
Payer: MEDICARE

## 2024-02-26 DIAGNOSIS — Z01.818 PRE-OP TESTING: ICD-10-CM

## 2024-02-26 DIAGNOSIS — M17.11 PRIMARY OSTEOARTHRITIS OF RIGHT KNEE: Primary | ICD-10-CM

## 2024-02-26 PROCEDURE — 25000003 PHARM REV CODE 250: Performed by: ANESTHESIOLOGY

## 2024-02-26 PROCEDURE — 97110 THERAPEUTIC EXERCISES: CPT

## 2024-02-26 PROCEDURE — 97161 PT EVAL LOW COMPLEX 20 MIN: CPT

## 2024-02-26 PROCEDURE — 97116 GAIT TRAINING THERAPY: CPT

## 2024-02-26 PROCEDURE — 63600175 PHARM REV CODE 636 W HCPCS: Mod: JZ,JG | Performed by: ANESTHESIOLOGY

## 2024-02-26 PROCEDURE — 27200688 HC TRAY, SPINAL-HYPER/ ISOBARIC: Performed by: ANESTHESIOLOGY

## 2024-02-26 PROCEDURE — 71000039 HC RECOVERY, EACH ADD'L HOUR: Performed by: ORTHOPAEDIC SURGERY

## 2024-02-26 PROCEDURE — 71000033 HC RECOVERY, INTIAL HOUR: Performed by: ORTHOPAEDIC SURGERY

## 2024-02-26 PROCEDURE — 27201423 OPTIME MED/SURG SUP & DEVICES STERILE SUPPLY: Performed by: ORTHOPAEDIC SURGERY

## 2024-02-26 PROCEDURE — 27447 TOTAL KNEE ARTHROPLASTY: CPT | Mod: RT,,, | Performed by: ORTHOPAEDIC SURGERY

## 2024-02-26 PROCEDURE — 36000713 HC OR TIME LEV V EA ADD 15 MIN: Performed by: ORTHOPAEDIC SURGERY

## 2024-02-26 PROCEDURE — C1713 ANCHOR/SCREW BN/BN,TIS/BN: HCPCS | Performed by: ORTHOPAEDIC SURGERY

## 2024-02-26 PROCEDURE — D9220A PRA ANESTHESIA: Mod: ANES,,, | Performed by: ANESTHESIOLOGY

## 2024-02-26 PROCEDURE — 63600175 PHARM REV CODE 636 W HCPCS: Performed by: ORTHOPAEDIC SURGERY

## 2024-02-26 PROCEDURE — 71000015 HC POSTOP RECOV 1ST HR: Performed by: ORTHOPAEDIC SURGERY

## 2024-02-26 PROCEDURE — C9290 INJ, BUPIVACAINE LIPOSOME: HCPCS | Performed by: ANESTHESIOLOGY

## 2024-02-26 PROCEDURE — C1776 JOINT DEVICE (IMPLANTABLE): HCPCS | Performed by: ORTHOPAEDIC SURGERY

## 2024-02-26 PROCEDURE — 25000003 PHARM REV CODE 250: Performed by: ORTHOPAEDIC SURGERY

## 2024-02-26 PROCEDURE — 27200750 HC INSULATED NEEDLE/ STIMUPLEX: Performed by: ANESTHESIOLOGY

## 2024-02-26 PROCEDURE — 37000009 HC ANESTHESIA EA ADD 15 MINS: Performed by: ORTHOPAEDIC SURGERY

## 2024-02-26 PROCEDURE — 63600175 PHARM REV CODE 636 W HCPCS: Performed by: ANESTHESIOLOGY

## 2024-02-26 PROCEDURE — 99900035 HC TECH TIME PER 15 MIN (STAT)

## 2024-02-26 PROCEDURE — D9220A PRA ANESTHESIA: Mod: CRNA,,, | Performed by: NURSE ANESTHETIST, CERTIFIED REGISTERED

## 2024-02-26 PROCEDURE — 71000016 HC POSTOP RECOV ADDL HR: Performed by: ORTHOPAEDIC SURGERY

## 2024-02-26 PROCEDURE — 63600175 PHARM REV CODE 636 W HCPCS: Performed by: NURSE ANESTHETIST, CERTIFIED REGISTERED

## 2024-02-26 PROCEDURE — 25000003 PHARM REV CODE 250: Performed by: NURSE ANESTHETIST, CERTIFIED REGISTERED

## 2024-02-26 PROCEDURE — 36000712 HC OR TIME LEV V 1ST 15 MIN: Performed by: ORTHOPAEDIC SURGERY

## 2024-02-26 PROCEDURE — 37000008 HC ANESTHESIA 1ST 15 MINUTES: Performed by: ORTHOPAEDIC SURGERY

## 2024-02-26 PROCEDURE — 64447 NJX AA&/STRD FEMORAL NRV IMG: CPT | Performed by: ANESTHESIOLOGY

## 2024-02-26 DEVICE — SIMPLEX® HV IS A FAST-SETTING ACRYLIC RESIN FOR USE IN BONE SURGERY. MIXING THE TWO SEPARATE STERILE COMPONENTS PRODUCES A DUCTILE BONE CEMENT WHICH, AFTER HARDENING, FIXES THE IMPLANT AND TRANSFERS STRESSES PRODUCED DURING MOVEMENT EVENLY TO THE BONE. SIMPLEX® HV CEMENT POWDER ALSO CONTAINS INSOLUBLE ZIRCONIUM DIOXIDE AS AN X-RAY CONTRAST MEDIUM. SIMPLEX® HV DOES NOT EMIT A SIGNAL AND DOES NOT POSE A SAFETY RISK IN A MAGNETIC RESONANCE ENVIRONMENT.
Type: IMPLANTABLE DEVICE | Site: KNEE | Status: FUNCTIONAL
Brand: SIMPLEX HV

## 2024-02-26 DEVICE — PRIMARY TIBIAL BASE 5.: Type: IMPLANTABLE DEVICE | Site: KNEE | Status: FUNCTIONAL

## 2024-02-26 DEVICE — COMP FEM CRUCIATE CEM DZ 5 RT: Type: IMPLANTABLE DEVICE | Site: KNEE | Status: FUNCTIONAL

## 2024-02-26 DEVICE — INSERT TIBIAL SZ 5 9MMX3: Type: IMPLANTABLE DEVICE | Site: KNEE | Status: FUNCTIONAL

## 2024-02-26 DEVICE — PATELLA TRI 33X9 X3 POLYETHYLE: Type: IMPLANTABLE DEVICE | Site: KNEE | Status: FUNCTIONAL

## 2024-02-26 RX ORDER — ACETAMINOPHEN 500 MG
1000 TABLET ORAL
Status: COMPLETED | OUTPATIENT
Start: 2024-02-26 | End: 2024-02-26

## 2024-02-26 RX ORDER — BISACODYL 10 MG/1
10 SUPPOSITORY RECTAL DAILY
Status: CANCELLED | OUTPATIENT
Start: 2024-02-26

## 2024-02-26 RX ORDER — FAMOTIDINE 20 MG/1
20 TABLET, FILM COATED ORAL 2 TIMES DAILY
Status: CANCELLED | OUTPATIENT
Start: 2024-02-26

## 2024-02-26 RX ORDER — SODIUM CHLORIDE 0.9 % (FLUSH) 0.9 %
5 SYRINGE (ML) INJECTION
Status: DISCONTINUED | OUTPATIENT
Start: 2024-02-26 | End: 2024-02-26 | Stop reason: HOSPADM

## 2024-02-26 RX ORDER — DEXTROSE MONOHYDRATE AND SODIUM CHLORIDE 5; .45 G/100ML; G/100ML
INJECTION, SOLUTION INTRAVENOUS CONTINUOUS
Status: CANCELLED | OUTPATIENT
Start: 2024-02-26

## 2024-02-26 RX ORDER — MORPHINE SULFATE 2 MG/ML
2 INJECTION, SOLUTION INTRAMUSCULAR; INTRAVENOUS EVERY 4 HOURS PRN
Status: CANCELLED | OUTPATIENT
Start: 2024-02-26

## 2024-02-26 RX ORDER — FENTANYL CITRATE 50 UG/ML
25 INJECTION, SOLUTION INTRAMUSCULAR; INTRAVENOUS EVERY 5 MIN PRN
Status: DISCONTINUED | OUTPATIENT
Start: 2024-02-26 | End: 2024-02-26 | Stop reason: HOSPADM

## 2024-02-26 RX ORDER — BUPIVACAINE HYDROCHLORIDE 7.5 MG/ML
INJECTION, SOLUTION EPIDURAL; RETROBULBAR
Status: DISCONTINUED | OUTPATIENT
Start: 2024-02-26 | End: 2024-02-26

## 2024-02-26 RX ORDER — DEXAMETHASONE SODIUM PHOSPHATE 4 MG/ML
INJECTION, SOLUTION INTRA-ARTICULAR; INTRALESIONAL; INTRAMUSCULAR; INTRAVENOUS; SOFT TISSUE
Status: DISCONTINUED | OUTPATIENT
Start: 2024-02-26 | End: 2024-02-26

## 2024-02-26 RX ORDER — CELECOXIB 100 MG/1
200 CAPSULE ORAL 2 TIMES DAILY
Status: CANCELLED | OUTPATIENT
Start: 2024-02-26

## 2024-02-26 RX ORDER — PROPOFOL 10 MG/ML
VIAL (ML) INTRAVENOUS
Status: DISCONTINUED | OUTPATIENT
Start: 2024-02-26 | End: 2024-02-26

## 2024-02-26 RX ORDER — ONDANSETRON 4 MG/1
8 TABLET, ORALLY DISINTEGRATING ORAL EVERY 8 HOURS PRN
Status: CANCELLED | OUTPATIENT
Start: 2024-02-26

## 2024-02-26 RX ORDER — LIDOCAINE HYDROCHLORIDE 10 MG/ML
1 INJECTION, SOLUTION EPIDURAL; INFILTRATION; INTRACAUDAL; PERINEURAL ONCE
Status: DISCONTINUED | OUTPATIENT
Start: 2024-02-26 | End: 2024-02-26 | Stop reason: HOSPADM

## 2024-02-26 RX ORDER — OXYCODONE HYDROCHLORIDE 5 MG/1
5 TABLET ORAL
Status: DISCONTINUED | OUTPATIENT
Start: 2024-02-26 | End: 2024-02-26 | Stop reason: HOSPADM

## 2024-02-26 RX ORDER — POLYETHYLENE GLYCOL 3350 17 G/17G
17 POWDER, FOR SOLUTION ORAL DAILY
Status: CANCELLED | OUTPATIENT
Start: 2024-02-26

## 2024-02-26 RX ORDER — SODIUM CHLORIDE, SODIUM LACTATE, POTASSIUM CHLORIDE, CALCIUM CHLORIDE 600; 310; 30; 20 MG/100ML; MG/100ML; MG/100ML; MG/100ML
INJECTION, SOLUTION INTRAVENOUS CONTINUOUS
Status: DISCONTINUED | OUTPATIENT
Start: 2024-02-26 | End: 2024-02-26 | Stop reason: HOSPADM

## 2024-02-26 RX ORDER — OXYCODONE HYDROCHLORIDE 10 MG/1
10 TABLET ORAL EVERY 4 HOURS PRN
Status: CANCELLED | OUTPATIENT
Start: 2024-02-26

## 2024-02-26 RX ORDER — PREGABALIN 75 MG/1
75 CAPSULE ORAL ONCE
Status: DISCONTINUED | OUTPATIENT
Start: 2024-02-26 | End: 2024-02-26

## 2024-02-26 RX ORDER — MIDAZOLAM HYDROCHLORIDE 1 MG/ML
0.5 INJECTION INTRAMUSCULAR; INTRAVENOUS
Status: DISCONTINUED | OUTPATIENT
Start: 2024-02-26 | End: 2024-02-26 | Stop reason: HOSPADM

## 2024-02-26 RX ORDER — PREGABALIN 75 MG/1
150 CAPSULE ORAL
Status: COMPLETED | OUTPATIENT
Start: 2024-02-26 | End: 2024-02-26

## 2024-02-26 RX ORDER — CELECOXIB 100 MG/1
400 CAPSULE ORAL ONCE
Status: COMPLETED | OUTPATIENT
Start: 2024-02-26 | End: 2024-02-26

## 2024-02-26 RX ORDER — PROPOFOL 10 MG/ML
VIAL (ML) INTRAVENOUS CONTINUOUS PRN
Status: DISCONTINUED | OUTPATIENT
Start: 2024-02-26 | End: 2024-02-26

## 2024-02-26 RX ORDER — KETAMINE HYDROCHLORIDE 100 MG/ML
INJECTION, SOLUTION INTRAMUSCULAR; INTRAVENOUS
Status: DISCONTINUED | OUTPATIENT
Start: 2024-02-26 | End: 2024-02-26

## 2024-02-26 RX ORDER — LIDOCAINE HYDROCHLORIDE 20 MG/ML
INJECTION INTRAVENOUS
Status: DISCONTINUED | OUTPATIENT
Start: 2024-02-26 | End: 2024-02-26

## 2024-02-26 RX ORDER — ZOLPIDEM TARTRATE 5 MG/1
5 TABLET ORAL NIGHTLY PRN
Status: CANCELLED | OUTPATIENT
Start: 2024-02-26

## 2024-02-26 RX ORDER — ONDANSETRON HYDROCHLORIDE 2 MG/ML
INJECTION, SOLUTION INTRAMUSCULAR; INTRAVENOUS
Status: DISCONTINUED | OUTPATIENT
Start: 2024-02-26 | End: 2024-02-26

## 2024-02-26 RX ORDER — OXYCODONE HCL 10 MG/1
10 TABLET, FILM COATED, EXTENDED RELEASE ORAL ONCE
Status: COMPLETED | OUTPATIENT
Start: 2024-02-26 | End: 2024-02-26

## 2024-02-26 RX ORDER — BUPIVACAINE HYDROCHLORIDE 5 MG/ML
INJECTION, SOLUTION EPIDURAL; INTRACAUDAL
Status: COMPLETED | OUTPATIENT
Start: 2024-02-26 | End: 2024-02-26

## 2024-02-26 RX ORDER — TRANEXAMIC ACID 100 MG/ML
INJECTION, SOLUTION INTRAVENOUS
Status: DISCONTINUED | OUTPATIENT
Start: 2024-02-26 | End: 2024-02-26

## 2024-02-26 RX ADMIN — MIDAZOLAM HYDROCHLORIDE 1 MG: 1 INJECTION INTRAMUSCULAR; INTRAVENOUS at 11:02

## 2024-02-26 RX ADMIN — OXYCODONE HYDROCHLORIDE 10 MG: 10 TABLET, FILM COATED, EXTENDED RELEASE ORAL at 10:02

## 2024-02-26 RX ADMIN — PROPOFOL 50 MCG/KG/MIN: 10 INJECTION, EMULSION INTRAVENOUS at 12:02

## 2024-02-26 RX ADMIN — TRANEXAMIC ACID 1000 MG: 100 INJECTION, SOLUTION INTRAVENOUS at 12:02

## 2024-02-26 RX ADMIN — ONDANSETRON 4 MG: 2 INJECTION INTRAMUSCULAR; INTRAVENOUS at 12:02

## 2024-02-26 RX ADMIN — GLYCOPYRROLATE 0.2 MG: 0.2 INJECTION, SOLUTION INTRAMUSCULAR; INTRAVITREAL at 11:02

## 2024-02-26 RX ADMIN — BUPIVACAINE HYDROCHLORIDE 10 ML: 5 INJECTION, SOLUTION EPIDURAL; INTRACAUDAL; PERINEURAL at 11:02

## 2024-02-26 RX ADMIN — BUPIVACAINE 15 ML: 13.3 INJECTION, SUSPENSION, LIPOSOMAL INFILTRATION at 11:02

## 2024-02-26 RX ADMIN — PROPOFOL 20 MG: 10 INJECTION, EMULSION INTRAVENOUS at 12:02

## 2024-02-26 RX ADMIN — FENTANYL CITRATE 100 MCG: 50 INJECTION, SOLUTION INTRAMUSCULAR; INTRAVENOUS at 11:02

## 2024-02-26 RX ADMIN — DEXAMETHASONE SODIUM PHOSPHATE 8 MG: 4 INJECTION, SOLUTION INTRA-ARTICULAR; INTRALESIONAL; INTRAMUSCULAR; INTRAVENOUS; SOFT TISSUE at 12:02

## 2024-02-26 RX ADMIN — KETAMINE HYDROCHLORIDE 20 MG: 100 INJECTION, SOLUTION, CONCENTRATE INTRAMUSCULAR; INTRAVENOUS at 12:02

## 2024-02-26 RX ADMIN — KETOROLAC TROMETHAMINE: 30 INJECTION INTRAMUSCULAR; INTRAVENOUS at 12:02

## 2024-02-26 RX ADMIN — BUPIVACAINE HYDROCHLORIDE 1.4 ML: 7.5 INJECTION, SOLUTION EPIDURAL; RETROBULBAR at 12:02

## 2024-02-26 RX ADMIN — KETAMINE HYDROCHLORIDE 5 MG: 100 INJECTION, SOLUTION, CONCENTRATE INTRAMUSCULAR; INTRAVENOUS at 11:02

## 2024-02-26 RX ADMIN — LIDOCAINE HYDROCHLORIDE 75 MG: 20 INJECTION, SOLUTION INTRAVENOUS at 12:02

## 2024-02-26 RX ADMIN — SODIUM CHLORIDE, SODIUM GLUCONATE, SODIUM ACETATE, POTASSIUM CHLORIDE, MAGNESIUM CHLORIDE, SODIUM PHOSPHATE, DIBASIC, AND POTASSIUM PHOSPHATE: .53; .5; .37; .037; .03; .012; .00082 INJECTION, SOLUTION INTRAVENOUS at 10:02

## 2024-02-26 RX ADMIN — OXYCODONE 5 MG: 5 TABLET ORAL at 02:02

## 2024-02-26 RX ADMIN — CELECOXIB 400 MG: 100 CAPSULE ORAL at 10:02

## 2024-02-26 RX ADMIN — TRANEXAMIC ACID 1000 MG: 100 INJECTION, SOLUTION INTRAVENOUS at 01:02

## 2024-02-26 RX ADMIN — CEFAZOLIN 2 G: 2 INJECTION, POWDER, FOR SOLUTION INTRAMUSCULAR; INTRAVENOUS at 12:02

## 2024-02-26 RX ADMIN — ACETAMINOPHEN 1000 MG: 500 TABLET ORAL at 10:02

## 2024-02-26 RX ADMIN — MIDAZOLAM HYDROCHLORIDE 2 MG: 1 INJECTION INTRAMUSCULAR; INTRAVENOUS at 11:02

## 2024-02-26 RX ADMIN — SODIUM CHLORIDE, SODIUM GLUCONATE, SODIUM ACETATE, POTASSIUM CHLORIDE, MAGNESIUM CHLORIDE, SODIUM PHOSPHATE, DIBASIC, AND POTASSIUM PHOSPHATE: .53; .5; .37; .037; .03; .012; .00082 INJECTION, SOLUTION INTRAVENOUS at 12:02

## 2024-02-26 RX ADMIN — PREGABALIN 150 MG: 75 CAPSULE ORAL at 10:02

## 2024-02-26 NOTE — PLAN OF CARE
PT at bedside.      1659--Pt up to chair from stretcher per PT.      1734--Pt ambulating in hallway per PT.

## 2024-02-26 NOTE — ANESTHESIA PREPROCEDURE EVALUATION
02/26/2024  Lolly Ramírez is a 63 y.o., female.      Pre-op Assessment    I have reviewed the NPO Status.   I have reviewed the Medications.     Review of Systems  Anesthesia Hx:  No problems with previous Anesthesia                Social:  Non-Smoker       Cardiovascular:  Exercise tolerance: good                 ECG has been reviewed.                          Pulmonary:   COPD                     Hepatic/GI:     GERD, well controlled             Musculoskeletal:  Arthritis               Neurological:    Neuromuscular Disease,                                   Endocrine:  Diabetes, type 2         Obesity / BMI > 30, Morbid Obesity / BMI > 40  Psych:  Psychiatric History                  Physical Exam  General: Well nourished    Airway:  Mallampati: II   Mouth Opening: Normal  TM Distance: Normal  Tongue: Normal  Neck ROM: Normal ROM    Dental:  Intact    Chest/Lungs:  Clear to auscultation, Normal Respiratory Rate        Anesthesia Plan  Type of Anesthesia, risks & benefits discussed:    Anesthesia Type: Spinal  Intra-op Monitoring Plan: Standard ASA Monitors  Post Op Pain Control Plan: multimodal analgesia, IV/PO Opioids PRN and peripheral nerve block  Induction:  IV  Informed Consent: Patient consented to blood products? Yes  ASA Score: 3  Day of Surgery Review of History & Physical: H&P Update referred to the surgeon/provider.    Ready For Surgery From Anesthesia Perspective.     .

## 2024-02-26 NOTE — CARE UPDATE
02/26/24 1018   Patient Assessment/Suction   Level of Consciousness (AVPU) alert   Respiratory Effort Normal   Incentive Spirometer   $ Incentive Spirometer Charges proper technique demonstrated   Incentive Spirometer Predicted Level (mL) 1840   Administration (IS) proper technique demonstrated   Number of Repetitions (IS) 5   Level Incentive Spirometer (mL) 3000   Patient Tolerance (IS) good

## 2024-02-26 NOTE — H&P
CC/Indication for Procedure: 63 y.o. female with Pre-op testing [Z01.818]  Primary osteoarthritis of right knee [M17.11].    Patient scheduled for CA TOTAL KNEE ARTHROPLASTY [97675] (ROBOTIC ARTHROPLASTY, KNEE, TOTAL, RIGHT)  CA TOTAL KNEE ARTHROPLASTY [06703].    Past Medical History:   Diagnosis Date    Breast cancer, stage 1, estrogen receptor negative, left () 2020    Depression     Diabetes mellitus, type 2     GERD (gastroesophageal reflux disease)     HER2-positive carcinoma of left breast 2020    Hx of breast cancer     Hx of breast cancer - Her2Nu+/ER+ - 2011 12/3/2019    Insomnia     Lymphedema     Neuropathy of both feet     S/P lumpectomy, left breast 2020     Past Surgical History:   Procedure Laterality Date    ARTHROSCOPY OF SHOULDER WITH DECOMPRESSION OF SUBACROMIAL SPACE Right 2023    Procedure: ARTHROSCOPY, SHOULDER, WITH SUBACROMIAL SPACE DECOMPRESSION;  Surgeon: Curtis Ricardo MD;  Location: [facility] OR;  Service: Orthopedics;  Laterality: Right;    BICEPS TENDON REPAIR Left 2005    BREAST BIOPSY Left     BREAST LUMPECTOMY Left      SECTION      1982, ,     CHOLECYSTECTOMY  2000    Elbow fx Left 2015    FRACTURE SURGERY Left 2016    elbow    HYSTERECTOMY  2013    KNEE ARTHROSCOPY W/ MENISCAL REPAIR Left 2014    Lower back ruptured disc  2010    LYMPH NODE DISSECTION  2011    ROBOTIC ARTHROPLASTY, KNEE Left 2023    Procedure: ROBOTIC ARTHROPLASTY, KNEE, TOTAL, LEFT;  Surgeon: Curtis Ricardo MD;  Location: [facility];  Service: Orthopedics;  Laterality: Left;  [place]    SPINE SURGERY  2009    ruptured disc     Family History   Problem Relation Age of Onset    Cancer Mother     Breast cancer Mother     Parkinsonism Father     Diabetes Father     Breast cancer Maternal Aunt     Breast cancer Paternal Aunt     Cancer Maternal Aunt     Cancer Paternal Aunt     Cancer Daughter     Heart disease Maternal Grandmother     Heart disease Paternal  Grandmother      Social History     Socioeconomic History    Marital status:    Tobacco Use    Smoking status: Former     Current packs/day: 0.00     Average packs/day: 0.5 packs/day for 25.0 years (12.5 ttl pk-yrs)     Types: Cigarettes     Start date: 1985     Quit date: 2010     Years since quittin.2    Smokeless tobacco: Never   Substance and Sexual Activity    Alcohol use: Not Currently     Alcohol/week: 2.0 standard drinks of alcohol     Types: 2 Glasses of wine per week    Drug use: Never    Sexual activity: Yes     Partners: Male     Birth control/protection: Other-see comments, None     Comment: Hysterectomy     Social Determinants of Health     Financial Resource Strain: Low Risk  (2023)    Overall Financial Resource Strain (CARDIA)     Difficulty of Paying Living Expenses: Not hard at all   Food Insecurity: No Food Insecurity (2023)    Hunger Vital Sign     Worried About Running Out of Food in the Last Year: Never true     Ran Out of Food in the Last Year: Never true   Transportation Needs: No Transportation Needs (2023)    PRAPARE - Transportation     Lack of Transportation (Medical): No     Lack of Transportation (Non-Medical): No   Physical Activity: Inactive (2023)    Exercise Vital Sign     Days of Exercise per Week: 0 days     Minutes of Exercise per Session: 10 min   Stress: Stress Concern Present (2023)    [place] [employer] - Occupational Stress Questionnaire     Feeling of Stress : To some extent   Social Connections: Unknown (2023)    Social Connection and Isolation Panel [NHANES]     Frequency of Communication with Friends and Family: Three times a week     Frequency of Social Gatherings with Friends and Family: Once a week     Active Member of Clubs or Organizations: No     Attends Club or Organization Meetings: Never     Marital Status:    Housing Stability: Low Risk  (2023)    Housing Stability Vital  Sign     Unable to Pay for Housing in the Last Year: No     Number of Places Lived in the Last Year: 1     Unstable Housing in the Last Year: No       Review of patient's allergies indicates:   Allergen Reactions    Banana Hives    Codeine Nausea And Vomiting    Adhesive Rash    Dilaudid  [hydromorphone (bulk)] Rash    Hydromorphone hcl Rash         Current Facility-Administered Medications:     ceFAZolin 2 g in dextrose 5 % in water (D5W) 50 mL IVPB (MB+), 2 g, Intravenous, On Call Procedure, Curtis Ricardo MD    celecoxib capsule 400 mg, 400 mg, Oral, Once, Erica Scanlon MD    fentaNYL 50 mcg/mL injection 25 mcg, 25 mcg, Intravenous, Q5 Min PRN, Erica Scanlon MD    lactated ringers infusion, , Intravenous, Continuous, Erica Scanlon MD    LIDOcaine (PF) 10 mg/ml (1%) injection 10 mg, 1 mL, Intradermal, Once, Erica Scanlon MD    midazolam (VERSED) 1 mg/mL injection 0.5 mg, 0.5 mg, Intravenous, PRN, Erica Scanlon MD    pregabalin capsule 75 mg, 75 mg, Oral, Once, Erica Scanlon MD    tranexamic acid (CYKLOKAPRON) 1,000 mg in sodium chloride 0.9 % 100 mL IVPB (MB+), 1,000 mg, Intravenous, On Call Procedure, Curtis Ricardo MD    ROS:    Denies chest pain or palpitations  Denies shortness of breath  Denies fevers or chills  Denies chest pain  Denies abdominal pain    PE:    General Appearance: Well nourished  Orientation: Oriented to time, place, person  Mental Status: Alert  Heart: RRR  Lungs: CTA  Abdomen: Soft and non-tender    Anesthesia/Surgery risks, benefits and alternative options discussed and understood by patient/family.    This note was created using Dragon voice recognition software that occasionally misinterpreted phrases or words.

## 2024-02-26 NOTE — OP NOTE
[facility]  Surgery Department  Operative Note    SUMMARY     Date of Procedure: 2/26/2024     Procedure:  Right total knee arthroplasty.  Robotic    Surgeon(s) and Role:     * Curtis Ricardo MD - Primary    Assisting Surgeon:  Jose    Pre-Operative Diagnosis: Pre-op testing [Z01.818]  Primary osteoarthritis of right knee [M17.11]    Post-Operative Diagnosis: Post-Op Diagnosis Codes:     * Pre-op testing [Z01.818]     * Primary osteoarthritis of right knee [M17.11]    Anesthesia: Spinal    Intraoperative Findings:  Bone-on-bone arthritis right knee    Description of the Findings of the Procedure: Patient was placed on the operative table in the supine position.  The  knee was prepped and draped in the usual sterile manner for surgery.  An incision was made over the anterior aspect of the knee.  It was carried down sharply through the skin.  The medial parapatellar tissues were divided and the patella was taken laterally.  The effusion was aspirated.  The medial tibial plateau was taken down.  Two pins were placed just medial to the tibial tubercle for the proximal tibial array.  The tibial array was assembled.  We now placed the tibial checkpoint just medial to the tibial tubercle.  The knee was flexed to 90°.  Two pins were placed into the distal femur for the femoral array and the femoral array was assembled.  A femoral checkpoint was placed.  We now connected to the robotic system and determined the center of rotation of the hip.  The medial and lateral malleoli were identified.  The tibial and femoral check points were verified.  We now verified the femur and the tibia.  When this was completed the knee was brought into extension and the extension gap was determined and captured.  Similarly the flexion gap was determined and captured.  A robotic plan was created to balance the flexion-extension gap.  The plan was accepted.  When this was completed the robotic arm was brought into  position and the femur and the blade were both verified.  The cutting device was utilized and the femoral cuts were made.  Similarly the tibia was verified as was the tibial cutting blade and the tibial cut was made.  We now placed a femoral trial and the tibial trial.  We had full extension full flexion and completely balanced flexion-extension gaps.  This was determined both clinically and using the robotic measurements.  We now broached the tibia.  The patella was calibrated and cut and a button was placed.  The construct trialed perfectly with no lift-off.  We pulsed and irrigated.  We mixed bone cement and cemented 1st the tibia, next the femur and finally the patella.  All excess cement was removed at the time that we cemented and the construct was held in full extension until all the cement completely hardened.  We copiously irrigated again.  We closed the extensor mechanism with a combination of 2. FiberWire and a running Quill stitch.  We irrigated again and closed the subcu with 2-0 Stratafix.  We closed the skin with 4-0 Monocryl.  Sterile dressings were applied and the patient was noted to be in stable condition pending an x-ray and a neurovascular check.    Complications: No    Estimated Blood Loss (EBL): 25 mL           Implants:   Implant Name Type Inv. Item Serial No.  Lot No. LRB No. Used Action   PIN BONE [id number] - [id number]  PIN BONE [id number]  [employer] ROSY. [id number] Right 1 Implanted and Explanted   PIN FIXATION BONE 140X3.2MM - [id number]  PIN FIXATION BONE 140X3.2MM  [email or web address] ROSY. [id number] Right 1 Implanted and Explanted   CEMENT BONE SIMPLEX HV RADPQ - [id number]  CEMENT BONE SIMPLEX HV RADPQ  [employer] ROSY. [id number] Right 2 Implanted   COMP FEM CRUCIATE CONNIE DZ 5 RT - [id number]  COMP FEM CRUCIATE CONNIE DZ 5 RT  [employer] ROSY. [id number] Right 1 Implanted   PATELLA TRI 33X9 X3 POLYETHYLE - [id number]  PATELLA TRI 33X9 X3 POLYETHYLE  [employer] ROSY. [id number]  Right 1 Implanted   PRIMARY TIBIAL BASE 5. - [id number]  PRIMARY TIBIAL BASE 5.  [employer] ROSY. [id number] Right 1 Implanted   INSERT TIBIAL SZ 5 9MMX3 - [id number]  INSERT TIBIAL SZ 5 9MMX3  [employer] ROSY. [id number] Right 1 Implanted       Specimens:   Specimen (24h ago, onward)      None                    Condition: Good    Disposition: PACU - hemodynamically stable.              Discharge Note    SUMMARY     Admit Date: 2/26/2024    Discharge Date and Time:  02/26/2024 1:14 PM    Hospital Course (synopsis of major diagnoses, care, treatment, and services provided during the course of the hospital stay): Uneventful      Final Diagnosis: Post-Op Diagnosis Codes:     * Pre-op testing [Z01.818]     * Primary osteoarthritis of right knee [M17.11]    Disposition: Home or Self Care    Follow Up/Patient Instructions:     Medications:  Reconciled Home Medications:   Current Discharge Medication List        CONTINUE these medications which have NOT CHANGED    Details   ascorbic acid, vitamin C, (VITAMIN C) 500 MG tablet Take 500 mg by mouth once daily.      aspirin (ECOTRIN) 81 MG EC tablet Take 1 tablet (81 mg total) by mouth every 12 (twelve) hours.  Qty: 60 tablet, Refills: 0    Associated Diagnoses: Primary osteoarthritis of right knee      atomoxetine (STRATTERA) 60 MG capsule Take 1 capsule (60 mg total) by mouth once daily.  Qty: 90 capsule, Refills: 1    Associated Diagnoses: Attention deficit disorder, unspecified hyperactivity presence      b complex vitamins capsule Take 1 capsule by mouth once daily.      calcium carbonate (OS-JULIANNE) 600 mg calcium (1,500 mg) Tab Take 600 mg by mouth once.      celecoxib (CELEBREX) 200 MG capsule Take 1 capsule (200 mg total) by mouth 2 (two) times daily.  Qty: 60 capsule, Refills: 0    Associated Diagnoses: Primary osteoarthritis of right knee      furosemide (LASIX) 20 MG tablet Take 1 tablet (20 mg total) by mouth daily as needed.  Qty: 90 tablet, Refills: 3    Associated  Diagnoses: Physical exam      glucosamine-chondroitin 250-200 mg Tab Take 2 tablets by mouth once daily.      magnesium 250 mg Tab Take 250 mg by mouth once daily.      multivitamin capsule Take 1 capsule by mouth once daily.      pantoprazole (PROTONIX) 40 MG tablet Take 1 tablet (40 mg total) by mouth once daily.  Qty: 90 tablet, Refills: 3    Associated Diagnoses: Gastroesophageal reflux disease, unspecified whether esophagitis present      pregabalin (LYRICA) 200 MG Cap Take 200 mg by mouth 3 (three) times daily.      traZODone (DESYREL) 100 MG tablet Take 2 tablets (200 mg total) by mouth every evening.  Qty: 60 tablet, Refills: 5      venlafaxine (EFFEXOR-XR) 75 MG 24 hr capsule Take 3 capsules (225 mg total) by mouth every evening.  Qty: 270 capsule, Refills: 3      docusate sodium (COLACE) 100 MG capsule Take 1 capsule (100 mg total) by mouth 2 (two) times daily.  Qty: 60 capsule, Refills: 0    Associated Diagnoses: Primary osteoarthritis of right knee      oxyCODONE-acetaminophen (PERCOCET) 7.5-325 mg per tablet Take 1 tablet by mouth every 6 (six) hours as needed for Pain.  Qty: 28 tablet, Refills: 0    Comments: This prescription in the attached 3 other prescriptions are for postoperative use for the patient's scheduled total joint arthroplasty on Monday February 26, 2024.  This is for the meds to bed program.  Associated Diagnoses: Primary osteoarthritis of right knee           Discharge Procedure Orders   Diet general     Call MD for:  temperature >100.4     Call MD for:  persistent nausea and vomiting     Call MD for:  severe uncontrolled pain     Call MD for:  difficulty breathing, headache or visual disturbances     Call MD for:  redness, tenderness, or signs of infection (pain, swelling, redness, odor or green/yellow discharge around incision site)     Call MD for:  hives     Call MD for:  persistent dizziness or light-headedness     Call MD for:  extreme fatigue      Follow-up Information        [facility]    Specialties: Physical Therapy, Home Health Services  Why: As needed  Contact information:  [address] [facility] [address]  [place] [address] [phone]

## 2024-02-26 NOTE — DISCHARGE INSTRUCTIONS
"Discharge Instructions: After Your Surgery/Procedure  Youve just had surgery. During surgery you were given medicine called anesthesia to keep you relaxed and free of pain. After surgery you may have some pain or nausea. This is common. Here are some tips for feeling better and getting well after surgery.     Stay on schedule with your medication.   Going home  Your doctor or nurse will show you how to take care of yourself when you go home. He or she will also answer your questions. Have an adult family member or friend drive you home.      For your safety we recommend these precaution for the first 24 hours after your procedure:  Do not drive or use heavy equipment.  Do not make important decisions or sign legal papers.  Do not drink alcohol.  Have someone stay with you, if needed. He or she can watch for problems and help keep you safe.  Your concentration, balance, coordination, and judgement may be impaired for many hours after anesthesia.  Use caution when ambulating or standing up.     You may feel weak and "washed out" after anesthesia and surgery.      Subtle residual effects of general anesthesia or sedation with regional / local anesthesia can last more than 24 hours.  Rest for the remainder of the day or longer if your Doctor/Surgeon has advised you to do so.  Although you may feel normal within the first 24 hours, your reflexes and mental ability may be impaired without you realizing it.  You may feel dizzy, lightheaded or sleepy for 24 hours or longer.      Be sure to go to all follow-up visits with your doctor. And rest after your surgery for as long as your doctor tells you to.  Coping with pain  If you have pain after surgery, pain medicine will help you feel better. Take it as told, before pain becomes severe. Also, ask your doctor or pharmacist about other ways to control pain. This might be with heat, ice, or relaxation. And follow any other instructions your surgeon or nurse gives you.  Tips " for taking pain medicine  To get the best relief possible, remember these points:  Pain medicines can upset your stomach. Taking them with a little food may help.  Most pain relievers taken by mouth need at least 20 to 30 minutes to start to work.  Taking medicine on a schedule can help you remember to take it. Try to time your medicine so that you can take it before starting an activity. This might be before you get dressed, go for a walk, or sit down for dinner.  Constipation is a common side effect of pain medicines. Call your doctor before taking any medicines such as laxatives or stool softeners to help ease constipation. Also ask if you should skip any foods. Drinking lots of fluids and eating foods such as fruits and vegetables that are high in fiber can also help. Remember, do not take laxatives unless your surgeon has prescribed them.  Drinking alcohol and taking pain medicine can cause dizziness and slow your breathing. It can even be deadly. Do not drink alcohol while taking pain medicine.  Pain medicine can make you react more slowly to things. Do not drive or run machinery while taking pain medicine.  Your health care provider may tell you to take acetaminophen to help ease your pain. Ask him or her how much you are supposed to take each day. Acetaminophen or other pain relievers may interact with your prescription medicines or other over-the-counter (OTC) drugs. Some prescription medicines have acetaminophen and other ingredients. Using both prescription and OTC acetaminophen for pain can cause you to overdose. Read the labels on your OTC medicines with care. This will help you to clearly know the list of ingredients, how much to take, and any warnings. It may also help you not take too much acetaminophen. If you have questions or do not understand the information, ask your pharmacist or health care provider to explain it to you before you take the OTC medicine.  Managing nausea  Some people have an  upset stomach after surgery. This is often because of anesthesia, pain, or pain medicine, or the stress of surgery. These tips will help you handle nausea and eat healthy foods as you get better. If you were on a special food plan before surgery, ask your doctor if you should follow it while you get better. These tips may help:  Do not push yourself to eat. Your body will tell you when to eat and how much.  Start off with clear liquids and soup. They are easier to digest.  Next try semi-solid foods, such as mashed potatoes, applesauce, and gelatin, as you feel ready.  Slowly move to solid foods. Dont eat fatty, rich, or spicy foods at first.  Do not force yourself to have 3 large meals a day. Instead eat smaller amounts more often.  Take pain medicines with a small amount of solid food, such as crackers or toast, to avoid nausea.     Call your surgeon if  You still have pain an hour after taking medicine. The medicine may not be strong enough.  You feel too sleepy, dizzy, or groggy. The medicine may be too strong.  You have side effects like nausea, vomiting, or skin changes, such as rash, itching, or hives.       If you have obstructive sleep apnea  You were given anesthesia medicine during surgery to keep you comfortable and free of pain. After surgery, you may have more apnea spells because of this medicine and other medicines you were given. The spells may last longer than usual.   At home:  Keep using the continuous positive airway pressure (CPAP) device when you sleep. Unless your health care provider tells you not to, use it when you sleep, day or night. CPAP is a common device used to treat obstructive sleep apnea.  Talk with your provider before taking any pain medicine, muscle relaxants, or sedatives. Your provider will tell you about the possible dangers of taking these medicines.  © 3489-2336 [employer]. [address] [place] [address], [place] [address]. All rights reserved. This information is  not intended as a substitute for professional medical care. Always follow your healthcare professional's instructions.          Using an Incentive Spirometer    An incentive spirometer is a device that helps you do deep breathing exercises. These exercises expand your lungs, aid in circulation, and help prevent pneumonia. Deep breathing exercises also help you breathe better and improve the function of your lungs by:  Keeping your lungs clear  Strengthening your breathing muscles  Helping prevent respiratory complications or problems  The incentive spirometer gives you a way to take an active part in recover. A nurse or therapist will teach you breathing exercises. To do these exercises, you will breathe in through your mouth and not your nose. The incentive spirometer only works correctly if you breathe in through your mouth.  Steps to clear lungs  Step 1. Exhale normally. Then, inhale normally.  Relax and breathe out.  Step 2. Place your lips tightly around the mouthpiece.  Make sure the device is upright and not tilted.  Step 3. Inhale as much air as you can through the mouthpiece (don't breath through your nose).  Inhale slowly and deeply.  Hold your breath long enough to keep the balls or disk raised for at least 3 to 5 seconds, or as instructed by your healthcare provider.  Some spirometers have an indicator to let you know that you are breathing in too fast. If the indicator goes off, breathe in more slowly.  Step 4. Repeat the exercise regularly.  Do this exercise every hour while you're awake, or as instructed by your healthcare provider.  If you were taught deep breathing and coughing exercises, do them regularly as instructed by your healthcare provider.           Post op instructions for prevention of DVT  What is deep vein thrombosis?  Deep vein thrombosis (DVT) is the medical term for blood clots in the deep veins of the leg.  These blood clots can be dangerous.  A DVT can block a blood vessel and keep  blood from getting where it needs to go.  Another problem is that the clot can travel to other parts of the body such as the lungs.  A clot that travels to the lungs is called a pulmonary embolus (PE) and can cause serious problems with breathing which can lead to death.  Am I at risk for DVT/PE?  If you are not very active, you are at risk of DVT.  Anyone confined to bed, sitting for long periods of time, recovering from surgery, etc. increases the risk of DVT.  Other risk factors are cancer diagnosis, certain medications, estrogen replacement in any form,older age, obesity, pregnancy, smoking, history of clotting disorders, and dehydration.  How will I know if I have a DVT?  Swelling in the lower leg  Pain  Warmth, redness, hardness or bulging of the vein  If you have any of these symptoms, call your doctors office right away.  Some people will not have any symptoms until the clot moves to the lungs.  What are the symptoms of a PE?  Panting, shortness of breath, or trouble breathing  Sharp, knife-like chest pain when you breathe  Coughing or coughing up blood  Rapid heartbeat  If you have any of these symptoms or get worse quickly, call 911 for emergency treatment.  How can I prevent a DVT?  Avoid long periods of inactivity and dont cross your legs--get up and walk around every hour or so.  Stay active--walking after surgery is highly encouraged.  This means you should get out of the house and walk in the neighborhood.  Going up and down stairs will not impair healing (unless advised against such activity by your doctor).    Drink plenty of noncaffeinated, nonalcoholic fluids each day to prevent dehydration.  Wear special support stockings, if they have been advised by your doctor.  If you travel, stop at least once an hour and walk around.  Avoid smoking (assistance with stopping is available through your healthcare provider)  Always notify your doctor if you are not able to follow the post operative  instructions that are given to you at the time of discharge.  It may be necessary to prescribe one of the medications available to prevent DVT.          Exparel(bupivacaine) has been injected to provide approximately 72 hours of reduced pain after your surgery.  Do not remove the bracelet for five days.  Report to your doctor as soon as possible if you experience any of the following:   Restlessness   Anxiety   Speech problems    Lightheadedness   Numbness and tingling of the mouth and lips   Seizures    Metallic taste   Blurred vision   Tremors    Twitching   Depression   Extreme drowsiness  Avoid additional use of local anesthetics (such as dental procedures) for five days (96 hours).          We hope your stay was comfortable as you heal now, mend and rest.    For we have enjoyed taking care of you by giving your our best.    And as you get better, by regaining your health and strength;   We count it as a privilege to have served you and hope your time at [facility] was well spent.      Thank  You!!!

## 2024-02-26 NOTE — ANESTHESIA PROCEDURE NOTES
Adductor SS    Patient location during procedure: pre-op   Block not for primary anesthetic.  Reason for block: at surgeon's request and post-op pain management   Post-op Pain Location: right knee   Start time: 2/26/2024 11:12 AM  Timeout: 2/26/2024 11:11 AM   End time: 2/26/2024 11:15 AM    Staffing  Authorizing Provider: Florencio Ingram MD  Performing Provider: Florencio Ingram MD    Staffing  Performed by: Florencio Ingram MD  Authorized by: Florencio Ingram MD    Preanesthetic Checklist  Completed: patient identified, IV checked, site marked, risks and benefits discussed, surgical consent, monitors and equipment checked, pre-op evaluation and timeout performed  Peripheral Block  Patient position: supine  Prep: ChloraPrep  Patient monitoring: heart rate, cardiac monitor, continuous pulse ox, continuous capnometry and frequent blood pressure checks  Block type: adductor canal  Laterality: right  Injection technique: single shot  Needle  Needle type: Stimuplex   Needle gauge: 21 G  Needle length: 4 in  Needle localization: anatomical landmarks and ultrasound guidance   -ultrasound image captured on disc.  Assessment  Injection assessment: negative aspiration, negative parasthesia and local visualized surrounding nerve  Paresthesia pain: none  Heart rate change: no  Slow fractionated injection: yes  Pain Tolerance: comfortable throughout block and no complaints  Medications:    Medications: bupivacaine (pf) (MARCAINE) injection 0.5% - Perineural   10 mL - 2/26/2024 11:15:00 AM    Additional Notes  VSS.  [facility] RN monitoring vitals throughout procedure.  Patient tolerated procedure well.

## 2024-02-26 NOTE — TRANSFER OF CARE
"Anesthesia Transfer of Care Note    Patient: Lolly Ramírez    Procedure(s) Performed: Procedure(s) (LRB):  ROBOTIC ARTHROPLASTY, KNEE, TOTAL, RIGHT (Right)    Patient location: PACU    Anesthesia Type: spinal    Transport from OR: Transported from OR on 2-3 L/min O2 by NC with adequate spontaneous ventilation    Post pain: adequate analgesia    Post assessment: no apparent anesthetic complications    Post vital signs: stable    Level of consciousness: awake    Nausea/Vomiting: no nausea/vomiting    Complications: none    Transfer of care protocol was followed      Last vitals: Visit Vitals  BP (!) 142/65   Pulse 80   Resp 15   Ht 5' 7" (1.702 m)   Wt 113.4 kg (250 lb)   SpO2 96%   Breastfeeding No   BMI 39.16 kg/m²     "

## 2024-02-26 NOTE — ANESTHESIA PROCEDURE NOTES
Spinal    Diagnosis: arthritis  Patient location during procedure: OR  Start time: 2/26/2024 11:50 AM  Timeout: 2/26/2024 11:49 AM  End time: 2/26/2024 12:04 PM    Staffing  Authorizing Provider: Florencio Ingram MD  Performing Provider: Florencio Ingram MD    Staffing  Performed by: Florencio Ingram MD  Authorized by: Florencio Ingram MD    Preanesthetic Checklist  Completed: patient identified, IV checked, site marked, risks and benefits discussed, surgical consent, monitors and equipment checked, pre-op evaluation and timeout performed  Spinal Block  Patient position: sitting  Prep: ChloraPrep  Patient monitoring: heart rate, cardiac monitor and continuous pulse ox  Approach: midline  Location: L3-4  Injection technique: single shot  CSF Fluid: clear free-flowing CSF  Needle  Needle type: pencil-tip   Needle gauge: 25 G  Needle length: 3.5 in  Additional Documentation: incremental injection, negative aspiration for heme and no paresthesia on injection  Needle localization: anatomical landmarks  Assessment  Ease of block: difficult  Patient's tolerance of the procedure: no complaints and comfortable throughout block  Medications:    Medications: bupivacaine (pf) (MARCAINE) injection 0.75% - Intraspinal   1.4 mL - 2/26/2024 12:04:00 PM

## 2024-02-27 ENCOUNTER — OFFICE VISIT (OUTPATIENT)
Dept: ORTHOPEDICS | Facility: CLINIC | Age: 64
End: 2024-02-27
Payer: MEDICARE

## 2024-02-27 VITALS
HEIGHT: 67 IN | WEIGHT: 250 LBS | BODY MASS INDEX: 39.24 KG/M2 | SYSTOLIC BLOOD PRESSURE: 116 MMHG | HEART RATE: 93 BPM | RESPIRATION RATE: 15 BRPM | DIASTOLIC BLOOD PRESSURE: 66 MMHG | TEMPERATURE: 98 F | OXYGEN SATURATION: 98 %

## 2024-02-27 VITALS — HEIGHT: 67 IN | WEIGHT: 250 LBS | BODY MASS INDEX: 39.24 KG/M2

## 2024-02-27 DIAGNOSIS — Z96.651 S/P TOTAL KNEE ARTHROPLASTY, RIGHT: Primary | ICD-10-CM

## 2024-02-27 PROCEDURE — 99024 POSTOP FOLLOW-UP VISIT: CPT | Mod: S$GLB,POP,, | Performed by: ORTHOPAEDIC SURGERY

## 2024-02-27 PROCEDURE — G0180 MD CERTIFICATION HHA PATIENT: HCPCS | Mod: ,,, | Performed by: ORTHOPAEDIC SURGERY

## 2024-02-27 NOTE — PT/OT/SLP EVAL
Physical Therapy Evaluation and Discharge Note    Patient Name:  Lolly Ramírez   MRN:  [id number]    Recommendations:     Discharge Recommendations: Low Intensity Therapy  Discharge Equipment Recommendations: none   Barriers to discharge: None    Assessment:     Lolly Ramírez is a 63 y.o. female admitted with a medical diagnosis of right  TKA. .  At this time, patient is scheduled for discharge home and appears will be safe with mobility in the home.  Patient would benefit though from continued PT with HHPT to work on ROM and strengthening to further increase safety with mobility.       Recent Surgery: Procedure(s) (LRB):  ROBOTIC ARTHROPLASTY, KNEE, TOTAL, RIGHT (Right) Day of Surgery    Plan:     During this hospitalization, patient does not require further acute PT services.  Please re-consult if situation changes.      Subjective     Chief Complaint: weakness  Patient/Family Comments/goals: get stronger  Pain/Comfort:  Pain Rating 1: 6/10  Location - Side 1: Right  Location - Orientation 1: lower  Location 1: knee  Pain Addressed 1: Reposition, Cessation of Activity  Pain Rating Post-Intervention 1: 6/10    Patients cultural, spiritual, Yazdanism conflicts given the current situation:      Living Environment:  Currently lives with spouse in a 1 story home with 1 step entry.  Prior to admission, patients level of function was independent.  Equipment used at home: none.  DME owned (not currently used): rolling walker and bedside commode.  Upon discharge, patient will have assistance from family.    Objective:     Communicated with nurse prior to session.  Patient found supine with cryotherapy upon PT entry to room.    General Precautions: Standard, fall    Orthopedic Precautions:RLE weight bearing as tolerated   Braces:    Respiratory Status: Room air    Exams:  RLE ROM: Deficits: knee flexion 95 degrees taken in sitting  RLE Strength: Deficits: 3-/5 overall  LLE ROM: WFL  LLE Strength: WNL    Functional  Mobility:  Bed Mobility:     Supine to Sit: stand by assistance  Transfers:     Sit to Stand:  contact guard assistance with rolling walker  Bed to Chair: contact guard assistance with  rolling walker  using  Step Transfer  Gait: x 150 feet RW CGA    AM-PAC 6 CLICK MOBILITY  Total Score:        Treatment and Education:  Exercise to include ankle pumps, quad sets, heel slides, hip abd and LAQ. All done right LE x 10 reps  Gait training x 150 feet rW, x 200 feet RW CGA, x 250 feet rW sBA, up/down 1 4 inch step RW SBA    AM-PAC 6 CLICK MOBILITY  Total Score:      Patient left up in chair with call button in reach, nurse notified, and spouse present.    GOALS:   Multidisciplinary Problems       Physical Therapy Goals       Not on file                    History:     Past Medical History:   Diagnosis Date    Breast cancer, stage 1, estrogen receptor negative, left () 2020    Depression     Diabetes mellitus, type 2     GERD (gastroesophageal reflux disease)     HER2-positive carcinoma of left breast 2020    Hx of breast cancer     Hx of breast cancer - Her2Nu+/ER+ - 2019    Insomnia     Lymphedema     Neuropathy of both feet     S/P lumpectomy, left breast 2020       Past Surgical History:   Procedure Laterality Date    ARTHROSCOPY OF SHOULDER WITH DECOMPRESSION OF SUBACROMIAL SPACE Right 2023    Procedure: ARTHROSCOPY, SHOULDER, WITH SUBACROMIAL SPACE DECOMPRESSION;  Surgeon: Curtis Ricardo MD;  Location: [facility];  Service: Orthopedics;  Laterality: Right;    BICEPS TENDON REPAIR Left 2005    BREAST BIOPSY Left     BREAST LUMPECTOMY Left      SECTION      1982, 1985, 1986    CHOLECYSTECTOMY  2000    Elbow fx Left 2015    FRACTURE SURGERY Left 2016    elbow    HYSTERECTOMY  2013    KNEE ARTHROSCOPY W/ MENISCAL REPAIR Left 2014    Lower back ruptured disc  2010    LYMPH NODE DISSECTION  2011    ROBOTIC ARTHROPLASTY, KNEE Left 2023    Procedure: ROBOTIC  ARTHROPLASTY, KNEE, TOTAL, LEFT;  Surgeon: Curtis Ricardo MD;  Location: [facility];  Service: Orthopedics;  Laterality: Left;  [place]    SPINE SURGERY  2009    ruptured disc       Time Tracking:     PT Received On: 02/26/24  PT Start Time: 1648     PT Stop Time: 1734  PT Total Time (min): 46 min     Billable Minutes: Evaluation 20, Gait Training 16, and Therapeutic Exercise 10      02/26/2024

## 2024-02-27 NOTE — ANESTHESIA POSTPROCEDURE EVALUATION
Anesthesia Post Evaluation    Patient: Lolly Ramírez    Procedure(s) Performed: Procedure(s) (LRB):  ROBOTIC ARTHROPLASTY, KNEE, TOTAL, RIGHT (Right)    Final Anesthesia Type: spinal      Patient location during evaluation: PACU  Patient participation: Yes- Able to Participate  Level of consciousness: awake and alert and oriented  Post-procedure vital signs: reviewed and stable  Pain management: adequate  Airway patency: patent  CANELO mitigation strategies: Multimodal analgesia and Use of major conduction anesthesia (spinal/epidural) or peripheral nerve block  PONV status at discharge: No PONV  Anesthetic complications: no      Cardiovascular status: blood pressure returned to baseline  Respiratory status: unassisted, spontaneous ventilation and room air  Hydration status: euvolemic  Follow-up not needed.              Vitals Value Taken Time   /66 02/26/24 1646   Temp 36.6 °C (97.8 °F) 02/26/24 1350   Pulse 93 02/26/24 1646   Resp 15 02/26/24 1646   SpO2 98 % 02/26/24 1646         Event Time   Out of Recovery 15:36:00         Pain/Zac Score: Pain Rating Prior to Med Admin: 5 (2/26/2024  2:52 PM)  Zac Score: 10 (2/26/2024  3:15 PM)  Modified Zac Score: 19 (2/26/2024  5:55 PM)

## 2024-02-27 NOTE — PROGRESS NOTES
[facility] POST-OP NOTE    Subjective:           Chief Complaint:   Chief Complaint   Patient presents with    Right Knee - Post-op Evaluation     POD 1 1 right TKA 2.26.24 states pain is currently at 5/10 after pain medication       Past Medical History:   Diagnosis Date    Breast cancer, stage 1, estrogen receptor negative, left () 2020    Depression     Diabetes mellitus, type 2     GERD (gastroesophageal reflux disease)     HER2-positive carcinoma of left breast 2020    Hx of breast cancer     Hx of breast cancer - Her2Nu+/ER+ - 2019    Insomnia     Lymphedema     Neuropathy of both feet     S/P lumpectomy, left breast 2020       Past Surgical History:   Procedure Laterality Date    ARTHROSCOPY OF SHOULDER WITH DECOMPRESSION OF SUBACROMIAL SPACE Right 2023    Procedure: ARTHROSCOPY, SHOULDER, WITH SUBACROMIAL SPACE DECOMPRESSION;  Surgeon: Curtis Ricadro MD;  Location: [facility] OR;  Service: Orthopedics;  Laterality: Right;    BICEPS TENDON REPAIR Left 2005    BREAST BIOPSY Left     BREAST LUMPECTOMY Left      SECTION      1982, 1985,     CHOLECYSTECTOMY  2000    Elbow fx Left 2015    FRACTURE SURGERY Left 2016    elbow    HYSTERECTOMY  2013    KNEE ARTHROSCOPY W/ MENISCAL REPAIR Left 2014    Lower back ruptured disc  2010    LYMPH NODE DISSECTION  2011    ROBOTIC ARTHROPLASTY, KNEE Left 2023    Procedure: ROBOTIC ARTHROPLASTY, KNEE, TOTAL, LEFT;  Surgeon: Curtis Ricardo MD;  Location: [facility] OR;  Service: Orthopedics;  Laterality: Left;  [place]    SPINE SURGERY  2009    ruptured disc       Current Outpatient Medications   Medication Sig    ascorbic acid, vitamin C, (VITAMIN C) 500 MG tablet Take 500 mg by mouth once daily.    aspirin (ECOTRIN) 81 MG EC tablet Take 1 tablet (81 mg total) by mouth every 12 (twelve) hours.    atomoxetine (STRATTERA) 60 MG capsule Take 1 capsule (60 mg total) by mouth once daily.    b complex vitamins  capsule Take 1 capsule by mouth once daily.    calcium carbonate (OS-JULIANNE) 600 mg calcium (1,500 mg) Tab Take 600 mg by mouth once.    celecoxib (CELEBREX) 200 MG capsule Take 1 capsule (200 mg total) by mouth 2 (two) times daily.    docusate sodium (COLACE) 100 MG capsule Take 1 capsule (100 mg total) by mouth 2 (two) times daily.    furosemide (LASIX) 20 MG tablet Take 1 tablet (20 mg total) by mouth daily as needed.    glucosamine-chondroitin 250-200 mg Tab Take 2 tablets by mouth once daily.    magnesium 250 mg Tab Take 250 mg by mouth once daily.    multivitamin capsule Take 1 capsule by mouth once daily.    oxyCODONE-acetaminophen (PERCOCET) 7.5-325 mg per tablet Take 1 tablet by mouth every 6 (six) hours as needed for Pain.    pantoprazole (PROTONIX) 40 MG tablet Take 1 tablet (40 mg total) by mouth once daily.    pregabalin (LYRICA) 200 MG Cap Take 200 mg by mouth 3 (three) times daily.    traZODone (DESYREL) 100 MG tablet Take 2 tablets (200 mg total) by mouth every evening.    venlafaxine (EFFEXOR-XR) 75 MG 24 hr capsule Take 3 capsules (225 mg total) by mouth every evening.     No current facility-administered medications for this visit.       Review of patient's allergies indicates:   Allergen Reactions    Banana Hives    Codeine Nausea And Vomiting    Adhesive Rash    Dilaudid  [hydromorphone (bulk)] Rash    Hydromorphone hcl Rash       Family History   Problem Relation Age of Onset    Cancer Mother     Breast cancer Mother     Parkinsonism Father     Diabetes Father     Breast cancer Maternal Aunt     Breast cancer Paternal Aunt     Cancer Maternal Aunt     Cancer Paternal Aunt     Cancer Daughter     Heart disease Maternal Grandmother     Heart disease Paternal Grandmother        Social History     Socioeconomic History    Marital status:    Tobacco Use    Smoking status: Former     Current packs/day: 0.00     Average packs/day: 0.5 packs/day for 25.0 years (12.5 ttl pk-yrs)     Types:  Cigarettes     Start date: 1985     Quit date: 2010     Years since quittin.2    Smokeless tobacco: Never   Substance and Sexual Activity    Alcohol use: Not Currently     Alcohol/week: 2.0 standard drinks of alcohol     Types: 2 Glasses of wine per week    Drug use: Never    Sexual activity: Yes     Partners: Male     Birth control/protection: Other-see comments, None     Comment: Hysterectomy     Social Determinants of Health     Financial Resource Strain: Low Risk  (2023)    Overall Financial Resource Strain (CARDIA)     Difficulty of Paying Living Expenses: Not hard at all   Food Insecurity: No Food Insecurity (2023)    Hunger Vital Sign     Worried About Running Out of Food in the Last Year: Never true     Ran Out of Food in the Last Year: Never true   Transportation Needs: No Transportation Needs (2023)    PRAPARE - Transportation     Lack of Transportation (Medical): No     Lack of Transportation (Non-Medical): No   Physical Activity: Inactive (2023)    Exercise Vital Sign     Days of Exercise per Week: 0 days     Minutes of Exercise per Session: 10 min   Stress: Stress Concern Present (2023)    [employer] - Occupational Stress Questionnaire     Feeling of Stress : To some extent   Social Connections: Unknown (2023)    Social Connection and Isolation Panel [NHANES]     Frequency of Communication with Friends and Family: Three times a week     Frequency of Social Gatherings with Friends and Family: Once a week     Active Member of Clubs or Organizations: No     Attends Club or Organization Meetings: Never     Marital Status:    Housing Stability: Low Risk  (2023)    Housing Stability Vital Sign     Unable to Pay for Housing in the Last Year: No     Number of Places Lived in the Last Year: 1     Unstable Housing in the Last Year: No       History of present illness:  Patient returns today status post total knee  arthroplasty.  Denies any chest pain or shortness of breath.  Denies any calf pain.  Pain is well controlled.      Review of Systems:    Musculoskeletal:  See HPI      Objective:        Physical Examination:    Vital Signs:  There were no vitals filed for this visit.    Body mass index is 39.16 kg/m².    This a well-developed, well nourished patient in no acute distress.  They are alert and oriented and cooperative to examination.      Wounds are clean dry and intact.  Calf is soft.  Sensation is preserved.  Quad is active.      Assessment/Plan:      Stable postop day 1.  I went over the postoperative care with the patient in great detail.  We spoke about anticoagulation, pain control and a bowel program.  Patient clearly understands.  We will follow-up in approximately 10 days for suture removal or sooner if they develop increasing pain drainage calf pain or significant constipation.    This note was created using Dragon voice recognition software that occasionally misinterpreted phrases or words.

## 2024-02-27 NOTE — PLAN OF CARE
"Pt awake, alert, oriented. Vital signs stable. Pt reports she is "ready to go". Cleared for discharge by PT. Pt reports having all necessary DME. Discharge instructions reviewed with pt and pt's spouse. Pt and pt's spouse verbalized understanding. IV removed, catheter intact. Dressing to RLE clean, dry, and intact. All belongings returned to patient. Pt transferred to car via wheelchair. Safety maintained.   "

## 2024-02-27 NOTE — PROGRESS NOTES
2/27/24  Very pleased with care given.  States all staff were excellent and supportive.  States she was seen in MD office for scheduled post op visit.  States she has read and understands all discharge instructions.  HH coming this afternoon.

## 2024-03-04 ENCOUNTER — PATIENT MESSAGE (OUTPATIENT)
Dept: HEMATOLOGY/ONCOLOGY | Facility: CLINIC | Age: 64
End: 2024-03-04

## 2024-03-07 ENCOUNTER — TELEPHONE (OUTPATIENT)
Dept: HEMATOLOGY/ONCOLOGY | Facility: CLINIC | Age: 64
End: 2024-03-07

## 2024-03-07 DIAGNOSIS — R97.8 OTHER ABNORMAL TUMOR MARKERS: ICD-10-CM

## 2024-03-07 DIAGNOSIS — C50.912 BREAST CANCER, STAGE 1, ESTROGEN RECEPTOR NEGATIVE, LEFT: Primary | ICD-10-CM

## 2024-03-07 DIAGNOSIS — Z17.1 BREAST CANCER, STAGE 1, ESTROGEN RECEPTOR NEGATIVE, LEFT: Primary | ICD-10-CM

## 2024-03-11 ENCOUNTER — OFFICE VISIT (OUTPATIENT)
Dept: ORTHOPEDICS | Facility: CLINIC | Age: 64
End: 2024-03-11
Payer: MEDICARE

## 2024-03-11 VITALS — WEIGHT: 250 LBS | HEIGHT: 67 IN | BODY MASS INDEX: 39.24 KG/M2

## 2024-03-11 DIAGNOSIS — Z96.651 S/P TOTAL KNEE ARTHROPLASTY, RIGHT: Primary | ICD-10-CM

## 2024-03-11 DIAGNOSIS — M17.11 PRIMARY OSTEOARTHRITIS OF RIGHT KNEE: ICD-10-CM

## 2024-03-11 PROCEDURE — 99024 POSTOP FOLLOW-UP VISIT: CPT | Mod: S$GLB,POP,, | Performed by: PHYSICIAN ASSISTANT

## 2024-03-11 RX ORDER — OXYCODONE AND ACETAMINOPHEN 7.5; 325 MG/1; MG/1
1 TABLET ORAL EVERY 6 HOURS PRN
Qty: 28 TABLET | Refills: 0 | Status: SHIPPED | OUTPATIENT
Start: 2024-03-11 | End: 2024-06-10

## 2024-03-11 NOTE — PROGRESS NOTES
[facility]  [id number]  [place] [address]  Phone: [phone]   [phone]    Patient's PCP:Shaunna Gordon NP  Referring Provider: No ref. provider found    POST-OP Note:    Subjective:        Chief Complaint:   Chief Complaint   Patient presents with    Right Knee - Post-op Evaluation     S/p Right TKA 24, the knee is fine having pain in the muscles,        Past Medical History:   Diagnosis Date    Breast cancer, stage 1, estrogen receptor negative, left () 2020    Depression     Diabetes mellitus, type 2     GERD (gastroesophageal reflux disease)     HER2-positive carcinoma of left breast 2020    Hx of breast cancer     Hx of breast cancer - Her2Nu+/ER+ - 2019    Insomnia     Lymphedema     Neuropathy of both feet     S/P lumpectomy, left breast 2020       Past Surgical History:   Procedure Laterality Date    ARTHROSCOPY OF SHOULDER WITH DECOMPRESSION OF SUBACROMIAL SPACE Right 2023    Procedure: ARTHROSCOPY, SHOULDER, WITH SUBACROMIAL SPACE DECOMPRESSION;  Surgeon: Curtis Ricardo MD;  Location: [facility] OR;  Service: Orthopedics;  Laterality: Right;    BICEPS TENDON REPAIR Left 2005    BREAST BIOPSY Left     BREAST LUMPECTOMY Left      SECTION      1982, ,     CHOLECYSTECTOMY  2000    Elbow fx Left 2015    FRACTURE SURGERY Left 2016    elbow    HYSTERECTOMY  2013    KNEE ARTHROSCOPY W/ MENISCAL REPAIR Left 2014    Lower back ruptured disc  2010    LYMPH NODE DISSECTION  2011    ROBOTIC ARTHROPLASTY, KNEE Left 2023    Procedure: ROBOTIC ARTHROPLASTY, KNEE, TOTAL, LEFT;  Surgeon: Curtis Ricardo MD;  Location: [facility] OR;  Service: Orthopedics;  Laterality: Left;  [place]    ROBOTIC ARTHROPLASTY, KNEE Right 2024    Procedure: ROBOTIC ARTHROPLASTY, KNEE, TOTAL, RIGHT;  Surgeon: Curtis Ricardo MD;  Location: [facility] OR;  Service: Orthopedics;  Laterality: Right;  [place]    SPINE SURGERY      ruptured  disc       Current Outpatient Medications   Medication Sig    ascorbic acid, vitamin C, (VITAMIN C) 500 MG tablet Take 500 mg by mouth once daily.    aspirin (ECOTRIN) 81 MG EC tablet Take 1 tablet (81 mg total) by mouth every 12 (twelve) hours.    atomoxetine (STRATTERA) 60 MG capsule Take 1 capsule (60 mg total) by mouth once daily.    b complex vitamins capsule Take 1 capsule by mouth once daily.    calcium carbonate (OS-JULIANNE) 600 mg calcium (1,500 mg) Tab Take 600 mg by mouth once.    celecoxib (CELEBREX) 200 MG capsule Take 1 capsule (200 mg total) by mouth 2 (two) times daily.    docusate sodium (COLACE) 100 MG capsule Take 1 capsule (100 mg total) by mouth 2 (two) times daily.    furosemide (LASIX) 20 MG tablet Take 1 tablet (20 mg total) by mouth daily as needed.    glucosamine-chondroitin 250-200 mg Tab Take 2 tablets by mouth once daily.    magnesium 250 mg Tab Take 250 mg by mouth once daily.    multivitamin capsule Take 1 capsule by mouth once daily.    pantoprazole (PROTONIX) 40 MG tablet Take 1 tablet (40 mg total) by mouth once daily.    pregabalin (LYRICA) 200 MG Cap Take 200 mg by mouth 3 (three) times daily.    traZODone (DESYREL) 100 MG tablet Take 2 tablets (200 mg total) by mouth every evening.    venlafaxine (EFFEXOR-XR) 75 MG 24 hr capsule Take 3 capsules (225 mg total) by mouth every evening.    oxyCODONE-acetaminophen (PERCOCET) 7.5-325 mg per tablet Take 1 tablet by mouth every 6 (six) hours as needed for Pain.     No current facility-administered medications for this visit.       Review of patient's allergies indicates:   Allergen Reactions    Banana Hives    Codeine Nausea And Vomiting    Adhesive Rash    Dilaudid  [hydromorphone (bulk)] Rash    Hydromorphone hcl Rash       Family History   Problem Relation Age of Onset    Cancer Mother     Breast cancer Mother     Parkinsonism Father     Diabetes Father     Breast cancer Maternal Aunt     Breast cancer Paternal Aunt     Cancer Maternal  Aunt     Cancer Paternal Aunt     Cancer Daughter     Heart disease Maternal Grandmother     Heart disease Paternal Grandmother        Social History     Socioeconomic History    Marital status:    Tobacco Use    Smoking status: Former     Current packs/day: 0.00     Average packs/day: 0.5 packs/day for 25.0 years (12.5 ttl pk-yrs)     Types: Cigarettes     Start date: 1985     Quit date: 2010     Years since quittin.2    Smokeless tobacco: Never   Substance and Sexual Activity    Alcohol use: Not Currently     Alcohol/week: 2.0 standard drinks of alcohol     Types: 2 Glasses of wine per week    Drug use: Never    Sexual activity: Yes     Partners: Male     Birth control/protection: Other-see comments, None     Comment: Hysterectomy     Social Determinants of Health     Financial Resource Strain: Low Risk  (2023)    Overall Financial Resource Strain (CARDIA)     Difficulty of Paying Living Expenses: Not hard at all   Food Insecurity: No Food Insecurity (2023)    Hunger Vital Sign     Worried About Running Out of Food in the Last Year: Never true     Ran Out of Food in the Last Year: Never true   Transportation Needs: No Transportation Needs (2023)    PRAPARE - Transportation     Lack of Transportation (Medical): No     Lack of Transportation (Non-Medical): No   Physical Activity: Inactive (2023)    Exercise Vital Sign     Days of Exercise per Week: 0 days     Minutes of Exercise per Session: 10 min   Stress: Stress Concern Present (2023)    [employer] - Occupational Stress Questionnaire     Feeling of Stress : To some extent   Social Connections: Unknown (2023)    Social Connection and Isolation Panel [NHANES]     Frequency of Communication with Friends and Family: Three times a week     Active Member of Clubs or Organizations: No     Attends Club or Organization Meetings: Never     Marital Status:    Housing Stability: Low  Risk  (12/14/2023)    Housing Stability Vital Sign     Unable to Pay for Housing in the Last Year: No     Number of Places Lived in the Last Year: 1     Unstable Housing in the Last Year: No       History of present illness:  Lolly comes in today 2 weeks status post right total knee arthroplasty.  Comes in today for wound check and suture removal.  She is doing well.  She is ambulating with her rolling walker.  She is ready to transition from home health therapy to outpatient therapy.    Review of Systems:    Musculoskeletal:  See HPI       Objective:        Physical Examination:    Vital Signs: There were no vitals filed for this visit.    Body mass index is 39.16 kg/m².    This a well-developed, well nourished patient in no acute distress.  They are alert and oriented and cooperative to examination.        Right knee exam:  Skin to the right knee is clean dry and intact.  No erythema or ecchymosis.  No signs or symptoms of infection.  She is neurovascularly intact throughout the right lower extremity.  Right calf is soft and nontender.  Right knee range of motion 0-100 degrees.  The knee is stable to varus and valgus stresses.  She does have some irritation about the most inferior suture.  She can weightbear as tolerated on the right lower extremity.  She ambulates with a rolling walker.    Pertinent New Results:     XRAY Report / Interpretation:  No new radiographs taken on today's clinic.     Assessment:       1. S/P total knee arthroplasty, right    2. Primary osteoarthritis of right knee      Plan:     S/P total knee arthroplasty, right  -     Ambulatory referral/consult to Physical/Occupational Therapy; Future; Expected date: 03/18/2024    Primary osteoarthritis of right knee  -     oxyCODONE-acetaminophen (PERCOCET) 7.5-325 mg per tablet; Take 1 tablet by mouth every 6 (six) hours as needed for Pain.  Dispense: 28 tablet; Refill: 0        Follow up in about 4 weeks (around 4/8/2024) for Tues/Thurs, PO R  TKA.    Sutures removed from the right knee today.  She tolerated this well.  She was advised to clean the operative site once a day with warm soapy water and not apply any topical creams or ointments.  Do not submerge operative site underwater in a pool or bathtub type environment for least 1 more week.  She continue use an aspirin 81 mg by mouth twice a day for least 2 more weeks for a full month of postoperative DVT prophylaxis.  We will give her a prescription for outpatient physical therapy once home health is commenced.  Provided a refill of her Percocet 7.5/325 quantity 28.  We will see her back in 4 weeks to assess progress with therapy and obtain plain film radiographs of her right knee.      Arben Coombs, OSWALDO, PA-C    This note was created using [employer] voice recognition software that occasionally misinterprets words or phrases.

## 2024-03-12 LAB
ALBUMIN SERPL-MCNC: 3.8 G/DL (ref 3.6–5.1)
ALBUMIN/GLOB SERPL: 1.5 (CALC) (ref 1–2.5)
ALP SERPL-CCNC: 91 U/L (ref 37–153)
ALT SERPL-CCNC: 9 U/L (ref 6–29)
AST SERPL-CCNC: 11 U/L (ref 10–35)
BASOPHILS # BLD AUTO: 87 CELLS/UL (ref 0–200)
BASOPHILS NFR BLD AUTO: 0.9 %
BILIRUB SERPL-MCNC: 0.3 MG/DL (ref 0.2–1.2)
BUN SERPL-MCNC: 15 MG/DL (ref 7–25)
BUN/CREAT SERPL: ABNORMAL (CALC) (ref 6–22)
CALCIUM SERPL-MCNC: 9.3 MG/DL (ref 8.6–10.4)
CANCER AG125 SERPL-ACNC: 13 U/ML
CANCER AG15-3 SERPL-ACNC: 19 U/ML
CANCER AG27-29 SERPL-ACNC: 26 U/ML
CHLORIDE SERPL-SCNC: 105 MMOL/L (ref 98–110)
CO2 SERPL-SCNC: 27 MMOL/L (ref 20–32)
CREAT SERPL-MCNC: 0.75 MG/DL (ref 0.5–1.05)
EGFR: 90 ML/MIN/1.73M2
EOSINOPHIL # BLD AUTO: 563 CELLS/UL (ref 15–500)
EOSINOPHIL NFR BLD AUTO: 5.8 %
ERYTHROCYTE [DISTWIDTH] IN BLOOD BY AUTOMATED COUNT: 17.7 % (ref 11–15)
GLOBULIN SER CALC-MCNC: 2.5 G/DL (CALC) (ref 1.9–3.7)
GLUCOSE SERPL-MCNC: 156 MG/DL (ref 65–139)
HCT VFR BLD AUTO: 34.3 % (ref 35–45)
HGB BLD-MCNC: 10.3 G/DL (ref 11.7–15.5)
LYMPHOCYTES # BLD AUTO: 1377 CELLS/UL (ref 850–3900)
LYMPHOCYTES NFR BLD AUTO: 14.2 %
MCH RBC QN AUTO: 23.9 PG (ref 27–33)
MCHC RBC AUTO-ENTMCNC: 30 G/DL (ref 32–36)
MCV RBC AUTO: 79.6 FL (ref 80–100)
MONOCYTES # BLD AUTO: 621 CELLS/UL (ref 200–950)
MONOCYTES NFR BLD AUTO: 6.4 %
NEUTROPHILS # BLD AUTO: 7052 CELLS/UL (ref 1500–7800)
NEUTROPHILS NFR BLD AUTO: 72.7 %
PLATELET # BLD AUTO: 395 THOUSAND/UL (ref 140–400)
PMV BLD REES-ECKER: 11 FL (ref 7.5–12.5)
POTASSIUM SERPL-SCNC: 4.3 MMOL/L (ref 3.5–5.3)
PROT SERPL-MCNC: 6.3 G/DL (ref 6.1–8.1)
RBC # BLD AUTO: 4.31 MILLION/UL (ref 3.8–5.1)
SODIUM SERPL-SCNC: 144 MMOL/L (ref 135–146)
WBC # BLD AUTO: 9.7 THOUSAND/UL (ref 3.8–10.8)

## 2024-03-13 NOTE — PROGRESS NOTES
[facility] Hematology/Oncology  PROGRESS NOTE -   Follow-up Visit      Subjective:       Patient ID:   NAME: Lolly Ramírez : 1960     63 y.o. female    Referring Doc: Shaunna Gordon, ZENA  Other Physicians: Edgar; Amadou (Neuro); Macario Rodas           Chief Complaint: hx/of left breast ca    History of Present Illness:     Patient returns today for a regularly scheduled follow-up visit.  The patient is here today to go over the results of the recently ordered labs, tests and studies. She is here with her  today.     She is doing ok with no new issues. She has had bilateral total knees since last visit with right one done just two weeks ago with Dr Ricardo. The left was done in 2023 and she seems to be healing well. She is getting PT. Using walker to assist with ambulation.      Mammogram due in 2024    She saw Shaunna Gordon in Dec 2023 and sees her again in 2024    No CP, HA's or N/V. Breathing ok.      She had prior back nerve ablation  with Dr Villa with pain management and has not had any further debilitating pain.     Discussed covid19 precautions                   ROS:   GEN: normal without any fever, night sweats or weight loss; s/p bilateral knee surgeries  HEENT: normal with no HA's, sore throat, stiff neck, changes in vision  CV: normal with no CP, SOB, PND, LAKE or orthopnea  PULM: normal with no SOB, cough, hemoptysis, sputum or pleuritic pain  GI: normal with no abdominal pain, nausea, vomiting, constipation, diarrhea, melanotic stools, BRBPR, or hematemesis  : normal with no hematuria, dysuria  BREAST: normal with no mass, discharge, pain  SKIN: normal with no rash, erythema, bruising, or swelling; chronic dry skin and scratch marks on lower legs    Pain Scale: 8-9 postop    Allergies:  Review of patient's allergies indicates:   Allergen Reactions    Banana Hives    Codeine Nausea And Vomiting    Adhesive Rash    Dilaudid  [hydromorphone (bulk)] Rash    Hydromorphone hcl Rash        Medications:    Current Outpatient Medications:     ascorbic acid, vitamin C, (VITAMIN C) 500 MG tablet, Take 500 mg by mouth once daily., Disp: , Rfl:     aspirin (ECOTRIN) 81 MG EC tablet, Take 1 tablet (81 mg total) by mouth every 12 (twelve) hours., Disp: 60 tablet, Rfl: 0    atomoxetine (STRATTERA) 60 MG capsule, Take 1 capsule (60 mg total) by mouth once daily., Disp: 90 capsule, Rfl: 1    b complex vitamins capsule, Take 1 capsule by mouth once daily., Disp: , Rfl:     calcium carbonate (OS-JULIANNE) 600 mg calcium (1,500 mg) Tab, Take 600 mg by mouth once., Disp: , Rfl:     celecoxib (CELEBREX) 200 MG capsule, Take 1 capsule (200 mg total) by mouth 2 (two) times daily., Disp: 60 capsule, Rfl: 0    docusate sodium (COLACE) 100 MG capsule, Take 1 capsule (100 mg total) by mouth 2 (two) times daily., Disp: 60 capsule, Rfl: 0    furosemide (LASIX) 20 MG tablet, Take 1 tablet (20 mg total) by mouth daily as needed., Disp: 90 tablet, Rfl: 3    glucosamine-chondroitin 250-200 mg Tab, Take 2 tablets by mouth once daily., Disp: , Rfl:     magnesium 250 mg Tab, Take 250 mg by mouth once daily., Disp: , Rfl:     multivitamin capsule, Take 1 capsule by mouth once daily., Disp: , Rfl:     oxyCODONE-acetaminophen (PERCOCET) 7.5-325 mg per tablet, Take 1 tablet by mouth every 6 (six) hours as needed for Pain., Disp: 28 tablet, Rfl: 0    pantoprazole (PROTONIX) 40 MG tablet, Take 1 tablet (40 mg total) by mouth once daily., Disp: 90 tablet, Rfl: 3    pregabalin (LYRICA) 200 MG Cap, Take 200 mg by mouth 3 (three) times daily., Disp: , Rfl:     traZODone (DESYREL) 100 MG tablet, Take 2 tablets (200 mg total) by mouth every evening., Disp: 60 tablet, Rfl: 5    venlafaxine (EFFEXOR-XR) 75 MG 24 hr capsule, Take 3 capsules (225 mg total) by mouth every evening., Disp: 270 capsule, Rfl: 3    PMHx/PSHx Updates:  See patient's last visit with me on 3/16/2023  See H&P on 2/18/2020        Pathology:   Cancer Staging   No matching  "staging information was found for the patient.          Objective:     Vitals:  Blood pressure 136/88, pulse 89, temperature 98.3 °F (36.8 °C), resp. rate 20, height 5' 7" (1.702 m), weight 120.7 kg (266 lb 1.6 oz).    Physical Examination:   GEN: no apparent distress, comfortable; AAOx3; overweight  HEAD: atraumatic and normocephalic  EYES: no pallor, no icterus, PERRLA  ENT: OMM, no pharyngeal erythema, external ears WNL; no nasal discharge; no thrush  NECK: no masses, thyroid normal, trachea midline, no LAD/LN's, supple  CV: RRR with no murmur; normal pulse; normal S1 and S2; no pedal edema  CHEST: Normal respiratory effort; CTAB; normal breath sounds; no wheeze or crackles  ABDOM: nontender and nondistended; soft; normal bowel sounds; no rebound/guarding  MUSC/Skeletal: ROM normal; no crepitus; joints normal; no deformities or arthropathy  EXTREM: no clubbing, cyanosis, inflammation or swelling; s/p bilateral knee surgeries  SKIN: excoriations on legs primarily; psoriasis plaques on feet  : no red  NEURO: grossly intact; motor/sensory WNL; AAOx3; no tremors  PSYCH: normal mood, affect and behavior  LYMPH: normal cervical, supraclavicular, axillary and groin LN's  Breast: lumpectomy on left; no changes; she declined exam        Labs:     Lab Results   Component Value Date    WBC 9.7 03/11/2024    HGB 10.3 (L) 03/11/2024    HCT 34.3 (L) 03/11/2024    MCV 79.6 (L) 03/11/2024     03/11/2024       CMP  Sodium   Date Value Ref Range Status   03/11/2024 144 135 - 146 mmol/L Final   04/26/2019 141 134 - 144 mmol/L      Potassium   Date Value Ref Range Status   03/11/2024 4.3 3.5 - 5.3 mmol/L Final     Chloride   Date Value Ref Range Status   03/11/2024 105 98 - 110 mmol/L Final   04/26/2019 103 98 - 110 mmol/L      CO2   Date Value Ref Range Status   03/11/2024 27 20 - 32 mmol/L Final     Glucose   Date Value Ref Range Status   03/11/2024 156 (H) 65 - 139 mg/dL Final     Comment:               Non-fasting " reference interval        04/26/2019 81 70 - 99 mg/dL      BUN   Date Value Ref Range Status   03/11/2024 15 7 - 25 mg/dL Final     Creatinine   Date Value Ref Range Status   03/11/2024 0.75 0.50 - 1.05 mg/dL Final   04/26/2019 0.71 0.60 - 1.40 mg/dL      Calcium   Date Value Ref Range Status   03/11/2024 9.3 8.6 - 10.4 mg/dL Final     Total Protein   Date Value Ref Range Status   03/11/2024 6.3 6.1 - 8.1 g/dL Final     Albumin   Date Value Ref Range Status   03/11/2024 3.8 3.6 - 5.1 g/dL Final   04/26/2019 3.9 3.1 - 4.7 g/dL      Total Bilirubin   Date Value Ref Range Status   03/11/2024 0.3 0.2 - 1.2 mg/dL Final     Alkaline Phosphatase   Date Value Ref Range Status   02/12/2024 102 55 - 135 U/L Final     AST   Date Value Ref Range Status   03/11/2024 11 10 - 35 U/L Final     ALT   Date Value Ref Range Status   03/11/2024 9 6 - 29 U/L Final     Anion Gap   Date Value Ref Range Status   02/12/2024 10 8 - 16 mmol/L Final     eGFR if    Date Value Ref Range Status   03/15/2022 93 > OR = 60 mL/min/1.73m2 Final     eGFR if non    Date Value Ref Range Status   03/15/2022 80 > OR = 60 mL/min/1.73m2 Final   \  CA 15-3 <32 U/mL 19     CA 27-29 <38 U/mL 26       <35 U/mL 13      Radiology/Diagnostic Studies:    Mammo 4/18/2022:    Impression:     1. No mammographic evidence of malignancy and no detrimental change.  2. Yearly mammography is recommended.  BI-RADS CATEGORY 2: BENIGN FINDING.    mammo 3/24/2020:    Impression:     1. No mammographic evidence of malignancy.  2. Yearly mammography is recommended.  BI-RADS CATEGORY 2: BENIGN FINDING.    Ct Chest 5/1/2019:     IMPRESSION:  No acute pulmonary process    The abnormality on the chest radiograph most likely secondary to degenerative  changes of the thoracic spine    Small hiatal hernia        PET  12/17/2015 on chart    I have reviewed all available lab results and radiology reports.    Assessment/Plan:   (1) 63 y.o. female  with  diagnosis of history of left breast cancer who has been referred by Shaunna Gordon, ZENA for evaluation by medical hematology/oncology.   - She was diagnosed with Her2Nu positive breast cancer in 2011 and was treated with chemotherapy + herceptin, radiation and tamoxifen.   - T1c N0  - 1.4 cm  - Her2Nu +3; ER neg' [place] + but at only 3%  - she developed a severe rash to the tamoxifen in the past and it had to be discontinued  - she had prior lumpectomy    3/11/2021:  - she is due for mammogram this month  - last mammogram 3/24/2020    3/16/2022:  - she has mammo scheduled for next month  - basic labs are adequate  - awaiting tumor markers which are still in process    3/16/2023:  - due for mammo in March 2023  - labs adequate  - tumor markers are WNL  - bone density being ordered by PCP    3/14/2024:  - mammo due in June 2024  - latest breast tumor markers remain WNL's  - s/p bilateral total knees since last visit with right one done just two weeks ago with Dr Ricardo. The left was done in Nov 2023 and she seems to be healing well. She is getting PT. Using walker to assist with ambulation.   - mild anemia - possibly post-op related - check labs again in 6 months         (2) DM II     (3) GERD     (4) OA and bicep tendonitis     (5) Prior microcytic anemia issues in 2017 with GIB - s/p scope with Dr Heart in past     (6) Neuropathy - followed by Dr Tobar     (7) Former smoker (quit 2010)     (8) Chronic skin issues on legs - on creams - seen by derm in past     (9) Gastric sleeve     (10) Chronic back issues         VISIT DIAGNOSES:      Breast cancer, stage 1, estrogen receptor negative, left (2011)    Hx of breast cancer - Her2Nu+/ER+ - 2011    S/P lumpectomy, left breast    HER2-positive carcinoma of left breast          PLAN:  1. Check CBC, CMP. Breast cancer markers, iron panel every 6 months  2. mammogram scheduled for June 2024 - needs yearly  3. F/u with PCP, GI, card etc  4. Continue with PT for her knees and  ortho f/u as directed by ortho     RTC in  6 months   Fax note to Shaunna Gordon NP; [facility]; Katie Ricardo       Discussion:     COVID-19 Discussion:    I had long discussion with patient and any applicable family about the COVID-19 coronavirus epidemic and the recommended precautions with regard to cancer and/or hematology patients. I have re-iterated the CDC recommendations for adequate hand washing, use of hand -like products, and coughing into elbow, etc. In addition, especially for our patients who are on chemotherapy and/or our otherwise immunocompromised patients, I have recommended avoidance of crowds, including movie theaters, restaurants, churches, etc. I have recommended avoidance of any sick or symptomatic family members and/or friends. I have also recommended avoidance of any raw and unwashed food products, and general avoidance of food items that have not been prepared by themselves. The patient has been asked to call us immediately with any symptom developments, issues, questions or other general concerns.       I spent over 25 mins of time with the patient. Reviewed results of the recently ordered labs, tests and studies; made directives with regards to the results. Over half of this time was spent couseling and coordinating care.    I have explained all of the above in detail and the patient understands all of the current recommendation(s). I have answered all of their questions to the best of my ability and to their complete satisfaction.   The patient is to continue with the current management plan.            Electronically signed by Tray Simpson MD

## 2024-03-14 ENCOUNTER — OFFICE VISIT (OUTPATIENT)
Dept: HEMATOLOGY/ONCOLOGY | Facility: CLINIC | Age: 64
End: 2024-03-14
Payer: MEDICARE

## 2024-03-14 VITALS
RESPIRATION RATE: 20 BRPM | BODY MASS INDEX: 41.77 KG/M2 | TEMPERATURE: 98 F | SYSTOLIC BLOOD PRESSURE: 136 MMHG | HEIGHT: 67 IN | WEIGHT: 266.13 LBS | HEART RATE: 89 BPM | DIASTOLIC BLOOD PRESSURE: 88 MMHG

## 2024-03-14 DIAGNOSIS — C50.912 HER2-POSITIVE CARCINOMA OF LEFT BREAST: ICD-10-CM

## 2024-03-14 DIAGNOSIS — Z85.3 HX OF BREAST CANCER: ICD-10-CM

## 2024-03-14 DIAGNOSIS — Z98.890 S/P LUMPECTOMY, LEFT BREAST: ICD-10-CM

## 2024-03-14 DIAGNOSIS — C50.912 BREAST CANCER, STAGE 1, ESTROGEN RECEPTOR NEGATIVE, LEFT: Primary | ICD-10-CM

## 2024-03-14 DIAGNOSIS — Z17.1 BREAST CANCER, STAGE 1, ESTROGEN RECEPTOR NEGATIVE, LEFT: Primary | ICD-10-CM

## 2024-03-14 PROCEDURE — 99214 OFFICE O/P EST MOD 30 MIN: CPT | Mod: S$GLB,,, | Performed by: INTERNAL MEDICINE

## 2024-03-20 ENCOUNTER — CLINICAL SUPPORT (OUTPATIENT)
Dept: REHABILITATION | Facility: HOSPITAL | Age: 64
End: 2024-03-20
Payer: MEDICARE

## 2024-03-20 DIAGNOSIS — R26.9 GAIT DIFFICULTY: ICD-10-CM

## 2024-03-20 DIAGNOSIS — M25.661 DECREASED ROM OF RIGHT KNEE: ICD-10-CM

## 2024-03-20 DIAGNOSIS — Z96.651 S/P TOTAL KNEE ARTHROPLASTY, RIGHT: ICD-10-CM

## 2024-03-20 DIAGNOSIS — Z74.09 IMPAIRED FUNCTIONAL MOBILITY AND ACTIVITY TOLERANCE: Primary | ICD-10-CM

## 2024-03-20 PROCEDURE — 97162 PT EVAL MOD COMPLEX 30 MIN: CPT | Mod: PN

## 2024-03-20 PROCEDURE — 97140 MANUAL THERAPY 1/> REGIONS: CPT | Mod: PN

## 2024-03-20 NOTE — PLAN OF CARE
"[facility]   Physical Therapy Initial Evaluation      Name: Lolly Ramírez  Clinic Number: [id number]    Therapy Diagnosis:   Encounter Diagnoses   Name Primary?    Impaired functional mobility and activity tolerance Yes    Gait difficulty     Decreased ROM of right knee     S/P total knee arthroplasty, right         Physician: Arben Coombs, *    Physician Orders: PT Eval and Treat Knees  Medical Diagnosis from Referral: S/P total knee arthroplasty, right  Evaluation Date: 3/20/2024  Authorization Period Expiration: 03/11/2025  Plan of Care Expiration: 5/2/2024  Progress Note Due: 4/18/2024  Date of Surgery: 2/26/2024  Visit # / Visits authorized: 1/ 1   FOTO: 1/ 3   Next survey due week of 4/1/2024    Precautions: Diabetes, Fall, and cancer     Time In: 1240  Time Out: 1317  Total Billable Time: 34 minutes    Subjective     Date of onset: 2/26/2024    History of current condition - Lolly reports: she had problems with her R knee for quite a while.  Underwent R TKA 2/26/2024 without complication.  Discharged home same day.  Started home health the following day with last day being Wed 3/13/2024. Had follow up with ortho 3/11/2024 and is now being referred to PT.  Has history of L TKA 12/2023 and is doing well.      Falls: None since surgery.     Imaging: X-Ray Knee 1 or 2 View Right 2/26/2024:  FINDINGS per report:  "Interval right knee total arthroplasty hardware with no periprosthetic fracture or abnormal lucency to suggest loosening or infection.  No malalignment.  Iatrogenic periarticular gas".    Prior Therapy: Yes.    Social History: lives with their spouse in a 1 story home with 1 step to enter.   Occupation: Retired  Prior Level of Function: Ambulating without device, occasional toe drag, some difficulty with transfers due to R knee pain. Was driving.  Performing shower transfers without assistance.   Current Level of Function: Ambulating using rollator.  Uses furniture " for UE support when walking in narrow spaces. Not driving yet.  Some difficulty with transfers, Increased UE support required.  Assistance with transferring out of the shower.      Pain:  Current 5-6/10, worst 10/10, best 4/10   Location: right knee  anterior aspect  Description: intermittent dull and aching.    Aggravating Factors: standing/walking, transfers,   Easing Factors: ice, rest, elevation    Patients goals: To walk up the stairs so I can go camping (in a camper)     Medical History:   Past Medical History:   Diagnosis Date    Breast cancer, stage 1, estrogen receptor negative, left () 2020    Depression     Diabetes mellitus, type 2     GERD (gastroesophageal reflux disease)     HER2-positive carcinoma of left breast 2020    Hx of breast cancer     Hx of breast cancer - Her2Nu+/ER+ - 2019    Insomnia     Lymphedema     Neuropathy of both feet     S/P lumpectomy, left breast 2020       Surgical History:   Lolly Ramírez  has a past surgical history that includes Elbow fx (Left, );  section; Lower back ruptured disc (2010); Knee arthroscopy w/ meniscal repair (Left, ); Biceps tendon repair (Left, 2005); Cholecystectomy (); Hysterectomy (); Lymph node dissection (); Breast biopsy (Left); Breast lumpectomy (Left, ); Fracture surgery (Left, ); Spine surgery (); Arthroscopy of shoulder with decompression of subacromial space (Right, 2023); robotic arthroplasty, knee (Left, 2023); and robotic arthroplasty, knee (Right, 2024).    Medications:   Lolly has a current medication list which includes the following prescription(s): ascorbic acid (vitamin c), aspirin, atomoxetine, b complex vitamins, calcium carbonate, celecoxib, docusate sodium, furosemide, glucosamine-chondroitin, magnesium, multivitamin, oxycodone-acetaminophen, pantoprazole, pregabalin, trazodone, and venlafaxine.    Allergies:   Review of patient's  "allergies indicates:   Allergen Reactions    Banana Hives    Codeine Nausea And Vomiting    Adhesive Rash    Dilaudid  [hydromorphone (bulk)] Rash    Hydromorphone hcl Rash        Objective      Posture: Standing: Weight shifted toward L with minimal R knee flexion and minimal hip external rotation.  Sitting: Unremarkable   Palpation: Decreased scar mobility along entire incision scar with horizontal buckling in suprapatellar region.  Tenderness medial aspect of R knee  Sensation: Reports "numbness" R posteromedial calf and bottom of R foot "feels like I have a sock on with my toes hanging out"  DTRs: N/A  Range of Motion/Strength:    Knee  Right   Left  Pain/Dysfunction with Movement    AROM PROM MMT AROM PROM MMT    Flexion  96*  110*  4/5  116*  122*  4+/5    Extension  -4*  0*  4/5  0*  0*  4+/5    Hip ROM: WNL B   Strength: R hip flexion 4-/5, L 4+/5; otherwise B hips 4+//5    Flexibility: Hamstring length R 165*, L 175*; Piriformis R minimal to moderate tightness, L minimal tightness.  Calves moderate tightness B  Gait: With AD.  Device Used -  4 -wheeled walker  Analysis: Decreased weight shift to R, decreased stance time on R,   Bed Mobility: Modified Independent with increased time required to perform task  Transfers: Assistive Device Rollator, Modified independent with increased time required and increased UE support required.   Special Tests: Patellar mobility: decreased R superior <> inferior glide  Other:    Girth:  Knee     Right  Left  @ joint line  44.1 cm 42.3 cm  @ 5 cm proximal 51.5 cm 49.8 cm  @ 10 cm proximal 54.4 cm 50.9 cm  @ 5 cm distal   40.0 cm 39.6 cm  @ 10 cm distal 40.0 cm 41.1 cm     Intake Outcome Measure for FOTO Knee Survey    Therapist reviewed FOTO scores for Lolly Lozanoncer on 3/20/2024.   FOTO report - see Media section or FOTO account episode details.    Intake Score: 70%    Primary Measure    Score Range    Intake Score    Score Interpretation    JR. HUMBERTO              0 - 100  "             76.3                   Lower Score = Greater Disability         Treatment     Total Treatment time (time-based codes) separate from Evaluation: 12 minutes     Lolly received the treatments listed below:      therapeutic exercises to develop ROM for 4 minutes including:  Recumbent bike L1 (initially self stretch) x 4'    manual therapy techniques: Joint mobilizations and Soft tissue Mobilization were applied to the: R knee for 8 minutes, including:   Patellar mobilization (superior <> inferior glide)    Scar mobilization utilizing skin rolling, strumming, deep pressure   Kinesiology taping using star technique over R suprapatellar region (to reduce skin buckling)    Patient Education and Home Exercises     Education provided:   - Discussed care and removal of kinesiology tape. Reviewed proposed POC    Written Home Exercises Provided:  To be issued during subsequent visits . Exercises were reviewed and Lolly was able to demonstrate them prior to the end of the session.  Lolly demonstrated good  understanding of the education provided. See EMR under Patient Instructions for exercises provided during therapy sessions.    Assessment     Lolly is a 63 y.o. female referred to outpatient Physical Therapy with a medical diagnosis of S/P total knee arthroplasty, right. Patient presents with therapeutic diagnoses of functional mobility/activity tolerance, gait difficulty, and decreased ROM of R knee.  Additional problems include: increaseds swelling of R knee, decreased scar mobility, impaired patellar mobility, and decreased LE strength.  These problems contribute to the following limitations:  impaired gait, inability to drive, increased fall risk, difficulty with transfers.  Lolly will benefit from skilled PT services to address these problems in order to minimize R knee pain/swelling, to improve LE strength, to minimize difficulty with transfers, to return to driving, to maximize activity tolerance.    Patient  prognosis is Good.   Patient will benefit from skilled outpatient Physical Therapy to address the deficits stated above and in the chart below, provide patient /family education, and to maximize patientt's level of independence.     Plan of care discussed with patient: Yes  Patient's spiritual, cultural and educational needs considered and patient is agreeable to the plan of care and goals as stated below:     Anticipated Barriers for therapy: Transportation    Medical Necessity is demonstrated by the following  History  Co-morbidities and personal factors that may impact the plan of care [] LOW: no personal factors / co-morbidities  [] MODERATE: 1-2 personal factors / co-morbidities  [x] HIGH: 3+ personal factors / co-morbidities    Moderate / High Support Documentation:   Co-morbidities affecting plan of care: Cancer, Depression, diabetes, High BMI, Neuropathy, h/o recent L TKA, h/o Achilles tendon rupture with subsequent repair.     Personal Factors:   no deficits     Examination  Body Structures and Functions, activity limitations and participation restrictions that may impact the plan of care [] LOW: addressing 1-2 elements  [] MODERATE: 3+ elements  [x] HIGH: 4+ elements (please support below)    Moderate / High Support Documentation: Gait impairment, difficulty with transfers, increased fall risk, inability to drive.      Clinical Presentation [] LOW: stable  [x] MODERATE: Evolving  [] HIGH: Unstable     Decision Making/ Complexity Score: moderate       Goals:  Short Term Goals: 3 weeks   1) Decrease swelling by 1 cm at each affected level  2) Facilitate improved scar mobility  3) Normalize patellar mobility  4) Patient will consistently stand with equal weight bearing with minimal - no UE support    Long Term Goals: 6 weeks   1) Patient will resume driving  2) Patient will consistently perform car transfers without significant difficulty  3) Patient will safely ambulate over level surfaces with no more than  cane for assistive device  4) Patient will step into/out of tub safely without assistance  5) Patient will consistently demonstrate equal weight bearing throughout sit <> stand transfers using no more than minimal UE support.  6) Improve functional score to > 78% as indicated by FOTO knee survey  7) Patient will be independent in HEP.      Plan     Plan of care Certification: 3/20/2024 to 5/2/2024.    Outpatient Physical Therapy 2 times weekly for 6 weeks to include the following interventions: Electrical Stimulation (IFC/NMES), Gait Training, Manual Therapy, Moist heat/cold packs, Patient Education, Therapeutic Activity, Therapeutic Exercise, Therapeutic Taping. Lolly may be treated by PTA as part of their rehab team.     Lolly Villafana, STEFANIE        Physician's Signature: _________________________________________ Date: ________________

## 2024-03-22 ENCOUNTER — CLINICAL SUPPORT (OUTPATIENT)
Dept: REHABILITATION | Facility: HOSPITAL | Age: 64
End: 2024-03-22
Payer: MEDICARE

## 2024-03-22 DIAGNOSIS — Z74.09 IMPAIRED FUNCTIONAL MOBILITY AND ACTIVITY TOLERANCE: Primary | ICD-10-CM

## 2024-03-22 DIAGNOSIS — R26.9 GAIT DIFFICULTY: ICD-10-CM

## 2024-03-22 DIAGNOSIS — M25.661 DECREASED ROM OF RIGHT KNEE: ICD-10-CM

## 2024-03-22 DIAGNOSIS — Z96.651 S/P TOTAL KNEE ARTHROPLASTY, RIGHT: ICD-10-CM

## 2024-03-22 PROCEDURE — 97110 THERAPEUTIC EXERCISES: CPT | Mod: PN,CQ

## 2024-03-22 PROCEDURE — 97140 MANUAL THERAPY 1/> REGIONS: CPT | Mod: PN,CQ

## 2024-03-22 NOTE — PROGRESS NOTES
"[facility]   Physical Therapy Treatment Note     Name: Lolly Lozanoncer  Clinic Number: [id number]    Therapy Diagnosis:   Encounter Diagnoses   Name Primary?    Impaired functional mobility and activity tolerance Yes    Gait difficulty     Decreased ROM of right knee     S/P total knee arthroplasty, right      Physician: Arben Coombs, *    Visit Date: 3/22/2024    Physician Orders: PT Eval and Treat Knees  Medical Diagnosis from Referral: S/P total knee arthroplasty, right  Evaluation Date: 3/20/2024  Authorization Period Expiration: 03/11/2025  Plan of Care Expiration: 5/2/2024  Progress Note Due: 4/18/2024  Date of Surgery: 2/26/2024  Visit # / Visits authorized: 1/ 6 (2 total)  FOTO: 1/ 3              Next survey due week of 4/1/2024     Precautions: Diabetes, Fall, and cancer     PTA Visit #: 1/5     Time In: 1444  Time Out: 1528  Total Billable Time: 44 minutes    SUBJECTIVE     Pt reports: "I told my  if he would get the door for me, I would leave the walker at home." "The tape broke me out a little bit." "I actually got out of the shower by myself this morning." "On the side of the knee something like a tendon rolls sometimes." "I have been getting a lot of dizzy spells lately."  She was compliant with home exercise program from previous bout of PT.  Response to previous treatment: Reaction to kinesiology tape  Functional change: Increasing knee range of motion, independent home ambulation    Pain: 5/10  Location: right knee      OBJECTIVE     Objective Measures updated at progress report unless specified.     Treatment     Lolly received the treatments listed below:      therapeutic exercises to develop strength, endurance, ROM, and flexibility for 36 minutes including:  Recumbent bike with seat at 5 for self stretch x6'  Seated R hamstring stretch 3x30s  LAQ using 1# x20 (0* extension)  March using 1# 2x10  AAROM x10 (105* knee flexion)  Standing gastroc stretches " "3x30s  Alternate toe taps to 8" step using 1# x2'  Supine quad stretches 3x30s  SLR using 1# 2x10  SAQ using 1# x20  Heel slides AROM x10 & x10 AAROM using strap (112* knee flexion)    To be added:   Standing marches, Standing SLR, Closed chain TKE using theraband & gradually incorporate step navigation    manual therapy techniques: Soft tissue Mobilization and Friction Massage were applied to the: Right knee for 8 minutes, including:  STM: strumming to lateral knee, heads of gastroc, and mid to medial distal hamstrings  Retrograde and Cross friction scar massage    Patient Education and Home Exercises     Home Exercises Provided and Patient Education Provided     Education provided:   - The patient was instructed in initial treatment program as stated above.    Written Home Exercises Provided: Patient instructed to cont prior HEP provided during previous PT for contralateral TKA. Exercises were reviewed and Lolly was able to demonstrate them prior to the end of the session.  Lolly demonstrated good  understanding of the education provided. See EMR under Patient Instructions for exercises provided during therapy sessions.    ASSESSMENT     The patient reported to physical therapy stating moderate knee pain with increased dizziness. Lolly Darrell was instructed in initial treatment program targeting lower extremity strength (to equal contralateral), improved stability for improved safety, and improved flexibility for improved knee range of motion, in turn improved function for activities of daily living. The patient demonstrates improved terminal knee extension on this date, and flexion is gradually improving.    Lolly Is progressing well towards her goals.   Pt prognosis is Good.     Pt will continue to benefit from skilled outpatient physical therapy to address the deficits listed in the problem list box on initial evaluation, provide pt/family education and to maximize pt's level of independence in the home and " community environment.     Pt's spiritual, cultural and educational needs considered and pt agreeable to plan of care and goals.     Anticipated barriers to physical therapy: Transportation     Goals:  Short Term Goals: 3 weeks   1) Decrease swelling by 1 cm at each affected level Initiated/Slowly progressing  2) Facilitate improved scar mobility Initiated/Slowly progressing  3) Normalize patellar mobility Progressing  4) Patient will consistently stand with equal weight bearing with minimal - no UE support Initiated     Long Term Goals: 6 weeks   1) Patient will resume driving Ongoing  2) Patient will consistently perform car transfers without significant difficulty Initiated  3) Patient will safely ambulate over level surfaces with no more than cane for assistive device Initiated  4) Patient will step into/out of tub safely without assistance Ongoing  5) Patient will consistently demonstrate equal weight bearing throughout sit <> stand transfers using no more than minimal UE support. Initiated  6) Improve functional score to > 78% as indicated by FOTO knee survey Ongoing  7) Patient will be independent in HEP. Partially initiated/Ongoing    PLAN   POC: Outpatient Physical Therapy 2 times weekly for 6 weeks to include the following interventions: Electrical Stimulation (IFC/NMES), Gait Training, Manual Therapy, Moist heat/cold packs, Patient Education, Therapeutic Activity, Therapeutic Exercise, Therapeutic Taping. Lolly may be treated by PTA as part of their rehab team.     The patient is to be progressed within the established plan of care as tolerated in order to accomplish goals as stated above. Plan to incorporate standing marches, standing hip flexion straight leg raises, and closed chain terminal knee extension using theraband in subsequent session. Plan to provide formal written home exercise program subsequently.    Eda Marrufo PTA

## 2024-03-25 ENCOUNTER — CLINICAL SUPPORT (OUTPATIENT)
Dept: REHABILITATION | Facility: HOSPITAL | Age: 64
End: 2024-03-25
Payer: MEDICARE

## 2024-03-25 DIAGNOSIS — Z74.09 IMPAIRED FUNCTIONAL MOBILITY AND ACTIVITY TOLERANCE: Primary | ICD-10-CM

## 2024-03-25 DIAGNOSIS — M25.661 DECREASED ROM OF RIGHT KNEE: ICD-10-CM

## 2024-03-25 DIAGNOSIS — R26.9 GAIT DIFFICULTY: ICD-10-CM

## 2024-03-25 PROCEDURE — 97110 THERAPEUTIC EXERCISES: CPT | Mod: PN

## 2024-03-25 PROCEDURE — 97140 MANUAL THERAPY 1/> REGIONS: CPT | Mod: PN

## 2024-03-25 NOTE — PROGRESS NOTES
"[facility]   Physical Therapy Treatment Note     Name: Lolly Lozanoncer  Clinic Number: [id number]    Therapy Diagnosis:   Encounter Diagnoses   Name Primary?    Impaired functional mobility and activity tolerance Yes    Gait difficulty     Decreased ROM of right knee      Physician: Arben Coombs, *    Visit Date: 3/25/2024    Physician Orders: PT Eval and Treat Knees  Medical Diagnosis from Referral: S/P total knee arthroplasty, right  Evaluation Date: 3/20/2024  Authorization Period Expiration: 03/11/2025  Plan of Care Expiration: 5/2/2024  Progress Note Due: 4/18/2024  Date of Surgery: 2/26/2024  Visit # / Visits authorized: 2/ 6 (3 total)  FOTO: 1/ 3              Next survey due week of 4/1/2024     Precautions: Diabetes, Fall, and cancer     PTA Visit #: 0/5     Time In: 1609  Time Out: 1655  Total Billable Time: 42 minutes    SUBJECTIVE     Pt reports: "It's been bothering me since my last session".  "Putting on my shoe hurt too"  She was compliant with home exercise program from previous bout of PT.  Response to previous treatment: Reaction to kinesiology tape  Functional change: Increasing knee range of motion, independent home ambulation    Pain: 5/10  Location: right knee      OBJECTIVE     Objective Measures updated at progress report unless specified.     Treatment     Lolly received the treatments listed below:      therapeutic exercises to develop strength, endurance, ROM, and flexibility for 36 minutes including:  Recumbent bike with seat at 5 for self stretch x6'  LAQ using 1# x20 (0* extension)  March using 1# 2x10  Heel slides with overpressure AAROM 2x10 (102* knee flexion)  Standing gastroc stretches 3x30s  Alternate toe taps to 8" step using 1# x2'  R hip flexion with 2s hold x 20  Closed chain TKE 2x10 using 4" step  Supine quad stretches 3x30s  SLR using 1# 2x10  SAQ using 1# x20  Heel slides AROM x10 & x10 AAROM using strap (AROM 107*, with overpressure 113* " knee flexion)    To be added:   Standing SLR, gradually incorporate step navigation    manual therapy techniques: Soft tissue Mobilization and Friction Massage were applied to the: Right knee for 8 minutes, including:  STM: strumming to lateral knee, heads of gastroc, and mid to medial distal hamstrings  Scar mobilization including cross friction and skin rolling    Patient Education and Home Exercises     Home Exercises Provided and Patient Education Provided     Education provided:   - The patient was instructed in additional exercises as noted above in bold print.    Written Home Exercises Provided: Patient instructed to cont prior HEP provided during previous PT for contralateral TKA. Exercises were reviewed and Lolly was able to demonstrate them prior to the end of the session.  Lolly demonstrated good  understanding of the education provided. See EMR under Patient Instructions for exercises provided during therapy sessions.    ASSESSMENT     The patient presents to PT today reporting increased overall knee pain, mostly concentrated posterior knee and proximal gastrocs.  She demonstrated slight increase in A/PROM of R knee in supine but slightly less PROM of R knee (flexion) in sitting, probably due to swelling. During scar tissue mobilization, small piece of retained stitch was extracted.  Scar mobility improving.     Lolly Is progressing well towards her goals.   Pt prognosis is Good.     Pt will continue to benefit from skilled outpatient physical therapy to address the deficits listed in the problem list box on initial evaluation, provide pt/family education and to maximize pt's level of independence in the home and community environment.     Pt's spiritual, cultural and educational needs considered and pt agreeable to plan of care and goals.     Anticipated barriers to physical therapy: Transportation     Goals:  Short Term Goals: 3 weeks   1) Decrease swelling by 1 cm at each affected level  Initiated/Slowly progressing  2) Facilitate improved scar mobility Progressing  3) Normalize patellar mobility Progressing  4) Patient will consistently stand with equal weight bearing with minimal - no UE support Progressing     Long Term Goals: 6 weeks   1) Patient will resume driving Ongoing  2) Patient will consistently perform car transfers without significant difficulty Slowly progressing  3) Patient will safely ambulate over level surfaces with no more than cane for assistive device Progressing (not using device but safety component not met yet)  4) Patient will step into/out of tub safely without assistance Slowly progressing  5) Patient will consistently demonstrate equal weight bearing throughout sit <> stand transfers using no more than minimal UE support. Progressing  6) Improve functional score to > 78% as indicated by FOTO knee survey Ongoing  7) Patient will be independent in HEP.  Initiated/Ongoing    PLAN   POC: Outpatient Physical Therapy 2 times weekly for 6 weeks to include the following interventions: Electrical Stimulation (IFC/NMES), Gait Training, Manual Therapy, Moist heat/cold packs, Patient Education, Therapeutic Activity, Therapeutic Exercise, Therapeutic Taping. Lolly may be treated by PTA as part of their rehab team.     The patient is to be progressed within the established plan of care as tolerated in order to accomplish goals as stated above. Plan to incorporate standing hip flexion straight leg raises, step up/back to work on curb negotiation. Plan to provide formal written home exercise program subsequently.    Lolly Villafana, PT

## 2024-03-27 ENCOUNTER — CLINICAL SUPPORT (OUTPATIENT)
Dept: REHABILITATION | Facility: HOSPITAL | Age: 64
End: 2024-03-27
Payer: MEDICARE

## 2024-03-27 DIAGNOSIS — Z74.09 IMPAIRED FUNCTIONAL MOBILITY AND ACTIVITY TOLERANCE: Primary | ICD-10-CM

## 2024-03-27 DIAGNOSIS — R26.9 GAIT DIFFICULTY: ICD-10-CM

## 2024-03-27 DIAGNOSIS — F98.8 ATTENTION DEFICIT DISORDER, UNSPECIFIED HYPERACTIVITY PRESENCE: ICD-10-CM

## 2024-03-27 DIAGNOSIS — M25.661 DECREASED ROM OF RIGHT KNEE: ICD-10-CM

## 2024-03-27 PROCEDURE — 97110 THERAPEUTIC EXERCISES: CPT | Mod: PN

## 2024-03-27 PROCEDURE — 97140 MANUAL THERAPY 1/> REGIONS: CPT | Mod: PN

## 2024-03-27 NOTE — PROGRESS NOTES
"[facility]   Physical Therapy Treatment Note     Name: Lolly Lozanoncer  Clinic Number: [id number]    Therapy Diagnosis:   Encounter Diagnoses   Name Primary?    Impaired functional mobility and activity tolerance Yes    Gait difficulty     Decreased ROM of right knee      Physician: Arben Coombs, *    Visit Date: 3/27/2024    Physician Orders: PT Eval and Treat Knees  Medical Diagnosis from Referral: S/P total knee arthroplasty, right  Evaluation Date: 3/20/2024  Authorization Period Expiration: 03/11/2025  Plan of Care Expiration: 5/2/2024  Progress Note Due: 4/18/2024  Date of Surgery: 2/26/2024  Visit # / Visits authorized: 3/ 6 (4 total)  FOTO: 1/ 3              Next survey due week of 4/1/2024     Precautions: Diabetes, Fall, and cancer     PTA Visit #: 0/5     Time In: 0931  Time Out: 1022  Total Billable Time: 40 minutes    SUBJECTIVE     Pt reports: "It didn't hurt before I got out of bed but after I sat for a while it started aching." "Whatever you did the other day, do that again. It helped"  She was compliant with home exercise program from previous bout of PT.  Response to previous treatment: Hurt right after but felt better the next day.  Functional change: Increasing knee range of motion, independent home ambulation    Pain: 4-5/10  Location: right knee      OBJECTIVE     Objective Measures updated at progress report unless specified.     Treatment     Lolly received the treatments listed below:      therapeutic exercises to develop strength, endurance, ROM, and flexibility for 38 minutes including (total time includes 6 minutes of unbilled time due to overlapping time with 1 other patient):  Recumbent bike with seat at 5 for self stretch x6'  LAQ using 1# x20 (0* extension)  March using 1# 2x10  Heel slides with overpressure AAROM 2x10 (110* knee flexion)  Standing gastroc stretches 3x30s  Alternate toe taps to 8" step using 1# x2'  R hip flexion with 2s hold x " "20  Closed chain TKE 2x10 using 4" step  Forward step up/over 4" step x 10 ea way   Supine quad stretches 3x30s  SLR using 1# 2x10  SAQ using 1# x20  Heel slides AROM x10 & x10 AAROM using strap (AROM 107*, with overpressure 109* knee flexion)    To be added:   Standing SLR, gradually incorporate step navigation, lateral step up/over, hamstring curls, proprioception training.     manual therapy techniques: Soft tissue Mobilization and Friction Massage were applied to the: Right knee for 8 minutes, including:  STM: strumming to lateral knee, heads of gastroc, and mid to medial distal hamstrings  Scar mobilization including cross friction and skin rolling    Patient Education and Home Exercises     Home Exercises Provided and Patient Education Provided     Education provided:   - The patient was instructed in additional exercises as noted above in bold print.    Written Home Exercises Provided: Patient instructed to cont prior HEP provided during previous PT for contralateral TKA. Exercises were reviewed and Lolly was able to demonstrate them prior to the end of the session.  Lolly demonstrated good  understanding of the education provided. See EMR under Patient Instructions for exercises provided during therapy sessions.    ASSESSMENT     The patient arrived at PT today reporting significant improvement in posterior knee pain following previous visit.  She completed exercise program with minimal difficulty.  She demonstrated improved AAROM in flexion when in seated position but less when supine. She continues with significant tenderness in proximal calf that decreased somewhat following STM.  She ambulates with decreased antalgic gait.      Lolly Is progressing well towards her goals.   Pt prognosis is Good.     Pt will continue to benefit from skilled outpatient physical therapy to address the deficits listed in the problem list box on initial evaluation, provide pt/family education and to maximize pt's level of " independence in the home and community environment.     Pt's spiritual, cultural and educational needs considered and pt agreeable to plan of care and goals.     Anticipated barriers to physical therapy: Transportation     Goals:  Short Term Goals: 3 weeks   1) Decrease swelling by 1 cm at each affected level Slowly progressing  2) Facilitate improved scar mobility Progressing  3) Normalize patellar mobility Progressing  4) Patient will consistently stand with equal weight bearing with minimal - no UE support Progressing     Long Term Goals: 6 weeks   1) Patient will resume driving Ongoing  2) Patient will consistently perform car transfers without significant difficulty Progressing  3) Patient will safely ambulate over level surfaces with no more than cane for assistive device Progressing (not using device but safety component not met yet)  4) Patient will step into/out of tub safely without assistance Slowly progressing  5) Patient will consistently demonstrate equal weight bearing throughout sit <> stand transfers using no more than minimal UE support. Progressing  6) Improve functional score to > 78% as indicated by FOTO knee survey Ongoing  7) Patient will be independent in HEP.  Initiated/Ongoing    PLAN   POC: Outpatient Physical Therapy 2 times weekly for 6 weeks to include the following interventions: Electrical Stimulation (IFC/NMES), Gait Training, Manual Therapy, Moist heat/cold packs, Patient Education, Therapeutic Activity, Therapeutic Exercise, Therapeutic Taping. Lolly may be treated by PTA as part of their rehab team.     The patient is to be progressed within the established plan of care as tolerated in order to accomplish goals as stated above. Add lateral step up/over, standing SLR, standing hamstring curls, proprioception retraining.     Lolly Villafana, PT

## 2024-03-28 RX ORDER — TRAZODONE HYDROCHLORIDE 100 MG/1
200 TABLET ORAL NIGHTLY
Qty: 60 TABLET | Refills: 5 | Status: SHIPPED | OUTPATIENT
Start: 2024-03-28 | End: 2025-03-28

## 2024-03-28 RX ORDER — ATOMOXETINE 60 MG/1
60 CAPSULE ORAL DAILY
Qty: 90 CAPSULE | Refills: 1 | Status: SHIPPED | OUTPATIENT
Start: 2024-03-28

## 2024-04-01 ENCOUNTER — EXTERNAL HOME HEALTH (OUTPATIENT)
Dept: HOME HEALTH SERVICES | Facility: HOSPITAL | Age: 64
End: 2024-04-01
Payer: MEDICARE

## 2024-04-04 ENCOUNTER — CLINICAL SUPPORT (OUTPATIENT)
Dept: REHABILITATION | Facility: HOSPITAL | Age: 64
End: 2024-04-04
Payer: MEDICARE

## 2024-04-04 DIAGNOSIS — M25.661 DECREASED ROM OF RIGHT KNEE: ICD-10-CM

## 2024-04-04 DIAGNOSIS — R26.9 GAIT DIFFICULTY: ICD-10-CM

## 2024-04-04 DIAGNOSIS — Z74.09 IMPAIRED FUNCTIONAL MOBILITY AND ACTIVITY TOLERANCE: Primary | ICD-10-CM

## 2024-04-04 PROCEDURE — 97110 THERAPEUTIC EXERCISES: CPT | Mod: PN

## 2024-04-04 PROCEDURE — 97530 THERAPEUTIC ACTIVITIES: CPT | Mod: PN

## 2024-04-04 PROCEDURE — 97140 MANUAL THERAPY 1/> REGIONS: CPT | Mod: PN

## 2024-04-04 NOTE — PROGRESS NOTES
"[facility]   Physical Therapy Treatment Note     Name: Lolly Nicolaser  Clinic Number: [id number]    Therapy Diagnosis:   Encounter Diagnoses   Name Primary?    Impaired functional mobility and activity tolerance Yes    Gait difficulty     Decreased ROM of right knee      Physician: Arben Coombs, *    Visit Date: 4/4/2024    Physician Orders: PT Eval and Treat Knees  Medical Diagnosis from Referral: S/P total knee arthroplasty, right  Evaluation Date: 3/20/2024  Authorization Period Expiration: 03/11/2025  Plan of Care Expiration: 5/2/2024  Progress Note Due: 4/18/2024  Date of Surgery: 2/26/2024  Visit # / Visits authorized: 4/ 6 (5 total)  FOTO: 1/ 3              Next survey due week of 4/1/2024     Precautions: Diabetes, Fall, and cancer     PTA Visit #: 0/5     Time In: 0850  Time Out: 0945  Total Billable Time: 43 minutes    SUBJECTIVE     Pt reports: "It's stiff "  She was compliant with home exercise program from previous bout of PT.  Response to previous treatment: Felt better  Functional change: Resumed driving short distance    Pain: 4/10  Location: right knee      OBJECTIVE     Objective Measures updated at progress report unless specified.     Treatment     Lolly received the treatments listed below:      therapeutic exercises to develop strength, endurance, ROM, and flexibility for 23 minutes including (+ 7 minutes of unbilled time due to overlapping time with 1 other patient):  Recumbent bike with seat at 5 for self stretch x6' with verbal cues to enhance stretch and to avoid excessive lateral tilt)  LAQ using 2# x20 (0* extension)  March using 2# 2x10  Heel slides with overpressure AAROM 2x10 (112* knee flexion)  Standing gastroc stretches 3x30s  R hip flexion (march) with 2s hold 2# x 20  Standing SLR flexion 2# x 20  Supine quad stretches 3x30s  SLR using 1# 2x10  SAQ using 1# x20  Heel slides AROM x10 & x10 AAROM using strap (AROM 115*, with overpressure 120* knee " "flexion)  Shuttle leg press 2x10 ea   B using 37#   R only using 25#    To be added:   Increase step height, hamstring curls, proprioception training.     manual therapy techniques: Soft tissue Mobilization and Friction Massage were applied to the: Right knee for 8 minutes, including:  STM: strumming to lateral knee, heads of gastroc, and mid to medial distal hamstrings  Scar mobilization including cross friction and skin rolling    therapeutic activities to improve functional performance for 12  minutes, including:  Alternate toe taps to 8" step using 2# x2'  Forward step up/over 4" step x 10 ea way  Closed chain TKE 2x10 using 4" step  Lateral step up/over 4" step x 10 ea way  Mini squat 2x10    Patient Education and Home Exercises     Home Exercises Provided and Patient Education Provided     Education provided:   - The patient was instructed in postural correction, exercise technique.     Written Home Exercises Provided: Patient instructed to cont prior HEP provided during previous PT for contralateral TKA. Exercises were reviewed and Lolly was able to demonstrate them prior to the end of the session.  Lolly demonstrated good  understanding of the education provided. See EMR under Patient Instructions for exercises provided during therapy sessions.    ASSESSMENT     The patient presents to PT today reporting minimal knee pain.  She ambulates with mildly antalgic gait.  She required verbal cues to increase self stretch intensity and to hold stretch for longer period of time (on bike) in order to achieve full benefit from activity.  She reported increased contralateral LE fatigue during standing activities due to prolonged standing on L LE while performing exercises with R.  R medial gastrocs with significant tenderness/muscle tension today that decreased with STM.  Active and passive ROM increased today compared to prior visits.     Lolly Is progressing well towards her goals.   Pt prognosis is Good.     Pt will " continue to benefit from skilled outpatient physical therapy to address the deficits listed in the problem list box on initial evaluation, provide pt/family education and to maximize pt's level of independence in the home and community environment.     Pt's spiritual, cultural and educational needs considered and pt agreeable to plan of care and goals.     Anticipated barriers to physical therapy: Transportation     Goals:  Short Term Goals: 3 weeks   1) Decrease swelling by 1 cm at each affected level Slowly progressing  2) Facilitate improved scar mobility Progressing  3) Normalize patellar mobility Met 4/4/2024  4) Patient will consistently stand with equal weight bearing with minimal - no UE support Progressing/nearly met     Long Term Goals: 6 weeks   1) Patient will resume driving Progressing (short distance only)  2) Patient will consistently perform car transfers without significant difficulty Progressing  3) Patient will safely ambulate over level surfaces with no more than cane for assistive device Progressing (not using device but safety component not met yet)  4) Patient will step into/out of tub safely without assistance Slowly progressing  5) Patient will consistently demonstrate equal weight bearing throughout sit <> stand transfers using no more than minimal UE support. Progressing  6) Improve functional score to > 78% as indicated by FOTO knee survey Ongoing  7) Patient will be independent in HEP.  Initiated/Ongoing    PLAN   POC: Outpatient Physical Therapy 2 times weekly for 6 weeks to include the following interventions: Electrical Stimulation (IFC/NMES), Gait Training, Manual Therapy, Moist heat/cold packs, Patient Education, Therapeutic Activity, Therapeutic Exercise, Therapeutic Taping. Lolly may be treated by PTA as part of their rehab team.     The patient is to be progressed within the established plan of care as tolerated in order to accomplish goals as stated above. Increase height for  closed chain TKE, step up activity. Increase resistance for shuttle leg press.    Lolly Villafana, PT

## 2024-04-05 ENCOUNTER — CLINICAL SUPPORT (OUTPATIENT)
Dept: REHABILITATION | Facility: HOSPITAL | Age: 64
End: 2024-04-05
Payer: MEDICARE

## 2024-04-05 DIAGNOSIS — R26.9 GAIT DIFFICULTY: ICD-10-CM

## 2024-04-05 DIAGNOSIS — Z74.09 IMPAIRED FUNCTIONAL MOBILITY AND ACTIVITY TOLERANCE: Primary | ICD-10-CM

## 2024-04-05 DIAGNOSIS — M25.661 DECREASED ROM OF RIGHT KNEE: ICD-10-CM

## 2024-04-05 DIAGNOSIS — Z96.651 S/P TOTAL KNEE ARTHROPLASTY, RIGHT: ICD-10-CM

## 2024-04-05 PROCEDURE — 97530 THERAPEUTIC ACTIVITIES: CPT | Mod: PN,CQ

## 2024-04-05 PROCEDURE — 97110 THERAPEUTIC EXERCISES: CPT | Mod: PN,CQ

## 2024-04-05 NOTE — PROGRESS NOTES
"[facility]   Physical Therapy Treatment Note     Name: Lolly Lozanoncer  Clinic Number: [id number]    Therapy Diagnosis:   Encounter Diagnoses   Name Primary?    Impaired functional mobility and activity tolerance Yes    Gait difficulty     Decreased ROM of right knee     S/P total knee arthroplasty, right      Physician: Arben Coombs, *    Visit Date: 4/5/2024    Physician Orders: PT Eval and Treat Knees  Medical Diagnosis from Referral: S/P total knee arthroplasty, right  Evaluation Date: 3/20/2024  Authorization Period Expiration: 03/11/2025  Plan of Care Expiration: 5/2/2024  Progress Note Due: 4/18/2024  Date of Surgery: 2/26/2024  Visit # / Visits authorized: 5/ 6 (6 total)  FOTO: 2/ 3 Completed 4/5/2024 with a Functional Score of 59%  KOOS, JR. 66.0/100 Lower Score = Greater Disability              Next survey due session 10     Precautions: Diabetes, Fall, and cancer     PTA Visit #: 1/5     Time In: 0801  Time Out: 0855  Total Billable Time: 53 minutes    SUBJECTIVE     Pt reports: "My muscles are sore." "My score is going to go down because I messed up two questions."  She was compliant with home exercise program from previous bout of PT.  Response to previous treatment: "I was hurting; I had to take some pain medication when I got home."  Functional change: Resumed driving short distance    Pain: 0/10 "but the muscles"  Location: right knee      OBJECTIVE     Objective Measures updated at progress report unless specified.     Treatment     Lolly received the treatments listed below:      therapeutic exercises to develop strength, endurance, ROM, and flexibility for 40 minutes including:  Recumbent bike with seat at 2 for self stretch x6'  Standing gastroc stretches 3x30s  R hip flexion (march) with 2s hold 2# x20  Standing SLR flexion 2# x20  Sitting LAQ using 2# x20  March using 2# 2x10  Heel slides with overpressure AAROM x20 (114* knee flexion)  Supine quad stretches " "3x30s (D/C)  SLR using 2# 2x10  SAQ using 2# x20  Heel slides AROM x10 & x10 AAROM using strap (with overpressure 118* knee flexion)    To be added:   hamstring curls, proprioception training.     manual therapy techniques: Soft tissue Mobilization and Friction Massage were applied to the: Right knee for 3 minutes, including: (per pt request, but began to be uncomfortable due to excess soreness)  STM: strumming to lateral knee, heads of gastroc, and mid to medial distal hamstrings  Scar mobilization including cross friction and skin rolling    therapeutic activities to improve functional performance for 10 minutes, including:  Alternate toe taps to 8" step using 2# x2'  Closed chain TKE using 6" step 2x10  Forward step up/over 4" step using 2# x10 ea way  Lateral step up/over 4" step using 2# x10 ea way  Mini squat 2x10    Patient Education and Home Exercises     Home Exercises Provided and Patient Education Provided     Education provided:   - The patient was instructed in increased exercise complexity as stated above in bold print.    Written Home Exercises Provided: Patient instructed to cont prior HEP provided during previous PT for contralateral TKA. Exercises were reviewed and Lolly was able to demonstrate them prior to the end of the session.  Lolly demonstrated good  understanding of the education provided. See EMR under Patient Instructions for exercises provided during therapy sessions.    ASSESSMENT     The patient reported to physical therapy stating excessive muscle soreness post previous session with back to back treatment days. Lolly Ramírez was progressed with increased exercise complexity targeting knee range of motion, as well as lower extremity strength, stability, and endurance for improved function and safety during activities of daily living. Patient is demonstrating improved knee range of motion and strength, as evident by progressions; however, waddling gait continues during weight shifting. " Incision is healing nicely with slight restriction at distal inch and barely under soft tissues at proximal inch. Patient requested manual techniques but due to excessive muscle soreness discontinued.    Lolly Is progressing well towards her goals.   Pt prognosis is Good.     Pt will continue to benefit from skilled outpatient physical therapy to address the deficits listed in the problem list box on initial evaluation, provide pt/family education and to maximize pt's level of independence in the home and community environment.     Pt's spiritual, cultural and educational needs considered and pt agreeable to plan of care and goals.     Anticipated barriers to physical therapy: Transportation     Goals:  Short Term Goals: 3 weeks   1) Decrease swelling by 1 cm at each affected level Progressing  2) Facilitate improved scar mobility Progressing  3) Normalize patellar mobility Met 4/4/2024  4) Patient will consistently stand with equal weight bearing with minimal - no UE support Progressing/nearly met     Long Term Goals: 6 weeks   1) Patient will resume driving Progressing (short distance only)  2) Patient will consistently perform car transfers without significant difficulty Progressing  3) Patient will safely ambulate over level surfaces with no more than cane for assistive device Progressing (not using device but safety component not met yet)  4) Patient will step into/out of tub safely without assistance Slowly progressing  5) Patient will consistently demonstrate equal weight bearing throughout sit <> stand transfers using no more than minimal UE support. Progressing  6) Improve functional score to > 78% as indicated by FOTO knee survey Slightly regressed (pt reported not accurate)  7) Patient will be independent in HEP.  Progressing    PLAN   POC: Outpatient Physical Therapy 2 times weekly for 6 weeks to include the following interventions: Electrical Stimulation (IFC/NMES), Gait Training, Manual Therapy, Moist  heat/cold packs, Patient Education, Therapeutic Activity, Therapeutic Exercise, Therapeutic Taping. Lolly may be treated by PTA as part of their rehab team.     The patient is to be progressed within the established plan of care as tolerated in order to accomplish goals as stated above. Plan to increase height step up activity, as well as increasing other complexities as able.    Eda Marrufo, PTA

## 2024-04-09 ENCOUNTER — OFFICE VISIT (OUTPATIENT)
Dept: ORTHOPEDICS | Facility: CLINIC | Age: 64
End: 2024-04-09
Payer: MEDICARE

## 2024-04-09 VITALS
SYSTOLIC BLOOD PRESSURE: 126 MMHG | BODY MASS INDEX: 41.77 KG/M2 | DIASTOLIC BLOOD PRESSURE: 76 MMHG | HEIGHT: 67 IN | WEIGHT: 266.13 LBS

## 2024-04-09 DIAGNOSIS — Z96.651 S/P TOTAL KNEE ARTHROPLASTY, RIGHT: Primary | ICD-10-CM

## 2024-04-09 PROCEDURE — 99024 POSTOP FOLLOW-UP VISIT: CPT | Mod: S$GLB,POP,, | Performed by: ORTHOPAEDIC SURGERY

## 2024-04-09 NOTE — PROGRESS NOTES
[facility]    Subjective:     Chief Complaint:   Chief Complaint   Patient presents with    Right Knee - Post-op Evaluation     XR// PO right TKA 24         Past Medical History:   Diagnosis Date    Breast cancer, stage 1, estrogen receptor negative, left () 2020    Depression     Diabetes mellitus, type 2     GERD (gastroesophageal reflux disease)     HER2-positive carcinoma of left breast 2020    Hx of breast cancer     Hx of breast cancer - Her2Nu+/ER+ - 2019    Insomnia     Lymphedema     Neuropathy of both feet     S/P lumpectomy, left breast 2020       Past Surgical History:   Procedure Laterality Date    ARTHROSCOPY OF SHOULDER WITH DECOMPRESSION OF SUBACROMIAL SPACE Right 2023    Procedure: ARTHROSCOPY, SHOULDER, WITH SUBACROMIAL SPACE DECOMPRESSION;  Surgeon: Curtis Ricardo MD;  Location: [facility] OR;  Service: Orthopedics;  Laterality: Right;    BICEPS TENDON REPAIR Left 2005    BREAST BIOPSY Left     BREAST LUMPECTOMY Left      SECTION      1982, 1985,     CHOLECYSTECTOMY  2000    Elbow fx Left 2015    FRACTURE SURGERY Left 2016    elbow    HYSTERECTOMY  2013    KNEE ARTHROSCOPY W/ MENISCAL REPAIR Left 2014    Lower back ruptured disc  2010    LYMPH NODE DISSECTION  2011    ROBOTIC ARTHROPLASTY, KNEE Left 2023    Procedure: ROBOTIC ARTHROPLASTY, KNEE, TOTAL, LEFT;  Surgeon: Curtis Ricardo MD;  Location: [facility] OR;  Service: Orthopedics;  Laterality: Left;  [place]    ROBOTIC ARTHROPLASTY, KNEE Right 2024    Procedure: ROBOTIC ARTHROPLASTY, KNEE, TOTAL, RIGHT;  Surgeon: Curtis Ricardo MD;  Location: [facility] OR;  Service: Orthopedics;  Laterality: Right;  [place]    SPINE SURGERY      ruptured disc       Current Outpatient Medications   Medication Sig    ascorbic acid, vitamin C, (VITAMIN C) 500 MG tablet Take 500 mg by mouth once daily.    aspirin (ECOTRIN) 81 MG EC tablet Take 1 tablet (81 mg total) by mouth every  12 (twelve) hours.    atomoxetine (STRATTERA) 60 MG capsule Take 1 capsule (60 mg total) by mouth once daily.    b complex vitamins capsule Take 1 capsule by mouth once daily.    calcium carbonate (OS-JULIANNE) 600 mg calcium (1,500 mg) Tab Take 600 mg by mouth once.    furosemide (LASIX) 20 MG tablet Take 1 tablet (20 mg total) by mouth daily as needed.    glucosamine-chondroitin 250-200 mg Tab Take 2 tablets by mouth once daily.    magnesium 250 mg Tab Take 250 mg by mouth once daily.    multivitamin capsule Take 1 capsule by mouth once daily.    oxyCODONE-acetaminophen (PERCOCET) 7.5-325 mg per tablet Take 1 tablet by mouth every 6 (six) hours as needed for Pain.    pantoprazole (PROTONIX) 40 MG tablet Take 1 tablet (40 mg total) by mouth once daily.    pregabalin (LYRICA) 200 MG Cap Take 200 mg by mouth 3 (three) times daily.    traZODone (DESYREL) 100 MG tablet Take 2 tablets (200 mg total) by mouth every evening.    venlafaxine (EFFEXOR-XR) 75 MG 24 hr capsule Take 3 capsules (225 mg total) by mouth every evening.     No current facility-administered medications for this visit.       Review of patient's allergies indicates:   Allergen Reactions    Banana Hives    Codeine Nausea And Vomiting    Adhesive Rash    Dilaudid  [hydromorphone (bulk)] Rash    Hydromorphone hcl Rash       Family History   Problem Relation Age of Onset    Cancer Mother     Breast cancer Mother     Parkinsonism Father     Diabetes Father     Breast cancer Maternal Aunt     Breast cancer Paternal Aunt     Cancer Maternal Aunt     Cancer Paternal Aunt     Cancer Daughter     Heart disease Maternal Grandmother     Heart disease Paternal Grandmother        Social History     Socioeconomic History    Marital status:    Tobacco Use    Smoking status: Former     Current packs/day: 0.00     Average packs/day: 0.5 packs/day for 25.0 years (12.5 ttl pk-yrs)     Types: Cigarettes     Start date: 12/1985     Quit date: 12/2010     Years since  quittin.3    Smokeless tobacco: Never   Substance and Sexual Activity    Alcohol use: Not Currently     Alcohol/week: 2.0 standard drinks of alcohol     Types: 2 Glasses of wine per week    Drug use: Never    Sexual activity: Yes     Partners: Male     Birth control/protection: Other-see comments, None     Comment: Hysterectomy     Social Determinants of Health     Financial Resource Strain: Low Risk  (2023)    Overall Financial Resource Strain (CARDIA)     Difficulty of Paying Living Expenses: Not hard at all   Food Insecurity: No Food Insecurity (2023)    Hunger Vital Sign     Worried About Running Out of Food in the Last Year: Never true     Ran Out of Food in the Last Year: Never true   Transportation Needs: No Transportation Needs (2023)    PRAPARE - Transportation     Lack of Transportation (Medical): No     Lack of Transportation (Non-Medical): No   Physical Activity: Inactive (2023)    Exercise Vital Sign     Days of Exercise per Week: 0 days     Minutes of Exercise per Session: 10 min   Stress: Stress Concern Present (2023)    [employer] - Occupational Stress Questionnaire     Feeling of Stress : To some extent   Social Connections: Unknown (2023)    Social Connection and Isolation Panel [NHANES]     Frequency of Communication with Friends and Family: Three times a week     Active Member of Clubs or Organizations: No     Attends Club or Organization Meetings: Never     Marital Status:    Housing Stability: Low Risk  (2023)    Housing Stability Vital Sign     Unable to Pay for Housing in the Last Year: No     Number of Places Lived in the Last Year: 1     Unstable Housing in the Last Year: No       History of present illness:  63-year-old female, status post right total knee arthroplasty .  She is here today for routine follow-up.  Six weeks postop.      Review of Systems:    Constitution: Negative for chills,  fever, and sweats.  Negative for unexplained weight loss.    HENT:  Negative for headaches and blurry vision.    Cardiovascular:Negative for chest pain or irregular heart beat. Negative for hypertension.    Respiratory:  Negative for cough and shortness of breath.    Gastrointestinal: Negative for abdominal pain, heartburn, melena, nausea, and vomitting.    Genitourinary:  Negative bladder incontinence and dysuria.    Musculoskeletal:  See HPI for details.     Neurological: Negative for numbness.    Psychiatric/Behavioral: Negative for depression.  The patient is not nervous/anxious.      Endocrine: Negative for polyuria    Hematologic/Lymphatic: Negative for bleeding problem.  Does not bruise/bleed easily.    Skin: Negative for poor would healing and rash    Objective:      Physical Examination:    Vital Signs:    Vitals:    04/09/24 1342   BP: 126/76       Body mass index is 41.68 kg/m².    This a well-developed, well nourished patient in no acute distress.  They are alert and oriented and cooperative to examination.        Examination of the right knee, skin is dry and intact, no erythema or ecchymosis no signs symptoms of infection.  Range of motion 0-110 degrees.  Stable in flexion and extension.  Calf soft nontender, straight leg raise negative.    Pertinent New Results:    XRAY Report / Interpretation:   AP lateral sunrise views of the right knee taken today in the office do not demonstrate any osseous abnormalities.  No fracture dislocation.  Tota knee arthroplasty appears to be in appropriate position without evidence of hardware failure or loosening or subsidence.  Visualized soft tissues appear normal.    Assessment/Plan:      Stable status post right total knee arthroplasty 6 weeks ago.  She is doing great in terms of range of motion and pain control.  Continue with physical therapy for strengthening, she is working on going up and down stairs.  They have a travel trailer.  She is interested in getting  "back to her vacation.  Recommend she follow-up in 2-3 months depending on their travels.  She can certainly follow up any time if she has any concerns.    We reviewed her right shoulder, we did arthroscopy back in November.  She has got an unrepairable retracted rotator cuff tear and extensive glenohumeral arthrosis she will need a shoulder replacement.  We will revisit that conversation looking at surgery in the fall.  September October time frame.    Francisco Condon, Physician Assistant, served in the capacity as a "scribe" for this patient encounter.  A "face-to-face" encounter occurred with Dr. Curtis Ricardo on this date.  The treatment plan and medical decision-making is outlined above. Patient was seen and examined with a chaperone.       This note was created using Dragon voice recognition software that occasionally misinterpreted phrases or words.          "

## 2024-04-12 ENCOUNTER — CLINICAL SUPPORT (OUTPATIENT)
Dept: REHABILITATION | Facility: HOSPITAL | Age: 64
End: 2024-04-12
Payer: MEDICARE

## 2024-04-12 DIAGNOSIS — Z96.651 S/P TOTAL KNEE ARTHROPLASTY, RIGHT: ICD-10-CM

## 2024-04-12 DIAGNOSIS — M25.661 DECREASED ROM OF RIGHT KNEE: ICD-10-CM

## 2024-04-12 DIAGNOSIS — Z74.09 IMPAIRED FUNCTIONAL MOBILITY AND ACTIVITY TOLERANCE: Primary | ICD-10-CM

## 2024-04-12 DIAGNOSIS — R26.9 GAIT DIFFICULTY: ICD-10-CM

## 2024-04-12 PROCEDURE — 97110 THERAPEUTIC EXERCISES: CPT | Mod: PN,CQ

## 2024-04-12 PROCEDURE — 97140 MANUAL THERAPY 1/> REGIONS: CPT | Mod: PN,CQ

## 2024-04-12 PROCEDURE — 97530 THERAPEUTIC ACTIVITIES: CPT | Mod: PN,CQ

## 2024-04-12 NOTE — PROGRESS NOTES
"[facility]   Physical Therapy Treatment Note     Name: Lolly Ramírez  Clinic Number: [id number]    Therapy Diagnosis:   Encounter Diagnoses   Name Primary?    Impaired functional mobility and activity tolerance Yes    Gait difficulty     Decreased ROM of right knee     S/P total knee arthroplasty, right      Physician: Arben Coombs, *    Visit Date: 4/12/2024    Physician Orders: PT Eval and Treat Knees  Medical Diagnosis from Referral: S/P total knee arthroplasty, right  Evaluation Date: 3/20/2024  Authorization Period Expiration: 03/11/2025  Plan of Care Expiration: 5/2/2024  Progress Note Due: 4/18/2024  Date of Surgery: 2/26/2024  Visit # / Visits authorized: 6/ 20 (7 total)  FOTO: 2/ 3 Completed 4/5/2024 with a Functional Score of 59%  KOOS, JR. 66.0/100 Lower Score = Greater Disability              Next survey due session 10     Precautions: Diabetes, Fall, and cancer     PTA Visit #: 2/5     Time In: 0802  Time Out: Transitioned care to PT @[id number]  Total Billable Time: 56 minutes    SUBJECTIVE     Pt reports: "My doctor told me I didn't need physical therapy anymore, but I told him no way. I can't go all the way around on the bike yet. I need to work on my strength; these muscles still bother me some."  She was compliant with home exercise program.  Response to previous treatment: "Sore, but I was ready to come back."  Functional change: Resumed driving short distance    Pain: 0/10 "just an annoyance in my muscles."  Location: right knee      OBJECTIVE     Objective Measures updated at progress report unless specified.     Treatment     Lolly received the treatments listed below:      therapeutic exercises to develop strength, endurance, ROM, and flexibility for 35 minutes including:  Recumbent bike with seat at 2 for self stretch x6'  Standing gastroc stretches 3x30s  R hip flexion (march) with 2s hold 3# x20  Hip abduction using 3# x20   Extension using 3# " "x20  Hamstring curls using 3# x20  SLR flexion using 3# x20  Sitting LAQ using 3# x20  March using 3# 2x10  Heel slides with overpressure AAROM x20 (0-118* knee flexion)  Supine SLR using 3# 2x10  SAQ using 3# x20  Heel slides AROM x15 & x15 AAROM using strap (with overpressure 0-120* knee flexion)    To be added:   proprioception training    therapeutic activities to improve functional performance for 13 minutes, including:  Alternate toe taps to 8" step using 3# x2'  Closed chain TKE using 6" step x20  Forward step up/over 6" step using 3# x10 ea way  Lateral step up/over 6" step using 3# x10 ea way  Mini squat x20    manual therapy techniques: Soft tissue Mobilization and Friction Massage were applied to the: Right knee for 8 minutes, including:  STM: strumming to lateral knee, heads of gastroc, and mid to medial distal hamstrings  Scar mobilization including cross friction and skin rolling (continue for proximal incision)      Patient Education and Home Exercises     Home Exercises Provided and Patient Education Provided     Education provided:   - The patient was instructed in increased exercise complexity as stated above in bold print.    Written Home Exercises Provided: Patient instructed to cont prior HEP provided during previous PT for contralateral TKA. Exercises were reviewed and Lolly was able to demonstrate them prior to the end of the session. Lolly demonstrated good  understanding of the education provided. See EMR under Patient Instructions for exercises provided during therapy sessions.    ASSESSMENT     The patient reported to physical therapy denying present knee pain, only continued musculature discomfort. Lolly Ramírez was progressed with increased exercise complexity targeting lower extremity strength, stability, endurance, and flexibility for improved functional performance and safety. The patient demonstrates functional knee range of motion; however, would continue to benefit from strength " and stability training for improved function, as well as focusing on proprioception for improved safety. Patient continues to demonstrates excessive lateral weight shifting during gait, demonstrating waddling gait, although slight reducing compared to initially. Brawny edema noted in affected lower extremity; patient has a history of lymphedema following co-morbidities. Only slight scar tissue restriction noted at proximal inch of incision.    Lolly Is progressing well towards her goals.   Pt prognosis is Good.     Pt will continue to benefit from skilled outpatient physical therapy to address the deficits listed in the problem list box on initial evaluation, provide pt/family education and to maximize pt's level of independence in the home and community environment.     Pt's spiritual, cultural and educational needs considered and pt agreeable to plan of care and goals.     Anticipated barriers to physical therapy: Transportation     Goals:  Short Term Goals: 3 weeks   1) Decrease swelling by 1 cm at each affected level Progressing  2) Facilitate improved scar mobility Progressing  3) Normalize patellar mobility Met 4/4/2024  4) Patient will consistently stand with equal weight bearing with minimal - no UE support Progressing/nearly met     Long Term Goals: 6 weeks   1) Patient will resume driving Progressing (short distance only)  2) Patient will consistently perform car transfers without significant difficulty Progressing  3) Patient will safely ambulate over level surfaces with no more than cane for assistive device Progressing (not using device but safety component not met yet)  4) Patient will step into/out of tub safely without assistance Slowly progressing  5) Patient will consistently demonstrate equal weight bearing throughout sit <> stand transfers using no more than minimal UE support. Progressing  6) Improve functional score to > 78% as indicated by FOTO knee survey Slightly regressed (pt reported not  accurate)  7) Patient will be independent in HEP.  Progressing    PLAN   POC: Outpatient Physical Therapy 2 times weekly for 6 weeks to include the following interventions: Electrical Stimulation (IFC/NMES), Gait Training, Manual Therapy, Moist heat/cold packs, Patient Education, Therapeutic Activity, Therapeutic Exercise, Therapeutic Taping. Lolly may be treated by PTA as part of their rehab team.     The patient is to be progressed within the established plan of care as tolerated in order to accomplish goals as stated above. Plan to increase exercise complexity as able and incorporate proprioception training for functional safety.    Eda Marrufo, PTA

## 2024-04-16 ENCOUNTER — CLINICAL SUPPORT (OUTPATIENT)
Dept: REHABILITATION | Facility: HOSPITAL | Age: 64
End: 2024-04-16
Payer: MEDICARE

## 2024-04-16 DIAGNOSIS — R26.9 GAIT DIFFICULTY: Primary | ICD-10-CM

## 2024-04-16 DIAGNOSIS — Z74.09 IMPAIRED FUNCTIONAL MOBILITY AND ACTIVITY TOLERANCE: ICD-10-CM

## 2024-04-16 DIAGNOSIS — M25.661 DECREASED ROM OF RIGHT KNEE: ICD-10-CM

## 2024-04-16 PROCEDURE — 97140 MANUAL THERAPY 1/> REGIONS: CPT | Mod: PN

## 2024-04-16 PROCEDURE — 97530 THERAPEUTIC ACTIVITIES: CPT | Mod: PN

## 2024-04-16 PROCEDURE — 97110 THERAPEUTIC EXERCISES: CPT | Mod: PN

## 2024-04-16 NOTE — PROGRESS NOTES
"[facility]   Physical Therapy Treatment Note     Name: Lolly Lozanoncer  Clinic Number: [id number]    Therapy Diagnosis:   Encounter Diagnoses   Name Primary?    Gait difficulty Yes    Impaired functional mobility and activity tolerance     Decreased ROM of right knee        Physician: Arben Coombs, *    Visit Date: 4/16/2024    Physician Orders: PT Eval and Treat Knees  Medical Diagnosis from Referral: S/P total knee arthroplasty, right  Evaluation Date: 3/20/2024  Authorization Period Expiration: 03/11/2025  Plan of Care Expiration: 5/2/2024  Progress Note Due: 4/18/2024  Date of Surgery: 2/26/2024  Visit # / Visits authorized: 7/ 20 (8 total)  FOTO: 2/ 3 Completed 4/5/2024 with a Functional Score of 59%  KOOS, JR. 66.0/100 Lower Score = Greater Disability              Next survey due session 10     Precautions: Diabetes, Fall, and cancer     PTA Visit #: 0/5     Time In: 0800  Time Out: 0845  Total Billable Time: 45 minutes    SUBJECTIVE     Pt reports: Patient states the muscles in the back of the knee (points to gastroc heads) are sore, but that's really the only area that's bothersome today.   She was compliant with home exercise program.  Response to previous treatment: positive  Functional change: Resumed driving short distance    Pain: 0/10 "just an annoyance in my muscles."  Location: right knee      OBJECTIVE     Objective Measures updated at progress report unless specified.     Treatment     Lolly received the treatments listed below:      therapeutic exercises to develop strength, endurance, ROM, and flexibility for 18 minutes including:  Recumbent bike with seat at 4, 3, for 2 min each, then seat 2 for self stretch x 4' (8' total)  Standing gastroc stretches 3x30s  R hip flexion (march) with 2s hold 3# x20  Hip abduction using 3# x20   Extension using 3# x20  Hamstring curls using 3# x20  SLR flexion using 3# x20  Sitting LAQ using 3# x20  March using 3# 2x10  Heel " "slides with overpressure AAROM x20   Supine SLR using 3# 2x10  SAQ using 3# x20  Heel slides AROM x15 & x15 AAROM using strap (with overpressure 0-120* knee flexion)    To be added:   proprioception training    therapeutic activities to improve functional performance for 19 minutes, including:  Alternate toe taps to 8" step using 3# x2'  Closed chain TKE using 6" step x20  Forward step up/over 6" step using 0# 2 x 10 ea way- focus on decreased use of handrail  Lateral step down 6" step using 0# 2 x10 ea leg- focus on quad control  Mini squat x20  Stair training: patient performed 3 stair steps ascending with reciprocal pattern, use of handrail for stability; descending leading with right lower extremity. Moderate compensation due to decreased confidence and pain in left knee at last 25% of motion     manual therapy techniques: Soft tissue Mobilization and Friction Massage were applied to the: Right knee for 8 minutes, including:  STM: strumming to lateral knee, heads of gastroc, and mid to medial distal hamstrings  Scar mobilization including cross friction and skin rolling (continue for proximal incision)      Patient Education and Home Exercises     Home Exercises Provided and Patient Education Provided     Education provided:   - The patient was instructed in increased exercise complexity as stated above in bold print.    Written Home Exercises Provided: Patient instructed to cont prior HEP provided during previous PT for contralateral TKA. Exercises were reviewed and Lolly was able to demonstrate them prior to the end of the session. Lolly demonstrated good  understanding of the education provided. See EMR under Patient Instructions for exercises provided during therapy sessions.    ASSESSMENT     The patient reported to physical therapy denying present knee pain, only continued musculature discomfort. Increased focus on range of motion in flexion and improving quad control with forward step up and step down. " "Patient able to ascend/descend 3 stair steps with moderate difficulty and tendency to want to side step down the stairs. Patient did much better on 6" stepper in clinic suggesting lack of confidence affecting her ability. Patient with decreased quad control with lateral step down. Continued stiffness in right knee.     Lolly Is progressing well towards her goals.   Pt prognosis is Good.     Pt will continue to benefit from skilled outpatient physical therapy to address the deficits listed in the problem list box on initial evaluation, provide pt/family education and to maximize pt's level of independence in the home and community environment.     Pt's spiritual, cultural and educational needs considered and pt agreeable to plan of care and goals.     Anticipated barriers to physical therapy: Transportation     Goals:  Short Term Goals: 3 weeks   1) Decrease swelling by 1 cm at each affected level Progressing  2) Facilitate improved scar mobility Progressing  3) Normalize patellar mobility Met 4/4/2024  4) Patient will consistently stand with equal weight bearing with minimal - no UE support Progressing/nearly met     Long Term Goals: 6 weeks   1) Patient will resume driving Progressing (short distance only)  2) Patient will consistently perform car transfers without significant difficulty Progressing  3) Patient will safely ambulate over level surfaces with no more than cane for assistive device Progressing (not using device but safety component not met yet)  4) Patient will step into/out of tub safely without assistance Slowly progressing  5) Patient will consistently demonstrate equal weight bearing throughout sit <> stand transfers using no more than minimal UE support. Progressing  6) Improve functional score to > 78% as indicated by FOTO knee survey Slightly regressed (pt reported not accurate)  7) Patient will be independent in HEP.  Progressing    PLAN   POC: Outpatient Physical Therapy 2 times weekly for 6 " weeks to include the following interventions: Electrical Stimulation (IFC/NMES), Gait Training, Manual Therapy, Moist heat/cold packs, Patient Education, Therapeutic Activity, Therapeutic Exercise, Therapeutic Taping. Lolly may be treated by PTA as part of their rehab team.     The patient is to be progressed within the established plan of care as tolerated in order to accomplish goals as stated above. Plan to increase exercise complexity as able and incorporate proprioception training for functional safety.    Radha Ramirez, PT

## 2024-04-18 ENCOUNTER — CLINICAL SUPPORT (OUTPATIENT)
Dept: REHABILITATION | Facility: HOSPITAL | Age: 64
End: 2024-04-18
Payer: MEDICARE

## 2024-04-18 DIAGNOSIS — R26.9 GAIT DIFFICULTY: Primary | ICD-10-CM

## 2024-04-18 DIAGNOSIS — Z74.09 IMPAIRED FUNCTIONAL MOBILITY AND ACTIVITY TOLERANCE: ICD-10-CM

## 2024-04-18 DIAGNOSIS — M25.661 DECREASED ROM OF RIGHT KNEE: ICD-10-CM

## 2024-04-18 PROCEDURE — 97110 THERAPEUTIC EXERCISES: CPT | Mod: PN

## 2024-04-18 PROCEDURE — 97530 THERAPEUTIC ACTIVITIES: CPT | Mod: PN

## 2024-04-18 NOTE — PROGRESS NOTES
"[facility]   Physical Therapy Treatment Note     Name: Lolly Lozanoncer  Clinic Number: [id number]    Therapy Diagnosis:   Encounter Diagnoses   Name Primary?    Gait difficulty Yes    Impaired functional mobility and activity tolerance     Decreased ROM of right knee        Physician: Arben Coombs, *    Visit Date: 4/18/2024    Physician Orders: PT Eval and Treat Knees  Medical Diagnosis from Referral: S/P total knee arthroplasty, right  Evaluation Date: 3/20/2024  Authorization Period Expiration: 03/11/2025  Plan of Care Expiration: 5/2/2024  Progress Note Due: 4/18/2024  Date of Surgery: 2/26/2024  Visit # / Visits authorized: 8/ 20 (9 total)  FOTO: 2/ 3 Completed 4/5/2024 with a Functional Score of 59%  KOOS, JR. 66.0/100 Lower Score = Greater Disability              Next survey due session 10     Precautions: Diabetes, Fall, and cancer     PTA Visit #: 0/5     Time In: 0800  Time Out: 0845  Total Billable Time: 45 minutes    SUBJECTIVE     Pt reports: Patient states the muscles in the back of the knee (points to gastroc heads) are sore, but that's really the only area that's bothersome today.   She was compliant with home exercise program.  Response to previous treatment: positive  Functional change: Resumed driving short distance    Pain: 0/10 "just an annoyance in my muscles."  Location: right knee      OBJECTIVE     Objective Measures updated at progress report unless specified.     Treatment     Lolly received the treatments listed below:      therapeutic exercises to develop strength, endurance, ROM, and flexibility for 20 minutes including:  Recumbent bike with seat at 3, for 2 min, then seat 2 for self stretch x 4 min (8' total)  Standing gastroc stretches 3x30s  R hip flexion (march) with 2s hold 3# x20  Hip abduction using 3# x20   Extension using 3# x20  Hamstring curls using 3# x20  SLR flexion using 3# x20  Sitting LAQ using 3# x20  March using 3# 2x10  Heel slides " "with overpressure AAROM x20   Supine SLR using 3# 2x10  SAQ using 3# x20  Heel slides AROM x15 & x15 AAROM using strap (with overpressure 0-120* knee flexion)    To be added:   proprioception training    therapeutic activities to improve functional performance for 25 minutes, including:  Alternate toe taps to 8" step using 3# x2'  Closed chain TKE using 6" step x20  Forward step up/over 6" step using 0# 2 x 10 ea way- focus on decreased use of handrail  Lateral step down 6" step using 0# 2 x10 ea leg- focus on quad control  Mini squat x20  Sit to stand from high/low mat, staggered stance x 10 ea leg- mat height adjusted to challenging yet possible    manual therapy techniques: Soft tissue Mobilization and Friction Massage were applied to the: Right knee for 0 minutes, including:  STM: strumming to lateral knee, heads of gastroc, and mid to medial distal hamstrings  Scar mobilization including cross friction and skin rolling (continue for proximal incision)      Patient Education and Home Exercises     Home Exercises Provided and Patient Education Provided     Education provided:   - The patient was instructed in increased exercise complexity as stated above in bold print.    Written Home Exercises Provided: Patient instructed to cont prior HEP provided during previous PT for contralateral TKA. Exercises were reviewed and Lolly was able to demonstrate them prior to the end of the session. Lolly demonstrated good  understanding of the education provided. See EMR under Patient Instructions for exercises provided during therapy sessions.    ASSESSMENT     Patient performed exercises and activities today with focus on quad control with step up and step down from 6-8" step to facilitate her ability to ascend/descend stair steps with control and confidence. Patient continues to demonstrate decreased eccentric and concentric quad control. Continued quad strengthening warranted. Patient with good AROM once warmed up, but " does continue to struggle with stiffness daily.     Lolly Is progressing well towards her goals.   Pt prognosis is Good.     Pt will continue to benefit from skilled outpatient physical therapy to address the deficits listed in the problem list box on initial evaluation, provide pt/family education and to maximize pt's level of independence in the home and community environment.     Pt's spiritual, cultural and educational needs considered and pt agreeable to plan of care and goals.     Anticipated barriers to physical therapy: Transportation     Goals:  Short Term Goals: 3 weeks   1) Decrease swelling by 1 cm at each affected level Progressing  2) Facilitate improved scar mobility Progressing  3) Normalize patellar mobility Met 4/4/2024  4) Patient will consistently stand with equal weight bearing with minimal - no UE support Progressing/nearly met     Long Term Goals: 6 weeks   1) Patient will resume driving Progressing (short distance only)  2) Patient will consistently perform car transfers without significant difficulty Progressing  3) Patient will safely ambulate over level surfaces with no more than cane for assistive device Progressing (not using device but safety component not met yet)  4) Patient will step into/out of tub safely without assistance Slowly progressing  5) Patient will consistently demonstrate equal weight bearing throughout sit <> stand transfers using no more than minimal UE support. Progressing  6) Improve functional score to > 78% as indicated by FOTO knee survey Slightly regressed (pt reported not accurate)  7) Patient will be independent in HEP.  Progressing    PLAN   POC: Outpatient Physical Therapy 2 times weekly for 6 weeks to include the following interventions: Electrical Stimulation (IFC/NMES), Gait Training, Manual Therapy, Moist heat/cold packs, Patient Education, Therapeutic Activity, Therapeutic Exercise, Therapeutic Taping. Lolly may be treated by PTA as part of their rehab  team.     The patient is to be progressed within the established plan of care as tolerated in order to accomplish goals as stated above. Plan to increase exercise complexity as able and incorporate proprioception training for functional safety.    Radha Ramirez, PT

## 2024-04-23 ENCOUNTER — CLINICAL SUPPORT (OUTPATIENT)
Dept: REHABILITATION | Facility: HOSPITAL | Age: 64
End: 2024-04-23
Payer: MEDICARE

## 2024-04-23 DIAGNOSIS — Z74.09 IMPAIRED FUNCTIONAL MOBILITY AND ACTIVITY TOLERANCE: Primary | ICD-10-CM

## 2024-04-23 DIAGNOSIS — M25.661 DECREASED ROM OF RIGHT KNEE: ICD-10-CM

## 2024-04-23 DIAGNOSIS — R26.9 GAIT DIFFICULTY: ICD-10-CM

## 2024-04-23 DIAGNOSIS — Z96.651 S/P TOTAL KNEE ARTHROPLASTY, RIGHT: ICD-10-CM

## 2024-04-23 PROCEDURE — 97140 MANUAL THERAPY 1/> REGIONS: CPT | Mod: PN,CQ

## 2024-04-23 PROCEDURE — 97110 THERAPEUTIC EXERCISES: CPT | Mod: PN,CQ

## 2024-04-23 PROCEDURE — 97530 THERAPEUTIC ACTIVITIES: CPT | Mod: PN,CQ

## 2024-04-23 NOTE — PROGRESS NOTES
"[facility]   Physical Therapy Treatment Note     Name: Lolly Ramírez  Clinic Number: [id number]    Therapy Diagnosis:   Encounter Diagnoses   Name Primary?    Impaired functional mobility and activity tolerance Yes    Gait difficulty     Decreased ROM of right knee     S/P total knee arthroplasty, right      Physician: Arben Coombs, *    Visit Date: 4/23/2024    Physician Orders: PT Eval and Treat Knees  Medical Diagnosis from Referral: S/P total knee arthroplasty, right  Evaluation Date: 3/20/2024  Authorization Period Expiration: 03/11/2025  Plan of Care Expiration: 5/2/2024  Progress Note Due: 4/18/2024  Date of Surgery: 2/26/2024  Visit # / Visits authorized: 9/ 20 (10 total)  FOTO: 3/ 3 Completed 4/23/2024 with a Functional Score of 59%  KOOS, JR. 66/100 Lower Score = Greater Disability              Next survey due at discharge     Precautions: Diabetes, Fall, and cancer     PTA Visit #: 1/5     Time In: 0854  Time Out: 0936  Total Billable Time: 42 minutes    SUBJECTIVE     Pt reports: "It was a rough weekend. We went up to the property, and I was on my feet a lot on uneven surfaces. I wore a compression knee brace, and when I took it off it felt wobbly. I was working with it on, and I was afraid I was going to fall in the fire-pit. I did trip over some blackberry saulo, and I was like I am done. We are working on getting the ground level."  She was compliant with home exercise program.  Response to previous treatment: "I was good, tired."  Functional change: Resumed driving short distance    Pain: 2-3/10 "in the muscles & a bite in the outside of that knee"  Location: right knee      OBJECTIVE     Objective Measures updated at progress report unless specified.     Treatment     Lolly received the treatments listed below:      therapeutic exercises to develop strength, endurance, ROM, and flexibility for 14 minutes including:  Recumbent bike with seat at 2 for self stretch " "x6'  Standing gastroc stretches 3x30s  R hip flexion (march) with 2s hold 3# x20  Hip abduction using 3# x20   Extension using 3# x20  Hamstring curls using 3# x20  SLR flexion using 3# x20  Sitting LAQ using 3# x20  March using 3# 2x10  Heel slides with overpressure AAROM x20 (0- * knee flexion)  Supine SLR using 3# 2x10  SAQ using 3# x20  Heel slides AROM x15 & x15 AAROM using strap (with overpressure 0- * knee flexion)    To be added:   proprioception training    therapeutic activities to improve functional performance for 20 minutes, including:  Alternate toe taps to 8" step using 3# on Airex foam x2' (using fingertip assist)  Closed chain TKE using 8" step x20  Forward step up/over 8" step using 3# x10 ea way (focus on eccentric descending)  Lateral step up/over 8" step using 3# x10 ea way  Mini squat x20   NBOS w/eyes closed w/o UE support 3x30s   Single leg stance w/B UE support 3x30s ea   Tandem stance w/moderate single UE support 3x30s    manual therapy techniques: Soft tissue Mobilization and Friction Massage were applied to the: Right knee for 8 minutes, including:  STM: strumming and deep pressure to medial and lateral knee, heads of gastroc, and mid to medial distal hamstrings  Scar mobilization including cross friction and skin rolling (continue for proximal incision)      Patient Education and Home Exercises     Home Exercises Provided and Patient Education Provided     Education provided:   - Education was provided on proprioception training with reasoning.    Written Home Exercises Provided: Patient instructed to cont prior HEP provided during previous PT for contralateral TKA. Exercises were reviewed and Lolly was able to demonstrate them prior to the end of the session. Lolly demonstrated good  understanding of the education provided. See EMR under Patient Instructions for exercises provided during therapy sessions.    ASSESSMENT     The patient reported to physical therapy stating slight soreness " and knee pain following working on patient's property the day prior. Lolly Ramírez was progressed slightly with increased step height during functional training, as well as incorporation of proprioception and balance training for improved functional safety. The patient reports discomfort during descent of step; patient tends to pivot as descending to reduce knee flexion. Patient began to become emotion due to discomfort. Manual techniques were provided with tenderness present medial and lateral to patella in joint line.    Lolly Is progressing well towards her goals.   Pt prognosis is Good.     Pt will continue to benefit from skilled outpatient physical therapy to address the deficits listed in the problem list box on initial evaluation, provide pt/family education and to maximize pt's level of independence in the home and community environment.     Pt's spiritual, cultural and educational needs considered and pt agreeable to plan of care and goals.     Anticipated barriers to physical therapy: Transportation     Goals:  Short Term Goals: 3 weeks   1) Decrease swelling by 1 cm at each affected level Progressing  2) Facilitate improved scar mobility Progressing  3) Normalize patellar mobility Met 4/4/2024  4) Patient will consistently stand with equal weight bearing with minimal - no UE support Progressing/nearly met     Long Term Goals: 6 weeks   1) Patient will resume driving Progressing (short distance only; pt is experiencing dizziness at times)  2) Patient will consistently perform car transfers without significant difficulty Progressing  3) Patient will safely ambulate over level surfaces with no more than cane for assistive device Progressing (not using device but safety component not met yet)  4) Patient will step into/out of tub safely without assistance Slowly progressing  5) Patient will consistently demonstrate equal weight bearing throughout sit <> stand transfers using no more than minimal UE support.  Progressing  6) Improve functional score to > 78% as indicated by FOTO knee survey No change since previous  7) Patient will be independent in HEP.  Progressing    PLAN   POC: Outpatient Physical Therapy 2 times weekly for 6 weeks to include the following interventions: Electrical Stimulation (IFC/NMES), Gait Training, Manual Therapy, Moist heat/cold packs, Patient Education, Therapeutic Activity, Therapeutic Exercise, Therapeutic Taping. Lolly may be treated by PTA as part of their rehab team.     The patient is to be progressed within the established plan of care as tolerated in order to accomplish goals as stated above. Plan to increase exercise complexity as able and incorporate additional proprioception training for functional safety. Focus on eccentric descending from step in subsequent session.    Eda Marrufo, PTA

## 2024-04-26 ENCOUNTER — CLINICAL SUPPORT (OUTPATIENT)
Dept: REHABILITATION | Facility: HOSPITAL | Age: 64
End: 2024-04-26
Payer: MEDICARE

## 2024-04-26 DIAGNOSIS — M25.661 DECREASED ROM OF RIGHT KNEE: ICD-10-CM

## 2024-04-26 DIAGNOSIS — R26.9 GAIT DIFFICULTY: ICD-10-CM

## 2024-04-26 DIAGNOSIS — Z74.09 IMPAIRED FUNCTIONAL MOBILITY AND ACTIVITY TOLERANCE: Primary | ICD-10-CM

## 2024-04-26 PROCEDURE — 97110 THERAPEUTIC EXERCISES: CPT | Mod: PN

## 2024-04-26 PROCEDURE — 97530 THERAPEUTIC ACTIVITIES: CPT | Mod: PN

## 2024-04-26 NOTE — PROGRESS NOTES
"[facility]   Physical Therapy Treatment Note     Name: Lolly Lozanoncer  Clinic Number: [id number]    Therapy Diagnosis:   Encounter Diagnoses   Name Primary?    Impaired functional mobility and activity tolerance Yes    Gait difficulty     Decreased ROM of right knee      Physician: Arben Coombs, *    Visit Date: 4/26/2024    Physician Orders: PT Eval and Treat Knees  Medical Diagnosis from Referral: S/P total knee arthroplasty, right  Evaluation Date: 3/20/2024  Authorization Period Expiration: 03/11/2025  Plan of Care Expiration: 5/2/2024  Progress Note Due: 4/18/2024  Date of Surgery: 2/26/2024  Visit # / Visits authorized: 10/ 20 (11 total)  FOTO: 3/ 3 Completed 4/23/2024 with a Functional Score of 59%  KOOS, JR. 66/100 Lower Score = Greater Disability              Next survey due at discharge     Precautions: Diabetes, Fall, and cancer     PTA Visit #: 0/5     Time In: 0931  Time Out: 1025  Total Billable Time: 47 minutes    SUBJECTIVE     Pt reports: "I'm feeling it more in my shin/calf." (With regard to bike).  "Yesterday was a great day knee wise"  She was compliant with home exercise program.  Response to previous treatment: "tired and sore"  Functional change: Resumed driving short distance    Pain: 0/10   Location: right knee      OBJECTIVE     Objective Measures updated at progress report unless specified.     Treatment     Lolly received the treatments listed below (new activities/progressions indicated in bold print, continued activities indicated in regular print, activities not performed indicated by light print):      therapeutic exercises to develop strength, endurance, ROM, and flexibility for 20 minutes including:  Recumbent bike with seat at 3 x 4' then decreased to seat level 2 x 4' (achieving full revolution for both)  Standing gastroc stretches 3x30s  R hip flexion (march) with 2s hold 3# x20  Hip abduction using 3# x20   Extension using 3# x20  Hamstring " "curls using 3# x20  SLR flexion using 3# x20  Sitting LAQ using 3# x20  March using 3# 2x10  Heel slides with overpressure AAROM x20 (0- * knee flexion)  Supine SLR using 3# 2x10  SAQ using 3# x20  Heel slides AROM x15 & x15 AAROM using strap (with overpressure 0- * knee flexion)    To be added:   proprioception training    therapeutic activities to improve functional performance for 28 minutes, including:  Alternate toe taps to 8" step using 4# on Airex foam x2' (using fingertip assist)  Closed chain TKE using 8" step x20  Forward step up/over 8" step using 4# x10 ea way (focus on eccentric descending)  Lateral step up/over 8" step using 4# x10 ea way  Mini squat x20   NBOS w/eyes closed w/o UE support 3x30s   Single leg stance w/B UE support 3x30s ea   Tandem stance w/moderate single UE support 3x30s    manual therapy techniques: Soft tissue Mobilization and Friction Massage were applied to the: Right knee for 0 minutes, including:  STM: strumming and deep pressure to medial and lateral knee, heads of gastroc, and mid to medial distal hamstrings  Scar mobilization including cross friction and skin rolling (continue for proximal incision)      Patient Education and Home Exercises     Home Exercises Provided and Patient Education Provided     Education provided:   - Education was provided on proprioception training with reasoning. Discussed utilization of side stepping to manage steps in camper to improve safety.    Written Home Exercises Provided: Patient instructed to cont prior HEP provided during previous PT for contralateral TKA. Exercises were reviewed and Lolly was able to demonstrate them prior to the end of the session. Lolly demonstrated good  understanding of the education provided. See EMR under Patient Instructions for exercises provided during therapy sessions.    ASSESSMENT     The patient presents to PT today with reports of intermittent R knee pain in anterior aspect that is most notable when " "descending steps.  Patient completed activities as noted above, demonstrating greatest difficulty with descending 8" step (occasional loss of balance requiring significant UE support and/or PT assistance to prevent fall). Patient indicated pain is in infrapatellar region on R.  Overall her strength is improving but she continues to experience difficulty with step negotiation.      Lolly Is progressing well towards her goals.   Pt prognosis is Good.     Pt will continue to benefit from skilled outpatient physical therapy to address the deficits listed in the problem list box on initial evaluation, provide pt/family education and to maximize pt's level of independence in the home and community environment.     Pt's spiritual, cultural and educational needs considered and pt agreeable to plan of care and goals.     Anticipated barriers to physical therapy: Transportation     Goals:  Short Term Goals: 3 weeks   1) Decrease swelling by 1 cm at each affected level Progressing/nearly met  2) Facilitate improved scar mobility Progressing (not addressed this date)  3) Normalize patellar mobility Met 4/4/2024  4) Patient will consistently stand with equal weight bearing with minimal - no UE support Met 4/26/2024     Long Term Goals: 6 weeks   1) Patient will resume driving Progressing (short distance only; pt is experiencing dizziness at times)  2) Patient will consistently perform car transfers without significant difficulty Progressing  3) Patient will safely ambulate over level surfaces with no more than cane for assistive device Progressing (not using device but safety component not met yet)  4) Patient will step into/out of tub safely without assistance Progressing  5) Patient will consistently demonstrate equal weight bearing throughout sit <> stand transfers using no more than minimal UE support. Progressing/nearly met  6) Improve functional score to > 78% as indicated by FOTO knee survey No change since previous  7) " Patient will be independent in HEP.  Progressing    PLAN   POC: Outpatient Physical Therapy 2 times weekly for 6 weeks to include the following interventions: Electrical Stimulation (IFC/NMES), Gait Training, Manual Therapy, Moist heat/cold packs, Patient Education, Therapeutic Activity, Therapeutic Exercise, Therapeutic Taping. Lolly may be treated by PTA as part of their rehab team.     The patient is to be progressed within the established plan of care as tolerated in order to accomplish goals as stated above. Plan to increase advance proprioception activities to enhance balance in standing/walking.      Lolly Villafana, PT

## 2024-04-29 ENCOUNTER — CLINICAL SUPPORT (OUTPATIENT)
Dept: REHABILITATION | Facility: HOSPITAL | Age: 64
End: 2024-04-29
Payer: MEDICARE

## 2024-04-29 DIAGNOSIS — M25.661 DECREASED ROM OF RIGHT KNEE: ICD-10-CM

## 2024-04-29 DIAGNOSIS — R26.9 GAIT DIFFICULTY: ICD-10-CM

## 2024-04-29 DIAGNOSIS — Z74.09 IMPAIRED FUNCTIONAL MOBILITY AND ACTIVITY TOLERANCE: Primary | ICD-10-CM

## 2024-04-29 PROCEDURE — 97140 MANUAL THERAPY 1/> REGIONS: CPT | Mod: PN

## 2024-04-29 PROCEDURE — 97530 THERAPEUTIC ACTIVITIES: CPT | Mod: PN

## 2024-04-29 PROCEDURE — 97110 THERAPEUTIC EXERCISES: CPT | Mod: PN

## 2024-04-29 NOTE — PROGRESS NOTES
"[facility]   Physical Therapy Treatment Note     Name: Lolly Ramírez  Clinic Number: [id number]    Therapy Diagnosis:   Encounter Diagnoses   Name Primary?    Impaired functional mobility and activity tolerance Yes    Gait difficulty     Decreased ROM of right knee      Physician: Arben Coombs, *    Visit Date: 4/29/2024    Physician Orders: PT Eval and Treat Knees  Medical Diagnosis from Referral: S/P total knee arthroplasty, right  Evaluation Date: 3/20/2024  Authorization Period Expiration: 03/11/2025  Plan of Care Expiration: 5/2/2024  Progress Note Due: 4/18/2024  Date of Surgery: 2/26/2024  Visit # / Visits authorized: 11/ 20 (12 total)  FOTO: 3/ 3 Completed 4/23/2024 with a Functional Score of 59%  KOOS, JR. 66/100 Lower Score = Greater Disability              Next survey due at discharge     Precautions: Diabetes, Fall, and cancer     PTA Visit #: 0/5     Time In: 0800  Time Out: 0905  Total Billable Time: 54 minutes    SUBJECTIVE     Pt reports: "It's just sore"  She was compliant with home exercise program.  Response to previous treatment: Tired"  Functional change: Resumed driving short distance    Pain: 0/10   Location: right knee      OBJECTIVE     Objective Measures updated at progress report unless specified.     Treatment     Lolly received the treatments listed below (new activities/progressions indicated in bold print, continued activities indicated in regular print, activities not performed indicated by light print):      therapeutic exercises to develop strength, endurance, ROM, and flexibility for 20 minutes including:  Recumbent bike with seat at 3 x 4' then decreased to seat level 2 x 4' (achieving full revolution for both)  Standing gastroc stretches 3x30s  R hip flexion (march) with 2s hold 4#x20  Hip abduction using 4# x20   Extension using 4# x20  Hamstring curls using 4# x20  SLR flexion using 3# x20  Sitting LAQ using 3# x20  March using 3# 2x10  Heel " "slides with overpressure AAROM x20 (0- * knee flexion)  Supine SLR using 3# 2x10  SAQ using 3# x20  Heel slides AROM x15 & x15 AAROM using strap (with overpressure 0- * knee flexion)    To be added:   proprioception training, side stepping, forward march    therapeutic activities to improve functional performance for 28 minutes, including:  Alternate toe taps to 8" step using 4# on Airex foam x2' (using fingertip assist)  Closed chain TKE using 8" step x20  Forward step up/over 8" step using 4# x10 ea way (focus on eccentric descending)  Lateral step up/over 8" step using 4# x10 ea way  Mini squat x20   NBOS w/eyes closed w/o UE support 3x30s   Single leg stance on airex w/B UE support 3x30s ea   Tandem stance on airex w/moderate single UE support 3x30s    manual therapy techniques: Soft tissue Mobilization and Friction Massage were applied to the: Right knee for 8 minutes, including:  STM: strumming and deep pressure to lateral knee  Scar mobilization including cross friction and skin rolling    Patient Education and Home Exercises     Home Exercises Provided and Patient Education Provided     Education provided:   - Review of exercises as noted above with verbal cues for technique. Discussed soft tissue mobilization to decrease lateral knee tenderness.     Written Home Exercises Provided: Patient instructed to cont prior HEP provided during previous PT for contralateral TKA. Exercises were reviewed and Lolly was able to demonstrate them prior to the end of the session. Lolly demonstrated good  understanding of the education provided. See EMR under Patient Instructions for exercises provided during therapy sessions.    ASSESSMENT     The patient presents to PT today with reports of intermittent R knee pain in anterior aspect that is most notable when descending steps.  Patient completed activities as noted above, demonstrating greatest difficulty with descending 8" step (occasional loss of balance requiring " significant UE support and/or PT assistance to prevent fall). Patient indicated pain is in infrapatellar region on R.  Overall her strength is improving but she continues to experience difficulty with step negotiation.      Lolly Is progressing well towards her goals.   Pt prognosis is Good.     Pt will continue to benefit from skilled outpatient physical therapy to address the deficits listed in the problem list box on initial evaluation, provide pt/family education and to maximize pt's level of independence in the home and community environment.     Pt's spiritual, cultural and educational needs considered and pt agreeable to plan of care and goals.     Anticipated barriers to physical therapy: Transportation     Goals:  Short Term Goals: 3 weeks   1) Decrease swelling by 1 cm at each affected level Progressing/nearly met  2) Facilitate improved scar mobility Progressing/nearly met  3) Normalize patellar mobility Met 4/4/2024  4) Patient will consistently stand with equal weight bearing with minimal - no UE support Met 4/26/2024     Long Term Goals: 6 weeks   1) Patient will resume driving Progressing (short distance only; pt is experiencing dizziness at times)  2) Patient will consistently perform car transfers without significant difficulty Progressing/nearly met  3) Patient will safely ambulate over level surfaces with no more than cane for assistive device Progressing/nearly met  4) Patient will step into/out of tub safely without assistance Progressing/nearly met  5) Patient will consistently demonstrate equal weight bearing throughout sit <> stand transfers using no more than minimal UE support. Progressing/nearly met  6) Improve functional score to > 78% as indicated by FOTO knee survey No change since previous  7) Patient will be independent in HEP.  Progressing    PLAN   POC: Outpatient Physical Therapy 2 times weekly for 6 weeks to include the following interventions: Electrical Stimulation (IFC/NMES),  Gait Training, Manual Therapy, Moist heat/cold packs, Patient Education, Therapeutic Activity, Therapeutic Exercise, Therapeutic Taping. Lolly may be treated by PTA as part of their rehab team.     The patient is to be progressed within the established plan of care as tolerated in order to accomplish goals as stated above. Plan to add side stepping and forward marching. Prepare for discharge.     Lolly Villafana, PT

## 2024-05-01 ENCOUNTER — CLINICAL SUPPORT (OUTPATIENT)
Dept: REHABILITATION | Facility: HOSPITAL | Age: 64
End: 2024-05-01
Payer: MEDICARE

## 2024-05-01 DIAGNOSIS — M25.661 DECREASED ROM OF RIGHT KNEE: ICD-10-CM

## 2024-05-01 DIAGNOSIS — R26.9 GAIT DIFFICULTY: ICD-10-CM

## 2024-05-01 DIAGNOSIS — Z74.09 IMPAIRED FUNCTIONAL MOBILITY AND ACTIVITY TOLERANCE: Primary | ICD-10-CM

## 2024-05-01 PROCEDURE — 97110 THERAPEUTIC EXERCISES: CPT | Mod: PN

## 2024-05-01 PROCEDURE — 97530 THERAPEUTIC ACTIVITIES: CPT | Mod: PN

## 2024-05-01 NOTE — PROGRESS NOTES
"[facility]   Physical Therapy Treatment Note/Discharge Summary    Name: Lolly Ramírez  Clinic Number: [id number]    Therapy Diagnosis:   Encounter Diagnoses   Name Primary?    Impaired functional mobility and activity tolerance Yes    Gait difficulty     Decreased ROM of right knee      Physician: Arben Coombs, *    Visit Date: 5/1/2024    Physician Orders: PT Eval and Treat Knees  Medical Diagnosis from Referral: S/P total knee arthroplasty, right  Evaluation Date: 3/20/2024  Authorization Period Expiration: 03/11/2025  Plan of Care Expiration: 5/2/2024  Progress Note Due: 4/18/2024  Date of Surgery: 2/26/2024  Visit # / Visits authorized: 12/ 20 (13 total)  Visits canceled: 2  Visits no showed:  0  FOTO: Final Completed 5/1/2024 with a Functional Score of 74%  KOOS, JR. 79.9/100 Lower Score = Greater Disability              Precautions: Diabetes, Fall, and cancer     PTA Visit #: 0/5     Time In: 0800  Time Out: 0900  Total Billable Time: 53 minutes    SUBJECTIVE     Pt reports: "I just planned our next camp trip."  She was compliant with home exercise program.  Response to previous treatment: Tired, sore"  Functional change: Navigated flight of steps using reciprocating pattern, 1 hand rail    Pain: 0/10   Location: right knee      OBJECTIVE     AROM R knee 0* - 120*   PROM R knee 0* - 123*  Strength R knee 4+/5  Girth:  Knee                                      Right                  @ joint line                  43.1 cm             @ 5 cm proximal         50.4 cm             @ 10 cm proximal       53.8 cm             @ 5 cm distal              39.8 cm             @ 10 cm distal            40.0 cm             Treatment     Lolly received the treatments listed below (new activities/progressions indicated in bold print, continued activities indicated in regular print, activities not performed indicated by light print):      therapeutic exercises to develop strength, endurance, " "ROM, and flexibility for 28 minutes including (3 min unbilled due to overlapping time with 1 other patient):  Recumbent bike with seat at 3 x 4' then decreased to seat level 2 x 4' (achieving full revolution for both)  Standing gastroc stretches 3x30s  R hip flexion (march) with 2s hold 4#x20  Hip abduction using 4# x20   Extension using 4# x20  Hamstring curls using 4# x20  Sitting LAQ using 4# x20    therapeutic activities to improve functional performance for 28 minutes, including:  Alternate toe taps to 8" step using 4# on Airex foam x2' (using fingertip assist)  Closed chain TKE using 8" step x20  Step negotiation x 20 (1 short flight without rail, 1 full flight with 1 rail) using reciprocating gait pattern.     Lateral step up/over 8" step using 4# x10 ea way  Mini squat x20   NBOS w/eyes closed w/o UE support 3x30s   Single leg stance on airex w/B UE support 3x30s ea   Tandem stance on airex w/moderate single UE support 3x30s    Patient Education and Home Exercises     Home Exercises Provided and Patient Education Provided     Education provided:   - Review of exercises as noted above with verbal cues for technique. Instructed patient in stair negotiation using reciprocating gait pattern with and without use of rail.  Required CGA for safety.    Written Home Exercises Provided: Patient instructed to cont prior HEP.  Exercises were reviewed and Lolly was able to demonstrate them prior to the end of the session. Lolly demonstrated good  understanding of the education provided. See EMR under Patient Instructions for exercises provided during therapy sessions.    ASSESSMENT     Lolly has made steady progress in PT with improved knee ROM, improved LE strength, and improved flexibility.  This has contributed to improved gait quality, increased walking tolerance, and minimally improved balance.  She continues to exhibit balance deficits but she is nearing pre-existing levels.  She is able to perform exercise program " using illustrated instructions.  She is ready for discharge.        Lolly has progressed well towards her goals.   Pt prognosis is Good.     Pt will no longer benefit from skilled outpatient physical therapy to address the deficits listed in the problem list box on initial evaluation, provide pt/family education and to maximize pt's level of independence in the home and community environment.     Pt's spiritual, cultural and educational needs considered and pt agreeable to plan of care and goals.     Anticipated barriers to physical therapy: None    Goals:  Short Term Goals:    1) Decrease swelling by 1 cm at each affected level Met 5/1/2024  2) Facilitate improved scar mobility Nearly met  3) Normalize patellar mobility Met 4/4/2024  4) Patient will consistently stand with equal weight bearing with minimal - no UE support Met 4/26/2024     Long Term Goals: 6 weeks   1) Patient will resume driving Met 5/1/2024  2) Patient will consistently perform car transfers without significant difficulty Met 5/1/2024  3) Patient will safely ambulate over level surfaces with no more than cane for assistive device Met 5/1/2024  4) Patient will step into/out of tub safely without assistance Partially met  5) Patient will consistently demonstrate equal weight bearing throughout sit <> stand transfers using no more than minimal UE support. Partially met  6) Improve functional score to > 78% as indicated by FOTO knee survey Nearly met  7) Patient will be independent in HEP.  Met 5/1/2024    PLAN     Discharge from skilled PT services. Patient to continue HEP to maintain gains achieved in PT.      Lolly Villafana, PT

## 2024-05-14 DIAGNOSIS — Z12.31 ENCOUNTER FOR SCREENING MAMMOGRAM FOR MALIGNANT NEOPLASM OF BREAST: Primary | ICD-10-CM

## 2024-06-10 ENCOUNTER — OFFICE VISIT (OUTPATIENT)
Dept: FAMILY MEDICINE | Facility: CLINIC | Age: 64
End: 2024-06-10
Payer: MEDICARE

## 2024-06-10 VITALS
DIASTOLIC BLOOD PRESSURE: 74 MMHG | TEMPERATURE: 98 F | SYSTOLIC BLOOD PRESSURE: 130 MMHG | HEIGHT: 67 IN | OXYGEN SATURATION: 99 % | HEART RATE: 92 BPM | BODY MASS INDEX: 42.46 KG/M2 | WEIGHT: 270.5 LBS

## 2024-06-10 DIAGNOSIS — E11.69 TYPE 2 DIABETES MELLITUS WITH OTHER SPECIFIED COMPLICATION, WITHOUT LONG-TERM CURRENT USE OF INSULIN: ICD-10-CM

## 2024-06-10 DIAGNOSIS — Z74.09 OTHER REDUCED MOBILITY: ICD-10-CM

## 2024-06-10 DIAGNOSIS — Z00.00 ENCOUNTER FOR MEDICARE ANNUAL WELLNESS EXAM: ICD-10-CM

## 2024-06-10 DIAGNOSIS — R26.9 ABNORMALITY OF GAIT AND MOBILITY: ICD-10-CM

## 2024-06-10 DIAGNOSIS — Z99.89 DEPENDENCE ON OTHER ENABLING MACHINES AND DEVICES: ICD-10-CM

## 2024-06-10 DIAGNOSIS — Z00.00 ENCOUNTER FOR PREVENTIVE HEALTH EXAMINATION: Primary | ICD-10-CM

## 2024-06-10 DIAGNOSIS — E66.01 MORBID OBESITY WITH BODY MASS INDEX (BMI) OF 40.0 TO 49.9: ICD-10-CM

## 2024-06-10 PROCEDURE — G0439 PPPS, SUBSEQ VISIT: HCPCS | Mod: ,,, | Performed by: NURSE PRACTITIONER

## 2024-06-10 PROCEDURE — 99214 OFFICE O/P EST MOD 30 MIN: CPT | Mod: PBBFAC,PO | Performed by: NURSE PRACTITIONER

## 2024-06-10 PROCEDURE — 99999 PR PBB SHADOW E&M-EST. PATIENT-LVL IV: CPT | Mod: PBBFAC,,, | Performed by: NURSE PRACTITIONER

## 2024-06-10 RX ORDER — ACETAMINOPHEN 500 MG
500 TABLET ORAL EVERY 6 HOURS PRN
COMMUNITY

## 2024-06-10 RX ORDER — MUPIROCIN 20 MG/G
OINTMENT TOPICAL DAILY
COMMUNITY
Start: 2024-05-26

## 2024-06-10 NOTE — PROGRESS NOTES
"  Lolly Ramírez presented for a  Medicare AWV and comprehensive Health Risk Assessment today. The following components were reviewed and updated:    Medical history  Family History  Social history  Allergies and Current Medications  Health Risk Assessment  Health Maintenance  Care Team         ** See Completed Assessments for Annual Wellness Visit within the encounter summary.**         The following assessments were completed:  Living Situation  CAGE  Depression Screening  Timed Get Up and Go  Whisper Test  Cognitive Function Screening  Nutrition Screening  ADL Screening  PAQ Screening    Clock in media   Opioid documentation:      Patient does not have a current opioid prescription.        Vitals:    06/10/24 1410   BP: 130/74   Pulse: 92   Temp: 98.2 °F (36.8 °C)   TempSrc: Oral   SpO2: 99%   Weight: 122.7 kg (270 lb 8.1 oz)   Height: 5' 7" (1.702 m)     Body mass index is 42.37 kg/m².  Physical Exam  Constitutional:       Appearance: She is well-developed.   HENT:      Head: Normocephalic and atraumatic.      Nose: Nose normal.   Eyes:      General: Lids are normal.      Conjunctiva/sclera: Conjunctivae normal.   Cardiovascular:      Rate and Rhythm: Normal rate.   Pulmonary:      Effort: Pulmonary effort is normal.   Neurological:      Mental Status: She is alert and oriented to person, place, and time.   Psychiatric:         Speech: Speech normal.         Behavior: Behavior normal.               Diagnoses and health risks identified today and associated recommendations/orders:    1. Encounter for preventive health examination  Discussed health maintenance guidelines appropriate for age.        2. Encounter for Medicare annual wellness exam    - Ambulatory Referral/Consult to Enhanced Annual Wellness Visit (eAWV)    3. Dependence on other enabling machines and devices  Stable, continue use of assistive devices     4. Type 2 diabetes mellitus with other specified complication, without long-term current use of " "insulin  Controlled, continue current medication regimen  Last HgA1c - 5.6  ADA diet  Increase physical activity   Followed by pcp      5. Morbid obesity with body mass index (BMI) of 40.0 to 49.9  Uncontrolled, Counseled patient on his ideal body weight, health consequences of being obese and current recommendations including weekly exercise and a heart healthy diet.  Current BMI is:Estimated body mass index is 42.37 kg/m² as calculated from the following:    Height as of this encounter: 5' 7" (1.702 m).    Weight as of this encounter: 122.7 kg (270 lb 8.1 oz)..  Patient is aware that ideal BMI < 25 or Weight in (lb) to have BMI = 25: 159.3.      6. Abnormality of gait and mobility  Stable, continue use of assistive device     7. Other reduced mobility  Stable, continue use of assistive device       Provided Lolly with a 5-10 year written screening schedule and personal prevention plan. Recommendations were developed using the USPSTF age appropriate recommendations. Education, counseling, and referrals were provided as needed. After Visit Summary printed and given to patient which includes a list of additional screenings\tests needed.    Follow up for One year for Annual Wellness Visit.    Nancy Buchanan, NP      I offered to discuss advanced care planning, including how to pick a person who would make decisions for you if you were unable to make them for yourself, called a health care power of , and what kind of decisions you might make such as use of life sustaining treatments such as ventilators and tube feeding when faced with a life limiting illness recorded on a living will that they will need to know. (How you want to be cared for as you near the end of your natural life)     X  Patient has advanced directives written and agrees to provide copies to the institution.  "

## 2024-06-10 NOTE — PATIENT INSTRUCTIONS
Counseling and Referral of Other Preventative  (Italic type indicates deductible and co-insurance are waived)    Patient Name: Lolly Ramírez  Today's Date: 6/10/2024    Health Maintenance       Date Due Completion Date    Hepatitis C Screening Never done ---    Pneumococcal Vaccines (Age 0-64) (1 of 2 - PCV) Never done ---    HIV Screening Never done ---    Shingles Vaccine (1 of 2) Never done ---    Low Dose Statin Never done ---    COVID-19 Vaccine (3 - Pfizer risk series) 05/25/2021 4/27/2021    RSV Vaccine (Age 60+ and Pregnant patients) (1 - 1-dose 60+ series) Never done ---    Diabetes Urine Screening 03/01/2024 3/1/2023    Lipid Panel 03/01/2024 3/1/2023    Foot Exam 03/13/2024 3/13/2023    Eye Exam 06/16/2024 6/16/2023    Hemoglobin A1c 06/20/2024 12/20/2023    Mammogram 06/22/2024 6/22/2023    Influenza Vaccine (Season Ended) 09/01/2024 3/9/2023 (Declined)    Override on 3/9/2023: Declined    TETANUS VACCINE 08/06/2031 8/6/2021    Colorectal Cancer Screening 07/08/2032 7/8/2022        No orders of the defined types were placed in this encounter.    The following information is provided to all patients.  This information is to help you find resources for any of the problems found today that may be affecting your health:                  Living healthy guide: [place].gov    Understanding Diabetes: www.diabetes.org      Eating healthy: www.cdc.gov/healthyweight      CDC home safety checklist: www.cdc.gov/steadi/patient.html      Agency on Aging: [place].gov    Alcoholics anonymous (AA): www.aa.org      Physical Activity: www.gemma.nih.gov/qi0hlyu      Tobacco use: [place].gov

## 2024-06-11 ENCOUNTER — OFFICE VISIT (OUTPATIENT)
Dept: ORTHOPEDICS | Facility: CLINIC | Age: 64
End: 2024-06-11
Payer: MEDICARE

## 2024-06-11 VITALS — HEIGHT: 67 IN | WEIGHT: 270.5 LBS | BODY MASS INDEX: 42.46 KG/M2

## 2024-06-11 DIAGNOSIS — M19.032 DRUJ (DISTAL RADIOULNAR JOINT) ARTHROSIS, PRIMARY, LEFT: ICD-10-CM

## 2024-06-11 DIAGNOSIS — Z96.651 S/P TOTAL KNEE ARTHROPLASTY, RIGHT: Primary | ICD-10-CM

## 2024-06-11 PROBLEM — M79.10 MYALGIA: Status: ACTIVE | Noted: 2024-06-11

## 2024-06-11 PROCEDURE — 99214 OFFICE O/P EST MOD 30 MIN: CPT | Mod: 25,S$GLB,, | Performed by: ORTHOPAEDIC SURGERY

## 2024-06-11 PROCEDURE — 20600 DRAIN/INJ JOINT/BURSA W/O US: CPT | Mod: LT,S$GLB,, | Performed by: ORTHOPAEDIC SURGERY

## 2024-06-11 RX ORDER — PNV NO.95/FERROUS FUM/FOLIC AC 28MG-0.8MG
TABLET ORAL
COMMUNITY
Start: 2024-06-01

## 2024-06-11 RX ORDER — TRIAMCINOLONE ACETONIDE 40 MG/ML
40 INJECTION, SUSPENSION INTRA-ARTICULAR; INTRAMUSCULAR
Status: DISCONTINUED | OUTPATIENT
Start: 2024-06-11 | End: 2024-06-11 | Stop reason: HOSPADM

## 2024-06-11 RX ADMIN — TRIAMCINOLONE ACETONIDE 40 MG: 40 INJECTION, SUSPENSION INTRA-ARTICULAR; INTRAMUSCULAR at 07:06

## 2024-06-11 NOTE — PROCEDURES
Small Joint Aspiration/Injection    Date/Time: 6/11/2024 7:45 AM    Performed by: Curtis Ricardo MD  Authorized by: Curtis Ricardo MD    Consent Done?:  Yes (Verbal)  Indications:  Arthritis and pain  Site marked: the procedure site was marked    Timeout: prior to procedure the correct patient, procedure, and site was verified    Prep: patient was prepped and draped in usual sterile fashion      Local anesthesia used?: Yes    Location: left DRUJ joint.  Ultrasonic guidance for needle placement?: No    Needle size:  25 G  Medications:  40 mg triamcinolone acetonide 40 mg/mL  Patient tolerance:  Patient tolerated the procedure well with no immediate complications

## 2024-06-11 NOTE — PROGRESS NOTES
[facility]    Subjective:     Chief Complaint:   Chief Complaint   Patient presents with    Right Knee - Pain     Patient is here for a PO Right TKA 24, states her pain is better, still has some pain on the lateral side of her knee & right above the knee.     Left Wrist - Pain     Complaints of Left Wrist pain, had a fall a while back and pain never went away.        Past Medical History:   Diagnosis Date    Breast cancer, stage 1, estrogen receptor negative, left () 2020    Depression     Diabetes mellitus, type 2     GERD (gastroesophageal reflux disease)     HER2-positive carcinoma of left breast 2020    Hx of breast cancer     Hx of breast cancer - Her2Nu+/ER+ - 2019    Insomnia     Lymphedema     Neuropathy of both feet     S/P lumpectomy, left breast 2020       Past Surgical History:   Procedure Laterality Date    ARTHROSCOPY OF SHOULDER WITH DECOMPRESSION OF SUBACROMIAL SPACE Right 2023    Procedure: ARTHROSCOPY, SHOULDER, WITH SUBACROMIAL SPACE DECOMPRESSION;  Surgeon: Curtis Ricardo MD;  Location: [facility] OR;  Service: Orthopedics;  Laterality: Right;    BICEPS TENDON REPAIR Left 2005    BREAST BIOPSY Left     BREAST LUMPECTOMY Left      SECTION      1982, ,     CHOLECYSTECTOMY  2000    Elbow fx Left 2015    FRACTURE SURGERY Left 2016    elbow    HYSTERECTOMY  2013    KNEE ARTHROSCOPY W/ MENISCAL REPAIR Left 2014    Lower back ruptured disc  2010    LYMPH NODE DISSECTION  2011    ROBOTIC ARTHROPLASTY, KNEE Left 2023    Procedure: ROBOTIC ARTHROPLASTY, KNEE, TOTAL, LEFT;  Surgeon: Curtis Ricardo MD;  Location: [facility] OR;  Service: Orthopedics;  Laterality: Left;  Jaime-Edi    ROBOTIC ARTHROPLASTY, KNEE Right 2024    Procedure: ROBOTIC ARTHROPLASTY, KNEE, TOTAL, RIGHT;  Surgeon: Curtis Ricardo MD;  Location: [facility] OR;  Service: Orthopedics;  Laterality: Right;  Jaime-Edi    SPINE SURGERY  2009    ruptured disc        Current Outpatient Medications   Medication Sig    acetaminophen (TYLENOL) 500 MG tablet Take 500 mg by mouth every 6 (six) hours as needed for Pain.    atomoxetine (STRATTERA) 60 MG capsule Take 1 capsule (60 mg total) by mouth once daily.    b complex vitamins capsule Take 1 capsule by mouth once daily.    calcium carbonate (OS-JULIANNE) 600 mg calcium (1,500 mg) Tab Take 600 mg by mouth once.    furosemide (LASIX) 20 MG tablet Take 1 tablet (20 mg total) by mouth daily as needed.    magnesium 250 mg Tab Take 250 mg by mouth once daily.    mupirocin (BACTROBAN) 2 % ointment Apply topically once daily.    pantoprazole (PROTONIX) 40 MG tablet Take 1 tablet (40 mg total) by mouth once daily.    pregabalin (LYRICA) 200 MG Cap Take 200 mg by mouth 3 (three) times daily.    traZODone (DESYREL) 100 MG tablet Take 2 tablets (200 mg total) by mouth every evening.    venlafaxine (EFFEXOR-XR) 75 MG 24 hr capsule Take 3 capsules (225 mg total) by mouth every evening.    vitamin E 1000 UNIT capsule      No current facility-administered medications for this visit.       Review of patient's allergies indicates:   Allergen Reactions    Banana Hives    Codeine Nausea And Vomiting    Adhesive Rash    Dilaudid  [hydromorphone (bulk)] Rash    Hydromorphone hcl Rash       Family History   Problem Relation Name Age of Onset    Cancer Mother Brenda         breast    Breast cancer Mother Brenda     Parkinsonism Father C E [place]     Diabetes Father C E [place]     Cancer Sister  70 - 79        liver and breast    Cancer Daughter      Breast cancer Maternal Aunt      Cancer Maternal Aunt Jose Juan Dorman     Breast cancer Paternal Aunt      Cancer Paternal Aunt Floureene     Heart disease Maternal Grandmother Marlee     Heart disease Paternal Grandmother Bela        Social History     Socioeconomic History    Marital status:    Tobacco Use    Smoking status: Former     Current packs/day: 0.00     Average packs/day: 0.5 packs/day for  25.0 years (12.5 ttl pk-yrs)     Types: Cigarettes     Start date: 1985     Quit date: 2010     Years since quittin.5    Smokeless tobacco: Never   Substance and Sexual Activity    Alcohol use: Not Currently     Alcohol/week: 2.0 standard drinks of alcohol     Types: 2 Glasses of wine per week    Drug use: Never    Sexual activity: Yes     Partners: Male     Birth control/protection: Other-see comments, None     Comment: Hysterectomy     Social Determinants of Health     Financial Resource Strain: Low Risk  (6/10/2024)    Overall Financial Resource Strain (CARDIA)     Difficulty of Paying Living Expenses: Not hard at all   Food Insecurity: No Food Insecurity (6/10/2024)    Hunger Vital Sign     Worried About Running Out of Food in the Last Year: Never true     Ran Out of Food in the Last Year: Never true   Transportation Needs: No Transportation Needs (6/10/2024)    PRAPARE - Transportation     Lack of Transportation (Medical): No     Lack of Transportation (Non-Medical): No   Physical Activity: Inactive (6/10/2024)    Exercise Vital Sign     Days of Exercise per Week: 0 days     Minutes of Exercise per Session: 10 min   Stress: Stress Concern Present (6/10/2024)    [employer] - Occupational Stress Questionnaire     Feeling of Stress : To some extent   Housing Stability: Low Risk  (6/10/2024)    Housing Stability Vital Sign     Unable to Pay for Housing in the Last Year: No     Homeless in the Last Year: No       History of present illness:  Lolly comes in today for follow-up for her total knee arthroplasty.  She is 3 and half months postop and doing very well.  She is completed formal physical therapy.  She also complains of some ulnar-sided left wrist pain.  Denies injury or trauma there.  We would like this evaluated today.    Review of Systems:    Constitution: Negative for chills, fever, and sweats.  Negative for unexplained weight loss.    HENT:  Negative for headaches  and blurry vision.    Cardiovascular:Negative for chest pain or irregular heart beat. Negative for hypertension.    Respiratory:  Negative for cough and shortness of breath.    Gastrointestinal: Negative for abdominal pain, heartburn, melena, nausea, and vomitting.    Genitourinary:  Negative bladder incontinence and dysuria.    Musculoskeletal:  See HPI for details.     Neurological: Negative for numbness.    Psychiatric/Behavioral: Negative for depression.  The patient is not nervous/anxious.      Endocrine: Negative for polyuria    Hematologic/Lymphatic: Negative for bleeding problem.  Does not bruise/bleed easily.    Skin: Negative for poor would healing and rash    Objective:      Physical Examination:    Vital Signs:  There were no vitals filed for this visit.    Body mass index is 42.37 kg/m².    This a well-developed, well nourished patient in no acute distress.  They are alert and oriented and cooperative to examination.        Right knee exam:  Skin to right knee clean dry intact.  No erythema or ecchymosis.  No signs or symptoms of infection.  She is neurovascularly intact throughout the right lower extremity.  Right calf is soft and nontender.  Right knee range of motion 0-120 degrees.  The knee is stable to varus and valgus stresses.  She can weightbear as tolerated right lower extremity.    Left wrist exam:  Skin to left wrist clean dry and intact.  No erythema or ecchymosis.  No signs or symptoms of infection.  She is neurovascularly intact throughout the left upper extremity.  She can open and close her left hand into a fist.  She can oppose her left thumb to all digits in the left hand.  She does have a prominent left ulnar styloid.  It is mildly tender to palpation.  She is tender to palpation over the dorsum of the wrist near the DRUJ.      Pertinent New Results:    XRAY Report / Interpretation:   Three views taken of the right knee today: AP, lateral, and sunrise views.  They reveal no acute  "fractures or dislocations.  Visualized soft tissues appear unremarkable.  She has an anatomically placed right total knee arthroplasty without evidence of hardware failure or subsidence.      Three views taken of the left hand today:  AP, lateral, and oblique views.  No acute fractures or dislocations seen.  Patient does have degenerative changes with osteophytosis at the DRUJ.  She does have a prominent left styloid as well.    Assessment/Plan:      1. History of right total knee arthroplasty.    2. Left wrist DRUJ osteoarthritis.      Continue with a home exercise program for right lower extremity quad strengthening.  Her range of motion is fantastic at this stage.  For left wrist DRUJ OA, Finger today with 40 mg Kenalog and lidocaine.  She tolerated this well.  We will see her back in few months to see how she is doing.  If she continues to have pain at the DRUJ, would refer to dedicated hand specialist for further treatment.    Arben Coombs, Physician Assistant, served in the capacity as a "scribe" for this patient encounter.  A "face-to-face" encounter occurred with Dr. Curtis Ricardo on this date.  The treatment plan and medical decision-making is outlined above. Patient was seen and examined with a chaperone.       This note was created using Dragon voice recognition software that occasionally misinterpreted phrases or words.          "

## 2024-06-21 ENCOUNTER — TELEPHONE (OUTPATIENT)
Dept: FAMILY MEDICINE | Facility: CLINIC | Age: 64
End: 2024-06-21
Payer: MEDICARE

## 2024-06-21 DIAGNOSIS — D64.9 ANEMIA, UNSPECIFIED TYPE: ICD-10-CM

## 2024-06-21 DIAGNOSIS — Z79.899 ENCOUNTER FOR LONG-TERM (CURRENT) USE OF OTHER MEDICATIONS: ICD-10-CM

## 2024-06-21 DIAGNOSIS — Z79.899 HIGH RISK MEDICATION USE: ICD-10-CM

## 2024-06-21 DIAGNOSIS — E78.5 HYPERLIPIDEMIA, UNSPECIFIED HYPERLIPIDEMIA TYPE: ICD-10-CM

## 2024-06-21 DIAGNOSIS — E11.69 TYPE 2 DIABETES MELLITUS WITH OTHER SPECIFIED COMPLICATION, WITHOUT LONG-TERM CURRENT USE OF INSULIN: Primary | ICD-10-CM

## 2024-06-24 ENCOUNTER — HOSPITAL ENCOUNTER (OUTPATIENT)
Dept: RADIOLOGY | Facility: HOSPITAL | Age: 64
Discharge: HOME | End: 2024-06-24
Attending: NURSE PRACTITIONER
Payer: MEDICARE

## 2024-06-24 VITALS — BODY MASS INDEX: 42.46 KG/M2 | HEIGHT: 67 IN | WEIGHT: 270.5 LBS

## 2024-06-24 DIAGNOSIS — Z12.31 ENCOUNTER FOR SCREENING MAMMOGRAM FOR MALIGNANT NEOPLASM OF BREAST: ICD-10-CM

## 2024-06-24 PROCEDURE — 77063 BREAST TOMOSYNTHESIS BI: CPT | Mod: 26,,, | Performed by: RADIOLOGY

## 2024-06-24 PROCEDURE — 77067 SCR MAMMO BI INCL CAD: CPT | Mod: TC,PO

## 2024-06-24 PROCEDURE — 77067 SCR MAMMO BI INCL CAD: CPT | Mod: 26,,, | Performed by: RADIOLOGY

## 2024-06-27 ENCOUNTER — OFFICE VISIT (OUTPATIENT)
Dept: FAMILY MEDICINE | Facility: CLINIC | Age: 64
End: 2024-06-27
Payer: MEDICARE

## 2024-06-27 VITALS
WEIGHT: 265 LBS | BODY MASS INDEX: 40.16 KG/M2 | DIASTOLIC BLOOD PRESSURE: 72 MMHG | HEIGHT: 68 IN | OXYGEN SATURATION: 95 % | SYSTOLIC BLOOD PRESSURE: 122 MMHG | HEART RATE: 100 BPM

## 2024-06-27 DIAGNOSIS — M17.11 PRIMARY OSTEOARTHRITIS OF RIGHT KNEE: ICD-10-CM

## 2024-06-27 DIAGNOSIS — T45.1X5A CHEMOTHERAPY-INDUCED NEUROPATHY: ICD-10-CM

## 2024-06-27 DIAGNOSIS — K21.9 GASTROESOPHAGEAL REFLUX DISEASE, UNSPECIFIED WHETHER ESOPHAGITIS PRESENT: ICD-10-CM

## 2024-06-27 DIAGNOSIS — Z85.3 HX OF BREAST CANCER: ICD-10-CM

## 2024-06-27 DIAGNOSIS — G62.0 CHEMOTHERAPY-INDUCED NEUROPATHY: ICD-10-CM

## 2024-06-27 DIAGNOSIS — G47.00 INSOMNIA, UNSPECIFIED TYPE: ICD-10-CM

## 2024-06-27 DIAGNOSIS — E11.69 TYPE 2 DIABETES MELLITUS WITH OTHER SPECIFIED COMPLICATION, WITHOUT LONG-TERM CURRENT USE OF INSULIN: Primary | ICD-10-CM

## 2024-06-27 DIAGNOSIS — F32.A DEPRESSION, UNSPECIFIED DEPRESSION TYPE: ICD-10-CM

## 2024-06-27 DIAGNOSIS — F98.8 ATTENTION DEFICIT DISORDER, UNSPECIFIED HYPERACTIVITY PRESENCE: ICD-10-CM

## 2024-06-27 PROCEDURE — 99214 OFFICE O/P EST MOD 30 MIN: CPT | Mod: S$GLB,,, | Performed by: NURSE PRACTITIONER

## 2024-06-27 RX ORDER — PANTOPRAZOLE SODIUM 40 MG/1
40 TABLET, DELAYED RELEASE ORAL DAILY
Qty: 90 TABLET | Refills: 3 | Status: SHIPPED | OUTPATIENT
Start: 2024-06-27

## 2024-06-27 RX ORDER — DULAGLUTIDE 0.75 MG/.5ML
0.75 INJECTION, SOLUTION SUBCUTANEOUS
Qty: 4 PEN | Refills: 1 | Status: SHIPPED | OUTPATIENT
Start: 2024-06-27 | End: 2024-07-02 | Stop reason: SDUPTHER

## 2024-06-27 RX ORDER — VENLAFAXINE HYDROCHLORIDE 75 MG/1
225 CAPSULE, EXTENDED RELEASE ORAL NIGHTLY
Qty: 270 CAPSULE | Refills: 3 | Status: SHIPPED | OUTPATIENT
Start: 2024-06-27 | End: 2024-07-02 | Stop reason: SDUPTHER

## 2024-06-27 RX ORDER — AMOXICILLIN 500 MG
CAPSULE ORAL DAILY
COMMUNITY

## 2024-06-27 NOTE — PROGRESS NOTES
SUBJECTIVE:    Patient ID: Lolly Ramírez is a 63 y.o. female.    Chief Complaint: Follow-up (No bottles//Pt here for 6 mo follow up//discuss back pain//will have eye exam July 1st with Dr. Ly-reminder set//due for foot exam//JL)    63 year old female presents for check up.  is present during the exam. She is treated for gerd, insomnia,neuropathy, oa. Psoriasis and dm. Taking meds as prescribed.  Hga1c today is 6.4mg/dl. she would like to discuss increase. Has been having right knee and left wrist pain. Recently saw dr. Ricardo. Still having trouble focusing despite taking straterra. Would like to increase dose. Sleeping ok. Plans to have labs today    Follow-up  Associated symptoms include numbness, a rash and weakness. Pertinent negatives include no abdominal pain, arthralgias, chest pain, chills, coughing, fever, headaches, nausea, sore throat or vomiting.   Back Pain  This is a recurrent problem. The current episode started more than 1 month ago. The problem occurs daily. The problem has been waxing and waning since onset. The pain is present in the lumbar spine and sacro-iliac. The quality of the pain is described as aching. The pain does not radiate. The pain is at a severity of 8/10. The pain is moderate. The pain is The same all the time. The symptoms are aggravated by standing. Stiffness is present All day. Associated symptoms include bladder incontinence, leg pain, numbness and weakness. Pertinent negatives include no abdominal pain, bowel incontinence, chest pain, dysuria, fever, headaches, paresis, paresthesias, pelvic pain, perianal numbness, tingling or weight loss. Risk factors include history of cancer, lack of exercise, menopause, obesity, poor posture and sedentary lifestyle. The treatment provided no relief.   Diabetes  Pertinent negatives for hypoglycemia include no confusion, dizziness, headaches or nervousness/anxiousness. Associated symptoms include weakness. Pertinent negatives  for diabetes include no chest pain, no polydipsia, no polyuria and no weight loss.       Telephone on 06/21/2024   Component Date Value Ref Range Status    TSH w/reflex to FT4 06/24/2024 2.68  0.40 - 4.50 mIU/L Final    Cholesterol 06/24/2024 235 (H)  <200 mg/dL Final    HDL 06/24/2024 86  > OR = 50 mg/dL Final    Triglycerides 06/24/2024 79  <150 mg/dL Final    LDL Cholesterol 06/24/2024 131 (H)  mg/dL (calc) Final    HDL/Cholesterol Ratio 06/24/2024 2.7  <5.0 (calc) Final    Non HDL Chol. (LDL+VLDL) 06/24/2024 149 (H)  <130 mg/dL (calc) Final    Hemoglobin A1C 06/24/2024 6.4 (H)  <5.7 % of total Hgb Final    Color, UA 06/24/2024 YELLOW  YELLOW Final    Appearance, UA 06/24/2024 CLEAR  CLEAR Final    Specific Gravity, UA 06/24/2024 1.017  1.001 - 1.035 Final    pH, UA 06/24/2024 7.0  5.0 - 8.0 Final    Glucose, UA 06/24/2024 NEGATIVE  NEGATIVE Final    Bilirubin, UA 06/24/2024 NEGATIVE  NEGATIVE Final    Ketones, UA 06/24/2024 NEGATIVE  NEGATIVE Final    Occult Blood UA 06/24/2024 NEGATIVE  NEGATIVE Final    Protein, UA 06/24/2024 NEGATIVE  NEGATIVE Final    Nitrite, UA 06/24/2024 NEGATIVE  NEGATIVE Final    Leukocytes, UA 06/24/2024 TRACE (A)  NEGATIVE Final    WBC Casts, UA 06/24/2024 NONE SEEN  < OR = 5 /HPF Final    RBC Casts, UA 06/24/2024 NONE SEEN  < OR = 2 /HPF Final    Squam Epithel, UA 06/24/2024 0-5  < OR = 5 /HPF Final    Bacteria, UA 06/24/2024 NONE SEEN  NONE SEEN /HPF Final    Hyaline Casts, UA 06/24/2024 NONE SEEN  NONE SEEN /LPF Final    Service Cmt: 06/24/2024 SEE COMMENT   Final    Reflexive Urine Culture 06/24/2024 SEE COMMENT   Final    Urine Culture, Routine 06/24/2024 SEE COMMENT   Final    Creatinine, Urine 06/24/2024 89  20 - 275 mg/dL Final    Microalb, Ur 06/24/2024 0.2  See Note: mg/dL Final    Microalb/Creat Ratio 06/24/2024 2  <30 mg/g creat Final   Telephone on 03/07/2024   Component Date Value Ref Range Status    WBC 03/11/2024 9.7  3.8 - 10.8 Thousand/uL Final    RBC 03/11/2024 4.31   3.80 - 5.10 Million/uL Final    Hemoglobin 03/11/2024 10.3 (L)  11.7 - 15.5 g/dL Final    Hematocrit 03/11/2024 34.3 (L)  35.0 - 45.0 % Final    MCV 03/11/2024 79.6 (L)  80.0 - 100.0 fL Final    MCH 03/11/2024 23.9 (L)  27.0 - 33.0 pg Final    MCHC 03/11/2024 30.0 (L)  32.0 - 36.0 g/dL Final    RDW 03/11/2024 17.7 (H)  11.0 - 15.0 % Final    Platelets 03/11/2024 395  140 - 400 Thousand/uL Final    MPV 03/11/2024 11.0  7.5 - 12.5 fL Final    Neutrophils, Abs 03/11/2024 7,052  1,500 - 7,800 cells/uL Final    Lymph # 03/11/2024 1,377  850 - 3,900 cells/uL Final    Mono # 03/11/2024 621  200 - 950 cells/uL Final    Eos # 03/11/2024 563 (H)  15 - 500 cells/uL Final    Baso # 03/11/2024 87  0 - 200 cells/uL Final    Neutrophils Relative 03/11/2024 72.7  % Final    Lymph % 03/11/2024 14.2  % Final    Mono % 03/11/2024 6.4  % Final    Eosinophil % 03/11/2024 5.8  % Final    Basophil % 03/11/2024 0.9  % Final    Glucose 03/11/2024 156 (H)  65 - 139 mg/dL Final    BUN 03/11/2024 15  7 - 25 mg/dL Final    Creatinine 03/11/2024 0.75  0.50 - 1.05 mg/dL Final    eGFR 03/11/2024 90  > OR = 60 mL/min/1.73m2 Final    BUN/Creatinine Ratio 03/11/2024 SEE NOTE:  6 - 22 (calc) Final    Sodium 03/11/2024 144  135 - 146 mmol/L Final    Potassium 03/11/2024 4.3  3.5 - 5.3 mmol/L Final    Chloride 03/11/2024 105  98 - 110 mmol/L Final    CO2 03/11/2024 27  20 - 32 mmol/L Final    Calcium 03/11/2024 9.3  8.6 - 10.4 mg/dL Final    Total Protein 03/11/2024 6.3  6.1 - 8.1 g/dL Final    Albumin 03/11/2024 3.8  3.6 - 5.1 g/dL Final    Globulin, Total 03/11/2024 2.5  1.9 - 3.7 g/dL (calc) Final    Albumin/Globulin Ratio 03/11/2024 1.5  1.0 - 2.5 (calc) Final    Total Bilirubin 03/11/2024 0.3  0.2 - 1.2 mg/dL Final    Alkaline Phosphatase 03/11/2024 91  37 - 153 U/L Final    AST 03/11/2024 11  10 - 35 U/L Final    ALT 03/11/2024 9  6 - 29 U/L Final     03/11/2024 13  <35 U/mL Final    CA 27-29 03/11/2024 26  <38 U/mL Final    CA 15-3  03/11/2024 19  <32 U/mL Final   Appointment on 02/12/2024   Component Date Value Ref Range Status    MRSA SCREEN BY PCR 02/12/2024 Negative  Negative Final   Hospital Outpatient Visit on 02/12/2024   Component Date Value Ref Range Status    Sodium 02/12/2024 142  136 - 145 mmol/L Final    Potassium 02/12/2024 3.9  3.5 - 5.1 mmol/L Final    Chloride 02/12/2024 106  95 - 110 mmol/L Final    CO2 02/12/2024 26  23 - 29 mmol/L Final    Glucose 02/12/2024 76  70 - 110 mg/dL Final    BUN 02/12/2024 13  8 - 23 mg/dL Final    Creatinine 02/12/2024 0.8  0.5 - 1.4 mg/dL Final    Calcium 02/12/2024 9.2  8.7 - 10.5 mg/dL Final    Total Protein 02/12/2024 7.1  6.0 - 8.4 g/dL Final    Albumin 02/12/2024 3.6  3.5 - 5.2 g/dL Final    Total Bilirubin 02/12/2024 0.2  0.1 - 1.0 mg/dL Final    Alkaline Phosphatase 02/12/2024 102  55 - 135 U/L Final    AST 02/12/2024 15  10 - 40 U/L Final    ALT 02/12/2024 13  10 - 44 U/L Final    eGFR 02/12/2024 >60  >60 mL/min/1.73 m^2 Final    Anion Gap 02/12/2024 10  8 - 16 mmol/L Final    WBC 02/12/2024 6.91  3.90 - 12.70 K/uL Final    RBC 02/12/2024 4.71  4.00 - 5.40 M/uL Final    Hemoglobin 02/12/2024 11.2 (L)  12.0 - 16.0 g/dL Final    Hematocrit 02/12/2024 37.0  37.0 - 48.5 % Final    MCV 02/12/2024 79 (L)  82 - 98 fL Final    MCH 02/12/2024 23.8 (L)  27.0 - 31.0 pg Final    MCHC 02/12/2024 30.3 (L)  32.0 - 36.0 g/dL Final    RDW 02/12/2024 17.4 (H)  11.5 - 14.5 % Final    Platelets 02/12/2024 339  150 - 450 K/uL Final    MPV 02/12/2024 10.2  9.2 - 12.9 fL Final    Immature Granulocytes 02/12/2024 0.3  0.0 - 0.5 % Final    Gran # (ANC) 02/12/2024 4.0  1.8 - 7.7 K/uL Final    Immature Grans (Abs) 02/12/2024 0.02  0.00 - 0.04 K/uL Final    Lymph # 02/12/2024 1.7  1.0 - 4.8 K/uL Final    Mono # 02/12/2024 0.7  0.3 - 1.0 K/uL Final    Eos # 02/12/2024 0.4  0.0 - 0.5 K/uL Final    Baso # 02/12/2024 0.08  0.00 - 0.20 K/uL Final    nRBC 02/12/2024 0  0 /100 WBC Final    Gran % 02/12/2024 58.0  38.0 -  73.0 % Final    Lymph % 2024 25.0  18.0 - 48.0 % Final    Mono % 2024 9.4  4.0 - 15.0 % Final    Eosinophil % 2024 6.1  0.0 - 8.0 % Final    Basophil % 2024 1.2  0.0 - 1.9 % Final    Differential Method 2024 Automated   Final    QRS Duration 2024 90  ms Final    [facility] QTC Calculation 2024 420  ms Final       Past Medical History:   Diagnosis Date    Breast cancer, stage 1, estrogen receptor negative, left () 2020    Depression     Diabetes mellitus, type 2     GERD (gastroesophageal reflux disease)     HER2-positive carcinoma of left breast 2020    Hx of breast cancer     Hx of breast cancer - Her2Nu+/ER+ - 2019    Insomnia     Lymphedema     Neuropathy of both feet     S/P lumpectomy, left breast 2020     Social History     Socioeconomic History    Marital status:    Tobacco Use    Smoking status: Former     Current packs/day: 0.00     Average packs/day: 0.5 packs/day for 25.0 years (12.5 ttl pk-yrs)     Types: Cigarettes     Start date: 1985     Quit date: 2010     Years since quittin.5    Smokeless tobacco: Never   Substance and Sexual Activity    Alcohol use: Not Currently     Alcohol/week: 2.0 standard drinks of alcohol     Types: 2 Glasses of wine per week    Drug use: Never    Sexual activity: Yes     Partners: Male     Birth control/protection: Other-see comments, None     Comment: Hysterectomy     Social Determinants of Health     Financial Resource Strain: Low Risk  (6/10/2024)    Overall Financial Resource Strain (CARDIA)     Difficulty of Paying Living Expenses: Not hard at all   Food Insecurity: No Food Insecurity (6/10/2024)    Hunger Vital Sign     Worried About Running Out of Food in the Last Year: Never true     Ran Out of Food in the Last Year: Never true   Transportation Needs: No Transportation Needs (6/10/2024)    PRAPARE - Transportation     Lack of Transportation (Medical): No     Lack of Transportation  (Non-Medical): No   Physical Activity: Inactive (6/10/2024)    Exercise Vital Sign     Days of Exercise per Week: 0 days     Minutes of Exercise per Session: 10 min   Stress: Stress Concern Present (6/10/2024)    [employer] [place] [employer] - Occupational Stress Questionnaire     Feeling of Stress : To some extent   Housing Stability: Low Risk  (6/10/2024)    Housing Stability Vital Sign     Unable to Pay for Housing in the Last Year: No     Homeless in the Last Year: No     Past Surgical History:   Procedure Laterality Date    ARTHROSCOPY OF SHOULDER WITH DECOMPRESSION OF SUBACROMIAL SPACE Right 2023    Procedure: ARTHROSCOPY, SHOULDER, WITH SUBACROMIAL SPACE DECOMPRESSION;  Surgeon: Curtis Ricardo MD;  Location: [facility] OR;  Service: Orthopedics;  Laterality: Right;    BICEPS TENDON REPAIR Left 2005    BREAST BIOPSY Left     BREAST LUMPECTOMY Left      SECTION      , ,     CHOLECYSTECTOMY  2000    Elbow fx Left 2015    FRACTURE SURGERY Left 2016    elbow    HYSTERECTOMY  2013    KNEE ARTHROSCOPY W/ MENISCAL REPAIR Left 2014    Lower back ruptured disc  2010    LYMPH NODE DISSECTION  2011    ROBOTIC ARTHROPLASTY, KNEE Left 2023    Procedure: ROBOTIC ARTHROPLASTY, KNEE, TOTAL, LEFT;  Surgeon: Curtis Ricardo MD;  Location: [facility] OR;  Service: Orthopedics;  Laterality: Left;  [place]    ROBOTIC ARTHROPLASTY, KNEE Right 2024    Procedure: ROBOTIC ARTHROPLASTY, KNEE, TOTAL, RIGHT;  Surgeon: Curtis Ricardo MD;  Location: [facility] OR;  Service: Orthopedics;  Laterality: Right;  [place]    SPINE SURGERY  2009    ruptured disc     Family History   Problem Relation Name Age of Onset    Cancer Mother Brenda         breast    Breast cancer Mother Brenda 70    Parkinsonism Father C E Bertha     Diabetes Father C E [place]     Breast cancer Sister Marie     Cancer Sister Marie 70 - 79        liver and breast    Cancer Daughter      Breast cancer Maternal Aunt       Cancer Maternal Aunt Jose Juan Dorman     Breast cancer Paternal Aunt      Cancer Paternal Aunt Radhaeene     Heart disease Maternal Grandmother Marlee     Heart disease Paternal Grandmother Bela        Tests to Keep You Healthy    Mammogram: Met on 6/24/2024  Eye Exam: DUE  Colon Cancer Screening: Met on 7/8/2022  Last HbA1c < 8 (06/24/2024): Yes      Review of patient's allergies indicates:   Allergen Reactions    Banana Hives    Codeine Nausea And Vomiting    Adhesive Rash    Dilaudid  [hydromorphone (bulk)] Rash    Hydromorphone hcl Rash       Current Outpatient Medications:     acetaminophen (TYLENOL) 500 MG tablet, Take 500 mg by mouth every 6 (six) hours as needed for Pain., Disp: , Rfl:     b complex vitamins capsule, Take 1 capsule by mouth once daily., Disp: , Rfl:     calcium carbonate (OS-JULIANNE) 600 mg calcium (1,500 mg) Tab, Take 600 mg by mouth once., Disp: , Rfl:     furosemide (LASIX) 20 MG tablet, Take 1 tablet (20 mg total) by mouth daily as needed., Disp: 90 tablet, Rfl: 3    magnesium 250 mg Tab, Take 250 mg by mouth once daily., Disp: , Rfl:     mupirocin (BACTROBAN) 2 % ointment, Apply topically once daily., Disp: , Rfl:     omega-3 fatty acids/fish oil (FISH OIL-OMEGA-3 FATTY ACIDS) 300-1,000 mg capsule, Take by mouth once daily., Disp: , Rfl:     pregabalin (LYRICA) 200 MG Cap, Take 200 mg by mouth 3 (three) times daily., Disp: , Rfl:     traZODone (DESYREL) 100 MG tablet, Take 2 tablets (200 mg total) by mouth every evening., Disp: 60 tablet, Rfl: 5    vitamin E 1000 UNIT capsule, , Disp: , Rfl:     atomoxetine (STRATTERA) 80 MG capsule, Take 1 capsule (80 mg total) by mouth once daily., Disp: 90 capsule, Rfl: 1    dulaglutide (TRULICITY) 0.75 mg/0.5 mL pen injector, Inject 0.75 mg into the skin every 7 days., Disp: 4 pen , Rfl: 1    pantoprazole (PROTONIX) 40 MG tablet, Take 1 tablet (40 mg total) by mouth once daily., Disp: 90 tablet, Rfl: 3    venlafaxine (EFFEXOR-XR) 75 MG 24 hr capsule, Take 3  "capsules (225 mg total) by mouth every evening., Disp: 270 capsule, Rfl: 3    Review of Systems   Constitutional:  Negative for chills, fever, unexpected weight change and weight loss.   HENT:  Negative for ear pain, rhinorrhea and sore throat.    Eyes:  Negative for pain and visual disturbance.   Respiratory:  Negative for cough and shortness of breath.    Cardiovascular:  Negative for chest pain, palpitations and leg swelling.   Gastrointestinal:  Negative for abdominal pain, bowel incontinence, diarrhea, nausea and vomiting.   Endocrine: Negative for polydipsia and polyuria.   Genitourinary:  Positive for bladder incontinence. Negative for difficulty urinating, dysuria, hematuria, pelvic pain and vaginal bleeding.   Musculoskeletal:  Positive for back pain. Negative for arthralgias.   Skin:  Positive for rash.   Neurological:  Positive for weakness and numbness. Negative for dizziness, tingling, headaches and paresthesias.   Psychiatric/Behavioral:  Negative for agitation, confusion and sleep disturbance. The patient is not nervous/anxious.           Objective:      Vitals:    06/27/24 0822   BP: 122/72   Pulse: 100   SpO2: 95%   Weight: 120.2 kg (265 lb)   Height: 5' 8" (1.727 m)     Physical Exam  Vitals and nursing note reviewed.   Constitutional:       General: She is not in acute distress.     Appearance: Normal appearance. She is well-developed. She is obese.   HENT:      Head: Normocephalic.      Right Ear: External ear normal.      Left Ear: External ear normal.   Eyes:      Conjunctiva/sclera: Conjunctivae normal.      Pupils: Pupils are equal, round, and reactive to light.   Neck:      Vascular: No JVD.   Cardiovascular:      Rate and Rhythm: Normal rate and regular rhythm.      Heart sounds: No murmur heard.  Pulmonary:      Effort: Pulmonary effort is normal.      Breath sounds: Normal breath sounds.   Abdominal:      General: Bowel sounds are normal.      Palpations: Abdomen is soft. "   Musculoskeletal:         General: No deformity. Normal range of motion.      Cervical back: Normal range of motion and neck supple.   Lymphadenopathy:      Cervical: No cervical adenopathy.   Skin:     General: Skin is warm and dry.      Findings: Rash present.      Comments: Psoriasis to lower extrem   Neurological:      Mental Status: She is alert and oriented to person, place, and time.      Gait: Gait normal.   Psychiatric:         Speech: Speech normal.         Behavior: Behavior normal.           Assessment:       1. Type 2 diabetes mellitus with other specified complication, without long-term current use of insulin    2. Gastroesophageal reflux disease, unspecified whether esophagitis present    3. Depression, unspecified depression type    4. Primary osteoarthritis of right knee    5. Hx of breast cancer - Her2Nu+/ER+ - 2011    6. Insomnia, unspecified type    7. Chemotherapy-induced neuropathy    8. Gastroesophageal reflux disease, unspecified whether esophagitis present    9. Attention deficit disorder, unspecified hyperactivity presence         Plan:       Type 2 diabetes mellitus with other specified complication, without long-term current use of insulin  Comments:  start trulicity  Orders:  -     Foot Exam Performed  -     dulaglutide (TRULICITY) 0.75 mg/0.5 mL pen injector; Inject 0.75 mg into the skin every 7 days.  Dispense: 4 pen ; Refill: 1    Gastroesophageal reflux disease, unspecified whether esophagitis present  Comments:  continue protonix  Orders:  -     pantoprazole (PROTONIX) 40 MG tablet; Take 1 tablet (40 mg total) by mouth once daily.  Dispense: 90 tablet; Refill: 3    Depression, unspecified depression type  Comments:  effexor  Orders:  -     venlafaxine (EFFEXOR-XR) 75 MG 24 hr capsule; Take 3 capsules (225 mg total) by mouth every evening.  Dispense: 270 capsule; Refill: 3    Primary osteoarthritis of right knee    Hx of breast cancer - Her2Nu+/ER+ - 2011    Insomnia, unspecified  type  Comments:  trazodone    Chemotherapy-induced neuropathy  Comments:  lyrica    Gastroesophageal reflux disease, unspecified whether esophagitis present  Comments:  protonix  Orders:  -     pantoprazole (PROTONIX) 40 MG tablet; Take 1 tablet (40 mg total) by mouth once daily.  Dispense: 90 tablet; Refill: 3    Attention deficit disorder, unspecified hyperactivity presence  Comments:  increase strattera    Other orders  -     atomoxetine (STRATTERA) 80 MG capsule; Take 1 capsule (80 mg total) by mouth once daily.  Dispense: 90 capsule; Refill: 1      Follow up in about 3 months (around 9/27/2024).        7/2/2024 Shaunna Gordon      Answers submitted by the patient for this visit:  Back Pain Questionnaire (Submitted on 6/25/2024)  Chief Complaint: Back pain  genital pain: No

## 2024-07-01 LAB
LEFT EYE DM RETINOPATHY: NEGATIVE
RIGHT EYE DM RETINOPATHY: NEGATIVE

## 2024-07-02 ENCOUNTER — TELEPHONE (OUTPATIENT)
Dept: FAMILY MEDICINE | Facility: CLINIC | Age: 64
End: 2024-07-02
Payer: MEDICARE

## 2024-07-02 ENCOUNTER — PATIENT OUTREACH (OUTPATIENT)
Dept: ADMINISTRATIVE | Facility: HOSPITAL | Age: 64
End: 2024-07-02
Payer: MEDICARE

## 2024-07-02 DIAGNOSIS — E11.69 TYPE 2 DIABETES MELLITUS WITH OTHER SPECIFIED COMPLICATION, WITHOUT LONG-TERM CURRENT USE OF INSULIN: ICD-10-CM

## 2024-07-02 DIAGNOSIS — F32.A DEPRESSION, UNSPECIFIED DEPRESSION TYPE: ICD-10-CM

## 2024-07-02 PROBLEM — J43.9 PULMONARY EMPHYSEMA, UNSPECIFIED EMPHYSEMA TYPE: Status: RESOLVED | Noted: 2023-09-14 | Resolved: 2024-07-02

## 2024-07-02 RX ORDER — DULAGLUTIDE 0.75 MG/.5ML
0.75 INJECTION, SOLUTION SUBCUTANEOUS
Qty: 4 PEN | Refills: 1 | Status: SHIPPED | OUTPATIENT
Start: 2024-07-02 | End: 2025-07-02

## 2024-07-02 RX ORDER — ATOMOXETINE 80 MG/1
80 CAPSULE ORAL DAILY
Qty: 90 CAPSULE | Refills: 1 | Status: SHIPPED | OUTPATIENT
Start: 2024-07-02 | End: 2024-08-01

## 2024-07-02 RX ORDER — VENLAFAXINE HYDROCHLORIDE 75 MG/1
225 CAPSULE, EXTENDED RELEASE ORAL NIGHTLY
Qty: 270 CAPSULE | Refills: 3 | Status: SHIPPED | OUTPATIENT
Start: 2024-07-02 | End: 2025-07-02

## 2024-07-02 NOTE — LETTER
AUTHORIZATION FOR RELEASE OF   CONFIDENTIAL INFORMATION    Dear Dr. Michel,    We are seeing Lolly Ramírez, date of birth 1960, in the clinic at [facility]. Shaunna Gordon NP is the patient's PCP. Lolly Ramírez has an outstanding lab/procedure at the time we reviewed her chart. In order to help keep her health information updated, she has authorized us to request the following medical record(s):                                               ( X )  EYE EXAM ( Most Recent )             Please fax records to [facility], Powell, Jodi, NP, [phone] or [phone]     If you have any questions, please contact     Gillian EDWARDS  Clinical Care Coordinator    [facility]  [phone] (Phone)  [phone] (Fax)    Patient Name: Lolly Ramírez  : 1960  Patient Phone #: [phone]              Lolly Ramírez  MRN: [id number]  : 1960  Age: 63 y.o.  Sex: female         Patient/Legal Guardian Signature  This signature was collected at 2023           _______________________________   Printed Name/Relationship to Patient      Consent for Examination and Treatment: I hereby authorize the providers and employees of [facility] ([facility]) to provide medical treatment/services which includes, but is not limited to, performing and administering tests and diagnostic procedures that are deemed necessary, including, but not limited to, imaging examinations, blood tests and other laboratory procedures as may be required by the hospital, clinic, or may be ordered by my physician(s) or persons working under the general and/or special instructions of my physician(s).      I understand and agree that this consent covers all authorized persons, including but not limited to physicians, residents, nurse practitioners, physicians' assistants, specialists, consultants, student nurses, and independently contracted physicians, who are called upon by the  physician in charge, to carry out the diagnostic procedures and medical or surgical treatment.     I hereby authorize [facility] to retain or dispose of any specimens or tissue, should there be such remaining from any test or procedure.     I hereby authorize and give consent for [facility] providers and employees to take photographs, images or videotapes of such diagnostic, surgical or treatment procedures of Patient as may be required by [facility] or as may be ordered by a physician. I further acknowledge and agree that [facility] may use cameras or other devices for patient monitoring.     I am aware that the practice of medicine is not an exact science, and I acknowledge that no guarantees have been made to me as to the outcome of any tests, procedures or treatment.     Authorization for Release of Information: I understand that my insurance company and/or their agents may need information necessary to make determinations about payment/reimbursement. I hereby provide authorization to release to all insurance companies, their successors, assignees, other parties with whom they may have contracted, or others acting on their behalf, that are involved with payment for any hospital and/or clinic charges incurred by the patient, any information that they request and deem necessary for payment/reimbursement, and/or quality review.  I further authorize the release of my health information to physicians or other health care practitioners on staff who are involved in my health care now and in the future, and to other health care providers, entities, or institutions for the purpose of my continued care and treatment, including referrals.     REGISTRATION AUTHORIZATION  Form No. 33771 (Rev. 7/13/2022)       Medicare Patient's Certification and Authorization to Release Information and Payment Request:  I certify that the information given by me in applying for payment under Title XVIII of the Social Security Act is correct. I authorize  any mcnair of medical or other information about me to release to the [facility], or its intermediaries or carriers, any information needed for this or a related Medicare claim. I request that payment of authorized benefits be made on my behalf.     Assignment of Insurance Benefits:   I hereby authorize any and all insurance companies, health plans, defined   benefit plans, health insurers or any entity that is or may be responsible for payment of my medical expenses to pay all hospital and medical benefits now due, and to become due and payable to me under any hospital benefits, sick benefits, injury benefits or any other benefit for services rendered to me, including Major Medical Benefits, direct to [facility] and all independently contracted physicians. I assign any and all rights that I may have against any and all insurance companies, health plans, defined benefit plans, health insurers or any entity that is or may be responsible for payment of my medical expenses, including, but not limited to any right to appeal a denial of a claim, any right to bring any action, lawsuit, administrative proceeding, or other cause of action on my behalf. I specifically assign my right to pursue litigation against any and all insurance companies, health plans, defined benefit plans, health insurers or any entity that is or may be responsible for payment of my medical expenses based upon a refusal to pay charges.            E. Valuables: It is understood and agreed that [facility] is not liable for the damage to or loss of any money, jewelry,   documents, dentures, eye glasses, hearing aids, prosthetics, or other property of value.     F. Computer Equipment: I understand and agree that should I choose to use computer equipment owned by [facility] or if I choose to access the Internet via [facility]s network, I do so at my own risk. [facility] is not responsible for any damage to my computer equipment or to any damages of  any type that might arise from my loss of equipment or data.     G. Acceptance of Financial Responsibility:  I agree that in consideration of the services and   supplies that have been   or will be furnished to the patient, I am hereby obligated to pay all charges made for or on the account of the patient according to the standard rates (in effect at the time the services and supplies are delivered) established by [facility], including its Patient Financial Assistance Policy to the extent it is applicable. I understand that I am responsible for all charges, or portions thereof, not covered by insurance or other sources. Patient refunds will be distributed only after balances at all [facility] facilities are paid.     H. Communication Authorization:  I hereby authorize [facility] and its representatives, along with any billing service   or  who may work on their behalf, to contact me on   my cell phone and/or home phone using pre- recorded messages, artificial voice messages, automatic telephone dialing devices or other computer assisted technology, or by electronic      mail, text messaging, or by any other form of electronic communication. This includes, but is not limited to, appointment reminders, yearly physical exam reminders, preventive care reminders, patient campaigns, welcome calls, and calls about account balances on my account or any account on which I am listed as a guarantor. I understand I have the right to opt out of these communications at any time.      Relationship  Between  Facility and  Provider:      I understand that some, but not all, providers furnishing services to the patient are not employees or agents of [facility]. The patient is under the care and supervision of his/her attending physician, and it is the responsibility of the facility and its nursing staff to carry out the instructions of such physicians. It is the responsibility of the patient's physician/designee to obtain the  patient's informed consent, when required, for medical or surgical treatment, special diagnostic or therapeutic procedures, or hospital services rendered for the patient under the special instructions of the physician/designee.     REGISTRATION AUTHORIZATION  Form No. 84726 (Rev. 7/13/2022)      Notice of Privacy Practices: I acknowledge I have received a copy of [facility]'s Notice of Privacy Practices.     Facility  Directory: I have discussed with the organization my desire to be either included or excluded  in the facility directory in the event of my being an inpatient at an [facility] facility. I understand that if my choice is to opt-out of being identified in the facility directory that the facility will not provide any information about me such as my condition (e.g. fair, stable, etc.) or my location in the facility (e.g., room number, department).     Immunizations: [facility] shares immunization information with state sponsored health departments to help you and your doctor keep track of your immunization records. By signing, you consent to have this information shared with the health department in your state:                                [place] - LINKS ([place] Immunization Network for Kids Statewide)                                [place] - MIIX ([place] Immunization Information eXchange)                                [place] - ImmPRINT (Immunization Patient Registry with Integrated Technology)     TERM: This authorization is valid for this and subsequent care/treatment I receive at [facility] and will remain valid unless/until revoked in writing by me.     [facility]: As used in this document, [facility] means all [facility] owned and managed facilities, including, but not limited to, all health centers, surgery centers, clinics, urgent care centers, and hospitals.         [facility] complies with applicable Federal civil rights laws and does not discriminate on the basis of  race, color, national origin, age, disability, or sex.  ATENCIÓN: si habla español, tiene a salas disposición servicios gratuitos de asistencia lingüística. Llame al [phone].  CHÚ Ý: N?u b?n nói Ti?ng Vi?t, có các d?ch v? h? tr? ngôn ng? mi?n phí dành cho b?n. G?i s? [phone].        REGISTRATION AUTHORIZATION  Form No. 86456 (Rev. 7/13/2022)

## 2024-07-02 NOTE — PROGRESS NOTES
Population Health Chart Review & Patient Outreach Details      Additional [facility] Health Notes:               Updates Requested / Reviewed:      Updated Care Coordination Note and Immunizations Reconciliation Completed or Queried: [place]         Health Maintenance Topics Overdue:      [facility] Score: 1     Eye Exam    Pneumonia Vaccine  Shingles/Zoster Vaccine  RSV Vaccine                  Health Maintenance Topic(s) Outreach Outcomes & Actions Taken:    Eye Exam - Outreach Outcomes & Actions Taken  : External Records Requested & Care Team Updated if Applicable

## 2024-07-02 NOTE — TELEPHONE ENCOUNTER
----- Message from [facility], RT sent at 7/2/2024  9:53 AM CDT -----  Said she needs Effexor sent to [facility], it went to [facility]. Also, said [facility] said they don't have Trulicity. Wants to know if we can try [facility], [place].

## 2024-07-02 NOTE — TELEPHONE ENCOUNTER
----- Message from Shaunna Gordon NP sent at 7/2/2024 12:47 AM CDT -----  Normal mammogram    Shaunna Gordon NP  7/2/2024 12:47 AM CDT Back to Top      Ldl is elevated but has decreased slightly. Continue to work on low cholesterol diet. Rest of labs are stable.

## 2024-07-08 ENCOUNTER — PATIENT OUTREACH (OUTPATIENT)
Dept: ADMINISTRATIVE | Facility: HOSPITAL | Age: 64
End: 2024-07-08
Payer: MEDICARE

## 2024-07-08 NOTE — PROGRESS NOTES
Population Health Chart Review & Patient Outreach Details      Additional [facility] Health Notes:               Updates Requested / Reviewed:      Updated Care Coordination Note, Care Everywhere, and          Health Maintenance Topics Overdue:      VB Score: 0     Patient is not due for any topics at this time.    Pneumonia Vaccine  Shingles/Zoster Vaccine  RSV Vaccine                  Health Maintenance Topic(s) Outreach Outcomes & Actions Taken:    Eye Exam - Outreach Outcomes & Actions Taken  : Diabetic Eye External Records Uploaded, Care Team & History Updated if Applicable

## 2024-08-04 ENCOUNTER — PATIENT MESSAGE (OUTPATIENT)
Dept: FAMILY MEDICINE | Facility: CLINIC | Age: 64
End: 2024-08-04
Payer: MEDICARE

## 2024-09-05 ENCOUNTER — TELEPHONE (OUTPATIENT)
Facility: CLINIC | Age: 64
End: 2024-09-05
Payer: MEDICARE

## 2024-09-05 NOTE — TELEPHONE ENCOUNTER
I spoke with pt and reminded her to have labs done prior to appt on 9/12/24 pt verbalized understanding.

## 2024-09-10 NOTE — PROGRESS NOTES
[facility] Hematology/Oncology  PROGRESS NOTE -   Follow-up Visit      Subjective:       Patient ID:   NAME: Lolly Ramírez : 1960     63 y.o. female    Referring Doc: Shaunna Gordon, ZENA  Other Physicians: Edgar; Amadou (Neuro); Macario Rodas           Chief Complaint: hx/of left breast ca    History of Present Illness:     Patient returns today for a regularly scheduled follow-up visit.  The patient is here today to go over the results of the recently ordered labs, tests and studies. She is here by herself today.     She is doing ok with no new issues.     She had bilateral total knees and is doing great; healed well    Mammogram in 2024 was benign    She saw Shaunna Gordon in 2024    No CP, HA's or N/V. Breathing ok.      She had prior back nerve ablation  with Dr Villa with pain management and has not had any further debilitating pain. She saw Dr Alfredito Tobar with neurology in 2024     Discussed covid19 precautions                   ROS:   GEN: normal without any fever, night sweats or weight loss; s/p bilateral knee surgeries  HEENT: normal with no HA's, sore throat, stiff neck, changes in vision  CV: normal with no CP, SOB, PND, LAKE or orthopnea  PULM: normal with no SOB, cough, hemoptysis, sputum or pleuritic pain  GI: normal with no abdominal pain, nausea, vomiting, constipation, diarrhea, melanotic stools, BRBPR, or hematemesis  : normal with no hematuria, dysuria  BREAST: normal with no mass, discharge, pain  SKIN: normal with no rash, erythema, bruising, or swelling;      Pain Scale: 8-9 postop    Allergies:  Review of patient's allergies indicates:   Allergen Reactions    Banana Hives    Codeine Nausea And Vomiting    Adhesive Rash    Dilaudid  [hydromorphone (bulk)] Rash    Hydromorphone hcl Rash       Medications:    Current Outpatient Medications:     acetaminophen (TYLENOL) 500 MG tablet, Take 500 mg by mouth every 6 (six) hours as needed for Pain., Disp: , Rfl:     b complex  "vitamins capsule, Take 1 capsule by mouth once daily., Disp: , Rfl:     calcium carbonate (OS-JULIANNE) 600 mg calcium (1,500 mg) Tab, Take 600 mg by mouth once., Disp: , Rfl:     dulaglutide (TRULICITY) 0.75 mg/0.5 mL pen injector, Inject 0.75 mg into the skin every 7 days., Disp: 4 pen , Rfl: 3    furosemide (LASIX) 20 MG tablet, Take 1 tablet (20 mg total) by mouth daily as needed., Disp: 90 tablet, Rfl: 3    magnesium 250 mg Tab, Take 250 mg by mouth once daily., Disp: , Rfl:     mupirocin (BACTROBAN) 2 % ointment, Apply topically once daily., Disp: , Rfl:     omega-3 fatty acids/fish oil (FISH OIL-OMEGA-3 FATTY ACIDS) 300-1,000 mg capsule, Take by mouth once daily., Disp: , Rfl:     pantoprazole (PROTONIX) 40 MG tablet, Take 1 tablet (40 mg total) by mouth once daily., Disp: 90 tablet, Rfl: 3    pregabalin (LYRICA) 200 MG Cap, Take 200 mg by mouth 3 (three) times daily., Disp: , Rfl:     traZODone (DESYREL) 100 MG tablet, Take 2 tablets (200 mg total) by mouth every evening., Disp: 60 tablet, Rfl: 5    venlafaxine (EFFEXOR-XR) 75 MG 24 hr capsule, Take 3 capsules (225 mg total) by mouth every evening., Disp: 270 capsule, Rfl: 3    vitamin E 1000 UNIT capsule, , Disp: , Rfl:     atomoxetine (STRATTERA) 80 MG capsule, Take 1 capsule (80 mg total) by mouth once daily., Disp: 90 capsule, Rfl: 1    PMHx/PSHx Updates:  See patient's last visit with me on 3/14/2024  See H&P on 2/18/2020        Pathology:   Cancer Staging   No matching staging information was found for the patient.            Objective:     Vitals:  Blood pressure (!) 160/95, pulse 103, temperature 98.2 °F (36.8 °C), resp. rate 18, height 5' 7" (1.702 m), weight 120.3 kg (265 lb 4.8 oz).    Physical Examination:   GEN: no apparent distress, comfortable; AAOx3; overweight  HEAD: atraumatic and normocephalic  EYES: no pallor, no icterus, PERRLA  ENT: OMM, no pharyngeal erythema, external ears WNL; no nasal discharge; no thrush  NECK: no masses, thyroid " normal, trachea midline, no LAD/LN's, supple  CV: RRR with no murmur; normal pulse; normal S1 and S2; no pedal edema  CHEST: Normal respiratory effort; CTAB; normal breath sounds; no wheeze or crackles  ABDOM: nontender and nondistended; soft; normal bowel sounds; no rebound/guarding  MUSC/Skeletal: ROM normal; no crepitus; joints normal; no deformities or arthropathy  EXTREM: no clubbing, cyanosis, inflammation or swelling; s/p bilateral knee surgeries  SKIN:  psoriasis   : no red  NEURO: grossly intact; motor/sensory WNL; AAOx3; no tremors  PSYCH: normal mood, affect and behavior  LYMPH: normal cervical, supraclavicular, axillary and groin LN's  Breast: lumpectomy on left; no changes; she declined exam        Labs:     Lab Results   Component Value Date    WBC 6.6 09/06/2024    HGB 11.8 09/06/2024    HCT 39.8 09/06/2024    MCV 76.5 (L) 09/06/2024     09/06/2024       CMP  Sodium   Date Value Ref Range Status   09/06/2024 140 135 - 146 mmol/L Final   04/26/2019 141 134 - 144 mmol/L      Potassium   Date Value Ref Range Status   09/06/2024 4.2 3.5 - 5.3 mmol/L Final     Chloride   Date Value Ref Range Status   09/06/2024 101 98 - 110 mmol/L Final   04/26/2019 103 98 - 110 mmol/L      CO2   Date Value Ref Range Status   09/06/2024 31 20 - 32 mmol/L Final     Glucose   Date Value Ref Range Status   09/06/2024 83 65 - 139 mg/dL Final     Comment:               Non-fasting reference interval        04/26/2019 81 70 - 99 mg/dL      BUN   Date Value Ref Range Status   09/06/2024 20 7 - 25 mg/dL Final     Creatinine   Date Value Ref Range Status   09/06/2024 0.90 0.50 - 1.05 mg/dL Final   04/26/2019 0.71 0.60 - 1.40 mg/dL      Calcium   Date Value Ref Range Status   09/06/2024 9.5 8.6 - 10.4 mg/dL Final     Total Protein   Date Value Ref Range Status   09/06/2024 6.8 6.1 - 8.1 g/dL Final     Albumin   Date Value Ref Range Status   09/06/2024 4.2 3.6 - 5.1 g/dL Final   04/26/2019 3.9 3.1 - 4.7 g/dL      Total  Bilirubin   Date Value Ref Range Status   09/06/2024 0.3 0.2 - 1.2 mg/dL Final     Alkaline Phosphatase   Date Value Ref Range Status   02/12/2024 102 55 - 135 U/L Final     AST   Date Value Ref Range Status   09/06/2024 18 10 - 35 U/L Final     ALT   Date Value Ref Range Status   09/06/2024 13 6 - 29 U/L Final     Anion Gap   Date Value Ref Range Status   02/12/2024 10 8 - 16 mmol/L Final     eGFR if    Date Value Ref Range Status   03/15/2022 93 > OR = 60 mL/min/1.73m2 Final     eGFR if non    Date Value Ref Range Status   03/15/2022 80 > OR = 60 mL/min/1.73m2 Final     Lab Results   Component Value Date    IRON 24 (L) 09/06/2024    TIBC 437 09/06/2024    LABIRON 5 (L) 09/06/2024      Lab Results   Component Value Date    FERRITIN 7 (L) 09/06/2024         CA 15-3 <32 U/mL 19     CA 27-29 <38 U/mL 26       <35 U/mL 13      Radiology/Diagnostic Studies:    Mammo 6/24/2024:    Impression:     Benign mammogram.  Annual screening mammography is recommended.     BI-RADS CATEGORY 2: BENIGN FINDING.        Mammo 4/18/2022:    Impression:     1. No mammographic evidence of malignancy and no detrimental change.  2. Yearly mammography is recommended.  BI-RADS CATEGORY 2: BENIGN FINDING.    mammo 3/24/2020:    Impression:     1. No mammographic evidence of malignancy.  2. Yearly mammography is recommended.  BI-RADS CATEGORY 2: BENIGN FINDING.    Ct Chest 5/1/2019:     IMPRESSION:  No acute pulmonary process    The abnormality on the chest radiograph most likely secondary to degenerative  changes of the thoracic spine    Small hiatal hernia        PET  12/17/2015 on chart    I have reviewed all available lab results and radiology reports.    Assessment/Plan:   (1) 63 y.o. female  with diagnosis of history of left breast cancer who has been referred by Shaunna Gordon NP for evaluation by medical hematology/oncology.   - She was diagnosed with Her2Nu positive breast cancer in 2011 and was  treated with chemotherapy + herceptin, radiation and tamoxifen.   - T1c N0  - 1.4 cm  - Her2Nu +3; ER neg' MN + but at only 3%  - she developed a severe rash to the tamoxifen in the past and it had to be discontinued  - she had prior lumpectomy    3/11/2021:  - she is due for mammogram this month  - last mammogram 3/24/2020    3/16/2022:  - she has mammo scheduled for next month  - basic labs are adequate  - awaiting tumor markers which are still in process    3/16/2023:  - due for mammo in March 2023  - labs adequate  - tumor markers are WNL  - bone density being ordered by PCP    3/14/2024:  - mammo due in June 2024  - latest breast tumor markers remain WNL's  - s/p bilateral total knees since last visit with right one done just two weeks ago with Dr Ricardo. The left was done in Nov 2023 and she seems to be healing well. She is getting PT. Using walker to assist with ambulation.   - mild anemia - possibly post-op related - check labs again in 6 months    9/12/2024:  - mammo in June 2024 negative/benign  - hgb WNL but RBC's are microcytic  - her iron panel is low  - she last had scope last year with Dr Heart  - she is not able to tolerate oral iron, will set up some IV iron         (2) DM II     (3) GERD     (4) OA and bicep tendonitis     (5) Prior microcytic anemia issues in 2017 with GIB - s/p scope with Dr Heart in past     (6) Neuropathy - followed by Dr Tobar     (7) Former smoker (quit 2010)     (8) Chronic skin issues on legs - on creams - seen by derm in past     (9) Gastric sleeve     (10) Chronic back issues         VISIT DIAGNOSES:      Breast cancer, stage 1, estrogen receptor negative, left (2011)    Hx of breast cancer - Her2Nu+/ER+ - 2011    S/P lumpectomy, left breast    HER2-positive carcinoma of left breast          PLAN:  1. Check CBC, CMP and iron every 3 months; . Breast cancer markers every 12 months,    2. Repeat mammogram in June 2025 - needs yearly  3. F/u with PCP, GI, card  etc  4. Set up IV iron  5. F/u with Dr Heart with GI     RTC in  6 months   Fax note to Shaunna Gordon NP; Edgar Heart; Katie Ricardo       Discussion:     COVID-19 Discussion:    I had long discussion with patient and any applicable family about the COVID-19 coronavirus epidemic and the recommended precautions with regard to cancer and/or hematology patients. I have re-iterated the CDC recommendations for adequate hand washing, use of hand -like products, and coughing into elbow, etc. In addition, especially for our patients who are on chemotherapy and/or our otherwise immunocompromised patients, I have recommended avoidance of crowds, including movie theaters, restaurants, churches, etc. I have recommended avoidance of any sick or symptomatic family members and/or friends. I have also recommended avoidance of any raw and unwashed food products, and general avoidance of food items that have not been prepared by themselves. The patient has been asked to call us immediately with any symptom developments, issues, questions or other general concerns.       Iron Infusion Therapy Discussion:     Provided literature/learning materials on the particular IV iron regimen and discussed the potential side-effect profiles of the drug(s). Discussed the importance of compliance with obtaining and monitoring requested lab work, and went over the potential risk for the development of anaphylactic shock, bronchospasm, dysrhythmia, liver and/or kidney damage, and respiratory/cardiovascular arrest and/or failure. I discussed the potential risks for development of alopecia, fevers, itching, chills and/or rigors, cold sensory issues, ringing in ears, vertigo and neuropathy, all of which are usually acute but sometimes could end up being chronic and life-long. Discussed the risks of hand-foot syndrome and rashes, and development of other autoimmune mediated processes such as pneumonitis and colitis which  could be life threatening.     The patient's consent has been obtained to proceed with the IV iron therapy. The patient will either be referred to [facility] or one of the Hematology Clinic Nurses for training and education on IV iron therapy, use of antiemetics and/or anti-diarrheals, use of NSAID's, potential IV iron therapy side-effects, and any specific recommendations and precautions with the particular IV iron agents.      Answered all of the patient's (and family's, if applicable) questions to the best of my ability and to their complete satisfaction. The patient acknowledged full understanding of the risks, recommendations and plan(s).       I spent over 25 mins of time with the patient. Reviewed results of the recently ordered labs, tests and studies; made directives with regards to the results. Over half of this time was spent couseling and coordinating care.    I have explained all of the above in detail and the patient understands all of the current recommendation(s). I have answered all of their questions to the best of my ability and to their complete satisfaction.   The patient is to continue with the current management plan.            Electronically signed by Tray Simpson MD

## 2024-09-12 ENCOUNTER — OFFICE VISIT (OUTPATIENT)
Facility: CLINIC | Age: 64
End: 2024-09-12
Payer: MEDICARE

## 2024-09-12 VITALS
WEIGHT: 265.31 LBS | BODY MASS INDEX: 41.64 KG/M2 | SYSTOLIC BLOOD PRESSURE: 160 MMHG | HEIGHT: 67 IN | TEMPERATURE: 98 F | HEART RATE: 103 BPM | RESPIRATION RATE: 18 BRPM | DIASTOLIC BLOOD PRESSURE: 95 MMHG

## 2024-09-12 DIAGNOSIS — D50.0 IRON DEFICIENCY ANEMIA DUE TO CHRONIC BLOOD LOSS: ICD-10-CM

## 2024-09-12 DIAGNOSIS — R97.8 OTHER ABNORMAL TUMOR MARKERS: ICD-10-CM

## 2024-09-12 DIAGNOSIS — C50.912 BREAST CANCER, STAGE 1, ESTROGEN RECEPTOR NEGATIVE, LEFT: Primary | ICD-10-CM

## 2024-09-12 DIAGNOSIS — Z98.890 S/P LUMPECTOMY, LEFT BREAST: ICD-10-CM

## 2024-09-12 DIAGNOSIS — C50.912 HER2-POSITIVE CARCINOMA OF LEFT BREAST: ICD-10-CM

## 2024-09-12 DIAGNOSIS — D53.9 NUTRITIONAL ANEMIA, UNSPECIFIED: ICD-10-CM

## 2024-09-12 DIAGNOSIS — Z17.1 BREAST CANCER, STAGE 1, ESTROGEN RECEPTOR NEGATIVE, LEFT: Primary | ICD-10-CM

## 2024-09-12 DIAGNOSIS — Z85.3 HX OF BREAST CANCER: ICD-10-CM

## 2024-09-12 PROCEDURE — 99999 PR PBB SHADOW E&M-EST. PATIENT-LVL III: CPT | Mod: PBBFAC,,, | Performed by: INTERNAL MEDICINE

## 2024-09-12 PROCEDURE — 99213 OFFICE O/P EST LOW 20 MIN: CPT | Mod: PBBFAC,PN | Performed by: INTERNAL MEDICINE

## 2024-09-12 RX ORDER — SODIUM CHLORIDE 9 MG/ML
INJECTION, SOLUTION INTRAVENOUS CONTINUOUS
OUTPATIENT
Start: 2024-09-20

## 2024-09-12 RX ORDER — HEPARIN 100 UNIT/ML
5 SYRINGE INTRAVENOUS
OUTPATIENT
Start: 2024-09-20

## 2024-09-12 RX ORDER — EPINEPHRINE 0.3 MG/.3ML
0.3 INJECTION SUBCUTANEOUS ONCE AS NEEDED
OUTPATIENT
Start: 2024-09-20

## 2024-09-12 RX ORDER — SODIUM CHLORIDE 0.9 % (FLUSH) 0.9 %
10 SYRINGE (ML) INJECTION
OUTPATIENT
Start: 2024-09-20

## 2024-09-12 RX ORDER — DIPHENHYDRAMINE HYDROCHLORIDE 50 MG/ML
50 INJECTION INTRAMUSCULAR; INTRAVENOUS ONCE AS NEEDED
OUTPATIENT
Start: 2024-09-20

## 2024-09-20 ENCOUNTER — INFUSION (OUTPATIENT)
Dept: INFUSION THERAPY | Facility: HOSPITAL | Age: 64
End: 2024-09-20
Attending: INTERNAL MEDICINE
Payer: MEDICARE

## 2024-09-20 VITALS
DIASTOLIC BLOOD PRESSURE: 71 MMHG | SYSTOLIC BLOOD PRESSURE: 111 MMHG | HEART RATE: 78 BPM | TEMPERATURE: 98 F | OXYGEN SATURATION: 95 %

## 2024-09-20 DIAGNOSIS — D50.0 IRON DEFICIENCY ANEMIA DUE TO CHRONIC BLOOD LOSS: Primary | ICD-10-CM

## 2024-09-20 PROCEDURE — 96365 THER/PROPH/DIAG IV INF INIT: CPT

## 2024-09-20 PROCEDURE — 63600175 PHARM REV CODE 636 W HCPCS: Mod: JZ,JG | Performed by: INTERNAL MEDICINE

## 2024-09-20 PROCEDURE — 25000003 PHARM REV CODE 250: Performed by: INTERNAL MEDICINE

## 2024-09-20 RX ORDER — SODIUM CHLORIDE 9 MG/ML
INJECTION, SOLUTION INTRAVENOUS CONTINUOUS
Status: DISCONTINUED | OUTPATIENT
Start: 2024-09-20 | End: 2024-09-20 | Stop reason: HOSPADM

## 2024-09-20 RX ORDER — SODIUM CHLORIDE 0.9 % (FLUSH) 0.9 %
10 SYRINGE (ML) INJECTION
OUTPATIENT
Start: 2024-09-27

## 2024-09-20 RX ORDER — SODIUM CHLORIDE 9 MG/ML
INJECTION, SOLUTION INTRAVENOUS CONTINUOUS
OUTPATIENT
Start: 2024-09-27

## 2024-09-20 RX ORDER — DIPHENHYDRAMINE HYDROCHLORIDE 50 MG/ML
50 INJECTION INTRAMUSCULAR; INTRAVENOUS ONCE AS NEEDED
OUTPATIENT
Start: 2024-09-27

## 2024-09-20 RX ORDER — HEPARIN 100 UNIT/ML
5 SYRINGE INTRAVENOUS
OUTPATIENT
Start: 2024-09-27

## 2024-09-20 RX ORDER — EPINEPHRINE 0.3 MG/.3ML
0.3 INJECTION SUBCUTANEOUS ONCE AS NEEDED
OUTPATIENT
Start: 2024-09-27

## 2024-09-20 RX ORDER — HEPARIN 100 UNIT/ML
5 SYRINGE INTRAVENOUS
Status: DISCONTINUED | OUTPATIENT
Start: 2024-09-20 | End: 2024-09-20 | Stop reason: HOSPADM

## 2024-09-20 RX ORDER — SODIUM CHLORIDE 0.9 % (FLUSH) 0.9 %
10 SYRINGE (ML) INJECTION
Status: DISCONTINUED | OUTPATIENT
Start: 2024-09-20 | End: 2024-09-20 | Stop reason: HOSPADM

## 2024-09-20 RX ADMIN — FERRIC CARBOXYMALTOSE INJECTION 750 MG: 50 INJECTION, SOLUTION INTRAVENOUS at 08:09

## 2024-09-20 RX ADMIN — SODIUM CHLORIDE: 9 INJECTION, SOLUTION INTRAVENOUS at 08:09

## 2024-09-20 NOTE — PLAN OF CARE
Problem: Adult Inpatient Plan of Care  Goal: Plan of Care Review  9/20/2024 0931 by Olga Pineda RN  Outcome: Met  9/20/2024 0931 by Olga Pineda RN  Outcome: Progressing  Goal: Patient-Specific Goal (Individualized)  9/20/2024 0931 by Olga Pineda RN  Outcome: Met  9/20/2024 0931 by Ogla Pineda RN  Outcome: Progressing  Goal: Absence of Hospital-Acquired Illness or Injury  9/20/2024 0931 by Olga Pineda RN  Outcome: Met  9/20/2024 0931 by Olga Pineda RN  Outcome: Progressing  Goal: Optimal Comfort and Wellbeing  9/20/2024 0931 by Olga Pineda RN  Outcome: Met  9/20/2024 0931 by Olga Pineda RN  Outcome: Progressing  Goal: Readiness for Transition of Care  9/20/2024 0931 by Olga Pineda RN  Outcome: Met  9/20/2024 0931 by Olga Pineda RN  Outcome: Progressing

## 2024-10-04 ENCOUNTER — INFUSION (OUTPATIENT)
Dept: INFUSION THERAPY | Facility: HOSPITAL | Age: 64
End: 2024-10-04
Attending: INTERNAL MEDICINE
Payer: MEDICARE

## 2024-10-04 VITALS
HEART RATE: 69 BPM | TEMPERATURE: 98 F | SYSTOLIC BLOOD PRESSURE: 118 MMHG | OXYGEN SATURATION: 98 % | DIASTOLIC BLOOD PRESSURE: 67 MMHG

## 2024-10-04 DIAGNOSIS — D50.0 IRON DEFICIENCY ANEMIA DUE TO CHRONIC BLOOD LOSS: Primary | ICD-10-CM

## 2024-10-04 PROCEDURE — 96365 THER/PROPH/DIAG IV INF INIT: CPT

## 2024-10-04 RX ORDER — DIPHENHYDRAMINE HYDROCHLORIDE 50 MG/ML
50 INJECTION INTRAMUSCULAR; INTRAVENOUS ONCE AS NEEDED
OUTPATIENT
Start: 2024-10-04

## 2024-10-04 RX ORDER — SODIUM CHLORIDE 9 MG/ML
INJECTION, SOLUTION INTRAVENOUS CONTINUOUS
Status: CANCELLED | OUTPATIENT
Start: 2024-10-04

## 2024-10-04 RX ORDER — SODIUM CHLORIDE 0.9 % (FLUSH) 0.9 %
10 SYRINGE (ML) INJECTION
Status: CANCELLED | OUTPATIENT
Start: 2024-10-04

## 2024-10-04 RX ORDER — SODIUM CHLORIDE 9 MG/ML
INJECTION, SOLUTION INTRAVENOUS CONTINUOUS
Status: DISCONTINUED | OUTPATIENT
Start: 2024-10-04 | End: 2024-10-04 | Stop reason: HOSPADM

## 2024-10-04 RX ORDER — SODIUM CHLORIDE 0.9 % (FLUSH) 0.9 %
10 SYRINGE (ML) INJECTION
Status: DISCONTINUED | OUTPATIENT
Start: 2024-10-04 | End: 2024-10-04 | Stop reason: HOSPADM

## 2024-10-04 RX ORDER — HEPARIN 100 UNIT/ML
5 SYRINGE INTRAVENOUS
Status: DISCONTINUED | OUTPATIENT
Start: 2024-10-04 | End: 2024-10-04 | Stop reason: HOSPADM

## 2024-10-04 RX ORDER — EPINEPHRINE 0.3 MG/.3ML
0.3 INJECTION SUBCUTANEOUS ONCE AS NEEDED
OUTPATIENT
Start: 2024-10-04

## 2024-10-04 RX ORDER — HEPARIN 100 UNIT/ML
5 SYRINGE INTRAVENOUS
Status: CANCELLED | OUTPATIENT
Start: 2024-10-04

## 2024-10-04 NOTE — PLAN OF CARE
Problem: Adult Inpatient Plan of Care  Goal: Plan of Care Review  10/4/2024 0824 by Olga Pineda RN  Outcome: Met  10/4/2024 0824 by Olga Pineda RN  Outcome: Progressing  Goal: Patient-Specific Goal (Individualized)  10/4/2024 0824 by Olga Pineda RN  Outcome: Met  10/4/2024 0824 by Olga Pineda RN  Outcome: Progressing  Goal: Absence of Hospital-Acquired Illness or Injury  10/4/2024 0824 by Olga Pineda RN  Outcome: Met  10/4/2024 0824 by Olga Pineda RN  Outcome: Progressing  Goal: Optimal Comfort and Wellbeing  10/4/2024 0824 by Olga Pineda RN  Outcome: Met  10/4/2024 0824 by Olga Pineda RN  Outcome: Progressing  Goal: Readiness for Transition of Care  10/4/2024 0824 by Olga Pineda RN  Outcome: Met  10/4/2024 0824 by Olga Pineda RN  Outcome: Progressing

## 2024-10-07 ENCOUNTER — OFFICE VISIT (OUTPATIENT)
Dept: FAMILY MEDICINE | Facility: CLINIC | Age: 64
End: 2024-10-07
Payer: MEDICARE

## 2024-10-07 VITALS
DIASTOLIC BLOOD PRESSURE: 78 MMHG | SYSTOLIC BLOOD PRESSURE: 112 MMHG | BODY MASS INDEX: 41.34 KG/M2 | HEART RATE: 89 BPM | HEIGHT: 67 IN | OXYGEN SATURATION: 95 % | WEIGHT: 263.38 LBS

## 2024-10-07 DIAGNOSIS — F32.A DEPRESSION, UNSPECIFIED DEPRESSION TYPE: ICD-10-CM

## 2024-10-07 DIAGNOSIS — M79.10 MYALGIA: ICD-10-CM

## 2024-10-07 DIAGNOSIS — E11.69 TYPE 2 DIABETES MELLITUS WITH OTHER SPECIFIED COMPLICATION, WITHOUT LONG-TERM CURRENT USE OF INSULIN: ICD-10-CM

## 2024-10-07 DIAGNOSIS — M19.012 PRIMARY OSTEOARTHRITIS OF LEFT SHOULDER: ICD-10-CM

## 2024-10-07 DIAGNOSIS — F51.01 PRIMARY INSOMNIA: ICD-10-CM

## 2024-10-07 DIAGNOSIS — D50.8 OTHER IRON DEFICIENCY ANEMIA: ICD-10-CM

## 2024-10-07 DIAGNOSIS — F90.0 ATTENTION DEFICIT HYPERACTIVITY DISORDER (ADHD), PREDOMINANTLY INATTENTIVE TYPE: Primary | ICD-10-CM

## 2024-10-07 DIAGNOSIS — Z85.3 HX OF BREAST CANCER: ICD-10-CM

## 2024-10-07 DIAGNOSIS — K21.9 GASTROESOPHAGEAL REFLUX DISEASE, UNSPECIFIED WHETHER ESOPHAGITIS PRESENT: ICD-10-CM

## 2024-10-07 DIAGNOSIS — Z00.00 PHYSICAL EXAM: ICD-10-CM

## 2024-10-07 PROCEDURE — 99214 OFFICE O/P EST MOD 30 MIN: CPT | Mod: S$GLB,,, | Performed by: NURSE PRACTITIONER

## 2024-10-07 RX ORDER — TIRZEPATIDE 5 MG/.5ML
5 INJECTION, SOLUTION SUBCUTANEOUS
Qty: 4 PEN | Refills: 5 | Status: SHIPPED | OUTPATIENT
Start: 2024-10-07

## 2024-10-07 RX ORDER — VENLAFAXINE HYDROCHLORIDE 75 MG/1
225 CAPSULE, EXTENDED RELEASE ORAL NIGHTLY
Qty: 270 CAPSULE | Refills: 3 | Status: SHIPPED | OUTPATIENT
Start: 2024-10-07 | End: 2025-10-07

## 2024-10-07 RX ORDER — PANTOPRAZOLE SODIUM 40 MG/1
40 TABLET, DELAYED RELEASE ORAL DAILY
Qty: 90 TABLET | Refills: 3 | Status: SHIPPED | OUTPATIENT
Start: 2024-10-07

## 2024-10-07 RX ORDER — FUROSEMIDE 20 MG/1
20 TABLET ORAL DAILY PRN
Qty: 90 TABLET | Refills: 3 | Status: SHIPPED | OUTPATIENT
Start: 2024-10-07

## 2024-10-07 RX ORDER — DULAGLUTIDE 0.75 MG/.5ML
0.75 INJECTION, SOLUTION SUBCUTANEOUS
Qty: 4 PEN | Refills: 3 | Status: CANCELLED | OUTPATIENT
Start: 2024-10-07 | End: 2025-10-07

## 2024-10-07 RX ORDER — ATOMOXETINE 80 MG/1
80 CAPSULE ORAL DAILY
Qty: 90 CAPSULE | Refills: 1 | Status: CANCELLED | OUTPATIENT
Start: 2024-10-07 | End: 2024-11-06

## 2024-10-07 RX ORDER — ATOMOXETINE 100 MG/1
100 CAPSULE ORAL DAILY
Qty: 90 CAPSULE | Refills: 1 | Status: SHIPPED | OUTPATIENT
Start: 2024-10-07 | End: 2025-01-05

## 2024-10-07 RX ORDER — TRAZODONE HYDROCHLORIDE 100 MG/1
200 TABLET ORAL NIGHTLY
Qty: 180 TABLET | Refills: 1 | Status: SHIPPED | OUTPATIENT
Start: 2024-10-07 | End: 2025-10-07

## 2024-10-08 NOTE — PROGRESS NOTES
SUBJECTIVE:    Patient ID: Lolly Ramírez is a 63 y.o. female.    Chief Complaint: Follow-up (Brought bottles//Pt is here for a 3 month follow up//KE)    63 year old female presents for check up.  is present during the exam. She is treated for gerd, insomnia,neuropathy, oa. Psoriasis and dm. Taking meds as prescribed.  Hga1c has been controlled. Started on trulicity. Not much weight loss. Wants to discuss. Currently taking strattera . Wants to increase dosage. Has arthralgia daily. Wants to hold off on any surgeries.  Would like to increase dose. Sleeping ok. Plans to have labs today    Diabetes  Pertinent negatives for diabetes include no polydipsia and no polyuria.       Patient Outreach on 07/08/2024   Component Date Value Ref Range Status    Left Eye DM Retinopathy 07/01/2024 Negative   Final    Right Eye DM Retinopathy 07/01/2024 Negative   Final   Telephone on 06/21/2024   Component Date Value Ref Range Status    TSH w/reflex to FT4 06/24/2024 2.68  0.40 - 4.50 mIU/L Final    Cholesterol 06/24/2024 235 (H)  <200 mg/dL Final    HDL 06/24/2024 86  > OR = 50 mg/dL Final    Triglycerides 06/24/2024 79  <150 mg/dL Final    LDL Cholesterol 06/24/2024 131 (H)  mg/dL (calc) Final    HDL/Cholesterol Ratio 06/24/2024 2.7  <5.0 (calc) Final    Non HDL Chol. (LDL+VLDL) 06/24/2024 149 (H)  <130 mg/dL (calc) Final    Hemoglobin A1C 06/24/2024 6.4 (H)  <5.7 % of total Hgb Final    Color, UA 06/24/2024 YELLOW  YELLOW Final    Appearance, UA 06/24/2024 CLEAR  CLEAR Final    Specific Gravity, UA 06/24/2024 1.017  1.001 - 1.035 Final    pH, UA 06/24/2024 7.0  5.0 - 8.0 Final    Glucose, UA 06/24/2024 NEGATIVE  NEGATIVE Final    Bilirubin, UA 06/24/2024 NEGATIVE  NEGATIVE Final    Ketones, UA 06/24/2024 NEGATIVE  NEGATIVE Final    Occult Blood UA 06/24/2024 NEGATIVE  NEGATIVE Final    Protein, UA 06/24/2024 NEGATIVE  NEGATIVE Final    Nitrite, UA 06/24/2024 NEGATIVE  NEGATIVE Final    Leukocytes, UA 06/24/2024 TRACE (A)   NEGATIVE Final    WBC Casts, UA 2024 NONE SEEN  < OR = 5 /HPF Final    RBC Casts, UA 2024 NONE SEEN  < OR = 2 /HPF Final    Squam Epithel, UA 2024 0-5  < OR = 5 /HPF Final    Bacteria, UA 2024 NONE SEEN  NONE SEEN /HPF Final    Hyaline Casts, UA 2024 NONE SEEN  NONE SEEN /LPF Final    Service Cmt: 2024 SEE COMMENT   Final    Reflexive Urine Culture 2024 SEE COMMENT   Final    Urine Culture, Routine 2024 SEE COMMENT   Final    Creatinine, Urine 2024 89  20 - 275 mg/dL Final    Microalb, Ur 2024 0.2  See Note: mg/dL Final    Microalb/Creat Ratio 2024 2  <30 mg/g creat Final       Past Medical History:   Diagnosis Date    Breast cancer, stage 1, estrogen receptor negative, left () 2020    Depression     Diabetes mellitus, type 2     GERD (gastroesophageal reflux disease)     HER2-positive carcinoma of left breast 2020    Hx of breast cancer     Hx of breast cancer - Her2Nu+/ER+ - 2019    Insomnia     Lymphedema     Neuropathy of both feet     S/P lumpectomy, left breast 2020     Social History     Socioeconomic History    Marital status:    Tobacco Use    Smoking status: Former     Current packs/day: 0.00     Average packs/day: 0.5 packs/day for 25.0 years (12.5 ttl pk-yrs)     Types: Cigarettes     Start date: 1985     Quit date: 2010     Years since quittin.8    Smokeless tobacco: Never   Substance and Sexual Activity    Alcohol use: Not Currently     Alcohol/week: 2.0 standard drinks of alcohol     Types: 2 Glasses of wine per week    Drug use: Never    Sexual activity: Yes     Partners: Male     Birth control/protection: Other-see comments, None     Comment: Hysterectomy     Social Drivers of Health     Financial Resource Strain: Low Risk  (6/10/2024)    Overall Financial Resource Strain (CARDIA)     Difficulty of Paying Living Expenses: Not hard at all   Food Insecurity: No Food Insecurity  (6/10/2024)    Hunger Vital Sign     Worried About Running Out of Food in the Last Year: Never true     Ran Out of Food in the Last Year: Never true   Transportation Needs: No Transportation Needs (6/10/2024)    PRAPARE - Transportation     Lack of Transportation (Medical): No     Lack of Transportation (Non-Medical): No   Physical Activity: Inactive (6/10/2024)    Exercise Vital Sign     Days of Exercise per Week: 0 days     Minutes of Exercise per Session: 10 min   Stress: Stress Concern Present (6/10/2024)    [employer] - Occupational Stress Questionnaire     Feeling of Stress : To some extent   Housing Stability: Low Risk  (6/10/2024)    Housing Stability Vital Sign     Unable to Pay for Housing in the Last Year: No     Homeless in the Last Year: No     Past Surgical History:   Procedure Laterality Date    ARTHROSCOPY OF SHOULDER WITH DECOMPRESSION OF SUBACROMIAL SPACE Right 2023    Procedure: ARTHROSCOPY, SHOULDER, WITH SUBACROMIAL SPACE DECOMPRESSION;  Surgeon: Curtis Ricardo MD;  Location: [facility] OR;  Service: Orthopedics;  Laterality: Right;    BICEPS TENDON REPAIR Left 2005    BREAST BIOPSY Left     BREAST LUMPECTOMY Left 2011     SECTION      , ,     CHOLECYSTECTOMY  2000    Elbow fx Left 2015    FRACTURE SURGERY Left 2016    elbow    HYSTERECTOMY  2013    KNEE ARTHROSCOPY W/ MENISCAL REPAIR Left 2014    Lower back ruptured disc  2010    LYMPH NODE DISSECTION  2011    ROBOTIC ARTHROPLASTY, KNEE Left 2023    Procedure: ROBOTIC ARTHROPLASTY, KNEE, TOTAL, LEFT;  Surgeon: Curtis Ricardo MD;  Location: [facility] OR;  Service: Orthopedics;  Laterality: Left;  Jaime-Edi    ROBOTIC ARTHROPLASTY, KNEE Right 2024    Procedure: ROBOTIC ARTHROPLASTY, KNEE, TOTAL, RIGHT;  Surgeon: Curtis Ricardo MD;  Location: [facility] OR;  Service: Orthopedics;  Laterality: Right;  Jaime-Edi    SPINE SURGERY  2009    ruptured disc     Family History   Problem Relation  Name Age of Onset    Cancer Mother Brenda         breast    Breast cancer Mother Brenda 70    Parkinsonism Father C E Bertha     Diabetes Father C E Bertha     Breast cancer Sister Marie     Cancer Sister Marie 70 - 79        liver and breast    Cancer Daughter      Breast cancer Maternal Aunt      Cancer Maternal Aunt Jose Juan Dorman     Breast cancer Paternal Aunt      Cancer Paternal Aunt Floureene     Heart disease Maternal Grandmother Marlee     Heart disease Paternal Grandmother Bela        All of your core healthy metrics are met.      Review of patient's allergies indicates:   Allergen Reactions    Banana Hives    Codeine Nausea And Vomiting    Adhesive Rash    Dilaudid  [hydromorphone (bulk)] Rash    Hydromorphone hcl Rash       Current Outpatient Medications:     acetaminophen (TYLENOL) 500 MG tablet, Take 500 mg by mouth every 6 (six) hours as needed for Pain., Disp: , Rfl:     b complex vitamins capsule, Take 1 capsule by mouth once daily., Disp: , Rfl:     calcium carbonate (OS-JULIANNE) 600 mg calcium (1,500 mg) Tab, Take 600 mg by mouth once., Disp: , Rfl:     magnesium 250 mg Tab, Take 250 mg by mouth once daily., Disp: , Rfl:     mupirocin (BACTROBAN) 2 % ointment, Apply topically once daily., Disp: , Rfl:     omega-3 fatty acids/fish oil (FISH OIL-OMEGA-3 FATTY ACIDS) 300-1,000 mg capsule, Take by mouth once daily., Disp: , Rfl:     pregabalin (LYRICA) 200 MG Cap, Take 200 mg by mouth 3 (three) times daily., Disp: , Rfl:     vitamin E 1000 UNIT capsule, , Disp: , Rfl:     atomoxetine (STRATTERA) 100 mg capsule, Take 1 capsule (100 mg total) by mouth once daily., Disp: 90 capsule, Rfl: 1    furosemide (LASIX) 20 MG tablet, Take 1 tablet (20 mg total) by mouth daily as needed., Disp: 90 tablet, Rfl: 3    pantoprazole (PROTONIX) 40 MG tablet, Take 1 tablet (40 mg total) by mouth once daily., Disp: 90 tablet, Rfl: 3    tirzepatide (MOUNJARO) 5 mg/0.5 mL PnIj, Inject 5 mg into the skin every 7 days., Disp: 4  "Pen, Rfl: 5    traZODone (DESYREL) 100 MG tablet, Take 2 tablets (200 mg total) by mouth every evening., Disp: 180 tablet, Rfl: 1    venlafaxine (EFFEXOR-XR) 75 MG 24 hr capsule, Take 3 capsules (225 mg total) by mouth every evening., Disp: 270 capsule, Rfl: 3    Review of Systems   Constitutional:  Negative for unexpected weight change.   HENT:  Negative for ear pain and rhinorrhea.    Eyes:  Negative for pain and visual disturbance.   Respiratory:  Negative for shortness of breath.    Gastrointestinal:  Negative for diarrhea.   Endocrine: Negative for polydipsia and polyuria.   Psychiatric/Behavioral:  Negative for agitation and sleep disturbance.           Objective:      Vitals:    10/07/24 1022   BP: 112/78   Pulse: 89   SpO2: 95%   Weight: 119.5 kg (263 lb 6.4 oz)   Height: 5' 7" (1.702 m)     Physical Exam  Vitals and nursing note reviewed.   Constitutional:       General: She is not in acute distress.     Appearance: Normal appearance. She is well-developed. She is obese.   HENT:      Head: Normocephalic.      Right Ear: External ear normal.      Left Ear: External ear normal.   Eyes:      Conjunctiva/sclera: Conjunctivae normal.      Pupils: Pupils are equal, round, and reactive to light.   Neck:      Vascular: No JVD.   Cardiovascular:      Rate and Rhythm: Normal rate and regular rhythm.      Heart sounds: No murmur heard.  Pulmonary:      Effort: Pulmonary effort is normal.      Breath sounds: Normal breath sounds.   Abdominal:      General: Bowel sounds are normal.      Palpations: Abdomen is soft.   Musculoskeletal:         General: No deformity.      Left shoulder: No bony tenderness. Decreased range of motion.      Cervical back: Normal range of motion and neck supple.   Lymphadenopathy:      Cervical: No cervical adenopathy.   Skin:     General: Skin is warm and dry.      Findings: No rash.   Neurological:      Mental Status: She is alert and oriented to person, place, and time.      Gait: Gait " normal.   Psychiatric:         Mood and Affect: Affect normal. Mood is not depressed.         Speech: Speech normal.         Behavior: Behavior normal.           Assessment:       1. Attention deficit hyperactivity disorder (ADHD), predominantly inattentive type    2. Type 2 diabetes mellitus with other specified complication, without long-term current use of insulin    3. Physical exam    4. Gastroesophageal reflux disease, unspecified whether esophagitis present    5. Depression, unspecified depression type    6. Primary insomnia    7. Hx of breast cancer - Her2Nu+/ER+ - 2011    8. Primary osteoarthritis of left shoulder    9. Myalgia    10. Other iron deficiency anemia         Plan:       Attention deficit hyperactivity disorder (ADHD), predominantly inattentive type  Comments:  strattera max dose 100mg  Orders:  -     atomoxetine (STRATTERA) 100 mg capsule; Take 1 capsule (100 mg total) by mouth once daily.  Dispense: 90 capsule; Refill: 1    Type 2 diabetes mellitus with other specified complication, without long-term current use of insulin  Comments:  stop trulicity. try mounjaro  Orders:  -     tirzepatide (MOUNJARO) 5 mg/0.5 mL PnIj; Inject 5 mg into the skin every 7 days.  Dispense: 4 Pen; Refill: 5    Physical exam  -     furosemide (LASIX) 20 MG tablet; Take 1 tablet (20 mg total) by mouth daily as needed.  Dispense: 90 tablet; Refill: 3    Gastroesophageal reflux disease, unspecified whether esophagitis present  Comments:  continue protonix  Orders:  -     pantoprazole (PROTONIX) 40 MG tablet; Take 1 tablet (40 mg total) by mouth once daily.  Dispense: 90 tablet; Refill: 3    Depression, unspecified depression type  Comments:  effexor  Orders:  -     venlafaxine (EFFEXOR-XR) 75 MG 24 hr capsule; Take 3 capsules (225 mg total) by mouth every evening.  Dispense: 270 capsule; Refill: 3    Primary insomnia  Comments:  trazodone  Orders:  -     traZODone (DESYREL) 100 MG tablet; Take 2 tablets (200 mg total)  by mouth every evening.  Dispense: 180 tablet; Refill: 1    Hx of breast cancer - Her2Nu+/ER+ - 2011    Primary osteoarthritis of left shoulder  Comments:  followed by ortho    Myalgia    Other iron deficiency anemia  Comments:  dr. galindo following. recently had 2 iron infusions      Follow up in about 3 months (around 1/7/2025), or if symptoms worsen or fail to improve, for medication management.        10/7/2024 Shaunna Gordon

## 2024-10-15 ENCOUNTER — PATIENT MESSAGE (OUTPATIENT)
Facility: CLINIC | Age: 64
End: 2024-10-15
Payer: MEDICARE

## 2024-10-31 DIAGNOSIS — E11.69 TYPE 2 DIABETES MELLITUS WITH OTHER SPECIFIED COMPLICATION, WITHOUT LONG-TERM CURRENT USE OF INSULIN: ICD-10-CM

## 2024-11-01 RX ORDER — TIRZEPATIDE 5 MG/.5ML
5 INJECTION, SOLUTION SUBCUTANEOUS
Qty: 4 PEN | Refills: 5 | Status: SHIPPED | OUTPATIENT
Start: 2024-11-01

## 2024-11-19 DIAGNOSIS — F51.01 PRIMARY INSOMNIA: ICD-10-CM

## 2024-11-19 DIAGNOSIS — F90.0 ATTENTION DEFICIT HYPERACTIVITY DISORDER (ADHD), PREDOMINANTLY INATTENTIVE TYPE: ICD-10-CM

## 2024-11-19 DIAGNOSIS — Z00.00 PHYSICAL EXAM: ICD-10-CM

## 2024-11-19 RX ORDER — TRAZODONE HYDROCHLORIDE 100 MG/1
200 TABLET ORAL NIGHTLY
Qty: 180 TABLET | Refills: 1 | Status: SHIPPED | OUTPATIENT
Start: 2024-11-19 | End: 2025-11-19

## 2024-11-19 RX ORDER — FUROSEMIDE 20 MG/1
20 TABLET ORAL DAILY PRN
Qty: 90 TABLET | Refills: 3 | Status: SHIPPED | OUTPATIENT
Start: 2024-11-19

## 2024-11-19 RX ORDER — ATOMOXETINE 100 MG/1
100 CAPSULE ORAL DAILY
Qty: 90 CAPSULE | Refills: 0 | Status: SHIPPED | OUTPATIENT
Start: 2024-11-19

## 2024-11-19 NOTE — TELEPHONE ENCOUNTER
----- Message from Sharee sent at 11/19/2024  8:39 AM CST -----  Contact: [facility]  Refill for Furosemide 20 mg, atomoxetine 100 mg, trazodone 100 mg.  90 day supply. [facility] #[phone]

## 2025-01-13 ENCOUNTER — OFFICE VISIT (OUTPATIENT)
Dept: FAMILY MEDICINE | Facility: CLINIC | Age: 65
End: 2025-01-13
Payer: MEDICARE

## 2025-01-13 VITALS
OXYGEN SATURATION: 97 % | HEIGHT: 67 IN | HEART RATE: 73 BPM | BODY MASS INDEX: 38.8 KG/M2 | DIASTOLIC BLOOD PRESSURE: 78 MMHG | SYSTOLIC BLOOD PRESSURE: 116 MMHG | WEIGHT: 247.19 LBS

## 2025-01-13 DIAGNOSIS — C50.912 BREAST CANCER, STAGE 1, ESTROGEN RECEPTOR NEGATIVE, LEFT: ICD-10-CM

## 2025-01-13 DIAGNOSIS — F51.01 PRIMARY INSOMNIA: ICD-10-CM

## 2025-01-13 DIAGNOSIS — M25.50 ARTHRALGIA, UNSPECIFIED JOINT: ICD-10-CM

## 2025-01-13 DIAGNOSIS — T45.1X5A CHEMOTHERAPY-INDUCED NEUROPATHY: ICD-10-CM

## 2025-01-13 DIAGNOSIS — E11.69 TYPE 2 DIABETES MELLITUS WITH OTHER SPECIFIED COMPLICATION, WITHOUT LONG-TERM CURRENT USE OF INSULIN: ICD-10-CM

## 2025-01-13 DIAGNOSIS — G62.0 CHEMOTHERAPY-INDUCED NEUROPATHY: ICD-10-CM

## 2025-01-13 DIAGNOSIS — D50.8 OTHER IRON DEFICIENCY ANEMIA: ICD-10-CM

## 2025-01-13 DIAGNOSIS — R63.4 WEIGHT LOSS: ICD-10-CM

## 2025-01-13 DIAGNOSIS — E11.9 TYPE 2 DIABETES MELLITUS WITHOUT COMPLICATION, WITHOUT LONG-TERM CURRENT USE OF INSULIN: Primary | ICD-10-CM

## 2025-01-13 DIAGNOSIS — Z17.1 BREAST CANCER, STAGE 1, ESTROGEN RECEPTOR NEGATIVE, LEFT: ICD-10-CM

## 2025-01-13 DIAGNOSIS — K21.9 GASTROESOPHAGEAL REFLUX DISEASE, UNSPECIFIED WHETHER ESOPHAGITIS PRESENT: ICD-10-CM

## 2025-01-13 DIAGNOSIS — F90.0 ATTENTION DEFICIT HYPERACTIVITY DISORDER (ADHD), PREDOMINANTLY INATTENTIVE TYPE: ICD-10-CM

## 2025-01-13 LAB — HBA1C MFR BLD: 5.6 %

## 2025-01-13 PROCEDURE — 83036 HEMOGLOBIN GLYCOSYLATED A1C: CPT | Mod: QW,,, | Performed by: NURSE PRACTITIONER

## 2025-01-13 PROCEDURE — 99214 OFFICE O/P EST MOD 30 MIN: CPT | Mod: S$GLB,,, | Performed by: NURSE PRACTITIONER

## 2025-01-13 RX ORDER — PANTOPRAZOLE SODIUM 40 MG/1
40 TABLET, DELAYED RELEASE ORAL DAILY
Qty: 90 TABLET | Refills: 3 | Status: SHIPPED | OUTPATIENT
Start: 2025-01-13

## 2025-01-13 RX ORDER — TIRZEPATIDE 5 MG/.5ML
5 INJECTION, SOLUTION SUBCUTANEOUS
Qty: 12 PEN | Refills: 1 | Status: SHIPPED | OUTPATIENT
Start: 2025-01-13 | End: 2025-01-13

## 2025-01-13 RX ORDER — FAMOTIDINE 40 MG/1
40 TABLET, FILM COATED ORAL DAILY
Qty: 30 TABLET | Refills: 11 | Status: SHIPPED | OUTPATIENT
Start: 2025-01-13 | End: 2026-01-13

## 2025-01-23 PROBLEM — E66.01 MORBID OBESITY WITH BODY MASS INDEX (BMI) OF 40.0 TO 49.9: Status: RESOLVED | Noted: 2023-09-14 | Resolved: 2025-01-23

## 2025-01-23 NOTE — PROGRESS NOTES
SUBJECTIVE:    Patient ID: Lolly Ramírez is a 64 y.o. female.    Chief Complaint: Follow-up (Bottles brought//Pt is here for a 3 month follow up//KE)    History of Present Illness    CHIEF COMPLAINT:  64 year old female presents today for follow-up regarding diabetes management and weight loss.  is present during the exam.     DIABETES AND WEIGHT MANAGEMENT:  Her blood sugar remain well controlled on current regimen. She reports significant weight loss, though has not reached her previous lowest weight following bariatric surgery due to life stressors including illness of family members.    MUSCULOSKELETAL PAIN:  She reports chronic back pain due to degenerative disc disease diagnosed in her 30s. She has a history of fall in 2014 resulting in elbow and wrist fractures. Her back pain is currently tolerable. She also has pending shoulder surgery but is deferring both shoulder and back surgical interventions to allow recovery from multiple recent surgeries.    MEDICATIONS AND SUPPLEMENTS:  She takes multiple medications at night including Protonix for GERD, which has been increased due to breakthrough symptoms. She also takes magnesium, zinc, and vitamin C supplements. She reports nausea possibly attributed to zinc. She does not tolerate iron pills well and attempts to maintain iron levels through diet.      ROS:  General: -fever, -chills, -fatigue, -weight gain, +weight loss  Eyes: -vision changes, -redness, -discharge  ENT: -ear pain, -nasal congestion, -sore throat  Cardiovascular: -chest pain, -palpitations, -lower extremity edema  Respiratory: -cough, -shortness of breath  Gastrointestinal: -abdominal pain, +nausea, -vomiting, -diarrhea, -constipation, -blood in stool  Genitourinary: -dysuria, -hematuria, -frequency  Musculoskeletal: -joint pain, -muscle pain, +back pain  Skin: -rash, -lesion  Neurological: -headache, -dizziness, -numbness, -tingling  Psychiatric: -anxiety, -depression, -sleep  difficulty         Office Visit on 01/13/2025   Component Date Value Ref Range Status    Hemoglobin A1C, POC 01/13/2025 5.6  % Final   Ancillary Orders on 11/29/2024   Component Date Value Ref Range Status    WBC 12/13/2024 6.3  3.8 - 10.8 Thousand/uL Final    RBC 12/13/2024 5.28 (H)  3.80 - 5.10 Million/uL Final    Hemoglobin 12/13/2024 14.9  11.7 - 15.5 g/dL Final    Hematocrit 12/13/2024 46.8 (H)  35.0 - 45.0 % Final    MCV 12/13/2024 88.6  80.0 - 100.0 fL Final    MCH 12/13/2024 28.2  27.0 - 33.0 pg Final    MCHC 12/13/2024 31.8 (L)  32.0 - 36.0 g/dL Final    RDW 12/13/2024 18.0 (H)  11.0 - 15.0 % Final    Platelets 12/13/2024 271  140 - 400 Thousand/uL Final    MPV 12/13/2024 10.9  7.5 - 12.5 fL Final    Neutrophils, Abs 12/13/2024 4,089  1,500 - 7,800 cells/uL Final    Lymph # 12/13/2024 1,418  850 - 3,900 cells/uL Final    Mono # 12/13/2024 529  200 - 950 cells/uL Final    Eos # 12/13/2024 208  15 - 500 cells/uL Final    Baso # 12/13/2024 57  0 - 200 cells/uL Final    Neutrophils Relative 12/13/2024 64.9  % Final    Lymph % 12/13/2024 22.5  % Final    Mono % 12/13/2024 8.4  % Final    Eosinophil % 12/13/2024 3.3  % Final    Basophil % 12/13/2024 0.9  % Final    Glucose 12/13/2024 80  65 - 99 mg/dL Final    BUN 12/13/2024 16  7 - 25 mg/dL Final    Creatinine 12/13/2024 0.81  0.50 - 1.05 mg/dL Final    eGFR 12/13/2024 82  > OR = 60 mL/min/1.73m2 Final    BUN/Creatinine Ratio 12/13/2024 SEE NOTE:  6 - 22 (calc) Final    Sodium 12/13/2024 144  135 - 146 mmol/L Final    Potassium 12/13/2024 4.5  3.5 - 5.3 mmol/L Final    Chloride 12/13/2024 107  98 - 110 mmol/L Final    CO2 12/13/2024 30  20 - 32 mmol/L Final    Calcium 12/13/2024 9.3  8.6 - 10.4 mg/dL Final    Total Protein 12/13/2024 6.3  6.1 - 8.1 g/dL Final    Albumin 12/13/2024 4.0  3.6 - 5.1 g/dL Final    Globulin, Total 12/13/2024 2.3  1.9 - 3.7 g/dL (calc) Final    Albumin/Globulin Ratio 12/13/2024 1.7  1.0 - 2.5 (calc) Final    Total Bilirubin 12/13/2024  0.3  0.2 - 1.2 mg/dL Final    Alkaline Phosphatase 2024 80  37 - 153 U/L Final    AST 2024 14  10 - 35 U/L Final    ALT 2024 15  6 - 29 U/L Final    Iron 2024 90  45 - 160 mcg/dL Final    TIBC 2024 289  250 - 450 mcg/dL (calc) Final    Iron Saturation 2024 31  16 - 45 % (calc) Final    Ferritin 2024 166  16 - 288 ng/mL Final       Past Medical History:   Diagnosis Date    Breast cancer, stage 1, estrogen receptor negative, left () 2020    Depression     Diabetes mellitus, type 2     GERD (gastroesophageal reflux disease)     HER2-positive carcinoma of left breast 2020    Hx of breast cancer     Hx of breast cancer - Her2Nu+/ER+ - 2019    Insomnia     Lymphedema     Neuropathy of both feet     S/P lumpectomy, left breast 2020     Social History     Socioeconomic History    Marital status:    Tobacco Use    Smoking status: Former     Current packs/day: 0.00     Average packs/day: 0.5 packs/day for 25.0 years (12.5 ttl pk-yrs)     Types: Cigarettes     Start date: 1985     Quit date: 2010     Years since quittin.1    Smokeless tobacco: Never   Substance and Sexual Activity    Alcohol use: Not Currently     Alcohol/week: 2.0 standard drinks of alcohol     Types: 2 Glasses of wine per week    Drug use: Never    Sexual activity: Yes     Partners: Male     Birth control/protection: Other-see comments, None     Comment: Hysterectomy     Social Drivers of Health     Financial Resource Strain: Low Risk  (6/10/2024)    Overall Financial Resource Strain (CARDIA)     Difficulty of Paying Living Expenses: Not hard at all   Food Insecurity: No Food Insecurity (6/10/2024)    Hunger Vital Sign     Worried About Running Out of Food in the Last Year: Never true     Ran Out of Food in the Last Year: Never true   Transportation Needs: No Transportation Needs (6/10/2024)    PRAPARE - Transportation     Lack of Transportation (Medical): No      Lack of Transportation (Non-Medical): No   Physical Activity: Inactive (6/10/2024)    Exercise Vital Sign     Days of Exercise per Week: 0 days     Minutes of Exercise per Session: 10 min   Stress: Stress Concern Present (6/10/2024)    [employer] [place] [employer] - Occupational Stress Questionnaire     Feeling of Stress : To some extent   Housing Stability: Low Risk  (6/10/2024)    Housing Stability Vital Sign     Unable to Pay for Housing in the Last Year: No     Homeless in the Last Year: No     Past Surgical History:   Procedure Laterality Date    ARTHROSCOPY OF SHOULDER WITH DECOMPRESSION OF SUBACROMIAL SPACE Right 2023    Procedure: ARTHROSCOPY, SHOULDER, WITH SUBACROMIAL SPACE DECOMPRESSION;  Surgeon: uCrtis Ricardo MD;  Location: [facility] OR;  Service: Orthopedics;  Laterality: Right;    BICEPS TENDON REPAIR Left 2005    BREAST BIOPSY Left     BREAST LUMPECTOMY Left      SECTION      , ,     CHOLECYSTECTOMY  2000    Elbow fx Left 2015    FRACTURE SURGERY Left 2016    elbow    HYSTERECTOMY  2013    KNEE ARTHROSCOPY W/ MENISCAL REPAIR Left     Lower back ruptured disc  2010    LYMPH NODE DISSECTION  2011    ROBOTIC ARTHROPLASTY, KNEE Left 2023    Procedure: ROBOTIC ARTHROPLASTY, KNEE, TOTAL, LEFT;  Surgeon: Curtis Ricardo MD;  Location: [facility] OR;  Service: Orthopedics;  Laterality: Left;  [place]    ROBOTIC ARTHROPLASTY, KNEE Right 2024    Procedure: ROBOTIC ARTHROPLASTY, KNEE, TOTAL, RIGHT;  Surgeon: Curtis Ricardo MD;  Location: [facility];  Service: Orthopedics;  Laterality: Right;  [place]    SPINE SURGERY  2009    ruptured disc     Family History   Problem Relation Name Age of Onset    Cancer Mother Brenda         breast    Breast cancer Mother Brenda 70    Parkinsonism Father C E Bertha     Diabetes Father C E [place]     Breast cancer Sister Marie     Cancer Sister Marie 70 - 79        liver and breast    Cancer Daughter      Breast  cancer Maternal Aunt      Cancer Maternal Aunt Jose Juan Dorman     Breast cancer Paternal Aunt      Cancer Paternal Aunt Floureene     Heart disease Maternal Grandmother Marlee     Heart disease Paternal Grandmother Bela        All of your core healthy metrics are met.      Review of patient's allergies indicates:   Allergen Reactions    Banana Hives    Codeine Nausea And Vomiting    Adhesive Rash    Dilaudid  [hydromorphone (bulk)] Rash    Hydromorphone hcl Rash       Current Outpatient Medications:     acetaminophen (TYLENOL) 500 MG tablet, Take 500 mg by mouth every 6 (six) hours as needed for Pain., Disp: , Rfl:     atomoxetine (STRATTERA) 100 mg capsule, Take 1 capsule (100 mg total) by mouth once daily., Disp: 90 capsule, Rfl: 0    b complex vitamins capsule, Take 1 capsule by mouth once daily., Disp: , Rfl:     calcium carbonate (OS-JULIANNE) 600 mg calcium (1,500 mg) Tab, Take 600 mg by mouth once., Disp: , Rfl:     magnesium 250 mg Tab, Take 250 mg by mouth once daily., Disp: , Rfl:     omega-3 fatty acids/fish oil (FISH OIL-OMEGA-3 FATTY ACIDS) 300-1,000 mg capsule, Take by mouth once daily., Disp: , Rfl:     pregabalin (LYRICA) 200 MG Cap, Take 200 mg by mouth 3 (three) times daily., Disp: , Rfl:     tirzepatide (MOUNJARO) 5 mg/0.5 mL PnIj, Inject 5 mg into the skin every 7 days., Disp: 4 Pen, Rfl: 5    traZODone (DESYREL) 100 MG tablet, Take 2 tablets (200 mg total) by mouth every evening., Disp: 180 tablet, Rfl: 1    venlafaxine (EFFEXOR-XR) 75 MG 24 hr capsule, Take 3 capsules (225 mg total) by mouth every evening., Disp: 270 capsule, Rfl: 3    famotidine (PEPCID) 40 MG tablet, Take 1 tablet (40 mg total) by mouth once daily., Disp: 30 tablet, Rfl: 11    furosemide (LASIX) 20 MG tablet, Take 1 tablet (20 mg total) by mouth daily as needed. (Patient not taking: Reported on 1/13/2025), Disp: 90 tablet, Rfl: 3    mupirocin (BACTROBAN) 2 % ointment, Apply topically once daily. (Patient not taking: Reported on  "1/13/2025), Disp: , Rfl:     pantoprazole (PROTONIX) 40 MG tablet, Take 1 tablet (40 mg total) by mouth once daily., Disp: 90 tablet, Rfl: 3    vitamin E 1000 UNIT capsule, , Disp: , Rfl:     Objective:      Vitals:    01/13/25 0948   BP: 116/78   Pulse: 73   SpO2: 97%   Weight: 112.1 kg (247 lb 3.2 oz)   Height: 5' 7" (1.702 m)     Physical Exam    Vitals: Normal blood pressure.  General: No acute distress. Well-developed. Well-nourished.obese  HENT: Normocephalic. Atraumatic. Nares patent. Moist oral mucosa.  Ears: Bilateral TMs clear. Bilateral EACs clear. Minimal ear drainage.  Cardiovascular: Regular rate. Regular rhythm. No murmurs.   Respiratory: Normal respiratory effort. Clear to auscultation bilaterally. No rales. No rhonchi. No wheezing.  Abdomen: Soft. Non-tender. Non-distended. Normoactive bowel sounds.  Musculoskeletal: No  obvious deformity.  Extremities: No lower extremity edema.  Neurological: Alert & oriented x3. No slurred speech. Normal gait.  Psychiatric: Normal mood. Normal affect. Good insight. Good judgment.  Skin: psoriasis patches to lower extrem      Assessment:       Assessment & Plan    - Titrated diabetes medication (likely Ozempic) based on weight loss plateau rather than glucose levels, as blood sugar is well-controlled  - Iron levels improved from historically low values (32, 38, 7) to 166; monitoring to maintain current levels  - Considered patient's history of weight loss surgery and its impact on vitamin absorption  - Assessed ongoing joint pain in wrist, shoulder, and back; patient managing pain conservatively for now  - Reviewed lab order sets to ensure comprehensive testing, including cancer markers    MALIGNANT NEOPLASM OF UNSPECIFIED SITE OF LEFT FEMALE BREAST:  - Schedule mammogram follow up in February as per previous recommendation.  - Monitor cancer markers, which have not been drawn in about a year.  - Conduct all labs, including cancer markers, in June.  - Instruct the " patient to ensure all order sets are pulled up for labs.    TYPE 2 DIABETES MELLITUS WITH OTHER SPECIFIED COMPLICATION, WITHOUT LONG-TERM CURRENT USE OF INSULIN:  - Note that the patient's blood sugar is well-controlled and has been for a long time.  - Adjust medication dose based on weight loss rather than blood sugar.  - Increase Ozempic to 5 mg when weight loss plateaus.  - Instruct the patient to self-administer two 2.5 mg injections or switch to 5 mg dose when current supply is finished.  - Advise the patient to contact the office if weight loss plateaus to discuss increasing Ozempic dose.    MORBID OBESITY WITH BODY MASS INDEX (BMI) OF 40.0 TO 49.9:  - Note that the patient has lost a significant amount of weight.  - Discuss the impact of weight loss on joints and back pain, explaining that it has helped with knee pain and is expected to continue helping.  - Medication management for obesity is addressed under the diabetes section above.    IRON DEFICIENCY:  - Note that the patient has had weight loss surgery and is not absorbing vitamins well.  - Iron levels have improved to 166 from previously very low levels.  - Recommend maintaining an iron-rich diet and increasing vitamin C intake to improve iron absorption.  - Schedule regular labs every 3 months to monitor iron levels.    HEART HEALTH:  - Monitor blood pressure, which is currently well-controlled.  - Discuss the potential benefits of magnesium supplementation for heart health.    MEDICATIONS/SUPPLEMENTS:  - Continue calcium/magnesium/zinc supplement; instruct the patient to take 2 tablets at night instead of 1 in the morning and 1 at night.    DEGENERATIVE DISC DISEASE:  - Note that the patient reports ongoing back issues and has degenerative disc disease.    WRIST FRACTURE:  - Note that the patient reports pain in previously fractured wrist, likely from a fall in 2014.  - Advise the patient to see a specialist when pain becomes intolerable.    SHOULDER  ISSUES:  - Note that the patient reports ongoing shoulder issues.  - Recommend surgery when pain becomes intolerable.    GERD:  - Explain the difference between PPIs (e.g., Protonix) and H2 blockers (e.g., Pepcid) in managing .  - Change  medication regimen: Start Protonix in the morning and Pepcid at night.  - Note that the patient reports breakthrough  symptoms.    ARTHRITIS:  - Note that the patient reports ongoing arthritis symptoms.  - Continue Tylenol for arthritis pain management.    LABS:  - Ordered comprehensive lab work for March, including all tests from Dr. Santo's 9/12 order set.  - Ordered routine annual labs for June, including cholesterol and thyroid tests.    FOLLOW UP:  - Follow up on June 2 for routine annual labs and check-up.       Plan:       Type 2 diabetes mellitus without complication, without long-term current use of insulin  Comments:  5.6mg/dl. doing very well  Orders:  -     POCT HEMOGLOBIN A1C    Gastroesophageal reflux disease, unspecified whether esophagitis present  Comments:  continue protonix  Orders:  -     pantoprazole (PROTONIX) 40 MG tablet; Take 1 tablet (40 mg total) by mouth once daily.  Dispense: 90 tablet; Refill: 3    Breast cancer, stage 1, estrogen receptor negative, left  Comments:  followed by dr. jasso    Chemotherapy-induced neuropathy    Type 2 diabetes mellitus with other specified complication, without long-term current use of insulin    Attention deficit hyperactivity disorder (ADHD), predominantly inattentive type  Comments:  strattera    Primary insomnia  Comments:  trazodone    Arthralgia, unspecified joint  Comments:  tylenol prn    Weight loss  Comments:  continue to work on diet . increase mounjaro for diet and dm    Other iron deficiency anemia  Comments:  will monitor    Other orders  -     Discontinue: tirzepatide (MOUNJARO) 5 mg/0.5 mL PnIj; Inject 5 mg into the skin every 7 days.  Dispense: 12 Pen; Refill: 1  -     famotidine (PEPCID) 40 MG  tablet; Take 1 tablet (40 mg total) by mouth once daily.  Dispense: 30 tablet; Refill: 11      Follow up in about 3 months (around 4/13/2025).        This note was generated with the assistance of ambient listening technology. Verbal consent was obtained by the patient and accompanying visitor(s) for the recording of patient appointment to facilitate this note. I attest to having reviewed and edited the generated note for accuracy, though some syntax or spelling errors may persist. Please contact the author of this note for any clarification.      1/23/2025 Shaunna Gordon

## 2025-02-25 ENCOUNTER — HOSPITAL ENCOUNTER (OUTPATIENT)
Dept: RADIOLOGY | Facility: HOSPITAL | Age: 65
Discharge: HOME | End: 2025-02-25
Attending: ORTHOPAEDIC SURGERY
Payer: MEDICARE

## 2025-02-25 ENCOUNTER — OFFICE VISIT (OUTPATIENT)
Dept: ORTHOPEDICS | Facility: CLINIC | Age: 65
End: 2025-02-25
Payer: MEDICARE

## 2025-02-25 VITALS — WEIGHT: 247.13 LBS | BODY MASS INDEX: 38.79 KG/M2 | HEIGHT: 67 IN

## 2025-02-25 DIAGNOSIS — M19.032 PRIMARY OSTEOARTHRITIS OF LEFT WRIST: ICD-10-CM

## 2025-02-25 DIAGNOSIS — M19.022 PRIMARY OSTEOARTHRITIS OF LEFT ELBOW: ICD-10-CM

## 2025-02-25 DIAGNOSIS — M19.032: ICD-10-CM

## 2025-02-25 DIAGNOSIS — Z98.890 HX OF ELBOW SURGERY: Primary | ICD-10-CM

## 2025-02-25 DIAGNOSIS — Z98.890 HX OF ELBOW SURGERY: ICD-10-CM

## 2025-02-25 PROCEDURE — 99999 PR PBB SHADOW E&M-EST. PATIENT-LVL III: CPT | Mod: PBBFAC,,, | Performed by: ORTHOPAEDIC SURGERY

## 2025-02-25 PROCEDURE — 99213 OFFICE O/P EST LOW 20 MIN: CPT | Mod: PBBFAC,25,PN | Performed by: ORTHOPAEDIC SURGERY

## 2025-02-25 PROCEDURE — 73080 X-RAY EXAM OF ELBOW: CPT | Mod: TC,PN,LT

## 2025-02-25 PROCEDURE — 73080 X-RAY EXAM OF ELBOW: CPT | Mod: 26,LT,, | Performed by: RADIOLOGY

## 2025-02-25 NOTE — PROGRESS NOTES
[facility]    Subjective:     Chief Complaint:   Chief Complaint   Patient presents with    Left Wrist - Pain     Patient is here for a f/up on Left Wrist, states her pain is worse than her last visit, Would like to discuss surgery       Past Medical History:   Diagnosis Date    Breast cancer, stage 1, estrogen receptor negative, left () 2020    Depression     Diabetes mellitus, type 2     GERD (gastroesophageal reflux disease)     HER2-positive carcinoma of left breast 2020    Hx of breast cancer     Hx of breast cancer - Her2Nu+/ER+ - 2019    Insomnia     Lymphedema     Neuropathy of both feet     S/P lumpectomy, left breast 2020       Past Surgical History:   Procedure Laterality Date    ARTHROSCOPY OF SHOULDER WITH DECOMPRESSION OF SUBACROMIAL SPACE Right 2023    Procedure: ARTHROSCOPY, SHOULDER, WITH SUBACROMIAL SPACE DECOMPRESSION;  Surgeon: Curtis Ricardo MD;  Location: [facility] OR;  Service: Orthopedics;  Laterality: Right;    BICEPS TENDON REPAIR Left 2005    BREAST BIOPSY Left     BREAST LUMPECTOMY Left      SECTION      1982, 1985,     CHOLECYSTECTOMY  2000    Elbow fx Left 2015    FRACTURE SURGERY Left 2016    elbow    HYSTERECTOMY  2013    KNEE ARTHROSCOPY W/ MENISCAL REPAIR Left 2014    Lower back ruptured disc  2010    LYMPH NODE DISSECTION  2011    ROBOTIC ARTHROPLASTY, KNEE Left 2023    Procedure: ROBOTIC ARTHROPLASTY, KNEE, TOTAL, LEFT;  Surgeon: Curtis Ricardo MD;  Location: [facility] OR;  Service: Orthopedics;  Laterality: Left;  Emerson-Edi    ROBOTIC ARTHROPLASTY, KNEE Right 2024    Procedure: ROBOTIC ARTHROPLASTY, KNEE, TOTAL, RIGHT;  Surgeon: Curtis Ricardo MD;  Location: [facility] OR;  Service: Orthopedics;  Laterality: Right;  Jaime-Edi    SPINE SURGERY  2009    ruptured disc       Current Outpatient Medications   Medication Sig    acetaminophen (TYLENOL) 500 MG tablet Take 500 mg by mouth every 6 (six) hours as needed  for Pain.    atomoxetine (STRATTERA) 100 mg capsule Take 1 capsule (100 mg total) by mouth once daily.    b complex vitamins capsule Take 1 capsule by mouth once daily.    calcium carbonate (OS-JULIANNE) 600 mg calcium (1,500 mg) Tab Take 600 mg by mouth once.    famotidine (PEPCID) 40 MG tablet Take 1 tablet (40 mg total) by mouth once daily.    magnesium 250 mg Tab Take 250 mg by mouth once daily.    omega-3 fatty acids/fish oil (FISH OIL-OMEGA-3 FATTY ACIDS) 300-1,000 mg capsule Take by mouth once daily.    pantoprazole (PROTONIX) 40 MG tablet Take 1 tablet (40 mg total) by mouth once daily.    pregabalin (LYRICA) 200 MG Cap Take 200 mg by mouth 3 (three) times daily.    tirzepatide (MOUNJARO) 5 mg/0.5 mL PnIj Inject 5 mg into the skin every 7 days.    traZODone (DESYREL) 100 MG tablet Take 2 tablets (200 mg total) by mouth every evening.    venlafaxine (EFFEXOR-XR) 75 MG 24 hr capsule Take 3 capsules (225 mg total) by mouth every evening.    furosemide (LASIX) 20 MG tablet Take 1 tablet (20 mg total) by mouth daily as needed. (Patient not taking: Reported on 2/25/2025)    mupirocin (BACTROBAN) 2 % ointment Apply topically once daily. (Patient not taking: Reported on 2/25/2025)    vitamin E 1000 UNIT capsule  (Patient not taking: Reported on 2/25/2025)     No current facility-administered medications for this visit.       Review of patient's allergies indicates:   Allergen Reactions    Banana Hives    Codeine Nausea And Vomiting    Adhesive Rash    Dilaudid  [hydromorphone (bulk)] Rash    Hydromorphone hcl Rash       Family History   Problem Relation Name Age of Onset    Cancer Mother Brenda         breast    Breast cancer Mother Brenda 70    Parkinsonism Father C E Bertha     Diabetes Father C E [place]     Breast cancer Sister Marie     Cancer Sister Marie 70 - 79        liver and breast    Cancer Daughter      Breast cancer Maternal Aunt      Cancer Maternal Aunt Jose Juan Dorman     Breast cancer Paternal Aunt       Cancer Paternal Aunt Radhaeene     Heart disease Maternal Grandmother Marlee     Heart disease Paternal Grandmother Bela        Social History[1]    History of present illness:  Lolly comes in today for follow-up for her left wrist.  She has known DRUJ osteoarthritis.  She was last seen and injected there about 8 months ago with a minimal relief of her symptoms.  She continues to have pain on a daily basis.  It interferes with some of her ADLs.  She is interested in definitive treatment.  She is also complaining of some left elbow pain.  She does have a prior surgical history there from a traumatic injury.    Review of Systems:    Constitution: Negative for chills, fever, and sweats.  Negative for unexplained weight loss.    HENT:  Negative for headaches and blurry vision.    Cardiovascular:Negative for chest pain or irregular heart beat. Negative for hypertension.    Respiratory:  Negative for cough and shortness of breath.    Gastrointestinal: Negative for abdominal pain, heartburn, melena, nausea, and vomitting.    Genitourinary:  Negative bladder incontinence and dysuria.    Musculoskeletal:  See HPI for details.     Neurological: Negative for numbness.    Psychiatric/Behavioral: Negative for depression.  The patient is not nervous/anxious.      Endocrine: Negative for polyuria    Hematologic/Lymphatic: Negative for bleeding problem.  Does not bruise/bleed easily.    Skin: Negative for poor would healing and rash    Objective:      Physical Examination:    Vital Signs:  There were no vitals filed for this visit.    Body mass index is 38.71 kg/m².    This a well-developed, well nourished patient in no acute distress.  They are alert and oriented and cooperative to examination.        Left wrist exam.  Left wrist exam is similar to where she was on her last visit with us about 8 months ago. Skin to left wrist clean dry and intact. No erythema or ecchymosis. No signs or symptoms of infection. She is neurovascularly  "intact throughout the left upper extremity. She can open and close her left hand into a fist. She can oppose her left thumb to all digits in the left hand. She does have a prominent left ulnar styloid. It is mildly tender to palpation. She is tender to palpation over the dorsum of the wrist near the DRUJ.     Pertinent New Results:    XRAY Report / Interpretation:   Three views taken of the left elbow today: AP, lateral, and oblique views.  They reveal severe arthrosis with diffuse osteophyte development.    Assessment/Plan:      1. Left wrist osteoarthritis.    2. Left distal radioulnar joint osteoarthritis.    3. History of left elbow surgery/left elbow osteoarthritis    Treatment options were review with her today in regards to the wrist and elbow.  I believe she will be best served being seen, evaluated, and treated by a dedicated hand/wrist/elbow specialist.  We will refer her to Dr. Ruslan Acevedo in [place] as he is an expert in treating these matters.  She can follow up with us on an as-needed basis for any issues or concerns that arise.    Arben Coombs, Physician Assistant, served in the capacity as a "scribe" for this patient encounter.  A "face-to-face" encounter occurred with Dr. Curtis Ricardo on this date.  The treatment plan and medical decision-making is outlined above. Patient was seen and examined with a chaperone.         This note was created using Dragon voice recognition software that occasionally misinterpreted phrases or words.               [1]   Social History  Socioeconomic History    Marital status:    Tobacco Use    Smoking status: Former     Current packs/day: 0.00     Average packs/day: 0.5 packs/day for 25.0 years (12.5 ttl pk-yrs)     Types: Cigarettes     Start date: 1985     Quit date: 2010     Years since quittin.2    Smokeless tobacco: Never   Substance and Sexual Activity    Alcohol use: Not Currently     Alcohol/week: 2.0 standard drinks of alcohol "     Types: 2 Glasses of wine per week    Drug use: Never    Sexual activity: Yes     Partners: Male     Birth control/protection: Other-see comments, None     Comment: Hysterectomy     Social Drivers of Health     Financial Resource Strain: Low Risk  (6/10/2024)    Overall Financial Resource Strain (CARDIA)     Difficulty of Paying Living Expenses: Not hard at all   Food Insecurity: No Food Insecurity (6/10/2024)    Hunger Vital Sign     Worried About Running Out of Food in the Last Year: Never true     Ran Out of Food in the Last Year: Never true   Transportation Needs: No Transportation Needs (6/10/2024)    PRAPARE - Transportation     Lack of Transportation (Medical): No     Lack of Transportation (Non-Medical): No   Physical Activity: Inactive (6/10/2024)    Exercise Vital Sign     Days of Exercise per Week: 0 days     Minutes of Exercise per Session: 10 min   Stress: Stress Concern Present (6/10/2024)    [employer] - Occupational Stress Questionnaire     Feeling of Stress : To some extent   Housing Stability: Unknown (6/10/2024)    Housing Stability Vital Sign     Unable to Pay for Housing in the Last Year: No     Homeless in the Last Year: No

## 2025-02-27 ENCOUNTER — HOSPITAL ENCOUNTER (OUTPATIENT)
Dept: RADIOLOGY | Facility: HOSPITAL | Age: 65
Discharge: HOME | End: 2025-02-27
Attending: ORTHOPAEDIC SURGERY
Payer: MEDICARE

## 2025-02-27 DIAGNOSIS — M19.032 PRIMARY OSTEOARTHRITIS OF LEFT WRIST: ICD-10-CM

## 2025-02-27 PROCEDURE — 73221 MRI JOINT UPR EXTREM W/O DYE: CPT | Mod: TC,PO,LT

## 2025-02-27 PROCEDURE — 73221 MRI JOINT UPR EXTREM W/O DYE: CPT | Mod: 26,LT,, | Performed by: RADIOLOGY

## 2025-03-12 NOTE — PROGRESS NOTES
[facility] Hematology/Oncology  PROGRESS NOTE -   Follow-up Visit      Subjective:       Patient ID:   NAME: Lolly Ramírez : 1960     64 y.o. female    Referring Doc: Shaunna Gordon, ZENA  Other Physicians: Steven Tobar (Neuro); Macario Rodas           Chief Complaint: hx/of left breast ca    History of Present Illness:     Patient returns today for a regularly scheduled follow-up visit.  The patient is here today to go over the results of the recently ordered labs, tests and studies. She is here by herself today.     She is doing ok with no new issues.     She had bilateral total knees and is doing great; healed well; she saw Dr Ricardo with ortho on 2025; she has a bone nodule on left forearm/wrist    She saw Dr Tobar with neuro in 2025;     Mammogram in 2024 was benign    She saw Shaunna Gordon in 2025    No CP, HA's or N/V. Breathing ok.      She had prior back nerve ablation  with Dr Villa with pain management and has not had any further debilitating pain.                          ROS:   GEN: normal without any fever, night sweats or weight loss; s/p bilateral knee surgeries  HEENT: normal with no HA's, sore throat, stiff neck, changes in vision  CV: normal with no CP, SOB, PND, LAKE or orthopnea  PULM: normal with no SOB, cough, hemoptysis, sputum or pleuritic pain  GI: normal with no abdominal pain, nausea, vomiting, constipation, diarrhea, melanotic stools, BRBPR, or hematemesis  : normal with no hematuria, dysuria  BREAST: normal with no mass, discharge, pain  SKIN: normal with no rash, erythema, bruising, or swelling;      Pain Scale:      Allergies:  Review of patient's allergies indicates:   Allergen Reactions    Banana Hives    Codeine Nausea And Vomiting    Adhesive Rash    Dilaudid  [hydromorphone (bulk)] Rash    Hydromorphone hcl Rash       Medications:    Current Outpatient Medications:     acetaminophen (TYLENOL) 500 MG tablet, Take 500 mg by mouth every 6 (six) hours as  "needed for Pain., Disp: , Rfl:     atomoxetine (STRATTERA) 100 mg capsule, Take 1 capsule (100 mg total) by mouth once daily., Disp: 90 capsule, Rfl: 0    b complex vitamins capsule, Take 1 capsule by mouth once daily., Disp: , Rfl:     calcium carbonate (OS-JULIANNE) 600 mg calcium (1,500 mg) Tab, Take 600 mg by mouth once., Disp: , Rfl:     famotidine (PEPCID) 40 MG tablet, Take 1 tablet (40 mg total) by mouth once daily., Disp: 30 tablet, Rfl: 11    magnesium 250 mg Tab, Take 250 mg by mouth once daily., Disp: , Rfl:     omega-3 fatty acids/fish oil (FISH OIL-OMEGA-3 FATTY ACIDS) 300-1,000 mg capsule, Take by mouth once daily., Disp: , Rfl:     pantoprazole (PROTONIX) 40 MG tablet, Take 1 tablet (40 mg total) by mouth once daily., Disp: 90 tablet, Rfl: 3    pregabalin (LYRICA) 200 MG Cap, Take 200 mg by mouth 3 (three) times daily., Disp: , Rfl:     tirzepatide (MOUNJARO) 5 mg/0.5 mL PnIj, Inject 5 mg into the skin every 7 days., Disp: 4 Pen, Rfl: 5    traZODone (DESYREL) 100 MG tablet, Take 2 tablets (200 mg total) by mouth every evening., Disp: 180 tablet, Rfl: 1    venlafaxine (EFFEXOR-XR) 75 MG 24 hr capsule, Take 3 capsules (225 mg total) by mouth every evening., Disp: 270 capsule, Rfl: 3    furosemide (LASIX) 20 MG tablet, Take 1 tablet (20 mg total) by mouth daily as needed. (Patient not taking: Reported on 1/13/2025), Disp: 90 tablet, Rfl: 3    mupirocin (BACTROBAN) 2 % ointment, Apply topically once daily. (Patient not taking: Reported on 1/13/2025), Disp: , Rfl:     vitamin E 1000 UNIT capsule, , Disp: , Rfl:     PMHx/PSHx Updates:  See patient's last visit with me on 9/12/2024  See H&P on 2/18/2020        Pathology:   Cancer Staging   No matching staging information was found for the patient.            Objective:     Vitals:  Blood pressure (P) 138/85, pulse 78, temperature 97.7 °F (36.5 °C), temperature source Temporal, resp. rate 16, height 5' 7" (1.702 m), weight 106 kg (233 lb 9.6 oz), SpO2 " 98%.    Physical Examination:   GEN: no apparent distress, comfortable; AAOx3; overweight  HEAD: atraumatic and normocephalic  EYES: no pallor, no icterus, PERRLA  ENT: OMM, no pharyngeal erythema, external ears WNL; no nasal discharge; no thrush  NECK: no masses, thyroid normal, trachea midline, no LAD/LN's, supple  CV: RRR with no murmur; normal pulse; normal S1 and S2; no pedal edema  CHEST: Normal respiratory effort; CTAB; normal breath sounds; no wheeze or crackles  ABDOM: nontender and nondistended; soft; normal bowel sounds; no rebound/guarding  MUSC/Skeletal: ROM normal; no crepitus; joints normal; no deformities or arthropathy  EXTREM: no clubbing, cyanosis, inflammation or swelling; s/p bilateral knee surgeries  SKIN:  psoriasis   : no red  NEURO: grossly intact; motor/sensory WNL; AAOx3; no tremors  PSYCH: normal mood, affect and behavior  LYMPH: normal cervical, supraclavicular, axillary and groin LN's  Breast: lumpectomy on left; no changes; she declined exam        Labs:     Lab Results   Component Value Date    WBC 5.4 03/10/2025    HGB 15.0 03/10/2025    HCT 45.5 (H) 03/10/2025    MCV 91.9 03/10/2025     03/10/2025       CMP  Sodium   Date Value Ref Range Status   03/10/2025 143 135 - 146 mmol/L Final   04/26/2019 141 134 - 144 mmol/L      Potassium   Date Value Ref Range Status   03/10/2025 3.9 3.5 - 5.3 mmol/L Final     Chloride   Date Value Ref Range Status   03/10/2025 106 98 - 110 mmol/L Final   04/26/2019 103 98 - 110 mmol/L      CO2   Date Value Ref Range Status   03/10/2025 28 20 - 32 mmol/L Final     Glucose   Date Value Ref Range Status   03/10/2025 101 (H) 65 - 99 mg/dL Final     Comment:                   Fasting reference interval     For someone without known diabetes, a glucose value  between 100 and 125 mg/dL is consistent with  prediabetes and should be confirmed with a  follow-up test.        04/26/2019 81 70 - 99 mg/dL      BUN   Date Value Ref Range Status   03/10/2025  15 7 - 25 mg/dL Final     Creatinine   Date Value Ref Range Status   03/10/2025 0.95 0.50 - 1.05 mg/dL Final   04/26/2019 0.71 0.60 - 1.40 mg/dL      Calcium   Date Value Ref Range Status   03/10/2025 9.7 8.6 - 10.4 mg/dL Final     Total Protein   Date Value Ref Range Status   03/10/2025 6.3 6.1 - 8.1 g/dL Final     Albumin   Date Value Ref Range Status   03/10/2025 4.1 3.6 - 5.1 g/dL Final   04/26/2019 3.9 3.1 - 4.7 g/dL      Total Bilirubin   Date Value Ref Range Status   03/10/2025 0.4 0.2 - 1.2 mg/dL Final     Alkaline Phosphatase   Date Value Ref Range Status   02/12/2024 102 55 - 135 U/L Final     AST   Date Value Ref Range Status   03/10/2025 14 10 - 35 U/L Final     ALT   Date Value Ref Range Status   03/10/2025 22 6 - 29 U/L Final     Anion Gap   Date Value Ref Range Status   02/12/2024 10 8 - 16 mmol/L Final     eGFR if    Date Value Ref Range Status   03/15/2022 93 > OR = 60 mL/min/1.73m2 Final     eGFR if non    Date Value Ref Range Status   03/15/2022 80 > OR = 60 mL/min/1.73m2 Final     Lab Results   Component Value Date    IRON 115 03/10/2025    TIBC 292 03/10/2025    LABIRON 39 03/10/2025      Lab Results   Component Value Date    FERRITIN 201 03/10/2025           Radiology/Diagnostic Studies:    Mammo 6/24/2024:    Impression:     Benign mammogram.  Annual screening mammography is recommended.     BI-RADS CATEGORY 2: BENIGN FINDING.        Mammo 4/18/2022:    Impression:     1. No mammographic evidence of malignancy and no detrimental change.  2. Yearly mammography is recommended.  BI-RADS CATEGORY 2: BENIGN FINDING.    mammo 3/24/2020:    Impression:     1. No mammographic evidence of malignancy.  2. Yearly mammography is recommended.  BI-RADS CATEGORY 2: BENIGN FINDING.    Ct Chest 5/1/2019:     IMPRESSION:  No acute pulmonary process    The abnormality on the chest radiograph most likely secondary to degenerative  changes of the thoracic spine    Small hiatal  hernia        PET  12/17/2015 on chart    I have reviewed all available lab results and radiology reports.    Assessment/Plan:   (1) 64 y.o. female  with diagnosis of history of left breast cancer who has been referred by Shaunna Gordon NP for evaluation by medical hematology/oncology.   - She was diagnosed with Her2Nu positive breast cancer in 2011 and was treated with chemotherapy + herceptin, radiation and tamoxifen.   - T1c N0  - 1.4 cm  - Her2Nu +3; ER neg' NV + but at only 3%  - she developed a severe rash to the tamoxifen in the past and it had to be discontinued  - she had prior lumpectomy    3/11/2021:  - she is due for mammogram this month  - last mammogram 3/24/2020    3/16/2022:  - she has mammo scheduled for next month  - basic labs are adequate  - awaiting tumor markers which are still in process    3/16/2023:  - due for mammo in March 2023  - labs adequate  - tumor markers are WNL  - bone density being ordered by PCP    3/14/2024:  - mammo due in June 2024  - latest breast tumor markers remain WNL's  - s/p bilateral total knees since last visit with right one done just two weeks ago with Dr Ricardo. The left was done in Nov 2023 and she seems to be healing well. She is getting PT. Using walker to assist with ambulation.   - mild anemia - possibly post-op related - check labs again in 6 months    9/12/2024:  - mammo in June 2024 negative/benign  - hgb WNL but RBC's are microcytic  - her iron panel is low  - she last had scope last year with Dr Heart  - she is not able to tolerate oral iron, will set up some IV iron    3/13/2025:  - latest CBC and iron panel adequate  - mammo due in June 2025         (2) DM II     (3) GERD     (4) OA and bicep tendonitis     (5) Prior microcytic anemia issues in 2017 with GIB - s/p scope with Dr Heart in past     (6) Neuropathy - followed by Dr Tobar     (7) Former smoker (quit 2010)     (8) Chronic skin issues on legs - on creams - seen by derm in past     (9)  Gastric sleeve     (10) Chronic back issues         VISIT DIAGNOSES:      Breast cancer, stage 1, estrogen receptor negative, left (2011)    Hx of breast cancer - Her2Nu+/ER+ - 2011    S/P lumpectomy, left breast    HER2-positive carcinoma of left breast    Iron deficiency anemia due to chronic blood loss          PLAN:  1. Check CBC, CMP and iron every 6 months; . Breast cancer markers every 12 months,    2. Repeat mammogram in June 2025 - needs yearly  3. F/u with PCP, GI, card etc  4.  F/u with Dr Heart with GI     RTC in  6 months   Fax note to Shaunna Gordon, ZENA; Edgar [facility]; Amadou; Clark; Davion       Discussion:     COVID-19 Discussion:    I had long discussion with patient and any applicable family about the COVID-19 coronavirus epidemic and the recommended precautions with regard to cancer and/or hematology patients. I have re-iterated the CDC recommendations for adequate hand washing, use of hand -like products, and coughing into elbow, etc. In addition, especially for our patients who are on chemotherapy and/or our otherwise immunocompromised patients, I have recommended avoidance of crowds, including movie theaters, restaurants, churches, etc. I have recommended avoidance of any sick or symptomatic family members and/or friends. I have also recommended avoidance of any raw and unwashed food products, and general avoidance of food items that have not been prepared by themselves. The patient has been asked to call us immediately with any symptom developments, issues, questions or other general concerns.       Iron Infusion Therapy Discussion:     Provided literature/learning materials on the particular IV iron regimen and discussed the potential side-effect profiles of the drug(s). Discussed the importance of compliance with obtaining and monitoring requested lab work, and went over the potential risk for the development of anaphylactic shock, bronchospasm, dysrhythmia, liver and/or  kidney damage, and respiratory/cardiovascular arrest and/or failure. I discussed the potential risks for development of alopecia, fevers, itching, chills and/or rigors, cold sensory issues, ringing in ears, vertigo and neuropathy, all of which are usually acute but sometimes could end up being chronic and life-long. Discussed the risks of hand-foot syndrome and rashes, and development of other autoimmune mediated processes such as pneumonitis and colitis which could be life threatening.     The patient's consent has been obtained to proceed with the IV iron therapy. The patient will either be referred to [facility] or one of the Hematology Clinic Nurses for training and education on IV iron therapy, use of antiemetics and/or anti-diarrheals, use of NSAID's, potential IV iron therapy side-effects, and any specific recommendations and precautions with the particular IV iron agents.      Answered all of the patient's (and family's, if applicable) questions to the best of my ability and to their complete satisfaction. The patient acknowledged full understanding of the risks, recommendations and plan(s).       I spent over 25 mins of time with the patient. Reviewed results of the recently ordered labs, tests and studies; made directives with regards to the results. Over half of this time was spent couseling and coordinating care.    I have explained all of the above in detail and the patient understands all of the current recommendation(s). I have answered all of their questions to the best of my ability and to their complete satisfaction.   The patient is to continue with the current management plan.            Electronically signed by Tray Simpson MD                    Answers submitted by the patient for this visit:  Review of Systems Questionnaire (Submitted on 3/8/2025)  appetite change : Yes  unexpected weight change: No  mouth sores: No  visual disturbance: Yes  cough:  No  shortness of breath: No  chest pain: No  abdominal pain: No  diarrhea: No  frequency: No  back pain: Yes  rash: Yes  headaches: Yes  adenopathy: No  nervous/ anxious: Yes

## 2025-03-13 ENCOUNTER — OFFICE VISIT (OUTPATIENT)
Facility: CLINIC | Age: 65
End: 2025-03-13
Payer: MEDICARE

## 2025-03-13 VITALS
TEMPERATURE: 98 F | BODY MASS INDEX: 36.67 KG/M2 | RESPIRATION RATE: 16 BRPM | OXYGEN SATURATION: 98 % | HEART RATE: 78 BPM | WEIGHT: 233.63 LBS | HEIGHT: 67 IN

## 2025-03-13 DIAGNOSIS — Z17.31 HER2-POSITIVE CARCINOMA OF LEFT BREAST: ICD-10-CM

## 2025-03-13 DIAGNOSIS — Z85.3 HX OF BREAST CANCER: ICD-10-CM

## 2025-03-13 DIAGNOSIS — Z98.890 S/P LUMPECTOMY, LEFT BREAST: ICD-10-CM

## 2025-03-13 DIAGNOSIS — R97.8 OTHER ABNORMAL TUMOR MARKERS: ICD-10-CM

## 2025-03-13 DIAGNOSIS — C50.912 HER2-POSITIVE CARCINOMA OF LEFT BREAST: ICD-10-CM

## 2025-03-13 DIAGNOSIS — C50.912 BREAST CANCER, STAGE 1, ESTROGEN RECEPTOR NEGATIVE, LEFT: Primary | ICD-10-CM

## 2025-03-13 DIAGNOSIS — Z17.1 BREAST CANCER, STAGE 1, ESTROGEN RECEPTOR NEGATIVE, LEFT: Primary | ICD-10-CM

## 2025-03-13 DIAGNOSIS — D50.0 IRON DEFICIENCY ANEMIA DUE TO CHRONIC BLOOD LOSS: ICD-10-CM

## 2025-03-13 PROCEDURE — 99999 PR PBB SHADOW E&M-EST. PATIENT-LVL IV: CPT | Mod: PBBFAC,,, | Performed by: INTERNAL MEDICINE

## 2025-03-13 PROCEDURE — 99214 OFFICE O/P EST MOD 30 MIN: CPT | Mod: PBBFAC,PN | Performed by: INTERNAL MEDICINE

## 2025-03-19 ENCOUNTER — OFFICE VISIT (OUTPATIENT)
Dept: ORTHOPEDICS | Facility: CLINIC | Age: 65
End: 2025-03-19
Payer: MEDICARE

## 2025-03-19 DIAGNOSIS — M19.032 PRIMARY OSTEOARTHRITIS OF LEFT WRIST: ICD-10-CM

## 2025-03-19 DIAGNOSIS — M25.832 ULNAR IMPACTION SYNDROME, LEFT: Primary | ICD-10-CM

## 2025-03-19 PROCEDURE — 99204 OFFICE O/P NEW MOD 45 MIN: CPT | Mod: S$PBB,,, | Performed by: ORTHOPAEDIC SURGERY

## 2025-03-19 PROCEDURE — 99999 PR PBB SHADOW E&M-EST. PATIENT-LVL III: CPT | Mod: PBBFAC,,, | Performed by: ORTHOPAEDIC SURGERY

## 2025-03-19 PROCEDURE — 99213 OFFICE O/P EST LOW 20 MIN: CPT | Mod: PBBFAC,PO | Performed by: ORTHOPAEDIC SURGERY

## 2025-03-19 NOTE — PROGRESS NOTES
3/19/2025    Chief Complaint:  Chief Complaint   Patient presents with    Left Wrist - Pain, Injury     Injury from falls    Left Elbow - Pain, Injury     Injury from falls       HPI:  Lolly Ramírez is a 64 y.o. female, who presents to clinic today has a history of left wrist pain.  This has been going on for years.  She had a fracture in her elbow in injury to the left wrist in .  Since that time she has had multiple other falls with pain about the left wrist.  She now has limitation of motion and has very little function.  She states that she can not even  a cup of coffee without having pain.  She is here today for further evaluation and treatment.    PMHX:  Past Medical History:   Diagnosis Date    Breast cancer, stage 1, estrogen receptor negative, left () 2020    Depression     Diabetes mellitus, type 2     GERD (gastroesophageal reflux disease)     HER2-positive carcinoma of left breast 2020    Hx of breast cancer     Hx of breast cancer - Her2Nu+/ER+ - 2019    Insomnia     Lymphedema     Neuropathy of both feet     S/P lumpectomy, left breast 2020       PSHX:  Past Surgical History:   Procedure Laterality Date    ARTHROSCOPY OF SHOULDER WITH DECOMPRESSION OF SUBACROMIAL SPACE Right 2023    Procedure: ARTHROSCOPY, SHOULDER, WITH SUBACROMIAL SPACE DECOMPRESSION;  Surgeon: Curtis Ricardo MD;  Location: [facility] OR;  Service: Orthopedics;  Laterality: Right;    BICEPS TENDON REPAIR Left 2005    BREAST BIOPSY Left     BREAST LUMPECTOMY Left      SECTION      1982, 1985, 1986    CHOLECYSTECTOMY  2000    Elbow fx Left 2015    FRACTURE SURGERY Left 2016    elbow    HYSTERECTOMY  2013    KNEE ARTHROSCOPY W/ MENISCAL REPAIR Left 2014    Lower back ruptured disc  2010    LYMPH NODE DISSECTION  2011    ROBOTIC ARTHROPLASTY, KNEE Left 2023    Procedure: ROBOTIC ARTHROPLASTY, KNEE, TOTAL, LEFT;  Surgeon: Curtis Ricardo MD;  Location: [facility] OR;  Service:  Orthopedics;  Laterality: Left;  Jaime-Edi    ROBOTIC ARTHROPLASTY, KNEE Right 2024    Procedure: ROBOTIC ARTHROPLASTY, KNEE, TOTAL, RIGHT;  Surgeon: Curtis Ricardo MD;  Location: [facility];  Service: Orthopedics;  Laterality: Right;  Jaime-Edi    SPINE SURGERY  2009    ruptured disc       FMHX:  Family History   Problem Relation Name Age of Onset    Cancer Mother Brenda         breast    Breast cancer Mother Brenda 70    Parkinsonism Father C E [place]     Diabetes Father C E [place]     Breast cancer Sister Marie     Cancer Sister aMrie 70 - 79        liver and breast    Cancer Daughter      Breast cancer Maternal Aunt      Cancer Maternal Aunt Jose Juan Dorman     Breast cancer Paternal Aunt      Cancer Paternal Aunt Floureene     Heart disease Maternal Grandmother Marlee     Heart disease Paternal Grandmother Bela        SOCHX:  Social History     Tobacco Use    Smoking status: Former     Current packs/day: 0.00     Average packs/day: 0.5 packs/day for 25.0 years (12.5 ttl pk-yrs)     Types: Cigarettes     Start date: 1985     Quit date: 2010     Years since quittin.3    Smokeless tobacco: Never   Substance Use Topics    Alcohol use: Not Currently     Alcohol/week: 2.0 standard drinks of alcohol     Types: 2 Glasses of wine per week       ALLERGIES:  Banana, Codeine, Adhesive, Dilaudid  [hydromorphone (bulk)], and Hydromorphone hcl    CURRENT MEDICATIONS:  Medications Ordered Prior to Encounter[1]    REVIEW OF SYSTEMS:  Review of Systems   Constitutional: Negative.    HENT: Negative.     Eyes: Negative.    Respiratory: Negative.     Cardiovascular: Negative.    Gastrointestinal: Negative.    Genitourinary: Negative.    Musculoskeletal:  Positive for falls and joint pain.   Skin: Negative.    Neurological:  Positive for weakness.   Endo/Heme/Allergies: Negative.    Psychiatric/Behavioral: Negative.       GENERAL PHYSICAL EXAM:   There were no vitals taken for this visit.   GEN: well developed,  well nourished, no acute distress   HENT: Normocephalic, atraumatic   EYES: No discharge, conjunctiva normal   NECK: Supple, non-tender   PULM: No wheezing, no respiratory distress   CV: RRR   ABD: Soft, non-tender    ORTHO EXAM:   Examination of the left wrist reveals that there are no major skin changes.  She has no significant edema.  There has a radial shortening deformity noted.  Palpation about the wrist produces tenderness directly over the ulnar head at the TFCC.  There is also tenderness at the distal radial ulnar joint.  There was no tenderness over the radial side of the wrist.  Wrist range of motion is extension of 80° and flexion of 60°.  She can pronate and supinate.  There is some crepitance with those motions noted.  She does have sensation intact and capillary refill is less than 2 seconds    RADIOLOGY:   X-rays of the left wrist as well as an MRI have been reviewed.  She is noted to have severe ulnar positive variance.  This has approximately 7 mm ulnar positive.  She has severe degenerative changes about the distal radial ulnar joint with evidence of impaction of the carpus on the ulna.  There is obvious TFCC tearing with ulnocarpal joint arthritis.    ASSESSMENT:   Left wrist ulnar impaction syndrome    PLAN:  1. I have discussed treatment options with the patient.  I have discussed observation versus surgical interventions.  We have discussed multiple different surgical intervention but I feel that a ulnar shortening osteotomy would be the best chance at getting her a more functional wrist.  I have also discussed the possibility of debridement of the distal radial ulnar joint at that time.  After discussion of the risks and benefits of those procedures informed consent has been obtained     2.  Will proceed with left wrist ulnar shortening osteotomy with debridement of the distal radial ulnar joint.  This will be performed under general anesthesia with a single-shot supraclavicular block     3.   She will follow up with me 2 weeks after the surgery           [1]   Current Outpatient Medications on File Prior to Visit   Medication Sig Dispense Refill    acetaminophen (TYLENOL) 500 MG tablet Take 500 mg by mouth every 6 (six) hours as needed for Pain.      atomoxetine (STRATTERA) 100 mg capsule Take 1 capsule (100 mg total) by mouth once daily. 90 capsule 0    b complex vitamins capsule Take 1 capsule by mouth once daily.      calcium carbonate (OS-JULIANNE) 600 mg calcium (1,500 mg) Tab Take 600 mg by mouth once.      famotidine (PEPCID) 40 MG tablet Take 1 tablet (40 mg total) by mouth once daily. 30 tablet 11    magnesium 250 mg Tab Take 250 mg by mouth once daily.      omega-3 fatty acids/fish oil (FISH OIL-OMEGA-3 FATTY ACIDS) 300-1,000 mg capsule Take by mouth once daily.      pantoprazole (PROTONIX) 40 MG tablet Take 1 tablet (40 mg total) by mouth once daily. 90 tablet 3    pregabalin (LYRICA) 200 MG Cap Take 200 mg by mouth 3 (three) times daily.      tirzepatide (MOUNJARO) 5 mg/0.5 mL PnIj Inject 5 mg into the skin every 7 days. 4 Pen 5    traZODone (DESYREL) 100 MG tablet Take 2 tablets (200 mg total) by mouth every evening. 180 tablet 1    venlafaxine (EFFEXOR-XR) 75 MG 24 hr capsule Take 3 capsules (225 mg total) by mouth every evening. 270 capsule 3    vitamin E 1000 UNIT capsule       furosemide (LASIX) 20 MG tablet Take 1 tablet (20 mg total) by mouth daily as needed. (Patient not taking: Reported on 3/19/2025) 90 tablet 3    mupirocin (BACTROBAN) 2 % ointment Apply topically once daily. (Patient not taking: Reported on 3/19/2025)       No current facility-administered medications on file prior to visit.

## 2025-03-19 NOTE — PATIENT INSTRUCTIONS
Surgery Instructions:     Your surgery is scheduled on 4/22 at the surgery center: [address], 1st floor, second entrance.    The pre-op department will be in contact with you prior to your procedure to review medications and instructions.       Nothing to eat or drink after midnight prior to day of surgery.    You should STOP taking any blood thinners 5 days prior to surgery.     The surgery center will contact you the day prior to surgery to advise you of your arrival time for surgery.     Your post op appointment is scheduled on 5/7 at 2:00.

## 2025-03-28 DIAGNOSIS — M25.832 ULNAR IMPACTION SYNDROME, LEFT: Primary | ICD-10-CM

## 2025-03-28 RX ORDER — CEFAZOLIN SODIUM 2 G/50ML
2 SOLUTION INTRAVENOUS
OUTPATIENT
Start: 2025-03-28

## 2025-03-28 RX ORDER — MUPIROCIN 20 MG/G
OINTMENT TOPICAL
OUTPATIENT
Start: 2025-03-28

## 2025-04-10 ENCOUNTER — OFFICE VISIT (OUTPATIENT)
Dept: FAMILY MEDICINE | Facility: CLINIC | Age: 65
End: 2025-04-10
Payer: MEDICARE

## 2025-04-10 VITALS
HEART RATE: 80 BPM | OXYGEN SATURATION: 97 % | DIASTOLIC BLOOD PRESSURE: 72 MMHG | WEIGHT: 226.38 LBS | BODY MASS INDEX: 35.53 KG/M2 | HEIGHT: 67 IN | SYSTOLIC BLOOD PRESSURE: 128 MMHG

## 2025-04-10 DIAGNOSIS — Z00.00 PHYSICAL EXAM: ICD-10-CM

## 2025-04-10 DIAGNOSIS — G47.00 INSOMNIA, UNSPECIFIED TYPE: ICD-10-CM

## 2025-04-10 DIAGNOSIS — F51.01 PRIMARY INSOMNIA: ICD-10-CM

## 2025-04-10 DIAGNOSIS — E11.9 TYPE 2 DIABETES MELLITUS WITHOUT COMPLICATION, WITHOUT LONG-TERM CURRENT USE OF INSULIN: Primary | ICD-10-CM

## 2025-04-10 DIAGNOSIS — M25.839 ULNAR ABUTMENT SYNDROME, UNSPECIFIED LATERALITY: ICD-10-CM

## 2025-04-10 DIAGNOSIS — K21.9 GASTROESOPHAGEAL REFLUX DISEASE, UNSPECIFIED WHETHER ESOPHAGITIS PRESENT: ICD-10-CM

## 2025-04-10 DIAGNOSIS — Z85.3 HX OF BREAST CANCER: ICD-10-CM

## 2025-04-10 DIAGNOSIS — M79.10 MYALGIA: ICD-10-CM

## 2025-04-10 DIAGNOSIS — E11.69 TYPE 2 DIABETES MELLITUS WITH OTHER SPECIFIED COMPLICATION, WITHOUT LONG-TERM CURRENT USE OF INSULIN: ICD-10-CM

## 2025-04-10 DIAGNOSIS — F90.0 ATTENTION DEFICIT HYPERACTIVITY DISORDER (ADHD), PREDOMINANTLY INATTENTIVE TYPE: ICD-10-CM

## 2025-04-10 LAB — HBA1C MFR BLD: 5.2 %

## 2025-04-10 PROCEDURE — 99214 OFFICE O/P EST MOD 30 MIN: CPT | Mod: S$GLB,,, | Performed by: NURSE PRACTITIONER

## 2025-04-10 PROCEDURE — 83036 HEMOGLOBIN GLYCOSYLATED A1C: CPT | Mod: QW,,, | Performed by: NURSE PRACTITIONER

## 2025-04-10 RX ORDER — ATOMOXETINE 100 MG/1
100 CAPSULE ORAL DAILY
Qty: 90 CAPSULE | Refills: 0 | Status: SHIPPED | OUTPATIENT
Start: 2025-04-10

## 2025-04-10 RX ORDER — FUROSEMIDE 20 MG/1
20 TABLET ORAL DAILY PRN
Qty: 90 TABLET | Refills: 3 | Status: SHIPPED | OUTPATIENT
Start: 2025-04-10

## 2025-04-10 RX ORDER — TIRZEPATIDE 5 MG/.5ML
5 INJECTION, SOLUTION SUBCUTANEOUS
Qty: 12 PEN | Refills: 1 | Status: SHIPPED | OUTPATIENT
Start: 2025-04-10

## 2025-04-10 RX ORDER — TRAZODONE HYDROCHLORIDE 100 MG/1
200 TABLET ORAL NIGHTLY
Qty: 180 TABLET | Refills: 1 | Status: SHIPPED | OUTPATIENT
Start: 2025-04-10 | End: 2026-04-10

## 2025-04-14 NOTE — PROGRESS NOTES
SUBJECTIVE:    Patient ID: Lolly Ramírez is a 64 y.o. female.    Chief Complaint: Follow-up (Bottles brought//Pt is here for a 3 month follow up//Remind me created for Mammogram due in June to send order//Eye exam due in July-Referral placed. Remind me created//KE)      History of Present Illness    CHIEF COMPLAINT:  64 year old female presents today for check up.  is present during the exam    MUSCULOSKELETAL:  She reports wrist condition stemming from an elbow fracture 10 years ago. She describes bone overgrowth causing wrist twisting and weakness, resulting in inability to hold objects or lift a cup of coffee. She experiences electric shock sensations from wrist to elbow and intermittent discomfort without constant pain. Surgery is scheduled for the 22nd to address the ulnar bone issue.    GASTROINTESTINAL:  She has food sensitivities manifesting as whole body itching with pizza consumption, leading to reduced intake of bread and crackers. She has history of C. difficile infection following chemotherapy treatment 10 years ago, which resolved without specific C. difficile targeted treatment.      ROS:  General: -fever, -chills, -fatigue, -weight gain, -weight loss  Eyes: -vision changes, -redness, -discharge  ENT: -ear pain, -nasal congestion, -sore throat, +ear discharge  Cardiovascular: -chest pain, -palpitations, -lower extremity edema  Respiratory: -cough, -shortness of breath  Gastrointestinal: -abdominal pain, -nausea, -vomiting, -diarrhea, -constipation, -blood in stool  Genitourinary: -dysuria, -hematuria, -frequency  Musculoskeletal: -joint pain, -muscle pain, +muscle weakness, +pain with movement  Skin: -rash, -lesion, +itching  Neurological: -headache, -dizziness, -numbness, -tingling, +shooting pain sensation  Psychiatric: -anxiety, -depression, -sleep difficulty              Office Visit on 04/10/2025   Component Date Value Ref Range Status    Hemoglobin A1C, POC 04/10/2025 5.2  % Final    Ancillary Orders on 02/21/2025   Component Date Value Ref Range Status    WBC 03/10/2025 5.4  3.8 - 10.8 Thousand/uL Final    RBC 03/10/2025 4.95  3.80 - 5.10 Million/uL Final    Hemoglobin 03/10/2025 15.0  11.7 - 15.5 g/dL Final    Hematocrit 03/10/2025 45.5 (H)  35.0 - 45.0 % Final    MCV 03/10/2025 91.9  80.0 - 100.0 fL Final    MCH 03/10/2025 30.3  27.0 - 33.0 pg Final    MCHC 03/10/2025 33.0  32.0 - 36.0 g/dL Final    RDW 03/10/2025 12.6  11.0 - 15.0 % Final    Platelets 03/10/2025 293  140 - 400 Thousand/uL Final    MPV 03/10/2025 11.4  7.5 - 12.5 fL Final    Neutrophils, Abs 03/10/2025 3,488  1,500 - 7,800 cells/uL Final    Lymph # 03/10/2025 1,226  850 - 3,900 cells/uL Final    Mono # 03/10/2025 378  200 - 950 cells/uL Final    Eos # 03/10/2025 259  15 - 500 cells/uL Final    Baso # 03/10/2025 49  0 - 200 cells/uL Final    Neutrophils Relative 03/10/2025 64.6  % Final    Lymph % 03/10/2025 22.7  % Final    Mono % 03/10/2025 7.0  % Final    Eosinophil % 03/10/2025 4.8  % Final    Basophil % 03/10/2025 0.9  % Final    Glucose 03/10/2025 101 (H)  65 - 99 mg/dL Final    BUN 03/10/2025 15  7 - 25 mg/dL Final    Creatinine 03/10/2025 0.95  0.50 - 1.05 mg/dL Final    eGFR 03/10/2025 67  > OR = 60 mL/min/1.73m2 Final    BUN/Creatinine Ratio 03/10/2025 SEE NOTE:  6 - 22 (calc) Final    Sodium 03/10/2025 143  135 - 146 mmol/L Final    Potassium 03/10/2025 3.9  3.5 - 5.3 mmol/L Final    Chloride 03/10/2025 106  98 - 110 mmol/L Final    CO2 03/10/2025 28  20 - 32 mmol/L Final    Calcium 03/10/2025 9.7  8.6 - 10.4 mg/dL Final    Total Protein 03/10/2025 6.3  6.1 - 8.1 g/dL Final    Albumin 03/10/2025 4.1  3.6 - 5.1 g/dL Final    Globulin, Total 03/10/2025 2.2  1.9 - 3.7 g/dL (calc) Final    Albumin/Globulin Ratio 03/10/2025 1.9  1.0 - 2.5 (calc) Final    Total Bilirubin 03/10/2025 0.4  0.2 - 1.2 mg/dL Final    Alkaline Phosphatase 03/10/2025 77  37 - 153 U/L Final    AST 03/10/2025 14  10 - 35 U/L Final    ALT  03/10/2025 22  6 - 29 U/L Final    Iron 03/10/2025 115  45 - 160 mcg/dL Final    TIBC 03/10/2025 292  250 - 450 mcg/dL (calc) Final    Iron Saturation 03/10/2025 39  16 - 45 % (calc) Final    Ferritin 03/10/2025 201  16 - 288 ng/mL Final   Office Visit on 01/13/2025   Component Date Value Ref Range Status    Hemoglobin A1C, POC 01/13/2025 5.6  % Final   Ancillary Orders on 11/29/2024   Component Date Value Ref Range Status    WBC 12/13/2024 6.3  3.8 - 10.8 Thousand/uL Final    RBC 12/13/2024 5.28 (H)  3.80 - 5.10 Million/uL Final    Hemoglobin 12/13/2024 14.9  11.7 - 15.5 g/dL Final    Hematocrit 12/13/2024 46.8 (H)  35.0 - 45.0 % Final    MCV 12/13/2024 88.6  80.0 - 100.0 fL Final    MCH 12/13/2024 28.2  27.0 - 33.0 pg Final    MCHC 12/13/2024 31.8 (L)  32.0 - 36.0 g/dL Final    RDW 12/13/2024 18.0 (H)  11.0 - 15.0 % Final    Platelets 12/13/2024 271  140 - 400 Thousand/uL Final    MPV 12/13/2024 10.9  7.5 - 12.5 fL Final    Neutrophils, Abs 12/13/2024 4,089  1,500 - 7,800 cells/uL Final    Lymph # 12/13/2024 1,418  850 - 3,900 cells/uL Final    Mono # 12/13/2024 529  200 - 950 cells/uL Final    Eos # 12/13/2024 208  15 - 500 cells/uL Final    Baso # 12/13/2024 57  0 - 200 cells/uL Final    Neutrophils Relative 12/13/2024 64.9  % Final    Lymph % 12/13/2024 22.5  % Final    Mono % 12/13/2024 8.4  % Final    Eosinophil % 12/13/2024 3.3  % Final    Basophil % 12/13/2024 0.9  % Final    Glucose 12/13/2024 80  65 - 99 mg/dL Final    BUN 12/13/2024 16  7 - 25 mg/dL Final    Creatinine 12/13/2024 0.81  0.50 - 1.05 mg/dL Final    eGFR 12/13/2024 82  > OR = 60 mL/min/1.73m2 Final    BUN/Creatinine Ratio 12/13/2024 SEE NOTE:  6 - 22 (calc) Final    Sodium 12/13/2024 144  135 - 146 mmol/L Final    Potassium 12/13/2024 4.5  3.5 - 5.3 mmol/L Final    Chloride 12/13/2024 107  98 - 110 mmol/L Final    CO2 12/13/2024 30  20 - 32 mmol/L Final    Calcium 12/13/2024 9.3  8.6 - 10.4 mg/dL Final    Total Protein 12/13/2024 6.3  6.1 -  8.1 g/dL Final    Albumin 2024 4.0  3.6 - 5.1 g/dL Final    Globulin, Total 2024 2.3  1.9 - 3.7 g/dL (calc) Final    Albumin/Globulin Ratio 2024 1.7  1.0 - 2.5 (calc) Final    Total Bilirubin 2024 0.3  0.2 - 1.2 mg/dL Final    Alkaline Phosphatase 2024 80  37 - 153 U/L Final    AST 2024 14  10 - 35 U/L Final    ALT 2024 15  6 - 29 U/L Final    Iron 2024 90  45 - 160 mcg/dL Final    TIBC 2024 289  250 - 450 mcg/dL (calc) Final    Iron Saturation 2024 31  16 - 45 % (calc) Final    Ferritin 2024 166  16 - 288 ng/mL Final       Past Medical History:   Diagnosis Date    Breast cancer, stage 1, estrogen receptor negative, left () 2020    Depression     Diabetes mellitus, type 2     GERD (gastroesophageal reflux disease)     HER2-positive carcinoma of left breast 2020    Hx of breast cancer     Hx of breast cancer - Her2Nu+/ER+ - 2019    Insomnia     Lymphedema     Neuropathy of both feet     S/P lumpectomy, left breast 2020     Past Surgical History:   Procedure Laterality Date    ARTHROSCOPY OF SHOULDER WITH DECOMPRESSION OF SUBACROMIAL SPACE Right 2023    Procedure: ARTHROSCOPY, SHOULDER, WITH SUBACROMIAL SPACE DECOMPRESSION;  Surgeon: Curtis Ricardo MD;  Location: [facility] OR;  Service: Orthopedics;  Laterality: Right;    BICEPS TENDON REPAIR Left 2005    BREAST BIOPSY Left     BREAST LUMPECTOMY Left      SECTION      1982, 1985, 1986    CHOLECYSTECTOMY  2000    Elbow fx Left 2015    FRACTURE SURGERY Left 2016    elbow    HYSTERECTOMY  2013    KNEE ARTHROSCOPY W/ MENISCAL REPAIR Left 2014    Lower back ruptured disc  2010    LYMPH NODE DISSECTION  2011    ROBOTIC ARTHROPLASTY, KNEE Left 2023    Procedure: ROBOTIC ARTHROPLASTY, KNEE, TOTAL, LEFT;  Surgeon: Curtis Ricardo MD;  Location: [facility] OR;  Service: Orthopedics;  Laterality: Left;  Jaime-Edi    ROBOTIC ARTHROPLASTY, KNEE Right 2024     Procedure: ROBOTIC ARTHROPLASTY, KNEE, TOTAL, RIGHT;  Surgeon: Curtis Ricardo MD;  Location: [facility];  Service: Orthopedics;  Laterality: Right;  Jaime-Edi    SPINE SURGERY  2009    ruptured disc     Family History   Problem Relation Name Age of Onset    Cancer Mother Brenda         breast    Breast cancer Mother Brenda 70    Parkinsonism Father RYANE Stone     Diabetes Father RAYNE Stone     Breast cancer Sister Marie     Cancer Sister Marie 70 - 79        liver and breast    Cancer Daughter      Breast cancer Maternal Aunt      Cancer Maternal Aunt Jose Juan Dorman     Breast cancer Paternal Aunt      Cancer Paternal Aunt Radhaeene     Heart disease Maternal Grandmother Marlee     Heart disease Paternal Grandmother Bela        All of your core healthy metrics are met.      The 10-year CVD risk score (SHERON'Agostino, et al., 2008) is: 12.9%    Values used to calculate the score:      Age: 64 years      Sex: Female      Diabetic: Yes      Tobacco smoker: No      Systolic Blood Pressure: 128 mmHg      Is BP treated: No      HDL Cholesterol: 86 mg/dL      Total Cholesterol: 235 mg/dL     Marital Status:   Alcohol History:  reports that she does not currently use alcohol after a past usage of about 2.0 standard drinks of alcohol per week.  Tobacco History:  reports that she quit smoking about 14 years ago. Her smoking use included cigarettes. She started smoking about 39 years ago. She has a 12.5 pack-year smoking history. She has never used smokeless tobacco.  Drug History:  reports no history of drug use.    Health Maintenance Topics with due status: Not Due       Topic Last Completion Date    TETANUS VACCINE 08/06/2021    Colorectal Cancer Screening 07/08/2022    Diabetes Urine Screening 06/24/2024    Lipid Panel 06/24/2024    Foot Exam 06/27/2024    Hemoglobin A1c 04/10/2025    RSV Vaccine (Age 60+ and Pregnant patients) Not Due     Immunization History   Administered Date(s) Administered    COVID-19, MRNA, LN-S,  "PF (Pfizer) (Purple Cap) 03/30/2021, 04/27/2021    Tdap 08/06/2021       Review of patient's allergies indicates:   Allergen Reactions    Banana Hives    Codeine Nausea And Vomiting    Adhesive Rash    Dilaudid  [hydromorphone (bulk)] Rash    Hydromorphone hcl Rash     Current Medications[1]        Objective:      Vitals:    04/10/25 0820   BP: 128/72   Pulse: 80   SpO2: 97%   Weight: 102.7 kg (226 lb 6.4 oz)   Height: 5' 7" (1.702 m)       Physical Exam  Vitals and nursing note reviewed.   Constitutional:       General: She is not in acute distress.     Appearance: Normal appearance. She is well-developed. She is obese.   HENT:      Right Ear: External ear normal.      Left Ear: External ear normal.   Eyes:      Conjunctiva/sclera: Conjunctivae normal.      Pupils: Pupils are equal, round, and reactive to light.   Neck:      Vascular: No JVD.   Cardiovascular:      Rate and Rhythm: Normal rate and regular rhythm.      Heart sounds: No murmur heard.  Pulmonary:      Effort: Pulmonary effort is normal.      Breath sounds: Normal breath sounds.   Abdominal:      General: Bowel sounds are normal.      Palpations: Abdomen is soft.   Musculoskeletal:         General: No deformity.      Left elbow: Decreased range of motion.      Left wrist: Decreased range of motion.      Cervical back: Normal range of motion and neck supple.   Lymphadenopathy:      Cervical: No cervical adenopathy.   Skin:     General: Skin is warm and dry.      Findings: No rash.   Neurological:      Mental Status: She is alert and oriented to person, place, and time.      Gait: Gait normal.   Psychiatric:         Speech: Speech normal.         Behavior: Behavior normal.            Assessment:       1. Type 2 diabetes mellitus without complication, without long-term current use of insulin    2. Type 2 diabetes mellitus with other specified complication, without long-term current use of insulin    3. Primary insomnia    4. Attention deficit " hyperactivity disorder (ADHD), predominantly inattentive type    5. Physical exam    6. Gastroesophageal reflux disease, unspecified whether esophagitis present    7. Hx of breast cancer - Her2Nu+/ER+ - 2011    8. Insomnia, unspecified type    9. Myalgia    10. Ulnar abutment syndrome, unspecified laterality           Assessment & Plan    - Blood sugar average of 96, indicating well-controlled diabetes.  - Upcoming wrist surgery to address complications from previous fracture, including ulnar bone removal and arthritis clean-out.  - Shoulder condition not an immediate concern but still on horizon for potential treatment.  - Clear fluid in one ear, likely due to allergies or pollen.  - Potential connection between diet and skin itching, particularly related to pizza and bread consumption.    LEFT WRIST PAIN AND WEAKNESS:  - Evaluated the patient's wrist and determined surgery is necessary due to severe pain and weakness.  - Noted the patient's inability to hold objects, including a cup of coffee, due to wrist pain and weakness.  - Scheduled surgery for the 22nd to remove part of the ulnar bone, clean out arthritis, and correct wrist alignment.  - Planned post-surgery treatment to include casting for approximately 6 weeks.    LEFT ARM BONE AND STRUCTURE DISORDER:  - Noted the patient reports weakness in the entire arm.  - Previously described the elbow as 'ugly', indicating structural abnormalities.  - Considered future shoulder surgery, but determined it is not immediately necessary.  - Evaluated that the previous fracture has led to ongoing issues with the wrist and arm.  - Planned surgical intervention to address ongoing issues related to the previous fracture.    LEFT ULNAR NERVE LESION:  - Observed the patient experiences electric shocks from wrist to elbow.  - Observed the patient experiences nerve-related symptoms in the left arm, including electric shocks from wrist to elbow.  - Planned surgical intervention  to address ulnar bone issues, which may help alleviate nerve-related symptoms.    BILATERAL OTORRHEA:  - Noted the patient has some fluid drainage in the ears, possibly related to allergies and pollen.  - Examined ears and found clear fluid in one ear.    FOOD ALLERGY:  - Noted the patient reports itching from head to toe after eating certain foods, particularly pizza and bread.  - Suggested possible connection between carbohydrates/gluten and patient's symptoms.  - Observed the patient experiences itching from head to toe after eating certain foods.  - Suggested possible connection between diet and skin symptoms.  - Noted the patient is managing symptoms by avoiding trigger foods and reducing acetaminophen consumption.    SKIN RASH AND ERUPTION:  - Recommend trying probiotics to improve gut health and potentially alleviate symptoms.  - Recommend probiotics for gut health improvement.  - Explained importance of gut health and its potential impact on overall well-being.  - Discussed criteria for selecting an effective probiotic supplement, including CFU count and diversity of bacterial strains.  - Started probiotic supplement (specific brand not prescribed, but recommended looking for products with >10 billion CFUs and multiple bacterial strains).    HISTORY OF HEALED FRACTURE:  - Noted the patient has a history of fracturing her elbow and wrist about 10 years ago.        Plan:       1. Type 2 diabetes mellitus without complication, without long-term current use of insulin  Comments:  hga1c 5.2  Orders:  -     POCT HEMOGLOBIN A1C  -     Ambulatory referral/consult to Ophthalmology; Future; Expected date: 07/01/2025    2. Type 2 diabetes mellitus with other specified complication, without long-term current use of insulin  Comments:  stop trulicity. try mounjaro  Orders:  -     tirzepatide (MOUNJARO) 5 mg/0.5 mL PnIj; Inject 5 mg into the skin every 7 days.  Dispense: 12 Pen; Refill: 1    3. Primary  insomnia  Comments:  trazodone  Orders:  -     traZODone (DESYREL) 100 MG tablet; Take 2 tablets (200 mg total) by mouth every evening.  Dispense: 180 tablet; Refill: 1    4. Attention deficit hyperactivity disorder (ADHD), predominantly inattentive type  Comments:  strattera max dose 100mg  Orders:  -     atomoxetine (STRATTERA) 100 mg capsule; Take 1 capsule (100 mg total) by mouth once daily.  Dispense: 90 capsule; Refill: 0    5. Physical exam  -     furosemide (LASIX) 20 MG tablet; Take 1 tablet (20 mg total) by mouth daily as needed.  Dispense: 90 tablet; Refill: 3    6. Gastroesophageal reflux disease, unspecified whether esophagitis present  Comments:  protonix    7. Hx of breast cancer - Her2Nu+/ER+ - 2011  Comments:  followed by dr. jasso    8. Insomnia, unspecified type  Comments:  trazodone    9. Myalgia    10. Ulnar abutment syndrome, unspecified laterality  Comments:  being followed by dr. Acevedo,      Follow up in about 3 months (around 7/10/2025), or if symptoms worsen or fail to improve, for Diabetic Check-Up.          Counseled on age and gender appropriate medical preventative services, including cancer screenings, immunizations, overall nutritional health, need for a consistent exercise regimen and an overall push towards maintaining a vigorous and active lifestyle.      This note was generated with the assistance of ambient listening technology. Verbal consent was obtained by the patient and accompanying visitor(s) for the recording of patient appointment to facilitate this note. I attest to having reviewed and edited the generated note for accuracy, though some syntax or spelling errors may persist. Please contact the author of this note for any clarification.       4/14/2025 Shaunna Gordon NP    Answers submitted by the patient for this visit:  Review of Systems Questionnaire (Submitted on 4/3/2025)  activity change: No  neck pain: Yes  hearing loss: No  trouble swallowing: Yes  eye  discharge: No  visual disturbance: Yes  chest tightness: No  wheezing: No  chest pain: No  palpitations: No  blood in stool: No  constipation: No  vomiting: No  diarrhea: No  polydipsia: No  polyuria: No  difficulty urinating: No  hematuria: No  menstrual problem: No  dysuria: No  joint swelling: No  arthralgias: No  headaches: No  weakness: Yes  confusion: No  dysphoric mood: No         [1]   Current Outpatient Medications:     acetaminophen (TYLENOL) 500 MG tablet, Take 500 mg by mouth every 6 (six) hours as needed for Pain., Disp: , Rfl:     b complex vitamins capsule, Take 1 capsule by mouth once daily., Disp: , Rfl:     calcium carbonate (OS-JULIANNE) 600 mg calcium (1,500 mg) Tab, Take 600 mg by mouth once., Disp: , Rfl:     famotidine (PEPCID) 40 MG tablet, Take 1 tablet (40 mg total) by mouth once daily., Disp: 30 tablet, Rfl: 11    magnesium 250 mg Tab, Take 250 mg by mouth once daily., Disp: , Rfl:     omega-3 fatty acids/fish oil (FISH OIL-OMEGA-3 FATTY ACIDS) 300-1,000 mg capsule, Take by mouth once daily., Disp: , Rfl:     pantoprazole (PROTONIX) 40 MG tablet, Take 1 tablet (40 mg total) by mouth once daily., Disp: 90 tablet, Rfl: 3    pregabalin (LYRICA) 200 MG Cap, Take 200 mg by mouth 3 (three) times daily., Disp: , Rfl:     venlafaxine (EFFEXOR-XR) 75 MG 24 hr capsule, Take 3 capsules (225 mg total) by mouth every evening., Disp: 270 capsule, Rfl: 3    vitamin E 1000 UNIT capsule, , Disp: , Rfl:     atomoxetine (STRATTERA) 100 mg capsule, Take 1 capsule (100 mg total) by mouth once daily., Disp: 90 capsule, Rfl: 0    furosemide (LASIX) 20 MG tablet, Take 1 tablet (20 mg total) by mouth daily as needed., Disp: 90 tablet, Rfl: 3    mupirocin (BACTROBAN) 2 % ointment, Apply topically once daily. (Patient not taking: Reported on 1/13/2025), Disp: , Rfl:     tirzepatide (MOUNJARO) 5 mg/0.5 mL PnIj, Inject 5 mg into the skin every 7 days., Disp: 12 Pen, Rfl: 1    traZODone (DESYREL) 100 MG tablet, Take 2  tablets (200 mg total) by mouth every evening., Disp: 180 tablet, Rfl: 1

## 2025-04-21 ENCOUNTER — ANESTHESIA EVENT (OUTPATIENT)
Dept: SURGERY | Facility: HOSPITAL | Age: 65
End: 2025-04-21
Payer: MEDICARE

## 2025-04-22 ENCOUNTER — HOSPITAL ENCOUNTER (OUTPATIENT)
Facility: HOSPITAL | Age: 65
Discharge: HOME | End: 2025-04-22
Attending: ORTHOPAEDIC SURGERY | Admitting: ORTHOPAEDIC SURGERY
Payer: MEDICARE

## 2025-04-22 ENCOUNTER — HOSPITAL ENCOUNTER (OUTPATIENT)
Dept: RADIOLOGY | Facility: HOSPITAL | Age: 65
Discharge: HOME | End: 2025-04-22
Attending: ORTHOPAEDIC SURGERY
Payer: MEDICARE

## 2025-04-22 ENCOUNTER — ANESTHESIA (OUTPATIENT)
Dept: SURGERY | Facility: HOSPITAL | Age: 65
End: 2025-04-22
Payer: MEDICARE

## 2025-04-22 DIAGNOSIS — M25.832 ULNAR IMPACTION SYNDROME, LEFT: ICD-10-CM

## 2025-04-22 DIAGNOSIS — M25.532 LEFT WRIST PAIN: ICD-10-CM

## 2025-04-22 LAB — GLUCOSE SERPL-MCNC: 87 MG/DL (ref 70–110)

## 2025-04-22 PROCEDURE — D9220A PRA ANESTHESIA: Mod: CRNA,,, | Performed by: NURSE ANESTHETIST, CERTIFIED REGISTERED

## 2025-04-22 PROCEDURE — 71000033 HC RECOVERY, INTIAL HOUR: Mod: PO | Performed by: ORTHOPAEDIC SURGERY

## 2025-04-22 PROCEDURE — 63600175 PHARM REV CODE 636 W HCPCS: Mod: PO | Performed by: ANESTHESIOLOGY

## 2025-04-22 PROCEDURE — 36000709 HC OR TIME LEV III EA ADD 15 MIN: Mod: PO | Performed by: ORTHOPAEDIC SURGERY

## 2025-04-22 PROCEDURE — D9220A PRA ANESTHESIA: Mod: ANES,,, | Performed by: ANESTHESIOLOGY

## 2025-04-22 PROCEDURE — 71000015 HC POSTOP RECOV 1ST HR: Mod: PO | Performed by: ORTHOPAEDIC SURGERY

## 2025-04-22 PROCEDURE — 63600175 PHARM REV CODE 636 W HCPCS: Mod: PO | Performed by: PHYSICIAN ASSISTANT

## 2025-04-22 PROCEDURE — 36000708 HC OR TIME LEV III 1ST 15 MIN: Mod: PO | Performed by: ORTHOPAEDIC SURGERY

## 2025-04-22 PROCEDURE — 64415 NJX AA&/STRD BRCH PLXS IMG: CPT | Mod: PO | Performed by: ANESTHESIOLOGY

## 2025-04-22 PROCEDURE — C1713 ANCHOR/SCREW BN/BN,TIS/BN: HCPCS | Mod: PO | Performed by: ORTHOPAEDIC SURGERY

## 2025-04-22 PROCEDURE — 25000003 PHARM REV CODE 250: Mod: PO | Performed by: PHYSICIAN ASSISTANT

## 2025-04-22 PROCEDURE — 76000 FLUOROSCOPY <1 HR PHYS/QHP: CPT | Mod: TC,PO

## 2025-04-22 PROCEDURE — 37000008 HC ANESTHESIA 1ST 15 MINUTES: Mod: PO | Performed by: ORTHOPAEDIC SURGERY

## 2025-04-22 PROCEDURE — 25390 SHORTEN RADIUS OR ULNA: CPT | Mod: LT,,, | Performed by: ORTHOPAEDIC SURGERY

## 2025-04-22 PROCEDURE — 29846 WRIST ARTHROSCOPY/SURGERY: CPT | Mod: 51,LT,, | Performed by: ORTHOPAEDIC SURGERY

## 2025-04-22 PROCEDURE — 27200651 HC AIRWAY, LMA: Mod: PO | Performed by: ANESTHESIOLOGY

## 2025-04-22 PROCEDURE — 37000009 HC ANESTHESIA EA ADD 15 MINS: Mod: PO | Performed by: ORTHOPAEDIC SURGERY

## 2025-04-22 PROCEDURE — 27201423 OPTIME MED/SURG SUP & DEVICES STERILE SUPPLY: Mod: PO | Performed by: ORTHOPAEDIC SURGERY

## 2025-04-22 PROCEDURE — 63600175 PHARM REV CODE 636 W HCPCS: Mod: PO | Performed by: NURSE ANESTHETIST, CERTIFIED REGISTERED

## 2025-04-22 DEVICE — SCREW CORTEX 2.7 X 16MM: Type: IMPLANTABLE DEVICE | Site: ARM | Status: FUNCTIONAL

## 2025-04-22 DEVICE — SCREW STRDRV REC T8 2.7X14 SS: Type: IMPLANTABLE DEVICE | Site: ARM | Status: FUNCTIONAL

## 2025-04-22 DEVICE — SCREW STRDRV REC T8 2.7X12 SS: Type: IMPLANTABLE DEVICE | Site: ARM | Status: FUNCTIONAL

## 2025-04-22 DEVICE — SCREW 2.7X14MM: Type: IMPLANTABLE DEVICE | Site: ARM | Status: FUNCTIONAL

## 2025-04-22 DEVICE — SCREW CORTEX 3.5MM X 14MM.: Type: IMPLANTABLE DEVICE | Site: ARM | Status: FUNCTIONAL

## 2025-04-22 RX ORDER — OXYCODONE HYDROCHLORIDE 5 MG/1
5 TABLET ORAL ONCE AS NEEDED
Status: DISCONTINUED | OUTPATIENT
Start: 2025-04-22 | End: 2025-04-22 | Stop reason: HOSPADM

## 2025-04-22 RX ORDER — LIDOCAINE HYDROCHLORIDE 10 MG/ML
1 INJECTION, SOLUTION EPIDURAL; INFILTRATION; INTRACAUDAL; PERINEURAL ONCE
Status: DISCONTINUED | OUTPATIENT
Start: 2025-04-22 | End: 2025-04-22 | Stop reason: HOSPADM

## 2025-04-22 RX ORDER — FENTANYL CITRATE 50 UG/ML
100 INJECTION, SOLUTION INTRAMUSCULAR; INTRAVENOUS SEE ADMIN INSTRUCTIONS
Status: DISCONTINUED | OUTPATIENT
Start: 2025-04-22 | End: 2025-04-22 | Stop reason: HOSPADM

## 2025-04-22 RX ORDER — CEFAZOLIN SODIUM 1 G/3ML
2 INJECTION, POWDER, FOR SOLUTION INTRAMUSCULAR; INTRAVENOUS
Status: COMPLETED | OUTPATIENT
Start: 2025-04-22 | End: 2025-04-22

## 2025-04-22 RX ORDER — FENTANYL CITRATE 50 UG/ML
25 INJECTION, SOLUTION INTRAMUSCULAR; INTRAVENOUS EVERY 5 MIN PRN
Status: DISCONTINUED | OUTPATIENT
Start: 2025-04-22 | End: 2025-04-22 | Stop reason: HOSPADM

## 2025-04-22 RX ORDER — BUPIVACAINE HYDROCHLORIDE 5 MG/ML
INJECTION, SOLUTION EPIDURAL; INTRACAUDAL; PERINEURAL
Status: COMPLETED | OUTPATIENT
Start: 2025-04-22 | End: 2025-04-22

## 2025-04-22 RX ORDER — OXYCODONE HYDROCHLORIDE 5 MG/1
5 TABLET ORAL EVERY 4 HOURS PRN
Qty: 10 TABLET | Refills: 0 | Status: SHIPPED | OUTPATIENT
Start: 2025-04-22

## 2025-04-22 RX ORDER — ACETAMINOPHEN 10 MG/ML
INJECTION, SOLUTION INTRAVENOUS
Status: DISCONTINUED | OUTPATIENT
Start: 2025-04-22 | End: 2025-04-22

## 2025-04-22 RX ORDER — LIDOCAINE HYDROCHLORIDE 20 MG/ML
INJECTION INTRAVENOUS
Status: DISCONTINUED | OUTPATIENT
Start: 2025-04-22 | End: 2025-04-22

## 2025-04-22 RX ORDER — ONDANSETRON 8 MG/1
8 TABLET, ORALLY DISINTEGRATING ORAL EVERY 8 HOURS PRN
Qty: 3 TABLET | Refills: 0 | Status: SHIPPED | OUTPATIENT
Start: 2025-04-22

## 2025-04-22 RX ORDER — MUPIROCIN 20 MG/G
OINTMENT TOPICAL
Status: DISCONTINUED | OUTPATIENT
Start: 2025-04-22 | End: 2025-04-22 | Stop reason: HOSPADM

## 2025-04-22 RX ORDER — MIDAZOLAM HYDROCHLORIDE 1 MG/ML
2 INJECTION, SOLUTION INTRAMUSCULAR; INTRAVENOUS
Status: DISCONTINUED | OUTPATIENT
Start: 2025-04-22 | End: 2025-04-22 | Stop reason: HOSPADM

## 2025-04-22 RX ORDER — PROPOFOL 10 MG/ML
VIAL (ML) INTRAVENOUS
Status: DISCONTINUED | OUTPATIENT
Start: 2025-04-22 | End: 2025-04-22

## 2025-04-22 RX ORDER — MIDAZOLAM HYDROCHLORIDE 1 MG/ML
INJECTION INTRAMUSCULAR; INTRAVENOUS
Status: DISCONTINUED | OUTPATIENT
Start: 2025-04-22 | End: 2025-04-22

## 2025-04-22 RX ORDER — METOCLOPRAMIDE HYDROCHLORIDE 5 MG/ML
10 INJECTION INTRAMUSCULAR; INTRAVENOUS EVERY 10 MIN PRN
Status: DISCONTINUED | OUTPATIENT
Start: 2025-04-22 | End: 2025-04-22 | Stop reason: HOSPADM

## 2025-04-22 RX ORDER — ONDANSETRON HYDROCHLORIDE 2 MG/ML
INJECTION, SOLUTION INTRAVENOUS
Status: DISCONTINUED | OUTPATIENT
Start: 2025-04-22 | End: 2025-04-22

## 2025-04-22 RX ORDER — BUPIVACAINE 13.3 MG/ML
INJECTION, SUSPENSION, LIPOSOMAL INFILTRATION
Status: COMPLETED | OUTPATIENT
Start: 2025-04-22 | End: 2025-04-22

## 2025-04-22 RX ORDER — SODIUM CHLORIDE 0.9 % (FLUSH) 0.9 %
3 SYRINGE (ML) INJECTION
Status: DISCONTINUED | OUTPATIENT
Start: 2025-04-22 | End: 2025-04-22 | Stop reason: HOSPADM

## 2025-04-22 RX ORDER — SODIUM CHLORIDE, SODIUM LACTATE, POTASSIUM CHLORIDE, CALCIUM CHLORIDE 600; 310; 30; 20 MG/100ML; MG/100ML; MG/100ML; MG/100ML
INJECTION, SOLUTION INTRAVENOUS CONTINUOUS
Status: DISCONTINUED | OUTPATIENT
Start: 2025-04-22 | End: 2025-04-22 | Stop reason: HOSPADM

## 2025-04-22 RX ADMIN — ONDANSETRON 4 MG: 2 INJECTION, SOLUTION INTRAMUSCULAR; INTRAVENOUS at 11:04

## 2025-04-22 RX ADMIN — BUPIVACAINE HYDROCHLORIDE 8 ML: 5 INJECTION, SOLUTION EPIDURAL; INTRACAUDAL; PERINEURAL at 09:04

## 2025-04-22 RX ADMIN — SODIUM CHLORIDE, POTASSIUM CHLORIDE, SODIUM LACTATE AND CALCIUM CHLORIDE: 600; 310; 30; 20 INJECTION, SOLUTION INTRAVENOUS at 12:04

## 2025-04-22 RX ADMIN — CEFAZOLIN 2 G: 330 INJECTION, POWDER, FOR SOLUTION INTRAMUSCULAR; INTRAVENOUS at 11:04

## 2025-04-22 RX ADMIN — BUPIVACAINE 20 ML: 13.3 INJECTION, SUSPENSION, LIPOSOMAL INFILTRATION at 09:04

## 2025-04-22 RX ADMIN — MUPIROCIN: 20 OINTMENT TOPICAL at 09:04

## 2025-04-22 RX ADMIN — SODIUM CHLORIDE, POTASSIUM CHLORIDE, SODIUM LACTATE AND CALCIUM CHLORIDE: 600; 310; 30; 20 INJECTION, SOLUTION INTRAVENOUS at 09:04

## 2025-04-22 RX ADMIN — FENTANYL CITRATE 100 MCG: 50 INJECTION INTRAMUSCULAR; INTRAVENOUS at 09:04

## 2025-04-22 RX ADMIN — PROPOFOL 200 MG: 10 INJECTION, EMULSION INTRAVENOUS at 11:04

## 2025-04-22 RX ADMIN — LIDOCAINE HYDROCHLORIDE 100 MG: 20 INJECTION INTRAVENOUS at 11:04

## 2025-04-22 RX ADMIN — MIDAZOLAM 2 MG: 1 INJECTION INTRAMUSCULAR; INTRAVENOUS at 09:04

## 2025-04-22 RX ADMIN — MIDAZOLAM HYDROCHLORIDE 2 MG: 2 INJECTION, SOLUTION INTRAMUSCULAR; INTRAVENOUS at 10:04

## 2025-04-22 RX ADMIN — ACETAMINOPHEN 1000 MG: 10 INJECTION INTRAVENOUS at 11:04

## 2025-04-22 NOTE — PLAN OF CARE
Left supraclavicular n single shot block complete per Dr. Balderas with Anesthesia. Exparel band placed to pt's wrist. Pt tolerated well. See Anesthesia record for procedural notes. See flowsheet and MAR for vitals and medications. Monitors in place, alarms audible. VSS. etCO2 monitoring in place. Resp even, unlabored. Skin warm, dry. Pt in NAD. Pt awaiting transport to OR. Family at bedside. Bed in lowest, locked position. Side rails up x2. Call light within reach.      Oriented - self; Oriented - place; Oriented - time

## 2025-04-22 NOTE — OP NOTE
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  Left wrist arthroscopy   2. Left wrist triangular fibrocartilage debridement   3. Left wrist extensive arthroscopic synovectomy   4. Left wrist arthroscopic loose body removal   5. Left wrist ulnar shortening osteotomy    IMPLANTS:  Synthes ulnar shortening osteotomy 2.7 mm plate x1     SPECIMENS:  None    INDICATION:     Ms. Ramírez has a history of ulnar-sided wrist pain.  She was noted have significant amount of ulnar positive variance from a previous fracture.  There is also arthritis of the distal radial ulnar joint and evidence of impaction syndrome of the carpus.  At that point I had a discussion with the patient about the risks and benefits of wrist arthroscopy with debridement of the joint including the distal radial ulnar joint and shortening of the ulna.  At that point informed consent was obtained    PROCEDURE IN DETAIL:     Ms. Ramírez was transported to the operating room and was placed supine on the operating room table. All appropriate points were padded. The left arm and hand was prepped and draped in the normal sterile fashion. Time out was called. The correct patient, correct operative site, correct procedure, antibiotic administration which consisted of 2 g of Ancef, and allergies to medications which are to Banana, Codeine, Adhesive, Dilaudid  [hydromorphone (bulk)], and Hydromorphone hcl  were reviewed. Time in was then called.     Attention was turned to the left wrist.  A 3/4 arthroscopic portal was established.  The  arthroscope was advanced into the radiocarpal joint.  There was noted to be severe degenerative changes throughout the joint.  This included ulnar positive variance.  There was complete disruption of the TFCC.  There was large cartilage lesion on the lunate noted.  There was loose body within the joint.  A 6 R portal was then established.  A shaver was introduced.  An extensive debridement of the joint was performed.  This included significant portion of the synovial tissue.  The TFCC was then visualized.  It was noted to be severely degraded and torn.  The TFCC was then debrided.  The wrist was further scope the knee there was noted to be loose bodies within the joint which were removed with arthroscopic grasper.  The distal radial ulnar joint was able to be seen from the dorsum.  The scope was removed from the radial carpal joint and was placed into the distal radial ulnar joint.  A shaver was used to debride within the distal radial ulnar joint as well.  With a complete debridement of the wrist performed the arthroscope was then removed.  The arthroscopic wounds were closed with 4-0 nylon.    Attention was then turned to the ulna.  An 8 cm incision was made over the ulnar border of the forearm and wrist.  The incision was carried through the skin.  Subcutaneous tissues were dissected with tenotomy scissors.  The fascia overlying the ulna was incised over the ulnar border.  Soft tissue was elevated off of the distal portion of the ulna.  The ulnar nerve and artery were retracted and protected.  The cut guide for a distal ulna osteotomy was positioned over the ulna itself.  It was affixed into place with a total of 3 pins.  A 5 mm cut guide was then placed.  Saw was used to resect 5 mm of ulna.  A good resection was performed.  The cut guide was then removed.  A plate was positioned over the osteotomy site.  This time the wound site was copiously irrigated and all debris was removed from the osteotomy site.   Compression across the osteotomy site was then achieved.  The plate was affixed with a single cortical screw distally and a single cortical screw proximally.  With good positioning of the plate and compression across the osteotomy site a lag screw was added.  The lag across the osteotomy site in a perpendicular fashion.  This did provide excellent compression across the osteotomy site.  The proximal and distal screws were then further tightened.  Additional proximal and distal locking screws were placed.  This completed the construct.  Fluoroscopy was used and there was noted to be excellent compression and position of the osteotomy site with good positioning of the plate and all of the screws.  At this time the wound was then copiously irrigated.  The tourniquet was let down and hemostasis was obtained.  The ulnar wound was closed with 3-0 Vicryl in the fascia.  3-0 Vicryl subcutaneous sutures and 3-0 nylon superficial sutures.  The wounds were all dressed with Xeroform, gauze padding, cast padding and a sugar-tong splint was placed    The patient was awakened from anesthesia and was transported to the recovery room in stable condition. All lap, needle, sponge, and equipment counts were correct at the end of the case.    POST-OPERATIVE PLAN:     Patient will keep the splint in place for 2 weeks which time she will follow up with me.  Will consider the timing of going to a long Velcro brace sometime at the 4-6 week jasper.

## 2025-04-22 NOTE — ANESTHESIA PREPROCEDURE EVALUATION
04/22/2025  Lolly Ramírez is a 64 y.o., female.      Pre-op Assessment    I have reviewed the Patient Summary Reports.     I have reviewed the Nursing Notes. I have reviewed the NPO Status.   I have reviewed the Medications.     Review of Systems  Anesthesia Hx:             Denies Family Hx of Anesthesia complications.    Denies Personal Hx of Anesthesia complications.                    Hematology/Oncology:                        --  Cancer in past history:       Breast    left axillary node dissection lymphedema chemotherapy and surgery       Hepatic/GI:     GERD                Musculoskeletal:  Arthritis               Neurological:        Peripheral Neuropathy                          Endocrine:  Diabetes, type 2         Morbid Obesity / BMI > 40  Psych:  Psychiatric History  depression                Physical Exam  General: Cooperative, Alert and Oriented    Airway:  Mallampati: III / II  Mouth Opening: Normal  TM Distance: Normal  Tongue: Normal  Neck ROM: Normal ROM  Neck: Girth Increased    Chest/Lungs:  Normal Respiratory Rate    Heart:  Rate: Normal  Rhythm: Regular Rhythm        Anesthesia Plan  Type of Anesthesia, risks & benefits discussed:    Anesthesia Type: Gen ETT, Gen Supraglottic Airway  Intra-op Monitoring Plan: Standard ASA Monitors  Post Op Pain Control Plan: multimodal analgesia and peripheral nerve block  Induction:  IV  Airway Plan: Video, Post-Induction  Informed Consent: Informed consent signed with the Patient and all parties understand the risks and agree with anesthesia plan.  All questions answered.   ASA Score: 3    Ready For Surgery From Anesthesia Perspective.     .

## 2025-04-22 NOTE — TRANSFER OF CARE
"Anesthesia Transfer of Care Note    Patient: Lolly Ramírez    Procedure(s) Performed: Procedure(s) (LRB):  Left wrist ulnar shortening osteotomy with debridement of the distal radial ulnar joint (Left)    Patient location: PACU    Anesthesia Type: general    Transport from OR: Transported from OR on room air with adequate spontaneous ventilation    Post pain: adequate analgesia    Post assessment: no apparent anesthetic complications    Post vital signs: stable    Level of consciousness: responds to stimulation    Nausea/Vomiting: no nausea/vomiting    Complications: none    Transfer of care protocol was followed    Last vitals: Visit Vitals  /63   Pulse 75   Temp 36.7 °C (98 °F)   Resp 12   Ht 5' 7" (1.702 m)   Wt 102.5 kg (226 lb)   SpO2 (!) 94%   Breastfeeding No   BMI 35.40 kg/m²     "

## 2025-04-22 NOTE — ANESTHESIA PROCEDURE NOTES
Intubation    Date/Time: 4/22/2025 11:06 AM    Performed by: Tameka Vazquez CRNA  Authorized by: David Clark MD    Intubation:     Induction:  Intravenous    Intubated:  Postinduction    Mask Ventilation:  Not attempted    Attempts:  1    Attempted By:  CRNA    Difficult Airway Encountered?: No      Complications:  None    Airway Device:  Supraglottic airway/LMA    Airway Device Size:  4.0    Style/Cuff Inflation:  Cuffed (inflated to minimal occlusive pressure)    Placement Verified By:  Capnometry    Findings Post-Intubation:  Atraumatic/condition of teeth unchanged

## 2025-04-22 NOTE — H&P
2025    Chief Complaint:  No chief complaint on file.      HPI:  Lolly Ramírez is a 64 y.o. female, who presents to the surgery center today.  She is here to undergo a left wrist ulnar shortening osteotomy with debridement.  She has no new complaints.    PMHX:  Past Medical History:   Diagnosis Date    Breast cancer, stage 1, estrogen receptor negative, left () 2020    Depression     Diabetes mellitus, type 2     GERD (gastroesophageal reflux disease)     HER2-positive carcinoma of left breast 2020    Hx of breast cancer     Hx of breast cancer - Her2Nu+/ER+ - 2019    Insomnia     Lymphedema     Neuropathy of both feet     S/P lumpectomy, left breast 2020       PSHX:  Past Surgical History:   Procedure Laterality Date    ARTHROSCOPY OF SHOULDER WITH DECOMPRESSION OF SUBACROMIAL SPACE Right 2023    Procedure: ARTHROSCOPY, SHOULDER, WITH SUBACROMIAL SPACE DECOMPRESSION;  Surgeon: Curtis Ricardo MD;  Location: [facility] OR;  Service: Orthopedics;  Laterality: Right;    BICEPS TENDON REPAIR Left 2005    BREAST BIOPSY Left     BREAST LUMPECTOMY Left      SECTION      1982, 1985,     CHOLECYSTECTOMY  2000    Elbow fx Left 2015    FRACTURE SURGERY Left 2016    elbow    HYSTERECTOMY  2013    KNEE ARTHROSCOPY W/ MENISCAL REPAIR Left 2014    Lower back ruptured disc  2010    LYMPH NODE DISSECTION  2011    ROBOTIC ARTHROPLASTY, KNEE Left 2023    Procedure: ROBOTIC ARTHROPLASTY, KNEE, TOTAL, LEFT;  Surgeon: Curtis Ricardo MD;  Location: [facility] OR;  Service: Orthopedics;  Laterality: Left;  [place]    ROBOTIC ARTHROPLASTY, KNEE Right 2024    Procedure: ROBOTIC ARTHROPLASTY, KNEE, TOTAL, RIGHT;  Surgeon: Curtis Ricardo MD;  Location: [facility] OR;  Service: Orthopedics;  Laterality: Right;  [place]    SPINE SURGERY  2009    ruptured disc       FMHX:  Family History   Problem Relation Name Age of Onset    Cancer Mother Brenda         breast    Breast cancer  "Mother Brenda 70    Parkinsonism Father RAYNE Stone     Diabetes Father RAYNE Stone     Breast cancer Sister Marie     Cancer Sister Marie 70 - 79        liver and breast    Cancer Daughter      Breast cancer Maternal Aunt      Cancer Maternal Aunt Jose Juan Dorman     Breast cancer Paternal Aunt      Cancer Paternal Aunt Floureene     Heart disease Maternal Grandmother Marlee     Heart disease Paternal Grandmother Bela        SOCHX:  Social History     Tobacco Use    Smoking status: Former     Current packs/day: 0.00     Average packs/day: 0.5 packs/day for 25.0 years (12.5 ttl pk-yrs)     Types: Cigarettes     Start date: 1985     Quit date: 2010     Years since quittin.4    Smokeless tobacco: Never   Substance Use Topics    Alcohol use: Not Currently     Alcohol/week: 2.0 standard drinks of alcohol     Types: 2 Glasses of wine per week       ALLERGIES:  Banana, Codeine, Adhesive, Dilaudid  [hydromorphone (bulk)], and Hydromorphone hcl    CURRENT MEDICATIONS:  Medications Ordered Prior to Encounter[1]    REVIEW OF SYSTEMS:  ROS    GENERAL PHYSICAL EXAM:   /63   Pulse 71   Temp 98 °F (36.7 °C)   Resp 16   Ht 5' 7" (1.702 m)   Wt 102.5 kg (226 lb)   SpO2 97%   Breastfeeding No   BMI 35.40 kg/m²    GEN: well developed, well nourished, no acute distress   HENT: Normocephalic, atraumatic   EYES: No discharge, conjunctiva normal   NECK: Supple, non-tender   PULM: No wheezing, no respiratory distress   CV: RRR   ABD: Soft, non-tender    ORTHO EXAM:    Examination of the left wrist reveals that there are no major skin changes.  She has no significant edema.  There has a radial shortening deformity noted.  Palpation about the wrist produces tenderness directly over the ulnar head at the TFCC.  There is also tenderness at the distal radial ulnar joint.  There was no tenderness over the radial side of the wrist.  Wrist range of motion is extension of 80° and flexion of 60°.  She can pronate and " supinate.  There is some crepitance with those motions noted.  She does have sensation intact and capillary refill is less than 2 seconds     RADIOLOGY:   X-rays of the left wrist has been reviewed.  There was noted to be degenerative changes of the distal radial ulnar joint with severe ulnar positive variance.  There is ulnar impaction syndrome noted    ASSESSMENT:   Left wrist ulnar impaction syndrome with ulnar positive variance    PLAN:  1. I have reviewed the risks and benefits of ulnar shortening osteotomy with debridement of the distal radial ulnar joint.  After discussion of the risks and benefits informed consent has been confirmed     2.  Will proceed with left wrist ulnar shortening osteotomy with debridement of the distal radial ulnar joint     3.  She will follow up in 2 weeks         [1]   No current facility-administered medications on file prior to encounter.     Current Outpatient Medications on File Prior to Encounter   Medication Sig Dispense Refill    acetaminophen (TYLENOL) 500 MG tablet Take 500 mg by mouth every 6 (six) hours as needed for Pain.      b complex vitamins capsule Take 1 capsule by mouth once daily.      calcium carbonate (OS-JULIANNE) 600 mg calcium (1,500 mg) Tab Take 600 mg by mouth once.      famotidine (PEPCID) 40 MG tablet Take 1 tablet (40 mg total) by mouth once daily. 30 tablet 11    magnesium 250 mg Tab Take 250 mg by mouth once daily.      pregabalin (LYRICA) 200 MG Cap Take 200 mg by mouth 3 (three) times daily.      venlafaxine (EFFEXOR-XR) 75 MG 24 hr capsule Take 3 capsules (225 mg total) by mouth every evening. 270 capsule 3    vitamin E 1000 UNIT capsule       mupirocin (BACTROBAN) 2 % ointment Apply topically once daily. (Patient not taking: Reported on 1/13/2025)      omega-3 fatty acids/fish oil (FISH OIL-OMEGA-3 FATTY ACIDS) 300-1,000 mg capsule Take by mouth once daily.      pantoprazole (PROTONIX) 40 MG tablet Take 1 tablet (40 mg total) by mouth once daily. 90  tablet 3

## 2025-04-22 NOTE — ANESTHESIA POSTPROCEDURE EVALUATION
Anesthesia Post Evaluation    Patient: Lolly Ramírez    Procedure(s) Performed: Procedure(s) (LRB):  Left wrist ulnar shortening osteotomy with debridement of the distal radial ulnar joint (Left)    Final Anesthesia Type: general      Patient location during evaluation: PACU  Patient participation: Yes- Able to Participate  Level of consciousness: awake and alert  Post-procedure vital signs: reviewed and stable  Pain management: adequate  Airway patency: patent    PONV status at discharge: No PONV  Anesthetic complications: no      Cardiovascular status: blood pressure returned to baseline  Respiratory status: unassisted  Hydration status: euvolemic  Follow-up not needed.              Vitals Value Taken Time   /68 04/22/25 14:02   Temp   04/22/25 14:10   Pulse 74 04/22/25 14:02   Resp 13 04/22/25 14:02   SpO2 94 % 04/22/25 14:02         No case tracking events are documented in the log.      Pain/Zac Score: Pain Rating Prior to Med Admin: 8 (4/22/2025 10:04 AM)  Pain Rating Post Med Admin: 0 (4/22/2025 10:04 AM)  Zac Score: 8 (4/22/2025  2:04 PM)          
House Keeper

## 2025-04-22 NOTE — DISCHARGE INSTRUCTIONS
Procedure:  Left wrist arthroscopy and ulnar shortening osteotomy    1.  Please keep the splint clean, dry, and in place. Do not take it off and do not get it wet.    2.  Please keep the sling to the left arm in place until you have regained full control over the arm and hand    3.  The pain block to the left arm will last between 1 and 3 days.  As soon as the pain block begins to wear off you can begin taking the prescribed pain medication    4.  Nausea medication has been prescribed in the case that you have any postoperative stomach upset    5.  Flexion and extension of the exposed fingers is encouraged but do NOT attempt to lift or push off with the left arm or hand.    6.  If there are any questions or concerns please call Dr. Acevedo office at [phone]    7.  Follow up with Dr. Acevedo in 2 weeks

## 2025-04-22 NOTE — DISCHARGE SUMMARY
[facility] - Surgery  Discharge Note  Short Stay    Procedure(s) (LRB):  Left wrist ulnar shortening osteotomy with debridement of the distal radial ulnar joint (Left)      OUTCOME: Patient tolerated treatment/procedure well without complication and is now ready for discharge.    DISPOSITION: Home or Self Care    FINAL DIAGNOSIS:  Ulnar impaction syndrome, left    FOLLOWUP: In clinic    DISCHARGE INSTRUCTIONS:    Discharge Procedure Orders   SLING ORTHOPEDIC LARGE FOR HOME USE     Diet general     Activity as tolerated     Keep surgical extremity elevated     Lifting restrictions   Order Comments: Please do not lift or push off with the left arm or hand     Leave dressing on - Keep it clean, dry, and intact until clinic visit     Call MD for:  temperature >100.4     Call MD for:  persistent nausea and vomiting     Call MD for:  severe uncontrolled pain     Call MD for:  difficulty breathing, headache or visual disturbances     Call MD for:  redness, tenderness, or signs of infection (pain, swelling, redness, odor or green/yellow discharge around incision site)     Call MD for:  hives     Call MD for:  persistent dizziness or light-headedness     Call MD for:  extreme fatigue        TIME SPENT ON DISCHARGE: 15 minutes

## 2025-04-22 NOTE — ANESTHESIA PROCEDURE NOTES
Peripheral Block    Patient location during procedure: pre-op   Block not for primary anesthetic.  Reason for block: at surgeon's request and post-op pain management   Post-op Pain Location: wrist left   Start time: 4/22/2025 9:46 AM  Timeout: 4/22/2025 9:43 AM   End time: 4/22/2025 9:51 AM    Staffing  Authorizing Provider: David Clark MD  Performing Provider: David Clark MD    Staffing  Performed by: David Clark MD  Authorized by: David Clark MD    Preanesthetic Checklist  Completed: patient identified, IV checked, site marked, risks and benefits discussed, surgical consent, monitors and equipment checked, pre-op evaluation and timeout performed  Peripheral Block  Patient position: supine  Prep: ChloraPrep  Patient monitoring: heart rate, cardiac monitor, continuous pulse ox, continuous capnometry and frequent blood pressure checks  Block type: supraclavicular  Laterality: left  Injection technique: single shot  Needle  Needle type: Stimuplex   Needle gauge: 22 G  Needle length: 2 in  Needle localization: anatomical landmarks and ultrasound guidance   -ultrasound image captured on disc.  Assessment  Injection assessment: negative aspiration, negative parasthesia and local visualized surrounding nerve  Paresthesia pain: none  Heart rate change: no  Slow fractionated injection: yes  Pain Tolerance: comfortable throughout block and no complaints  Medications:    Medications: BUPivacaine liposome (PF) 1.3 % (13.3 mg/mL) suspension - Injection   20 mL - 4/22/2025 9:48:00 AM  bupivacaine (pf) (MARCAINE) injection 0.5% - Perineural   8 mL - 4/22/2025 9:48:00 AM    Additional Notes  VSS.  [facility] RN monitoring vitals throughout procedure.  Patient tolerated procedure well.

## 2025-04-23 VITALS
DIASTOLIC BLOOD PRESSURE: 65 MMHG | SYSTOLIC BLOOD PRESSURE: 140 MMHG | OXYGEN SATURATION: 97 % | HEART RATE: 75 BPM | WEIGHT: 226 LBS | BODY MASS INDEX: 35.47 KG/M2 | HEIGHT: 67 IN | RESPIRATION RATE: 16 BRPM | TEMPERATURE: 98 F

## 2025-05-07 ENCOUNTER — HOSPITAL ENCOUNTER (OUTPATIENT)
Dept: RADIOLOGY | Facility: HOSPITAL | Age: 65
Discharge: HOME | End: 2025-05-07
Attending: ORTHOPAEDIC SURGERY
Payer: MEDICARE

## 2025-05-07 ENCOUNTER — OFFICE VISIT (OUTPATIENT)
Dept: ORTHOPEDICS | Facility: CLINIC | Age: 65
End: 2025-05-07
Payer: MEDICARE

## 2025-05-07 DIAGNOSIS — M25.832 ULNAR IMPACTION SYNDROME, LEFT: ICD-10-CM

## 2025-05-07 DIAGNOSIS — M25.832 ULNAR IMPACTION SYNDROME, LEFT: Primary | ICD-10-CM

## 2025-05-07 PROCEDURE — 99999 PR PBB SHADOW E&M-EST. PATIENT-LVL III: CPT | Mod: PBBFAC,,, | Performed by: ORTHOPAEDIC SURGERY

## 2025-05-07 PROCEDURE — 73110 X-RAY EXAM OF WRIST: CPT | Mod: TC,PO,LT

## 2025-05-07 PROCEDURE — 73110 X-RAY EXAM OF WRIST: CPT | Mod: 26,LT,, | Performed by: RADIOLOGY

## 2025-05-07 PROCEDURE — 99024 POSTOP FOLLOW-UP VISIT: CPT | Mod: POP,,, | Performed by: ORTHOPAEDIC SURGERY

## 2025-05-07 PROCEDURE — 99213 OFFICE O/P EST LOW 20 MIN: CPT | Mod: PBBFAC,25,PO | Performed by: ORTHOPAEDIC SURGERY

## 2025-05-07 NOTE — PROGRESS NOTES
Ms Ramírez returns to clinic today.  Has a history of left wrist ulnar impaction syndrome.  She underwent extensive debridement arthroscopically as well as ulnar shortening osteotomy.  She is 2 weeks postop.  She is overall doing well     Physical exam: Examination of the left forearm wrist and hand reveals that the incisions are healing well.  There are sutures in place.  She does have significant number of abrasions over the forearm from scratching.  There was no surrounding erythema or drainage.  She is neurovascularly intact over the hand     Radiology: X-rays of the left wrist were taken in clinic today there was noted to be evidence of the ulnar shortening osteotomy.  The osteotomy site remains well aligned.  There is plate and screw fixation in place.  There is still significant amount of arthritis in the wrist and distal radioulnar joints     Assessment:  Status post left wrist arthroscopy with debridement and ulnar shortening osteotomy     Plan:     1. Sutures were removed today and Steri-Strips were placed     2.  She was placed into a short-arm fiberglass cast     3.  She will be limited to nonweightbearing    4.  She will follow up in 2-3 weeks with an x-ray of the left wrist out of the cast

## 2025-05-16 DIAGNOSIS — M25.832 ULNAR IMPACTION SYNDROME, LEFT: Primary | ICD-10-CM

## 2025-05-21 ENCOUNTER — HOSPITAL ENCOUNTER (OUTPATIENT)
Dept: RADIOLOGY | Facility: HOSPITAL | Age: 65
Discharge: HOME | End: 2025-05-21
Attending: ORTHOPAEDIC SURGERY
Payer: MEDICARE

## 2025-05-21 ENCOUNTER — OFFICE VISIT (OUTPATIENT)
Dept: ORTHOPEDICS | Facility: CLINIC | Age: 65
End: 2025-05-21
Payer: MEDICARE

## 2025-05-21 DIAGNOSIS — M25.832 ULNAR IMPACTION SYNDROME, LEFT: Primary | ICD-10-CM

## 2025-05-21 DIAGNOSIS — M25.832 ULNAR IMPACTION SYNDROME, LEFT: ICD-10-CM

## 2025-05-21 PROCEDURE — 73110 X-RAY EXAM OF WRIST: CPT | Mod: 26,LT,, | Performed by: RADIOLOGY

## 2025-05-21 PROCEDURE — 73110 X-RAY EXAM OF WRIST: CPT | Mod: TC,PO,LT

## 2025-05-21 PROCEDURE — 99024 POSTOP FOLLOW-UP VISIT: CPT | Mod: POP,,, | Performed by: ORTHOPAEDIC SURGERY

## 2025-05-21 NOTE — PROGRESS NOTES
Ms Ramírez returns to clinic today.  He is approximately 4 weeks status post left wrist ulnar shortening osteotomy with arthroscopic debridement of the joint.  She is overall doing well.      Physical exam: Examination of the left forearm and wrist reveals that the wounds are all healing well.  There is only minimal edema.  There was no erythema.  Palpation does not produce significant tenderness.      Radiology: X-rays of the left wrist were taken in clinic today.  There is evidence of the ulnar shortening osteotomy.  The plate and screws remain well fixed in the osteotomy site has a early healing noted     Assessment: Status post left wrist ulnar shortening osteotomy and arthroscopic joint debridement     Plan:    1. Will place her into a Velcro wrist splint     2.  She will continue limited to nonweightbearing    3.  She will follow up in 3 weeks with an x-ray of the left wrist

## 2025-05-26 DIAGNOSIS — Z00.00 ENCOUNTER FOR MEDICARE ANNUAL WELLNESS EXAM: ICD-10-CM

## 2025-06-03 DIAGNOSIS — M25.832 ULNAR IMPACTION SYNDROME, LEFT: Primary | ICD-10-CM

## 2025-06-13 ENCOUNTER — OFFICE VISIT (OUTPATIENT)
Dept: ORTHOPEDICS | Facility: CLINIC | Age: 65
End: 2025-06-13
Payer: MEDICARE

## 2025-06-13 ENCOUNTER — HOSPITAL ENCOUNTER (OUTPATIENT)
Dept: RADIOLOGY | Facility: HOSPITAL | Age: 65
Discharge: HOME | End: 2025-06-13
Attending: ORTHOPAEDIC SURGERY
Payer: MEDICARE

## 2025-06-13 VITALS — WEIGHT: 226 LBS | HEIGHT: 67 IN | BODY MASS INDEX: 35.47 KG/M2

## 2025-06-13 DIAGNOSIS — M25.832 ULNAR IMPACTION SYNDROME, LEFT: Primary | ICD-10-CM

## 2025-06-13 DIAGNOSIS — M19.032 PRIMARY OSTEOARTHRITIS OF LEFT WRIST: ICD-10-CM

## 2025-06-13 DIAGNOSIS — M25.832 ULNAR IMPACTION SYNDROME, LEFT: ICD-10-CM

## 2025-06-13 PROCEDURE — 99999 PR PBB SHADOW E&M-EST. PATIENT-LVL III: CPT | Mod: PBBFAC,,, | Performed by: ORTHOPAEDIC SURGERY

## 2025-06-13 PROCEDURE — 73110 X-RAY EXAM OF WRIST: CPT | Mod: 26,LT,, | Performed by: RADIOLOGY

## 2025-06-13 PROCEDURE — 73110 X-RAY EXAM OF WRIST: CPT | Mod: TC,PO,LT

## 2025-06-13 PROCEDURE — 99213 OFFICE O/P EST LOW 20 MIN: CPT | Mod: PBBFAC,25,PO | Performed by: ORTHOPAEDIC SURGERY

## 2025-06-13 NOTE — PROGRESS NOTES
Ms Ramírez returns to clinic today.  Has a history of left wrist ulnar shortening osteotomy with debridement of the joint.  She states that she is improving.  She still has pain in the wrist as well as about her elbow.  She is here today for further evaluation.  He has been wearing her splint     Physical exam: Examination of the left wrist and hand reveals that the wound is very well healed.  There was no edema or erythema.  Palpation produces only minimal tenderness.  Attempts at pronation and supination do reproduce some pain.    Radiology: X-rays of the left forearm and wrist were taken in clinic today.  He is noted have evidence of the ulnar shortening osteotomy.  The plate and screws remains well fixed.  The osteotomy site appears to be healing.    Assessment: Status post left wrist debridement with ulnar shortening osteotomy     Plan:     1. I will begin to wean out of the Velcro brace     2.  She will continue with 3-5 lb weight limit     3. She will begin a home range of motion program     4.  She will follow up in 4 weeks for repeat evaluation with an x-ray of the left wrist

## 2025-06-16 ENCOUNTER — TELEPHONE (OUTPATIENT)
Dept: FAMILY MEDICINE | Facility: CLINIC | Age: 65
End: 2025-06-16
Payer: MEDICARE

## 2025-06-16 DIAGNOSIS — Z12.31 ENCOUNTER FOR SCREENING MAMMOGRAM FOR MALIGNANT NEOPLASM OF BREAST: Primary | ICD-10-CM

## 2025-06-16 NOTE — TELEPHONE ENCOUNTER
----- Message from Young Barrios sent at 4/10/2025  8:23 AM CDT -----  Regarding: Mammogram due  Mammogram due June 24 needs order

## 2025-06-27 ENCOUNTER — HOSPITAL ENCOUNTER (OUTPATIENT)
Dept: RADIOLOGY | Facility: HOSPITAL | Age: 65
Discharge: HOME | End: 2025-06-27
Attending: NURSE PRACTITIONER
Payer: MEDICARE

## 2025-06-27 VITALS — WEIGHT: 225 LBS | BODY MASS INDEX: 35.31 KG/M2 | HEIGHT: 67 IN

## 2025-06-27 DIAGNOSIS — Z12.31 ENCOUNTER FOR SCREENING MAMMOGRAM FOR MALIGNANT NEOPLASM OF BREAST: ICD-10-CM

## 2025-06-27 PROCEDURE — 77063 BREAST TOMOSYNTHESIS BI: CPT | Mod: 26,,, | Performed by: RADIOLOGY

## 2025-06-27 PROCEDURE — 77067 SCR MAMMO BI INCL CAD: CPT | Mod: TC,PO

## 2025-06-27 PROCEDURE — 77067 SCR MAMMO BI INCL CAD: CPT | Mod: 26,,, | Performed by: RADIOLOGY

## 2025-07-04 NOTE — PROGRESS NOTES
Angioplasty of the left anterior descending diagonal lesion. Inflation 1: Pressure = 12 tommie; Duration = 5 sec. Inflation 2: Pressure = 12 tommie; Duration = 15 sec. Inflation 3: Pressure = 12 tommie; Duration = 5 sec. Inflation 4: Pressure = 12 tommie; Duration = 5 sec. Past Medical History:   Diagnosis Date    Depression     Diabetes mellitus, type 2     GERD (gastroesophageal reflux disease)     Hx of breast cancer     Insomnia     Lymphedema     Neuropathy of both feet        Past Surgical History:   Procedure Laterality Date    BICEPS TENDON REPAIR Left 2005    BREAST LUMPECTOMY Left      SECTION      , ,     CHOLECYSTECTOMY      Elbow fx Left 2015    HYSTERECTOMY      KNEE ARTHROSCOPY W/ MENISCAL REPAIR Left     Lower back ruptured disc  2010    LYMPH NODE DISSECTION         Current Outpatient Medications   Medication Sig    LYRICA 200 mg Cap Take 1 capsule by mouth 2 (two) times daily.    multivitamin capsule Take 1 capsule by mouth once daily.    naproxen (NAPROSYN) 500 MG tablet TK 1 T PO Q 12 H PRN. TAKE WITH FOOD OR MILK.    pantoprazole (PROTONIX) 40 MG tablet Take 1 tablet by mouth once daily.    traZODone (DESYREL) 50 MG tablet 1 tablet every evening.    triamcinolone (KENALOG) 0.5 % ointment NOHEMY EXT AA BID FOR 10 DAYS    triamcinolone (KENALOG) 0.5 % ointment 0.5 % by Other route.    venlafaxine (EFFEXOR) 75 MG tablet Take 1 tablet by mouth once daily.     No current facility-administered medications for this visit.        Review of patient's allergies indicates:   Allergen Reactions    Codeine Nausea And Vomiting    Dilaudid  [hydromorphone (bulk)] Rash    Hydromorphone hcl Rash       Family History   Problem Relation Age of Onset    No Known Problems Mother     Parkinsonism Father     Diabetes Father        Social History     Socioeconomic History    Marital status:      Spouse name: Not on file    Number of children: Not on file    Years of education: Not on file    Highest education level: Not on file   Occupational History    Not on file   Social Needs    Financial resource strain: Not on file    Food insecurity:     Worry: Not on file     Inability: Not on file    Transportation needs:      "Medical: Not on file     Non-medical: Not on file   Tobacco Use    Smoking status: Former Smoker     Last attempt to quit: 2010     Years since quittin.6    Smokeless tobacco: Never Used   Substance and Sexual Activity    Alcohol use: Yes     Comment: socially    Drug use: No    Sexual activity: Not on file   Lifestyle    Physical activity:     Days per week: Not on file     Minutes per session: Not on file    Stress: Not on file   Relationships    Social connections:     Talks on phone: Not on file     Gets together: Not on file     Attends Evangelical service: Not on file     Active member of club or organization: Not on file     Attends meetings of clubs or organizations: Not on file     Relationship status: Not on file   Other Topics Concern    Not on file   Social History Narrative    Not on file       Chief Complaint:   Chief Complaint   Patient presents with    Right Knee - Injections     Supartz #3 Right Knee        Consulting Physician: No ref. provider found    History of Present Illness:    This is a 58 y.o. year old female who complains of short osteoarthritis of the right knee Supartz injection      ROS    Examination:    Vital Signs:    Vitals:    19 0853   Pulse: 72   Weight: 125.2 kg (276 lb)   Height: 5' 10" (1.778 m)   PainSc:   2   PainLoc: Knee       This a well-developed, well nourished patient in no acute distress.    Alert and oriented and cooperative to examination.       Physical Exam: right knee mild effusion but no cellulitis    Imaging:  No x-rays     Assessment: steoarthritis of the right knee    Plan:  Will inject#3 Supartz      DISCLAIMER: This note may have been dictated using voice recognition software and may contain grammatical errors.     NOTE: Consult report sent to referring provider via [facility] EMR.  "

## 2025-07-06 ENCOUNTER — RESULTS FOLLOW-UP (OUTPATIENT)
Dept: FAMILY MEDICINE | Facility: CLINIC | Age: 65
End: 2025-07-06

## 2025-07-09 ENCOUNTER — TELEPHONE (OUTPATIENT)
Dept: FAMILY MEDICINE | Facility: CLINIC | Age: 65
End: 2025-07-09
Payer: MEDICARE

## 2025-07-09 NOTE — TELEPHONE ENCOUNTER
----- Message from Young Barrios sent at 4/10/2025  9:18 AM CDT -----  Regarding: Eye Exam  Eye Exam due July 1. Referral sent on 4/10 with info when appointment due

## 2025-07-09 NOTE — LETTER
AUTHORIZATION FOR RELEASE OF   CONFIDENTIAL INFORMATION    Dear Dr Newton,    We are seeing Lolly Ramírez, date of birth 1960, in the clinic at [facility]. Shaunna Gordon NP is the patient's PCP. Lolly Ramírez has an outstanding lab/procedure at the time we reviewed her chart. In order to help keep her health information updated, she has authorized us to request the following medical record(s):        (  )  MAMMOGRAM                                      (  )  COLONOSCOPY      (  )  PAP SMEAR                                          (  )  OUTSIDE LAB RESULTS     (  )  DEXA SCAN                                          ( X ) DIABETIC EYE EXAM            (  )  FOOT EXAM                                          (  )  ENTIRE RECORD     (  )  OUTSIDE IMMUNIZATIONS                 (  )  _______________         Please fax records to [facility], [place], Jodi, NP, [phone]     If you have any questions, please contact Sujey at [phone].           Patient Name: Lolly Ramírez  : 1960  Patient Phone #: [phone]

## 2025-07-10 DIAGNOSIS — M25.832 ULNAR IMPACTION SYNDROME, LEFT: Primary | ICD-10-CM

## 2025-07-14 ENCOUNTER — HOSPITAL ENCOUNTER (OUTPATIENT)
Dept: RADIOLOGY | Facility: HOSPITAL | Age: 65
Discharge: HOME | End: 2025-07-14
Attending: ORTHOPAEDIC SURGERY
Payer: MEDICARE

## 2025-07-14 ENCOUNTER — OFFICE VISIT (OUTPATIENT)
Dept: ORTHOPEDICS | Facility: CLINIC | Age: 65
End: 2025-07-14
Payer: MEDICARE

## 2025-07-14 DIAGNOSIS — M25.832 ULNAR IMPACTION SYNDROME, LEFT: Primary | ICD-10-CM

## 2025-07-14 DIAGNOSIS — M25.832 ULNAR IMPACTION SYNDROME, LEFT: ICD-10-CM

## 2025-07-14 PROCEDURE — 73110 X-RAY EXAM OF WRIST: CPT | Mod: TC,PO,LT

## 2025-07-14 PROCEDURE — 99213 OFFICE O/P EST LOW 20 MIN: CPT | Mod: PBBFAC,25,PO | Performed by: ORTHOPAEDIC SURGERY

## 2025-07-14 PROCEDURE — 99999 PR PBB SHADOW E&M-EST. PATIENT-LVL III: CPT | Mod: PBBFAC,,, | Performed by: ORTHOPAEDIC SURGERY

## 2025-07-14 PROCEDURE — 99024 POSTOP FOLLOW-UP VISIT: CPT | Mod: POP,,, | Performed by: ORTHOPAEDIC SURGERY

## 2025-07-14 PROCEDURE — 73110 X-RAY EXAM OF WRIST: CPT | Mod: 26,LT,, | Performed by: RADIOLOGY

## 2025-07-14 NOTE — PROGRESS NOTES
Ms Ramírez returns to clinic today.  Has a history of left wrist arthroscopic debridement with ulnar shortening osteotomy.  She states that the only time she really has pain is if she is loading her wrist in full extension.  The rest of the time her wrist is feeling significantly better.    Physical exam: Examination of the left wrist and forearm reveals that there is well-healed incision.  There was no significant edema.  Palpation produces minimal tenderness over the forearm.  He has no significant tenderness over the wrist.  Range of motion of the wrist is extension of 70° and flexion of 70°.  He is able to pronate and supinate without significant pain.  He is grossly neurovascularly intact.      Radiology: X-rays of the left wrist were taken in clinic today.  There was evidence of the ulnar shortening osteotomy.  The osteotomy site is still visible.  Plate and screw fixation is stable     Assessment: Status post left wrist ulnar shortening osteotomy     Plan:     1. She can begin to increase her activity as tolerated    2.  We will continue to monitor the osteotomy site     3.  She will follow up in 6 weeks with a repeat x-ray of the left forearm

## 2025-07-17 ENCOUNTER — TELEPHONE (OUTPATIENT)
Dept: FAMILY MEDICINE | Facility: CLINIC | Age: 65
End: 2025-07-17
Payer: MEDICARE

## 2025-07-17 NOTE — LETTER
AUTHORIZATION FOR RELEASE OF   CONFIDENTIAL INFORMATION    Dear Dr Newton,    We are seeing Lolly Ramírez, date of birth 1960, in the clinic at [facility]. Shaunna Gordon NP is the patient's PCP. Lolly Ramírez has an outstanding lab/procedure at the time we reviewed her chart. In order to help keep her health information updated, she has authorized us to request the following medical record(s): 2nd Request       (  )  MAMMOGRAM                                      (  )  COLONOSCOPY      (  )  PAP SMEAR                                          (  )  OUTSIDE LAB RESULTS     (  )  DEXA SCAN                                          ( X ) DIABETIC EYE EXAM            (  )  FOOT EXAM                                          (  )  ENTIRE RECORD     (  )  OUTSIDE IMMUNIZATIONS                 (  )  _______________         Please fax records to [facility], [place], Jodi, NP, [phone]     If you have any questions, please contact Sujey at [phone].           Patient Name: Lolly Ramírez  : 1960  Patient Phone #: [phone]

## 2025-07-18 ENCOUNTER — TELEPHONE (OUTPATIENT)
Dept: FAMILY MEDICINE | Facility: CLINIC | Age: 65
End: 2025-07-18
Payer: MEDICARE

## 2025-07-18 DIAGNOSIS — Z00.00 ROUTINE GENERAL MEDICAL EXAMINATION AT A HEALTH CARE FACILITY: ICD-10-CM

## 2025-07-18 DIAGNOSIS — Z00.00 PHYSICAL EXAM: ICD-10-CM

## 2025-07-18 DIAGNOSIS — E11.9 TYPE 2 DIABETES MELLITUS WITHOUT COMPLICATION, WITHOUT LONG-TERM CURRENT USE OF INSULIN: Primary | ICD-10-CM

## 2025-07-18 DIAGNOSIS — E78.5 HYPERLIPIDEMIA, UNSPECIFIED HYPERLIPIDEMIA TYPE: ICD-10-CM

## 2025-07-18 DIAGNOSIS — Z79.899 HIGH RISK MEDICATION USE: ICD-10-CM

## 2025-07-18 DIAGNOSIS — Z79.899 ENCOUNTER FOR LONG-TERM (CURRENT) USE OF HIGH-RISK MEDICATION: ICD-10-CM

## 2025-07-18 DIAGNOSIS — Z51.81 ENCOUNTER FOR THERAPEUTIC DRUG MONITORING: ICD-10-CM

## 2025-07-22 ENCOUNTER — TELEPHONE (OUTPATIENT)
Dept: FAMILY MEDICINE | Facility: CLINIC | Age: 65
End: 2025-07-22
Payer: MEDICARE

## 2025-07-22 NOTE — TELEPHONE ENCOUNTER
Spoke to patient to verify whether she saw Dr Newton for her Eye exam. Pt states they have not scheduled it yet due to a situation with their home. Patient states her and her  will get it done when they come back from out of town. Instructed patient to let us know when appointment are scheduled so we can look out for them. Verbalized understanding.

## 2025-07-29 ENCOUNTER — OFFICE VISIT (OUTPATIENT)
Dept: FAMILY MEDICINE | Facility: CLINIC | Age: 65
End: 2025-07-29
Payer: MEDICARE

## 2025-07-29 VITALS
DIASTOLIC BLOOD PRESSURE: 82 MMHG | OXYGEN SATURATION: 98 % | HEIGHT: 67 IN | BODY MASS INDEX: 33.96 KG/M2 | HEART RATE: 74 BPM | WEIGHT: 216.38 LBS | SYSTOLIC BLOOD PRESSURE: 122 MMHG

## 2025-07-29 DIAGNOSIS — E11.69 TYPE 2 DIABETES MELLITUS WITH OTHER SPECIFIED COMPLICATION, WITHOUT LONG-TERM CURRENT USE OF INSULIN: ICD-10-CM

## 2025-07-29 DIAGNOSIS — Z00.00 PHYSICAL EXAM: ICD-10-CM

## 2025-07-29 DIAGNOSIS — K21.9 GASTROESOPHAGEAL REFLUX DISEASE, UNSPECIFIED WHETHER ESOPHAGITIS PRESENT: ICD-10-CM

## 2025-07-29 DIAGNOSIS — F32.A DEPRESSION, UNSPECIFIED DEPRESSION TYPE: ICD-10-CM

## 2025-07-29 DIAGNOSIS — F90.0 ATTENTION DEFICIT HYPERACTIVITY DISORDER (ADHD), PREDOMINANTLY INATTENTIVE TYPE: ICD-10-CM

## 2025-07-29 DIAGNOSIS — E11.9 TYPE 2 DIABETES MELLITUS WITHOUT COMPLICATION, WITHOUT LONG-TERM CURRENT USE OF INSULIN: Primary | ICD-10-CM

## 2025-07-29 DIAGNOSIS — F51.01 PRIMARY INSOMNIA: ICD-10-CM

## 2025-07-29 LAB — HBA1C MFR BLD: 5.2 %

## 2025-07-29 PROCEDURE — 99214 OFFICE O/P EST MOD 30 MIN: CPT | Mod: S$GLB,,, | Performed by: NURSE PRACTITIONER

## 2025-07-29 PROCEDURE — 83036 HEMOGLOBIN GLYCOSYLATED A1C: CPT | Mod: QW,,, | Performed by: NURSE PRACTITIONER

## 2025-07-29 RX ORDER — TRAZODONE HYDROCHLORIDE 100 MG/1
200 TABLET ORAL NIGHTLY
Qty: 180 TABLET | Refills: 1 | Status: SHIPPED | OUTPATIENT
Start: 2025-07-29 | End: 2026-07-29

## 2025-07-29 RX ORDER — PANTOPRAZOLE SODIUM 40 MG/1
40 TABLET, DELAYED RELEASE ORAL DAILY
Qty: 90 TABLET | Refills: 3 | Status: SHIPPED | OUTPATIENT
Start: 2025-07-29

## 2025-07-29 RX ORDER — ATOMOXETINE 100 MG/1
100 CAPSULE ORAL DAILY
Qty: 90 CAPSULE | Refills: 0 | Status: SHIPPED | OUTPATIENT
Start: 2025-07-29

## 2025-07-29 RX ORDER — FAMOTIDINE 40 MG/1
40 TABLET, FILM COATED ORAL DAILY
Qty: 30 TABLET | Refills: 11 | Status: SHIPPED | OUTPATIENT
Start: 2025-07-29 | End: 2026-07-29

## 2025-07-29 RX ORDER — FUROSEMIDE 20 MG/1
20 TABLET ORAL DAILY PRN
Qty: 90 TABLET | Refills: 3 | Status: SHIPPED | OUTPATIENT
Start: 2025-07-29

## 2025-07-29 RX ORDER — VENLAFAXINE HYDROCHLORIDE 75 MG/1
225 CAPSULE, EXTENDED RELEASE ORAL NIGHTLY
Qty: 270 CAPSULE | Refills: 3 | Status: SHIPPED | OUTPATIENT
Start: 2025-07-29 | End: 2026-07-29

## 2025-07-29 RX ORDER — TIRZEPATIDE 5 MG/.5ML
5 INJECTION, SOLUTION SUBCUTANEOUS
Qty: 12 PEN | Refills: 1 | Status: SHIPPED | OUTPATIENT
Start: 2025-07-29

## 2025-08-01 ENCOUNTER — TELEPHONE (OUTPATIENT)
Dept: FAMILY MEDICINE | Facility: CLINIC | Age: 65
End: 2025-08-01
Payer: MEDICARE

## 2025-08-07 NOTE — PROGRESS NOTES
"  SUBJECTIVE:    Patient ID: Lolly Ramírez is a 64 y.o. female.    Chief Complaint: Follow-up (Bottles brought//Pt is here for a 3 month follow up//Pt will get Eye Exam done when they get back from their trip//Foot exam ordered//KE)      History of Present Illness    CHIEF COMPLAINT:  Lolly presents today for follow up.  is present during the exam    DIABETES:  Recent A1C of 5.2 with average glucose of 96. She has implemented dietary modifications, specifically avoiding processed foods. She acknowledges occasional pizza consumption, limiting intake to 1-2 pieces. She reports feeling asymptomatic and denies experiencing any diabetes-related symptoms. She appears to be effectively managing blood sugar through dietary interventions.    NEUROPATHY:  She reports progressive neuropathy symptoms affecting gait and sensation. She describes walking pattern changes with characteristic neuropathic walking style. She experiences significant difficulty maintaining a straight walking line and reports feeling unsteady. She describes altered foot sensation with areas of numbness and reduced tactile perception. She reports areas of sensory deficit including "dead" sensation under her arch and along her lower extremities. She experiences challenges with balance and reports tendency to deviate while walking, with occasional back twinges that further impact her mobility. She acknowledges sensory changes where she can recognize touch but cannot fully feel sensation, describing some areas as feeling like she is wearing a sock.    WRIST PAIN:  She reports ongoing wrist pain with limited range of motion. She experiences nerve pain that radiates and causes numbness in her hand. She describes difficulty moving her wrist in certain directions due to pain. Pain interferes with full wrist mobility and causes intermittent nerve-related discomfort extending up her arm. She currently manages symptoms with naproxen.    CURRENT " MEDICATIONS:  She is currently taking Lasix, Mounjaro 5 mg, famotidine, Pantoprazole, Effexor, Trazodone, and Strattera. She reports medication refills have been set up and is adherent to current medication regimen. She also takes turmeric gummies as a supplement to reduce inflammation.      ROS:  General: -fever, -chills, -fatigue, -weight gain, -weight loss  Eyes: -vision changes, -redness, -discharge  ENT: -ear pain, -nasal congestion, -sore throat  Cardiovascular: -chest pain, -palpitations, -lower extremity edema  Respiratory: -cough, -shortness of breath  Gastrointestinal: -abdominal pain, -nausea, -vomiting, -diarrhea, -constipation, -blood in stool  Genitourinary: -dysuria, -hematuria, -frequency  Musculoskeletal: +joint pain, -muscle pain, +back pain  Skin: -rash, -lesion  Neurological: -headache, -dizziness, +numbness, -tingling, +abnormal gait  Psychiatric: -anxiety, -depression, -sleep difficulty              Office Visit on 07/29/2025   Component Date Value Ref Range Status    Hemoglobin A1C, POC 07/29/2025 5.2  % Final   Telephone on 07/18/2025   Component Date Value Ref Range Status    WBC 07/29/2025 5.4  3.8 - 10.8 Thousand/uL Final    RBC 07/29/2025 4.71  3.80 - 5.10 Million/uL Final    Hemoglobin 07/29/2025 13.9  11.7 - 15.5 g/dL Final    Hematocrit 07/29/2025 44.4  35.0 - 45.0 % Final    MCV 07/29/2025 94.3  80.0 - 100.0 fL Final    MCH 07/29/2025 29.5  27.0 - 33.0 pg Final    MCHC 07/29/2025 31.3 (L)  32.0 - 36.0 g/dL Final    RDW 07/29/2025 12.6  11.0 - 15.0 % Final    Platelets 07/29/2025 283  140 - 400 Thousand/uL Final    MPV 07/29/2025 10.9  7.5 - 12.5 fL Final    Neutrophils, Abs 07/29/2025 3,186  1,500 - 7,800 cells/uL Final    Lymph # 07/29/2025 1,469  850 - 3,900 cells/uL Final    Mono # 07/29/2025 437  200 - 950 cells/uL Final    Eos # 07/29/2025 248  15 - 500 cells/uL Final    Baso # 07/29/2025 59  0 - 200 cells/uL Final    Neutrophils Relative 07/29/2025 59  % Final    Lymph %  07/29/2025 27.2  % Final    Mono % 07/29/2025 8.1  % Final    Eosinophil % 07/29/2025 4.6  % Final    Basophil % 07/29/2025 1.1  % Final    Glucose 07/29/2025 81  65 - 139 mg/dL Final    BUN 07/29/2025 16  7 - 25 mg/dL Final    Creatinine 07/29/2025 0.78  0.50 - 1.05 mg/dL Final    eGFR 07/29/2025 85  > OR = 60 mL/min/1.73m2 Final    BUN/Creatinine Ratio 07/29/2025 SEE NOTE:  6 - 22 (calc) Final    Sodium 07/29/2025 142  135 - 146 mmol/L Final    Potassium 07/29/2025 4.5  3.5 - 5.3 mmol/L Final    Chloride 07/29/2025 105  98 - 110 mmol/L Final    CO2 07/29/2025 30  20 - 32 mmol/L Final    Calcium 07/29/2025 9.3  8.6 - 10.4 mg/dL Final    Total Protein 07/29/2025 6.1  6.1 - 8.1 g/dL Final    Albumin 07/29/2025 3.9  3.6 - 5.1 g/dL Final    Globulin, Total 07/29/2025 2.2  1.9 - 3.7 g/dL (calc) Final    Albumin/Globulin Ratio 07/29/2025 1.8  1.0 - 2.5 (calc) Final    Total Bilirubin 07/29/2025 0.4  0.2 - 1.2 mg/dL Final    Alkaline Phosphatase 07/29/2025 75  37 - 153 U/L Final    AST 07/29/2025 13  10 - 35 U/L Final    ALT 07/29/2025 9  6 - 29 U/L Final    Hemoglobin A1C 07/29/2025 5.3  <5.7 % Final    Cholesterol 07/29/2025 226 (H)  <200 mg/dL Final    HDL 07/29/2025 74  > OR = 50 mg/dL Final    Triglycerides 07/29/2025 110  <150 mg/dL Final    LDL Cholesterol 07/29/2025 130 (H)  mg/dL (calc) Final    HDL/Cholesterol Ratio 07/29/2025 3.1  <5.0 (calc) Final    Non HDL Chol. (LDL+VLDL) 07/29/2025 152 (H)  <130 mg/dL (calc) Final    Creatinine, Urine 07/29/2025 195  20 - 275 mg/dL Final    Microalb, Ur 07/29/2025 1.6  See Note: mg/dL Final    Microalb/Creat Ratio 07/29/2025 8  <30 mg/g creat Final    TSH w/reflex to FT4 07/29/2025 1.18  0.40 - 4.50 mIU/L Final    Color, UA 07/29/2025 DARK YELLOW  YELLOW Final    Appearance, UA 07/29/2025 CLOUDY (A)  CLEAR Final    Specific Gravity, UA 07/29/2025 1.033  1.001 - 1.035 Final    pH, UA 07/29/2025 5.5  5.0 - 8.0 Final    Glucose, UA 07/29/2025 NEGATIVE  NEGATIVE Final     Bilirubin, UA 07/29/2025 NEGATIVE  NEGATIVE Final    Ketones, UA 07/29/2025 TRACE (A)  NEGATIVE Final    Occult Blood UA 07/29/2025 NEGATIVE  NEGATIVE Final    Protein, UA 07/29/2025 TRACE (A)  NEGATIVE Final    Nitrite, UA 07/29/2025 NEGATIVE  NEGATIVE Final    Leukocytes, UA 07/29/2025 2+ (A)  NEGATIVE Final    WBC Casts, UA 07/29/2025 6-10 (A)  < OR = 5 /HPF Final    RBC Casts, UA 07/29/2025 NONE SEEN  < OR = 2 /HPF Final    Squam Epithel, UA 07/29/2025 10-20 (A)  < OR = 5 /HPF Final    Bacteria, UA 07/29/2025 MANY (A)  NONE SEEN /HPF Final    Ca Oxalate Daphne, UA 07/29/2025 MODERATE (A)  NONE OR FEW /HPF Final    Hyaline Casts, UA 07/29/2025 0-5 (A)  NONE SEEN /LPF Final    Service Cmt: 07/29/2025 SEE COMMENT   Final    Reflexive Urine Culture 07/29/2025 SEE COMMENT   Final    Urine Culture, Routine 07/29/2025 SEE COMMENT (A)   Final   Admission on 04/22/2025, Discharged on 04/22/2025   Component Date Value Ref Range Status    POC Glucose 04/22/2025 87  70 - 110 MG/DL Final   Office Visit on 04/10/2025   Component Date Value Ref Range Status    Hemoglobin A1C, POC 04/10/2025 5.2  % Final   Ancillary Orders on 02/21/2025   Component Date Value Ref Range Status    WBC 03/10/2025 5.4  3.8 - 10.8 Thousand/uL Final    RBC 03/10/2025 4.95  3.80 - 5.10 Million/uL Final    Hemoglobin 03/10/2025 15.0  11.7 - 15.5 g/dL Final    Hematocrit 03/10/2025 45.5 (H)  35.0 - 45.0 % Final    MCV 03/10/2025 91.9  80.0 - 100.0 fL Final    MCH 03/10/2025 30.3  27.0 - 33.0 pg Final    MCHC 03/10/2025 33.0  32.0 - 36.0 g/dL Final    RDW 03/10/2025 12.6  11.0 - 15.0 % Final    Platelets 03/10/2025 293  140 - 400 Thousand/uL Final    MPV 03/10/2025 11.4  7.5 - 12.5 fL Final    Neutrophils, Abs 03/10/2025 3,488  1,500 - 7,800 cells/uL Final    Lymph # 03/10/2025 1,226  850 - 3,900 cells/uL Final    Mono # 03/10/2025 378  200 - 950 cells/uL Final    Eos # 03/10/2025 259  15 - 500 cells/uL Final    Baso # 03/10/2025 49  0 - 200 cells/uL Final     Neutrophils Relative 03/10/2025 64.6  % Final    Lymph % 03/10/2025 22.7  % Final    Mono % 03/10/2025 7.0  % Final    Eosinophil % 03/10/2025 4.8  % Final    Basophil % 03/10/2025 0.9  % Final    Glucose 03/10/2025 101 (H)  65 - 99 mg/dL Final    BUN 03/10/2025 15  7 - 25 mg/dL Final    Creatinine 03/10/2025 0.95  0.50 - 1.05 mg/dL Final    eGFR 03/10/2025 67  > OR = 60 mL/min/1.73m2 Final    BUN/Creatinine Ratio 03/10/2025 SEE NOTE:  6 - 22 (calc) Final    Sodium 03/10/2025 143  135 - 146 mmol/L Final    Potassium 03/10/2025 3.9  3.5 - 5.3 mmol/L Final    Chloride 03/10/2025 106  98 - 110 mmol/L Final    CO2 03/10/2025 28  20 - 32 mmol/L Final    Calcium 03/10/2025 9.7  8.6 - 10.4 mg/dL Final    Total Protein 03/10/2025 6.3  6.1 - 8.1 g/dL Final    Albumin 03/10/2025 4.1  3.6 - 5.1 g/dL Final    Globulin, Total 03/10/2025 2.2  1.9 - 3.7 g/dL (calc) Final    Albumin/Globulin Ratio 03/10/2025 1.9  1.0 - 2.5 (calc) Final    Total Bilirubin 03/10/2025 0.4  0.2 - 1.2 mg/dL Final    Alkaline Phosphatase 03/10/2025 77  37 - 153 U/L Final    AST 03/10/2025 14  10 - 35 U/L Final    ALT 03/10/2025 22  6 - 29 U/L Final    Iron 03/10/2025 115  45 - 160 mcg/dL Final    TIBC 03/10/2025 292  250 - 450 mcg/dL (calc) Final    Iron Saturation 03/10/2025 39  16 - 45 % (calc) Final    Ferritin 03/10/2025 201  16 - 288 ng/mL Final       Past Medical History:   Diagnosis Date    Breast cancer, stage 1, estrogen receptor negative, left (2011) 02/18/2020    Depression     Diabetes mellitus, type 2     GERD (gastroesophageal reflux disease)     HER2-positive carcinoma of left breast 02/18/2020    Hx of breast cancer     Hx of breast cancer - Her2Nu+/ER+ - 2011 12/03/2019    Insomnia     Lymphedema     Neuropathy of both feet     S/P lumpectomy, left breast 02/18/2020     Past Surgical History:   Procedure Laterality Date    ARTHROSCOPY OF SHOULDER WITH DECOMPRESSION OF SUBACROMIAL SPACE Right 5/24/2023    Procedure: ARTHROSCOPY,  SHOULDER, WITH SUBACROMIAL SPACE DECOMPRESSION;  Surgeon: Curtis Ricardo MD;  Location: [facility] OR;  Service: Orthopedics;  Laterality: Right;    BICEPS TENDON REPAIR Left 2005    BREAST BIOPSY Left     BREAST LUMPECTOMY Left 2011     SECTION      1982, 1985,     CHOLECYSTECTOMY  2000    Elbow fx Left 2015    FRACTURE SURGERY Left 2016    elbow    HYSTERECTOMY  2013    KNEE ARTHROSCOPY W/ MENISCAL REPAIR Left 2014    Lower back ruptured disc  2010    LYMPH NODE DISSECTION  2011    OSTEOTOMY, CORTEX, FOREARM OR WRIST Left 2025    Procedure: Left wrist ulnar shortening osteotomy with debridement of the distal radial ulnar joint;  Surgeon: Ruslan Acevedo MD;  Location: [facility] OR;  Service: Orthopedics;  Laterality: Left;  Anesthesia: General with a single-shot supraclavicular Exparel block    ROBOTIC ARTHROPLASTY, KNEE Left 2023    Procedure: ROBOTIC ARTHROPLASTY, KNEE, TOTAL, LEFT;  Surgeon: Curtis Ricardo MD;  Location: [facility] OR;  Service: Orthopedics;  Laterality: Left;  [place]    ROBOTIC ARTHROPLASTY, KNEE Right 2024    Procedure: ROBOTIC ARTHROPLASTY, KNEE, TOTAL, RIGHT;  Surgeon: Curtis Ricardo MD;  Location: [facility];  Service: Orthopedics;  Laterality: Right;  [place]    SPINE SURGERY  2009    ruptured disc     Family History   Problem Relation Name Age of Onset    Cancer Mother Brenda         breast    Breast cancer Mother Brenda 70    Parkinsonism Father C E Bertha     Diabetes Father C E Bertha     Breast cancer Sister Marie     Cancer Sister Marie 70 - 79        liver and breast    Cancer Daughter      Breast cancer Maternal Aunt      Cancer Maternal Aunt Jose Juan Goddardoll     Breast cancer Paternal Aunt      Cancer Paternal Aunt Floureene     Heart disease Maternal Grandmother Marlee     Heart disease Paternal Grandmother Bela        Tests to Keep You Healthy    Mammogram: Met on 2025  Eye Exam: ORDERED BUT NOT SCHEDULED  Colon Cancer Screening: Met on  "7/8/2022  Last HbA1c < 8 (07/29/2025): Yes      The 10-year CVD risk score (SHERON'Agostino, et al., 2008) is: 12%    Values used to calculate the score:      Age: 64 years      Sex: Female      Diabetic: Yes      Tobacco smoker: No      Systolic Blood Pressure: 122 mmHg      Is BP treated: No      HDL Cholesterol: 74 mg/dL      Total Cholesterol: 226 mg/dL     Marital Status:   Alcohol History:  reports that she does not currently use alcohol after a past usage of about 2.0 standard drinks of alcohol per week.  Tobacco History:  reports that she quit smoking about 14 years ago. Her smoking use included cigarettes. She started smoking about 39 years ago. She has a 12.5 pack-year smoking history. She has never used smokeless tobacco.  Drug History:  reports no history of drug use.    Health Maintenance Topics with due status: Not Due       Topic Last Completion Date    TETANUS VACCINE 08/06/2021    Colorectal Cancer Screening 07/08/2022    Influenza Vaccine 01/13/2025    Mammogram 06/27/2025    Diabetes Urine Screening 07/29/2025    Foot Exam 07/29/2025    Lipid Panel 07/29/2025    Hemoglobin A1c 07/29/2025    RSV Vaccine (Age 60+ and Pregnant patients) Not Due     Immunization History   Administered Date(s) Administered    COVID-19, MRNA, LN-S, PF (Pfizer) (Purple Cap) 03/30/2021, 04/27/2021    Tdap 08/06/2021       Review of patient's allergies indicates:   Allergen Reactions    Banana Hives    Codeine Nausea And Vomiting    Adhesive Rash    Dilaudid  [hydromorphone (bulk)] Rash    Hydromorphone hcl Rash     Current Medications[1]        Objective:      Vitals:    07/29/25 0920   BP: 122/82   Pulse: 74   SpO2: 98%   Weight: 98.2 kg (216 lb 6.4 oz)   Height: 5' 7" (1.702 m)       Physical Exam  Vitals and nursing note reviewed.   Constitutional:       General: She is not in acute distress.     Appearance: Normal appearance. She is well-developed. She is obese.   HENT:      Right Ear: External ear normal.      Left " Ear: External ear normal.   Eyes:      Conjunctiva/sclera: Conjunctivae normal.      Pupils: Pupils are equal, round, and reactive to light.   Neck:      Vascular: No JVD.   Cardiovascular:      Rate and Rhythm: Normal rate and regular rhythm.      Heart sounds: No murmur heard.  Pulmonary:      Effort: Pulmonary effort is normal.      Breath sounds: Normal breath sounds.   Abdominal:      General: Bowel sounds are normal.      Palpations: Abdomen is soft.   Musculoskeletal:      Left elbow: Decreased range of motion.      Left wrist: Deformity present. Decreased range of motion.      Cervical back: Normal range of motion and neck supple.   Lymphadenopathy:      Cervical: No cervical adenopathy.   Skin:     General: Skin is warm and dry.      Findings: No rash.   Neurological:      Mental Status: She is alert and oriented to person, place, and time.      Gait: Gait normal.   Psychiatric:         Speech: Speech normal.         Behavior: Behavior normal.          Assessment:       1. Type 2 diabetes mellitus without complication, without long-term current use of insulin    2. Physical exam    3. Type 2 diabetes mellitus with other specified complication, without long-term current use of insulin    4. Gastroesophageal reflux disease, unspecified whether esophagitis present    5. Depression, unspecified depression type    6. Primary insomnia    7. Attention deficit hyperactivity disorder (ADHD), predominantly inattentive type           Assessment & Plan    - A1C level 5.2, indicating good glycemic control with average glucose of 96 mg/dL.  - Patient no longer in pre-diabetic range, though still considered diabetic due to underlying pancreatic dysfunction.  - Gait improving, consistent with neuropathy rather than knee issues.  - Performed foot sensation test, identifying areas of decreased sensation in feet.    TYPE 2 DIABETES MELLITUS WITH DIABETIC POLYNEUROPATHY:  - Continue Mounjaro 5 mg subcutaneously weekly.  -  HbA1c is 5.2, indicating good glycemic control.  - Lolly denies hypoglycemic symptoms when glucose is 80-90 mg/dL.  - Assessed diabetic polyneuropathy symptoms, including inability to ambulate in a straight line and paresthesia in bilateral feet.  - Observed gait pattern consistent with neuropathic changes.  - Discussed neuropathy symptoms and gait difficulties with Dr. Tobar.    UNSTEADINESS ON FEET:  - Documented patient's unsteadiness on feet, inability to ambulate in a straight line, and tendency to deviate to the left while walking.  - Consulted with Dr. Tobar regarding these ambulatory difficulties.    CARPAL TUNNEL SYNDROME, LEFT UPPER LIMB:  - Lolly reports wrist pain and paresthesia in the left hand, consistent with carpal tunnel syndrome.  - Evaluated symptoms of left wrist pain, hand numbness, and associated paresthesia.    PAIN IN LEFT WRIST:  - Continue naproxen for management of wrist pain and stiffness.    STIFFNESS OF LEFT WRIST:  - Lolly reports left wrist stiffness and pain with decreased range of motion.  - Evaluated left wrist stiffness and limited mobility.    GENERAL MANAGEMENT AND FOLLOW-UP:  - Set up refills for medications including Lasix, Mounjaro, famotidine, pantoprazole, Effexor, trazodone, and Strattera.  - Lolly to continue current dietary approach of avoiding processed foods.  - Ordered comprehensive blood panel including cholesterol levels.  - Follow up to complete labs today.        Plan:       1. Type 2 diabetes mellitus without complication, without long-term current use of insulin  Comments:  hga1c 5.2  Orders:  -     POCT HEMOGLOBIN A1C  -      DIABETES FOOT EXAM    2. Physical exam  -     furosemide (LASIX) 20 MG tablet; Take 1 tablet (20 mg total) by mouth daily as needed.  Dispense: 90 tablet; Refill: 3    3. Type 2 diabetes mellitus with other specified complication, without long-term current use of insulin  Comments:  stop trulicity. try mounjaro  Orders:  -      tirzepatide (MOUNJARO) 5 mg/0.5 mL PnIj; Inject 5 mg into the skin every 7 days.  Dispense: 12 Pen; Refill: 1    4. Gastroesophageal reflux disease, unspecified whether esophagitis present  Comments:  continue protonix  Orders:  -     pantoprazole (PROTONIX) 40 MG tablet; Take 1 tablet (40 mg total) by mouth once daily.  Dispense: 90 tablet; Refill: 3    5. Depression, unspecified depression type  Comments:  effexor  Orders:  -     venlafaxine (EFFEXOR-XR) 75 MG 24 hr capsule; Take 3 capsules (225 mg total) by mouth every evening.  Dispense: 270 capsule; Refill: 3    6. Primary insomnia  Comments:  trazodone  Orders:  -     traZODone (DESYREL) 100 MG tablet; Take 2 tablets (200 mg total) by mouth every evening.  Dispense: 180 tablet; Refill: 1    7. Attention deficit hyperactivity disorder (ADHD), predominantly inattentive type  Comments:  strattera max dose 100mg  Orders:  -     atomoxetine (STRATTERA) 100 mg capsule; Take 1 capsule (100 mg total) by mouth once daily.  Dispense: 90 capsule; Refill: 0    Other orders  -     famotidine (PEPCID) 40 MG tablet; Take 1 tablet (40 mg total) by mouth once daily.  Dispense: 30 tablet; Refill: 11      Follow up in about 3 months (around 10/29/2025), or if symptoms worsen or fail to improve, for medication management.          Counseled on age and gender appropriate medical preventative services, including cancer screenings, immunizations, overall nutritional health, need for a consistent exercise regimen and an overall push towards maintaining a vigorous and active lifestyle.      This note was generated with the assistance of ambient listening technology. Verbal consent was obtained by the patient and accompanying visitor(s) for the recording of patient appointment to facilitate this note. I attest to having reviewed and edited the generated note for accuracy, though some syntax or spelling errors may persist. Please contact the author of this note for any clarification.        8/7/2025 Shaunna Gordon NP         [1]   Current Outpatient Medications:     acetaminophen (TYLENOL) 500 MG tablet, Take 500 mg by mouth every 6 (six) hours as needed for Pain., Disp: , Rfl:     BERBERINE CHLORIDE ORAL, Take by mouth., Disp: , Rfl:     pregabalin (LYRICA) 200 MG Cap, Take 200 mg by mouth 3 (three) times daily., Disp: , Rfl:     atomoxetine (STRATTERA) 100 mg capsule, Take 1 capsule (100 mg total) by mouth once daily., Disp: 90 capsule, Rfl: 0    b complex vitamins capsule, Take 1 capsule by mouth once daily. (Patient not taking: Reported on 7/29/2025), Disp: , Rfl:     famotidine (PEPCID) 40 MG tablet, Take 1 tablet (40 mg total) by mouth once daily., Disp: 30 tablet, Rfl: 11    furosemide (LASIX) 20 MG tablet, Take 1 tablet (20 mg total) by mouth daily as needed., Disp: 90 tablet, Rfl: 3    pantoprazole (PROTONIX) 40 MG tablet, Take 1 tablet (40 mg total) by mouth once daily., Disp: 90 tablet, Rfl: 3    tirzepatide (MOUNJARO) 5 mg/0.5 mL PnIj, Inject 5 mg into the skin every 7 days., Disp: 12 Pen, Rfl: 1    traZODone (DESYREL) 100 MG tablet, Take 2 tablets (200 mg total) by mouth every evening., Disp: 180 tablet, Rfl: 1    venlafaxine (EFFEXOR-XR) 75 MG 24 hr capsule, Take 3 capsules (225 mg total) by mouth every evening., Disp: 270 capsule, Rfl: 3    vitamin E 1000 UNIT capsule, , Disp: , Rfl:

## 2025-08-11 ENCOUNTER — OFFICE VISIT (OUTPATIENT)
Dept: FAMILY MEDICINE | Facility: CLINIC | Age: 65
End: 2025-08-11
Payer: MEDICARE

## 2025-08-11 VITALS
OXYGEN SATURATION: 98 % | BODY MASS INDEX: 33.87 KG/M2 | TEMPERATURE: 98 F | SYSTOLIC BLOOD PRESSURE: 122 MMHG | WEIGHT: 215.81 LBS | HEIGHT: 67 IN | DIASTOLIC BLOOD PRESSURE: 72 MMHG | HEART RATE: 80 BPM

## 2025-08-11 DIAGNOSIS — E11.69 TYPE 2 DIABETES MELLITUS WITH OTHER SPECIFIED COMPLICATION, WITHOUT LONG-TERM CURRENT USE OF INSULIN: ICD-10-CM

## 2025-08-11 DIAGNOSIS — Z00.00 ENCOUNTER FOR MEDICARE ANNUAL WELLNESS EXAM: Primary | ICD-10-CM

## 2025-08-11 DIAGNOSIS — G62.0 CHEMOTHERAPY-INDUCED NEUROPATHY: ICD-10-CM

## 2025-08-11 DIAGNOSIS — Z85.3 HX OF BREAST CANCER: ICD-10-CM

## 2025-08-11 DIAGNOSIS — R26.9 ABNORMALITY OF GAIT AND MOBILITY: ICD-10-CM

## 2025-08-11 DIAGNOSIS — T45.1X5A CHEMOTHERAPY-INDUCED NEUROPATHY: ICD-10-CM

## 2025-08-11 PROCEDURE — 99214 OFFICE O/P EST MOD 30 MIN: CPT | Mod: PBBFAC,PO | Performed by: NURSE PRACTITIONER

## 2025-08-11 PROCEDURE — G0439 PPPS, SUBSEQ VISIT: HCPCS | Mod: ,,, | Performed by: NURSE PRACTITIONER

## 2025-08-11 PROCEDURE — 99999 PR PBB SHADOW E&M-EST. PATIENT-LVL IV: CPT | Mod: PBBFAC,,, | Performed by: NURSE PRACTITIONER

## 2025-08-22 DIAGNOSIS — M19.032 PRIMARY OSTEOARTHRITIS OF LEFT WRIST: Primary | ICD-10-CM

## 2025-08-25 ENCOUNTER — OFFICE VISIT (OUTPATIENT)
Dept: ORTHOPEDICS | Facility: CLINIC | Age: 65
End: 2025-08-25
Payer: MEDICARE

## 2025-08-25 ENCOUNTER — HOSPITAL ENCOUNTER (OUTPATIENT)
Dept: RADIOLOGY | Facility: HOSPITAL | Age: 65
Discharge: HOME | End: 2025-08-25
Attending: ORTHOPAEDIC SURGERY
Payer: MEDICARE

## 2025-08-25 DIAGNOSIS — M79.642 PAIN IN LEFT HAND: ICD-10-CM

## 2025-08-25 DIAGNOSIS — S52.202K: Primary | ICD-10-CM

## 2025-08-25 DIAGNOSIS — M19.032 PRIMARY OSTEOARTHRITIS OF LEFT WRIST: ICD-10-CM

## 2025-08-25 DIAGNOSIS — S52.202K: ICD-10-CM

## 2025-08-25 DIAGNOSIS — M25.522 PAIN IN LEFT ELBOW: ICD-10-CM

## 2025-08-25 DIAGNOSIS — T85.848D PAIN FROM IMPLANTED HARDWARE, SUBSEQUENT ENCOUNTER: ICD-10-CM

## 2025-08-25 DIAGNOSIS — M25.522 PAIN IN LEFT ELBOW: Primary | ICD-10-CM

## 2025-08-25 PROCEDURE — 73110 X-RAY EXAM OF WRIST: CPT | Mod: 26,LT,, | Performed by: RADIOLOGY

## 2025-08-25 PROCEDURE — 73130 X-RAY EXAM OF HAND: CPT | Mod: 26,LT,, | Performed by: RADIOLOGY

## 2025-08-25 PROCEDURE — 73130 X-RAY EXAM OF HAND: CPT | Mod: TC,PO,LT

## 2025-08-25 PROCEDURE — 73110 X-RAY EXAM OF WRIST: CPT | Mod: TC,PO,LT

## 2025-08-25 PROCEDURE — 73080 X-RAY EXAM OF ELBOW: CPT | Mod: TC,PO,LT

## 2025-08-25 PROCEDURE — 73080 X-RAY EXAM OF ELBOW: CPT | Mod: 26,LT,, | Performed by: RADIOLOGY

## 2025-08-25 PROCEDURE — 99213 OFFICE O/P EST LOW 20 MIN: CPT | Mod: S$PBB,,, | Performed by: ORTHOPAEDIC SURGERY

## 2025-08-25 RX ORDER — MUPIROCIN 20 MG/G
OINTMENT TOPICAL
OUTPATIENT
Start: 2025-08-25

## 2025-08-25 RX ORDER — CEFAZOLIN SODIUM 2 G/50ML
2 SOLUTION INTRAVENOUS
OUTPATIENT
Start: 2025-08-25

## 2025-09-03 ENCOUNTER — ANESTHESIA EVENT (OUTPATIENT)
Dept: SURGERY | Facility: HOSPITAL | Age: 65
End: 2025-09-03
Payer: MEDICARE

## 2025-09-04 ENCOUNTER — ANESTHESIA (OUTPATIENT)
Dept: SURGERY | Facility: HOSPITAL | Age: 65
End: 2025-09-04
Payer: MEDICARE

## 2025-09-04 ENCOUNTER — HOSPITAL ENCOUNTER (OUTPATIENT)
Dept: RADIOLOGY | Facility: HOSPITAL | Age: 65
Discharge: HOME | End: 2025-09-04
Attending: ORTHOPAEDIC SURGERY
Payer: MEDICARE

## 2025-09-04 ENCOUNTER — TELEPHONE (OUTPATIENT)
Facility: CLINIC | Age: 65
End: 2025-09-04
Payer: MEDICARE

## 2025-09-04 DIAGNOSIS — M25.532 LEFT WRIST PAIN: ICD-10-CM

## 2025-09-04 PROBLEM — T85.848D PAIN FROM IMPLANTED HARDWARE, SUBSEQUENT ENCOUNTER: Status: ACTIVE | Noted: 2025-09-04

## 2025-09-04 PROCEDURE — 63600175 PHARM REV CODE 636 W HCPCS: Mod: PO | Performed by: NURSE ANESTHETIST, CERTIFIED REGISTERED

## 2025-09-04 PROCEDURE — 76000 FLUOROSCOPY <1 HR PHYS/QHP: CPT | Mod: TC,PO

## 2025-09-04 PROCEDURE — 63600175 PHARM REV CODE 636 W HCPCS: Mod: PO | Performed by: ORTHOPAEDIC SURGERY

## 2025-09-04 PROCEDURE — 63600175 PHARM REV CODE 636 W HCPCS: Mod: PO | Performed by: PHYSICIAN ASSISTANT

## 2025-09-04 RX ORDER — ONDANSETRON HYDROCHLORIDE 2 MG/ML
INJECTION, SOLUTION INTRAVENOUS
Status: DISCONTINUED | OUTPATIENT
Start: 2025-09-04 | End: 2025-09-04

## 2025-09-04 RX ORDER — LIDOCAINE HYDROCHLORIDE 20 MG/ML
INJECTION INTRAVENOUS
Status: DISCONTINUED | OUTPATIENT
Start: 2025-09-04 | End: 2025-09-04

## 2025-09-04 RX ORDER — DEXAMETHASONE SODIUM PHOSPHATE 4 MG/ML
INJECTION, SOLUTION INTRA-ARTICULAR; INTRALESIONAL; INTRAMUSCULAR; INTRAVENOUS; SOFT TISSUE
Status: DISCONTINUED | OUTPATIENT
Start: 2025-09-04 | End: 2025-09-04

## 2025-09-04 RX ORDER — ACETAMINOPHEN 10 MG/ML
INJECTION, SOLUTION INTRAVENOUS
Status: DISCONTINUED | OUTPATIENT
Start: 2025-09-04 | End: 2025-09-04

## 2025-09-04 RX ORDER — PROPOFOL 10 MG/ML
VIAL (ML) INTRAVENOUS
Status: DISCONTINUED | OUTPATIENT
Start: 2025-09-04 | End: 2025-09-04

## 2025-09-04 RX ADMIN — ACETAMINOPHEN 1000 MG: 10 INJECTION INTRAVENOUS at 01:09

## 2025-09-04 RX ADMIN — PROPOFOL 120 MG: 10 INJECTION, EMULSION INTRAVENOUS at 01:09

## 2025-09-04 RX ADMIN — DEXAMETHASONE SODIUM PHOSPHATE 4 MG: 4 INJECTION, SOLUTION INTRAMUSCULAR; INTRAVENOUS at 01:09

## 2025-09-04 RX ADMIN — ONDANSETRON 4 MG: 2 INJECTION, SOLUTION INTRAMUSCULAR; INTRAVENOUS at 01:09

## 2025-09-04 RX ADMIN — SODIUM CHLORIDE, POTASSIUM CHLORIDE, SODIUM LACTATE AND CALCIUM CHLORIDE: 600; 310; 30; 20 INJECTION, SOLUTION INTRAVENOUS at 12:09

## 2025-09-04 RX ADMIN — CEFAZOLIN 2 G: 330 INJECTION, POWDER, FOR SOLUTION INTRAMUSCULAR; INTRAVENOUS at 01:09

## 2025-09-04 RX ADMIN — LIDOCAINE HYDROCHLORIDE 100 MG: 20 INJECTION INTRAVENOUS at 01:09

## (undated) DEVICE — CATH URETHRAL 16FR RED

## (undated) DEVICE — TRAY SHOULDER ARTHROSCOPY

## (undated) DEVICE — TOGA FLYTE PEEL AWAY XLARGE

## (undated) DEVICE — KIT DRAPE RIO ONE PIECE W/POCK

## (undated) DEVICE — DRAPE HAND STERILE

## (undated) DEVICE — SUT 3-0 VICRYL / RB-1

## (undated) DEVICE — ALCOHOL 70% ISOP RUBBING 4OZ

## (undated) DEVICE — PACK CUSTOM UNIV BASIN SLI

## (undated) DEVICE — PADDING WYTEX UNDRCST 6INX4YD

## (undated) DEVICE — SLING ULTRA LARGE 11-0138-4

## (undated) DEVICE — SUT STRATAFIX PDS 1 CTX 18IN

## (undated) DEVICE — GLOVE SURG ULTRA TOUCH 8.5

## (undated) DEVICE — SYS CLSR DERMABOND PRINEO 42CM

## (undated) DEVICE — BANDAGE ESMARK LATEX FREE 4INX

## (undated) DEVICE — DRAPE U SPLIT SHEET 54X76IN

## (undated) DEVICE — NDL SPINAL 18GX3.5 SPINOCAN

## (undated) DEVICE — TUBING & CANNULA JOINT SMALL

## (undated) DEVICE — DRAPE STERI-DRAPE 1000 17X11IN

## (undated) DEVICE — DRESSING GAUZE OIL EMUL 3X8

## (undated) DEVICE — DRAPE INVISISHIELD TOWEL SMALL

## (undated) DEVICE — APPLICATOR CHLORAPREP ORN 26ML

## (undated) DEVICE — STRAP OR TABLE 5IN X 72IN

## (undated) DEVICE — SYR 50CC LL

## (undated) DEVICE — SUT FIBERWIRE 2 38 IN TAPER

## (undated) DEVICE — BANDAGE ESMARK ELASTIC ST 6X9

## (undated) DEVICE — TOURNIQUET SB QC DP 34X4IN

## (undated) DEVICE — WRAP KNEE ACCU THERM GEL PACK

## (undated) DEVICE — DRAPE THREE-QTR REINF 53X77IN

## (undated) DEVICE — MANIFOLD 4 PORT

## (undated) DEVICE — MIXER BONE CEMENT

## (undated) DEVICE — BIT DRILL 2.7MM DIA 100/75MM

## (undated) DEVICE — INTERPULSE SET

## (undated) DEVICE — COVER MAYO STND XL 30X57IN

## (undated) DEVICE — BLADE SAW SGTL 21.2X85.5X1.2

## (undated) DEVICE — SUT STRATAFIX SPIRAL VIOLET

## (undated) DEVICE — FIBERWIRE BPB #2 SUTR 1 BLU 1 WH/BLK

## (undated) DEVICE — CANNULA TWIST-IN NOTCHED

## (undated) DEVICE — SPONGE BULKEE II ABSRB 6X6.75

## (undated) DEVICE — KIT VIZADISC KNEE TRACKING

## (undated) DEVICE — SOLUTION IRRI NS BOTTLE 1000ML R5200-01

## (undated) DEVICE — BLADE SURG #15 CARBON STEEL

## (undated) DEVICE — DRAPE STERI INSTRUMENT 1018

## (undated) DEVICE — GLOVE SENSICARE PI GRN 8.5

## (undated) DEVICE — GLOVE SENSICARE PI ALOE 8

## (undated) DEVICE — BLADE AGRESSIVE PLUS 4.0 375-544-000

## (undated) DEVICE — Device

## (undated) DEVICE — SOL IRR 0.9% NACL 500ML PB

## (undated) DEVICE — GLOVE PROTEXIS LTX MICRO  7.5

## (undated) DEVICE — DRAPE U-SLOT 1019

## (undated) DEVICE — SUREFIRE SCORPION NEEDLE

## (undated) DEVICE — FORCEP STRAIGHT DISP

## (undated) DEVICE — CATH URETHRAL RED 16FR

## (undated) DEVICE — UNDERGLOVE BIOGEL MICRO BLUE SZ 8.5

## (undated) DEVICE — SPONGE LAP 18X18 PREWASHED

## (undated) DEVICE — STRAP TRACTION TOWER WRIST 19I

## (undated) DEVICE — SET DECANTER MEDICHOICE

## (undated) DEVICE — PACK SIRUS BASIC V SURG STRL

## (undated) DEVICE — BIT DRILL 2.0MM D DEPTH 110MM

## (undated) DEVICE — UNDERGLOVES BIOGEL PI SIZE 8.5

## (undated) DEVICE — NDL HYPO 27G X 1 1/2

## (undated) DEVICE — ELECTRODE REM PLYHSV RETURN 9

## (undated) DEVICE — SYR 10CC LUER LOCK

## (undated) DEVICE — ALCOHOL 70% ANTISEPTIC ISO 4OZ

## (undated) DEVICE — PAD BOVIE ADULT

## (undated) DEVICE — WRAP PROTECTIVE LEG POS STRL

## (undated) DEVICE — SLEEVE ARM LATERAL TRACTION  AR-1635

## (undated) DEVICE — SPONGE COTTON TRAY 4X4IN

## (undated) DEVICE — SOLUTION NACL 0.9% 3000ML

## (undated) DEVICE — KIT SAHARA DRAPE DRAW/LIFT

## (undated) DEVICE — PACK EXTREMITY ORTHOMAX

## (undated) DEVICE — SYR 30CC LUER LOCK

## (undated) DEVICE — STRAP TRACTION TOWER FRARM 20I

## (undated) DEVICE — TAPE SILK 3IN

## (undated) DEVICE — RESECTOR 5.5MM

## (undated) DEVICE — DRESSING XEROFORM NONADH 1X8IN

## (undated) DEVICE — TUBING SUC UNIV W/CONN 12FT

## (undated) DEVICE — SOL NACL IRR 1000ML BTL

## (undated) DEVICE — STRAP TRACTION TOWER UA PAD 6F

## (undated) DEVICE — SUT 4-0 PROLENE 18 P-3

## (undated) DEVICE — DRAPE INCISE IOBAN 2 23X33IN

## (undated) DEVICE — SLEEVE SCD EXPRESS KNEE MEDIUM

## (undated) DEVICE — PAD CAST SPECIALIST STRL 4

## (undated) DEVICE — SUT ETHILON 4-0 PS2 18 BLK

## (undated) DEVICE — BANDAGE MATRIX HK LOOP 6IN 5YD

## (undated) DEVICE — COVER TABLE 44X90 STERILE

## (undated) DEVICE — GOWN TOGA SYS PEELWY ZIP 2 XL

## (undated) DEVICE — PIN FIXATION BONE 140X3.2MM
Type: IMPLANTABLE DEVICE | Site: KNEE | Status: NON-FUNCTIONAL
Removed: 2023-11-20

## (undated) DEVICE — PIN BONE 3.2X110MM
Type: IMPLANTABLE DEVICE | Site: KNEE | Status: NON-FUNCTIONAL
Removed: 2023-11-20

## (undated) DEVICE — SOL NACL IRR 3000ML

## (undated) DEVICE — BONE PINS (3.2MM X 140MM)
Type: IMPLANTABLE DEVICE | Site: KNEE | Status: NON-FUNCTIONAL
Removed: 2024-02-26

## (undated) DEVICE — SPONGE GAUZE 10S 4X4  442214

## (undated) DEVICE — BANDAGE MATRIX HK LOOP 4IN 5YD

## (undated) DEVICE — BONE PINS (3.2MM X 110MM)
Type: IMPLANTABLE DEVICE | Site: KNEE | Status: NON-FUNCTIONAL
Removed: 2024-02-26

## (undated) DEVICE — HANDLE SURG LIGHT NONRIGID

## (undated) DEVICE — DRAPE HALF SURGICAL 40X58IN

## (undated) DEVICE — BLADE SHAVER MICRO HUB 2.9MM

## (undated) DEVICE — KIT TRIATHLON TIBIAL SIZER

## (undated) DEVICE — TOWEL OR DISP STRL BLUE 4/PK

## (undated) DEVICE — SOL POVIDONE PREP IODINE 4 OZ

## (undated) DEVICE — NDL HYPO STD REG BVL 18GX1.5IN

## (undated) DEVICE — CONNECTOR 5-IN-1 CON2001

## (undated) DEVICE — GLOVE SENSICARE PI ALOE 8.5

## (undated) DEVICE — BLADE SURG CARBON STEEL SZ11

## (undated) DEVICE — BLADE MAKO STANDARD

## (undated) DEVICE — SUT VICRYL 3-0 27 SH

## (undated) DEVICE — SUT MONOCRYL 3-0 PS-1

## (undated) DEVICE — GLOVE BIOGEL PI GOLD SZ  8.5

## (undated) DEVICE — SUT ETHICON 3-0 BLK MONO PS

## (undated) DEVICE — GLOVE PROTEXIS LTX MICRO 8

## (undated) DEVICE — BLADE 90-S SERFAS 3.5 279-351-100

## (undated) DEVICE — TOURNIQUET SB QC DP 18X4IN

## (undated) DEVICE — TAPE CLOTH 4 MEDIPORE 2864

## (undated) DEVICE — YANKAUER FLEX NO VENT HI CAP

## (undated) DEVICE — BLADE SURG CARBON STEEL #10

## (undated) DEVICE — SUT ETHILON 3/0 18IN PS-1

## (undated) DEVICE — BNDG COFLEX FOAM LF2 ST 6X5YD

## (undated) DEVICE — CORD BIPOLAR 12 FOOT

## (undated) DEVICE — TRAP DIGIT FINGER

## (undated) DEVICE — BNDG COFLEX FOAM LF2 ST 4X5YD

## (undated) DEVICE — KIT TRIATHLON CR TIB PREP SZ5

## (undated) DEVICE — KIT TRIATHLON CR FEM PREP SZ5